# Patient Record
Sex: FEMALE | Race: WHITE | NOT HISPANIC OR LATINO | Employment: OTHER | URBAN - NONMETROPOLITAN AREA
[De-identification: names, ages, dates, MRNs, and addresses within clinical notes are randomized per-mention and may not be internally consistent; named-entity substitution may affect disease eponyms.]

---

## 2017-01-25 ENCOUNTER — AMBULATORY - GICH (OUTPATIENT)
Dept: FAMILY MEDICINE | Facility: OTHER | Age: 82
End: 2017-01-25

## 2017-05-31 ENCOUNTER — AMBULATORY - GICH (OUTPATIENT)
Dept: FAMILY MEDICINE | Facility: OTHER | Age: 82
End: 2017-05-31

## 2017-06-07 ENCOUNTER — AMBULATORY - GICH (OUTPATIENT)
Dept: FAMILY MEDICINE | Facility: OTHER | Age: 82
End: 2017-06-07

## 2017-06-12 ENCOUNTER — AMBULATORY - GICH (OUTPATIENT)
Dept: FAMILY MEDICINE | Facility: OTHER | Age: 82
End: 2017-06-12

## 2017-06-13 ENCOUNTER — COMMUNICATION - GICH (OUTPATIENT)
Dept: INTERNAL MEDICINE | Facility: OTHER | Age: 82
End: 2017-06-13

## 2017-06-13 DIAGNOSIS — D75.89 OTHER SPECIFIED DISEASES OF BLOOD AND BLOOD-FORMING ORGANS: ICD-10-CM

## 2017-06-13 DIAGNOSIS — I10 ESSENTIAL (PRIMARY) HYPERTENSION: ICD-10-CM

## 2017-06-13 DIAGNOSIS — E87.1 HYPO-OSMOLALITY AND HYPONATREMIA (CODE): ICD-10-CM

## 2017-06-13 DIAGNOSIS — E03.9 HYPOTHYROIDISM: ICD-10-CM

## 2017-06-13 DIAGNOSIS — E78.5 HYPERLIPIDEMIA: ICD-10-CM

## 2017-06-13 DIAGNOSIS — N18.30 CHRONIC KIDNEY DISEASE, STAGE III (MODERATE) (H): ICD-10-CM

## 2017-06-15 ENCOUNTER — AMBULATORY - GICH (OUTPATIENT)
Dept: LAB | Facility: OTHER | Age: 82
End: 2017-06-15

## 2017-06-15 DIAGNOSIS — I10 ESSENTIAL (PRIMARY) HYPERTENSION: ICD-10-CM

## 2017-06-15 DIAGNOSIS — E78.5 HYPERLIPIDEMIA: ICD-10-CM

## 2017-06-15 DIAGNOSIS — E87.1 HYPO-OSMOLALITY AND HYPONATREMIA (CODE): ICD-10-CM

## 2017-06-15 DIAGNOSIS — E03.9 HYPOTHYROIDISM: ICD-10-CM

## 2017-06-15 DIAGNOSIS — D75.89 OTHER SPECIFIED DISEASES OF BLOOD AND BLOOD-FORMING ORGANS: ICD-10-CM

## 2017-06-15 DIAGNOSIS — N18.30 CHRONIC KIDNEY DISEASE, STAGE III (MODERATE) (H): ICD-10-CM

## 2017-06-15 LAB
ANION GAP - HISTORICAL: 9 (ref 5–18)
BUN SERPL-MCNC: 35 MG/DL (ref 7–25)
BUN/CREAT RATIO - HISTORICAL: 35
CALCIUM SERPL-MCNC: 9.6 MG/DL (ref 8.6–10.3)
CHLORIDE SERPLBLD-SCNC: 104 MMOL/L (ref 98–107)
CHOL/HDL RATIO - HISTORICAL: 3.06
CHOLESTEROL TOTAL: 162 MG/DL
CO2 SERPL-SCNC: 27 MMOL/L (ref 21–31)
CREAT SERPL-MCNC: 1.01 MG/DL (ref 0.7–1.3)
ERYTHROCYTE [DISTWIDTH] IN BLOOD BY AUTOMATED COUNT: 13.3 % (ref 11.5–15.5)
GFR IF NOT AFRICAN AMERICAN - HISTORICAL: 52 ML/MIN/1.73M2
GLUCOSE SERPL-MCNC: 96 MG/DL (ref 70–105)
HCT VFR BLD AUTO: 48.9 % (ref 33–51)
HDLC SERPL-MCNC: 53 MG/DL (ref 23–92)
HEMOGLOBIN: 16.2 G/DL (ref 12–16)
LDLC SERPL CALC-MCNC: 96 MG/DL
MCH RBC QN AUTO: 31.5 PG (ref 26–34)
MCHC RBC AUTO-ENTMCNC: 33.1 G/DL (ref 32–36)
MCV RBC AUTO: 95 FL (ref 80–100)
NON-HDL CHOLESTEROL - HISTORICAL: 109 MG/DL
PATIENT STATUS - HISTORICAL: NORMAL
PLATELET # BLD AUTO: 170 THOU/CU MM (ref 140–440)
PMV BLD: 10.4 FL (ref 6.5–11)
POTASSIUM SERPL-SCNC: 4.2 MMOL/L (ref 3.5–5.1)
RED BLOOD COUNT - HISTORICAL: 5.15 MIL/CU MM (ref 4–5.2)
SODIUM SERPL-SCNC: 140 MMOL/L (ref 133–143)
TRIGL SERPL-MCNC: 65 MG/DL
TSH - HISTORICAL: 7.48 UIU/ML (ref 0.34–5.6)
VIT B12 SERPL-MCNC: 1466 PG/ML (ref 180–914)
WHITE BLOOD COUNT - HISTORICAL: 5.9 THOU/CU MM (ref 4.5–11)

## 2017-06-16 ENCOUNTER — OFFICE VISIT - GICH (OUTPATIENT)
Dept: PEDIATRICS | Facility: OTHER | Age: 82
End: 2017-06-16

## 2017-06-16 ENCOUNTER — HISTORY (OUTPATIENT)
Dept: PEDIATRICS | Facility: OTHER | Age: 82
End: 2017-06-16

## 2017-06-16 DIAGNOSIS — I10 ESSENTIAL (PRIMARY) HYPERTENSION: ICD-10-CM

## 2017-06-16 DIAGNOSIS — I48.0 PAROXYSMAL ATRIAL FIBRILLATION (H): ICD-10-CM

## 2017-06-16 DIAGNOSIS — E03.9 HYPOTHYROIDISM: ICD-10-CM

## 2017-06-20 ENCOUNTER — AMBULATORY - GICH (OUTPATIENT)
Dept: SCHEDULING | Facility: OTHER | Age: 82
End: 2017-06-20

## 2017-07-31 ENCOUNTER — COMMUNICATION - GICH (OUTPATIENT)
Dept: INTERNAL MEDICINE | Facility: OTHER | Age: 82
End: 2017-07-31

## 2017-08-14 ENCOUNTER — COMMUNICATION - GICH (OUTPATIENT)
Dept: INTERNAL MEDICINE | Facility: OTHER | Age: 82
End: 2017-08-14

## 2017-08-14 ENCOUNTER — AMBULATORY - GICH (OUTPATIENT)
Dept: LAB | Facility: OTHER | Age: 82
End: 2017-08-14

## 2017-08-14 DIAGNOSIS — E03.9 HYPOTHYROIDISM: ICD-10-CM

## 2017-08-14 LAB — TSH - HISTORICAL: 5.49 UIU/ML (ref 0.34–5.6)

## 2017-12-28 NOTE — PROGRESS NOTES
"Patient Information     Patient Name MRJanna Pat [id number] Female 1931      Progress Notes by Nuno Clinton MD at 2017 10:45 AM     Author:  Nuno Clinton MD Service:  (none) Author Type:  Physician     Filed:  2017 12:58 PM Encounter Date:  2017 Status:  Signed     :  Nuno Clinton MD (Physician)            Subjective  Janna Mcdermott is a 86 y.o. female who presents for medication management. History of atrial fibrillation, hypertension which has been stable with losartan and diltiazem. Has chosen against warfarin and normal anticoagulation, but does continue on aspirin 81 mg daily. History of hypothyroidism and has been stable on [place] Thyroid. She eats very healthy. She does physical exercise every day. She has no chest pains. No constipation. No diarrhea. No skin or hair changes. No depression. No insomnia.    Problem List/PMH: reviewed in EMR, and made relevant updates today.  Medications: reviewed in EMR, and made relevant updates today.  Allergies: reviewed in EMR, and made relevant updates today.    Social Hx:  Social History     Substance Use Topics       Smoking status: Never Smoker     Smokeless tobacco: Never Used     Alcohol use No     Social History Narrative    .  Lives in town.  Daughter lives next door and works for the [employer].                      I reviewed social history and made relevant updates today.    Family Hx:   Family History       Problem   Relation Age of Onset     Heart Disease  Mother      Other  Other      No cancer or diabetes in the family       Other  Brother        in service       Other  Brother       of Parkinson's       Heart Disease  Sister             Heart Disease  Sister             Other  Sister      at 6 months       Other  Brother       of pneumonia         Objective  Vitals: reviewed in EMR.  /64  Pulse 68  Ht 1.549 m (5' 1\")  Wt 64 kg (141 lb)  BMI 26.64 " kg/m2    Gen: Pleasant female, NAD.  HEENT: MMM, no OP erythema.   Neck: Supple, no JVD, no bruits.  CV: Irregularly irregular, no murmurs  Pulm: CTAB no w/r/r  GI: Soft, NT, ND.   Neuro: Grossly intact  Msk: No lower extremity edema.  Skin: No concerning lesions.  Psychiatric: Normal affect and insight. Does not appear anxious or depressed.    Results for orders placed or performed in visit on 06/15/17      TSH      Result  Value Ref Range    TSH 7.48 (H) 0.34 - 5.60 uIU/mL   BASIC METABOLIC PANEL      Result  Value Ref Range    SODIUM 140 133 - 143 mmol/L    POTASSIUM 4.2 3.5 - 5.1 mmol/L    CHLORIDE 104 98 - 107 mmol/L    CO2,TOTAL 27 21 - 31 mmol/L    ANION GAP 9 5 - 18                    GLUCOSE 96 70 - 105 mg/dL    CALCIUM 9.6 8.6 - 10.3 mg/dL    BUN 35 (H) 7 - 25 mg/dL    CREATININE 1.01 0.70 - 1.30 mg/dL    BUN/CREAT RATIO           35                    GFR if African American >60 >60 ml/min/1.73m2    GFR if not African American 52 (L) >60 ml/min/1.73m2   LIPID PANEL      Result  Value Ref Range    CHOLESTEROL,TOTAL 162 <200 mg/dL    TRIGLYCERIDES 65 <150 mg/dL    HDL CHOLESTEROL 53 23 - 92 mg/dL    NON-HDL CHOLESTEROL 109 <145 mg/dl    CHOL/HDL RATIO            3.06 <4.50                    LDL CHOLESTEROL 96 <100 mg/dL    PATIENT STATUS            FASTING                   CBC W PLT NO DIFF      Result  Value Ref Range    WHITE BLOOD COUNT         5.9 4.5 - 11.0 thou/cu mm    RED BLOOD COUNT           5.15 4.00 - 5.20 mil/cu mm    HEMOGLOBIN                16.2 (H) 12.0 - 16.0 g/dL    HEMATOCRIT                48.9 33.0 - 51.0 %    MCV                       95 80 - 100 fL    MCH                       31.5 26.0 - 34.0 pg    MCHC                      33.1 32.0 - 36.0 g/dL    RDW                       13.3 11.5 - 15.5 %    PLATELET COUNT            170 140 - 440 thou/cu mm    MPV                       10.4 6.5 - 11.0 fL   VITAMIN B12      Result  Value Ref Range    VITAMIN B12 1466 (H) 180 - 914 pg/mL          Assessment    ICD-10-CM    1. Paroxysmal atrial fibrillation (HC) I48.0 diltiazem CD (CARDIZEM CD) 120 mg extended release 24 hr capsule   2. Hypothyroidism, unspecified type E03.9 thyroid (ARMOUR THYROID) 60 mg tablet      thyroid (ARMOUR THYROID) 90 mg tablet      TSH   3. Essential hypertension with goal blood pressure less than 140/90 I10 losartan (COZAAR) 25 mg tablet      losartan (COZAAR) 50 mg tablet       Atrial fibrillation seems to be persistent at this point however her daughter does report episodes of sinus rhythm on the monitor at home. We again discussed the possibility for anticoagulation which was declined by the patient. Given her elevated TSH we discussed the possibility for increasing her dose of [place] Thyroid and she prefers to recheck and see if the change has been persistent or not. No change to antihypertension regimen at this time. Discussed possible immunizations, patient declines all immunizations.    Plan   -- Expected clinical course discussed   -- Medications and their side effects discussed  Patient Instructions    -- Recheck TSH in 2 months   -- No change to [place] Thyroid today   -- Watch for constipation, depression, hair and skin changes    Return in about 1 year (around 6/16/2018) for medication management.    Signed, Nuno Clinton MD  Internal Medicine & Pediatrics

## 2017-12-28 NOTE — PATIENT INSTRUCTIONS
Patient Information     Patient Name MRJanna Pat [id number] Female 1931      Patient Instructions by Gali Myers at 2017 10:55 AM     Author:  Gali Myers Service:  (none) Author Type:  (none)     Filed:  2017 10:55 AM Encounter Date:  2017 Status:  Signed     :  Gali Myers              Your blood pressure was high today at  BP Readings from Last 1 Encounters:    17 146/84    Recommend follow up with your primary care provider to discuss ways to avoid hypertension.

## 2017-12-28 NOTE — PATIENT INSTRUCTIONS
Patient Information     Patient Name MRN Janna vAery [id number] Female 1931      Patient Instructions by Nuno Clinton MD at 2017 10:45 AM     Author:  Nuno Clinton MD Service:  (none) Author Type:  Physician     Filed:  2017 11:16 AM Encounter Date:  2017 Status:  Signed     :  Nuno Clinton MD (Physician)             -- Recheck TSH in 2 months   -- No change to [place] Thyroid today   -- Watch for constipation, depression, hair and skin changes

## 2017-12-28 NOTE — TELEPHONE ENCOUNTER
Patient Information     Patient Name MRJanna Pat [id number] Female 1931      Telephone Encounter by Kiara Barreto at 2017  8:53 AM     Author:  Kiara Barreto Service:  (none) Author Type:  (none)     Filed:  2017  8:55 AM Encounter Date:  2017 Status:  Signed     :  Kiara Barreto            [facility]- Pt wants an order for blood work. Pt has an appt with him on Friday and would like to have the blood work done on Thursday.

## 2017-12-28 NOTE — TELEPHONE ENCOUNTER
Patient Information     Patient Name MRJanna Pat [id number] Female 1931      Telephone Encounter by Laura Lundberg at 2017  4:36 PM     Author:  Laura Lundberg Service:  (none) Author Type:  (none)     Filed:  2017  4:38 PM Encounter Date:  2017 Status:  Signed     :  Laura Lundberg            Patient would like to know if she can have her T3 and T4 checked as well with the blood they osiris today.  Read the letter of results to her today.  Laura Lundberg LPN ....................  2017   4:38 PM

## 2017-12-28 NOTE — TELEPHONE ENCOUNTER
Patient Information     Patient Name MRJanna Pat [id number] Female 1931      Telephone Encounter by Nancy Guzman at 2017 11:39 AM     Author:  Nancy Guzman Service:  (none) Author Type:  (none)     Filed:  2017 11:48 AM Encounter Date:  2017 Status:  Signed     :  Nancy Guzman            Daughter will write a letter for Dr. Clinton to sign, stating patient should be able to bring her own food to the [facility] since she is on a restricted diet for her health issues.  Will drop it off at [address].  Nancy Guzman LPNAOMI..........2017  11:47 AM

## 2017-12-28 NOTE — TELEPHONE ENCOUNTER
Patient Information     Patient Name MRN Sex Janna White [id number] Female 1931      Telephone Encounter by Nuno Clinton MD at 2017  4:55 PM     Author:  Nuno Clinton MD Service:  (none) Author Type:  Physician     Filed:  2017  4:55 PM Encounter Date:  2017 Status:  Signed     :  Nuno Clinton MD (Physician)            TSH (uIU/mL)    Date Value   2017 5.49       TSH was normal. Checking T3 and T4 are not necessary.    Signed, Nuno Clinton MD  Internal Medicine & Pediatrics

## 2017-12-28 NOTE — TELEPHONE ENCOUNTER
Patient Information     Patient Name MRN Janna Avery [id number] Female 1931      Telephone Encounter by Cassandra Adames at 2017 10:45 AM     Author:  Cassandra Adames Service:  (none) Author Type:  (none)     Filed:  2017 10:46 AM Encounter Date:  2017 Status:  Signed     :  Cassandra Adames            Patient wants this lab work done also.hemoglobin, cholseterol , b 12 .  Cassandra Adames LPN ....................2017  10:46 AM

## 2017-12-29 NOTE — PATIENT INSTRUCTIONS
Patient Information     Patient Name Janna Berger [id number] Female 1931      Patient Instructions by Kristan Fine at 2017  9:36 AM     Author:  Kristan Fine Service:  (none) Author Type:  (none)     Filed:  2017  9:36 AM Encounter Date:  2017 Status:  Signed     :  Kristan Fine              Your blood pressure was high today at  BP Readings from Last 1 Encounters:    17 136/68    Recommend follow up with your primary care provider to discuss ways to avoid hypertension.

## 2017-12-30 NOTE — NURSING NOTE
Patient Information     Patient Name MRN Janna Avery [id number] Female 1931      Nursing Note by Laura Lundberg at 2017 10:45 AM     Author:  Laura Lundberg Service:  (none) Author Type:  (none)     Filed:  2017 11:08 AM Encounter Date:  2017 Status:  Signed     :  Laura Lundberg            Patient presents to clinic for medication management today.  No other concerns today. Had lad drawn yesterday.  Laura Lundberg LPN ....................  2017   10:50 AM

## 2018-01-27 VITALS
SYSTOLIC BLOOD PRESSURE: 136 MMHG | BODY MASS INDEX: 26.62 KG/M2 | HEART RATE: 68 BPM | HEIGHT: 61 IN | DIASTOLIC BLOOD PRESSURE: 64 MMHG | WEIGHT: 141 LBS

## 2018-01-27 VITALS — SYSTOLIC BLOOD PRESSURE: 136 MMHG | DIASTOLIC BLOOD PRESSURE: 68 MMHG | HEART RATE: 74 BPM

## 2018-01-27 VITALS — SYSTOLIC BLOOD PRESSURE: 169 MMHG | DIASTOLIC BLOOD PRESSURE: 78 MMHG | HEART RATE: 62 BPM

## 2018-01-27 VITALS — SYSTOLIC BLOOD PRESSURE: 146 MMHG | DIASTOLIC BLOOD PRESSURE: 84 MMHG

## 2018-01-27 VITALS — SYSTOLIC BLOOD PRESSURE: 144 MMHG | HEART RATE: 79 BPM | DIASTOLIC BLOOD PRESSURE: 68 MMHG

## 2018-02-13 ENCOUNTER — DOCUMENTATION ONLY (OUTPATIENT)
Dept: FAMILY MEDICINE | Facility: OTHER | Age: 83
End: 2018-02-13

## 2018-02-13 PROBLEM — E03.9 HYPOTHYROIDISM: Status: ACTIVE | Noted: 2018-02-13

## 2018-02-13 PROBLEM — I10 HYPERTENSION: Status: ACTIVE | Noted: 2018-02-13

## 2018-02-13 PROBLEM — E78.5 HYPERLIPIDEMIA: Status: ACTIVE | Noted: 2018-02-13

## 2018-02-13 RX ORDER — FLUOCINOLONE ACETONIDE 0.1 MG/G
CREAM TOPICAL 2 TIMES DAILY
COMMUNITY
Start: 2014-11-24 | End: 2020-08-25

## 2018-02-13 RX ORDER — DILTIAZEM HYDROCHLORIDE 120 MG/1
120 CAPSULE, COATED, EXTENDED RELEASE ORAL DAILY
COMMUNITY
Start: 2017-06-16 | End: 2018-08-17

## 2018-02-13 RX ORDER — CHLORAL HYDRATE 500 MG
1000 CAPSULE ORAL DAILY
COMMUNITY

## 2018-02-13 RX ORDER — THYROID 60 MG/1
1 TABLET ORAL EVERY OTHER DAY
COMMUNITY
Start: 2017-06-16 | End: 2018-07-26

## 2018-02-13 RX ORDER — LOSARTAN POTASSIUM 50 MG/1
75 TABLET ORAL DAILY
COMMUNITY
Start: 2017-06-16 | End: 2018-08-17

## 2018-02-13 RX ORDER — SESAME OIL
1 OIL (ML) MISCELLANEOUS DAILY
COMMUNITY

## 2018-02-13 RX ORDER — THYROID 90 MG/1
1 TABLET ORAL EVERY OTHER DAY
COMMUNITY
Start: 2017-06-16 | End: 2018-07-26

## 2018-02-13 RX ORDER — LOSARTAN POTASSIUM 25 MG/1
75 TABLET ORAL DAILY
COMMUNITY
Start: 2017-06-16 | End: 2018-08-17

## 2018-07-23 NOTE — PROGRESS NOTES
Patient Information     Patient Name  Janna Mcdermott MRN  [id number] Sex  Female   1931      Letter by Nuno Clinton MD at      Author:  Nuno Clinton MD Service:  (none) Author Type:  (none)    Filed:   Encounter Date:  2017 Status:  (Other)           Janna Mcdermott  [address] [facility] [address]          2017    Dear Ms. Mcdermott:    Looks good.    Results for orders placed or performed in visit on 17      TSH      Result  Value Ref Range    TSH 5.49 0.34 - 5.60 uIU/mL     If you have any questions or concerns, please call the clinic at [phone].    Signed, Nuno Clinton MD  Internal Medicine & Pediatrics

## 2018-07-23 NOTE — PROGRESS NOTES
Patient Information     Patient Name  Janna Mcdermott MRN  [id number] Sex  Female   1931      Letter by Nuno Clinton MD at      Author:  Nuno Clinton MD Service:  (none) Author Type:  (none)    Filed:   Encounter Date:  2017 Status:  (Other)           Janna Mcdermott  [address] [facility] [address]          2017    Dear Ms. Mcdermott:    As discussed.    Results for orders placed or performed in visit on 06/15/17      TSH      Result  Value Ref Range    TSH 7.48 (H) 0.34 - 5.60 uIU/mL   BASIC METABOLIC PANEL      Result  Value Ref Range    SODIUM 140 133 - 143 mmol/L    POTASSIUM 4.2 3.5 - 5.1 mmol/L    CHLORIDE 104 98 - 107 mmol/L    CO2,TOTAL 27 21 - 31 mmol/L    ANION GAP 9 5 - 18                    GLUCOSE 96 70 - 105 mg/dL    CALCIUM 9.6 8.6 - 10.3 mg/dL    BUN 35 (H) 7 - 25 mg/dL    CREATININE 1.01 0.70 - 1.30 mg/dL    BUN/CREAT RATIO           35                    GFR if African American >60 >60 ml/min/1.73m2    GFR if not African American 52 (L) >60 ml/min/1.73m2   LIPID PANEL      Result  Value Ref Range    CHOLESTEROL,TOTAL 162 <200 mg/dL    TRIGLYCERIDES 65 <150 mg/dL    HDL CHOLESTEROL 53 23 - 92 mg/dL    NON-HDL CHOLESTEROL 109 <145 mg/dl    CHOL/HDL RATIO            3.06 <4.50                    LDL CHOLESTEROL 96 <100 mg/dL    PATIENT STATUS            FASTING                   CBC W PLT NO DIFF      Result  Value Ref Range    WHITE BLOOD COUNT         5.9 4.5 - 11.0 thou/cu mm    RED BLOOD COUNT           5.15 4.00 - 5.20 mil/cu mm    HEMOGLOBIN                16.2 (H) 12.0 - 16.0 g/dL    HEMATOCRIT                48.9 33.0 - 51.0 %    MCV                       95 80 - 100 fL    MCH                       31.5 26.0 - 34.0 pg    MCHC                      33.1 32.0 - 36.0 g/dL    RDW                       13.3 11.5 - 15.5 %    PLATELET COUNT            170 140 - 440 thou/cu mm    MPV                       10.4 6.5 - 11.0 fL   VITAMIN B12      Result   Value Ref Range    VITAMIN B12 1466 (H) 180 - 914 pg/mL     If you have any questions or concerns, please call the clinic at [phone].    Signed, Nuno Clinton MD  Internal Medicine & Pediatrics

## 2018-07-26 DIAGNOSIS — E03.9 HYPOTHYROIDISM, UNSPECIFIED TYPE: Primary | ICD-10-CM

## 2018-07-30 RX ORDER — THYROID 60 MG
TABLET ORAL
Qty: 45 TABLET | Refills: 0 | Status: SHIPPED | OUTPATIENT
Start: 2018-07-30 | End: 2018-08-17

## 2018-07-30 RX ORDER — THYROID,PORK 90 MG
TABLET ORAL
Qty: 45 TABLET | Refills: 0 | Status: SHIPPED | OUTPATIENT
Start: 2018-07-30 | End: 2018-08-17

## 2018-07-30 NOTE — TELEPHONE ENCOUNTER
Prescription approved per [facility] according to the [facility] Refill Protocol.  LOV: 6/16/17  Last TSH: 8/14/17  Patient is due for annual review  Patient notified and transferred to scheduling.  Request came over as [facility] [place] Thyroid, current medication list and care Everywhere not thyroid ([place] Thyroid).   Will call and verify with pharmacy per pharmacy patient has been getting [place] Thyroid  Mimi refill given  Bessie Marroquin RN on 7/30/2018 at 8:08 AM

## 2018-07-30 NOTE — TELEPHONE ENCOUNTER
Patient calling regarding refill on [place] thyroid, states has one pill left and needing refill today.

## 2018-07-31 ENCOUNTER — TELEPHONE (OUTPATIENT)
Dept: PEDIATRICS | Facility: OTHER | Age: 83
End: 2018-07-31

## 2018-07-31 DIAGNOSIS — I10 ESSENTIAL HYPERTENSION: ICD-10-CM

## 2018-07-31 DIAGNOSIS — E03.9 ACQUIRED HYPOTHYROIDISM: ICD-10-CM

## 2018-07-31 DIAGNOSIS — I48.0 PAROXYSMAL ATRIAL FIBRILLATION (H): ICD-10-CM

## 2018-07-31 DIAGNOSIS — N18.30 CHRONIC KIDNEY DISEASE, STAGE III (MODERATE) (H): ICD-10-CM

## 2018-07-31 DIAGNOSIS — E78.2 MIXED HYPERLIPIDEMIA: Primary | ICD-10-CM

## 2018-07-31 PROBLEM — E78.5 HYPERLIPIDEMIA: Status: RESOLVED | Noted: 2018-02-13 | Resolved: 2018-07-31

## 2018-08-16 DIAGNOSIS — N18.30 CHRONIC KIDNEY DISEASE, STAGE III (MODERATE) (H): ICD-10-CM

## 2018-08-16 DIAGNOSIS — E03.9 ACQUIRED HYPOTHYROIDISM: ICD-10-CM

## 2018-08-16 DIAGNOSIS — I10 ESSENTIAL HYPERTENSION: ICD-10-CM

## 2018-08-16 DIAGNOSIS — I48.0 PAROXYSMAL ATRIAL FIBRILLATION (H): ICD-10-CM

## 2018-08-16 DIAGNOSIS — E78.2 MIXED HYPERLIPIDEMIA: ICD-10-CM

## 2018-08-16 LAB
ANION GAP SERPL CALCULATED.3IONS-SCNC: 6 MMOL/L (ref 3–14)
BUN SERPL-MCNC: 31 MG/DL (ref 7–25)
CALCIUM SERPL-MCNC: 9.7 MG/DL (ref 8.6–10.3)
CHLORIDE SERPL-SCNC: 105 MMOL/L (ref 98–107)
CHOLEST SERPL-MCNC: 167 MG/DL
CO2 SERPL-SCNC: 28 MMOL/L (ref 21–31)
CREAT SERPL-MCNC: 0.9 MG/DL (ref 0.6–1.2)
ERYTHROCYTE [DISTWIDTH] IN BLOOD BY AUTOMATED COUNT: 13.4 % (ref 10–15)
GFR SERPL CREATININE-BSD FRML MDRD: 59 ML/MIN/1.7M2
GLUCOSE SERPL-MCNC: 96 MG/DL (ref 70–105)
HCT VFR BLD AUTO: 48.4 % (ref 35–47)
HDLC SERPL-MCNC: 57 MG/DL (ref 23–92)
HGB BLD-MCNC: 16.1 G/DL (ref 11.7–15.7)
LDLC SERPL CALC-MCNC: 94 MG/DL
MCH RBC QN AUTO: 31.3 PG (ref 26.5–33)
MCHC RBC AUTO-ENTMCNC: 33.3 G/DL (ref 31.5–36.5)
MCV RBC AUTO: 94 FL (ref 78–100)
NONHDLC SERPL-MCNC: 110 MG/DL
PLATELET # BLD AUTO: 191 10E9/L (ref 150–450)
POTASSIUM SERPL-SCNC: 4 MMOL/L (ref 3.5–5.1)
RBC # BLD AUTO: 5.15 10E12/L (ref 3.8–5.2)
SODIUM SERPL-SCNC: 139 MMOL/L (ref 134–144)
TRIGL SERPL-MCNC: 78 MG/DL
TSH SERPL DL<=0.05 MIU/L-ACNC: 9.71 IU/ML (ref 0.34–5.6)
WBC # BLD AUTO: 6.9 10E9/L (ref 4–11)

## 2018-08-16 PROCEDURE — 80048 BASIC METABOLIC PNL TOTAL CA: CPT | Performed by: INTERNAL MEDICINE

## 2018-08-16 PROCEDURE — 85027 COMPLETE CBC AUTOMATED: CPT | Performed by: INTERNAL MEDICINE

## 2018-08-16 PROCEDURE — 84443 ASSAY THYROID STIM HORMONE: CPT | Performed by: INTERNAL MEDICINE

## 2018-08-16 PROCEDURE — 36415 COLL VENOUS BLD VENIPUNCTURE: CPT | Performed by: INTERNAL MEDICINE

## 2018-08-16 PROCEDURE — 80061 LIPID PANEL: CPT | Performed by: INTERNAL MEDICINE

## 2018-08-17 ENCOUNTER — OFFICE VISIT (OUTPATIENT)
Dept: PEDIATRICS | Facility: OTHER | Age: 83
End: 2018-08-17
Attending: INTERNAL MEDICINE
Payer: COMMERCIAL

## 2018-08-17 VITALS
DIASTOLIC BLOOD PRESSURE: 72 MMHG | SYSTOLIC BLOOD PRESSURE: 124 MMHG | BODY MASS INDEX: 27.48 KG/M2 | WEIGHT: 140 LBS | HEIGHT: 60 IN | HEART RATE: 56 BPM

## 2018-08-17 DIAGNOSIS — E03.9 ACQUIRED HYPOTHYROIDISM: Primary | ICD-10-CM

## 2018-08-17 DIAGNOSIS — E78.2 MIXED HYPERLIPIDEMIA: ICD-10-CM

## 2018-08-17 DIAGNOSIS — I10 ESSENTIAL HYPERTENSION: ICD-10-CM

## 2018-08-17 PROCEDURE — 99397 PER PM REEVAL EST PAT 65+ YR: CPT | Mod: GY | Performed by: INTERNAL MEDICINE

## 2018-08-17 PROCEDURE — G0463 HOSPITAL OUTPT CLINIC VISIT: HCPCS

## 2018-08-17 RX ORDER — THYROID,PORK 90 MG
TABLET ORAL
Qty: 45 TABLET | Refills: 3 | Status: SHIPPED | OUTPATIENT
Start: 2018-08-17 | End: 2019-08-22

## 2018-08-17 RX ORDER — LOSARTAN POTASSIUM 25 MG/1
TABLET ORAL
Qty: 90 TABLET | Refills: 3 | Status: SHIPPED | OUTPATIENT
Start: 2018-08-17 | End: 2019-08-22

## 2018-08-17 RX ORDER — DILTIAZEM HYDROCHLORIDE 120 MG/1
120 CAPSULE, COATED, EXTENDED RELEASE ORAL DAILY
Qty: 90 CAPSULE | Refills: 3 | Status: SHIPPED | OUTPATIENT
Start: 2018-08-17 | End: 2019-08-22

## 2018-08-17 RX ORDER — LOSARTAN POTASSIUM 50 MG/1
TABLET ORAL
Qty: 90 TABLET | Refills: 3 | Status: SHIPPED | OUTPATIENT
Start: 2018-08-17 | End: 2019-08-22

## 2018-08-17 RX ORDER — THYROID 60 MG
TABLET ORAL
Qty: 45 TABLET | Refills: 3 | Status: SHIPPED | OUTPATIENT
Start: 2018-08-17 | End: 2019-08-22

## 2018-08-17 ASSESSMENT — PAIN SCALES - GENERAL: PAINLEVEL: NO PAIN (0)

## 2018-08-17 NOTE — MR AVS SNAPSHOT
After Visit Summary   8/17/2018    Janna Mcdermott    MRN: [id number]           Patient Information     Date Of Birth          2/23/1931        Visit Information        Provider Department      8/17/2018 11:00 AM Nuno Clinton MD [facility]        Today's Diagnoses     Acquired hypothyroidism    -  1    Essential hypertension        Mixed hyperlipidemia          Care Instructions     -- Recheck your Thyroid in 3 months          Follow-ups after your visit        Future tests that were ordered for you today     Open Future Orders        Priority Expected Expires Ordered    Thyrotropin GH Routine 11/17/2018 8/18/2019 8/17/2018    T4, Free Routine 11/17/2018 8/17/2019 8/17/2018    T3, Free Routine 11/17/2018 8/17/2019 8/17/2018            Who to contact     If you have questions or need follow up information about today's clinic visit or your schedule please contact [facility] directly at [phone].  Normal or non-critical lab and imaging results will be communicated to you by MyChart, letter or phone within 4 business days after the clinic has received the results. If you do not hear from us within 7 days, please contact the clinic through [employer] or phone. If you have a critical or abnormal lab result, we will notify you by phone as soon as possible.  Submit refill requests through [employer] or call your pharmacy and they will forward the refill request to us. Please allow 3 business days for your refill to be completed.          Additional Information About Your Visit        Care EveryWhere ID     This is your Care EveryWhere ID. This could be used by other organizations to access your [facility] medical records  [id number]        Your Vitals Were     Pulse Height Breastfeeding? BMI (Body Mass Index)          56 5' (1.524 m) No 27.34 kg/m2         Blood Pressure from Last 3 Encounters:   08/17/18 124/72   06/16/17 136/64   06/12/17 136/68     Weight from Last 3 Encounters:   08/17/18 140 lb (63.5 kg)   06/16/17 141 lb (64 kg)   09/19/16 134 lb (60.8 kg)                 Today's Medication Changes          These changes are accurate as of 8/17/18 11:30 AM.  If you have any questions, ask your nurse or doctor.               These medicines have changed or have updated prescriptions.        Dose/Directions    * losartan 25 MG tablet   Commonly known as:  COZAAR   This may have changed:    - how much to take  - how to take this  - when to take this  - additional instructions   Used for:  Essential hypertension   Changed by:  Nuno Clinton MD        Take 75 mg by mouth daily   Quantity:  90 tablet   Refills:  3       * losartan 50 MG tablet   Commonly known as:  COZAAR   This may have changed:    - how much to take  - how to take this  - when to take this  - additional instructions   Used for:  Essential hypertension   Changed by:  Nuno Clinton MD        Take 75 mg by mouth daily   Quantity:  90 tablet   Refills:  3       * Notice:  This list has 2 medication(s) that are the same as other medications prescribed for you. Read the directions carefully, and ask your doctor or other care provider to review them with you.         Where to get your medicines      These medications were sent to [facility] [address] [place] [id number]     Phone:  [phone]     ARMOUR THYROID 60 MG tablet    ARMOUR THYROID 90 MG Tabs    diltiazem 120 MG 24 hr capsule    losartan 25 MG tablet    losartan 50 MG tablet                Primary Care Provider Office Phone # Fax #    Nuno Clinton [phone]       [address] [facility] [address]        Equal Access to Services     [facility]: Hadii jeannette Lambert, endy bourne, qajuan kolb, rey franco. So [facility] [phone].    ATENCIÓN: Taurus brennan  español, tiene a andrews disposición servicios gratuitos de asistencia lingüística. Jarad smith [phone].    We comply with applicable federal civil rights laws and [place] laws. We do not discriminate on the basis of race, color, national origin, age, disability, sex, sexual orientation, or gender identity.            Thank you!     Thank you for choosing [facility]  for your care. Our goal is always to provide you with excellent care. Hearing back from our patients is one way we can continue to improve our services. Please take a few minutes to complete the written survey that you may receive in the mail after your visit with us. Thank you!             Your Updated Medication List - Protect others around you: Learn how to safely use, store and throw away your medicines at www.disposemymeds.org.          This list is accurate as of 8/17/18 11:30 AM.  Always use your most recent med list.                   Brand Name Dispense Instructions for use Diagnosis    * ARMOUR THYROID 60 MG tablet   Generic drug:  thyroid     45 tablet    TAKE ONE TABLET BY MOUTH EVERY OTHER DAY    Acquired hypothyroidism       * ARMOUR THYROID 90 MG Tabs   Generic drug:  Thyroid     45 tablet    TAKE ONE TABLET BY MOUTH EVERY OTHER DAY    Acquired hypothyroidism       aspirin 81 MG tablet      Take 81 mg by mouth daily with food        [employer] VITAMIN D3 400 units Caps   Generic drug:  Cholecalciferol      Take 800 Units by mouth daily        diltiazem 120 MG 24 hr capsule    CARDIZEM CD/CARTIA XT    90 capsule    Take 1 capsule (120 mg) by mouth daily    Essential hypertension       fish oil-omega-3 fatty acids 1000 MG capsule      Take 1,000 capsules by mouth daily        Flaxseed Oil Oil      Take by mouth daily        fluocinolone 0.01 % cream    SYNALAR     Apply topically 2 times daily        * losartan 25 MG tablet    COZAAR    90 tablet    Take 75 mg by mouth daily    Essential hypertension       * losartan 50 MG  tablet    COZAAR    90 tablet    Take 75 mg by mouth daily    Essential hypertension       Sesame Oil Oil      Take by mouth daily        * Notice:  This list has 4 medication(s) that are the same as other medications prescribed for you. Read the directions carefully, and ask your doctor or other care provider to review them with you.

## 2018-08-17 NOTE — LETTER
August 17, 2018      Janna Mcdermott  [address] [facility] [address]        Dear ,    Here are are your results we discussed in clinic.    Results for orders placed or performed in visit on 08/16/18   Basic metabolic panel   Result Value Ref Range    Sodium 139 134 - 144 mmol/L    Potassium 4.0 3.5 - 5.1 mmol/L    Chloride 105 98 - 107 mmol/L    Carbon Dioxide 28 21 - 31 mmol/L    Anion Gap 6 3 - 14 mmol/L    Glucose 96 70 - 105 mg/dL    Urea Nitrogen 31 (H) 7 - 25 mg/dL    Creatinine 0.90 0.60 - 1.20 mg/dL    GFR Estimate 59 (L) >60 mL/min/1.7m2    GFR Estimate If Black 72 >60 mL/min/1.7m2    Calcium 9.7 8.6 - 10.3 mg/dL   CBC with platelets   Result Value Ref Range    WBC 6.9 4.0 - 11.0 10e9/L    RBC Count 5.15 3.8 - 5.2 10e12/L    Hemoglobin 16.1 (H) 11.7 - 15.7 g/dL    Hematocrit 48.4 (H) 35.0 - 47.0 %    MCV 94 78 - 100 fl    MCH 31.3 26.5 - 33.0 pg    MCHC 33.3 31.5 - 36.5 g/dL    RDW 13.4 10.0 - 15.0 %    Platelet Count 191 150 - 450 10e9/L   Lipid Profile   Result Value Ref Range    Cholesterol 167 <200 mg/dL    Triglycerides 78 <150 mg/dL    HDL Cholesterol 57 23 - 92 mg/dL    LDL Cholesterol Calculated 94 <100 mg/dL    Non HDL Cholesterol 110 <130 mg/dL   Thyrotropin GH   Result Value Ref Range    Thyrotropin 9.71 (H) 0.34 - 5.60 IU/mL       If you have any questions or concerns, please call the clinic at the number listed above.       Sincerely,        Nuno Clinton MD

## 2018-08-17 NOTE — PROGRESS NOTES
Subjective  Janna Mcdermott is a 87 year old female who presents for annual physical.  She has a history of hypothyroidism which has been stable on [place] Thyroid alternating between 90 and 60 mg.  No constipation, no skin or hair changes, no mood changes, no water retention.  History of hyperlipidemia which has been stable with fish oil.  History of hypertension which is stable on losartan 75 mg daily.  History of paroxysmal atrial fibrillation which is stable on diltiazem.  She has had no chest pain with exertion.  She has been checking her blood pressure at the [facility] and it has been great, in the 120 range.  She continues to be very active, goes to [employer] 5 days per week.    Problem List/PMH: reviewed in EMR, and made relevant updates today.  Medications: reviewed in EMR, and made relevant updates today.  Allergies: reviewed in EMR, and made relevant updates today.    Social Hx:  Social History   Substance Use Topics     Smoking status: Never Smoker     Smokeless tobacco: Never Used     Alcohol use No     Social History     Social History Narrative    .  Lives in town.  Daughter lives next door and works for the [employer].     I reviewed social history and made relevant updates today.    Family Hx:   Family History   Problem Relation Age of Onset     HEART DISEASE Mother      Heart Disease     Other - See Comments Other      No cancer or diabetes in the family     Other - See Comments Brother        in service     Other - See Comments Brother       of Parkinson's     HEART DISEASE Sister      Heart Disease,     HEART DISEASE Sister      Heart Disease,     Other - See Comments Sister      at 6 months     Other - See Comments Brother       of pneumonia       Objective  Vitals: reviewed in EMR.  /72 (BP Location: Right arm, Patient Position: Sitting, Cuff Size: Adult Large)  Pulse 56  Ht 5' (1.524 m)  Wt 140 lb (63.5 kg)  Breastfeeding? No  BMI 27.34 kg/m2    Gen:  Pleasant female, NAD.  HEENT: MMM, no OP erythema.   Neck: Supple, no JVD, no bruits. No thyromegaly.  CV: RRR no m/r/g.   Pulm: CTAB no w/r/r  Neuro: Grossly intact  Msk: No lower extremity edema.  Skin: No concerning lesions.  Psychiatric: Normal affect and insight. Does not appear anxious or depressed.    Results for orders placed or performed in visit on 08/16/18   Basic metabolic panel   Result Value Ref Range    Sodium 139 134 - 144 mmol/L    Potassium 4.0 3.5 - 5.1 mmol/L    Chloride 105 98 - 107 mmol/L    Carbon Dioxide 28 21 - 31 mmol/L    Anion Gap 6 3 - 14 mmol/L    Glucose 96 70 - 105 mg/dL    Urea Nitrogen 31 (H) 7 - 25 mg/dL    Creatinine 0.90 0.60 - 1.20 mg/dL    GFR Estimate 59 (L) >60 mL/min/1.7m2    GFR Estimate If Black 72 >60 mL/min/1.7m2    Calcium 9.7 8.6 - 10.3 mg/dL   CBC with platelets   Result Value Ref Range    WBC 6.9 4.0 - 11.0 10e9/L    RBC Count 5.15 3.8 - 5.2 10e12/L    Hemoglobin 16.1 (H) 11.7 - 15.7 g/dL    Hematocrit 48.4 (H) 35.0 - 47.0 %    MCV 94 78 - 100 fl    MCH 31.3 26.5 - 33.0 pg    MCHC 33.3 31.5 - 36.5 g/dL    RDW 13.4 10.0 - 15.0 %    Platelet Count 191 150 - 450 10e9/L   Lipid Profile   Result Value Ref Range    Cholesterol 167 <200 mg/dL    Triglycerides 78 <150 mg/dL    HDL Cholesterol 57 23 - 92 mg/dL    LDL Cholesterol Calculated 94 <100 mg/dL    Non HDL Cholesterol 110 <130 mg/dL   Thyrotropin GH   Result Value Ref Range    Thyrotropin 9.71 (H) 0.34 - 5.60 IU/mL         Assessment    ICD-10-CM    1. Acquired hypothyroidism E03.9 Thyrotropin GH     T4, Free     T3, Free     ARMOUR THYROID 60 MG tablet     ARMOUR THYROID 90 MG TABS   2. Essential hypertension I10 diltiazem (CARDIZEM CD/CARTIA XT) 120 MG 24 hr capsule     losartan (COZAAR) 25 MG tablet     losartan (COZAAR) 50 MG tablet   3. Mixed hyperlipidemia E78.2      Orders Placed This Encounter   Procedures     Thyrotropin GH     T4, Free     T3, Free       Medications were reviewed, renewed today.   Immunizations offered, patient declined.  TSH slightly increased so discussed options including dose adjustments.  Ultimately we decided to leave [place] Thyroid dose is stable at this point in time and recheck in 3 months.  Patient requests also checking free T3 and T4 at that time.  She will likely call her endocrinologist at that point to discuss a dose change.    Plan   -- Expected clinical course discussed   -- Medications and their side effects discussed  Patient Instructions    -- Recheck your Thyroid in 3 months    Signed, Nuno Clinton MD  Internal Medicine & Pediatrics

## 2018-08-17 NOTE — NURSING NOTE
Chief Complaint   Patient presents with     Physical   Pt present to clinic today for annual physical. She has no concerns today.  Kristie Dubose LPN on 8/17/2018 at 10:56 AM     Initial /72 (BP Location: Right arm, Patient Position: Sitting, Cuff Size: Adult Large)  Pulse 56  Ht 5' (1.524 m)  Wt 140 lb (63.5 kg)  Breastfeeding? No  BMI 27.34 kg/m2 Estimated body mass index is 27.34 kg/(m^2) as calculated from the following:    Height as of this encounter: 5' (1.524 m).    Weight as of this encounter: 140 lb (63.5 kg).  Medication Reconciliation: complete    Kristie Dubose LPN

## 2018-08-20 ENCOUNTER — TELEPHONE (OUTPATIENT)
Dept: PEDIATRICS | Facility: OTHER | Age: 83
End: 2018-08-20

## 2018-08-20 NOTE — TELEPHONE ENCOUNTER
Called and spoke with patient after proper verification. Informed patient of below message. Patient stated understanding and no further questions at this time.   Elham Lechuga LPN............. August 20, 2018 10:41 AM

## 2018-08-20 NOTE — TELEPHONE ENCOUNTER
Called and spoke with patient after proper verification. Patient states she seen Nuno Clinton MD on 8/17 and had a T3 and T4 lab ordered, but can't see them on mychart. Patient is looking for these results. Please advise.   Elham Lechuga LPN............. August 20, 2018 10:17 AM

## 2018-11-15 DIAGNOSIS — E03.9 ACQUIRED HYPOTHYROIDISM: ICD-10-CM

## 2018-11-15 LAB
T3FREE SERPL-MCNC: 3.9 PG/ML (ref 2.5–3.9)
T4 FREE SERPL-MCNC: 0.86 NG/DL (ref 0.6–1.6)
TSH SERPL DL<=0.05 MIU/L-ACNC: 6.8 IU/ML (ref 0.34–5.6)

## 2018-11-15 PROCEDURE — 36415 COLL VENOUS BLD VENIPUNCTURE: CPT | Performed by: INTERNAL MEDICINE

## 2018-11-15 PROCEDURE — 84481 FREE ASSAY (FT-3): CPT | Performed by: INTERNAL MEDICINE

## 2018-11-15 PROCEDURE — 84439 ASSAY OF FREE THYROXINE: CPT | Performed by: INTERNAL MEDICINE

## 2018-11-15 PROCEDURE — 84443 ASSAY THYROID STIM HORMONE: CPT | Performed by: INTERNAL MEDICINE

## 2018-11-19 ENCOUNTER — TELEPHONE (OUTPATIENT)
Dept: PEDIATRICS | Facility: OTHER | Age: 83
End: 2018-11-19

## 2018-11-19 NOTE — TELEPHONE ENCOUNTER
Patient notified that letter was written and read to her.  She will call if she wants to change her thyroid medication.  Laura Lundberg LPN ....................  11/19/2018   2:30 PM

## 2019-08-05 ENCOUNTER — TELEPHONE (OUTPATIENT)
Dept: PEDIATRICS | Facility: OTHER | Age: 84
End: 2019-08-05

## 2019-08-05 DIAGNOSIS — E03.9 ACQUIRED HYPOTHYROIDISM: Chronic | ICD-10-CM

## 2019-08-05 DIAGNOSIS — N18.30 CHRONIC KIDNEY DISEASE, STAGE III (MODERATE) (H): ICD-10-CM

## 2019-08-05 DIAGNOSIS — E78.2 MIXED HYPERLIPIDEMIA: Primary | Chronic | ICD-10-CM

## 2019-08-05 DIAGNOSIS — I10 ESSENTIAL HYPERTENSION: Chronic | ICD-10-CM

## 2019-08-21 ENCOUNTER — ALLIED HEALTH/NURSE VISIT (OUTPATIENT)
Dept: FAMILY MEDICINE | Facility: OTHER | Age: 84
End: 2019-08-21
Attending: INTERNAL MEDICINE
Payer: MEDICARE

## 2019-08-21 VITALS — DIASTOLIC BLOOD PRESSURE: 72 MMHG | SYSTOLIC BLOOD PRESSURE: 120 MMHG | HEART RATE: 60 BPM

## 2019-08-21 DIAGNOSIS — Z01.30 BLOOD PRESSURE CHECK: Primary | ICD-10-CM

## 2019-08-21 PROCEDURE — G0463 HOSPITAL OUTPT CLINIC VISIT: HCPCS

## 2019-08-22 ENCOUNTER — OFFICE VISIT (OUTPATIENT)
Dept: PEDIATRICS | Facility: OTHER | Age: 84
End: 2019-08-22
Attending: INTERNAL MEDICINE
Payer: MEDICARE

## 2019-08-22 VITALS
RESPIRATION RATE: 16 BRPM | WEIGHT: 138 LBS | SYSTOLIC BLOOD PRESSURE: 116 MMHG | BODY MASS INDEX: 27.09 KG/M2 | HEIGHT: 60 IN | OXYGEN SATURATION: 97 % | HEART RATE: 86 BPM | DIASTOLIC BLOOD PRESSURE: 72 MMHG | TEMPERATURE: 97.3 F

## 2019-08-22 DIAGNOSIS — N18.30 CHRONIC KIDNEY DISEASE, STAGE III (MODERATE) (H): ICD-10-CM

## 2019-08-22 DIAGNOSIS — E03.9 ACQUIRED HYPOTHYROIDISM: Chronic | ICD-10-CM

## 2019-08-22 DIAGNOSIS — I10 ESSENTIAL HYPERTENSION: Chronic | ICD-10-CM

## 2019-08-22 DIAGNOSIS — E78.2 MIXED HYPERLIPIDEMIA: Primary | Chronic | ICD-10-CM

## 2019-08-22 DIAGNOSIS — E78.2 MIXED HYPERLIPIDEMIA: Chronic | ICD-10-CM

## 2019-08-22 DIAGNOSIS — R73.01 IMPAIRED FASTING GLUCOSE: ICD-10-CM

## 2019-08-22 LAB
ANION GAP SERPL CALCULATED.3IONS-SCNC: 7 MMOL/L (ref 3–14)
BUN SERPL-MCNC: 34 MG/DL (ref 7–25)
CALCIUM SERPL-MCNC: 9.7 MG/DL (ref 8.6–10.3)
CHLORIDE SERPL-SCNC: 102 MMOL/L (ref 98–107)
CHOLEST SERPL-MCNC: 166 MG/DL
CO2 SERPL-SCNC: 28 MMOL/L (ref 21–31)
CREAT SERPL-MCNC: 0.94 MG/DL (ref 0.6–1.2)
ERYTHROCYTE [DISTWIDTH] IN BLOOD BY AUTOMATED COUNT: 13.7 % (ref 10–15)
GFR SERPL CREATININE-BSD FRML MDRD: 56 ML/MIN/{1.73_M2}
GLUCOSE SERPL-MCNC: 109 MG/DL (ref 70–105)
HBA1C MFR BLD: 5.6 % (ref 4–6)
HCT VFR BLD AUTO: 48.2 % (ref 35–47)
HDLC SERPL-MCNC: 54 MG/DL (ref 23–92)
HGB BLD-MCNC: 15.7 G/DL (ref 11.7–15.7)
LDLC SERPL CALC-MCNC: 100 MG/DL
MCH RBC QN AUTO: 31 PG (ref 26.5–33)
MCHC RBC AUTO-ENTMCNC: 32.6 G/DL (ref 31.5–36.5)
MCV RBC AUTO: 95 FL (ref 78–100)
NONHDLC SERPL-MCNC: 112 MG/DL
PLATELET # BLD AUTO: 170 10E9/L (ref 150–450)
POTASSIUM SERPL-SCNC: 4.1 MMOL/L (ref 3.5–5.1)
RBC # BLD AUTO: 5.06 10E12/L (ref 3.8–5.2)
SODIUM SERPL-SCNC: 137 MMOL/L (ref 134–144)
TRIGL SERPL-MCNC: 60 MG/DL
TSH SERPL DL<=0.05 MIU/L-ACNC: 10.33 IU/ML (ref 0.34–5.6)
WBC # BLD AUTO: 6.1 10E9/L (ref 4–11)

## 2019-08-22 PROCEDURE — 80048 BASIC METABOLIC PNL TOTAL CA: CPT | Performed by: INTERNAL MEDICINE

## 2019-08-22 PROCEDURE — 36415 COLL VENOUS BLD VENIPUNCTURE: CPT | Mod: ZL | Performed by: INTERNAL MEDICINE

## 2019-08-22 PROCEDURE — 84443 ASSAY THYROID STIM HORMONE: CPT | Performed by: INTERNAL MEDICINE

## 2019-08-22 PROCEDURE — G0463 HOSPITAL OUTPT CLINIC VISIT: HCPCS

## 2019-08-22 PROCEDURE — 80061 LIPID PANEL: CPT | Performed by: INTERNAL MEDICINE

## 2019-08-22 PROCEDURE — 85027 COMPLETE CBC AUTOMATED: CPT | Performed by: INTERNAL MEDICINE

## 2019-08-22 PROCEDURE — 99214 OFFICE O/P EST MOD 30 MIN: CPT | Performed by: INTERNAL MEDICINE

## 2019-08-22 PROCEDURE — 36415 COLL VENOUS BLD VENIPUNCTURE: CPT | Performed by: INTERNAL MEDICINE

## 2019-08-22 PROCEDURE — 83036 HEMOGLOBIN GLYCOSYLATED A1C: CPT | Mod: ZL | Performed by: INTERNAL MEDICINE

## 2019-08-22 RX ORDER — THYROID,PORK 90 MG
TABLET ORAL
Qty: 45 TABLET | Refills: 3 | Status: SHIPPED | OUTPATIENT
Start: 2019-08-22 | End: 2019-08-22

## 2019-08-22 RX ORDER — LOSARTAN POTASSIUM 25 MG/1
TABLET ORAL
Qty: 90 TABLET | Refills: 3 | Status: SHIPPED | OUTPATIENT
Start: 2019-08-22 | End: 2020-08-07

## 2019-08-22 RX ORDER — DILTIAZEM HYDROCHLORIDE 120 MG/1
120 CAPSULE, COATED, EXTENDED RELEASE ORAL DAILY
Qty: 90 CAPSULE | Refills: 3 | Status: SHIPPED | OUTPATIENT
Start: 2019-08-22 | End: 2020-08-06

## 2019-08-22 RX ORDER — THYROID 60 MG
TABLET ORAL
Qty: 45 TABLET | Refills: 3 | Status: SHIPPED | OUTPATIENT
Start: 2019-08-22 | End: 2020-08-11

## 2019-08-22 RX ORDER — SESAME OIL
OIL (ML) MISCELLANEOUS
COMMUNITY
End: 2020-08-25

## 2019-08-22 RX ORDER — LOSARTAN POTASSIUM 50 MG/1
TABLET ORAL
Qty: 90 TABLET | Refills: 3 | Status: SHIPPED | OUTPATIENT
Start: 2019-08-22 | End: 2020-08-07

## 2019-08-22 RX ORDER — THYROID 60 MG
TABLET ORAL
Qty: 45 TABLET | Refills: 3 | Status: SHIPPED | OUTPATIENT
Start: 2019-08-22 | End: 2019-08-22

## 2019-08-22 RX ORDER — LOSARTAN POTASSIUM 25 MG/1
TABLET ORAL
Qty: 90 TABLET | Refills: 3 | Status: SHIPPED | OUTPATIENT
Start: 2019-08-22 | End: 2019-08-22

## 2019-08-22 RX ORDER — DILTIAZEM HYDROCHLORIDE 120 MG/1
120 CAPSULE, COATED, EXTENDED RELEASE ORAL DAILY
Qty: 90 CAPSULE | Refills: 3 | Status: SHIPPED | OUTPATIENT
Start: 2019-08-22 | End: 2019-08-22

## 2019-08-22 RX ORDER — THYROID,PORK 90 MG
TABLET ORAL
Qty: 45 TABLET | Refills: 3 | Status: SHIPPED | OUTPATIENT
Start: 2019-08-22 | End: 2020-08-11

## 2019-08-22 RX ORDER — LOSARTAN POTASSIUM 50 MG/1
TABLET ORAL
Qty: 90 TABLET | Refills: 3 | Status: SHIPPED | OUTPATIENT
Start: 2019-08-22 | End: 2019-08-22

## 2019-08-22 ASSESSMENT — PAIN SCALES - GENERAL: PAINLEVEL: NO PAIN (0)

## 2019-08-22 ASSESSMENT — MIFFLIN-ST. JEOR: SCORE: 977.46

## 2019-08-22 NOTE — PROGRESS NOTES
Subjective  Janna Mcdermott is a 88 year old female who presents for medication management.  She has a history of hypothyroidism which is stable on [place] Thyroid.  History of hypertension which is stable on losartan.  History of hyperlipidemia which is diet controlled.  History of atrial fibrillation and follows with Dr. Iraheta of cardiology.  She continues on diltiazem.  She has declined anticoagulation.  She feels very healthy.  She eats healthy foods.  Low-salt diet.  Does not drink caffeine or alcohol.  Exercises every day.  She feels excellent.  No lightheadedness or dizziness.  Blood pressures are always in the normal range.  She sleeps well all night.  No chest pains with exertion.    Problem List/PMH: reviewed in EMR, and made relevant updates today.  Medications: reviewed in EMR, and made relevant updates today.  Allergies: reviewed in EMR, and made relevant updates today.    Social Hx:  Social History     Tobacco Use     Smoking status: Never Smoker     Smokeless tobacco: Never Used   Substance Use Topics     Alcohol use: No     Alcohol/week: 0.0 oz     Drug use: Unknown     Types: Other     Comment: Drug use: No     Social History     Social History Narrative    .  Lives in town.  Daughter lives next door and works for the [employer].     I reviewed social history and made relevant updates today.    Family Hx:   Family History   Problem Relation Age of Onset     Heart Disease Mother         Heart Disease     Other - See Comments Other         No cancer or diabetes in the family     Other - See Comments Brother           in service     Other - See Comments Brother          of Parkinson's     Heart Disease Sister         Heart Disease,     Heart Disease Sister         Heart Disease,     Other - See Comments Sister         at 6 months     Other - See Comments Brother          of pneumonia       Objective  Vitals: reviewed in EMR.  /72 (BP Location: Right arm, Patient  Position: Sitting, Cuff Size: Adult Regular)   Pulse 86   Temp 97.3  F (36.3  C) (Tympanic)   Resp 16   Ht 1.524 m (5')   Wt 62.6 kg (138 lb)   SpO2 97%   Breastfeeding? No   BMI 26.95 kg/m      Gen: Pleasant female, NAD.  HEENT: MMM, no OP erythema.   Neck: Supple, no JVD, no bruits.  CV: RRR no m/r/g.   Pulm: CTAB no w/r/r  Neuro: Grossly intact  Msk: No lower extremity edema.  Skin: No concerning lesions.  Psychiatric: Normal affect and insight. Does not appear anxious or depressed.    Labs:  Lab Results   Component Value Date    WBC 6.1 08/22/2019    HGB 15.7 08/22/2019    HCT 48.2 (H) 08/22/2019     08/22/2019    CHOL 166 08/22/2019    TRIG 60 08/22/2019    HDL 54 08/22/2019     08/22/2019    BUN 34 (H) 08/22/2019    CO2 28 08/22/2019    INR 1.0 10/15/2013     Labs:  Glucose   Date Value Ref Range Status   08/22/2019 109 (H) 70 - 105 mg/dL Final   08/16/2018 96 70 - 105 mg/dL Final     Hemoglobin A1C   Date Value Ref Range Status   08/22/2019 5.6 4.0 - 6.0 % Final     Creatinine   Date Value Ref Range Status   08/22/2019 0.94 0.60 - 1.20 mg/dL Final   08/16/2018 0.90 0.60 - 1.20 mg/dL Final     LDL Cholesterol Calculated   Date Value Ref Range Status   08/22/2019 100 (H) <100 mg/dL Final     Comment:     Above desirable:  100-129 mg/dl  Borderline High:  130-159 mg/dL  High:             160-189 mg/dL  Very high:       >189 mg/dl               Assessment    ICD-10-CM    1. Mixed hyperlipidemia E78.2    2. Essential hypertension I10 diltiazem ER COATED BEADS (CARDIZEM CD/CARTIA XT) 120 MG 24 hr capsule     losartan (COZAAR) 25 MG tablet     losartan (COZAAR) 50 MG tablet     DISCONTINUED: losartan (COZAAR) 25 MG tablet     DISCONTINUED: losartan (COZAAR) 50 MG tablet     DISCONTINUED: diltiazem ER COATED BEADS (CARDIZEM CD/CARTIA XT) 120 MG 24 hr capsule   3. Acquired hypothyroidism E03.9 JACOB THYROID 90 MG tablet     JACOB THYROID 60 MG tablet     Thyrotropin GH     DISCONTINUED: JACOB  THYROID 60 MG tablet     DISCONTINUED: JACOB THYROID 90 MG tablet   4. Impaired fasting glucose R73.01 Hemoglobin A1c     Hemoglobin A1c     Orders Placed This Encounter   Procedures     Hemoglobin A1c     Thyrotropin GH       She seems to be doing very well.  No major concerns.  If her thyrotropin continues to rise would recommend an increase in her thyroid dosage.  At this time she prefers observation as she feels quite well.  She wants to try to work on improving it with dietary changes if possible.  Warning signs were discussed including depression and constipation.    Plan   -- Expected clinical course discussed   -- Medications and their side effects discussed  Patient Instructions    -- No change to thyroid dose   -- Recheck TSH in 2-3 months   -- If level remains up, I'd recommend increasing the dose      Return in about 1 year (around 8/22/2020).    Signed, Nuno Clinton MD  Internal Medicine & Pediatrics

## 2019-08-22 NOTE — LETTER
August 22, 2019      Janna Mcdermott  [address] [address]        Dear ,    We are writing to inform you of your test results.    Good news, diabetes test is negative.  Keep up all the healthy living.    Signed, Nuno Clinton MD  Internal Medicine & Pediatrics     -- Pre-diabetes:    fasting glucose: 100 - 125    hemoglobin A1c: 5.7 - 6.4   -- Diabetes:    fasting glucose greater than 125    random glucose greater than 200    hemoglobin A1c: > or = 6.5        Resulted Orders   Hemoglobin A1c   Result Value Ref Range    Hemoglobin A1C 5.6 4.0 - 6.0 %       If you have any questions or concerns, please call the clinic at the number listed above.       Sincerely,        Nuno Clinton MD

## 2019-08-22 NOTE — NURSING NOTE
Chief Complaint   Patient presents with     Physical     Here today for PX. No specific questions or concerns at this time other than some medications. Had labs done this morning.     Medication Reconciliation: complete    Linda Salgado LPN

## 2019-08-22 NOTE — PATIENT INSTRUCTIONS
-- No change to thyroid dose   -- Recheck TSH in 2-3 months   -- If level remains up, I'd recommend increasing the dose

## 2019-11-06 DIAGNOSIS — E03.9 ACQUIRED HYPOTHYROIDISM: Chronic | ICD-10-CM

## 2019-11-06 LAB — TSH SERPL DL<=0.05 MIU/L-ACNC: 6.34 IU/ML (ref 0.34–5.6)

## 2019-11-06 PROCEDURE — 84443 ASSAY THYROID STIM HORMONE: CPT | Mod: ZL | Performed by: INTERNAL MEDICINE

## 2019-11-06 PROCEDURE — 36415 COLL VENOUS BLD VENIPUNCTURE: CPT | Mod: ZL | Performed by: INTERNAL MEDICINE

## 2019-11-06 NOTE — LETTER
November 7, 2019      Janna Mcdermott  [address] [address]         Dear ,    We are writing to inform you of your test results.    TSH level has come back down again.  If you're feeling fine, I'd recommend no changes to your thyroid hormone dose.  If you have noticed any hypothyroid symptoms like constipation or feeling cold we could increase it.    Signed, Nuno Clinton MD, FAAP, FACP  Internal Medicine & Pediatrics      Resulted Orders   Thyrotropin GH   Result Value Ref Range    Thyrotropin 6.34 (H) 0.34 - 5.60 IU/mL       If you have any questions or concerns, please call the clinic at the number listed above.       Sincerely,        [facility]

## 2019-11-08 ENCOUNTER — TELEPHONE (OUTPATIENT)
Dept: PEDIATRICS | Facility: OTHER | Age: 84
End: 2019-11-08

## 2019-11-08 NOTE — TELEPHONE ENCOUNTER
Read patient letter that Nuno Clinton MD wrote on the 6th.  States feeling fine and will call if anything changes.  Laura Boyd LPN ....................  11/8/2019   9:23 AM

## 2020-03-30 ENCOUNTER — TELEPHONE (OUTPATIENT)
Dept: PEDIATRICS | Facility: OTHER | Age: 85
End: 2020-03-30

## 2020-03-30 NOTE — TELEPHONE ENCOUNTER
Called and verified patient full name and  with daughter. Patient lives alone. Daughter is primary caretaker of patient and is wondering about guidelines of caring for patient. Patient is independent in most ADL's. Daughter is providing assistance with grocery shopping, picking up prescriptions and housekeeping. Daughter is worried about patient becoming depressed due to lack of social interaction. Daughter works from home and is also limiting her exposure to people. Is checking her temp at home. Has not been sitting and visiting or playing card like them normally do everyday.     Please advice if anything further is needed.     Linda Salgado LPN on 3/30/2020 at 10:20 AM

## 2020-03-30 NOTE — TELEPHONE ENCOUNTER
All that sounds great. Make sure to stay active outdoors with social distancing.  Use technology to stay connected to friends, family and Jew.    Signed, Nuno Clinton MD, FAAP, FACP  Internal Medicine & Pediatrics

## 2020-03-30 NOTE — TELEPHONE ENCOUNTER
Called and verified patient full name and  with daughter. Notified daughter of below.     Linda Salgado LPN on 3/30/2020 at 10:54 AM

## 2020-08-07 DIAGNOSIS — I10 ESSENTIAL HYPERTENSION: Chronic | ICD-10-CM

## 2020-08-07 RX ORDER — LOSARTAN POTASSIUM 25 MG/1
TABLET ORAL
Qty: 90 TABLET | Refills: 0 | Status: ON HOLD | OUTPATIENT
Start: 2020-08-07 | End: 2021-03-29

## 2020-08-07 RX ORDER — LOSARTAN POTASSIUM 50 MG/1
TABLET ORAL
Qty: 90 TABLET | Refills: 0 | Status: ON HOLD | OUTPATIENT
Start: 2020-08-07 | End: 2021-03-29

## 2020-08-07 NOTE — LETTER
August 7, 2020      Jnana Mcdermott  [address] [facility] [address]        Dear Janna,     This letter is to remind you that you are due for your annual exam with Nuno Clinton. Your last comprehensive visit was more than 12 months ago.    A LIMITED refill of Losartan has been called into your pharmacy. Additional refills require you to complete this appointment.    Please call the clinic at [phone] to schedule your appointment.    If you should require additional refills before your scheduled appointment, please contact your pharmacy and we will refill your medication until the date of your appointment.    If you are no longer seeing Nuno Clinton for primary care, please call to let us know. Doing so will remove you from our call/contact list.      Thank you for choosing [facility] and [facility] for your health care needs.    Sincerely,    Refill RN  [facility]

## 2020-08-07 NOTE — TELEPHONE ENCOUNTER
[id number] - [place] -  sent Rx request for the following:        Last Office Visit:              8/22/19  Future Office visit:           none         Disp  Refills  Start  End  [facility]    losartan (COZAAR) 25 MG tablet  90 tablet  3  8/22/2019   No    Sig: Take 75 mg by mouth daily       Disp  Refills  Start  End  [facility]    losartan (COZAAR) 50 MG tablet  90 tablet  3  8/22/2019   No    Sig: Take 75 mg by mouth daily      Prescription approved per [facility] Refill Protocol.  Reminder letter sent for appt.  Melita Vasquez RN .............. 8/7/2020  2:50 PM

## 2020-08-11 DIAGNOSIS — E03.9 ACQUIRED HYPOTHYROIDISM: Chronic | ICD-10-CM

## 2020-08-11 RX ORDER — THYROID,PORK 90 MG
TABLET ORAL
Qty: 45 TABLET | Refills: 0 | Status: SHIPPED | OUTPATIENT
Start: 2020-08-11 | End: 2020-08-13

## 2020-08-11 RX ORDER — THYROID 60 MG
TABLET ORAL
Qty: 45 TABLET | Refills: 0 | Status: SHIPPED | OUTPATIENT
Start: 2020-08-11 | End: 2020-08-13

## 2020-08-11 NOTE — TELEPHONE ENCOUNTER
Routing refill request to provider for review/approval because:  Labs out of range:  TSH  LOV: 8/22/2019  Letter already sent  Bessie Marroquin RN on 8/11/2020 at 3:14 PM

## 2020-08-13 ENCOUNTER — TELEPHONE (OUTPATIENT)
Dept: PEDIATRICS | Facility: OTHER | Age: 85
End: 2020-08-13

## 2020-08-13 DIAGNOSIS — E03.9 ACQUIRED HYPOTHYROIDISM: Chronic | ICD-10-CM

## 2020-08-13 RX ORDER — THYROID 60 MG
TABLET ORAL
Qty: 45 TABLET | Refills: 0 | Status: SHIPPED | OUTPATIENT
Start: 2020-08-13 | End: 2020-08-25

## 2020-08-13 RX ORDER — THYROID,PORK 90 MG
TABLET ORAL
Qty: 45 TABLET | Refills: 0 | Status: SHIPPED | OUTPATIENT
Start: 2020-08-13 | End: 2020-08-25

## 2020-08-13 NOTE — TELEPHONE ENCOUNTER
Patient is upset because the prescription losartan/ Cozaar was sent to [facility] and she does not go through [facility] for that prescription. Patients states she would like her prescription refill sent for [facility] to [facility].   Nisha Cerna on 8/13/2020 at 2:30 PM

## 2020-08-13 NOTE — TELEPHONE ENCOUNTER
Calling regarding a letter she received stating it says refill from RN.  Pt is upset because of the letter.  Would like a call back today.

## 2020-08-17 NOTE — TELEPHONE ENCOUNTER
Patient informed Losartan was sent to [facility] and [place] Thyroid was sent to [facility].     Linda Barnes CMA on 8/17/2020 at 11:52 AM

## 2020-08-20 DIAGNOSIS — N18.30 CHRONIC KIDNEY DISEASE, STAGE III (MODERATE) (H): ICD-10-CM

## 2020-08-20 DIAGNOSIS — E78.2 MIXED HYPERLIPIDEMIA: Chronic | ICD-10-CM

## 2020-08-20 DIAGNOSIS — I10 ESSENTIAL HYPERTENSION: Chronic | ICD-10-CM

## 2020-08-20 DIAGNOSIS — E03.9 ACQUIRED HYPOTHYROIDISM: Chronic | ICD-10-CM

## 2020-08-20 LAB
ANION GAP SERPL CALCULATED.3IONS-SCNC: 9 MMOL/L (ref 3–14)
BUN SERPL-MCNC: 40 MG/DL (ref 7–25)
CALCIUM SERPL-MCNC: 10 MG/DL (ref 8.6–10.3)
CHLORIDE SERPL-SCNC: 102 MMOL/L (ref 98–107)
CHOLEST SERPL-MCNC: 184 MG/DL
CO2 SERPL-SCNC: 29 MMOL/L (ref 21–31)
CREAT SERPL-MCNC: 1.03 MG/DL (ref 0.6–1.2)
ERYTHROCYTE [DISTWIDTH] IN BLOOD BY AUTOMATED COUNT: 13.4 % (ref 10–15)
GFR SERPL CREATININE-BSD FRML MDRD: 50 ML/MIN/{1.73_M2}
GLUCOSE SERPL-MCNC: 120 MG/DL (ref 70–105)
HCT VFR BLD AUTO: 50.9 % (ref 35–47)
HDLC SERPL-MCNC: 57 MG/DL (ref 23–92)
HGB BLD-MCNC: 16.7 G/DL (ref 11.7–15.7)
LDLC SERPL CALC-MCNC: 114 MG/DL
MCH RBC QN AUTO: 31 PG (ref 26.5–33)
MCHC RBC AUTO-ENTMCNC: 32.8 G/DL (ref 31.5–36.5)
MCV RBC AUTO: 95 FL (ref 78–100)
NONHDLC SERPL-MCNC: 127 MG/DL
PLATELET # BLD AUTO: 172 10E9/L (ref 150–450)
POTASSIUM SERPL-SCNC: 4.4 MMOL/L (ref 3.5–5.1)
RBC # BLD AUTO: 5.38 10E12/L (ref 3.8–5.2)
SODIUM SERPL-SCNC: 140 MMOL/L (ref 134–144)
T4 FREE SERPL-MCNC: 1.01 NG/DL (ref 0.6–1.6)
TRIGL SERPL-MCNC: 65 MG/DL
TSH SERPL DL<=0.05 MIU/L-ACNC: 15.8 IU/ML (ref 0.34–5.6)
WBC # BLD AUTO: 7.4 10E9/L (ref 4–11)

## 2020-08-20 PROCEDURE — 36415 COLL VENOUS BLD VENIPUNCTURE: CPT | Mod: ZL | Performed by: INTERNAL MEDICINE

## 2020-08-20 PROCEDURE — 80061 LIPID PANEL: CPT | Mod: ZL | Performed by: INTERNAL MEDICINE

## 2020-08-20 PROCEDURE — 80048 BASIC METABOLIC PNL TOTAL CA: CPT | Mod: ZL | Performed by: INTERNAL MEDICINE

## 2020-08-20 PROCEDURE — 84439 ASSAY OF FREE THYROXINE: CPT | Mod: ZL | Performed by: INTERNAL MEDICINE

## 2020-08-20 PROCEDURE — 84443 ASSAY THYROID STIM HORMONE: CPT | Mod: ZL | Performed by: INTERNAL MEDICINE

## 2020-08-20 PROCEDURE — 85027 COMPLETE CBC AUTOMATED: CPT | Mod: ZL | Performed by: INTERNAL MEDICINE

## 2020-08-25 ENCOUNTER — VIRTUAL VISIT (OUTPATIENT)
Dept: PEDIATRICS | Facility: OTHER | Age: 85
End: 2020-08-25
Attending: INTERNAL MEDICINE
Payer: COMMERCIAL

## 2020-08-25 VITALS — HEIGHT: 60 IN | WEIGHT: 142 LBS | BODY MASS INDEX: 27.88 KG/M2

## 2020-08-25 DIAGNOSIS — I10 ESSENTIAL HYPERTENSION: Chronic | ICD-10-CM

## 2020-08-25 DIAGNOSIS — E03.9 ACQUIRED HYPOTHYROIDISM: Primary | Chronic | ICD-10-CM

## 2020-08-25 PROCEDURE — 99213 OFFICE O/P EST LOW 20 MIN: CPT | Mod: 95 | Performed by: INTERNAL MEDICINE

## 2020-08-25 RX ORDER — THYROID,PORK 90 MG
TABLET ORAL
Qty: 45 TABLET | Refills: 3 | Status: SHIPPED | OUTPATIENT
Start: 2020-08-25 | End: 2021-10-11

## 2020-08-25 RX ORDER — THYROID 60 MG
TABLET ORAL
Qty: 45 TABLET | Refills: 3 | Status: SHIPPED | OUTPATIENT
Start: 2020-08-25 | End: 2021-10-11

## 2020-08-25 ASSESSMENT — MIFFLIN-ST. JEOR: SCORE: 990.61

## 2020-08-25 ASSESSMENT — PAIN SCALES - GENERAL: PAINLEVEL: NO PAIN (0)

## 2020-08-25 NOTE — PROGRESS NOTES
"Janna Mcdermott is a 89 year old female who is being evaluated via a billable telephone visit.      The patient has been notified of following:     \"This telephone visit will be conducted via a call between you and your physician/provider. We have found that certain health care needs can be provided without the need for a physical exam.  This service lets us provide the care you need with a short phone conversation.  If a prescription is necessary we can send it directly to your pharmacy.  If lab work is needed we can place an order for that and you can then stop by our lab to have the test done at a later time.    Telephone visits are billed at different rates depending on your insurance coverage. During this emergency period, for some insurers they may be billed the same as an in-person visit.  Please reach out to your insurance provider with any questions.    If during the course of the call the physician/provider feels a telephone visit is not appropriate, you will not be charged for this service.\"    Patient has given verbal consent for Telephone visit?  Yes    What phone number would you like to be contacted at? [phone]    How would you like to obtain your AVS? Does not need a copy of AVS     Subjective     Janna Mcdermott is a 89 year old female who presents via phone visit today for the following health issues:    HPI    She has no health concerns.  She feels like she has a lot of energy.  Her hair is very thick.  She has been staying in her own home since the COVID-19 pandemic started.  Family members visit through the glass.  They are bringing groceries to her front door.  She has a history of hypothyroidism which is stable on [place] Thyroid alternating doses of 60 and 90 mg.  History of hypertension which is stable on losartan and diltiazem.    Review of Systems   Constitutional, HEENT, cardiovascular, pulmonary, gi and gu systems are negative, except as otherwise noted.       Objective "   Vitals - Patient Reported  Pain Score: No Pain (0)        healthy, alert and no distress  PSYCH: Alert and oriented times 3; coherent speech, normal   rate and volume, able to articulate logical thoughts, able   to abstract reason, no tangential thoughts, no hallucinations   or delusions  Her affect is normal  RESP: No cough, no audible wheezing, able to talk in full sentences  Remainder of exam unable to be completed due to telephone visits    Labs:  Lab Results   Component Value Date    WBC 7.4 08/20/2020    HGB 16.7 (H) 08/20/2020    HCT 50.9 (H) 08/20/2020     08/20/2020    CHOL 184 08/20/2020    TRIG 65 08/20/2020    HDL 57 08/20/2020     08/20/2020    BUN 40 (H) 08/20/2020    CO2 29 08/20/2020    INR 1.0 10/15/2013       Glucose   Date Value Ref Range Status   08/20/2020 120 (H) 70 - 105 mg/dL Final   08/22/2019 109 (H) 70 - 105 mg/dL Final     Hemoglobin A1C   Date Value Ref Range Status   08/22/2019 5.6 4.0 - 6.0 % Final     Creatinine   Date Value Ref Range Status   08/20/2020 1.03 0.60 - 1.20 mg/dL Final   08/22/2019 0.94 0.60 - 1.20 mg/dL Final     LDL Cholesterol Calculated   Date Value Ref Range Status   08/20/2020 114 (H) <100 mg/dL Final     Comment:     Above desirable:  100-129 mg/dl  Borderline High:  130-159 mg/dL  High:             160-189 mg/dL  Very high:       >189 mg/dl       Thyrotropin   Date Value Ref Range Status   11/06/2019 6.34 (H) 0.34 - 5.60 IU/mL Final   08/22/2019 10.33 (H) 0.34 - 5.60 IU/mL Final   11/15/2018 6.80 (H) 0.34 - 5.60 IU/mL Final   08/16/2018 9.71 (H) 0.34 - 5.60 IU/mL Final                   Assessment/Plan:    ICD-10-CM    1. Acquired hypothyroidism  E03.9 ARMOUR THYROID 60 MG tablet     ARMOUR THYROID 90 MG tablet     Thyrotropin GH   2. Essential hypertension  I10      Medications are reviewed, renewed.  Screening and surveillance lab work is obtained.  I encouraged her to remain active.  Discussed the possibility of increasing the dose of her  thyroid medication and she decided against it.  She does seem to fluctuate quite a bit and always feels normal without any hypothyroid symptoms.  We will plan to trend again in 6 months, expects it will go back down as it has for the last several cycles.    Signed, Nuno Clinton MD, FAAP, FACP  Internal Medicine & Pediatrics    Phone call duration:  16 minutes

## 2020-08-25 NOTE — NURSING NOTE
Chief Complaint   Patient presents with     Recheck Medication     Patient is following up on medications and needs refills.     Initial Ht 1.524 m (5')   Wt 64.4 kg (142 lb)   LMP  (LMP Unknown)   Breastfeeding No   BMI 27.73 kg/m   Estimated body mass index is 27.73 kg/m  as calculated from the following:    Height as of this encounter: 1.524 m (5').    Weight as of this encounter: 64.4 kg (142 lb).  Medication Reconciliation: complete    Linda Barnes MA

## 2020-12-07 ENCOUNTER — TELEPHONE (OUTPATIENT)
Dept: PEDIATRICS | Facility: OTHER | Age: 85
End: 2020-12-07

## 2020-12-07 NOTE — TELEPHONE ENCOUNTER
Génesis states that patient's fridge broke and there are going to be people in her house replacing a new one. They will be wearing masks and patient will not be there (she is going to daughter's house). She is wondering if patient will be ok to return to home when they are finished. Writer spoke with Dr. Clinton and he stated it is fine for her to return home after workers are done. If she is concerned, she can clean with Clorox wipes and open the windows for 20 min. Génesis informed of his response.     Linda Barnes CMA on 12/7/2020 at 8:09 AM

## 2021-01-26 ENCOUNTER — TELEPHONE (OUTPATIENT)
Dept: PEDIATRICS | Facility: OTHER | Age: 86
End: 2021-01-26

## 2021-01-26 NOTE — TELEPHONE ENCOUNTER
Patient was called and requested to have information for cremation put into the chart as these are the arrangements that have been put in place.  Family knows of the patients wishes as well.  This information will be placed in the demographic portion of the patients chart.  Patient is aware for the need to have legal document scanned to honor this and stated that she has not left her home since COVID and doesn't plan too.  Additional information will be mailed to the patient for reference and understanding.    Svetlana Mcdonnell LPN....................  1/26/2021   4:04 PM

## 2021-01-26 NOTE — TELEPHONE ENCOUNTER
Follow up to call from other day - her request for her burial wishes to be in her chart.  Please call her on her cell [phone].    Angelia Amin on 1/26/2021 at 11:25 AM

## 2021-01-28 ENCOUNTER — TELEPHONE (OUTPATIENT)
Dept: PEDIATRICS | Facility: OTHER | Age: 86
End: 2021-01-28

## 2021-01-28 DIAGNOSIS — L60.2 OVERGROWN TOENAILS: Primary | ICD-10-CM

## 2021-01-28 NOTE — TELEPHONE ENCOUNTER
Patient requesting a referral to Dr. Mckeon for toenail trimming. Referral pending.     Linda Barnes CMA on 1/28/2021 at 12:01 PM

## 2021-01-28 NOTE — TELEPHONE ENCOUNTER
Pt would like to get a standing order for a pediatrist in case she has to go to one.  Please call.      Levi Heard on 1/28/2021 at 11:40 AM

## 2021-03-25 ENCOUNTER — APPOINTMENT (OUTPATIENT)
Dept: CT IMAGING | Facility: OTHER | Age: 86
End: 2021-03-25
Attending: PHYSICIAN ASSISTANT
Payer: MEDICARE

## 2021-03-25 ENCOUNTER — HOSPITAL ENCOUNTER (INPATIENT)
Facility: CLINIC | Age: 86
LOS: 4 days | Discharge: HOME HEALTH | DRG: 065 | End: 2021-03-29
Attending: INTERNAL MEDICINE | Admitting: INTERNAL MEDICINE
Payer: MEDICARE

## 2021-03-25 ENCOUNTER — HOSPITAL ENCOUNTER (EMERGENCY)
Facility: OTHER | Age: 86
Discharge: NURSING, REHAB OR LONG-TERM CARE | End: 2021-03-25
Attending: PHYSICIAN ASSISTANT | Admitting: PHYSICIAN ASSISTANT
Payer: MEDICARE

## 2021-03-25 ENCOUNTER — APPOINTMENT (OUTPATIENT)
Dept: CT IMAGING | Facility: CLINIC | Age: 86
DRG: 065 | End: 2021-03-25
Attending: PHYSICIAN ASSISTANT
Payer: MEDICARE

## 2021-03-25 VITALS
SYSTOLIC BLOOD PRESSURE: 156 MMHG | OXYGEN SATURATION: 93 % | DIASTOLIC BLOOD PRESSURE: 79 MMHG | RESPIRATION RATE: 22 BRPM | BODY MASS INDEX: 27.73 KG/M2 | HEART RATE: 86 BPM | TEMPERATURE: 96 F | WEIGHT: 142 LBS

## 2021-03-25 DIAGNOSIS — I65.01 VERTEBRAL ARTERY OCCLUSION, RIGHT: ICD-10-CM

## 2021-03-25 DIAGNOSIS — I48.91 ATRIAL FIBRILLATION, UNSPECIFIED TYPE (H): ICD-10-CM

## 2021-03-25 DIAGNOSIS — I65.02 OCCLUSION OF LEFT VERTEBRAL ARTERY: ICD-10-CM

## 2021-03-25 DIAGNOSIS — I63.9 ACUTE CVA (CEREBROVASCULAR ACCIDENT) (H): Primary | ICD-10-CM

## 2021-03-25 LAB
ALBUMIN UR-MCNC: NEGATIVE MG/DL
ANION GAP SERPL CALCULATED.3IONS-SCNC: 11 MMOL/L (ref 3–14)
APPEARANCE UR: CLEAR
APTT PPP: 170 SEC (ref 22–37)
APTT PPP: >240 SEC (ref 22–37)
BASOPHILS # BLD AUTO: 0.1 10E9/L (ref 0–0.2)
BASOPHILS NFR BLD AUTO: 0.5 %
BILIRUB UR QL STRIP: NEGATIVE
BUN SERPL-MCNC: 34 MG/DL (ref 7–25)
CALCIUM SERPL-MCNC: 9.7 MG/DL (ref 8.6–10.3)
CHLORIDE SERPL-SCNC: 99 MMOL/L (ref 98–107)
CO2 SERPL-SCNC: 24 MMOL/L (ref 21–31)
COLOR UR AUTO: ABNORMAL
CREAT SERPL-MCNC: 0.97 MG/DL (ref 0.6–1.2)
DIFFERENTIAL METHOD BLD: ABNORMAL
EOSINOPHIL # BLD AUTO: 0 10E9/L (ref 0–0.7)
EOSINOPHIL NFR BLD AUTO: 0.3 %
ERYTHROCYTE [DISTWIDTH] IN BLOOD BY AUTOMATED COUNT: 13.3 % (ref 10–15)
ERYTHROCYTE [DISTWIDTH] IN BLOOD BY AUTOMATED COUNT: 13.4 % (ref 10–15)
ERYTHROCYTE [DISTWIDTH] IN BLOOD BY AUTOMATED COUNT: 13.5 % (ref 10–15)
GFR SERPL CREATININE-BSD FRML MDRD: 54 ML/MIN/{1.73_M2}
GLUCOSE BLDC GLUCOMTR-MCNC: 142 MG/DL (ref 70–99)
GLUCOSE BLDC GLUCOMTR-MCNC: 173 MG/DL (ref 70–99)
GLUCOSE SERPL-MCNC: 185 MG/DL (ref 70–105)
GLUCOSE UR STRIP-MCNC: NEGATIVE MG/DL
HBA1C MFR BLD: 5.6 % (ref 0–5.6)
HCT VFR BLD AUTO: 47.5 % (ref 35–47)
HCT VFR BLD AUTO: 48 % (ref 35–47)
HCT VFR BLD AUTO: 49 % (ref 35–47)
HGB BLD-MCNC: 15.5 G/DL (ref 11.7–15.7)
HGB BLD-MCNC: 15.8 G/DL (ref 11.7–15.7)
HGB BLD-MCNC: 16.4 G/DL (ref 11.7–15.7)
HGB UR QL STRIP: NEGATIVE
IMM GRANULOCYTES # BLD: 0 10E9/L (ref 0–0.4)
IMM GRANULOCYTES NFR BLD: 0.3 %
KETONES UR STRIP-MCNC: 10 MG/DL
LABORATORY COMMENT REPORT: NORMAL
LACTATE BLD-SCNC: 1.7 MMOL/L (ref 0.7–2)
LACTATE BLD-SCNC: 2.4 MMOL/L (ref 0.7–2)
LEUKOCYTE ESTERASE UR QL STRIP: NEGATIVE
LYMPHOCYTES # BLD AUTO: 0.8 10E9/L (ref 0.8–5.3)
LYMPHOCYTES NFR BLD AUTO: 7.1 %
MCH RBC QN AUTO: 30.7 PG (ref 26.5–33)
MCH RBC QN AUTO: 30.9 PG (ref 26.5–33)
MCH RBC QN AUTO: 31.2 PG (ref 26.5–33)
MCHC RBC AUTO-ENTMCNC: 32.6 G/DL (ref 31.5–36.5)
MCHC RBC AUTO-ENTMCNC: 32.9 G/DL (ref 31.5–36.5)
MCHC RBC AUTO-ENTMCNC: 33.5 G/DL (ref 31.5–36.5)
MCV RBC AUTO: 93 FL (ref 78–100)
MCV RBC AUTO: 93 FL (ref 78–100)
MCV RBC AUTO: 95 FL (ref 78–100)
MONOCYTES # BLD AUTO: 0.5 10E9/L (ref 0–1.3)
MONOCYTES NFR BLD AUTO: 4.9 %
NEUTROPHILS # BLD AUTO: 9.6 10E9/L (ref 1.6–8.3)
NEUTROPHILS NFR BLD AUTO: 86.9 %
NITRATE UR QL: NEGATIVE
PH UR STRIP: 6.5 PH (ref 5–7)
PLATELET # BLD AUTO: 141 10E9/L (ref 150–450)
PLATELET # BLD AUTO: 149 10E9/L (ref 150–450)
PLATELET # BLD AUTO: 155 10E9/L (ref 150–450)
POTASSIUM SERPL-SCNC: 4.3 MMOL/L (ref 3.4–5.3)
POTASSIUM SERPL-SCNC: 4.4 MMOL/L (ref 3.5–5.1)
RBC # BLD AUTO: 5.02 10E12/L (ref 3.8–5.2)
RBC # BLD AUTO: 5.15 10E12/L (ref 3.8–5.2)
RBC # BLD AUTO: 5.25 10E12/L (ref 3.8–5.2)
SARS-COV-2 RNA RESP QL NAA+PROBE: NEGATIVE
SODIUM SERPL-SCNC: 134 MMOL/L (ref 134–144)
SOURCE: ABNORMAL
SP GR UR STRIP: 1.03 (ref 1–1.03)
SPECIMEN SOURCE: NORMAL
T3FREE SERPL-MCNC: 3 PG/ML (ref 2.5–3.9)
T4 FREE SERPL-MCNC: 0.93 NG/DL (ref 0.6–1.6)
TROPONIN I SERPL-MCNC: 4.2 PG/ML
TSH SERPL DL<=0.05 MIU/L-ACNC: 7.08 IU/ML (ref 0.34–5.6)
UROBILINOGEN UR STRIP-MCNC: NORMAL MG/DL (ref 0–2)
WBC # BLD AUTO: 10.8 10E9/L (ref 4–11)
WBC # BLD AUTO: 11 10E9/L (ref 4–11)
WBC # BLD AUTO: 12.9 10E9/L (ref 4–11)

## 2021-03-25 PROCEDURE — 84484 ASSAY OF TROPONIN QUANT: CPT | Performed by: PHYSICIAN ASSISTANT

## 2021-03-25 PROCEDURE — 255N000002 HC RX 255 OP 636: Performed by: PHYSICIAN ASSISTANT

## 2021-03-25 PROCEDURE — 96366 THER/PROPH/DIAG IV INF ADDON: CPT | Performed by: PHYSICIAN ASSISTANT

## 2021-03-25 PROCEDURE — 999N001017 HC STATISTIC GLUCOSE BY METER IP

## 2021-03-25 PROCEDURE — 250N000011 HC RX IP 250 OP 636: Performed by: PHYSICIAN ASSISTANT

## 2021-03-25 PROCEDURE — 93005 ELECTROCARDIOGRAM TRACING: CPT | Performed by: PHYSICIAN ASSISTANT

## 2021-03-25 PROCEDURE — 84443 ASSAY THYROID STIM HORMONE: CPT | Performed by: PHYSICIAN ASSISTANT

## 2021-03-25 PROCEDURE — 200N000001 HC R&B ICU

## 2021-03-25 PROCEDURE — 85730 THROMBOPLASTIN TIME PARTIAL: CPT | Performed by: PHYSICIAN ASSISTANT

## 2021-03-25 PROCEDURE — 258N000003 HC RX IP 258 OP 636: Performed by: PHYSICIAN ASSISTANT

## 2021-03-25 PROCEDURE — 84481 FREE ASSAY (FT-3): CPT | Performed by: PHYSICIAN ASSISTANT

## 2021-03-25 PROCEDURE — 93010 ELECTROCARDIOGRAM REPORT: CPT | Performed by: INTERNAL MEDICINE

## 2021-03-25 PROCEDURE — 84132 ASSAY OF SERUM POTASSIUM: CPT | Performed by: PHYSICIAN ASSISTANT

## 2021-03-25 PROCEDURE — 83036 HEMOGLOBIN GLYCOSYLATED A1C: CPT | Performed by: PHYSICIAN ASSISTANT

## 2021-03-25 PROCEDURE — 81003 URINALYSIS AUTO W/O SCOPE: CPT | Performed by: PHYSICIAN ASSISTANT

## 2021-03-25 PROCEDURE — 70496 CT ANGIOGRAPHY HEAD: CPT

## 2021-03-25 PROCEDURE — 85027 COMPLETE CBC AUTOMATED: CPT | Performed by: PHYSICIAN ASSISTANT

## 2021-03-25 PROCEDURE — 36415 COLL VENOUS BLD VENIPUNCTURE: CPT | Performed by: PHYSICIAN ASSISTANT

## 2021-03-25 PROCEDURE — 96374 THER/PROPH/DIAG INJ IV PUSH: CPT | Performed by: PHYSICIAN ASSISTANT

## 2021-03-25 PROCEDURE — 80048 BASIC METABOLIC PNL TOTAL CA: CPT | Performed by: PHYSICIAN ASSISTANT

## 2021-03-25 PROCEDURE — 84439 ASSAY OF FREE THYROXINE: CPT | Performed by: PHYSICIAN ASSISTANT

## 2021-03-25 PROCEDURE — U0005 INFEC AGEN DETEC AMPLI PROBE: HCPCS | Performed by: PHYSICIAN ASSISTANT

## 2021-03-25 PROCEDURE — 85027 COMPLETE CBC AUTOMATED: CPT | Mod: XU | Performed by: PHYSICIAN ASSISTANT

## 2021-03-25 PROCEDURE — 70450 CT HEAD/BRAIN W/O DYE: CPT

## 2021-03-25 PROCEDURE — U0003 INFECTIOUS AGENT DETECTION BY NUCLEIC ACID (DNA OR RNA); SEVERE ACUTE RESPIRATORY SYNDROME CORONAVIRUS 2 (SARS-COV-2) (CORONAVIRUS DISEASE [COVID-19]), AMPLIFIED PROBE TECHNIQUE, MAKING USE OF HIGH THROUGHPUT TECHNOLOGIES AS DESCRIBED BY CMS-2020-01-R: HCPCS | Performed by: PHYSICIAN ASSISTANT

## 2021-03-25 PROCEDURE — C9803 HOPD COVID-19 SPEC COLLECT: HCPCS | Performed by: PHYSICIAN ASSISTANT

## 2021-03-25 PROCEDURE — 99291 CRITICAL CARE FIRST HOUR: CPT | Performed by: PHYSICIAN ASSISTANT

## 2021-03-25 PROCEDURE — 99223 1ST HOSP IP/OBS HIGH 75: CPT | Mod: AI | Performed by: INTERNAL MEDICINE

## 2021-03-25 PROCEDURE — 99291 CRITICAL CARE FIRST HOUR: CPT | Mod: 25 | Performed by: PHYSICIAN ASSISTANT

## 2021-03-25 PROCEDURE — C9113 INJ PANTOPRAZOLE SODIUM, VIA: HCPCS | Performed by: PHYSICIAN ASSISTANT

## 2021-03-25 PROCEDURE — 83605 ASSAY OF LACTIC ACID: CPT | Mod: 91 | Performed by: PHYSICIAN ASSISTANT

## 2021-03-25 PROCEDURE — 85025 COMPLETE CBC W/AUTO DIFF WBC: CPT | Performed by: PHYSICIAN ASSISTANT

## 2021-03-25 PROCEDURE — 250N000009 HC RX 250: Performed by: PHYSICIAN ASSISTANT

## 2021-03-25 PROCEDURE — 96375 TX/PRO/DX INJ NEW DRUG ADDON: CPT | Performed by: PHYSICIAN ASSISTANT

## 2021-03-25 RX ORDER — HEPARIN SODIUM 5000 [USP'U]/.5ML
80 INJECTION, SOLUTION INTRAVENOUS; SUBCUTANEOUS ONCE
Status: COMPLETED | OUTPATIENT
Start: 2021-03-25 | End: 2021-03-25

## 2021-03-25 RX ORDER — PROCHLORPERAZINE MALEATE 5 MG
5 TABLET ORAL EVERY 6 HOURS PRN
Status: DISCONTINUED | OUTPATIENT
Start: 2021-03-25 | End: 2021-03-29 | Stop reason: HOSPADM

## 2021-03-25 RX ORDER — ONDANSETRON 4 MG/1
4 TABLET, ORALLY DISINTEGRATING ORAL EVERY 6 HOURS PRN
Status: DISCONTINUED | OUTPATIENT
Start: 2021-03-25 | End: 2021-03-29 | Stop reason: HOSPADM

## 2021-03-25 RX ORDER — LIDOCAINE 40 MG/G
CREAM TOPICAL
Status: DISCONTINUED | OUTPATIENT
Start: 2021-03-25 | End: 2021-03-29 | Stop reason: HOSPADM

## 2021-03-25 RX ORDER — NICOTINE POLACRILEX 4 MG
15-30 LOZENGE BUCCAL
Status: DISCONTINUED | OUTPATIENT
Start: 2021-03-25 | End: 2021-03-26

## 2021-03-25 RX ORDER — SODIUM CHLORIDE 9 MG/ML
INJECTION, SOLUTION INTRAVENOUS CONTINUOUS
Status: DISCONTINUED | OUTPATIENT
Start: 2021-03-25 | End: 2021-03-25 | Stop reason: HOSPADM

## 2021-03-25 RX ORDER — THYROID 30 MG/1
90 TABLET ORAL EVERY OTHER DAY
Status: DISCONTINUED | OUTPATIENT
Start: 2021-03-26 | End: 2021-03-29 | Stop reason: HOSPADM

## 2021-03-25 RX ORDER — ONDANSETRON 2 MG/ML
4 INJECTION INTRAMUSCULAR; INTRAVENOUS ONCE
Status: COMPLETED | OUTPATIENT
Start: 2021-03-25 | End: 2021-03-25

## 2021-03-25 RX ORDER — LABETALOL HYDROCHLORIDE 5 MG/ML
5-10 INJECTION, SOLUTION INTRAVENOUS EVERY 10 MIN PRN
Status: DISCONTINUED | OUTPATIENT
Start: 2021-03-25 | End: 2021-03-26

## 2021-03-25 RX ORDER — LATANOPROST 50 UG/ML
1 SOLUTION/ DROPS OPHTHALMIC AT BEDTIME
COMMUNITY
Start: 2021-03-24

## 2021-03-25 RX ORDER — IODIXANOL 320 MG/ML
100 INJECTION, SOLUTION INTRAVASCULAR ONCE
Status: COMPLETED | OUTPATIENT
Start: 2021-03-25 | End: 2021-03-25

## 2021-03-25 RX ORDER — PROCHLORPERAZINE 25 MG
12.5 SUPPOSITORY, RECTAL RECTAL EVERY 12 HOURS PRN
Status: DISCONTINUED | OUTPATIENT
Start: 2021-03-25 | End: 2021-03-29 | Stop reason: HOSPADM

## 2021-03-25 RX ORDER — ACETAMINOPHEN 650 MG/1
650 SUPPOSITORY RECTAL EVERY 4 HOURS PRN
Status: DISCONTINUED | OUTPATIENT
Start: 2021-03-25 | End: 2021-03-29 | Stop reason: HOSPADM

## 2021-03-25 RX ORDER — DILTIAZEM HYDROCHLORIDE 120 MG/1
120 CAPSULE, EXTENDED RELEASE ORAL DAILY
COMMUNITY
Start: 2020-08-11 | End: 2021-03-25

## 2021-03-25 RX ORDER — DILTIAZEM HYDROCHLORIDE 5 MG/ML
10 INJECTION INTRAVENOUS ONCE
Status: COMPLETED | OUTPATIENT
Start: 2021-03-25 | End: 2021-03-25

## 2021-03-25 RX ORDER — HYDRALAZINE HYDROCHLORIDE 20 MG/ML
5-10 INJECTION INTRAMUSCULAR; INTRAVENOUS EVERY 30 MIN PRN
Status: DISCONTINUED | OUTPATIENT
Start: 2021-03-25 | End: 2021-03-29 | Stop reason: HOSPADM

## 2021-03-25 RX ORDER — ONDANSETRON 2 MG/ML
4 INJECTION INTRAMUSCULAR; INTRAVENOUS EVERY 6 HOURS PRN
Status: DISCONTINUED | OUTPATIENT
Start: 2021-03-25 | End: 2021-03-29 | Stop reason: HOSPADM

## 2021-03-25 RX ORDER — ACETAMINOPHEN 325 MG/1
650 TABLET ORAL EVERY 4 HOURS PRN
Status: DISCONTINUED | OUTPATIENT
Start: 2021-03-25 | End: 2021-03-29 | Stop reason: HOSPADM

## 2021-03-25 RX ORDER — HEPARIN SODIUM 10000 [USP'U]/100ML
0-5000 INJECTION, SOLUTION INTRAVENOUS CONTINUOUS
Status: DISCONTINUED | OUTPATIENT
Start: 2021-03-25 | End: 2021-03-25 | Stop reason: HOSPADM

## 2021-03-25 RX ORDER — THYROID 60 MG/1
60 TABLET ORAL EVERY OTHER DAY
Status: DISCONTINUED | OUTPATIENT
Start: 2021-03-27 | End: 2021-03-29 | Stop reason: HOSPADM

## 2021-03-25 RX ORDER — HEPARIN SODIUM 10000 [USP'U]/100ML
0-5000 INJECTION, SOLUTION INTRAVENOUS CONTINUOUS
Status: DISCONTINUED | OUTPATIENT
Start: 2021-03-25 | End: 2021-03-27

## 2021-03-25 RX ORDER — DEXTROSE MONOHYDRATE 25 G/50ML
25-50 INJECTION, SOLUTION INTRAVENOUS
Status: DISCONTINUED | OUTPATIENT
Start: 2021-03-25 | End: 2021-03-26

## 2021-03-25 RX ORDER — METOPROLOL TARTRATE 1 MG/ML
2.5 INJECTION, SOLUTION INTRAVENOUS EVERY 4 HOURS PRN
Status: DISCONTINUED | OUTPATIENT
Start: 2021-03-25 | End: 2021-03-27

## 2021-03-25 RX ORDER — SODIUM CHLORIDE 9 MG/ML
INJECTION, SOLUTION INTRAVENOUS CONTINUOUS
Status: DISCONTINUED | OUTPATIENT
Start: 2021-03-25 | End: 2021-03-26

## 2021-03-25 RX ADMIN — SODIUM CHLORIDE: 9 INJECTION, SOLUTION INTRAVENOUS at 18:44

## 2021-03-25 RX ADMIN — ONDANSETRON 4 MG: 2 INJECTION INTRAMUSCULAR; INTRAVENOUS at 13:58

## 2021-03-25 RX ADMIN — SODIUM CHLORIDE: 9 INJECTION, SOLUTION INTRAVENOUS at 14:42

## 2021-03-25 RX ADMIN — HEPARIN SODIUM 5150 UNITS: 5000 INJECTION, SOLUTION INTRAVENOUS; SUBCUTANEOUS at 15:48

## 2021-03-25 RX ADMIN — HEPARIN SODIUM 750 UNITS/HR: 10000 INJECTION, SOLUTION INTRAVENOUS at 19:29

## 2021-03-25 RX ADMIN — IODIXANOL 100 ML: 320 INJECTION, SOLUTION INTRAVASCULAR at 14:18

## 2021-03-25 RX ADMIN — HEPARIN SODIUM AND DEXTROSE 1150 UNITS/HR: 10000; 5 INJECTION INTRAVENOUS at 15:49

## 2021-03-25 RX ADMIN — PANTOPRAZOLE SODIUM 40 MG: 40 INJECTION, POWDER, FOR SOLUTION INTRAVENOUS at 19:28

## 2021-03-25 RX ADMIN — DILTIAZEM HYDROCHLORIDE 10 MG: 5 INJECTION, SOLUTION INTRAVENOUS at 13:58

## 2021-03-25 ASSESSMENT — ACTIVITIES OF DAILY LIVING (ADL): ADLS_ACUITY_SCORE: 17

## 2021-03-25 ASSESSMENT — MIFFLIN-ST. JEOR: SCORE: 1009.5

## 2021-03-25 NOTE — ED PROVIDER NOTES
The patients is sleepy easily awakes and answers questions appropriately. Vital signs are noted and she was place on oxygen to keep sat's greater than 90% Heparin is infusing.     Vitals:    03/25/21 1520 03/25/21 1530 03/25/21 1540 03/25/21 1550   BP: (!) 146/77 129/74  (!) 146/66   Pulse: 96 88 86 77   Resp: 21 25 24 23   Temp:       TempSrc:       SpO2: 92% 90% (!) 89% (!) 89%   Weight:              Daniel Meade, PASylvesterC  03/25/21 1621

## 2021-03-25 NOTE — H&P
[facility]    History and Physical - Hospitalist Service       Date of Admission:  21     Assessment & Plan   Janna Mcdermott is a 89 year old female admitted on 21.  She has a PMH significant for paroxysmal atrial fibrillation not on anticoagulation, hypertension, dyslipidemia and chronic kidney disease.  She presented to outside emergency department for acute onset dizziness.  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  She is transferred for further stroke evaluation.    Acute ischemic stroke   Chronic occlusion of left vertebral artery  Acute occlusion of right vertebral artery  High-grade stenosis of basilar artery: Presented with complaints of acute dizziness.  CTA with the above findings.  Stroke neurology consulted who recommended initiation of heparin drip due to high-grade basilar artery stenosis.  Unfortunately, just prior to transfer she developed new neurologic deficits and heparin drip was stopped due to concern for hemorrhagic transformation.  -Admit to the ICU  -Stat head CT on arrival- negative for bleed. Spoke with NCC, will restart low intensity heparin  -Neuro critical care consult  -Neurochecks every hour  -Blood pressure goal with -160, per NCC favor high sided of range. Use small, frequent doses of prn medications. Hydralazine and labetalol are available.   -MRI  -Telemetry with echo with bubble study  -A1c, FLP and troponin  -PT/OT/speech    Paroxysmal atrial fibrillation: Follows with [facility].  Per review of most recent notes patient has historically declined full anticoagulation due to the fact that her   of complications from cerebral hemorrhage while on warfarin.  She is also declined left atrial appendage.  PTA regimen includes rate control with diltiazem and baby aspirin  - Hold Diltiazem while npo. Received IV bolus at OSH   - IV  metoprolol prn for HR > 120    Essential hypertension[PTA regimen includes diltiazem 240 mg daily losartan 75 mg daily]  - Hold PTA meds  - IV hydralazine and labetolol available    Hypothyroidism: Maintained on alternating doses of [place] Thyroid 60 mg to 90 mg every other day  - Continue PTA regimen  - Check TSH    Chronic kidney disease, stage III: Baseline creatinine 0.9    COVID-19: Asymptomatic swab pending     Diet:   NPO  DVT Prophylaxis: heparin  Grubbs Catheter: not present  Code Status:   Very prolonged conversation with patient and daughter, Jessika, (separetly on the phone) regarding CODE status. Patient has no POLST. She initially states she would want chest compression but is very clear that she does not want to under any circumstances want to be on a ventilator/intubated. We discussed that performing CPR in the absence of intubation is futile and that if she is firm on her DNI that DNR/DNI would be most align with her wishes. I spoke with Jessika via phone. She did agree that her mom has historically expressed a strong desire to never be on a ventilator but Jessika was also leaning towards CPR. She did say that she knows her mother would not ever want to live with a significant disability. She ultimately deferred decision to her mom but hoped she would consider Full code. Spoke again with patient, she remains adamant that she does not want to be on a ventilator. She is in agreement for DNR/DNI         Disposition Plan   Expected discharge: Admit inpatient  Entered: Suly Mayers PA-C 03/25/2021, 5:15 PM     The patient's care was discussed with the Bedside Nurse, Patient, Patient's Family and NCC Consultant.    Suly Mayers PA-C  [facility]  Contact information available via [facility] Paging/Directory      ______________________________________________________________________    Chief Complaint   Dizziness and nausea    History is obtained from the patient    History of Present  "Illness   Janna Mcdermott is a 89 year old female with a past medical history of paroxysmal atrial fibrillation, hypertension, chronic kidney disease who presented to [facility] emergency department for evaluation of sudden onset dizziness with associated emesis.  She had no other focal neurologic deficits.  And her symptoms resolved shortly after arrival.  CTA showed chronic occlusion of the left vertebral artery, acute occlusion of right vertebral artery and high-grade stenosis of basilar artery.  Neuro critical care was consulted who recommended initiation of heparin drip and transfer for further evaluation.  Just prior to transfer patient developed new onset right-sided deficits.  Neuro critical care was consulted and heparin drip was stopped due to concern for hemorrhagic transformation.    Presently, patient is evaluated in her hospital room.  Complains of mild headache she attributes to the ride to [place] (app). Denies current dizziness. She is alert, oriented, following commands and rattles off her daughter's phone number. Denies recent fevers/chill, no URI symptoms. States \"I've never been sick in my life\"     Review of Systems    The 10 point Review of Systems is negative other than noted in the HPI or here.     Past Medical History    I have reviewed this patient's medical history and updated it with pertinent information if needed.   Past Medical History:   Diagnosis Date     Asymptomatic menopausal state     on estrogen     Chronic kidney disease, stage I     No Comments Provided     Dermatitis     No Comments Provided     Essential (primary) hypertension     No Comments Provided     Hyperlipidemia     No Comments Provided     Hypothyroidism     No Comments Provided     Other specified symptoms and signs involving the circulatory and respiratory systems     No Comments Provided     Paroxysmal atrial fibrillation (H)     No Comments Provided       Past Surgical History   I have reviewed this patient's " surgical history and updated it with pertinent information if needed.  Past Surgical History:   Procedure Laterality Date     APPENDECTOMY OPEN      No Comments Provided     CHOLECYSTECTOMY      No Comments Provided     HYSTERECTOMY TOTAL ABDOMINAL      No Comments Provided       Social History   I have reviewed this patient's social history and updated it with pertinent information if needed.  Social History     Tobacco Use     Smoking status: Never Smoker     Smokeless tobacco: Never Used   Substance Use Topics     Alcohol use: No     Alcohol/week: 0.0 standard drinks     Drug use: Never     Types: Other       Family History   I have reviewed this patient's family history and updated it with pertinent information if needed.  Family History   Problem Relation Age of Onset     Heart Disease Mother         Heart Disease     Other - See Comments Other         No cancer or diabetes in the family     Other - See Comments Brother           in service     Other - See Comments Brother          of Parkinson's     Heart Disease Sister         Heart Disease,     Heart Disease Sister         Heart Disease,     Other - See Comments Sister         at 6 months     Other - See Comments Brother          of pneumonia       Prior to Admission Medications   Cannot display prior to admission medications because the patient has not been admitted in this contact.     Allergies   Allergies   Allergen Reactions     Benazepril Cough     Ciprofloxacin Unknown     Erythromycin Unknown     Gluten Meal Unknown     Hydrochlorothiazide      Other reaction(s): Hyponatremia     Penicillins Unknown       Physical Exam   Vital Signs:                    Weight: 0 lbs 0 oz    Physical Exam  Constitutional:       Appearance: Normal appearance. She is not toxic-appearing.      Comments: Leaning to right   HENT:      Head: Normocephalic and atraumatic.   Eyes:      General: No scleral icterus.     Extraocular Movements: Extraocular  movements intact.      Conjunctiva/sclera: Conjunctivae normal.      Comments: R pupil smaller then left   Cardiovascular:      Rate and Rhythm: Normal rate. Rhythm irregular.      Heart sounds: No murmur.   Pulmonary:      Effort: Pulmonary effort is normal. No respiratory distress.   Abdominal:      General: Abdomen is flat.   Musculoskeletal: Normal range of motion.      Right lower leg: No edema.      Left lower leg: No edema.   Skin:     General: Skin is warm and dry.      Findings: No rash.   Neurological:      General: No focal deficit present.      Mental Status: She is alert and oriented to person, place, and time.      Cranial Nerves: No cranial nerve deficit.      Motor: No weakness.      Coordination: Coordination normal.   Psychiatric:         Mood and Affect: Mood normal.         Thought Content: Thought content normal.         Data   Data reviewed today: I reviewed all medications, new labs and imaging results over the last 24 hours. I personally reviewed no images or EKG's today.    Recent Labs   Lab 03/25/21  1612 03/25/21  1330   WBC 12.9* 11.0   HGB 15.8* 16.4*   MCV 93 93   * 155   NA  --  134   POTASSIUM  --  4.4   CHLORIDE  --  99   CO2  --  24   BUN  --  34*   CR  --  0.97   ANIONGAP  --  11   CORY  --  9.7   GLC  --  185*     Recent Results (from the past 24 hour(s))   CTA Head Neck with Contrast    Narrative    PROCEDURE: CTA  HEAD NECK WITH CONTRAST   3/25/2021 2:31 PM    HISTORY:Female, age,  89 years, , , Afib dizziness but now resolved    COMPARISON: None    TECHNIQUE: CT scan was performed of the head and neck  before and  after the administration of intravenous contrast. Sagittal, coronal,  axial and 3-D reconstructed MIP  images were reviewed.    FINDINGS:     Head/neck Angiogram:  Mixed calcified and noncalcified plaque seen  within the visualized portions of the aortic arch. Calcified  noncalcified plaque within the proximal aspects of the left subclavian  artery contributes  to between 65 and 75 cm stenosis. 2 tandem plaques  are seen in this location.    The left subclavian artery is occluded in its proximal aspects. The  right subclavian artery is tortuous at its origin. Innominate artery  is patent. The right subclavian artery and common carotid arteries are  patent. Small volume of mixed calcified noncalcified plaque is seen  within the left and right carotid bifurcation. Cervical portions of  the left and right internal carotid artery are patent and normal in  appearance. The external carotid arteries and branches are normal.    The right subclavian artery is occluded at the C4-5 level. The distal  aspects of the left subclavian artery are reconstituted via  collaterals in the overlying soft tissues. The basilar artery  demonstrates heterogeneous opacification suggesting the presence of  noncalcified atherosclerotic plaque localized dissection.    The posterior cerebral arteries and branches are patent. The cavernous  and petrous portions of the left and right internal carotid artery are  also patent. Small volume of calcified plaque is seen within the  cavernous portions of both the left and right internal carotid artery.  The anterior and middle cerebral arteries and respective branches are  unremarkable.    CT scan of the neck: Muscles of mastication are normal. Oral and  pharyngeal mucosa is normal. There is no evidence of pathologic lymph  node enlargement. Salivary glands are grossly normal.    Mandible is intact. Bones of the face demonstrate no acute  abnormality. Degenerative changes are seen throughout the cervical  spine without acute abnormality.      Head CT: Ventricles and sulci are mildly prominent however are  age-appropriate. Patchy areas of decreased density is seen within the  periventricular white matter. Air-fluid levels are seen within the  sphenoid locules. No evidence of acute hemorrhage, mass or mass  effect..  Bones of the skull demonstrate no acute  abnormality.       Impression    IMPRESSION:   Complete occlusion of the right vertebral artery at the C4-5 level.  This appears to represent an acute/subacute finding as there is mild  fat stranding seen along the more distal portions of the vessel.    Chronic occlusion of the left vertebral artery at its origin with  reconstitution distally (at the C2 level) via collaterals arising from  the overlying soft tissues..     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    Small volume of calcified and noncalcified plaque at the carotid  bifurcations without significant stenosis.    Limited evaluation suggests high-grade stenosis in the proximal  aspects of the left subclavian artery secondary to mixed calcified and  noncalcified plaques.    Examination was discussed with Dr. Meade at 1500 hours 3/25/2021    XOCHILT MORGAN MD   CT Head w/o Contrast    Narrative    CT OF THE HEAD WITHOUT CONTRAST 3/25/2021 6:25 PM     COMPARISON: Head CT same day    HISTORY: Stroke, follow-up. Rule out hemorrhagic transformation.    TECHNIQUE: 5 mm thick axial CT images of the head were acquired  without IV contrast material.    FINDINGS: There is mild diffuse cerebral volume loss. There are subtle  patchy areas of decreased density in the cerebral white matter  bilaterally that are consistent with sequela of chronic small vessel  ischemic disease.    The ventricles and basal cisterns are within normal limits in  configuration given the degree of cerebral volume loss.  There is no  midline shift. There are no extra-axial fluid collections.    No intracranial hemorrhage, mass or recent infarct.    The visualized paranasal sinuses are well-aerated. There is no  mastoiditis. There are no fractures of the visualized bones.      Impression    IMPRESSION: Diffuse cerebral volume loss and cerebral white matter  changes consistent with chronic small vessel ischemic disease. No  evidence for  acute intracranial pathology. No change from the recent  comparison study.        Radiation dose for this scan was reduced using automated exposure  control, adjustment of the mA and/or kV according to patient size, or  iterative reconstruction technique

## 2021-03-25 NOTE — PHARMACY-ADMISSION MEDICATION HISTORY
Pharmacy -- Admission Medication Reconciliation IN PROGRESS    Prior to admission (PTA) medications were reviewed and the patient's PTA medication list was updated.    Sources Consulted: Sure Scripts, Chart notes, medication list, Care Everywhere medication list, medication history    The reliability of this Medication Reconciliation is: Reliability: Borderline reliable    The pharmacist has reviewed with the patient;s medication history. The pharmacist did NOT find any Factor Xa inhibitor (e.g. apixaban or rivaroxaban) in the last 72 hours.   Jessica Velez [facility], 3/25/2021,  3:44 PM

## 2021-03-25 NOTE — ED PROVIDER NOTES
History     Chief Complaint   Patient presents with     Dizziness     Nausea     HPI  Janna Mcdermott is a 89 year old female who presents from home by staff patient was very busy throughout the day doing her daily activities she went from the downstairs to the upstairs and then subsequently felt dizzy had an episode of vomiting this happened around 11:00. She notes she has had headaches this week on and off. She does have a known history of atrial fibrillation not on any anticoagulation.  She notes that she did not have any balance issues, vision changes, facial droop or extremity weakness there was no concerns for slurred speech or excessive sun severe headache.  At this time patient's symptoms have now improved significantly.  She does not have any dizziness but has some dry heaving intermittently.  No neck pain chest pain shortness of breath no abdominal pain loss of bowel bladder function rashes and she does not have any trauma.  She does not have any weakness on one side or the other and she is in good spirits. In fact she is refusing all cares at this time.    Allergies:  Allergies   Allergen Reactions     Benazepril Cough     Ciprofloxacin Unknown     Erythromycin Unknown     Gluten Meal Unknown     Hydrochlorothiazide      Other reaction(s): Hyponatremia     Penicillins Unknown       Problem List:    Patient Active Problem List    Diagnosis Date Noted     Mixed hyperlipidemia 07/31/2018     Priority: Medium     Essential hypertension 07/31/2018     Priority: Medium     Acquired hypothyroidism 07/31/2018     Priority: Medium     External ear ulcer (H) 09/20/2016     Priority: Medium     Biatrial enlargement 05/28/2015     Priority: Medium     Paroxysmal atrial fibrillation (H) 04/14/2014     Priority: Medium     Chronic kidney disease, stage III (moderate) 02/15/2011     Priority: Medium     Other symptoms involving cardiovascular system 01/26/2011     Priority: Medium     Overview:   No obstruction  on CUS in [place]       Contact dermatitis and eczema 2010     Priority: Medium        Past Medical History:    Past Medical History:   Diagnosis Date     Asymptomatic menopausal state      Chronic kidney disease, stage I      Dermatitis      Essential (primary) hypertension      Hyperlipidemia      Hypothyroidism      Other specified symptoms and signs involving the circulatory and respiratory systems      Paroxysmal atrial fibrillation (H)        Past Surgical History:    Past Surgical History:   Procedure Laterality Date     APPENDECTOMY OPEN      No Comments Provided     CHOLECYSTECTOMY      No Comments Provided     HYSTERECTOMY TOTAL ABDOMINAL      No Comments Provided       Family History:    Family History   Problem Relation Age of Onset     Heart Disease Mother         Heart Disease     Other - See Comments Other         No cancer or diabetes in the family     Other - See Comments Brother           in service     Other - See Comments Brother          of Parkinson's     Heart Disease Sister         Heart Disease,     Heart Disease Sister         Heart Disease,     Other - See Comments Sister         at 6 months     Other - See Comments Brother          of pneumonia       Social History:  Marital Status:   [5]  Social History     Tobacco Use     Smoking status: Never Smoker     Smokeless tobacco: Never Used   Substance Use Topics     Alcohol use: No     Alcohol/week: 0.0 standard drinks     Drug use: Never     Types: Other        Medications:    ARMOUR THYROID 60 MG tablet  ARMOUR THYROID 90 MG tablet  aspirin 81 MG tablet  Cholecalciferol ([employer] VITAMIN D3) 400 UNITS CAPS  diltiazem ER (DILT-XR) 120 MG 24 hr capsule  diltiazem ER COATED BEADS (CARDIZEM CD/CARTIA XT) 120 MG 24 hr capsule  fish oil-omega-3 fatty acids 1000 MG capsule  Flaxseed Oil OIL  latanoprost (XALATAN) 0.005 % ophthalmic solution  losartan (COZAAR) 25 MG tablet  losartan (COZAAR) 50 MG tablet  Sesame Oil  OIL          Review of Systems     Pertinent positives and negatives are as above in the HPI. 10 point review of systems is otherwise negative.    Physical Exam   BP: (!) 184/124  Pulse: 104  Temp: 96  F (35.6  C)  Resp: 18  Weight: 64.4 kg (142 lb)  SpO2: 99 %    Vitals:    03/25/21 1520 03/25/21 1530 03/25/21 1540 03/25/21 1550   BP: (!) 146/77 129/74  (!) 146/66   Pulse: 96 88 86 77   Resp: 21 25 24 23   Temp:       TempSrc:       SpO2: 92% 90% (!) 89% (!) 89%   Weight:             Physical Exam   Exam:  Constitutional: healthy, alert and no distress  Head: Normocephalic. No masses, lesions, tenderness or abnormalities  Neck: Neck supple. No adenopathy. Thyroid symmetric, normal size,, Carotids without bruits.  ENT: ENT exam normal, no neck nodes or sinus tenderness extraocular movements are intact and there is no nystagmus.  Cardiovascular: Irregular rate and rhythm, no murmurs, clicks gallops or rub  Respiratory: negative, Percussion normal. Good diaphragmatic excursion. Lungs clear  Gastrointestinal: Abdomen soft, non-tender. BS normal. No masses, organomegaly  : Deferred  Musculoskeletal: extremities normal- no gross deformities noted, gait normal and normal muscle tone  Skin: no suspicious lesions or rashes  Neurologic: Gait normal. Reflexes normal and symmetric. Sensation grossly WNL.  National Institutes of [employer] Stroke Scale  Exam Interval: Baseline   Score    Level of consciousness: (0)   Alert, keenly responsive    LOC questions: (0)   Answers both questions correctly    LOC commands: (0)   Performs both tasks correctly    Best gaze: (0)   Normal    Visual: (0)   No visual loss    Facial palsy: (0)   Normal symmetrical movements    Motor arm (left): (0)   No drift    Motor arm (right): (0)   No drift    Motor leg (left): (0)   No drift    Motor leg (right): (0)   No drift    Limb ataxia: (0)   Absent    Sensory: (0)   Normal- no sensory loss    Best language: (0)   Normal- no aphasia    Dysarthria:  (0)   Normal    Extinction and inattention: (0)   No abnormality        Total Score:  0     National Institutes of [employer] Stroke Scale  Exam Interval: second evaluation    Score    Level of consciousness: (0)   Alert, keenly responsive    LOC questions: (0)   Answers both questions correctly    LOC commands: (0)   Performs both tasks correctly    Best gaze: (0)   Normal    Visual: (0)   No visual loss    Facial palsy: (0)   Normal symmetrical movements    Motor arm (left): (0)   No drift    Motor arm (right): (0)   No drift    Motor leg (left): (0)   No drift    Motor leg (right): (0)   No drift    Limb ataxia: (0)   Absent    Sensory: (0)   Normal- no sensory loss    Best language: (0)   Normal- no aphasia    Dysarthria: (0)   Normal    Extinction and inattention: (0)   No abnormality        Total Score:  0   Psychiatric: mentation appears normal and affect normal/bright        ED Course        Procedures               EKG Interpretation:      Interpreted by Daniel Meade PA-C  Time reviewed: 13:51  Symptoms at time of EKG: None   Rhythm: atrial fibrillation  Rate: 106-120  ST Segments/ T Waves: No ST-T wave changes  Clinical Impression: atrial fibrillation (chronic)        Results for orders placed or performed during the hospital encounter of 03/25/21 (from the past 24 hour(s))   CBC with platelets differential   Result Value Ref Range    WBC 11.0 4.0 - 11.0 10e9/L    RBC Count 5.25 (H) 3.8 - 5.2 10e12/L    Hemoglobin 16.4 (H) 11.7 - 15.7 g/dL    Hematocrit 49.0 (H) 35.0 - 47.0 %    MCV 93 78 - 100 fl    MCH 31.2 26.5 - 33.0 pg    MCHC 33.5 31.5 - 36.5 g/dL    RDW 13.5 10.0 - 15.0 %    Platelet Count 155 150 - 450 10e9/L    Diff Method Automated Method     % Neutrophils 86.9 %    % Lymphocytes 7.1 %    % Monocytes 4.9 %    % Eosinophils 0.3 %    % Basophils 0.5 %    % Immature Granulocytes 0.3 %    Absolute Neutrophil 9.6 (H) 1.6 - 8.3 10e9/L    Absolute Lymphocytes 0.8 0.8 - 5.3 10e9/L    Absolute  Monocytes 0.5 0.0 - 1.3 10e9/L    Absolute Eosinophils 0.0 0.0 - 0.7 10e9/L    Absolute Basophils 0.1 0.0 - 0.2 10e9/L    Abs Immature Granulocytes 0.0 0 - 0.4 10e9/L   Basic metabolic panel   Result Value Ref Range    Sodium 134 134 - 144 mmol/L    Potassium 4.4 3.5 - 5.1 mmol/L    Chloride 99 98 - 107 mmol/L    Carbon Dioxide 24 21 - 31 mmol/L    Anion Gap 11 3 - 14 mmol/L    Glucose 185 (H) 70 - 105 mg/dL    Urea Nitrogen 34 (H) 7 - 25 mg/dL    Creatinine 0.97 0.60 - 1.20 mg/dL    GFR Estimate 54 (L) >60 mL/min/[1.73_m2]    GFR Estimate If Black 65 >60 mL/min/[1.73_m2]    Calcium 9.7 8.6 - 10.3 mg/dL   Lactic acid   Result Value Ref Range    Lactic Acid 2.4 (H) 0.7 - 2.0 mmol/L   Troponin GH (now)   Result Value Ref Range    Troponin 4.2 <34.0 pg/mL   Thyrotropin GH   Result Value Ref Range    Thyrotropin 7.08 (H) 0.34 - 5.60 IU/mL   T3 Free   Result Value Ref Range    Free T3 3.0 2.5 - 3.9 pg/mL   Thyroxin T4 free   Result Value Ref Range    T4 Free 0.93 0.60 - 1.60 ng/dL   CTA Head Neck with Contrast    Narrative    PROCEDURE: CTA  HEAD NECK WITH CONTRAST   3/25/2021 2:31 PM    HISTORY:Female, age,  89 years, , , Afib dizziness but now resolved    COMPARISON: None    TECHNIQUE: CT scan was performed of the head and neck  before and  after the administration of intravenous contrast. Sagittal, coronal,  axial and 3-D reconstructed MIP  images were reviewed.    FINDINGS:     Head/neck Angiogram:  Mixed calcified and noncalcified plaque seen  within the visualized portions of the aortic arch. Calcified  noncalcified plaque within the proximal aspects of the left subclavian  artery contributes to between 65 and 75 cm stenosis. 2 tandem plaques  are seen in this location.    The left subclavian artery is occluded in its proximal aspects. The  right subclavian artery is tortuous at its origin. Innominate artery  is patent. The right subclavian artery and common carotid arteries are  patent. Small volume of mixed  calcified noncalcified plaque is seen  within the left and right carotid bifurcation. Cervical portions of  the left and right internal carotid artery are patent and normal in  appearance. The external carotid arteries and branches are normal.    The right subclavian artery is occluded at the C4-5 level. The distal  aspects of the left subclavian artery are reconstituted via  collaterals in the overlying soft tissues. The basilar artery  demonstrates heterogeneous opacification suggesting the presence of  noncalcified atherosclerotic plaque localized dissection.    The posterior cerebral arteries and branches are patent. The cavernous  and petrous portions of the left and right internal carotid artery are  also patent. Small volume of calcified plaque is seen within the  cavernous portions of both the left and right internal carotid artery.  The anterior and middle cerebral arteries and respective branches are  unremarkable.    CT scan of the neck: Muscles of mastication are normal. Oral and  pharyngeal mucosa is normal. There is no evidence of pathologic lymph  node enlargement. Salivary glands are grossly normal.    Mandible is intact. Bones of the face demonstrate no acute  abnormality. Degenerative changes are seen throughout the cervical  spine without acute abnormality.      Head CT: Ventricles and sulci are mildly prominent however are  age-appropriate. Patchy areas of decreased density is seen within the  periventricular white matter. Air-fluid levels are seen within the  sphenoid locules. No evidence of acute hemorrhage, mass or mass  effect..  Bones of the skull demonstrate no acute abnormality.       Impression    IMPRESSION:   Complete occlusion of the right vertebral artery at the C4-5 level.  This appears to represent an acute/subacute finding as there is mild  fat stranding seen along the more distal portions of the vessel.    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    Small volume of calcified and noncalcified plaque at the carotid  bifurcations without significant stenosis.    Limited evaluation suggests high-grade stenosis in the proximal  aspects of the left subclavian artery secondary to mixed calcified and  noncalcified plaques.    Examination was discussed with Dr. Meade at 1500 hours 3/25/2021    XOCHILT MORGAN MD   UA reflex to Microscopic and Culture    Specimen: Urine clean catch; Midstream Urine   Result Value Ref Range    Color Urine Light Yellow     Appearance Urine Clear     Glucose Urine Negative NEG^Negative mg/dL    Bilirubin Urine Negative NEG^Negative    Ketones Urine 10 (A) NEG^Negative mg/dL    Specific Gravity Urine 1.026 1.003 - 1.035    Blood Urine Negative NEG^Negative    pH Urine 6.5 5.0 - 7.0 pH    Protein Albumin Urine Negative NEG^Negative mg/dL    Urobilinogen mg/dL Normal 0.0 - 2.0 mg/dL    Nitrite Urine Negative NEG^Negative    Leukocyte Esterase Urine Negative NEG^Negative    Source Midstream Urine        Medications   sodium chloride 0.9% infusion ( Intravenous New Bag 3/25/21 1442)   sodium chloride 0.9% infusion (has no administration in time range)   heparin 100 unit/mL in D5W ANTICOAGULANT infusion (1,150 Units/hr Intravenous New Bag 3/25/21 1549)   ondansetron (ZOFRAN) injection 4 mg (4 mg Intravenous Given 3/25/21 1358)   diltiazem (CARDIZEM) injection 10 mg (10 mg Intravenous Given 3/25/21 1358)   iodixanol (VISIPAQUE 320) injection 100 mL (100 mLs Intravenous Given 3/25/21 1418)   heparin loading dose for HIGH INTENSITY TREATMENT * Give BEFORE starting heparin infusion (5,150 Units Intravenous Given 3/25/21 1548)       Assessments & Plan (with Medical Decision Making)     There is a high probability of clinically significant  deterioration in this patient's condition which required the highest level of my preparedness to intervene urgently.    I have reviewed the nursing notes.    I have reviewed the findings, diagnosis, plan and need for follow up with the patient.  Differential diagnosis at this point included acute CVA, TIA, complex migraine headache, intracranial bleed, meningitis/encephalitis, hypoglycemia, metabolic derangements, atypical seizure, cardiovascular concerns as well as other etiologies.      Pleasant female who presents to the emergency department for evaluation she had some dizziness earlier on in the day.  Is also noted that she is had a headache intermittently for the last week or so that resolved.  She prompted the ambulance because she was nauseous and had some episodes of dry heaving.  Essentially present here for evaluation when she arrived her dizziness was resolved.  Underwent laboratory studies and CTA imaging of her head and neck.  CTA imaging as noted above.    Patient was found to be A. fib RVR about 112-120.  She was quite hypertensive and also received 10 mg of IV diltiazem.  Her rate improved to the 80s and her blood pressure as noted above.    Result returned at 1300, at 1305 I did speak with stroke neurology.  The fellow is going to discuss this with his staff.  I did call back at 1330 to speak with the attending Dr. Fernández who felt as though the patient would subsequently benefit from a MRI for amyeloid angiopathy we did not want to delay transport for MRI here at [facility] and subsequently MRI would be more beneficial at [facility], we will monitor for hypotension with a target systolic blood pressure of 120-160 and a heart rate between 70 and 100.  Dr. Wong also recommended heparin.  Heparin will be started here in the emergency department.  My understanding the patient does not a candidate for thrombectomy.      Stroke neurology called back and noted that we need to de-escalate the  stroke code.    I then subsequently at 1335 spoke with [facility] to speak with the hospitalist service.  [facility] return the call with the hospitalist at 15:50 Dr. Parrish is the accepting physician.    I explained my diagnostic considerations and recommendations and the patient voiced an understanding and was in agreement with the treatment plan. All questions were answered. We discussed potential side effects of any prescribed or recommended therapies, as well as expectations for response to treatments.       New Prescriptions    No medications on file     Aggregate Critical Care Time is 35 minutes.  This was the time seeing the patient at the bedside while the patient was critical.  My time did not include any pertinent procedures or activities that did not contribute to the patient's care while the patient was critical.        Final diagnoses:   Vertebral artery occlusion, right   Occlusion of left vertebral artery   Atrial fibrillation, unspecified type (H)       3/25/2021   [facility] AND [facility]     Daniel Meade PA-C  03/25/21 [id number]

## 2021-03-25 NOTE — ED PROVIDER NOTES
PTT had returned high, this was drawn after the heparin was started. Heparin was started at 15:40. At 16:04 the PTT was drawn by lab from the same site.      Daniel Meade, PA-C  03/25/21 1173

## 2021-03-25 NOTE — PROGRESS NOTES
[facility]    Stroke Telephone Note    I was called by Dr. Daniel Hurtado on 03/25/21 at 1509 regarding patient Janna Mcdermott, who is a 89 year old woman with known history of atrial fibrillation (not anticoagulated) who presents to the ED for evaluation of acute onset dizziness. Her symptoms started abruptly at 1100 AM along with nausea and dry heaving. She then presented to the ED for evaluation. Since her arrival in the ED her symptoms have resolved, although she is a little bit sleepy. She is not reported to be somnolent to the point of losing airway protection and she can be easily roused to cooperate with examination. No other neurologic symptoms are reported, including vision changes, facial droop, eye movement abnormalities, slurred speech, limb weakness, and sensory loss. 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 After completion of imagine, a stroke code was called.     Stroke Code Data  (for stroke code without tele)  Stroke code activated 03/25/21   1509   First stroke provider response 03/25/21   1513   Last known normal 03/25/21   1100   Time of discovery   (or onset of symptoms) 03/25/21   1100   Head CT read by Stroke Neuro Dr/Provider 03/25/21   1511   Was stroke code de-escalated? Yes 03/25/21 1538  other (see comments) Symptoms resolved     TPA Treatment   Not given due to minor/isolated/quickly resolving symptoms.    Endovascular Treatment  Proximal vessel occlusion present, but endovascular treatment not initiated due to technical difficulty accessing area of concern given bilateral vertebral artery occlusion    Impression  Ischemic Stroke due to large-artery atherosclerosis (embolus/thrombosis)   Ms Mcdermott is a 89-year-old woman presenting to the emergency department for evaluation of transient dizziness.  As part of the work-up, a CT and CT angiogram were obtained  showing chronic occlusion of the left vertebral artery, acute appearing occlusion of the mid right vertebral artery, and high-grade stenosis of the basilar artery.  Fortunately, her symptoms have completely resolved and there are no ongoing focal neurologic deficits.  Despite this, she remains at very high risk of neurologic injury due to high-grade basilar stenosis.  The basilar artery injury appears to be atherosclerotic in nature.  Given high risk of basilar artery stroke, we recommend initiation of heparin drip and transfer for further workup and secondary prevention.  Care should be taken to avoid hypotension and simultaneously hypertension given her age and risk of bleeding now that she is on an anticoagulant.  We suggest a blood pressure goal of 120-160/ in addition to hourly neurochecks.  She will need an MRI to assess for presence of acute infarction.  Signs of basilar artery stroke to be aware of include vertigo, nausea, vomiting, altered level of consciousness, abnormal eye movements, facial droop, limb weakness, and slurred speech among others.    Recommendations  Acute Ischemic Stroke (without tPA) Recommendations  - Neurochecks Q 1 hour  - heparin gtt, low intensity without bolus  - Blood pressure goal 120-160/  - Statin: TBD pending LDL level  - MRI Stroke Protocol  - Telemetry, EKG  - Bedside Glucose Monitoring  - Nutrition: TBD pending swallow assessment  - A1c, Lipid Panel, Troponin x 3  - PT/OT/SLP  - Stroke Education  - Euthermia, Euglycemia    My recommendations are based on the information provided over the phone by Janna Mcdermott's in-person providers. They are not intended to replace the clinical judgment of her in-person providers. I was not requested to personally see or examine the patient at this time.    The Stroke/Neurocritical Care Staff is Dr. Wong.    Leonardo Burnette DO  Neurocritical Care Fellow

## 2021-03-25 NOTE — ED TRIAGE NOTES
Patient presents to the ED via EMS from home with complaints of sudden onset of dizziness at 11am followed by nausea.  Patient has a hx of afib.  No known exposure to covid.  Patient refused PIV or medication by EMS.

## 2021-03-25 NOTE — PHARMACY-ADMISSION MEDICATION HISTORY
Admission medication history interview status for the 3/25/2021  admission is complete. See EPIC admission navigator for prior to admission medications     Medication history source reliability:Moderate    Actions taken by pharmacist (provider contacted, etc):None     Additional medication history information not noted on PTA med list :None    Medication reconciliation/reorder completed by provider prior to medication history? No    Time spent in this activity: 20min review of surescripts, ED interview in [facility], care everywhere     Prior to Admission medications    Medication Sig Last Dose Taking? Auth Provider   JACOB THYROID 60 MG tablet TAKE 1 TABLET BY MOUTH EVERY OTHER DAY 3/25/2021 at Unknown time Yes Nuno Clinton MD   ARMMARY KATE THYROID 90 MG tablet TAKE 1 TABLET BY MOUTH EVERY OTHER DAY 3/24/2021 at Unknown time Yes Nuno Clinton MD   aspirin 81 MG tablet Take 81 mg by mouth daily with food 3/25/2021 at Unknown time Yes Reported, Patient   Cholecalciferol ([employer] VITAMIN D3) 400 UNITS CAPS Take 800 Units by mouth daily 3/25/2021 at Unknown time Yes Reported, Patient   diltiazem ER COATED BEADS (CARDIZEM CD/CARTIA XT) 120 MG 24 hr capsule Take 1 capsule (120 mg) by mouth daily 3/25/2021 at Unknown time Yes Dwayne Gaona MD   fish oil-omega-3 fatty acids 1000 MG capsule Take 1,000 capsules by mouth daily 3/25/2021 at Unknown time Yes Reported, Patient   Flaxseed Oil OIL Take by mouth daily 3/25/2021 at Unknown time Yes Reported, Patient   latanoprost (XALATAN) 0.005 % ophthalmic solution Place 1 drop into both eyes At Bedtime  3/24/2021 at Unknown time Yes Reported, Patient   losartan (COZAAR) 25 MG tablet Take 75 mg by mouth daily  Patient taking differently: 25 mg daily Take with 50mg for total of 75mg 3/25/2021 at Unknown time Yes Nuno Clinton MD   losartan (COZAAR) 50 MG tablet Take 75 mg by mouth daily  Patient taking differently: 50 mg daily Take with 25mg for total of  75mg 3/25/2021 at Unknown time Yes Nuno Clinton MD   Sesame Oil OIL Take by mouth daily 3/25/2021 at Unknown time Yes Reported, Patient

## 2021-03-25 NOTE — ED NOTES
"Staff notified patient that a PIV and zofran was ordered.  Patient states to staff that she does not like needles.  Staff notified patient that labs were also ordered and blood could be pulled off patients PIV to save her from getting a second poke.  Patient stated to staff, \"its ok I am an easy poke\".  Patient refusing PIV or zofran at this time and would like to wait for lab results, provider aware.  "

## 2021-03-25 NOTE — ED PROVIDER NOTES
The patient was noted on reexamination she has started to lean to the right, a rightward gaze is noted, her tongue started to deviate to the right also.  Her right pupil is smaller than her left.  She still has equal  strength in the upper and lower extremities.  Finger-to-nose is intact.  Rapid alternating for intact inhalations intact at this time.    16:30 I did contact Dr. Parrish at [facility] to update him. Advised to discontinue heparin    16:35-16:45 Dr. Wong was notified also was updated.  Suggested CT examination of the head at arrival.     Daniel Meade PA-C  03/25/21 2496

## 2021-03-25 NOTE — ED NOTES
[facility] called and requested to be on stand by per request of PA.   [facility] accepted the flight; waiting for call; if they are called to transport, ETA 13 min.

## 2021-03-25 NOTE — ED NOTES
Neuro check completed prior to departure, new onset deficit noted with R) sided mediation with tongue.  Patient was noted to be leaning to the R) side prior to departure with guardian.  Provider called neurologist and orders received to discontinue heparin gtt.  Guardian called and notified to discontinue gtt, spoke with Josr MONTANEZ.  Custer aissatou miller called at this time and staff spoke with Lee, receiving RN, and notified receiving nurse that due to symptoms heparin gtt was stopped.

## 2021-03-25 NOTE — PROGRESS NOTES
IV Contrast- Discharge Instructions After Your CT Scan      The IV contrast you received today will be filtered from your bloodstream by your kidneys during the next 24 hours and pass from the body in urine.  You will not be aware of this process and your urine will not change in color.  To help this process you should drink at least 4 additional glasses of water or juice today.  This reduces stress on your kidneys.    Most contrast reactions are immediate.  Should you develop symptoms of concern after discharge, contact the department at the number below.  After hours you should contact your personal physician.  If you develop breathing distress or wheezing, call 911.      1.  Has the patient had a previous reaction to IV contrast? no    2.  Does the patient have kidney disease? Yes [id number]    3.  Is the patient on dialysis? no    If YES to any of these questions, exam will be reviewed with a Radiologist before administering contrast.

## 2021-03-26 ENCOUNTER — APPOINTMENT (OUTPATIENT)
Dept: CARDIOLOGY | Facility: CLINIC | Age: 86
DRG: 065 | End: 2021-03-26
Attending: PHYSICIAN ASSISTANT
Payer: MEDICARE

## 2021-03-26 ENCOUNTER — APPOINTMENT (OUTPATIENT)
Dept: MRI IMAGING | Facility: CLINIC | Age: 86
DRG: 065 | End: 2021-03-26
Attending: PHYSICIAN ASSISTANT
Payer: MEDICARE

## 2021-03-26 ENCOUNTER — APPOINTMENT (OUTPATIENT)
Dept: OCCUPATIONAL THERAPY | Facility: CLINIC | Age: 86
DRG: 065 | End: 2021-03-26
Attending: PHYSICIAN ASSISTANT
Payer: MEDICARE

## 2021-03-26 ENCOUNTER — APPOINTMENT (OUTPATIENT)
Dept: PHYSICAL THERAPY | Facility: CLINIC | Age: 86
DRG: 065 | End: 2021-03-26
Attending: PHYSICIAN ASSISTANT
Payer: MEDICARE

## 2021-03-26 ENCOUNTER — APPOINTMENT (OUTPATIENT)
Dept: CT IMAGING | Facility: CLINIC | Age: 86
DRG: 065 | End: 2021-03-26
Attending: INTERNAL MEDICINE
Payer: MEDICARE

## 2021-03-26 LAB
ANION GAP SERPL CALCULATED.3IONS-SCNC: 5 MMOL/L (ref 3–14)
APTT PPP: 45 SEC (ref 22–37)
BUN SERPL-MCNC: 20 MG/DL (ref 7–30)
CALCIUM SERPL-MCNC: 8.3 MG/DL (ref 8.5–10.1)
CHLORIDE SERPL-SCNC: 105 MMOL/L (ref 94–109)
CHOLEST SERPL-MCNC: 154 MG/DL
CO2 SERPL-SCNC: 26 MMOL/L (ref 20–32)
CREAT SERPL-MCNC: 0.71 MG/DL (ref 0.52–1.04)
ERYTHROCYTE [DISTWIDTH] IN BLOOD BY AUTOMATED COUNT: 13.3 % (ref 10–15)
GFR SERPL CREATININE-BSD FRML MDRD: 75 ML/MIN/{1.73_M2}
GLUCOSE BLDC GLUCOMTR-MCNC: 115 MG/DL (ref 70–99)
GLUCOSE BLDC GLUCOMTR-MCNC: 120 MG/DL (ref 70–99)
GLUCOSE BLDC GLUCOMTR-MCNC: 135 MG/DL (ref 70–99)
GLUCOSE BLDC GLUCOMTR-MCNC: 138 MG/DL (ref 70–99)
GLUCOSE SERPL-MCNC: 113 MG/DL (ref 70–99)
HCT VFR BLD AUTO: 44.6 % (ref 35–47)
HDLC SERPL-MCNC: 66 MG/DL
HGB BLD-MCNC: 14.9 G/DL (ref 11.7–15.7)
INR PPP: 1.06 (ref 0.86–1.14)
LDLC SERPL CALC-MCNC: 80 MG/DL
MAGNESIUM SERPL-MCNC: 2 MG/DL (ref 1.6–2.3)
MCH RBC QN AUTO: 31.2 PG (ref 26.5–33)
MCHC RBC AUTO-ENTMCNC: 33.4 G/DL (ref 31.5–36.5)
MCV RBC AUTO: 93 FL (ref 78–100)
NONHDLC SERPL-MCNC: 88 MG/DL
PHOSPHATE SERPL-MCNC: 3.1 MG/DL (ref 2.5–4.5)
PLATELET # BLD AUTO: 143 10E9/L (ref 150–450)
POTASSIUM SERPL-SCNC: 4 MMOL/L (ref 3.4–5.3)
RBC # BLD AUTO: 4.78 10E12/L (ref 3.8–5.2)
SODIUM SERPL-SCNC: 136 MMOL/L (ref 133–144)
TRIGL SERPL-MCNC: 40 MG/DL
TROPONIN I SERPL-MCNC: 0.04 UG/L (ref 0–0.04)
TROPONIN I SERPL-MCNC: 0.09 UG/L (ref 0–0.04)
TROPONIN I SERPL-MCNC: <0.015 UG/L (ref 0–0.04)
TSH SERPL DL<=0.005 MIU/L-ACNC: 3.9 MU/L (ref 0.4–4)
UFH PPP CHRO-ACNC: 0.16 IU/ML
UFH PPP CHRO-ACNC: 0.35 IU/ML
UFH PPP CHRO-ACNC: 0.5 IU/ML
UFH PPP CHRO-ACNC: 0.93 IU/ML
WBC # BLD AUTO: 8 10E9/L (ref 4–11)

## 2021-03-26 PROCEDURE — 85520 HEPARIN ASSAY: CPT | Performed by: PHYSICIAN ASSISTANT

## 2021-03-26 PROCEDURE — 80048 BASIC METABOLIC PNL TOTAL CA: CPT | Performed by: PHYSICIAN ASSISTANT

## 2021-03-26 PROCEDURE — 85027 COMPLETE CBC AUTOMATED: CPT | Performed by: PHYSICIAN ASSISTANT

## 2021-03-26 PROCEDURE — 250N000011 HC RX IP 250 OP 636: Performed by: INTERNAL MEDICINE

## 2021-03-26 PROCEDURE — A9585 GADOBUTROL INJECTION: HCPCS | Performed by: INTERNAL MEDICINE

## 2021-03-26 PROCEDURE — 85730 THROMBOPLASTIN TIME PARTIAL: CPT | Performed by: PHYSICIAN ASSISTANT

## 2021-03-26 PROCEDURE — 97161 PT EVAL LOW COMPLEX 20 MIN: CPT | Mod: GP | Performed by: PHYSICAL THERAPIST

## 2021-03-26 PROCEDURE — 255N000002 HC RX 255 OP 636: Performed by: INTERNAL MEDICINE

## 2021-03-26 PROCEDURE — 250N000013 HC RX MED GY IP 250 OP 250 PS 637: Performed by: INTERNAL MEDICINE

## 2021-03-26 PROCEDURE — 85520 HEPARIN ASSAY: CPT | Performed by: INTERNAL MEDICINE

## 2021-03-26 PROCEDURE — 36415 COLL VENOUS BLD VENIPUNCTURE: CPT | Performed by: INTERNAL MEDICINE

## 2021-03-26 PROCEDURE — 93005 ELECTROCARDIOGRAM TRACING: CPT

## 2021-03-26 PROCEDURE — 999N001017 HC STATISTIC GLUCOSE BY METER IP

## 2021-03-26 PROCEDURE — 120N000001 HC R&B MED SURG/OB

## 2021-03-26 PROCEDURE — 99232 SBSQ HOSP IP/OBS MODERATE 35: CPT | Mod: GC | Performed by: PSYCHIATRY & NEUROLOGY

## 2021-03-26 PROCEDURE — 258N000003 HC RX IP 258 OP 636: Performed by: PHYSICIAN ASSISTANT

## 2021-03-26 PROCEDURE — 84443 ASSAY THYROID STIM HORMONE: CPT | Performed by: INTERNAL MEDICINE

## 2021-03-26 PROCEDURE — 250N000009 HC RX 250: Performed by: INTERNAL MEDICINE

## 2021-03-26 PROCEDURE — 250N000011 HC RX IP 250 OP 636: Performed by: PHYSICIAN ASSISTANT

## 2021-03-26 PROCEDURE — 85610 PROTHROMBIN TIME: CPT | Performed by: PHYSICIAN ASSISTANT

## 2021-03-26 PROCEDURE — 97116 GAIT TRAINING THERAPY: CPT | Mod: GP | Performed by: PHYSICAL THERAPIST

## 2021-03-26 PROCEDURE — 93306 TTE W/DOPPLER COMPLETE: CPT | Mod: 26 | Performed by: INTERNAL MEDICINE

## 2021-03-26 PROCEDURE — 71275 CT ANGIOGRAPHY CHEST: CPT

## 2021-03-26 PROCEDURE — 83735 ASSAY OF MAGNESIUM: CPT | Performed by: PHYSICIAN ASSISTANT

## 2021-03-26 PROCEDURE — 999N000208 ECHOCARDIOGRAM COMPLETE

## 2021-03-26 PROCEDURE — 84484 ASSAY OF TROPONIN QUANT: CPT | Performed by: PHYSICIAN ASSISTANT

## 2021-03-26 PROCEDURE — 97530 THERAPEUTIC ACTIVITIES: CPT | Mod: GP | Performed by: PHYSICAL THERAPIST

## 2021-03-26 PROCEDURE — 97535 SELF CARE MNGMENT TRAINING: CPT | Mod: GO | Performed by: OCCUPATIONAL THERAPIST

## 2021-03-26 PROCEDURE — 80061 LIPID PANEL: CPT | Performed by: INTERNAL MEDICINE

## 2021-03-26 PROCEDURE — 258N000003 HC RX IP 258 OP 636: Performed by: NURSE PRACTITIONER

## 2021-03-26 PROCEDURE — 99233 SBSQ HOSP IP/OBS HIGH 50: CPT | Performed by: INTERNAL MEDICINE

## 2021-03-26 PROCEDURE — 97530 THERAPEUTIC ACTIVITIES: CPT | Mod: GO | Performed by: OCCUPATIONAL THERAPIST

## 2021-03-26 PROCEDURE — 97166 OT EVAL MOD COMPLEX 45 MIN: CPT | Mod: GO | Performed by: OCCUPATIONAL THERAPIST

## 2021-03-26 PROCEDURE — C9113 INJ PANTOPRAZOLE SODIUM, VIA: HCPCS | Performed by: PHYSICIAN ASSISTANT

## 2021-03-26 PROCEDURE — 93010 ELECTROCARDIOGRAM REPORT: CPT | Performed by: INTERNAL MEDICINE

## 2021-03-26 PROCEDURE — 36415 COLL VENOUS BLD VENIPUNCTURE: CPT | Performed by: PHYSICIAN ASSISTANT

## 2021-03-26 PROCEDURE — 84100 ASSAY OF PHOSPHORUS: CPT | Performed by: PHYSICIAN ASSISTANT

## 2021-03-26 PROCEDURE — 250N000011 HC RX IP 250 OP 636: Performed by: NURSE PRACTITIONER

## 2021-03-26 PROCEDURE — 250N000013 HC RX MED GY IP 250 OP 250 PS 637: Performed by: PHYSICIAN ASSISTANT

## 2021-03-26 PROCEDURE — 70553 MRI BRAIN STEM W/O & W/DYE: CPT

## 2021-03-26 RX ORDER — IOPAMIDOL 755 MG/ML
80 INJECTION, SOLUTION INTRAVASCULAR ONCE
Status: COMPLETED | OUTPATIENT
Start: 2021-03-26 | End: 2021-03-26

## 2021-03-26 RX ORDER — PANTOPRAZOLE SODIUM 40 MG/1
40 TABLET, DELAYED RELEASE ORAL
Status: DISCONTINUED | OUTPATIENT
Start: 2021-03-27 | End: 2021-03-29 | Stop reason: HOSPADM

## 2021-03-26 RX ORDER — LABETALOL HYDROCHLORIDE 5 MG/ML
10-20 INJECTION, SOLUTION INTRAVENOUS
Status: DISCONTINUED | OUTPATIENT
Start: 2021-03-26 | End: 2021-03-27

## 2021-03-26 RX ORDER — ATORVASTATIN CALCIUM 40 MG/1
40 TABLET, FILM COATED ORAL EVERY EVENING
Status: DISCONTINUED | OUTPATIENT
Start: 2021-03-26 | End: 2021-03-27

## 2021-03-26 RX ORDER — GADOBUTROL 604.72 MG/ML
7 INJECTION INTRAVENOUS ONCE
Status: COMPLETED | OUTPATIENT
Start: 2021-03-26 | End: 2021-03-26

## 2021-03-26 RX ADMIN — SODIUM CHLORIDE: 9 INJECTION, SOLUTION INTRAVENOUS at 12:15

## 2021-03-26 RX ADMIN — LABETALOL HYDROCHLORIDE 5 MG: 5 INJECTION, SOLUTION INTRAVENOUS at 07:47

## 2021-03-26 RX ADMIN — HEPARIN SODIUM 600 UNITS/HR: 10000 INJECTION, SOLUTION INTRAVENOUS at 22:13

## 2021-03-26 RX ADMIN — PANTOPRAZOLE SODIUM 40 MG: 40 INJECTION, POWDER, FOR SOLUTION INTRAVENOUS at 08:44

## 2021-03-26 RX ADMIN — CALCIUM GLUCONATE 1 G: 98 INJECTION, SOLUTION INTRAVENOUS at 22:20

## 2021-03-26 RX ADMIN — SODIUM CHLORIDE 80 ML: 9 INJECTION, SOLUTION INTRAVENOUS at 16:20

## 2021-03-26 RX ADMIN — LEVOTHYROXINE, LIOTHYRONINE 90 MG: 19; 4.5 TABLET ORAL at 08:46

## 2021-03-26 RX ADMIN — IOPAMIDOL 80 ML: 755 INJECTION, SOLUTION INTRAVENOUS at 16:20

## 2021-03-26 RX ADMIN — GADOBUTROL 7 ML: 604.72 INJECTION INTRAVENOUS at 04:38

## 2021-03-26 RX ADMIN — ONDANSETRON 4 MG: 4 TABLET, ORALLY DISINTEGRATING ORAL at 07:26

## 2021-03-26 ASSESSMENT — ACTIVITIES OF DAILY LIVING (ADL)
ADLS_ACUITY_SCORE: 17
ADLS_ACUITY_SCORE: 17
ADLS_ACUITY_SCORE: 20
PREVIOUS_RESPONSIBILITIES: MEAL PREP;HOUSEKEEPING;LAUNDRY;MEDICATION MANAGEMENT;FINANCES;DRIVING
ADLS_ACUITY_SCORE: 18
ADLS_ACUITY_SCORE: 17
ADLS_ACUITY_SCORE: 18

## 2021-03-26 ASSESSMENT — MIFFLIN-ST. JEOR: SCORE: 1025.5

## 2021-03-26 NOTE — PROGRESS NOTES
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   -- Transfer to Neurology floor, continue IV heparin for now -- anticipate change to Eliquis (might aim for lower dose of 2.5 mg bid given age and recent stroke), and Pharm Liaison asked to see if Eliquis covered (does want Warfarin and doing INR's,and doesn't want Xarelto as her   of a head bleed on it)     Paroxysmal atrial fibrillation: Follows with [facility].  Per review of most recent notes patient has historically declined full anticoagulation due to the fact that her   of complications from cerebral hemorrhage while on warfarin.  She is also declined left atrial appendage.  PTA regimen includes rate control with diltiazem and baby aspirin  - current HR 80's on no Diltiazem (will continue to monitor on telemetry    Slight elevated Trop, possible demand ischemia  -- repeat Trop in AM, keep on telemetry     Essential hypertension[PTA regimen includes diltiazem 240 mg daily losartan 75 mg daily]  - Hold PTA meds  - IV hydralazine PRN available     Hypothyroidism: Maintained on alternating doses of [place] Thyroid 60 mg to 90 mg every other day  - Continue PTA regimen  - Check TSH normal (continue current [place] dose)     Chronic kidney disease, stage III: Baseline creatinine 0.9     COVID-19: Negative     Plan:  As above    DVT Prophylaxis: On IV heparin   Code Status: No  CPR- Do NOT Intubate    Disposition: Expected discharge in 2 days, ?to TCU if balance issues     Carmine Montague MD  Pager [phone]  Cell Phone [phone]  Text Page (7am to 6pm)  (35 min total)    Interval History   Has dizziness but feels better today.     Physical Exam   Temp: (P) 98.1  F (36.7  C) Temp src: (P) Oral BP: (!) (P) 155/83 Pulse: (P) 62   Resp: (P) 18 SpO2: (P) 96 % O2 Device: (P) Nasal cannula Oxygen Delivery: (P) 2 LPM  Vitals:    03/25/21 1810 03/26/21 0530   Weight: 66.8 kg (147 lb 4.3 oz) 68.4 kg (150 lb 12.7 oz)     Vital Signs with Ranges  Temp:  [97.9  F (36.6  C)-98.3  F (36.8  C)] (P) 98.1  F (36.7  C)  Pulse:  [] (P) 62  Resp:  [10-31] (P) 18  BP: (126-166)/() (P) 155/83  SpO2:  [89 %-99 %] (P) 96 %  I/O last 3 completed shifts:  In: 1648.11 [P.O.:100; I.V.:1548.11]  Out: 1825 [Urine:1825]    # Pain Assessment:  Current Pain Score 3/26/2021   Patient currently in pain? zahida Saldivar s pain level was assessed and she currently denies pain.        Constitutional: Awake, alert, cooperative, no apparent distress  Respiratory: Clear to auscultation bilaterally, no crackles or wheezing  Cardiovascular: irregular rate and rhythm, normal S1 and S2, and no murmur noted  GI: Normal bowel sounds, soft, non-distended, non-tender  Extrem: No calf tenderness, no ankle edema  Neuro: Ox3, no focal motor or sensory deficits    Medications     heparin 600 Units/hr (03/26/21 1832)     - MEDICATION INSTRUCTIONS -       - MEDICATION INSTRUCTIONS -         atorvastatin  40 mg Oral QPM     [START ON 3/27/2021] pantoprazole  40 mg Oral QAM AC     sodium chloride (PF)  3 mL Intracatheter Q8H     [START ON 3/27/2021] thyroid  60 mg Oral Every Other Day     thyroid  90 mg Oral or NG Tube Every Other Day       Data   Recent Labs   Lab 03/26/21  1218 03/26/21  0554 03/26/21  0033 03/25/21  1924 03/25/21  1612 03/25/21  1330   WBC  --  8.0  --  10.8 12.9* 11.0   HGB  --  14.9  --  15.5  15.8* 16.4*   MCV  --  93  --  95 93 93   PLT  --  143*  --  149* 141* 155   INR  --  1.06  --   --   --   --    NA  --  136  --   --   --  134   POTASSIUM  --  4.0  --  4.3  --  4.4   CHLORIDE  --  105  --   --   --  99   CO2  --  26  --   --   --  24   BUN  --  20  --   --   --  34*   CR  --  0.71  --   --   --  0.97   ANIONGAP  --  5  --   --   --  11   CORY  --  8.3*  --   --   --  9.7   GLC  --  113*  --   --   --  185*   TROPI 0.094* 0.036 <0.015  --   --   --        Imaging:   Recent Results (from the past 24 hour(s))   MR Brain w/o & w Contrast    Narrative    EXAM: MR BRAIN W/O and W CONTRAST  LOCATION: [facility]  DATE/TIME: 3/26/2021 4:31 AM    INDICATION: Neuro deficit, acute, stroke suspected  COMPARISON: CT head 03/25/2021  CONTRAST: 7 mL Gadavist  TECHNIQUE: Routine multiplanar multisequence head MRI without and with intravenous contrast.    FINDINGS:  INTRACRANIAL CONTENTS: Right inferior cerebellum PICA territory prominent acute infarct (restricted diffusion, positive FLAIR). No additional acute infarcts. Left inferior cerebellum multiple small chronic infarcts PICA territory. No mass, acute   hemorrhage, or extra-axial fluid collections. Patchy nonspecific T2/FLAIR hyperintensities within the cerebral white matter most consistent with mild to moderate chronic microvascular ischemic change. Moderate generalized cerebral atrophy. No   hydrocephalus. Normal position of the cerebellar tonsils. No pathologic contrast enhancement.    SELLA: No abnormality accounting for technique.    OSSEOUS STRUCTURES/SOFT TISSUES: Normal marrow signal. Right V4 flow void absent. Portions of basilar artery flow void absent. Please defer to recent CTA 03/25/2021.     ORBITS: No abnormality accounting for technique.     SINUSES/MASTOIDS: Mild mucosal thickening scattered about the paranasal sinuses. Right sphenoid sinus small air-fluid level. Please correlate for acute sinusitis. No middle ear or mastoid  effusion.       Impression    IMPRESSION:  1.  Right inferior cerebellum PICA territory prominent acute infarct.  2.  No additional acute infarcts.   3.  Left inferior cerebellum multiple small chronic infarcts PICA territory.   4.  Age-related changes described above.     Echocardiogram Complete - Bubble study    Narrative    [id number]  [id number]  [id number]  [id number]^HEATHER^GERONIMO^MAY                                                                       Version 2     [facility]  Echocardiography Laboratory  [address] [place] [address]     Name: MARIA EUGENIA ADAMES  MRN: [id number]  : 1931  Study Date: 2021 11:19 AM  Age: 89 yrs  Gender: Female  Patient Location: [facility]  Reason For Study: CVA  Ordering Physician: GERONIMO ROMERO  Referring Physician: MAXINE PARIS  Performed By: Shilo Momin     BSA: 1.7 m2  Height: 60 in  Weight: 150 lb  HR: 87  BP: 156/79 mmHg  ______________________________________________________________________________  Procedure  Complete Portable Bubble Echo Adult.  ______________________________________________________________________________  Interpretation Summary     1. Left ventricular systolic function is normal. The visual ejection fraction  is estimated at 60-65%.  2. No regional wall motion abnormalities noted.  3. The right ventricle is normal in structure, function and size.  4. The left atrium is severely dilated.  5. Moderate to severely dilated ascending aorta (normalized for BSA) extending  all the way to the aortic arch. No clear dissection flap but cannot entirley  exclude contained dissection.  6. Recommend chest CTA for further evaluation. Findings discussed with Dr. Sweeney.  ______________________________________________________________________________  Left Ventricle  The left ventricle is normal in size. There is normal left ventricular wall  thickness. Left ventricular systolic function is normal. The visual  ejection  fraction is estimated at 60-65%. Diastolic function not assessed due to  arrhythmia. No regional wall motion abnormalities noted.     Right Ventricle  The right ventricle is normal in structure, function and size.     Atria  The left atrium is severely dilated. The right atrium is moderately dilated.  There is no color Doppler evidence of an atrial shunt. A contrast injection  (Bubble Study) was performed that was negative for flow across the interatrial  septum.     Mitral Valve  The mitral valve is normal in structure and function. There is trace mitral  regurgitation.     Tricuspid Valve  The tricuspid valve is normal in structure and function. There is moderate  (2+) tricuspid regurgitation. The right ventricular systolic pressure is  approximated at 35mmHg plus the right atrial pressure.     Aortic Valve  The aortic valve is trileaflet with aortic valve sclerosis. There is trace  aortic regurgitation.     Pulmonic Valve  Normal pulmonic valve. There is trace pulmonic valvular regurgitation.     Vessels  The ascending aorta is Moderately dilated. Moderate to severely dilated  ascending aorta extending to the aortic artch. No clear dissection flap but  cannot entirley exclude contained dissection.     Pericardium  There is no pericardial effusion.     Rhythm  Sinus rhythm was noted.  ______________________________________________________________________________  MMode/2D Measurements & Calculations  IVSd: 1.0 cm     LVIDd: 4.1 cm  LVIDs: 2.1 cm  LVPWd: 1.0 cm  FS: 49.6 %  LV mass(C)d: 135.6 grams  LV mass(C)dI: 82.1 grams/m2  Ao root diam: 2.7 cm  LA dimension: 3.2 cm  asc Aorta Diam: 4.5 cm  LA/Ao: 1.2  LVOT diam: 2.0 cm  LVOT area: 3.1 cm2  LA Volume (BP): 90.8 ml  LA Volume Index (BP): 55.0 ml/m2  RWT: 0.51     Doppler Measurements & Calculations  MV E max hanna: 139.0 cm/sec  MV dec slope: 633.2 cm/sec2  TR max hanna: 295.9 cm/sec  TR max P.0 mmHg  E/E' av.1  Lateral E/e': 21.3  Medial E/e':  16.8     ______________________________________________________________________________  Report approved by: Robbie Dotson 03/26/2021 01:58 PM         CTA Chest with Contrast    Narrative    EXAM: CTA CHEST, ABDOMEN AND PELVIS WITH CONTRAST  LOCATION: [facility]  DATE/TIME: 3/26/2021 4:20 PM    INDICATION: Aortic disease, nontraumatic. Evaluate for aortic dissection.  COMPARISON: No pertinent previous comparison study is available for comparison.    TECHNIQUE: Multiphase helical acquisition through the chest, abdomen, and pelvis was performed after the administration of IV contrast. 2D and 3D reconstructions were performed by the CT technologist. Dose reduction techniques were used.   CONTRAST: 80 mL Isovue-370    FINDINGS:  CT ANGIOGRAM CHEST, ABDOMEN, AND PELVIS:   Moderate to large amount of atheromatous disease involving the intrathoracic aorta, most notably involving the descending thoracic aorta where there are mild areas of penetrating atheromatous change. The ascending thoracic aorta is mildly dilated   measuring 42 mm in greatest dimension. Classic great arch anatomy. There is a high-grade stenosis of the proximal left subclavian artery extending up to 90% in severity. The remainder of the great vessels are widely patent. Right dominant vertebral   system. Moderate amount of atheromatous calcification involving the coronary arteries.    Minimal atheromatous disease involving a nonaneurysmal abdominal aorta. The celiac artery is widely patent. The superior mesenteric artery is severely stenotic (greater than 95%) over its first few cm however there is excellent collateral flow to its   branches. Variant anatomy with a separate and widely patent hepatic artery arising off the aorta.    Minimal atheromatous disease involving the iliac vasculature which is widely patent. The bilateral common femoral and profunda femoris arteries are widely patent. The bilateral superficial femoral arteries  appear to have stenoses proximally although are   only partially visualized.    CHEST:  LUNGS AND PLEURA: Mild strands of intraparenchymal scarring and fibrosis involving the lungs. Tiny left pleural effusion.    MEDIASTINUM: Negative.    CORONARY ARTERY CALCIFICATION: As above.    ABDOMEN: Cholecystectomy.    PELVIS: Extensive colonic diverticulosis.    MUSCULOSKELETAL: Degenerative change involving the bilateral sternoclavicular joints. Benign bone island in the sacrum. Degenerative change involving the lumbar spine and intervertebral discs.      Impression    IMPRESSION:  1.  No aortic dissection.  2.  Mild dilatation of the descending thoracic aorta measuring 42 mm.  3.  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  4.  Moderate atheromatous calcification involving the coronary arteries.  5.  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  6.  Flow more distally without secondary features to suggest bowel ischemia.  7.  Probable bilateral high-grade stenoses involving the superficial femoral arteries which are only partially visualized in the proximal thigh.  8.  Tiny left pleural effusion.  9.  Extensive musculoskeletal degenerative changes detailed above.

## 2021-03-26 NOTE — PLAN OF CARE
SLP: ST orders received, chart reviewed and discussed with RN. No SLP needs identified at this time. Will complete orders.

## 2021-03-26 NOTE — PHARMACY-RX INSURANCE COVERAGE
Anticoagulation coverage check.  Patient has Medicare D through [employer] with $330 (of $445) unmet deductible.    Eliquis & Pradaxa are not covered by this plan.    Xarelto   March: Upon receipt of RX, [facility] can dispense 1 month free.   April: $379 (fulfills $445 deductible)   May-Nov: $123/mo ($83/mo at [facility])   Dec: $133/mo (coverage gap)     Jantoven (warfarin): $6/mo ($1/mo at [facility]).      -RAQUEL Flores, Pharmacy Technician/Liaison, [facility] [phone]

## 2021-03-26 NOTE — PROGRESS NOTES
03/26/21 0920   Quick Adds   Type of Visit Initial PT Evaluation   Living Environment   People in home alone   Current Living Arrangements house   Home Accessibility stairs within home   Number of Stairs, Within Home, Primary other (see comments)  (12 to lower level)   Stair Railings, Within Home, Primary railings on both sides of stairs   Transportation Anticipated car, drives self;family or friend will provide   Living Environment Comments lives in own home; daughter lives next door   Self-Care   Usual Activity Tolerance excellent   Current Activity Tolerance good   Regular Exercise Yes   Activity/Exercise Type walking  (walks on deck)   Exercise Amount/Frequency daily   Equipment Currently Used at Home none  ( has a 2WW and 4WW)   Disability/Function   Fall history within last six months no   Change in Functional Status Since Onset of Current Illness/Injury yes   General Information   Onset of Illness/Injury or Date of Surgery 03/25/21   Referring Physician Suly Mayers PA-C   Patient/Family Therapy Goals Statement (PT) return home   Pertinent History of Current Problem (include personal factors and/or comorbidities that impact the POC) per chart: Janna Mcdermott is a 89 year old female admitted on 03/25/21.  She has a PMH significant for paroxysmal atrial fibrillation not on anticoagulation, hypertension, dyslipidemia and chronic kidney disease.  She presented to outside emergency department for acute onset dizziness.  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  She is transferred for further stroke evaluation; has Chronic occlusion of left vertebral artery; CT (-) for bleed; see medical record for further information   Existing Precautions/Restrictions fall;other (see comments)  (BP parameters .)   Heart Disease Risk Factors Medical history   General Observations patient in bed; wanting to use toilet and get to a chair    Cognition   Orientation Status (Cognition) oriented x 3  (could not name place)   Affect/Mental Status (Cognition) WNL   Cognitive Status Comments mild impairment? defer to OT for further testing as needed   Pain Assessment   Patient Currently in Pain No   Posture    Posture Comments forward flexed in standing   Range of Motion (ROM)   ROM Comment WFL   Strength   Strength Comments appears symmetrical; mild functional weakness noted in standing   Bed Mobility   Comment (Bed Mobility) SBA and use of bedrail   Transfers   Transfer Safety Comments Min/CGA and safety cues for sit<>stand and transfer on and off commode   Gait/Stairs (Locomotion)   Comment (Gait/Stairs) able to take steps to commode with min/CGA and support of therapist   Balance   Balance Comments mild unsteadiness noted during transfer; UE support needed   Sensory Examination   Sensory Perception Comments intact per patient   Coordination   Coordination Comments no noted deficits   Clinical Impression   Criteria for Skilled Therapeutic Intervention yes, treatment indicated   PT Diagnosis (PT) impaired gait/transfers   Influenced by the following impairments impaired balance; mild functional weakness;    Functional limitations due to impairments impaired independence with functional mobility   Clinical Presentation Evolving/Changing   Clinical Presentation Rationale clinical judgement; level of assist   Clinical Decision Making (Complexity) low complexity   Therapy Frequency (PT) Daily   Predicted Duration of Therapy Intervention (days/wks) 3 days   Planned Therapy Interventions (PT) bed mobility training;balance training;gait training;strengthening;stair training;transfer training   Anticipated Equipment Needs at Discharge (PT) other (see comments)  (to be determined)   Risk & Benefits of therapy have been explained evaluation/treatment results reviewed;care plan/treatment goals reviewed;risks/benefits reviewed;current/potential barriers  reviewed;participants voiced agreement with care plan;participants included   Clinical Impression Comments patient looks to be below reported baseline; she feels she has returned to her baseline   PT Discharge Planning    PT Discharge Recommendation (DC Rec) home with assist;home with home care physical therapy   PT Rationale for DC Rec patient appears below reported independent baseline; would benefit from ongoing PT in hospital and possibly at discharge to maximize return to PLOF; anticipate with therapy patient will return to baseline prior to discharge and may not need ongoing services; reports daughter lives next door and may be able to stay to help if needed   PT Brief overview of current status  A x 1   Total Evaluation Time   Total Evaluation Time (Minutes) 10

## 2021-03-26 NOTE — PROVIDER NOTIFICATION
Paged neurology to clarify blood pressure parameters. Callback received from Dr. Burnette, orders received to keep SBP<160.

## 2021-03-26 NOTE — PROGRESS NOTES
Arrived by EMS @1810- see neuro flowsheet for exam. Due to neuro changes prior to admission, pt was transported to CT scanner for CTH stat. VS taken and neuro exam done while transferring to cart. No issues through transfer. Back to unit at 1830 and settle with belongings that included but not limited to: cards, money, glasses, ID's, shoes, stockings. Son updated via phone about pt's status and plan of care. Heparin gtt restarted per Neuro crit s/p CTH results. All care explained to pt and education was also given to her.

## 2021-03-26 NOTE — PROGRESS NOTES
03/26/21 1438   Quick Adds   Type of Visit Initial Occupational Therapy Evaluation   Living Environment   People in home alone   Current Living Arrangements house   Home Accessibility stairs within home   Transportation Anticipated family or friend will provide;car, drives self   Self-Care   Regular Exercise Yes   Equipment Currently Used at Home grab bar, tub/shower   Activity/Exercise/Self-Care Comment Pt sits down into tub with grab bar use.    Disability/Function   Wear Glasses or Blind yes   Fall history within last six months no   General Information   Onset of Illness/Injury or Date of Surgery 03/25/21   Referring Physician Suly Mayers PA-C   Patient/Family Therapy Goal Statement (OT) home   Additional Occupational Profile Info/Pertinent History of Current Problem Janna Mcdermott is a 89 year old female admitted on 03/25/21.  She has a PMH significant for paroxysmal atrial fibrillation not on anticoagulation, hypertension, dyslipidemia and chronic kidney disease.  She presented to outside emergency department for acute onset dizziness.  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   Existing Precautions/Restrictions fall;oxygen therapy device and L/min  (2 L O2)   Cognitive Status Examination   Orientation Status orientation to person, place and time   Affect/Mental Status (Cognitive) WFL   Follows Commands WFL   Visual Perception   Visual Impairment/Limitations corrective lenses full-time   Sensory   Sensory Quick Adds No deficits were identified   Pain Assessment   Patient Currently in Pain No   Range of Motion Comprehensive   Comment, General Range of Motion B UE AROM WFL    Strength Comprehensive (MMT)   Comment, General Manual Muscle Testing (MMT) Assessment B UE strength symmetrical   Muscle Tone Assessment   Muscle Tone Quick Adds No deficits were identified   Coordination   Upper Extremity Coordination No deficits  were identified   Bed Mobility   Scooting/Bridging [place] (Bed Mobility) supervision   Supine-Sit [place] (Bed Mobility) supervision   Transfer Skill: Bed to Chair/Chair to Bed   Bed-Chair [place] (Transfers) minimum assist (75% patient effort)   Sit-Stand Transfer   Sit-Stand [place] (Transfers) minimum assist (75% patient effort)   Lower Body Dressing Assessment/Training   [place] Level (Lower Body Dressing) moderate assist (50% patient effort)   Instrumental Activities of Daily Living (IADL)   Previous Responsibilities meal prep;housekeeping;laundry;medication management;finances;driving  (pts dtr assisting with shopping since COVID, A with yard wor)   Clinical Impression   Criteria for Skilled Therapeutic Interventions Met (OT) yes   OT Diagnosis decreased ADL's and functional moblity    OT Problem List-Impairments impacting ADL problems related to;activity tolerance impaired;balance;strength   Assessment of Occupational Performance 3-5 Performance Deficits   Identified Performance Deficits impaired I with dressimg, toilet and tub transfer, laundry, cleaning, driving, etc   Planned Therapy Interventions (OT) ADL retraining;cognition;transfer training;home program guidelines;progressive activity/exercise;risk factor education   Clinical Decision Making Complexity (OT) moderate complexity   Therapy Frequency (OT) Daily   Predicted Duration of Therapy 3 days   Risk & Benefits of therapy have been explained evaluation/treatment results reviewed;care plan/treatment goals reviewed;risks/benefits reviewed;current/potential barriers reviewed;participants voiced agreement with care plan;patient;participants included;son   OT Discharge Planning    OT Discharge Recommendation (DC Rec) Home with assist;home with home care occupational therapy   OT Rationale for DC Rec Pt currently limited due to impaired balance, overall strength, actiivty tolerance and anticipate with further therapy and activity,  pt will be able to return home with family A with ADL/IADL's as needed ie tub/shwoer transfer, all cleaning, laundry, cooking, med mgmt, driving and home RN for med mgmt, OT for ADL/IADL's and home safety. cont to monitor progress and safe discharge plan.    Total Evaluation Time (Minutes)   Total Evaluation Time (Minutes) 10

## 2021-03-26 NOTE — PLAN OF CARE
NEURO: Neuro check Q 1 H, Alert and oriented X 4. No weakness, No focal deficit present,  No cranial nerve deficit. Still R pupil < L pupil  RESP:  LSC, good sat's  on 2 L, O 2 via nasal canula.  CARDIAC: A Fib, VSS, SBP < 160. Denies headache, CP or SOB  GI/: Pt voiding using bedpan, adequate UOP, denies nausea.  VASC: R and L PIV   GTT: Heparin @ 450 ( next check at 0800) and NS @ 75  Labs:  ( at 1940)  Hospitalist notified.  MR done this morning no result posted yet.

## 2021-03-26 NOTE — CONSULTS
"[facility]    Stroke Consult Note    Reason for Consult: \"stroke\"    Chief Complaint: No chief complaint on file.     IRIS Mcdermott is a right hand dominant 89 year old female with PMH of atrial fibrillation not anticoagulated, HLD, CKD3, who initially presented to [facility] emergency department for evaluation of new onset dizziness and N/V. Patient did report intermittent headaches throughout this last week. When she arrived to the ED, symptoms significantly improved although she was reported to be a bit sleepy but easily roused to participate in examination as needed. 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 Given these findings, it was recommended patient start on heparin and transfer to [facility] for further medical management. Prior to transfer Stroke/Neurology was paged as patient had a neurological change. She was noted to have a right gaze deviation, right sided tongue deviation, right pupil was noted to be smaller than left, and she was leaning to the right when staff attempted to sit her up straight. She was not reported to have any new focal weakness. Heparin gtt was held and STAT CTH ordered for upon arrival to Critical access hospital to rule out hemorrhagic transformation. CTH negative for acute bleed, Heparin gtt restarted.   Today patient laying in bed comfortably, she does not report current HA, visual disturbances, focal weakness, or sensory changes. She states declining anticoagulation due to her  experiencing complications on Warfarin, and the inconvenient frequent INR checks. She states that she is open to newer AC therapy as they do not require the frequent checks.      Stroke Evaluation summarized:  MRI/Head CT: MRI reveals right inferior cerebellum PICA territory prominent acute infarct  Intracranial Vascular Imaging: high grade stenosis of basilar artery   Cervical Carotid and " "Vertebral Artery Vascular Imaging: chronic L vert occlusion, acute R vert occlusion  Echocardiogram: EF 60-65%, mod-severe dilated ascending aorta No clear dissection flap but cannot entirely exclude contained dissection, left atrium severely dilated, right atrium moderly dilated, negative bubble   EKG/Telemetry: completed, pending.   LDL: 80  A1c: 5.6  Troponin: 0.036  Other testing: Not Applicable    Impression  Ischemic Stroke due to large-artery atherosclerosis (embolus/thrombosis) vs cardioembolic secondary to un-anticoagulated atrial fibrillation     Recommendations  -Continue Heparin gtt, will discuss anticoagulation options with patient tomorrow. Pharmacy liaison consult placed   -LDL (80), start Lipitor 40 mg daily   -A1c (5.6) within goal <7.0  -Goal BP <140/90 with tighter control associated with decreased overall CV risk, if tolerated  -Therapies as needed   -PLC    Patient Follow-up    - final recommendation pending work-up    Thank you for this consult. We will continue to follow.     Leonarda Abrams PA-C   Neurology  To page me or covering stroke neurology team member, click here: AMCOM   Choose \"On Call\" tab at top, then search dropdown box for \"Neurology Adult\", select location, press Enter, then look for stroke/neuro ICU/telestroke.  _____________________________________________________    Past Medical History   Past Medical History:   Diagnosis Date     Asymptomatic menopausal state     on estrogen     Chronic kidney disease, stage I     No Comments Provided     Dermatitis     No Comments Provided     Essential (primary) hypertension     No Comments Provided     Hyperlipidemia     No Comments Provided     Hypothyroidism     No Comments Provided     Other specified symptoms and signs involving the circulatory and respiratory systems     No Comments Provided     Paroxysmal atrial fibrillation (H)     No Comments Provided     Past Surgical History   Past Surgical History:   Procedure Laterality Date     " APPENDECTOMY OPEN      No Comments Provided     CHOLECYSTECTOMY      No Comments Provided     HYSTERECTOMY TOTAL ABDOMINAL      No Comments Provided     Medications   Home Meds  Prior to Admission medications    Medication Sig Start Date End Date Taking? Authorizing Provider   JACOB THYROID 60 MG tablet TAKE 1 TABLET BY MOUTH EVERY OTHER DAY 8/25/20  Yes Nuno Clinton MD   JACOB THYROID 90 MG tablet TAKE 1 TABLET BY MOUTH EVERY OTHER DAY 8/25/20  Yes Nuno Clinton MD   aspirin 81 MG tablet Take 81 mg by mouth daily with food   Yes Reported, Patient   Cholecalciferol ([employer] VITAMIN D3) 400 UNITS CAPS Take 800 Units by mouth daily   Yes Reported, Patient   diltiazem ER COATED BEADS (CARDIZEM CD/CARTIA XT) 120 MG 24 hr capsule Take 1 capsule (120 mg) by mouth daily 8/6/20  Yes Dwayne Gaona MD   fish oil-omega-3 fatty acids 1000 MG capsule Take 1,000 capsules by mouth daily   Yes Reported, Patient   Flaxseed Oil OIL Take by mouth daily   Yes Reported, Patient   latanoprost (XALATAN) 0.005 % ophthalmic solution Place 1 drop into both eyes At Bedtime  3/24/21  Yes Reported, Patient   losartan (COZAAR) 25 MG tablet Take 75 mg by mouth daily  Patient taking differently: 25 mg daily Take with 50mg for total of 75mg 8/7/20  Yes Nuno Clinton MD   losartan (COZAAR) 50 MG tablet Take 75 mg by mouth daily  Patient taking differently: 50 mg daily Take with 25mg for total of 75mg 8/7/20  Yes Nuno Clinton MD   Sesame Oil OIL Take by mouth daily   Yes Reported, Patient     Scheduled Meds    insulin aspart  1-7 Units Subcutaneous TID AC     insulin aspart  1-5 Units Subcutaneous At Bedtime     pantoprazole (PROTONIX) IV  40 mg Intravenous Daily     sodium chloride (PF)  3 mL Intracatheter Q8H     [START ON 3/27/2021] thyroid  60 mg Oral Every Other Day     thyroid  90 mg Oral or NG Tube Every Other Day     Infusion Meds    heparin 450 Units/hr (03/26/21 0600)     - MEDICATION INSTRUCTIONS -        - MEDICATION INSTRUCTIONS -       sodium chloride 75 mL/hr at 21 0400       PRN Meds  sodium chloride 0.9%, acetaminophen, acetaminophen, glucose **OR** dextrose **OR** glucagon, hydrALAZINE, labetalol, lidocaine 4%, lidocaine (buffered or not buffered), - MEDICATION INSTRUCTIONS -, - MEDICATION INSTRUCTIONS -, metoprolol, ondansetron **OR** ondansetron, prochlorperazine **OR** prochlorperazine **OR** prochlorperazine, sodium chloride (PF), sodium chloride (PF)    Allergies   Allergies   Allergen Reactions     Benazepril Cough     Ciprofloxacin Unknown     Erythromycin Unknown     Gluten Meal Unknown     Hydrochlorothiazide      Other reaction(s): Hyponatremia     Penicillins Unknown     Family History   Family History   Problem Relation Age of Onset     Heart Disease Mother         Heart Disease     Other - See Comments Other         No cancer or diabetes in the family     Other - See Comments Brother           in service     Other - See Comments Brother          of Parkinson's     Heart Disease Sister         Heart Disease,     Heart Disease Sister         Heart Disease,     Other - See Comments Sister         at 6 months     Other - See Comments Brother          of pneumonia     Social History   Social History     Tobacco Use     Smoking status: Never Smoker     Smokeless tobacco: Never Used   Substance Use Topics     Alcohol use: No     Alcohol/week: 0.0 standard drinks     Drug use: Never     Types: Other     Review of Systems   The 10 point Review of Systems is negative other than noted in the HPI or here.      PHYSICAL EXAMINATION   Temp:  [96  F (35.6  C)-98.1  F (36.7  C)] 98.1  F (36.7  C)  Pulse:  [] 85  Resp:  [14-47] 19  BP: (126-188)/() 158/82  SpO2:  [86 %-99 %] 99 %    Neurologic  Mental Status:  alert, oriented x 3, follows commands, speech clear and fluent, naming and repetition normal  Cranial Nerves: visual fields intact, pupils equal and reactive, left  pupil > right pupil, EOMI with normal smooth pursuit, facial sensation intact and symmetric, facial movements symmetric, hearing not formally tested but intact to conversation, no dysarthria, tongue protrusion midline  Motor:  normal muscle tone and bulk, no abnormal movements, able to move all limbs spontaneously, no pronator drift  Reflexes:  deferred  Sensory:  light touch sensation intact and symmetric throughout upper and lower extremities, no extinction on double simultaneous stimulation   Coordination:  subtle ataxia in RUE   Station/Gait:  deferred    Dysphagia Screen  Per Nursing    Stroke Scales  NIHSS  Interval     Interval Comments     1a. Level of Consciousness 0-->Alert, keenly responsive   1b. LOC Questions 0-->Answers both questions correctly   1c. LOC Commands 0-->Performs both tasks correctly   2.   Best Gaze 0-->Normal   3.   Visual 0-->No visual loss   4.   Facial Palsy 0-->Normal symmetrical movements   5a. Motor Arm, Left 0-->No drift, limb holds 90 (or 45) degrees for full 10 secs   5b. Motor Arm, Right 0-->No drift, limb holds 90 (or 45) degrees for full 10 secs   6a. Motor Leg, Left 0-->No drift, leg holds 30 degree position for full 5 secs   6b. Motor Leg, right 0-->No drift, leg holds 30 degree position for full 5 secs   7.   Limb Ataxia 1-->Present in one limb   8.   Sensory 0-->Normal, no sensory loss   9.   Best Language 0-->No aphasia, normal   10. Dysarthria 0-->Normal   11. Extinction and Inattention  0-->No abnormality   Total 1 (03/26/21 1018)     Imaging  I personally reviewed all imaging; relevant findings per HPI.    Labs Data   CBC  Recent Labs   Lab 03/26/21  0554 03/25/21 1924 03/25/21  1612   WBC 8.0 10.8 12.9*   RBC 4.78 5.02 5.15   HGB 14.9 15.5 15.8*   HCT 44.6 47.5* 48.0*   * 149* 141*     Basic Metabolic Panel   Recent Labs   Lab 03/26/21  0554 03/25/21 1924 03/25/21  1330     --  134   POTASSIUM 4.0 4.3 4.4   CHLORIDE 105  --  99   CO2 26  --  24   BUN  20  --  34*   CR 0.71  --  0.97   *  --  185*   CORY 8.3*  --  9.7     Liver Panel  No results for input(s): PROTTOTAL, ALBUMIN, BILITOTAL, ALKPHOS, AST, ALT, BILIDIRECT in the last 168 hours.     INR    Recent Labs   Lab Test 03/26/21  0554 10/15/13  1612   INR 1.06 1.0      Lipid Profile    Recent Labs   Lab Test 08/20/20  0806 08/22/19  0739 08/16/18  0725   CHOL 184 166 167   HDL 57 54 57   * 100* 94   TRIG 65 60 78     A1C    Recent Labs   Lab Test 03/25/21  1924 08/22/19  0740   A1C 5.6 5.6     Troponin I    Recent Labs   Lab 03/26/21  0554 03/26/21  0033   TROPI 0.036 <0.015        Stroke Code / Stroke Consult Data Data This was a non-emergent, non-tele stroke consult.

## 2021-03-26 NOTE — PHARMACY-CONSULT NOTE
Pharmacy Consult to evaluate for medication related stroke core measures    Janna Mcdermott, 89 year old female admitted for CVA 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.    Thrombolytic was not given because of not documented    VTE Prophylaxis Heparin given on 3/25 as appropriate prior to end of hospital day 2.    Antithrombotic: Not started yet. Will be addressed tomorrow after reviewing insurance coverage.  Continue antithrombotic therapy on discharge to meet quality measures, unless contraindicated.    Anticoagulation if history of A-fib/flutter: Patient on Heparin infusion; continue anticoagulation on discharge to meet quality measures, unless contraindicated.    No components found for: LDLCALC    Patient currently receiving Lipitor (atorvastatin) continue statin on discharge to meet quality measures, unless contraindicated.    Recommendations: None at this time    Thank you for the consult.    Edwin Maxwell RPH 3/26/2021 4:57 PM

## 2021-03-26 NOTE — PROGRESS NOTES
Pt vitally stable on 2L O2 per NC. SBPs maintained between 120-160. Neuros unchanged, intact except R<L pupil. CT[facility] scan completed. [facility]. Son at bedside updated on POC. Transferred to neuro unit, report called to intaking nurse.

## 2021-03-27 ENCOUNTER — APPOINTMENT (OUTPATIENT)
Dept: OCCUPATIONAL THERAPY | Facility: CLINIC | Age: 86
DRG: 065 | End: 2021-03-27
Attending: INTERNAL MEDICINE
Payer: MEDICARE

## 2021-03-27 ENCOUNTER — APPOINTMENT (OUTPATIENT)
Dept: PHYSICAL THERAPY | Facility: CLINIC | Age: 86
DRG: 065 | End: 2021-03-27
Attending: INTERNAL MEDICINE
Payer: MEDICARE

## 2021-03-27 LAB
ALBUMIN SERPL-MCNC: 3.5 G/DL (ref 3.4–5)
ALP SERPL-CCNC: 70 U/L (ref 40–150)
ALT SERPL W P-5'-P-CCNC: 45 U/L (ref 0–50)
ANION GAP SERPL CALCULATED.3IONS-SCNC: 4 MMOL/L (ref 3–14)
AST SERPL W P-5'-P-CCNC: 33 U/L (ref 0–45)
BILIRUB DIRECT SERPL-MCNC: 0.2 MG/DL (ref 0–0.2)
BILIRUB SERPL-MCNC: 1.1 MG/DL (ref 0.2–1.3)
BUN SERPL-MCNC: 19 MG/DL (ref 7–30)
CALCIUM SERPL-MCNC: 8.9 MG/DL (ref 8.5–10.1)
CHLORIDE SERPL-SCNC: 107 MMOL/L (ref 94–109)
CO2 SERPL-SCNC: 26 MMOL/L (ref 20–32)
CREAT SERPL-MCNC: 0.82 MG/DL (ref 0.52–1.04)
ERYTHROCYTE [DISTWIDTH] IN BLOOD BY AUTOMATED COUNT: 13.5 % (ref 10–15)
GFR SERPL CREATININE-BSD FRML MDRD: 63 ML/MIN/{1.73_M2}
GLUCOSE BLDC GLUCOMTR-MCNC: 103 MG/DL (ref 70–99)
GLUCOSE BLDC GLUCOMTR-MCNC: 94 MG/DL (ref 70–99)
GLUCOSE SERPL-MCNC: 106 MG/DL (ref 70–99)
HCT VFR BLD AUTO: 51.8 % (ref 35–47)
HGB BLD-MCNC: 16.3 G/DL (ref 11.7–15.7)
INTERPRETATION ECG - MUSE: NORMAL
MAGNESIUM SERPL-MCNC: 2.1 MG/DL (ref 1.6–2.3)
MCH RBC QN AUTO: 29.9 PG (ref 26.5–33)
MCHC RBC AUTO-ENTMCNC: 31.5 G/DL (ref 31.5–36.5)
MCV RBC AUTO: 95 FL (ref 78–100)
PHOSPHATE SERPL-MCNC: 2.6 MG/DL (ref 2.5–4.5)
PLATELET # BLD AUTO: 169 10E9/L (ref 150–450)
POTASSIUM SERPL-SCNC: 3.9 MMOL/L (ref 3.4–5.3)
PROT SERPL-MCNC: 7.3 G/DL (ref 6.8–8.8)
RBC # BLD AUTO: 5.46 10E12/L (ref 3.8–5.2)
SODIUM SERPL-SCNC: 137 MMOL/L (ref 133–144)
TROPONIN I SERPL-MCNC: 0.12 UG/L (ref 0–0.04)
UFH PPP CHRO-ACNC: 0.39 IU/ML
WBC # BLD AUTO: 8.6 10E9/L (ref 4–11)

## 2021-03-27 PROCEDURE — 85027 COMPLETE CBC AUTOMATED: CPT | Performed by: INTERNAL MEDICINE

## 2021-03-27 PROCEDURE — 84484 ASSAY OF TROPONIN QUANT: CPT | Performed by: INTERNAL MEDICINE

## 2021-03-27 PROCEDURE — 36415 COLL VENOUS BLD VENIPUNCTURE: CPT | Performed by: INTERNAL MEDICINE

## 2021-03-27 PROCEDURE — 97116 GAIT TRAINING THERAPY: CPT | Mod: GP

## 2021-03-27 PROCEDURE — 250N000013 HC RX MED GY IP 250 OP 250 PS 637: Performed by: INTERNAL MEDICINE

## 2021-03-27 PROCEDURE — 250N000011 HC RX IP 250 OP 636: Performed by: STUDENT IN AN ORGANIZED HEALTH CARE EDUCATION/TRAINING PROGRAM

## 2021-03-27 PROCEDURE — 120N000001 HC R&B MED SURG/OB

## 2021-03-27 PROCEDURE — 999N001017 HC STATISTIC GLUCOSE BY METER IP

## 2021-03-27 PROCEDURE — 80076 HEPATIC FUNCTION PANEL: CPT | Performed by: INTERNAL MEDICINE

## 2021-03-27 PROCEDURE — 80048 BASIC METABOLIC PNL TOTAL CA: CPT | Performed by: INTERNAL MEDICINE

## 2021-03-27 PROCEDURE — 83735 ASSAY OF MAGNESIUM: CPT | Performed by: INTERNAL MEDICINE

## 2021-03-27 PROCEDURE — 99232 SBSQ HOSP IP/OBS MODERATE 35: CPT | Performed by: HOSPITALIST

## 2021-03-27 PROCEDURE — 99232 SBSQ HOSP IP/OBS MODERATE 35: CPT | Mod: GC | Performed by: PSYCHIATRY & NEUROLOGY

## 2021-03-27 PROCEDURE — 250N000013 HC RX MED GY IP 250 OP 250 PS 637: Performed by: HOSPITALIST

## 2021-03-27 PROCEDURE — 84100 ASSAY OF PHOSPHORUS: CPT | Performed by: INTERNAL MEDICINE

## 2021-03-27 PROCEDURE — 250N000013 HC RX MED GY IP 250 OP 250 PS 637: Performed by: PHYSICIAN ASSISTANT

## 2021-03-27 PROCEDURE — 85520 HEPARIN ASSAY: CPT | Performed by: INTERNAL MEDICINE

## 2021-03-27 RX ORDER — POLYETHYLENE GLYCOL 3350 17 G/17G
17 POWDER, FOR SOLUTION ORAL 2 TIMES DAILY PRN
Status: DISCONTINUED | OUTPATIENT
Start: 2021-03-27 | End: 2021-03-29 | Stop reason: HOSPADM

## 2021-03-27 RX ORDER — AMOXICILLIN 250 MG
1 CAPSULE ORAL 2 TIMES DAILY
Status: DISCONTINUED | OUTPATIENT
Start: 2021-03-27 | End: 2021-03-29 | Stop reason: HOSPADM

## 2021-03-27 RX ORDER — AMLODIPINE BESYLATE 5 MG/1
5 TABLET ORAL DAILY
Status: DISCONTINUED | OUTPATIENT
Start: 2021-03-27 | End: 2021-03-27

## 2021-03-27 RX ORDER — LOSARTAN POTASSIUM 25 MG/1
25 TABLET ORAL DAILY
Status: DISCONTINUED | OUTPATIENT
Start: 2021-03-27 | End: 2021-03-27

## 2021-03-27 RX ORDER — HEPARIN SODIUM 10000 [USP'U]/100ML
0-5000 INJECTION, SOLUTION INTRAVENOUS CONTINUOUS
Status: DISCONTINUED | OUTPATIENT
Start: 2021-03-27 | End: 2021-03-27

## 2021-03-27 RX ORDER — ATORVASTATIN CALCIUM 20 MG/1
20 TABLET, FILM COATED ORAL EVERY EVENING
Status: DISCONTINUED | OUTPATIENT
Start: 2021-03-27 | End: 2021-03-29 | Stop reason: HOSPADM

## 2021-03-27 RX ADMIN — LEVOTHYROXINE, LIOTHYRONINE 60 MG: 38; 9 TABLET ORAL at 08:22

## 2021-03-27 RX ADMIN — SENNOSIDES AND DOCUSATE SODIUM 1 TABLET: 8.6; 5 TABLET ORAL at 20:19

## 2021-03-27 RX ADMIN — APIXABAN 5 MG: 5 TABLET, FILM COATED ORAL at 20:19

## 2021-03-27 RX ADMIN — SENNOSIDES AND DOCUSATE SODIUM 1 TABLET: 8.6; 5 TABLET ORAL at 11:12

## 2021-03-27 RX ADMIN — HYDRALAZINE HYDROCHLORIDE 5 MG: 20 INJECTION INTRAMUSCULAR; INTRAVENOUS at 11:28

## 2021-03-27 RX ADMIN — PANTOPRAZOLE SODIUM 40 MG: 40 TABLET, DELAYED RELEASE ORAL at 08:22

## 2021-03-27 RX ADMIN — ATORVASTATIN CALCIUM 20 MG: 20 TABLET, FILM COATED ORAL at 20:19

## 2021-03-27 RX ADMIN — LOSARTAN POTASSIUM 75 MG: 25 TABLET, FILM COATED ORAL at 13:07

## 2021-03-27 ASSESSMENT — ACTIVITIES OF DAILY LIVING (ADL)
ADLS_ACUITY_SCORE: 18
ADLS_ACUITY_SCORE: 17
ADLS_ACUITY_SCORE: 18
ADLS_ACUITY_SCORE: 18

## 2021-03-27 NOTE — PROGRESS NOTES
"[facility]    Stroke Progress Note    Interval Events  No acute events reported overnight, patient transferred from ICU to station 73.      Stroke Evaluation summarized:  MRI/Head CT: MRI reveals right inferior cerebellum PICA territory prominent acute infarct  Intracranial Vascular Imaging: high grade stenosis of basilar artery   Cervical Carotid and Vertebral Artery Vascular Imaging: chronic L vert occlusion, acute R vert occlusion  Echocardiogram: EF 60-65%, mod-severe dilated ascending aorta No clear dissection flap but cannot entirely exclude contained dissection, left atrium severely dilated, right atrium moderly dilated, negative bubble   EKG/Telemetry: completed, pending.   LDL: 80  A1c: 5.6  Troponin: 0.036  Other testing: Not Applicable     Impression  Ischemic Stroke due to large-artery atherosclerosis (embolus/thrombosis) vs cardioembolic secondary to un-anticoagulated atrial fibrillation     Plan  -Patient states that she discussed DOAC options with son and Dr. Sweeney, and has decided to proceed with Eliquis. Start on Eliquis 5 mg BID  -LDL(80) decreased Lipitor to 20 mg daily  -A1c(5.6) within goal <7.0   -Given evidence of high grade stenosis in basilar artery, blood pressure should not be dropped aggressively, as of now aim for long term goal BP <140/90 with tighter control associated with decreased overall CV risk, if tolerated. Discussed the importance of home BP monitoring and keeping a log to keep track of BP.   -Therapies as needed   -PLC  -Follow up with PCP in 1-2 weeks  -in 6-8 weeks with [facility] clinic [phone] (neurology referral order placed)    Thank you for this consult. No further stroke evaluation is recommended, so we will sign off. Please contact us with any additional questions.    Leonarda Abrams PA-C   Neurology  To page me or covering stroke neurology team member, click here: AMCOM   Choose \"On Call\" tab at top, then search dropdown " "box for \"Neurology Adult\", select location, press Enter, then look for stroke/neuro ICU/telestroke.  ______________________________________________________    Medications   Home Meds  Prior to Admission medications    Medication Sig Start Date End Date Taking? Authorizing Provider   JACOB THYROID 60 MG tablet TAKE 1 TABLET BY MOUTH EVERY OTHER DAY 8/25/20  Yes Nuno Clinton MD   ARMOUR THYROID 90 MG tablet TAKE 1 TABLET BY MOUTH EVERY OTHER DAY 8/25/20  Yes Nuno Clinton MD   aspirin 81 MG tablet Take 81 mg by mouth daily with food   Yes Reported, Patient   Cholecalciferol ([employer]S VITAMIN D3) 400 UNITS CAPS Take 800 Units by mouth daily   Yes Reported, Patient   diltiazem ER COATED BEADS (CARDIZEM CD/CARTIA XT) 120 MG 24 hr capsule Take 1 capsule (120 mg) by mouth daily 8/6/20  Yes Dwayne Gaona MD   fish oil-omega-3 fatty acids 1000 MG capsule Take 1,000 capsules by mouth daily   Yes Reported, Patient   Flaxseed Oil OIL Take by mouth daily   Yes Reported, Patient   latanoprost (XALATAN) 0.005 % ophthalmic solution Place 1 drop into both eyes At Bedtime  3/24/21  Yes Reported, Patient   losartan (COZAAR) 25 MG tablet Take 75 mg by mouth daily  Patient taking differently: 25 mg daily Take with 50mg for total of 75mg 8/7/20  Yes Nuno Clinton MD   losartan (COZAAR) 50 MG tablet Take 75 mg by mouth daily  Patient taking differently: 50 mg daily Take with 25mg for total of 75mg 8/7/20  Yes Nuno Clinton MD   Sesame Oil OIL Take by mouth daily   Yes Reported, Patient       Scheduled Meds    atorvastatin  40 mg Oral QPM     pantoprazole  40 mg Oral QAM AC     sodium chloride (PF)  3 mL Intracatheter Q8H     thyroid  60 mg Oral Every Other Day     thyroid  90 mg Oral or NG Tube Every Other Day       Infusion Meds    heparin 600 Units/hr (03/26/21 6954)     - MEDICATION INSTRUCTIONS -       - MEDICATION INSTRUCTIONS -         PRN Meds  sodium chloride 0.9%, acetaminophen, acetaminophen, " hydrALAZINE, [Held by provider] labetalol, lidocaine 4%, lidocaine (buffered or not buffered), - MEDICATION INSTRUCTIONS -, - MEDICATION INSTRUCTIONS -, [Held by provider] metoprolol, ondansetron **OR** ondansetron, prochlorperazine **OR** prochlorperazine **OR** prochlorperazine, sodium chloride (PF), sodium chloride (PF)       PHYSICAL EXAMINATION  Temp:  [97.6  F (36.4  C)-99.2  F (37.3  C)] 98.2  F (36.8  C)  Pulse:  [44-93] 84  Resp:  [14-27] 16  BP: (127-157)/(63-93) 145/93  SpO2:  [89 %-98 %] 96 %     Neurologic  Mental Status:  alert, oriented x 3, follows commands, speech clear and fluent  Cranial Nerves:  visual fields intact, EOMI with normal smooth pursuit, facial sensation intact and symmetric, facial movements symmetric, hearing not formally tested but intact to conversation, no dysarthria, tongue protrusion midline  Motor:  normal muscle tone and bulk, no abnormal movements, able to move all limbs spontaneously, strength 5/5 throughout upper and lower extremities, no pronator drift  Reflexes:  deferred  Sensory:  light touch sensation intact and symmetric throughout upper and lower extremities  Coordination:  no obvious ataxia noted on exam today   Station/Gait:  deferred      Imaging  I personally reviewed all imaging; relevant findings per HPI.     Lab Results Data   CBC  Recent Labs   Lab 03/26/21  0554 03/25/21 1924 03/25/21  1612   WBC 8.0 10.8 12.9*   RBC 4.78 5.02 5.15   HGB 14.9 15.5 15.8*   HCT 44.6 47.5* 48.0*   * 149* 141*     Basic Metabolic Panel    Recent Labs   Lab 03/26/21  0554 03/25/21 1924 03/25/21  1330     --  134   POTASSIUM 4.0 4.3 4.4   CHLORIDE 105  --  99   CO2 26  --  24   BUN 20  --  34*   CR 0.71  --  0.97   *  --  185*   CORY 8.3*  --  9.7     Liver Panel  No results for input(s): PROTTOTAL, ALBUMIN, BILITOTAL, ALKPHOS, AST, ALT, BILIDIRECT in the last 168 hours.  INR    Recent Labs   Lab Test 03/26/21  0554 10/15/13  1612   INR 1.06 1.0      Lipid  Profile    Recent Labs   Lab Test 03/26/21  0820 08/20/20  0806 08/22/19  0739   CHOL 154 184 166   HDL 66 57 54   LDL 80 114* 100*   TRIG 40 65 60     A1C    Recent Labs   Lab Test 03/25/21  1924 08/22/19  0740   A1C 5.6 5.6     Troponin I    Recent Labs   Lab 03/26/21  1218 03/26/21  0554 03/26/21  0033   TROPI 0.094* 0.036 <0.015

## 2021-03-27 NOTE — PLAN OF CARE
Pt here with R cerebellar CVA, R artery occlusion. A&Ox4. Neuros L pupil >R, slight unsteadiness in gait, otherwise intact. VSS. Heparin gtt at 6 ml/hr-recheck at 0600. Tele A-fib CVR. Regular diet, thin liquids. Takes pills whole with water. Up with A1/GB. Denies pain. Pt scoring green on the Aggression Stop Light Tool. Discharge plan pending.

## 2021-03-27 NOTE — PROVIDER NOTIFICATION
714, EB. Do you want to recheck a trop tomorrow? Eliquis has not been ordered yet, can you place order if you want to switch from hep drip. Thank you!

## 2021-03-27 NOTE — PROGRESS NOTES
[facility]    Hospitalist Progress Note    Assessment & Plan   89 year old female with paroxysmal atrial fibrillation not on anticoagulation, hypertension, dyslipidemia and chronic kidney disease -- presented to [facility] with  acute onset dizziness and found to have basilar artery occlusion and started on IV heparin and transferred to [facility] for further treatment on 21:     Acute Posterior Circulation stroke with dizziness   Chronic occlusion of left vertebral artery  Acute occlusion of right vertebral artery  High-grade stenosis of basilar arteryformation.  Plan  - start on eliquis, discussed with neurology  - gentle blood pressure assess to drop down to < 140/90 as an outpatient  - PT/OT/SLP  - follow-up with [facility] neurology as outpatient (order placed)  - A1c at goal  - LDL of 80, so lipitor started at 20       Paroxysmal atrial fibrillation: Follows with [facility].  Per review of most recent notes patient has historically declined full anticoagulation due to the fact that her   of complications from cerebral hemorrhage while on warfarin.  She is also declined left atrial appendage.  PTA regimen includes rate control with diltiazem and baby aspirin  Had 3.2 second pause on RRT (on the one rhythm strip able to be reviewed, it appears to be 2 seconds)  Plan  - holding the diltiazem    Slight elevated Trop, possible demand ischemia  -- echocardiogram had no wall motion abnormalities  - could be related to hypertension  - no clear symptoms of ACS     Essential hypertension[PTA regimen includes diltiazem 240 mg daily losartan 75 mg daily]  - Hold PTA meds, consider restart lower dose losartan in the AM  - IV hydralazine PRN available     Hypothyroidism: Maintained on alternating doses of [place] Thyroid 60 mg to 90 mg every other day  - Continue PTA regimen  - TSH WNL     Chronic kidney disease, stage III: Baseline creatinine 0.9     COVID-19: Negative        DVT Prophylaxis: transition to eliquis  Code Status: No CPR- Do NOT Intubate    Disposition: Expected discharge in 1-2 days    Dung Lopez DO  Hospitalist [facility]  Pager: [phone]  Interval History   Doing well, cleared for home by therapies. Discussed with neurology    Physical Exam   Temp: 97.9  F (36.6  C) Temp src: Oral BP: (!) 144/90 Pulse: 100   Resp: 18 SpO2: 97 % O2 Device: None (Room air) Oxygen Delivery: 1.5 LPM  Vitals:    03/25/21 1810 03/26/21 0530   Weight: 66.8 kg (147 lb 4.3 oz) 68.4 kg (150 lb 12.7 oz)     Vital Signs with Ranges  Temp:  [97.6  F (36.4  C)-99.2  F (37.3  C)] 97.9  F (36.6  C)  Pulse:  [] 100  Resp:  [16-18] 18  BP: (127-165)/() 144/90  SpO2:  [94 %-97 %] 97 %  I/O last 3 completed shifts:  In: 232 [P.O.:220; I.V.:12]  Out: -     # Pain Assessment:  Current Pain Score 3/27/2021   Patient currently in pain? denies   Janna s pain level was assessed and she currently denies pain.        Constitutional: Awake, alert, cooperative, no apparent distress  Respiratory: Clear to auscultation bilaterally, no crackles or wheezing  Cardiovascular: irregular rate and rhythm, normal S1 and S2, and no murmur noted  GI: Normal bowel sounds, soft, non-distended, non-tender  Extrem: No calf tenderness, no ankle edema  Neuro: Ox3, no focal motor or sensory deficits    Medications     heparin 600 Units/hr (03/27/21 1618)     - MEDICATION INSTRUCTIONS -       - MEDICATION INSTRUCTIONS -         apixaban ANTICOAGULANT  5 mg Oral BID     atorvastatin  20 mg Oral QPM     pantoprazole  40 mg Oral QAM AC     senna-docusate  1 tablet Oral BID     sodium chloride (PF)  3 mL Intracatheter Q8H     thyroid  60 mg Oral Every Other Day     thyroid  90 mg Oral or NG Tube Every Other Day       Data   Recent Labs   Lab 03/27/21  0938 03/26/21  1218 03/26/21  0554 03/25/21 1924 03/25/21 1924 03/25/21  1330 03/25/21  1330   WBC 8.6  --  8.0  --  10.8   < > 11.0   HGB 16.3*  --  14.9  --  15.5    < > 16.4*   MCV 95  --  93  --  95   < > 93     --  143*  --  149*   < > 155   INR  --   --  1.06  --   --   --   --      --  136  --   --   --  134   POTASSIUM 3.9  --  4.0  --  4.3  --  4.4   CHLORIDE 107  --  105  --   --   --  99   CO2 26  --  26  --   --   --  24   BUN 19  --  20  --   --   --  34*   CR 0.82  --  0.71  --   --   --  0.97   ANIONGAP 4  --  5  --   --   --  11   CORY 8.9  --  8.3*  --   --   --  9.7   *  --  113*  --   --   --  185*   ALBUMIN 3.5  --   --   --   --   --   --    PROTTOTAL 7.3  --   --   --   --   --   --    BILITOTAL 1.1  --   --   --   --   --   --    ALKPHOS 70  --   --   --   --   --   --    ALT 45  --   --   --   --   --   --    AST 33  --   --   --   --   --   --    TROPI 0.116* 0.094* 0.036   < >  --   --   --     < > = values in this interval not displayed.       Imaging:   No results found for this or any previous visit (from the past 24 hour(s)).

## 2021-03-27 NOTE — PROVIDER NOTIFICATION
Hospitalist service cross cover:    Paged by nursing staff regarding 3.4 second pause with subsequent bradycardia. Last set of vitals HR 62 RR 18 /83 mmHg. Electrolytes were normal this morning. I don't see a hospitalist note from today. Looks like patient has history of atrial fibrillation, both metoprolol and labetalol are available PRN.    Interventions/plan:  -- 12 lead ECG STAT, shows rate controlled afib, on the slower side. No acute ischemia.   -- hold PRN beta blockade until 12 lead is done   -- 1 g calcium gluconate   -- PRN hydralazine is available for HTN    Please page with additional concerns,     DEEDEE Aguilar, CNP  Hospitalist - [facility]  Text Page  [phone])

## 2021-03-27 NOTE — PLAN OF CARE
Pt here with R cerebellar CVA and R artery occlusion. Pt A&O x4. Left pupil larger than right. Hypertensive, received PRN hydralazine x1. New orders for increased BP meds. Denies pain. Pt up with A1/GB. Tolerating regular diet. Takes meds whole with water. Blanchable redness on coccyx. Plan to discharge home tomorrow.   Pt on stoplight aggression tool.

## 2021-03-27 NOTE — PLAN OF CARE
ICU transfer today. [facility] right cerebellum infarct, as well as a chronic left vertebral artery occlusion and an acute right vertebral artery occlusion. Patient is A&0x4, anxious at times. Neuros are intact with the exception of left pupil being larger than right. On a heparin gtt until a final anticoagulation plan is made tomorrow, per patients daughter, patient has been resistant to blood thinners in the past due to  dying of a brain bleed after being on Warfarin. Up with Ax1/GB to BSC, voiding adequately. Denies pain. Tolerating regular diet, patients daughter called tonight and expressed frustration at the limited food options available as the patient is GF and there are many foods she is unable to eat. Tele afib/cvr. Discharge plan pending.

## 2021-03-27 NOTE — PLAN OF CARE
Patient is here with a right cerebellum stroke. A&0x4, VSS, still hypertensive but blood pressure within parameters. Neuros with left pupil > right, otherwise intact. Denies pain or headache. Up with Ax1/GB in the room. Voiding adequately. Heparin discontinued this evening and first dose of Eliquis given. Tele controlled afib. Plan is for patient to discharge to home tomorrow, she would like to discharge as early as possible so she can make the four hour drive home.

## 2021-03-28 ENCOUNTER — APPOINTMENT (OUTPATIENT)
Dept: OCCUPATIONAL THERAPY | Facility: CLINIC | Age: 86
DRG: 065 | End: 2021-03-28
Attending: INTERNAL MEDICINE
Payer: MEDICARE

## 2021-03-28 ENCOUNTER — APPOINTMENT (OUTPATIENT)
Dept: PHYSICAL THERAPY | Facility: CLINIC | Age: 86
DRG: 065 | End: 2021-03-28
Attending: INTERNAL MEDICINE
Payer: MEDICARE

## 2021-03-28 LAB
ANION GAP SERPL CALCULATED.3IONS-SCNC: 5 MMOL/L (ref 3–14)
BUN SERPL-MCNC: 28 MG/DL (ref 7–30)
CALCIUM SERPL-MCNC: 8.3 MG/DL (ref 8.5–10.1)
CHLORIDE SERPL-SCNC: 108 MMOL/L (ref 94–109)
CO2 SERPL-SCNC: 26 MMOL/L (ref 20–32)
CREAT SERPL-MCNC: 0.85 MG/DL (ref 0.52–1.04)
ERYTHROCYTE [DISTWIDTH] IN BLOOD BY AUTOMATED COUNT: 13.4 % (ref 10–15)
GFR SERPL CREATININE-BSD FRML MDRD: 60 ML/MIN/{1.73_M2}
GLUCOSE SERPL-MCNC: 114 MG/DL (ref 70–99)
HCT VFR BLD AUTO: 46.2 % (ref 35–47)
HGB BLD-MCNC: 15.4 G/DL (ref 11.7–15.7)
MAGNESIUM SERPL-MCNC: 2 MG/DL (ref 1.6–2.3)
MCH RBC QN AUTO: 31.3 PG (ref 26.5–33)
MCHC RBC AUTO-ENTMCNC: 33.3 G/DL (ref 31.5–36.5)
MCV RBC AUTO: 94 FL (ref 78–100)
PHOSPHATE SERPL-MCNC: 2.7 MG/DL (ref 2.5–4.5)
PLATELET # BLD AUTO: 148 10E9/L (ref 150–450)
POTASSIUM SERPL-SCNC: 3.6 MMOL/L (ref 3.4–5.3)
RBC # BLD AUTO: 4.92 10E12/L (ref 3.8–5.2)
SODIUM SERPL-SCNC: 139 MMOL/L (ref 133–144)
UFH PPP CHRO-ACNC: >1.1 IU/ML
WBC # BLD AUTO: 7.9 10E9/L (ref 4–11)

## 2021-03-28 PROCEDURE — 97116 GAIT TRAINING THERAPY: CPT | Mod: GP

## 2021-03-28 PROCEDURE — 250N000011 HC RX IP 250 OP 636: Performed by: STUDENT IN AN ORGANIZED HEALTH CARE EDUCATION/TRAINING PROGRAM

## 2021-03-28 PROCEDURE — 99232 SBSQ HOSP IP/OBS MODERATE 35: CPT | Performed by: HOSPITALIST

## 2021-03-28 PROCEDURE — 250N000013 HC RX MED GY IP 250 OP 250 PS 637: Performed by: INTERNAL MEDICINE

## 2021-03-28 PROCEDURE — 36415 COLL VENOUS BLD VENIPUNCTURE: CPT | Performed by: INTERNAL MEDICINE

## 2021-03-28 PROCEDURE — 97535 SELF CARE MNGMENT TRAINING: CPT | Mod: GO | Performed by: OCCUPATIONAL THERAPIST

## 2021-03-28 PROCEDURE — 85520 HEPARIN ASSAY: CPT | Performed by: INTERNAL MEDICINE

## 2021-03-28 PROCEDURE — 250N000013 HC RX MED GY IP 250 OP 250 PS 637: Performed by: HOSPITALIST

## 2021-03-28 PROCEDURE — 83735 ASSAY OF MAGNESIUM: CPT | Performed by: INTERNAL MEDICINE

## 2021-03-28 PROCEDURE — 250N000013 HC RX MED GY IP 250 OP 250 PS 637: Performed by: PHYSICIAN ASSISTANT

## 2021-03-28 PROCEDURE — 84100 ASSAY OF PHOSPHORUS: CPT | Performed by: INTERNAL MEDICINE

## 2021-03-28 PROCEDURE — 93005 ELECTROCARDIOGRAM TRACING: CPT

## 2021-03-28 PROCEDURE — 85027 COMPLETE CBC AUTOMATED: CPT | Performed by: INTERNAL MEDICINE

## 2021-03-28 PROCEDURE — 120N000001 HC R&B MED SURG/OB

## 2021-03-28 PROCEDURE — 80048 BASIC METABOLIC PNL TOTAL CA: CPT | Performed by: INTERNAL MEDICINE

## 2021-03-28 PROCEDURE — 93010 ELECTROCARDIOGRAM REPORT: CPT | Performed by: INTERNAL MEDICINE

## 2021-03-28 RX ORDER — DILTIAZEM HYDROCHLORIDE 30 MG/1
30 TABLET, FILM COATED ORAL EVERY 6 HOURS SCHEDULED
Status: DISCONTINUED | OUTPATIENT
Start: 2021-03-28 | End: 2021-03-29

## 2021-03-28 RX ORDER — DILTIAZEM HYDROCHLORIDE 30 MG/1
30 TABLET, FILM COATED ORAL EVERY 6 HOURS SCHEDULED
Status: DISCONTINUED | OUTPATIENT
Start: 2021-03-28 | End: 2021-03-28

## 2021-03-28 RX ADMIN — DILTIAZEM HYDROCHLORIDE 30 MG: 30 TABLET, FILM COATED ORAL at 10:51

## 2021-03-28 RX ADMIN — DILTIAZEM HYDROCHLORIDE 30 MG: 30 TABLET, FILM COATED ORAL at 17:02

## 2021-03-28 RX ADMIN — HYDRALAZINE HYDROCHLORIDE 10 MG: 20 INJECTION INTRAMUSCULAR; INTRAVENOUS at 18:18

## 2021-03-28 RX ADMIN — SENNOSIDES AND DOCUSATE SODIUM 1 TABLET: 8.6; 5 TABLET ORAL at 21:08

## 2021-03-28 RX ADMIN — APIXABAN 5 MG: 5 TABLET, FILM COATED ORAL at 21:08

## 2021-03-28 RX ADMIN — APIXABAN 5 MG: 5 TABLET, FILM COATED ORAL at 09:47

## 2021-03-28 RX ADMIN — PANTOPRAZOLE SODIUM 40 MG: 40 TABLET, DELAYED RELEASE ORAL at 09:47

## 2021-03-28 RX ADMIN — ATORVASTATIN CALCIUM 20 MG: 20 TABLET, FILM COATED ORAL at 21:08

## 2021-03-28 RX ADMIN — SENNOSIDES AND DOCUSATE SODIUM 1 TABLET: 8.6; 5 TABLET ORAL at 09:47

## 2021-03-28 RX ADMIN — LEVOTHYROXINE, LIOTHYRONINE 90 MG: 19; 4.5 TABLET ORAL at 09:47

## 2021-03-28 ASSESSMENT — ACTIVITIES OF DAILY LIVING (ADL)
ADLS_ACUITY_SCORE: 18
ADLS_ACUITY_SCORE: 17
ADLS_ACUITY_SCORE: 18
ADLS_ACUITY_SCORE: 18

## 2021-03-28 ASSESSMENT — MIFFLIN-ST. JEOR: SCORE: 1030.06

## 2021-03-28 NOTE — CONSULTS
Care Management Initial Consult    General Information  Assessment completed with: Patient,    Type of CM/SW Visit: CM Role Introduction    Primary Care Provider verified and updated as needed: Yes   Readmission within the last 30 days: no previous admission in last 30 days   Reason for Consult: discharge planning    Communication Assessment  Patient's communication style: spoken language (English or Bilingual)    Hearing Difficulty or Deaf: no   Wear Glasses or Blind: yes    Cognitive  Cognitive/Neuro/Behavioral: WDL  Level of Consciousness: alert     Orientation: oriented x 4  Mood/Behavior: cooperative, calm  Best Language: 0 - No aphasia  Speech: clear, logical    Living Environment:   People in home: alone     Current living Arrangements: house      Able to return to prior arrangements: yes       Family/Social Support:  Care provided by: self  Provides care for: no one  Marital Status: Single  Children          Description of Support System: Supportive, Involved    Support Assessment: Adequate family and caregiver support, Adequate social supports    Current Resources:   Patient receiving home care services: No     Community Resources: None  Equipment currently used at home: grab bar, tub/shower  Supplies currently used at home:      Employment/Financial:  Employment Status: retired        Financial Concerns: unable to afford medication(s)(no coverage for eliquis however agreeable to xarelto cvd)   Referral to Financial Counselor: No       Lifestyle & Psychosocial Needs:        Socioeconomic History     Marital status:      Spouse name: Not on file     Number of children: Not on file     Years of education: 12     Highest education level: Not on file     Tobacco Use     Smoking status: Never Smoker     Smokeless tobacco: Never Used   Substance and Sexual Activity     Alcohol use: No     Alcohol/week: 0.0 standard drinks     Drug use: Never     Types: Other       Functional Status:  Prior to admission  patient needed assistance:   Independent                Additional Information:  Writer met with the patient at the bedside. Patient is A+Ox4 up with SBA.  Patient is most likely ready for discharge to home tomorrow.  Writer explained role and scope in safe discharge planning.  Patient resides independently in [place], her daughter lives right next door.  She is aware that homecare RN/PT/OT are being recommended at discharge.  Writer confirmed PCP, Address and preferred phone number in Epic are correct as listed.      Writer presented list of homecare agencies in her zip code for choice and explained that we would need to inquire with these homecare services with regards to their availability and that some homecare agencies are currently at capacity with homecare and some are unable to start until a week out, patient does identify that her daughter will be able to provide some assistance.      Patient agreeable to have a referral submitted to [facility] Phone#[phone] and talked to [place]per they would be able to provide c  homeare RN/PT/OT start of care week of 4/5/2021 and the MD would need to order the homecare with f2f and start of care on or before the week of 4/5/2021 sticky note to MD regarding this and MD is aware of homecare start delay.    Patient was not aware that Eliquis is NOT covered per [employer] in [place] patient would be responsible for ~$540.00/month patient is aware that her pharmacy's preferred RX is Xarelto and patient would be responsible to meet her deductible of $379.00 and ~$123-143 going forward.  Patient will get one month free if filled here and following months at [facility] in [place]. MD to change the medication to Xarelto today.    Patient is aware that she will require a PCP follow up within a week and that Neuro recommended a 6-8 week follow up locally. Patient stated Neuro mentioned a possible telephone follow up. Patient also advised to check with her PCP to ask  about local Neurologists in her area.    Patient had concerns about transportation to home. She has a friend who provides medical transports to the [facility] every week, however her insurance will most likely not cover.    Patient was encouraged to talk with her son regarding a ride home tomorrow as he is local.   Per patient her son is coming to visit today and would like to know if she is cleared tomorrow she could discuss this further with him. Writer asked bedside to relay that discharge is most likely tomorrow per MD so patient should discuss this with her son.      No further needs identified at this time.     Paris Lemus RN

## 2021-03-28 NOTE — PLAN OF CARE
Pt here with R cerebellar CVA, R artery occlusion. A&Ox4. Neuros L pupil > R, otherwise intact. VSS. Tele A-fib CVR. Regular diet, thin liquids. Takes pills whole with water. Up with A1/GB. Denies pain. Slept well. Pt scoring green on the Aggression Stop Light Tool. Discharge plan to go home today early this AM.

## 2021-03-28 NOTE — PLAN OF CARE
Pt here r cerebellar CVA. Pt A&Ox4. Pt left pupil larger than right, reactive. Pt in A fib with RVR, restarted on diltiazem. Pt denies pain. PT tolerating regular diet with thin liquids. Take pills whole with water. Bm today. Plan to discharge tomorrow morning.   Pt greenlight on stoplight aggression tool.

## 2021-03-28 NOTE — PROGRESS NOTES
[facility]    Hospitalist Progress Note    Assessment & Plan   89 year old female with paroxysmal atrial fibrillation not on anticoagulation, hypertension, dyslipidemia and chronic kidney disease -- presented to [place] ER with  acute onset dizziness and found to have basilar artery occlusion and started on IV heparin and transferred to [facility] for further treatment on 21:     Acute Posterior Circulation stroke with dizziness   Chronic occlusion of left vertebral artery  Acute occlusion of right vertebral artery  High-grade stenosis of basilar arteryformation.  Plan  - start on eliquis, discussed with neurology  - gentle blood pressure assess to drop down to < 140/90 as an outpatient  - PT/OT/SLP  - follow-up with [facility] neurology as outpatient (order placed)  - A1c at goal  - LDL of 80, so lipitor started at 20       Paroxysmal atrial fibrillation: Follows with [facility].  Per review of most recent notes patient has historically declined full anticoagulation due to the fact that her   of complications from cerebral hemorrhage while on warfarin.  She is also declined left atrial appendage.  PTA regimen includes rate control with diltiazem and baby aspirin  Had 3.2 second pause on RRT (on the one rhythm strip able to be reviewed, it appears to be 2 seconds)  Plan  - restart diltiazem as short acting, can resume the long acting on discharge    Slight elevated Trop, due to demand ischemia  -- echocardiogram had no wall motion abnormalities  - could be related to hypertension  - no clear symptoms of ACS     Essential hypertension[PTA regimen includes diltiazem 240 mg daily losartan 75 mg daily]  - Hold PTA meds, consider restart lower dose losartan in the AM  - IV hydralazine PRN available     Hypothyroidism: Maintained on alternating doses of [place] Thyroid 60 mg to 90 mg every other day  - Continue PTA regimen  - TSH WNL     Chronic kidney disease, stage III:  Baseline creatinine 0.9     COVID-19: Negative       DVT Prophylaxis: transition to eliquis  Code Status: No CPR- Do NOT Intubate    Disposition: Expected discharge tomorrow. Home therapies need to be set up and she will need early discharge orders.    Dung Lopez DO  Hospitalist [facility]  Pager: [phone]  Interval History   Developed RVR. Asymptomatic. We discussed staying another day    Physical Exam   Temp: 97.7  F (36.5  C) Temp src: Oral BP: 132/79 Pulse: 95   Resp: 16 SpO2: 96 % O2 Device: None (Room air)    Vitals:    03/25/21 1810 03/26/21 0530 03/28/21 0522   Weight: 66.8 kg (147 lb 4.3 oz) 68.4 kg (150 lb 12.7 oz) 68.9 kg (151 lb 12.8 oz)     Vital Signs with Ranges  Temp:  [97.7  F (36.5  C)-98.2  F (36.8  C)] 97.7  F (36.5  C)  Pulse:  [] 95  Resp:  [16-18] 16  BP: (132-170)/(76-96) 132/79  SpO2:  [92 %-97 %] 96 %  No intake/output data recorded.    # Pain Assessment:  Current Pain Score 3/28/2021   Patient currently in pain? denies   Janna s pain level was assessed and she currently denies pain.        Constitutional: Awake, alert, cooperative, no apparent distress  Respiratory: Clear to auscultation bilaterally, no crackles or wheezing  Cardiovascular: irregular rate and rhythm, normal S1 and S2, and no murmur noted  GI: Normal bowel sounds, soft, non-distended, non-tender  Extrem: No calf tenderness, no ankle edema  Neuro: Ox3, no focal motor or sensory deficits    Medications     - MEDICATION INSTRUCTIONS -       - MEDICATION INSTRUCTIONS -         apixaban ANTICOAGULANT  5 mg Oral BID     atorvastatin  20 mg Oral QPM     diltiazem  30 mg Oral Q6H [place]     pantoprazole  40 mg Oral QAM AC     senna-docusate  1 tablet Oral BID     sodium chloride (PF)  3 mL Intracatheter Q8H     thyroid  60 mg Oral Every Other Day     thyroid  90 mg Oral or NG Tube Every Other Day       Data   Recent Labs   Lab 03/28/21  0547 03/27/21  0938 03/26/21  1218 03/26/21  0554   WBC 7.9 8.6  --  8.0   HGB 15.4  16.3*  --  14.9   MCV 94 95  --  93   * 169  --  143*   INR  --   --   --  1.06    137  --  136   POTASSIUM 3.6 3.9  --  4.0   CHLORIDE 108 107  --  105   CO2 26 26  --  26   BUN 28 19  --  20   CR 0.85 0.82  --  0.71   ANIONGAP 5 4  --  5   CORY 8.3* 8.9  --  8.3*   * 106*  --  113*   ALBUMIN  --  3.5  --   --    PROTTOTAL  --  7.3  --   --    BILITOTAL  --  1.1  --   --    ALKPHOS  --  70  --   --    ALT  --  45  --   --    AST  --  33  --   --    TROPI  --  0.116* 0.094* 0.036       Imaging:   No results found for this or any previous visit (from the past 24 hour(s)).

## 2021-03-28 NOTE — PROVIDER NOTIFICATION
Paged on call regarding critical heparin lab >1.10. Pt stopped heparin in evening and started eliquis.   No new orders, does not need heparin labs anymore.

## 2021-03-29 VITALS
HEIGHT: 60 IN | OXYGEN SATURATION: 94 % | RESPIRATION RATE: 16 BRPM | SYSTOLIC BLOOD PRESSURE: 127 MMHG | TEMPERATURE: 97.8 F | BODY MASS INDEX: 29.8 KG/M2 | HEART RATE: 98 BPM | DIASTOLIC BLOOD PRESSURE: 83 MMHG | WEIGHT: 151.8 LBS

## 2021-03-29 LAB
ANION GAP SERPL CALCULATED.3IONS-SCNC: 5 MMOL/L (ref 3–14)
BUN SERPL-MCNC: 27 MG/DL (ref 7–30)
CALCIUM SERPL-MCNC: 8.3 MG/DL (ref 8.5–10.1)
CHLORIDE SERPL-SCNC: 110 MMOL/L (ref 94–109)
CO2 SERPL-SCNC: 26 MMOL/L (ref 20–32)
CREAT SERPL-MCNC: 0.86 MG/DL (ref 0.52–1.04)
ERYTHROCYTE [DISTWIDTH] IN BLOOD BY AUTOMATED COUNT: 13.4 % (ref 10–15)
GFR SERPL CREATININE-BSD FRML MDRD: 59 ML/MIN/{1.73_M2}
GLUCOSE SERPL-MCNC: 100 MG/DL (ref 70–99)
HCT VFR BLD AUTO: 45 % (ref 35–47)
HGB BLD-MCNC: 15.1 G/DL (ref 11.7–15.7)
INTERPRETATION ECG - MUSE: NORMAL
INTERPRETATION ECG - MUSE: NORMAL
MAGNESIUM SERPL-MCNC: 1.9 MG/DL (ref 1.6–2.3)
MCH RBC QN AUTO: 30.9 PG (ref 26.5–33)
MCHC RBC AUTO-ENTMCNC: 33.6 G/DL (ref 31.5–36.5)
MCV RBC AUTO: 92 FL (ref 78–100)
PHOSPHATE SERPL-MCNC: 3.1 MG/DL (ref 2.5–4.5)
PLATELET # BLD AUTO: 155 10E9/L (ref 150–450)
POTASSIUM SERPL-SCNC: 3.7 MMOL/L (ref 3.4–5.3)
RBC # BLD AUTO: 4.89 10E12/L (ref 3.8–5.2)
SODIUM SERPL-SCNC: 141 MMOL/L (ref 133–144)
WBC # BLD AUTO: 7.7 10E9/L (ref 4–11)

## 2021-03-29 PROCEDURE — 83735 ASSAY OF MAGNESIUM: CPT | Performed by: INTERNAL MEDICINE

## 2021-03-29 PROCEDURE — 84100 ASSAY OF PHOSPHORUS: CPT | Performed by: INTERNAL MEDICINE

## 2021-03-29 PROCEDURE — 250N000013 HC RX MED GY IP 250 OP 250 PS 637: Performed by: INTERNAL MEDICINE

## 2021-03-29 PROCEDURE — 85027 COMPLETE CBC AUTOMATED: CPT | Performed by: INTERNAL MEDICINE

## 2021-03-29 PROCEDURE — 80048 BASIC METABOLIC PNL TOTAL CA: CPT | Performed by: INTERNAL MEDICINE

## 2021-03-29 PROCEDURE — 36415 COLL VENOUS BLD VENIPUNCTURE: CPT | Performed by: INTERNAL MEDICINE

## 2021-03-29 PROCEDURE — 99239 HOSP IP/OBS DSCHRG MGMT >30: CPT | Performed by: HOSPITALIST

## 2021-03-29 PROCEDURE — 250N000013 HC RX MED GY IP 250 OP 250 PS 637: Performed by: HOSPITALIST

## 2021-03-29 RX ORDER — DILTIAZEM HYDROCHLORIDE 120 MG/1
120 CAPSULE, COATED, EXTENDED RELEASE ORAL DAILY
Status: DISCONTINUED | OUTPATIENT
Start: 2021-03-29 | End: 2021-03-29 | Stop reason: HOSPADM

## 2021-03-29 RX ORDER — ATORVASTATIN CALCIUM 20 MG/1
20 TABLET, FILM COATED ORAL EVERY EVENING
Qty: 30 TABLET | Refills: 0 | Status: SHIPPED | OUTPATIENT
Start: 2021-03-29 | End: 2021-04-07

## 2021-03-29 RX ADMIN — APIXABAN 5 MG: 5 TABLET, FILM COATED ORAL at 08:13

## 2021-03-29 RX ADMIN — LEVOTHYROXINE, LIOTHYRONINE 60 MG: 38; 9 TABLET ORAL at 08:14

## 2021-03-29 RX ADMIN — DILTIAZEM HYDROCHLORIDE 30 MG: 30 TABLET, FILM COATED ORAL at 00:01

## 2021-03-29 RX ADMIN — DILTIAZEM HYDROCHLORIDE 30 MG: 30 TABLET, FILM COATED ORAL at 06:44

## 2021-03-29 RX ADMIN — SENNOSIDES AND DOCUSATE SODIUM 1 TABLET: 8.6; 5 TABLET ORAL at 08:13

## 2021-03-29 RX ADMIN — PANTOPRAZOLE SODIUM 40 MG: 40 TABLET, DELAYED RELEASE ORAL at 06:44

## 2021-03-29 ASSESSMENT — ACTIVITIES OF DAILY LIVING (ADL)
ADLS_ACUITY_SCORE: 16

## 2021-03-29 NOTE — PLAN OF CARE
Patient discharging to home with assist from daughter, discharge instructions given to patients daughter by phone, also reviewed discharge orders with patient and son on patients room/ they have no further questions.     Belongings returned to patient at discharge as well as items from security.

## 2021-03-29 NOTE — PROGRESS NOTES
Physical Therapy Discharge Summary    Reason for therapy discharge:    Discharged to home with home therapy.    Progress towards therapy goal(s). See goals on Care Plan in [facility] electronic health record for goal details.  Goals partially met.  Barriers to achieving goals:   discharge from facility.    Therapy recommendation(s):    Continued therapy is recommended.  Rationale/Recommendations:  below baseline..          03/28/21 1147   Signing Clinician's Name / Credentials   Signing clinician's name / credentials Brittany Dressler, PT   Quick Adds   Rehab Discipline PT   Gait Training   Minutes of Treatment (Gait Training) 10   Symptoms Noted During/After Treatment (Gait Training) fatigue   Distance in Feet (Required for LE Total Joints) 200' Fidelia with pt self-initaiting RW use, Fidelia stands, pivots distant supervision to start given self-corrected instability then once going Fidelia. Finished with pt in chair with alarm armed.   PT Discharge Planning    PT Discharge Recommendation (DC Rec) home with assist;home with home care physical therapy   PT Rationale for DC Rec Pt needs consistent RW use, would be best to avoid stairs to basement until safer given high falls risk and living alone.    PT Brief overview of current status  Distant supervision-Fidelia tx and gait with RW, very unsafe without AD DGI 1/24 (falls risk when < 20).    Additional Documentation   PT Plan PT: safe mobility progressions wt pt initiating walker use, separate balance training.   Total Session Time   Total Session Time (minutes) 10 minutes

## 2021-03-29 NOTE — PLAN OF CARE
Occupational Therapy Discharge Summary    Reason for therapy discharge:    Discharged to home with home therapy.    Progress towards therapy goal(s). See goals on Care Plan in [facility] electronic health record for goal details.  Goals partially met.  Barriers to achieving goals:   discharge from facility.    Therapy recommendation(s):    Continued therapy is recommended.  Rationale/Recommendations:  Recommend continued therapy at home to progress and improve independence in ADLS and IADLS.

## 2021-03-29 NOTE — PLAN OF CARE
Patient is here with a right cerebellar stroke and basilar artery occlusion. Transitioned from heparin gtt to eliquis for afib. A&0x4, forgetful at times. IV Hydralazine given x1 for BP, vitals otherwise stable. Neuros stable and intact, left pupil larger than right. Denies pain. Up with SBA/GB/W to the bathroom. Tele controlled afib. Plan is for possible discharge home tomorrow, patients daughter, Génesis, would like to know how involved she'll need to be as far as assisting patient once she's home, says she will be around to help but cannot provide 24 assistance so the patient will be alone at times.

## 2021-03-29 NOTE — PLAN OF CARE
Pt here with R cerebellar CVA, R artery occlusion. A&Ox4. Neuros L pupil > R, otherwise intact. VSS. Tele A-fib CVR. Regular diet, thin liquids. Takes pills whole with water. Up with A1/GB. Denies pain. Seizure precautions maintained. Slept well. Pt scoring green on the Aggression Stop Light Tool. Discharge plan to go home today with home with family to assist.

## 2021-03-29 NOTE — DISCHARGE SUMMARY
[facility]  Hospitalist Discharge Summary      Date of Admission:  3/25/2021  Date of Discharge:  3/29/2021  Discharging Provider: Dung Lopez,       Discharge Diagnoses   Acute posterior stroke due to atrial fibrillation    Follow-ups Needed After Discharge   Follow-up Appointments     Follow-up and recommended labs and tests       Follow up with primary care provider, Nuno Clinton, within 7 days   to evaluate medication change and for hospital follow- up.  No follow up   labs or test are needed.  Future discussions of anticoagulant given the   eliquis is not covered. 1 month free provided    Follow-up with neurology in 4-6 wks locally.             Unresulted Labs Ordered in the Past 30 Days of this Admission     No orders found from 2/23/2021 to 3/26/2021.      These results will be followed up by none    Discharge Disposition   Discharged to home  Condition at discharge: Stable      Hospital Course   89 year old female with paroxysmal atrial fibrillation not on anticoagulation, hypertension, dyslipidemia and chronic kidney disease -- presented to [place] ER with  acute onset dizziness and found to have basilar artery occlusion and started on IV heparin and transferred to [facility] for further treatment on 03/25/21:     Acute Posterior Circulation stroke with dizziness   Chronic occlusion of left vertebral artery  Acute occlusion of right vertebral artery  High-grade stenosis of basilar arteryformation.  Plan  - start on eliquis. Family are aware it is not covered, this is their preference  - PT/OT/SLP  - follow-up with [facility] neurology as outpatient (order placed) in 6-8 weeks  - A1c at goal  - LDL of 80, so lipitor started at 20  - therapies ok for home therapy and family assistance prn for cooking, cleaning, bathing, medication management. Daughter lives next door and is able to help with some of this.        Paroxysmal atrial fibrillation: Follows with [facility]  [place].  Per review of most recent notes patient has historically declined full anticoagulation due to the fact that her   of complications from cerebral hemorrhage while on warfarin.  She is also declined left atrial appendage.  PTA regimen includes rate control with diltiazem and baby aspirin  Had 3.2 second pause on RRT (on the one rhythm strip able to be reviewed, it appears to be 2 seconds)  Plan  - resume long acting diltiazem    Slight elevated Trop, due to demand ischemia  -- echocardiogram had no wall motion abnormalities  - could be related to hypertension  - no clear symptoms of ACS     Essential hypertension[PTA regimen includes diltiazem 240 mg daily losartan 75 mg daily]  - resume home diltiazem  - IV hydralazine PRN available     Hypothyroidism: Maintained on alternating doses of [place] Thyroid 60 mg to 90 mg every other day  - Continue PTA regimen  - TSH WNL     Chronic kidney disease, stage III: Baseline creatinine 0.9    Consultations This Hospital Stay   PHYSICAL THERAPY ADULT IP CONSULT  OCCUPATIONAL THERAPY ADULT IP CONSULT  SPEECH LANGUAGE PATH ADULT IP CONSULT  SOCIAL WORK IP CONSULT  SMOKING CESSATION PROGRAM IP CONSULT  PHARMACY IP CONSULT  PATIENT LEARNING CENTER IP CONSULT  SWALLOW EVAL SPEECH PATH AT BEDSIDE IP CONSULT  SPEECH LANGUAGE PATH ADULT IP CONSULT  PHARMACY IP CONSULT  PHARMACY IP CONSULT  PHARMACY IP CONSULT  PHYSICAL THERAPY ADULT IP CONSULT  OCCUPATIONAL THERAPY ADULT IP CONSULT  CARE MANAGEMENT / SOCIAL WORK IP CONSULT  SOCIAL WORK IP CONSULT  PHARMACY IP CONSULT  PHARMACY IP CONSULT  PHARMACY LIAISON FOR MEDICATION COVERAGE CONSULT  PHARMACY LIAISON FOR MEDICATION COVERAGE CONSULT  PHARMACY LIAISON FOR MEDICATION COVERAGE CONSULT  NEUROLOGY IP CONSULT  CARE MANAGEMENT / SOCIAL WORK IP CONSULT  SMOKING CESSATION PROGRAM IP CONSULT    Code Status   No CPR- Do NOT Intubate    Time Spent on this Encounter   IDung DO, personally saw the patient  today and spent greater than 30 minutes discharging this patient.       Dung Lopez, [place] [address] [facility]  [address] [place] [address]  Phone: [phone]  ______________________________________________________________________    Physical Exam   Vital Signs: Temp: 97.8  F (36.6  C) Temp src: Oral BP: 127/83 Pulse: 98   Resp: 16 SpO2: 94 % O2 Device: None (Room air)    Weight: 151 lbs 12.8 oz     Constitutional: Awake, alert, cooperative, no apparent distress  Respiratory: Clear to auscultation bilaterally, no crackles or wheezing  Cardiovascular: irregular rate and rhythm, normal S1 and S2, and no murmur noted  GI: Normal bowel sounds, soft, non-distended, non-tender  Extrem: No calf tenderness, no ankle edema  Neuro: Ox3, no focal motor or sensory deficits          Primary Care Physician   Nuno Clinton    Discharge Orders      NEUROLOGY ADULT REFERRAL      Home care nursing referral      Home Care PT Referral for Hospital Discharge      Home Care OT Referral for Hospital Discharge      Reason for your hospital stay    Stroke due to right vertebral artery occlusion from atrial fibrillation     Follow-up and recommended labs and tests     Follow up with primary care provider, Nuno Clinton, within 7 days to evaluate medication change and for hospital follow- up.  No follow up labs or test are needed.  Future discussions of anticoagulant given the eliquis is not covered. 1 month free provided    Follow-up with neurology in 4-6 wks locally.     Activity    Your activity upon discharge: activity as tolerated     MD face to face encounter    Documentation of Face to Face and Certification for Home Health Services    I certify that patient: Janna Mcdermott is under my care and that I, or a nurse practitioner or physician's assistant working with me, had a face-to-face encounter that meets the physician face-to-face encounter requirements with this  patient on: 3/29/2021.    This encounter with the patient was in whole, or in part, for the following medical condition, which is the primary reason for home health care: stroke.    I certify that, based on my findings, the following services are medically necessary home health services: Nursing, Occupational Therapy and Physical Therapy.    My clinical findings support the need for the above services because: Nurse is needed: home safety, medication compliance., Occupational Therapy Services are needed to assess and treat cognitive ability and address ADL safety due to impairment in ADL due to stroke. and Physical Therapy Services are needed to assess and treat the following functional impairments: ADL due to stroke.    Further, I certify that my clinical findings support that this patient is homebound (i.e. absences from home require considerable and taxing effort and are for medical reasons or [employer] services or infrequently or of short duration when for other reasons) because: Patient is bedbound due to: stroke..    Based on the above findings. I certify that this patient is confined to the home and needs intermittent skilled nursing care, physical therapy and/or speech therapy.  The patient is under my care, and I have initiated the establishment of the plan of care.  This patient will be followed by a physician who will periodically review the plan of care.  Physician/Provider to provide follow up care: Nuno Clinton    Attending hospital physician (the Medicare certified [place] provider): Dung Lopez DO  Physician Signature: See electronic signature associated with these discharge orders.  Date: 3/29/2021     Discharge Instructions    patient will be able to return home with family assistance as needed ie tub/shwoer transfer, all cleaning, laundry, cooking, medication management, driving.    Home nursing and therapy are ordered to start.    Follow-up with the neurologist and primary care  doctor for further needs     Diet    Follow this diet upon discharge: Orders Placed This Encounter      Regular Diet Adult       Significant Results and Procedures   Most Recent 3 CBC's:  Recent Labs   Lab Test 03/29/21  0557 03/28/21  0547 03/27/21  0938   WBC 7.7 7.9 8.6   HGB 15.1 15.4 16.3*   MCV 92 94 95    148* 169     Most Recent 3 BMP's:  Recent Labs   Lab Test 03/29/21  0557 03/28/21  0547 03/27/21  0938    139 137   POTASSIUM 3.7 3.6 3.9   CHLORIDE 110* 108 107   CO2 26 26 26   BUN 27 28 19   CR 0.86 0.85 0.82   ANIONGAP 5 5 4   CORY 8.3* 8.3* 8.9   * 114* 106*     Most Recent 2 LFT's:  Recent Labs   Lab Test 03/27/21  0938 05/27/15  0952   AST 33  --    ALT 45  --    ALKPHOS 70 63   BILITOTAL 1.1 0.8     Most Recent 3 INR's:  Recent Labs   Lab Test 03/26/21  0554 10/15/13  1612   INR 1.06 1.0   ,   Results for orders placed or performed during the hospital encounter of 03/25/21   CT Head w/o Contrast    Narrative    CT OF THE HEAD WITHOUT CONTRAST 3/25/2021 6:25 PM     COMPARISON: Head CT same day    HISTORY: Stroke, follow-up. Rule out hemorrhagic transformation.    TECHNIQUE: 5 mm thick axial CT images of the head were acquired  without IV contrast material.    FINDINGS: There is mild diffuse cerebral volume loss. There are subtle  patchy areas of decreased density in the cerebral white matter  bilaterally that are consistent with sequela of chronic small vessel  ischemic disease.    The ventricles and basal cisterns are within normal limits in  configuration given the degree of cerebral volume loss.  There is no  midline shift. There are no extra-axial fluid collections.    No intracranial hemorrhage, mass or recent infarct.    The visualized paranasal sinuses are well-aerated. There is no  mastoiditis. There are no fractures of the visualized bones.      Impression    IMPRESSION: Diffuse cerebral volume loss and cerebral white matter  changes consistent with chronic small vessel  ischemic disease. No  evidence for acute intracranial pathology. No change from the recent  comparison study.        Radiation dose for this scan was reduced using automated exposure  control, adjustment of the mA and/or kV according to patient size, or  iterative reconstruction technique    ENRIQUE DOBBINS MD   MR Brain w/o & w Contrast    Narrative    EXAM: MR BRAIN W/O and W CONTRAST  LOCATION: [facility]  DATE/TIME: 3/26/2021 4:31 AM    INDICATION: Neuro deficit, acute, stroke suspected  COMPARISON: CT head 03/25/2021  CONTRAST: 7 mL Gadavist  TECHNIQUE: Routine multiplanar multisequence head MRI without and with intravenous contrast.    FINDINGS:  INTRACRANIAL CONTENTS: Right inferior cerebellum PICA territory prominent acute infarct (restricted diffusion, positive FLAIR). No additional acute infarcts. Left inferior cerebellum multiple small chronic infarcts PICA territory. No mass, acute   hemorrhage, or extra-axial fluid collections. Patchy nonspecific T2/FLAIR hyperintensities within the cerebral white matter most consistent with mild to moderate chronic microvascular ischemic change. Moderate generalized cerebral atrophy. No   hydrocephalus. Normal position of the cerebellar tonsils. No pathologic contrast enhancement.    SELLA: No abnormality accounting for technique.    OSSEOUS STRUCTURES/SOFT TISSUES: Normal marrow signal. Right V4 flow void absent. Portions of basilar artery flow void absent. Please defer to recent CTA 03/25/2021.     ORBITS: No abnormality accounting for technique.     SINUSES/MASTOIDS: Mild mucosal thickening scattered about the paranasal sinuses. Right sphenoid sinus small air-fluid level. Please correlate for acute sinusitis. No middle ear or mastoid effusion.       Impression    IMPRESSION:  1.  Right inferior cerebellum PICA territory prominent acute infarct.  2.  No additional acute infarcts.   3.  Left inferior cerebellum multiple small chronic infarcts PICA  territory.   4.  Age-related changes described above.     CTA Chest with Contrast    Narrative    EXAM: CTA CHEST, ABDOMEN AND PELVIS WITH CONTRAST  LOCATION: [facility]  DATE/TIME: 3/26/2021 4:20 PM    INDICATION: Aortic disease, nontraumatic. Evaluate for aortic dissection.  COMPARISON: No pertinent previous comparison study is available for comparison.    TECHNIQUE: Multiphase helical acquisition through the chest, abdomen, and pelvis was performed after the administration of IV contrast. 2D and 3D reconstructions were performed by the CT technologist. Dose reduction techniques were used.   CONTRAST: 80 mL Isovue-370    FINDINGS:  CT ANGIOGRAM CHEST, ABDOMEN, AND PELVIS:   Moderate to large amount of atheromatous disease involving the intrathoracic aorta, most notably involving the descending thoracic aorta where there are mild areas of penetrating atheromatous change. The ascending thoracic aorta is mildly dilated   measuring 42 mm in greatest dimension. Classic great arch anatomy. There is a high-grade stenosis of the proximal left subclavian artery extending up to 90% in severity. The remainder of the great vessels are widely patent. Right dominant vertebral   system. Moderate amount of atheromatous calcification involving the coronary arteries.    Minimal atheromatous disease involving a nonaneurysmal abdominal aorta. The celiac artery is widely patent. The superior mesenteric artery is severely stenotic (greater than 95%) over its first few cm however there is excellent collateral flow to its   branches. Variant anatomy with a separate and widely patent hepatic artery arising off the aorta.    Minimal atheromatous disease involving the iliac vasculature which is widely patent. The bilateral common femoral and profunda femoris arteries are widely patent. The bilateral superficial femoral arteries appear to have stenoses proximally although are   only partially visualized.    CHEST:  LUNGS AND  PLEURA: Mild strands of intraparenchymal scarring and fibrosis involving the lungs. Tiny left pleural effusion.    MEDIASTINUM: Negative.    CORONARY ARTERY CALCIFICATION: As above.    ABDOMEN: Cholecystectomy.    PELVIS: Extensive colonic diverticulosis.    MUSCULOSKELETAL: Degenerative change involving the bilateral sternoclavicular joints. Benign bone island in the sacrum. Degenerative change involving the lumbar spine and intervertebral discs.      Impression    IMPRESSION:  1.  No aortic dissection.  2.  Mild dilatation of the descending thoracic aorta measuring 42 mm.  3.  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  4.  Moderate atheromatous calcification involving the coronary arteries.  5.  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  6.  Flow more distally without secondary features to suggest bowel ischemia.  7.  Probable bilateral high-grade stenoses involving the superficial femoral arteries which are only partially visualized in the proximal thigh.  8.  Tiny left pleural effusion.  9.  Extensive musculoskeletal degenerative changes detailed above.   Echocardiogram Complete - Bubble study    Narrative    [id number]  [id number]  [id number]  [id number]^HEATHER^GERONIMO^MAY                                                                       Version 2     [facility]  Echocardiography Laboratory  [address] [place] [address]     Name: MARIA EUGENIA ADAMES  MRN: [id number]  : 1931  Study Date: 2021 11:19 AM  Age: 89 yrs  Gender: Female  Patient Location: [facility]  Reason For Study: CVA  Ordering Physician: GERONIMO ROMERO  Referring Physician: MAXINE PARIS  Performed By: Shilo Momin     BSA: 1.7 m2  Height: 60 in  Weight: 150 lb  HR: 87  BP: 156/79 mmHg  ______________________________________________________________________________  Procedure  Complete Portable Bubble Echo  Adult.  ______________________________________________________________________________  Interpretation Summary     1. Left ventricular systolic function is normal. The visual ejection fraction  is estimated at 60-65%.  2. No regional wall motion abnormalities noted.  3. The right ventricle is normal in structure, function and size.  4. The left atrium is severely dilated.  5. Moderate to severely dilated ascending aorta (normalized for BSA) extending  all the way to the aortic arch. No clear dissection flap but cannot entirley  exclude contained dissection.  6. Recommend chest CTA for further evaluation. Findings discussed with Dr. Sweeney.  ______________________________________________________________________________  Left Ventricle  The left ventricle is normal in size. There is normal left ventricular wall  thickness. Left ventricular systolic function is normal. The visual ejection  fraction is estimated at 60-65%. Diastolic function not assessed due to  arrhythmia. No regional wall motion abnormalities noted.     Right Ventricle  The right ventricle is normal in structure, function and size.     Atria  The left atrium is severely dilated. The right atrium is moderately dilated.  There is no color Doppler evidence of an atrial shunt. A contrast injection  (Bubble Study) was performed that was negative for flow across the interatrial  septum.     Mitral Valve  The mitral valve is normal in structure and function. There is trace mitral  regurgitation.     Tricuspid Valve  The tricuspid valve is normal in structure and function. There is moderate  (2+) tricuspid regurgitation. The right ventricular systolic pressure is  approximated at 35mmHg plus the right atrial pressure.     Aortic Valve  The aortic valve is trileaflet with aortic valve sclerosis. There is trace  aortic regurgitation.     Pulmonic Valve  Normal pulmonic valve. There is trace pulmonic valvular regurgitation.     Vessels  The ascending aorta is  Moderately dilated. Moderate to severely dilated  ascending aorta extending to the aortic artch. No clear dissection flap but  cannot entirley exclude contained dissection.     Pericardium  There is no pericardial effusion.     Rhythm  Sinus rhythm was noted.  ______________________________________________________________________________  MMode/2D Measurements & Calculations  IVSd: 1.0 cm     LVIDd: 4.1 cm  LVIDs: 2.1 cm  LVPWd: 1.0 cm  FS: 49.6 %  LV mass(C)d: 135.6 grams  LV mass(C)dI: 82.1 grams/m2  Ao root diam: 2.7 cm  LA dimension: 3.2 cm  asc Aorta Diam: 4.5 cm  LA/Ao: 1.2  LVOT diam: 2.0 cm  LVOT area: 3.1 cm2  LA Volume (BP): 90.8 ml  LA Volume Index (BP): 55.0 ml/m2  RWT: 0.51     Doppler Measurements & Calculations  MV E max hanna: 139.0 cm/sec  MV dec slope: 633.2 cm/sec2  TR max hanna: 295.9 cm/sec  TR max P.0 mmHg  E/E' av.1  Lateral E/e': 21.3  Medial E/e': 16.8     ______________________________________________________________________________  Report approved by: Robbie Dotson 2021 01:58 PM               Discharge Medications   Current Discharge Medication List      START taking these medications    Details   apixaban ANTICOAGULANT (ELIQUIS) 5 MG tablet Take 1 tablet (5 mg) by mouth 2 times daily  Qty: 60 tablet, Refills: 0    Associated Diagnoses: Acute CVA (cerebrovascular accident) (H)      atorvastatin (LIPITOR) 20 MG tablet Take 1 tablet (20 mg) by mouth every evening  Qty: 30 tablet, Refills: 0    Associated Diagnoses: Acute CVA (cerebrovascular accident) (H)         CONTINUE these medications which have NOT CHANGED    Details   !! JACOB THYROID 60 MG tablet TAKE 1 TABLET BY MOUTH EVERY OTHER DAY  Qty: 45 tablet, Refills: 3    Associated Diagnoses: Acquired hypothyroidism      !! JACOB THYROID 90 MG tablet TAKE 1 TABLET BY MOUTH EVERY OTHER DAY  Qty: 45 tablet, Refills: 3    Associated Diagnoses: Acquired hypothyroidism      Cholecalciferol ([employer]S VITAMIN D3) 400 UNITS CAPS  Take 800 Units by mouth daily      diltiazem ER COATED BEADS (CARDIZEM CD/CARTIA XT) 120 MG 24 hr capsule Take 1 capsule (120 mg) by mouth daily  Qty: 30 capsule, Refills: 0    Associated Diagnoses: Essential hypertension      fish oil-omega-3 fatty acids 1000 MG capsule Take 1,000 capsules by mouth daily      Flaxseed Oil OIL Take by mouth daily      latanoprost (XALATAN) 0.005 % ophthalmic solution Place 1 drop into both eyes At Bedtime       Sesame Oil OIL Take by mouth daily       !! - Potential duplicate medications found. Please discuss with provider.      STOP taking these medications       aspirin 81 MG tablet Comments:   Reason for Stopping:         losartan (COZAAR) 25 MG tablet Comments:   Reason for Stopping:         losartan (COZAAR) 50 MG tablet Comments:   Reason for Stopping:             Allergies   Allergies   Allergen Reactions     Benazepril Cough     Ciprofloxacin Unknown     Erythromycin Unknown     Gluten Meal Unknown     Hydrochlorothiazide      Other reaction(s): Hyponatremia     Penicillins Unknown

## 2021-03-29 NOTE — PROGRESS NOTES
Care Management Discharge Note    Discharge Date: 03/29/21       Discharge Disposition: Home, Home Care    Discharge Services: None    Discharge DME: None    Discharge Transportation: family or friend will provide    Private pay costs discussed: Not applicable    PAS Confirmation Code:  n/a  Patient/family educated on Medicare website which has current facility and service quality ratings: yes    Education Provided on the Discharge Plan:  yes  Persons Notified of Discharge Plans: daughter, RN  Patient/Family in Agreement with the Plan: yes    Handoff Referral Completed: No    Additional Information:  Spoke to Yareli at [facility]. Orders faxed to [phone].  They may be able to start nursing this week, but therapies most likely next week.  Updated patient with this information and also called her daughter Génesis.  Daughter indicated she will help her mother.  Patient requests RN review discharge instructions with her daughter as well.    Michelle Vail RN

## 2021-03-30 ENCOUNTER — TELEPHONE (OUTPATIENT)
Dept: PEDIATRICS | Facility: OTHER | Age: 86
End: 2021-03-30

## 2021-03-30 DIAGNOSIS — I63.9 CEREBROVASCULAR ACCIDENT (CVA), UNSPECIFIED MECHANISM (H): Primary | ICD-10-CM

## 2021-03-30 NOTE — TELEPHONE ENCOUNTER
Patient informed referral was sent and she will be contacted to schedule    Linda Barnes CMA on 3/30/2021 at 1:53 PM

## 2021-03-30 NOTE — TELEPHONE ENCOUNTER
Patient was hospitalized 3/25- 3/29 in regards to a stroke. Per hospital note, follow up with Neurology in 4-6 weeks locally. Also, note states to follow up with Dr. Clinton within 7 days. Patient is scheduled 4/5/2021 for hospital follow up. Please enter referral to Neurology.     Linda Barnes CMA on 3/30/2021 at 12:07 PM

## 2021-03-31 ENCOUNTER — TELEPHONE (OUTPATIENT)
Dept: NEUROLOGY | Facility: CLINIC | Age: 86
End: 2021-03-31

## 2021-03-31 ENCOUNTER — TELEPHONE (OUTPATIENT)
Dept: PEDIATRICS | Facility: OTHER | Age: 86
End: 2021-03-31

## 2021-03-31 NOTE — TELEPHONE ENCOUNTER
Tried to reach patient. No voicemail available.   Angelia Terry LPN.........................3/31/2021  10:40 AM

## 2021-03-31 NOTE — TELEPHONE ENCOUNTER
"[facility] Call Center    Phone Message    May a detailed message be left on voicemail: yes     Reason for Call: Other: pt had her stroke on 3/25/21. pt's family does not understand \"how soon\" pt was supposed to f/u after stroke. Pt is having anxiety about waiting until June to see provider. Pt's family is requesting an earlier appt date to help pt, please. Please review and call pt's daughter, Génesis, at # [phone]. Thank you    Action Taken: Message routed to:  [facility] ([facility]    Travel Screening: Not Applicable                                                                      "

## 2021-03-31 NOTE — TELEPHONE ENCOUNTER
Please resend orders for pt facility didn't get the one from yesterday fax referral number [id number]

## 2021-04-01 ENCOUNTER — TELEPHONE (OUTPATIENT)
Dept: PEDIATRICS | Facility: OTHER | Age: 86
End: 2021-04-01

## 2021-04-01 ENCOUNTER — TELEPHONE (OUTPATIENT)
Dept: NEUROLOGY | Facility: CLINIC | Age: 86
End: 2021-04-01

## 2021-04-01 NOTE — TELEPHONE ENCOUNTER
I spoke to pt daughter Génesis. I let her know Dr. Garrett said he could see them on Monday 5/17 at 2:30p. They will take the 2:30p on 5/17 at [facility]. We will message the scheduling team to update visit date/time from 6/11 and they would also like to be on wait list just in case anything sooner comes up.     Pt saw a home nurse today and was doing very well.     Quiana MONTANEZ

## 2021-04-01 NOTE — TELEPHONE ENCOUNTER
Dr. Clinton,       Request for Home Care Physical Therapy orders as follows:    PT eval and treat date:  4/1/21     Continuation frequency =  2 x week x 4 weeks              Effective date = 4/5/21    Interventions include:    Therapeutic Exercise  Gait Training  Gait/Balance/Dysfunction  Home Exercise Program  Home Safety Assessment      Therapist: Odilia Andrade, PT  Please respond with .HOMECAREAGREE, if you are in agreement. Please sign with your comments and signature, if you are not in agreement.

## 2021-04-01 NOTE — TELEPHONE ENCOUNTER
She has an appt. With Nuno Clinton 4/5/21. They will talk about it at that appointment.    Norma J. Gosselin, LPN .......  4/1/2021  12:54 PM

## 2021-04-01 NOTE — TELEPHONE ENCOUNTER
"I returned call to Génesis. I let her know per the hospital discharge instructions a follow up with neurology was recommended to be done 4-6 weeks after discharge. Currently they are scheduled on 6/11 with Dr. Garrett in [place]. She said she spoke to [place] and that was the soonest availability. I offered to help schedule an in person or video visit for pt here at [facility] [place]. Génesis requests I check with Dr. Garrett to see if anyway pt could be seen sooner. I will check with him and let Génesis know what he recommends.     She did also update pt is doing very well. No cognitive deficits \"she is sharp as a tack\".     Quiana MONTANEZ  "

## 2021-04-02 ENCOUNTER — TELEPHONE (OUTPATIENT)
Dept: PEDIATRICS | Facility: OTHER | Age: 86
End: 2021-04-02

## 2021-04-02 PROCEDURE — G0180 MD CERTIFICATION HHA PATIENT: HCPCS | Performed by: INTERNAL MEDICINE

## 2021-04-02 NOTE — TELEPHONE ENCOUNTER
Received Denial for Eliquis 5 MG tablet from Medicare Part D.  I faxed the denial / appeal information to our refill nurses.

## 2021-04-02 NOTE — TELEPHONE ENCOUNTER
I agree with the Home Care order requests below.  Nuno Clinton MD on 4/2/2021 at 8:56 AM    Signed, Nuno Clinton MD, FAAP, FACP  Internal Medicine & Pediatrics

## 2021-04-02 NOTE — TELEPHONE ENCOUNTER
I agree with the Home Care order requests below.  Nuno Clinton MD on 4/2/2021 at 11:21 AM    Signed, Nuno Clinton MD, FAAP, FACP  Internal Medicine & Pediatrics

## 2021-04-02 NOTE — TELEPHONE ENCOUNTER
Refer her to [facility] outpatient pharmacy.    Signed, Nuno Clinton MD, FAAP, FACP  Internal Medicine & Pediatrics

## 2021-04-02 NOTE — TELEPHONE ENCOUNTER
Returned call to Yareli at [facility] to inform her of Dr. Clinton's approval of skilled nursing requests for patient.  Dominga Real LPN on 4/2/2021 at 11:26 AM

## 2021-04-02 NOTE — TELEPHONE ENCOUNTER
Returned call to Home Care and relayed Dr. Clinton's message agreeing with Home Care requests for patient.  Dominga Real LPN on 4/2/2021 at 9:05 AM

## 2021-04-02 NOTE — TELEPHONE ENCOUNTER
"URGENT: per CMS best practice, response is requested within 24 hours.    Home Care regulation mandates that you are notified about drug discrepancies, interactions & contraindications. Response within a 24 hour timeframe is established by CMS as \"best practice\" for the delivery of home health care. Home Care is required to report if the 24 hour timeframe was met. The home health clinician will contact you again if this timeframe is not met or if the response does not address all concerns.       Situation:        The patient was admitted to [facility] on 4/1/21. Upon admission, a med reconciliation was completed to identify any drug discrepancies, interactions or contraindications. Home Care's drug regime review has revealed significant medication issues.     You are being contacted for clarifying orders related to the medication issues.     Background: Noted medication interactions and discrepancies with epic med list       Assessment:       Interactions  Moderate Level  1. Eliquis/Cardizem     Major Level  1. Atorvastatin/ Cardizem    Discrepancies:  1. Cholecalciferol- 400 international unit(s) (2 ) tabs twice daily (please update epic med list)  2. Please add- Conga Plex- as needed for lowered immune support/feeling illness coming on (not in drug formulary)  3. Please add- Cardio Plus - 2 tabs twice daily- (not in drug formulary)   4. Please add- BP health- 2 tabs daily (not in drug formulary)  5. Please add- Cyruta Plus 1 tab twice daily (not in drug formulary)   6. Please add- AC Carb- 1 tab twice daily (not found in drug formulary)  7. Please add- Zypan 1 tab daily (not found in drug formulary)  8. Please add- Mintran 1 po 3 times a day (not found in drug formulary)   6.     Recommendations:    Please evaluate this information and indicate below whether or not changes are required. A copy of the patient's drug interaction/contraindications report is available upon request.       CLINIC NURSE - If " changes are made, please update the Epic medication profile.     Please sign this encounter with your electronic signature.     Thanks for your time,   Piper Portillo RN

## 2021-04-02 NOTE — TELEPHONE ENCOUNTER
Request for Home Care Skilled Nursing orders as follows:    SN eval and treat date: 4/1/21    Continuation frequency =  1 X week X 2 weeks             Effective date= 4/1/21    Interventions include:    Assessment  O2 sat monitoring (all disciplines as needed)  Patient/caregiver education    Fall risk: instruct on fall prevention strategies, home safety, assess environment   Observe for signs and symptoms of depression, assess effectiveness of medication, if at risk  Instruct on pressure relief methods to prevent pressure ulcers      Please respond with .HOMECAREAGREE, if you are in agreement. Please sign with your comments and signature, if you are not in agreement.

## 2021-04-05 ENCOUNTER — OFFICE VISIT (OUTPATIENT)
Dept: PEDIATRICS | Facility: OTHER | Age: 86
End: 2021-04-05
Attending: INTERNAL MEDICINE
Payer: COMMERCIAL

## 2021-04-05 VITALS
SYSTOLIC BLOOD PRESSURE: 134 MMHG | BODY MASS INDEX: 27.73 KG/M2 | RESPIRATION RATE: 18 BRPM | DIASTOLIC BLOOD PRESSURE: 84 MMHG | HEART RATE: 91 BPM | WEIGHT: 142 LBS | OXYGEN SATURATION: 98 %

## 2021-04-05 DIAGNOSIS — K55.1 MESENTERIC ARTERY STENOSIS (H): ICD-10-CM

## 2021-04-05 DIAGNOSIS — I63.9 CEREBELLAR STROKE (H): Primary | ICD-10-CM

## 2021-04-05 DIAGNOSIS — I65.1 BASILAR ARTERY STENOSIS/OCCLUSION: ICD-10-CM

## 2021-04-05 DIAGNOSIS — E03.9 ACQUIRED HYPOTHYROIDISM: Chronic | ICD-10-CM

## 2021-04-05 DIAGNOSIS — I71.21 ASCENDING AORTIC ANEURYSM (H): ICD-10-CM

## 2021-04-05 DIAGNOSIS — I48.0 PAROXYSMAL ATRIAL FIBRILLATION (H): ICD-10-CM

## 2021-04-05 PROCEDURE — G0463 HOSPITAL OUTPT CLINIC VISIT: HCPCS | Performed by: INTERNAL MEDICINE

## 2021-04-05 PROCEDURE — 99215 OFFICE O/P EST HI 40 MIN: CPT | Performed by: INTERNAL MEDICINE

## 2021-04-05 ASSESSMENT — PAIN SCALES - GENERAL: PAINLEVEL: NO PAIN (0)

## 2021-04-05 NOTE — PATIENT INSTRUCTIONS
" -- Lifelong Eliquis and Lipitor   -- See [facility] pharmacy about Eliquis pricing   -- Vascular surgery consult   -- Get the COVID vaccine    How to get your COVID-19 vaccine  COVID-19 vaccine is free    1. From [facility]  We are working hard to be able to vaccinate more people against COVID-19.  We are vaccinating patents in Phase 1B - Tier 1, 2, 3 and 4 as identified by the [place] Department of Health.  This includes all people ages 50+ and ages 16+ with one or more of the following comorbidities:  Active cancer, Chronic kidney disease, COPD, Down Syndrome, Heart conditions (heart failure, coronary artery disease, cardiomyopathies), immunocompromised (HIV, bone marrow, chronic steroids for more than 30 days, immunodeficiency disease, or taking immunosuppressive medications), obesity - BMI greater than 30 kg/m2,  Pregnancy, Sickle cell disease or Type 1 or 2 diabetes.    The quickest way for patients to schedule a vaccine appointment is on [employer], but you can also schedule an appointment by calling our appointment line at [phone], weekdays 7:00AM - 5:00 PM. Our appointments for each week open for scheduling Mondays at 9:00 AM; the sooner you call, the more likely it is that you will get an appointment. We appreciate your patience as call volumes are expected to be high at times.      Please note that your primary care provider is not able to assist you in scheduling your vaccine. We do, however, have a \"will call standby list\" for those who meet the current qualifications and live within 10 minutes of [facility] to be contacted if last minute appointments become available. Please note that this does not ensure you will get an appointment and you should still attempt to make an appointment on Mondays at 9:00 AM when our schedules open for the week. Once you sign up, you will remain on the list for two weeks. After two weeks, you will be automatically removed from the list but can sign up again if you " choose.     We appreciate your patience as we work to vaccinate as many people as we can as quickly, safely and efficiently as possible.                    2. From [facility], pharmacy or other clinic  Who currently qualifies?    16+    See the [place] at [place]://[place].gov[place]vaccine/find-vaccine/community-vaccination-program/faq.jsp    How to schedule?    Schedule on-line via Vaccine Connector at https://vaccineconnector.[place].gov/)    Call [phone] or toll free at [phone]

## 2021-04-06 ENCOUNTER — TELEPHONE (OUTPATIENT)
Dept: PEDIATRICS | Facility: OTHER | Age: 86
End: 2021-04-06

## 2021-04-06 DIAGNOSIS — I63.9 ACUTE CVA (CEREBROVASCULAR ACCIDENT) (H): ICD-10-CM

## 2021-04-06 NOTE — TELEPHONE ENCOUNTER
Janna is interested in filling Eliquis at our pharmacy through the [facility] program. We need a new Rx that originates from our clinic in order to use the program. Please send us a new Rx if appropriate.    Thanks    Sonny Ha, PharmD  [facility]

## 2021-04-06 NOTE — PROGRESS NOTES
Subjective   Janna Mcdermott is a 89 year old female who presents for follow-up after stroke.  She wants to know why I think she had a stroke.  She has a history of paroxysmal atrial fibrillation and has chosen against anticoagulation in the past.  On March 25, 2021 she presented to the [facility] emergency department after developing a significant problem with her balance.  She tells me she had to hold onto the faucet in the kitchen for 30 minutes before she was even able to dial the phone to call for help.  She was found to have a cerebellar stroke.  She was started on anticoagulation and transferred to [facility].  Symptoms have essentially resolved at this point.  She continues on Eliquis anticoagulation.  She was newly started on atorvastatin.  She wants to know how long she will be on these.  She has a history of hypothyroidism and continues on [place] Thyroid.  She wants to know why she has had such wide fluctuations in her thyroid levels.  Her daughter has a grocery bag full of over-the-counter herbal and dietary supplements that she wants to go through with me to see if they are safe for her to continue taking.    Objective   Vitals: /84 (BP Location: Right arm, Patient Position: Sitting, Cuff Size: Adult Regular)   Pulse 91   Resp 18   Wt 64.4 kg (142 lb)   LMP  (LMP Unknown)   SpO2 98%   BMI 27.73 kg/m      Review and Analysis of Data   I personally reviewed the following:  External notes: Yes  Results: Yes  Use of an independent historian: No  Independent review of a test performed by another physician: No  Discussion of management with another physician: No  Moderate risk of morbidity from additional diagnostic testing and/or treatment.    Assessment & Plan   1. Cerebellar stroke (H)  The findings on the MRI and the cerebellum are consistent with her clinical presentation of significant onset of dizziness and balance problems.  Fortunately her symptoms have  resolved with initiating statin, anticoagulation and some permissive hypertension.  We discussed the use of these medications at length.  I would encourage lifelong Eliquis anticoagulation and lifelong use of statins.  We had discussed the use of anticoagulation at nausea him in the past and previously she had been uninterested in initiating it but now is willing to.  Her bubble study was reviewed and does not reveal any atrial level defect.  I reviewed her grocery bag full of bottles, looking several of them up through [employer].  Some of them we discontinued including the oral capsaicin.  Many seem to be consistent with food including mushroom and bovine extracts which I explained may not be well studied but seem to be safe.  Fortunately Eliquis is not known to have many drug drug interactions.  - VASCULAR SURGERY REFERRAL; Future    2. Basilar artery stenosis/occlusion  We had a lengthy discussion with regards to the findings on her CT neck.  These could also be a source for her stroke.  I recommend a vascular surgery consult to consider if intervention is indicated.  - VASCULAR SURGERY REFERRAL; Future    3. Acquired hypothyroidism  I think her thyroid levels have varied widely because she is on [place] Thyroid which can vary from lot a lot.  She is in range at this time.At goal, no change.    4. Ascending aortic aneurysm (H)  There was some discrepancy on size of the aneurysm between different studies.  I recommend vascular surgery consult.  - VASCULAR SURGERY REFERRAL; Future    5. Mesenteric artery stenosis (H)  - VASCULAR SURGERY REFERRAL; Future    6. Paroxysmal atrial fibrillation (H)  See above      Patient Instructions    -- Lifelong Eliquis and Lipitor   -- See [facility] pharmacy about Eliquis pricing   -- Vascular surgery consult   -- Get the COVID vaccine    How to get your COVID-19 vaccine  COVID-19 vaccine is free    1. From [facility]  We are working hard to be able to vaccinate more people against  "COVID-19.  We are vaccinating patents in Phase 1B - Tier 1, 2, 3 and 4 as identified by the [facility].  This includes all people ages 50+ and ages 16+ with one or more of the following comorbidities:  Active cancer, Chronic kidney disease, COPD, Down Syndrome, Heart conditions (heart failure, coronary artery disease, cardiomyopathies), immunocompromised (HIV, bone marrow, chronic steroids for more than 30 days, immunodeficiency disease, or taking immunosuppressive medications), obesity - BMI greater than 30 kg/m2,  Pregnancy, Sickle cell disease or Type 1 or 2 diabetes.    The quickest way for patients to schedule a vaccine appointment is on Livemocha, but you can also schedule an appointment by calling our appointment line at [phone], weekdays 7:00AM - 5:00 PM. Our appointments for each week open for scheduling Mondays at 9:00 AM; the sooner you call, the more likely it is that you will get an appointment. We appreciate your patience as call volumes are expected to be high at times.      Please note that your primary care provider is not able to assist you in scheduling your vaccine. We do, however, have a \"will call standby list\" for those who meet the current qualifications and live within 10 minutes of [facility] to be contacted if last minute appointments become available. Please note that this does not ensure you will get an appointment and you should still attempt to make an appointment on Mondays at 9:00 AM when our schedules open for the week. Once you sign up, you will remain on the list for two weeks. After two weeks, you will be automatically removed from the list but can sign up again if you choose.     We appreciate your patience as we work to vaccinate as many people as we can as quickly, safely and efficiently as possible.                    2. From [place] Department of Health, pharmacy or other clinic  Who currently qualifies?    16+    See the FAQ at " [place]://[place].gov[place]vaccine/find-vaccine/community-vaccination-program/faq.jsp    How to schedule?    Schedule on-line via Vaccine Connector at https://vaccineconnector.[place].gov/)    Call [phone] or toll free at [phone]        Nuno Ibrahim MD, FAAP, FACP  Internal Medicine & Pediatrics    Total time spent in the preparation, care care of this patient, coordination of care, documentation: 40-54 minutes.

## 2021-04-07 DIAGNOSIS — I63.9 ACUTE CVA (CEREBROVASCULAR ACCIDENT) (H): ICD-10-CM

## 2021-04-07 RX ORDER — ATORVASTATIN CALCIUM 20 MG/1
20 TABLET, FILM COATED ORAL EVERY EVENING
Qty: 90 TABLET | Refills: 3 | Status: SHIPPED | OUTPATIENT
Start: 2021-04-07 | End: 2021-05-17

## 2021-04-07 NOTE — TELEPHONE ENCOUNTER
atorvastatin (LIPITOR) 20 MG tablet  Sig - Route: Take 1 tablet (20 mg) by mouth every evening - Oral  Last Written Prescription Date:  03/29/2021  Last Fill Quantity: 30,  # refills: 0   Last office visit: 4/5/2021 with prescribing provider:    Future Office Visit:  None  Statins Protocol Passed    Prescription refilled per RN Medication Refill Policy. Phone call to pt, per pt, she would like this rx sent to [facility] in [place], [place]. .................... Kiara Mansfield RN ....................  4/7/2021   12:49 PM

## 2021-04-07 NOTE — TELEPHONE ENCOUNTER
Pt called the pharmacy again today asking for a refill of her atorvastatin which is out of refills. Please send us a new Rx if appropriate.    Thanks    Sonny Ha, PharmD  [facility] [place] [facility]

## 2021-04-07 NOTE — TELEPHONE ENCOUNTER
Please see refill encounter dated today. Medication refilled per Rx protocol for a 1 year supply. Per pt request prescription sent to [facility] pharmacy in [place], [place].    Kiara Mansfield RN  ....................  4/7/2021   12:54 PM

## 2021-04-19 ENCOUNTER — TELEPHONE (OUTPATIENT)
Facility: CLINIC | Age: 86
End: 2021-04-19

## 2021-04-19 NOTE — TELEPHONE ENCOUNTER
Called on request of daughter Génesis at her cell phone. Affirmed PCP plan to continue the eliquis and atorvastatin lifelong to reduce stroke risk. Discussed risks of stopping that, but that decision is ultimately up to them. Advised discussing her many herbal supps with pharmacist in terms of safety and interactions, but supplements are generally not studied and may be largely unknown. They had many questions in regards to exercise tolerance, and was advised to listen to her their home therapists and also the patient's own bodies.    They plan to follow-up with pcp and neurologist. Some depression present and somewhat expected after stroke. They are not interested in antidepressants.

## 2021-04-25 ENCOUNTER — HEALTH MAINTENANCE LETTER (OUTPATIENT)
Age: 86
End: 2021-04-25

## 2021-04-26 DIAGNOSIS — I63.9 CEREBELLAR STROKE (H): Primary | ICD-10-CM

## 2021-04-26 NOTE — PROGRESS NOTES
Dr Clinton reviewed and completed the following home care or hospice form(s) for Janna on 4-26-21   This covers the certification period effective 4-1-21 to 5-30-21.  Angelia Terry LPN on 4/26/2021 at 9:25 AM

## 2021-04-27 ENCOUNTER — TELEPHONE (OUTPATIENT)
Dept: PEDIATRICS | Facility: OTHER | Age: 86
End: 2021-04-27

## 2021-04-27 NOTE — TELEPHONE ENCOUNTER
Returned patient's daughter's call and relayed Dr. Clinton's information regarding patient's prescriptions.  Dominga Real LPN on 4/27/2021 at 3:22 PM

## 2021-04-27 NOTE — TELEPHONE ENCOUNTER
Pt daughter called and stated that she is concerned about her mothers medication dosages if you could please contact her thank you .

## 2021-04-27 NOTE — TELEPHONE ENCOUNTER
Returned call to patient's daughter; daughter is concerned about her mother's medications and questioning if she should be taking all she is prescribed right now.  Patient's daughter is inquiring about her Eliquis and her Lipitor; 1. Should patient be taking the Eliquis, 5mg 2 times daily and 2. why is she prescribed the Lipitor when her Lipid panel looks good.  Dominga Real, GHULAM on 4/27/2021 at 1:51 PM

## 2021-04-27 NOTE — TELEPHONE ENCOUNTER
-- Eliquis 5mg BID is the dose   -- Lipitor is life long after stroke    We discussed all of this in the OV.    Signed, Nuno Clinton MD, FAAP, FACP  Internal Medicine & Pediatrics

## 2021-05-17 ENCOUNTER — OFFICE VISIT (OUTPATIENT)
Dept: NEUROLOGY | Facility: OTHER | Age: 86
End: 2021-05-17
Attending: PSYCHIATRY & NEUROLOGY
Payer: COMMERCIAL

## 2021-05-17 VITALS
OXYGEN SATURATION: 99 % | WEIGHT: 145 LBS | TEMPERATURE: 97.9 F | HEART RATE: 65 BPM | DIASTOLIC BLOOD PRESSURE: 82 MMHG | HEIGHT: 60 IN | SYSTOLIC BLOOD PRESSURE: 128 MMHG | BODY MASS INDEX: 28.47 KG/M2 | RESPIRATION RATE: 18 BRPM

## 2021-05-17 DIAGNOSIS — I63.111 CEREBROVASCULAR ACCIDENT (CVA) DUE TO EMBOLISM OF RIGHT VERTEBRAL ARTERY (H): Primary | ICD-10-CM

## 2021-05-17 DIAGNOSIS — I63.9 ACUTE CVA (CEREBROVASCULAR ACCIDENT) (H): ICD-10-CM

## 2021-05-17 DIAGNOSIS — I63.9 CEREBROVASCULAR ACCIDENT (CVA), UNSPECIFIED MECHANISM (H): ICD-10-CM

## 2021-05-17 PROCEDURE — G0463 HOSPITAL OUTPT CLINIC VISIT: HCPCS

## 2021-05-17 PROCEDURE — 99204 OFFICE O/P NEW MOD 45 MIN: CPT | Performed by: PSYCHIATRY & NEUROLOGY

## 2021-05-17 RX ORDER — ATORVASTATIN CALCIUM 20 MG/1
10 TABLET, FILM COATED ORAL EVERY EVENING
Qty: 90 TABLET | Refills: 3 | Status: SHIPPED | OUTPATIENT
Start: 2021-05-17 | End: 2021-06-01 | Stop reason: ALTCHOICE

## 2021-05-17 ASSESSMENT — PAIN SCALES - GENERAL: PAINLEVEL: NO PAIN (0)

## 2021-05-17 ASSESSMENT — MIFFLIN-ST. JEOR: SCORE: 999.22

## 2021-05-17 NOTE — NURSING NOTE
Chief Complaint   Patient presents with     Consult     Stroke       Initial /82 (BP Location: Right arm, Patient Position: Sitting, Cuff Size: Adult Regular)   Pulse 65   Temp 97.9  F (36.6  C) (Tympanic)   Resp 18   Ht 1.524 m (5')   Wt 65.8 kg (145 lb)   LMP  (LMP Unknown)   SpO2 99%   Breastfeeding No   BMI 28.32 kg/m   Estimated body mass index is 28.32 kg/m  as calculated from the following:    Height as of this encounter: 1.524 m (5').    Weight as of this encounter: 65.8 kg (145 lb).  Medication Reconciliation: complete    Dominga Real LPN

## 2021-05-17 NOTE — PROGRESS NOTES
Neurology Clinic - New Consultation  5/17/2021    Reason for Consultation: stroke    Requesting Provider: Nuno Clinton    History of Presenting Symptoms:  Janna Mcdermott is a 89 year old female who presents today for evaluation of stroke.  She had a stroke on 3/25/21.   She states she did her usual work out that day and later that day she had a acute vestibular syndrome.  She was found to have a R PICA territory stroke with a acute R vert occlusion and high grade basilar artery stenosis.    She did not get alteplase.   Her blood pressure was noted to be high previously but she was stopped and paradoxically had improvement per daughter.     She had known atrial fibrillation but declined anticoagulation in the past due to  who had complications of intracerebral hemorrhage on anticoagulants.   She thinks she has fully recovered from her stroke.   She is living independently but has daughter next door.  She had no cognitive impairment from her stroke.       Her daughter thinks she is stronger now.  She is doing daily home exercises.        ROS: Complete 10-point review of systems was negative except as noted in the HPI.    Past Medical History:  Patient Active Problem List   Diagnosis     Biatrial enlargement     Other symptoms involving cardiovascular system     Chronic kidney disease, stage III (moderate)     Contact dermatitis and eczema     External ear ulcer (H)     Paroxysmal atrial fibrillation (H)     Mixed hyperlipidemia     Essential hypertension     Acquired hypothyroidism     Cerebellar stroke (H)     Mesenteric artery stenosis (H)     Ascending aortic aneurysm (H)     Basilar artery stenosis/occlusion       Past Surgical History:  Past Surgical History:   Procedure Laterality Date     APPENDECTOMY OPEN      No Comments Provided     CHOLECYSTECTOMY      No Comments Provided     HYSTERECTOMY TOTAL ABDOMINAL      No Comments Provided       PCP: Nuno Clinton    Allergies:    Allergies   Allergen Reactions     Bee Venom Anaphylaxis     Benazepril Cough     Ciprofloxacin Unknown     Erythromycin Unknown     Gluten Meal Unknown     Hydrochlorothiazide      Other reaction(s): Hyponatremia     Penicillins Unknown       Medications:  Current Outpatient Medications   Medication Sig Dispense Refill     apixaban ANTICOAGULANT (ELIQUIS) 5 MG tablet Take 1 tablet (5 mg) by mouth 2 times daily 180 tablet 3     ARMOUR THYROID 60 MG tablet TAKE 1 TABLET BY MOUTH EVERY OTHER DAY 45 tablet 3     ARMOUR THYROID 90 MG tablet TAKE 1 TABLET BY MOUTH EVERY OTHER DAY 45 tablet 3     atorvastatin (LIPITOR) 20 MG tablet Take 1 tablet (20 mg) by mouth every evening 90 tablet 3     Cholecalciferol ([employer]S VITAMIN D3) 400 UNITS CAPS Take 800 Units by mouth daily       diltiazem ER COATED BEADS (CARDIZEM CD/CARTIA XT) 120 MG 24 hr capsule Take 1 capsule (120 mg) by mouth daily 30 capsule 0     fish oil-omega-3 fatty acids 1000 MG capsule Take 1,000 capsules by mouth daily       Flaxseed Oil OIL Take by mouth daily       latanoprost (XALATAN) 0.005 % ophthalmic solution Place 1 drop into both eyes At Bedtime        Sesame Oil OIL Take by mouth daily         Family History:  Family History   Problem Relation Age of Onset     Heart Disease Mother         Heart Disease     Other - See Comments Other         No cancer or diabetes in the family     Other - See Comments Brother           in service     Other - See Comments Brother          of Parkinson's     Heart Disease Sister         Heart Disease,     Heart Disease Sister         Heart Disease,     Other - See Comments Sister         at 6 months     Other - See Comments Brother          of pneumonia       Social History:  Social History     Socioeconomic History     Marital status:      Spouse name: Not on file     Number of children: Not on file     Years of education: 12     Highest education level: Not on file   Occupational History      Not on file   Social Needs     Financial resource strain: Not on file     Food insecurity     Worry: Not on file     Inability: Not on file     Transportation needs     Medical: Not on file     Non-medical: Not on file   Tobacco Use     Smoking status: Never Smoker     Smokeless tobacco: Never Used   Substance and Sexual Activity     Alcohol use: No     Alcohol/week: 0.0 standard drinks     Drug use: Never     Types: Other     Sexual activity: Not Currently   Lifestyle     Physical activity     Days per week: Not on file     Minutes per session: Not on file     Stress: Not on file   Relationships     Social connections     Talks on phone: Not on file     Gets together: Not on file     Attends Alevism service: Not on file     Active member of club or organization: Not on file     Attends meetings of clubs or organizations: Not on file     Relationship status: Not on file     Intimate partner violence     Fear of current or ex partner: Not on file     Emotionally abused: Not on file     Physically abused: Not on file     Forced sexual activity: Not on file   Other Topics Concern     Parent/sibling w/ CABG, MI or angioplasty before 65F 55M? Not Asked   Social History Narrative    .  Lives in town.  Daughter lives next door and works for the [employer].     Alcohol: none  Tobacco: none  Illicit drugs: none  Caffeine: non    Physical Exam:  Vitals: /82 (BP Location: Right arm, Patient Position: Sitting, Cuff Size: Adult Regular)   Pulse 65   Temp 97.9  F (36.6  C) (Tympanic)   Resp 18   Ht 1.524 m (5')   Wt 65.8 kg (145 lb)   LMP  (LMP Unknown)   SpO2 99%   Breastfeeding No   BMI 28.32 kg/m     General: Seated comfortably in no acute distress.  HEENT:No paracervical muscle tenderness or tightness.     Heart: irregular rhythm  Lungs: breathing comfortably, no wheezing  Extremities: no edema  Skin: No rashes  Neurologic:     Mental Status: Fully alert, attentive and oriented. Speech clear and  fluent, no paraphasic errors.     Cranial Nerves:PERRL. EOMI with normal smooth pursuit, no nystagmus seen facial sensation intact/symmetric. Facial movements symmetric. Hearing not formally tested but intact to conversation. Palate elevation symmetric, uvula midline. No dysarthria. Shoulder shrug strong bilaterally. Tongue protrusion midline.     Motor: No tremors or other abnormal movements observed. Muscle tone normal throughout. No pronator drift.  Rapid finger tapping slightly slower and discordant on the right.  Strength 5/5 throughout upper and lower extremities.     Deep Tendon Reflexes: 2+/symmetric throughout upper and lower extremities. No clonus. Toes downgoing bilaterally.     Sensory: Intact to light touch throughout upper and lower extremities.  No extinction to double simultaneous stimulation.     Coordination: Finger-nose-finger with very mild dysmetria on the right, and normal on the left.     Gait: Normal, steady casual gait.     Pertinent Investigations:  MRI brain from March personally reviewed.  Right PICA stroke seen.  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    Assessment:  #Ischemic stroke: Likely embolic due to underlying atrial fibrillation.    Ms. Mcdermott is a 89-year-old female with minimal past medical history who presents after an ischemic stroke.  Etiology of her stroke is likely due to atrial fibrillation for which she was not anticoagulated at that time due to concerns about bleeding and a significant other.  She also was not on any statin at the time of the stroke.  Her LDL was 80 at that time but she did have some atherosclerotic disease.  Patient have family have questions about coming off of her secondary stroke prevention measures such as Lipitor and Eliquis.  I advised strongly against stopping Eliquis, as I suspect this is most likely responsible for preventing another stroke.  They are agreeable to continuing at this time.   She does have some atherosclerotic disease on her CTA, that being said I do think is reasonable to go down to atorvastatin 10 mg daily given her age/LDL and suspected cardioembolic source of stroke.  Her LDL was well controlled even off of statin medications at the time of her stroke.  I would not stop it however and I did discuss that I think this will likely be a lifelong medication.  Patient would like to continue following with neurology to monitor for new symptoms and ensure she is optimized from a secondary stroke prevention standpoint.  Her blood pressure was well controlled today.  We will have patient follow-up in approximately 9 months.    Plan:    - Continue apixaban 5mg BID  - Okay to decrease atorvastatin to 10mg    Follow up in Neurology clinic in 9 months or earlier as needed should new concerns arise.    Maritza Garrett DO   of Neurology      50 min spent on the date of the encounter in chart review, patient visit, review of tests, documentation and/or discussion with other providers about the issues documented above.

## 2021-05-17 NOTE — PATIENT INSTRUCTIONS
Reason for today's visit: Stroke follow up    Your diagnosis: Stroke follow up    Tests that you will need: none today    Treatment plan:   - Continue apixaban 5mg BID  - Okay to decrease atorvastatin to 10mg     Your test results are reviewed several times a day.  Once they are all in, you will be sent a letter with your results and/or if you are signed up for on-line services, you will be e-mailed the results.  If there are serious findings, you typically will be called.    If you have any questions about your visit, your symptoms, your medication, your test results or it is not clear what your diagnosis or treatment plan is please contact our office at [phone] for general neurology    If you need follow-up in the future, please call [phone] for an appointment.  If you cannot get a time that satisfies you, please let us know what times work for you and we will do our best to accommodate you.    Maritza Garrett DO  Neurology

## 2021-05-17 NOTE — LETTER
5/17/2021         RE: Janna Mcdermott  [address] [facility] [address]        Dear Colleague,    Thank you for referring your patient, Janna Mcdermott, to the [facility]. Please see a copy of my visit note below.    Neurology Clinic - New Consultation  5/17/2021    Reason for Consultation: stroke    Requesting Provider: Nuno Clinton    History of Presenting Symptoms:  Janna Mcdermott is a 89 year old female who presents today for evaluation of stroke.  She had a stroke on 3/25/21.   She states she did her usual work out that day and later that day she had a acute vestibular syndrome.  She was found to have a R PICA territory stroke with a acute R vert occlusion and high grade basilar artery stenosis.    She did not get alteplase.   Her blood pressure was noted to be high previously but she was stopped and paradoxically had improvement per daughter.     She had known atrial fibrillation but declined anticoagulation in the past due to  who had complications of intracerebral hemorrhage on anticoagulants.   She thinks she has fully recovered from her stroke.   She is living independently but has daughter next door.  She had no cognitive impairment from her stroke.       Her daughter thinks she is stronger now.  She is doing daily home exercises.        ROS: Complete 10-point review of systems was negative except as noted in the HPI.    Past Medical History:  Patient Active Problem List   Diagnosis     Biatrial enlargement     Other symptoms involving cardiovascular system     Chronic kidney disease, stage III (moderate)     Contact dermatitis and eczema     External ear ulcer (H)     Paroxysmal atrial fibrillation (H)     Mixed hyperlipidemia     Essential hypertension     Acquired hypothyroidism     Cerebellar stroke (H)     Mesenteric artery stenosis (H)     Ascending aortic aneurysm (H)     Basilar artery stenosis/occlusion       Past Surgical  History:  Past Surgical History:   Procedure Laterality Date     APPENDECTOMY OPEN      No Comments Provided     CHOLECYSTECTOMY      No Comments Provided     HYSTERECTOMY TOTAL ABDOMINAL      No Comments Provided       PCP: Nuno Clinton    Allergies:   Allergies   Allergen Reactions     Bee Venom Anaphylaxis     Benazepril Cough     Ciprofloxacin Unknown     Erythromycin Unknown     Gluten Meal Unknown     Hydrochlorothiazide      Other reaction(s): Hyponatremia     Penicillins Unknown       Medications:  Current Outpatient Medications   Medication Sig Dispense Refill     apixaban ANTICOAGULANT (ELIQUIS) 5 MG tablet Take 1 tablet (5 mg) by mouth 2 times daily 180 tablet 3     ARMOUR THYROID 60 MG tablet TAKE 1 TABLET BY MOUTH EVERY OTHER DAY 45 tablet 3     ARMOUR THYROID 90 MG tablet TAKE 1 TABLET BY MOUTH EVERY OTHER DAY 45 tablet 3     atorvastatin (LIPITOR) 20 MG tablet Take 1 tablet (20 mg) by mouth every evening 90 tablet 3     Cholecalciferol ([employer]S VITAMIN D3) 400 UNITS CAPS Take 800 Units by mouth daily       diltiazem ER COATED BEADS (CARDIZEM CD/CARTIA XT) 120 MG 24 hr capsule Take 1 capsule (120 mg) by mouth daily 30 capsule 0     fish oil-omega-3 fatty acids 1000 MG capsule Take 1,000 capsules by mouth daily       Flaxseed Oil OIL Take by mouth daily       latanoprost (XALATAN) 0.005 % ophthalmic solution Place 1 drop into both eyes At Bedtime        Sesame Oil OIL Take by mouth daily         Family History:  Family History   Problem Relation Age of Onset     Heart Disease Mother         Heart Disease     Other - See Comments Other         No cancer or diabetes in the family     Other - See Comments Brother           in service     Other - See Comments Brother          of Parkinson's     Heart Disease Sister         Heart Disease,     Heart Disease Sister         Heart Disease,     Other - See Comments Sister         at 6 months     Other - See Comments Brother          of  pneumonia       Social History:  Social History     Socioeconomic History     Marital status:      Spouse name: Not on file     Number of children: Not on file     Years of education: 12     Highest education level: Not on file   Occupational History     Not on file   Social Needs     Financial resource strain: Not on file     Food insecurity     Worry: Not on file     Inability: Not on file     Transportation needs     Medical: Not on file     Non-medical: Not on file   Tobacco Use     Smoking status: Never Smoker     Smokeless tobacco: Never Used   Substance and Sexual Activity     Alcohol use: No     Alcohol/week: 0.0 standard drinks     Drug use: Never     Types: Other     Sexual activity: Not Currently   Lifestyle     Physical activity     Days per week: Not on file     Minutes per session: Not on file     Stress: Not on file   Relationships     Social connections     Talks on phone: Not on file     Gets together: Not on file     Attends Jew service: Not on file     Active member of club or organization: Not on file     Attends meetings of clubs or organizations: Not on file     Relationship status: Not on file     Intimate partner violence     Fear of current or ex partner: Not on file     Emotionally abused: Not on file     Physically abused: Not on file     Forced sexual activity: Not on file   Other Topics Concern     Parent/sibling w/ CABG, MI or angioplasty before 65F 55M? Not Asked   Social History Narrative    .  Lives in town.  Daughter lives next door and works for the [employer].     Alcohol: none  Tobacco: none  Illicit drugs: none  Caffeine: non    Physical Exam:  Vitals: /82 (BP Location: Right arm, Patient Position: Sitting, Cuff Size: Adult Regular)   Pulse 65   Temp 97.9  F (36.6  C) (Tympanic)   Resp 18   Ht 1.524 m (5')   Wt 65.8 kg (145 lb)   LMP  (LMP Unknown)   SpO2 99%   Breastfeeding No   BMI 28.32 kg/m     General: Seated comfortably in no acute  distress.  HEENT:No paracervical muscle tenderness or tightness.     Heart: irregular rhythm  Lungs: breathing comfortably, no wheezing  Extremities: no edema  Skin: No rashes  Neurologic:     Mental Status: Fully alert, attentive and oriented. Speech clear and fluent, no paraphasic errors.     Cranial Nerves:PERRL. EOMI with normal smooth pursuit, no nystagmus seen facial sensation intact/symmetric. Facial movements symmetric. Hearing not formally tested but intact to conversation. Palate elevation symmetric, uvula midline. No dysarthria. Shoulder shrug strong bilaterally. Tongue protrusion midline.     Motor: No tremors or other abnormal movements observed. Muscle tone normal throughout. No pronator drift.  Rapid finger tapping slightly slower and discordant on the right.  Strength 5/5 throughout upper and lower extremities.     Deep Tendon Reflexes: 2+/symmetric throughout upper and lower extremities. No clonus. Toes downgoing bilaterally.     Sensory: Intact to light touch throughout upper and lower extremities.  No extinction to double simultaneous stimulation.     Coordination: Finger-nose-finger with very mild dysmetria on the right, and normal on the left.     Gait: Normal, steady casual gait.     Pertinent Investigations:  MRI brain from March personally reviewed.  Right PICA stroke seen.  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    Assessment:  #Ischemic stroke: Likely embolic due to underlying atrial fibrillation.    Ms. Mcdermott is a 89-year-old female with minimal past medical history who presents after an ischemic stroke.  Etiology of her stroke is likely due to atrial fibrillation for which she was not anticoagulated at that time due to concerns about bleeding and a significant other.  She also was not on any statin at the time of the stroke.  Her LDL was 80 at that time but she did have some atherosclerotic disease.  Patient have family have questions  about coming off of her secondary stroke prevention measures such as Lipitor and Eliquis.  I advised strongly against stopping Eliquis, as I suspect this is most likely responsible for preventing another stroke.  They are agreeable to continuing at this time.  She does have some atherosclerotic disease on her CTA, that being said I do think is reasonable to go down to atorvastatin 10 mg daily given her age/LDL and suspected cardioembolic source of stroke.  Her LDL was well controlled even off of statin medications at the time of her stroke.  I would not stop it however and I did discuss that I think this will likely be a lifelong medication.  Patient would like to continue following with neurology to monitor for new symptoms and ensure she is optimized from a secondary stroke prevention standpoint.  Her blood pressure was well controlled today.  We will have patient follow-up in approximately 9 months.    Plan:    - Continue apixaban 5mg BID  - Okay to decrease atorvastatin to 10mg    Follow up in Neurology clinic in 9 months or earlier as needed should new concerns arise.    Maritza Garrett DO   of Neurology      50 min spent on the date of the encounter in chart review, patient visit, review of tests, documentation and/or discussion with other providers about the issues documented above.         Again, thank you for allowing me to participate in the care of your patient.        Sincerely,        Maritza Garrett MD

## 2021-05-18 NOTE — TELEPHONE ENCOUNTER
"atorvastatin (LIPITOR) 20 MG tablet 90 tablet 3 5/17/2021  No   Sig - Route: Take 0.5 tablets (10 mg) by mouth every evening      To [facility] pharmacy    [facility] pharmacy requesting  Note from pharmacy \"Dr. Garrett has decreased dose to 10 mg. Could you send new Rx for 10 mg Please?Thank you\"    Called [facility] and informed new rx at [facility] pharmacy    Bessie Marroquin RN on 5/18/2021 at 9:32 AM    "

## 2021-05-30 ENCOUNTER — OFFICE VISIT (OUTPATIENT)
Dept: FAMILY MEDICINE | Facility: OTHER | Age: 86
End: 2021-05-30
Attending: FAMILY MEDICINE
Payer: MEDICARE

## 2021-05-30 ENCOUNTER — NURSE TRIAGE (OUTPATIENT)
Dept: NURSING | Facility: CLINIC | Age: 86
End: 2021-05-30

## 2021-05-30 ENCOUNTER — HOSPITAL ENCOUNTER (OUTPATIENT)
Dept: GENERAL RADIOLOGY | Facility: OTHER | Age: 86
End: 2021-05-30
Attending: NURSE PRACTITIONER
Payer: MEDICARE

## 2021-05-30 VITALS
OXYGEN SATURATION: 98 % | RESPIRATION RATE: 18 BRPM | DIASTOLIC BLOOD PRESSURE: 70 MMHG | SYSTOLIC BLOOD PRESSURE: 128 MMHG | HEART RATE: 76 BPM | WEIGHT: 144 LBS | TEMPERATURE: 99.2 F | BODY MASS INDEX: 28.12 KG/M2

## 2021-05-30 DIAGNOSIS — M54.2 NECK PAIN: Primary | ICD-10-CM

## 2021-05-30 PROCEDURE — G0463 HOSPITAL OUTPT CLINIC VISIT: HCPCS

## 2021-05-30 PROCEDURE — 99213 OFFICE O/P EST LOW 20 MIN: CPT | Performed by: NURSE PRACTITIONER

## 2021-05-30 PROCEDURE — G0463 HOSPITAL OUTPT CLINIC VISIT: HCPCS | Mod: 25

## 2021-05-30 PROCEDURE — 72040 X-RAY EXAM NECK SPINE 2-3 VW: CPT

## 2021-05-30 ASSESSMENT — PAIN SCALES - GENERAL: PAINLEVEL: MODERATE PAIN (5)

## 2021-05-30 NOTE — PROGRESS NOTES
"ASSESSMENT/PLAN:  1. Neck pain    - XR Cervical Spine 2/3 Views    Xray films and radiology report were reviewed.     Discussed with the patient and her daughter that the patient's neck pain is most likely due to a neck strain based on the patient's symptoms, physical exam findings and xray result.     Discussed with the patient that there were no acute fractures or dislocation of the cervical spine. However, there is suspected degenerative disc disease.     Instructed the patient to alternate warm and cool compresses and try taking tylenol 650 mg every 6-8 hours for pain relief. The patient may also apply biofreeze or topical medication for pain relief.     The patient states that she would like to stop taking her Lipitor or change this medication because she believes that this is contributing to her myalgia. Instructed the patient to avoid stopping this medication, due to her recent stroke, and follow up with her PCP regarding discontinuing this medication or changing the medication.      A follow up appointment was scheduled with the patient's PCP for this coming week and   Discussed warning signs/symptoms indicative of need to f/u    Follow up if symptoms persist or worsen or concerns      I explained my diagnostic considerations and recommendations to the patient, who voiced understanding and agreement with the treatment plan. All questions were answered. We discussed potential side effects of any prescribed or recommended therapies, as well as expectations for response to treatments.    Disclaimer:  This note consists of words and symbols derived from keyboarding, dictation, or using voice recognition software. As a result, there may be errors in the script that have gone undetected. Please consider this when interpreting information found in this note.    HPI:    Janna Mcdermott is a 89 year old female  who presents to [facility] today for a \"kink in her neck\". The patient had a stroke March 25th, " 2021. The patient has been completing PT and doing therapy at home with band exercises. The patient states that she fell asleep in her chair on Thursday and states that she has been having the posterior neck pain ever since. She is taking eliquis and Lipitor. States that yesterday it was in her right shoulder and neck and now today the pain is in the posterior aspect of her neck. Rating her pain 4-5/10. The patient states that she has a difficult time rotating her neck due to the pain. The patient denies taking any OTC medication for her pain.    Past Medical History:   Diagnosis Date     Asymptomatic menopausal state     on estrogen     Chronic kidney disease, stage I     No Comments Provided     Dermatitis     No Comments Provided     Essential (primary) hypertension     No Comments Provided     Hyperlipidemia     No Comments Provided     Hypothyroidism     No Comments Provided     Other specified symptoms and signs involving the circulatory and respiratory systems     No Comments Provided     Paroxysmal atrial fibrillation (H)     No Comments Provided     Past Surgical History:   Procedure Laterality Date     APPENDECTOMY OPEN      No Comments Provided     CHOLECYSTECTOMY      No Comments Provided     HYSTERECTOMY TOTAL ABDOMINAL      No Comments Provided     Social History     Tobacco Use     Smoking status: Never Smoker     Smokeless tobacco: Never Used   Substance Use Topics     Alcohol use: No     Alcohol/week: 0.0 standard drinks     Current Outpatient Medications   Medication Sig Dispense Refill     apixaban ANTICOAGULANT (ELIQUIS) 5 MG tablet Take 1 tablet (5 mg) by mouth 2 times daily 180 tablet 3     ARMOUR THYROID 60 MG tablet TAKE 1 TABLET BY MOUTH EVERY OTHER DAY 45 tablet 3     ARMOUR THYROID 90 MG tablet TAKE 1 TABLET BY MOUTH EVERY OTHER DAY 45 tablet 3     atorvastatin (LIPITOR) 20 MG tablet Take 0.5 tablets (10 mg) by mouth every evening 90 tablet 3     Cholecalciferol ([employer] VITAMIN D3) 400 UNITS  CAPS Take 800 Units by mouth daily       diltiazem ER COATED BEADS (CARDIZEM CD/CARTIA XT) 120 MG 24 hr capsule Take 1 capsule (120 mg) by mouth daily 30 capsule 0     fish oil-omega-3 fatty acids 1000 MG capsule Take 1,000 capsules by mouth daily       Flaxseed Oil OIL Take by mouth daily       latanoprost (XALATAN) 0.005 % ophthalmic solution Place 1 drop into both eyes At Bedtime        Sesame Oil OIL Take by mouth daily       Allergies   Allergen Reactions     Bee Venom Anaphylaxis     Benazepril Cough     Ciprofloxacin Unknown     Erythromycin Unknown     Gluten Meal Unknown     Hydrochlorothiazide      Other reaction(s): Hyponatremia     Penicillins Unknown         Past medical history, past surgical history, current medications and allergies reviewed and accurate to the best of my knowledge.        ROS:  Refer to HPI    /70   Pulse 76   Temp 99.2  F (37.3  C) (Tympanic)   Resp 18   Wt 65.3 kg (144 lb)   LMP  (LMP Unknown)   SpO2 98%   BMI 28.12 kg/m      EXAM:  General Appearance: Well appearing female, appropriate appearance for age. No acute distress  Eyes: conjunctivae normal without erythema or irritation, corneas clear, no drainage or crusting, no eyelid swelling, pupils equal and reactive to light.  Neurological exam: CN II-XII grossly intact   Musculoskeletal:  Equal movement of bilateral upper extremities. Patient has limited rotation of her neck.  Equal movement of bilateral lower extremities. Normal gait.    Dermatological: No erythema, swelling or ecchymosis noted.   Psychological: normal affect, alert, oriented, and pleasant.       Xray:  Results for orders placed or performed in visit on 05/30/21   XR Cervical Spine 2/3 Views     Status: None    Narrative    PROCEDURE INFORMATION:   Exam: XR Cervical Spine   Exam date and time: 5/30/2021 11:09 AM   Age: 89 years old   Clinical indication: Cervicalgia; Other: Neck pain     TECHNIQUE:   Imaging protocol: XR of the cervical spine.    Views: 2 or 3 views.     COMPARISON:   No relevant prior studies available.     FINDINGS:   Bones/joints: No acute fracture of the cervical spine. No subluxation or   dislocation of the cervical spine. Intervertebral disc space narrowing C5   through C7 may represent degenerative disc disease.. Anterior osteophyte   formation C5 through C7 Posterior osteophyte formation C5 through C7   Degenerative changes in the facets at multiple levels Degenerative changes at   C1/C2   Soft tissues: Unremarkable.       Impression    IMPRESSION:   1. No acute fracture of the cervical spine.   2. No subluxation or dislocation of the cervical spine.   3. Intervertebral disc space narrowing C5 through C7 may represent degenerative   disc disease..     THIS DOCUMENT HAS BEEN ELECTRONICALLY SIGNED BY NANETTE TORRES MD

## 2021-05-30 NOTE — NURSING NOTE
Chief Complaint   Patient presents with     Neck Problem     She had a stroke prior on the right side. Thursday she fell asleep in her chair and her neck has been kinked since. She is having pain in her neck on the right side. She has been doing PT at home with bands and she may have also pulled something.     Initial LMP  (LMP Unknown)  Estimated body mass index is 28.32 kg/m  as calculated from the following:    Height as of 5/17/21: 1.524 m (5').    Weight as of 5/17/21: 65.8 kg (145 lb).         Medication Reconciliation: Complete      Riley Workman LPN

## 2021-05-30 NOTE — PATIENT INSTRUCTIONS
Alternate warm and cool compresses to the neck.     Follow up appointment with PCP.     Try taking tylenol 650 mg

## 2021-05-30 NOTE — TELEPHONE ENCOUNTER
Started on Thursday    She might have slept wrong or stretched wrong    She is also having neck pain     It is on the right side    No numbness or tingling    Heat helps sometimes    Arm is not swollen    Per protocol recommended an appointment appointment in the next 3 days    Minoo Galicia RN  [facility]  7:56 AM  5/30/2021    COVID 19 Nurse Triage Plan/Patient Instructions    Please be aware that novel coronavirus (COVID-19) may be circulating in the community. If you develop symptoms such as fever, cough, or SOB or if you have concerns about the presence of another infection including coronavirus (COVID-19), please contact your health care provider or visit [email or web address].     Disposition/Instructions    In-Person Visit with provider recommended. Reference Visit Selection Guide.    Thank you for taking steps to prevent the spread of this virus.  o Limit your contact with others.  o Wear a simple mask to cover your cough.  o Wash your hands well and often.    Resources    [facility]: About COVID-19: [email or web address]    CDC: What to Do If You're Sick: www.cdc.gov/coronavirus/2019-ncov/about/steps-when-sick.html    CDC: Ending Home Isolation: www.cdc.gov/coronavirus/2019-ncov/hcp/disposition-in-home-patients.html     CDC: Caring for Someone: www.cdc.gov/coronavirus/2019-ncov/if-you-are-sick/care-for-someone.html     [facility]: Interim Guidance for Hospital Discharge to Home: www.health.[facility].[place]/diseases/coronavirus/hcp/hospdischarge.pdf    [facility] clinical trials (COVID-19 research studies): [email or web address]-clinical-trials     Below are the COVID-19 hotlines at the [facility] of Health ([facility]). Interpreters are available.   o For health questions: Call 956-801-6428 or 1-329.197.8481 (7 a.m. to 7 p.m.)  o For questions about schools and childcare: Call 709-851-1893 or 1-460.712.8628 (7 a.m. to 7 p.m.)                       Reason  "for Disposition    [1] MODERATE pain (e.g., interferes with normal activities) AND [2] present > 3 days    Additional Information    Negative: Passed out (i.e., lost consciousness, collapsed and was not responding)    Negative: Shock suspected (e.g., cold/pale/clammy skin, too weak to stand, low BP, rapid pulse)    Negative: [1] Similar pain previously AND [2] it was from \"heart attack\"    Negative: [1] Similar pain previously AND [2] it was from \"angina\" AND [3] not relieved by nitroglycerin    Negative: Sounds like a life-threatening emergency to the triager    Negative: Difficulty breathing or unusual sweating (e.g., sweating without exertion)    Negative: [1] Pain lasting > 5 minutes AND [2] pain also present in chest  (Exception: pain is clearly made worse by movement)    Negative: [1] Age > 40 AND [2] no obvious cause AND [3] pain even when not moving the arm    (Exception: pain is clearly made worse by moving arm or bending neck)    Negative: [1] SEVERE pain AND [2] not improved 2 hours after pain medicine    Negative: [1] Red area or streak AND [2] fever    Negative: [1] Swollen joint AND [2] fever    Negative: Patient sounds very sick or weak to the triager    Negative: Entire arm is swollen    Negative: Weakness (i.e., loss of strength) in hand or fingers    (Exception: not truly weak; hand feels weak because of pain)    Negative: [1] Shoulder pains with exertion (e.g., walking) AND [2] pain goes away on resting AND [3] not present now    Negative: [1] Painful rash AND [2] multiple small blisters grouped together (i.e., dermatomal distribution or \"band\" or \"stripe\")    Negative: Looks like a boil, infected sore, deep ulcer or other infected rash (spreading redness, pus)    Negative: [1] Localized rash is very painful AND [2] no fever    Negative: Numbness (i.e., loss of sensation) in hand or fingers    Negative: [1] Unable to use arm at all AND [2] because of shoulder pain or stiffness    Protocols used: " SHOULDER PAIN-A-AH

## 2021-05-31 ENCOUNTER — TELEPHONE (OUTPATIENT)
Dept: NEUROLOGY | Facility: CLINIC | Age: 86
End: 2021-05-31

## 2021-05-31 ENCOUNTER — NURSE TRIAGE (OUTPATIENT)
Dept: NURSING | Facility: CLINIC | Age: 86
End: 2021-05-31

## 2021-05-31 DIAGNOSIS — I63.22 CEREBROVASCULAR ACCIDENT (CVA) DUE TO STENOSIS OF BASILAR ARTERY (H): Primary | ICD-10-CM

## 2021-05-31 DIAGNOSIS — E78.5 HYPERLIPIDEMIA LDL GOAL <100: ICD-10-CM

## 2021-05-31 NOTE — TELEPHONE ENCOUNTER
Called daughter back at number provided.  No response.  HIPPA compliant  left.      Maritza Garrett, DO

## 2021-05-31 NOTE — TELEPHONE ENCOUNTER
Reason for call:  Other   Patient called regarding (reason for call): call back  Additional comments: Génesis just missed your call, please call  back as soon as possible.    Phone number to reach patient:  Other phone number:  [phone]    Best Time:  Anytime    Can we leave a detailed message on this number?  Not Applicable    Travel screening: Not Applicable

## 2021-05-31 NOTE — TELEPHONE ENCOUNTER
Janna gives consent to speak with daughter on the phone. Génesis calling, patient wants to stop taking Lipitor or take something else. She is having muscle spasms that she thinks is from the lipitor.   Please call Génesis (daughter) [phone] Daughter requests a message be sent to Dr. Garrett.  Mercedes Novoa RN  [facility]

## 2021-06-01 ENCOUNTER — TELEPHONE (OUTPATIENT)
Dept: PEDIATRICS | Facility: OTHER | Age: 86
End: 2021-06-01

## 2021-06-01 RX ORDER — ROSUVASTATIN CALCIUM 10 MG/1
10 TABLET, COATED ORAL DAILY
Qty: 60 TABLET | Refills: 4 | Status: SHIPPED | OUTPATIENT
Start: 2021-06-01 | End: 2022-03-21

## 2021-06-01 NOTE — TELEPHONE ENCOUNTER
Called patient back to discuss.  I actually suspect this is neck strain and less likely statin myalgia.  That being said patient stopped lipitor and daughter says won't use anymore.  Will discontinue in chart.      Will instead try crestor 10mg which is slightly less associated with myalgia.  If still symptomatic in 3-4 weeks should make appt with PCP for labs including CMP, mag, phos. Could consider alternate day dosing or 5mg dosing at that time if felt to be related to statin use but did discuss recent article in Stroke about deintensification associated with higher stroke mortality.    Other options include pravastatin if still symptomatic.  Daughter asked about chiropractor manipulations which I recommended against.       Maritza Garrett, DO

## 2021-06-01 NOTE — TELEPHONE ENCOUNTER
Has appt on Thur. [facility] Follow up also needs orders for Home Health-Bathing-and PT daughter can go over this

## 2021-06-03 ENCOUNTER — OFFICE VISIT (OUTPATIENT)
Dept: PEDIATRICS | Facility: OTHER | Age: 86
End: 2021-06-03
Attending: INTERNAL MEDICINE
Payer: COMMERCIAL

## 2021-06-03 ENCOUNTER — PATIENT OUTREACH (OUTPATIENT)
Dept: CARE COORDINATION | Facility: CLINIC | Age: 86
End: 2021-06-03

## 2021-06-03 VITALS
TEMPERATURE: 97.7 F | RESPIRATION RATE: 20 BRPM | WEIGHT: 146 LBS | SYSTOLIC BLOOD PRESSURE: 132 MMHG | OXYGEN SATURATION: 100 % | DIASTOLIC BLOOD PRESSURE: 76 MMHG | HEART RATE: 88 BPM | BODY MASS INDEX: 28.51 KG/M2

## 2021-06-03 DIAGNOSIS — R73.09 ELEVATED GLUCOSE: ICD-10-CM

## 2021-06-03 DIAGNOSIS — S13.9XXA NECK SPRAIN, INITIAL ENCOUNTER: ICD-10-CM

## 2021-06-03 DIAGNOSIS — Z86.73 HISTORY OF STROKE: ICD-10-CM

## 2021-06-03 DIAGNOSIS — I48.0 PAROXYSMAL ATRIAL FIBRILLATION (H): Primary | ICD-10-CM

## 2021-06-03 DIAGNOSIS — Z78.9 STATIN NOT TOLERATED: ICD-10-CM

## 2021-06-03 DIAGNOSIS — E03.9 ACQUIRED HYPOTHYROIDISM: ICD-10-CM

## 2021-06-03 DIAGNOSIS — I63.9 CEREBELLAR STROKE (H): ICD-10-CM

## 2021-06-03 DIAGNOSIS — N18.31 STAGE 3A CHRONIC KIDNEY DISEASE (H): ICD-10-CM

## 2021-06-03 LAB
ANION GAP SERPL CALCULATED.3IONS-SCNC: 5 MMOL/L (ref 3–14)
BUN SERPL-MCNC: 28 MG/DL (ref 7–25)
CALCIUM SERPL-MCNC: 9.4 MG/DL (ref 8.6–10.3)
CHLORIDE SERPL-SCNC: 103 MMOL/L (ref 98–107)
CK SERPL-CCNC: 42 U/L (ref 30–223)
CO2 SERPL-SCNC: 30 MMOL/L (ref 21–31)
CREAT SERPL-MCNC: 0.91 MG/DL (ref 0.6–1.2)
GFR SERPL CREATININE-BSD FRML MDRD: 58 ML/MIN/{1.73_M2}
GLUCOSE SERPL-MCNC: 107 MG/DL (ref 70–105)
HBA1C MFR BLD: 5.8 % (ref 4–6)
POTASSIUM SERPL-SCNC: 4.5 MMOL/L (ref 3.5–5.1)
SODIUM SERPL-SCNC: 138 MMOL/L (ref 134–144)
TSH SERPL DL<=0.05 MIU/L-ACNC: 6.47 IU/ML (ref 0.34–5.6)

## 2021-06-03 PROCEDURE — 80048 BASIC METABOLIC PNL TOTAL CA: CPT | Mod: ZL | Performed by: INTERNAL MEDICINE

## 2021-06-03 PROCEDURE — 36415 COLL VENOUS BLD VENIPUNCTURE: CPT | Mod: ZL | Performed by: INTERNAL MEDICINE

## 2021-06-03 PROCEDURE — 84443 ASSAY THYROID STIM HORMONE: CPT | Mod: ZL | Performed by: INTERNAL MEDICINE

## 2021-06-03 PROCEDURE — G0463 HOSPITAL OUTPT CLINIC VISIT: HCPCS | Performed by: INTERNAL MEDICINE

## 2021-06-03 PROCEDURE — 83036 HEMOGLOBIN GLYCOSYLATED A1C: CPT | Mod: ZL | Performed by: INTERNAL MEDICINE

## 2021-06-03 PROCEDURE — 82550 ASSAY OF CK (CPK): CPT | Mod: ZL | Performed by: INTERNAL MEDICINE

## 2021-06-03 PROCEDURE — 99214 OFFICE O/P EST MOD 30 MIN: CPT | Performed by: INTERNAL MEDICINE

## 2021-06-03 ASSESSMENT — PAIN SCALES - GENERAL: PAINLEVEL: NO PAIN (1)

## 2021-06-03 NOTE — PROGRESS NOTES
Subjective   Janna Mcdermott is a 89 year old female who presents for follow-up, refill, home care orders.  She is here with her daughter today.  She has a history of a cerebellar stroke.  She feels like she has completely recovered from the symptoms.  She was doing physical therapy to work on her legs.  The therapist left her some rubber bands and she feels like she overdid it causing neck pain.  She had to have her daughter help her get dressed for a while, now the pain is down to 1/10.  She would like to work on therapy again.  She was getting muscle aching from Lipitor.  They contacted Dr. Garrett and she was changed to Crestor, both moderate intensity dosing.  Her fingers are purple and cold.    Objective   Vitals: /76 (BP Location: Right arm, Patient Position: Sitting, Cuff Size: Adult Regular)   Pulse 88   Temp 97.7  F (36.5  C) (Tympanic)   Resp 20   Wt 66.2 kg (146 lb)   LMP  (LMP Unknown)   SpO2 100%   Breastfeeding No   BMI 28.51 kg/m      Cardiovascular: Irregularly irregular  Pulmonary: Clear  HEENT: No bruits  Msk: trace lower ext edema    Review and Analysis of Data   I personally reviewed the following:  External notes: No  Results: Yes Kidney panels and thyroid labs from the last year reviewed.  Normal LFTs reviewed.  Use of an independent historian: Yes I spoke with her daughter  Independent review of a test performed by another physician: No  Discussion of management with another physician: No  Moderate risk of morbidity from additional diagnostic testing and/or treatment.    Assessment & Plan   1. Paroxysmal atrial fibrillation (H)  At goal, no change.  - HOME CARE NURSING REFERRAL  - CARE COORDINATION REFERRAL    2. History of stroke  3. Cerebellar stroke (H)  At goal, no change.  - HOME CARE NURSING REFERRAL  - CARE COORDINATION REFERRAL    4. Stage 3a chronic kidney disease  At goal, no change.  - Basic metabolic panel; Future  - Basic metabolic panel    5. Statin not  tolerated  - CK Total; Future  - CK Total    6. Elevated glucose  - Hemoglobin A1c; Future  - Hemoglobin A1c    7. Acquired hypothyroidism  - Thyrotropin GH; Future  - Thyrotropin GH    8. Neck sprain, initial encounter  Physical therapy consult via home care as requested        Patient Instructions      -- See Dr. Iraheta, discuss switching from diltiazem to metoprolol      Home Care Referral and Documentation  Janna Mcdermott was seen for a face-to-face encounter 6/3/2021 by Nuno Clinton MD.    I recommend she receive home skilled nursing services and/or home physical therapy and/or home occupational therapy services related to the health conditions listed below.    In my opinion, because of illness and/or injury she has difficulty leaving the home due to taxing effort, requires assistive devices (including but not limited to crutches, canes, wheelchairs and walkers), requires assistance of another person for safety.    she is experiencing weakness, debility, unsteadiness, and/or pain secondary to the following diagnoses:    ICD-10-CM    1. Paroxysmal atrial fibrillation (H)  I48.0    2. History of stroke  Z86.73    3. Cerebellar stroke (H)  I63.9    4. Stage 3a chronic kidney disease  N18.31        Please allow Ms. Janna Mcdermott to receive these services as I believe they will allow improved ability to perform ADLs, and will prevent falls.    Signed, Nuno Clinton MD, FAAP, FACP  Internal Medicine & Pediatrics      How to get your COVID-19 vaccine  COVID-19 vaccine is free    1. From [facility]  Thanks for reaching out to us. At this time we are vaccinating all people age 16 and older. To schedule your appointment please log in to [facility] to see when and where we have openings. Appointments open up throughout the week, check back for additional openings. You can also schedule an appointment by calling our appointment line at [phone], weekdays 7:00AM - 5:00 PM.   Please note that your  "primary care provider is not able to assist you in scheduling your vaccine. We do, however, have a \"will call standby list\" for those who meet the current qualifications and live within 10 minutes of [facility] to be contacted if last minute appointments become available. Please note that this does not ensure you will get an appointment and you should still attempt to make an appointment. Once you sign up, you will remain on the list for two weeks. After two weeks, you will be automatically removed from the list but can sign up again if you choose.   We appreciate your patience as we work to vaccinate as many people as we can as quickly, safely and efficiently as possible.                      2. From [place] [facility], pharmacy or other clinic  Who currently qualifies?    12+    See the FAQ at [place]://[place].gov/[place]id[place]/vaccine/find-vaccine/community-vaccination-program/faq.jsp    How to schedule?    Schedule on-line via Vaccine Connector at https://vaccineconnector.[place].gov/)    Call [phone] or toll free at [phone]        Return if symptoms worsen or fail to improve.    SignedNuno MD, FAAP, FACP  Internal Medicine & Pediatrics    "

## 2021-06-03 NOTE — PATIENT INSTRUCTIONS
" -- See Dr. Iraheta, discuss switching from diltiazem to metoprolol      Home Care Referral and Documentation  Janna Mcdermott was seen for a face-to-face encounter 6/3/2021 by Nuno Clinton MD.    I recommend she receive home skilled nursing services and/or home physical therapy and/or home occupational therapy services related to the health conditions listed below.    In my opinion, because of illness and/or injury she has difficulty leaving the home due to taxing effort, requires assistive devices (including but not limited to crutches, canes, wheelchairs and walkers), requires assistance of another person for safety.    she is experiencing weakness, debility, unsteadiness, and/or pain secondary to the following diagnoses:    ICD-10-CM    1. Paroxysmal atrial fibrillation (H)  I48.0    2. History of stroke  Z86.73    3. Cerebellar stroke (H)  I63.9    4. Stage 3a chronic kidney disease  N18.31        Please allow Ms. Janna Mcdermott to receive these services as I believe they will allow improved ability to perform ADLs, and will prevent falls.    Signed, Nuno Clinton MD, FAAP, FACP  Internal Medicine & Pediatrics      How to get your COVID-19 vaccine  COVID-19 vaccine is free    1. From [facility]  Thanks for reaching out to us. At this time we are vaccinating all people age 16 and older. To schedule your appointment please log in to [facility] to see when and where we have openings. Appointments open up throughout the week, check back for additional openings. You can also schedule an appointment by calling our appointment line at [phone], weekdays 7:00AM - 5:00 PM.   Please note that your primary care provider is not able to assist you in scheduling your vaccine. We do, however, have a \"will call standby list\" for those who meet the current qualifications and live within 10 minutes of [facility] to be contacted if last minute appointments become available. Please note that this does not " ensure you will get an appointment and you should still attempt to make an appointment. Once you sign up, you will remain on the list for two weeks. After two weeks, you will be automatically removed from the list but can sign up again if you choose.   We appreciate your patience as we work to vaccinate as many people as we can as quickly, safely and efficiently as possible.                      2. From [facility] of Health, pharmacy or other clinic  Who currently qualifies?    12+    See the FAQ at [place]://[place].gov/[place]id[place]/vaccine/find-vaccine/community-vaccination-program/faq.jsp    How to schedule?    Schedule on-line via Vaccine Connector at https://vaccineconnector.[place].gov/)    Call [phone] or toll free at [phone]

## 2021-06-04 ENCOUNTER — TELEPHONE (OUTPATIENT)
Dept: PEDIATRICS | Facility: OTHER | Age: 86
End: 2021-06-04

## 2021-06-04 PROBLEM — R73.03 PRE-DIABETES: Status: ACTIVE | Noted: 2021-06-04

## 2021-06-04 PROCEDURE — G0180 MD CERTIFICATION HHA PATIENT: HCPCS | Performed by: INTERNAL MEDICINE

## 2021-06-04 NOTE — TELEPHONE ENCOUNTER
"URGENT: per CMS best practice, response is requested within 24 hours.    Home Care regulation mandates that you are notified about drug discrepancies, interactions & contraindications. Response within a 24 hour timeframe is established by CMS as \"best practice\" for the delivery of home health care. Home Care is required to report if the 24 hour timeframe was met. The home health clinician will contact you again if this timeframe is not met or if the response does not address all concerns.       Situation:        The patient was admitted to [facility] on 6-4-21. Upon admission, a med reconciliation was completed to identify any drug discrepancies, interactions or contraindications. Home Care's drug regime review has revealed significant medication issues.     You are being contacted for clarifying orders related to the medication issues.        Assessment: Med reconciliation Mod and Major     Eliquis & Cardizem, Atorvastatin calcium    FYI: Patient is taking multiple supplements that are not listed on our med span. Kathy Food, Zypan, multizym, Cyruta-Plus, Min-Tan, A-C Carbamide, Cardio-Plus daily        Recommendations:    Please evaluate this information and indicate below whether or not changes are required. A copy of the patient's drug interaction/contraindications report is available upon request.     Thanks for your time Katarina Brock, RN     CLINIC NURSE - If changes are made, please update the Epic medication profile.     Please sign this encounter with your electronic signature.         "

## 2021-06-08 NOTE — PROGRESS NOTES
"Clinic Care Coordination Contact    Situation: Patient referred by PCP.     Background:  Patient feels she has \"completely recovered\" from stroke.  Is participating in home therapies, and [facility] homecare nurse also comes weekly. Her daughter takes her out for for a drive daily, lives next door and is involved. No food insecurity, daughter brings groceries and patient cooks.  Reports she would like to drive again, but her daughter won't let her yet. Says the neurologist said it is \"fine if I want to\".     Denies falls or safety concerns at home. Feels she can complete daily tasks. Says she used to have someone helping her in house and someone else who mowed lawn. She felt it was too expensive recently.  However, she refused referral for [facility] . Patient values independence, also worries somewhat about Covid. Explains that is why she has not resumed the [employer] she enjoyed, saying \"there are too many people there not wearing masks\".  Also unsure if she wants to return to [facility] yet.      Intervention:  Discussed potential services to help in community. Care coordination was introduced. She wrote down my number to call \"if need be.\"    Kenisha Brasher RN on 6/8/2021 at 12:08 PM    "

## 2021-06-08 NOTE — TELEPHONE ENCOUNTER
I wondered if she needed any additional services.  I told them I'd have you call and placed a referral.    Signed, Nuno Clinton MD, FAAP, FACP  Internal Medicine & Pediatrics

## 2021-06-18 ENCOUNTER — TELEPHONE (OUTPATIENT)
Dept: PEDIATRICS | Facility: OTHER | Age: 86
End: 2021-06-18

## 2021-06-18 NOTE — TELEPHONE ENCOUNTER
Called and informed daughter refills at [facility] .  apixaban ANTICOAGULANT (ELIQUIS) 5 MG tablet 180 tablet 3 4/6/2021  No   Sig - Route: Take 1 tablet (5 mg) by mouth 2 times daily        Transferred daughter to pharmacy .    Bessie Marroquin RN on 6/18/2021 at 12:49 PM

## 2021-06-18 NOTE — TELEPHONE ENCOUNTER
Patient needs her apixaban ANTICOAGULANT (ELIQUIS) 5 MG tablet called into [facility].  Patient is running low and daughter would like to  today at the pharmacy.    Please call patient daughter back.  Thank you   Lorene Blank on 6/18/2021 at 12:38 PM

## 2021-06-21 ENCOUNTER — TELEPHONE (OUTPATIENT)
Dept: PEDIATRICS | Facility: OTHER | Age: 86
End: 2021-06-21
Payer: COMMERCIAL

## 2021-06-21 DIAGNOSIS — I48.0 PAROXYSMAL ATRIAL FIBRILLATION (H): Primary | ICD-10-CM

## 2021-06-21 NOTE — TELEPHONE ENCOUNTER
Dr Clinton  reviewed and completed the following home care or hospice form(s) for Monika Mcdermott  On 06/04/21   This covers the certification period effective 06/04/2021 to 08/02/21.  Cassandra Adames LPN on 6/21/2021 at 1:51 PM

## 2021-06-24 ENCOUNTER — TELEPHONE (OUTPATIENT)
Dept: PEDIATRICS | Facility: OTHER | Age: 86
End: 2021-06-24

## 2021-06-24 NOTE — TELEPHONE ENCOUNTER
FYI: patient has met Home Care skilled goals and discharged today from all therapies.      Allyson Workman RN on 6/24/2021 at 1:29 PM

## 2021-07-29 ENCOUNTER — TELEPHONE (OUTPATIENT)
Dept: NEUROLOGY | Facility: CLINIC | Age: 86
End: 2021-07-29

## 2021-07-29 NOTE — TELEPHONE ENCOUNTER
Prior Authorization Not Needed per Insurance    Medication: Rosuvastatin Calcium 10 MG Tablets-PA NOT NEEDED   Insurance Company: [employer] - Phone [phone] Fax [phone]  Expected CoPay:      Pharmacy Filling the Rx: [facility] [address]  Pharmacy Notified: Yes  Patient Notified: No    Called pharmacy and pharmacy stated that PA is Not Needed and medication is covered. **Instructed pharmacy to notify patient when script is ready to /ship.**  Pharmacy stated that they have a paid claim on medication on 7/27/2021 and patient has picked up medication.

## 2021-07-29 NOTE — TELEPHONE ENCOUNTER
Prior Authorization Retail Medication Request    Medication/Dose: Rosuvastatin Calcium 10 MG Tablets  ICD code (if different than what is on RX):  163.22  Previously Tried and Failed:  See Chart  Rationale:  See Chart    Insurance Name:  [employer] Ph: [phone]   Insurance ID:  [id number]      Pharmacy Information (if different than what is on RX)  Name:    Phone:

## 2021-10-09 ENCOUNTER — HEALTH MAINTENANCE LETTER (OUTPATIENT)
Age: 86
End: 2021-10-09

## 2021-10-10 DIAGNOSIS — E03.9 ACQUIRED HYPOTHYROIDISM: Chronic | ICD-10-CM

## 2021-10-11 RX ORDER — THYROID 60 MG
TABLET ORAL
Qty: 45 TABLET | Refills: 0 | Status: SHIPPED | OUTPATIENT
Start: 2021-10-11 | End: 2021-12-16

## 2021-10-11 RX ORDER — THYROID,PORK 90 MG
TABLET ORAL
Qty: 45 TABLET | Refills: 0 | Status: SHIPPED | OUTPATIENT
Start: 2021-10-11 | End: 2021-12-16

## 2021-10-13 ENCOUNTER — TELEPHONE (OUTPATIENT)
Dept: PEDIATRICS | Facility: OTHER | Age: 86
End: 2021-10-13

## 2021-10-13 ENCOUNTER — TELEPHONE (OUTPATIENT)
Dept: NEUROLOGY | Facility: CLINIC | Age: 86
End: 2021-10-13

## 2021-10-13 DIAGNOSIS — I10 ESSENTIAL HYPERTENSION: Chronic | ICD-10-CM

## 2021-10-13 DIAGNOSIS — N18.31 STAGE 3A CHRONIC KIDNEY DISEASE (H): ICD-10-CM

## 2021-10-13 DIAGNOSIS — E03.9 ACQUIRED HYPOTHYROIDISM: Chronic | ICD-10-CM

## 2021-10-13 DIAGNOSIS — E78.2 MIXED HYPERLIPIDEMIA: Primary | Chronic | ICD-10-CM

## 2021-10-13 NOTE — TELEPHONE ENCOUNTER
Health Call Center    Phone Message    May a detailed message be left on voicemail: no, mailbox is full     Reason for Call: Order(s): Other:   Reason for requested: Full panel lab  Date needed: ASAP  Provider name: Dr. Tami Capps calling to request a full panel for labs be entered so Janna may schedule and check to see if she needs to adjust her cholesterol medications. Génesis is requesting that the thyroid order be split to the T3, T4. They are wanting to schedule next week and would like a confirmation email sent to [email or web address].    Action Taken: Message routed to:  [facility]    Travel Screening: Not Applicable

## 2021-10-13 NOTE — TELEPHONE ENCOUNTER
The patient is going to see a neurologist next week on Friday (Dr Garrett) .  Her daughter wanted to have lab work done.  Dr Garrett had changed her cholesterol medication.  They want to a full lipid panel, liver, kidney and thyroid studies before her appointment with Dr Garrett.  Please advise.

## 2021-10-13 NOTE — TELEPHONE ENCOUNTER
I called Génesis back. I let her know Dr. Garrett reviewed her message. We recommend she reach out to PCP to order requested lab work.     Quiana MONTANEZ

## 2021-10-14 NOTE — TELEPHONE ENCOUNTER
Génesis informed lab orders are in and may schedule a lab appointment. She was transferred to scheduling to schedule.     Linda Barnes CMA on 10/14/2021 at 9:33 AM

## 2021-10-18 ENCOUNTER — TELEPHONE (OUTPATIENT)
Dept: PEDIATRICS | Facility: OTHER | Age: 86
End: 2021-10-18

## 2021-10-18 ENCOUNTER — LAB (OUTPATIENT)
Dept: LAB | Facility: OTHER | Age: 86
End: 2021-10-18
Attending: INTERNAL MEDICINE
Payer: MEDICARE

## 2021-10-18 DIAGNOSIS — E78.2 MIXED HYPERLIPIDEMIA: Chronic | ICD-10-CM

## 2021-10-18 DIAGNOSIS — N18.31 STAGE 3A CHRONIC KIDNEY DISEASE (H): ICD-10-CM

## 2021-10-18 DIAGNOSIS — E03.9 ACQUIRED HYPOTHYROIDISM: Chronic | ICD-10-CM

## 2021-10-18 DIAGNOSIS — N18.31 STAGE 3A CHRONIC KIDNEY DISEASE (H): Primary | ICD-10-CM

## 2021-10-18 DIAGNOSIS — I10 ESSENTIAL HYPERTENSION: Chronic | ICD-10-CM

## 2021-10-18 LAB
ALBUMIN SERPL-MCNC: 4 G/DL (ref 3.5–5.7)
ALP SERPL-CCNC: 60 U/L (ref 34–104)
ALT SERPL W P-5'-P-CCNC: 22 U/L (ref 7–52)
ANION GAP SERPL CALCULATED.3IONS-SCNC: 8 MMOL/L (ref 3–14)
AST SERPL W P-5'-P-CCNC: 23 U/L (ref 13–39)
BILIRUB SERPL-MCNC: 0.9 MG/DL (ref 0.3–1)
BUN SERPL-MCNC: 32 MG/DL (ref 7–25)
CALCIUM SERPL-MCNC: 9.5 MG/DL (ref 8.6–10.3)
CHLORIDE BLD-SCNC: 105 MMOL/L (ref 98–107)
CHOLEST SERPL-MCNC: 114 MG/DL
CO2 SERPL-SCNC: 28 MMOL/L (ref 21–31)
CREAT SERPL-MCNC: 1.02 MG/DL (ref 0.6–1.2)
DEPRECATED CALCIDIOL+CALCIFEROL SERPL-MC: 49 UG/L (ref 30–100)
FASTING STATUS PATIENT QL REPORTED: YES
GFR SERPL CREATININE-BSD FRML MDRD: 49 ML/MIN/1.73M2
GLUCOSE BLD-MCNC: 104 MG/DL (ref 70–105)
HDLC SERPL-MCNC: 50 MG/DL (ref 23–92)
HGB BLD-MCNC: 15.6 G/DL (ref 11.7–15.7)
HOLD SPECIMEN: NORMAL
LDLC SERPL CALC-MCNC: 50 MG/DL
NONHDLC SERPL-MCNC: 64 MG/DL
POTASSIUM BLD-SCNC: 4.3 MMOL/L (ref 3.5–5.1)
PROT SERPL-MCNC: 6.8 G/DL (ref 6.4–8.9)
SODIUM SERPL-SCNC: 141 MMOL/L (ref 134–144)
T4 FREE SERPL-MCNC: 0.89 NG/DL (ref 0.6–1.6)
TRIGL SERPL-MCNC: 68 MG/DL
TSH SERPL DL<=0.005 MIU/L-ACNC: 9.16 MU/L (ref 0.4–4)

## 2021-10-18 PROCEDURE — 80061 LIPID PANEL: CPT | Mod: ZL

## 2021-10-18 PROCEDURE — 85018 HEMOGLOBIN: CPT | Mod: ZL

## 2021-10-18 PROCEDURE — 36415 COLL VENOUS BLD VENIPUNCTURE: CPT | Mod: ZL

## 2021-10-18 PROCEDURE — 80053 COMPREHEN METABOLIC PANEL: CPT | Mod: ZL

## 2021-10-18 PROCEDURE — 84439 ASSAY OF FREE THYROXINE: CPT | Mod: ZL

## 2021-10-18 PROCEDURE — 82306 VITAMIN D 25 HYDROXY: CPT | Mod: ZL

## 2021-10-18 PROCEDURE — 84443 ASSAY THYROID STIM HORMONE: CPT | Mod: ZL

## 2021-10-18 NOTE — TELEPHONE ENCOUNTER
[facility]-patient daughter is wondering if [facility] can put an order in for hemoglobin to be drawn from lab draw today 10.18.21    Please call and advise    Larisa Martinez on 10/18/2021 at 3:47 PM

## 2021-10-18 NOTE — TELEPHONE ENCOUNTER
After proper verification writer spoke with patient daughter who wanted to know if Vitamin D could also be added to the blood they osiris this morning.  Order teed up.  Please advise.  Ele Leon LPN on 10/18/2021 at 4:10 PM

## 2021-10-22 ENCOUNTER — OFFICE VISIT (OUTPATIENT)
Dept: NEUROLOGY | Facility: OTHER | Age: 86
End: 2021-10-22
Attending: PSYCHIATRY & NEUROLOGY
Payer: COMMERCIAL

## 2021-10-22 VITALS
TEMPERATURE: 97.8 F | RESPIRATION RATE: 17 BRPM | WEIGHT: 146 LBS | HEIGHT: 60 IN | DIASTOLIC BLOOD PRESSURE: 76 MMHG | SYSTOLIC BLOOD PRESSURE: 128 MMHG | HEART RATE: 63 BPM | OXYGEN SATURATION: 100 % | BODY MASS INDEX: 28.66 KG/M2

## 2021-10-22 DIAGNOSIS — I63.111 CEREBROVASCULAR ACCIDENT (CVA) DUE TO EMBOLISM OF RIGHT VERTEBRAL ARTERY (H): Primary | ICD-10-CM

## 2021-10-22 PROCEDURE — G0463 HOSPITAL OUTPT CLINIC VISIT: HCPCS

## 2021-10-22 PROCEDURE — 99213 OFFICE O/P EST LOW 20 MIN: CPT | Performed by: PSYCHIATRY & NEUROLOGY

## 2021-10-22 ASSESSMENT — MIFFLIN-ST. JEOR: SCORE: 1003.75

## 2021-10-22 ASSESSMENT — PAIN SCALES - GENERAL: PAINLEVEL: NO PAIN (0)

## 2021-10-22 NOTE — LETTER
10/22/2021         RE: Janna Mcdermott  [address] [facility] [address]        Dear Colleague,    Thank you for referring your patient, Janna Mcdermott, to the [facility]. Please see a copy of my visit note below.    Outpatient Neurology Visit  10/22/2021    Subjective:  Janna Mcdermott is a 89 year old female who presents today for follow up evaluation of stroke       Brief history of symptoms: The patient was initially seen in neurologic consultation on 5/17/21 for evaluation of same. Please see the comprehensive neurologic consultation note from that date in the Epic records for details.     Interval history: She has no concerns today and feels well.  No events concerning for TIA's.  They state she is having diarrhea.   She is still on elequis and 10mg of crestor.  Patient's daughter has many questions about coming down on the Crestor and is concerned about bleeding if the LDL gets too low.      Physical Exam:  Vitals: /76   Pulse 63   Temp 97.8  F (36.6  C) (Tympanic)   Resp 17   Ht 1.524 m (5')   Wt 66.2 kg (146 lb)   LMP  (LMP Unknown)   SpO2 100%   BMI 28.51 kg/m     General: Seated comfortably in no acute distress.  Cardiac, regular rate  Respiratory: No respiratory distress  Neurologic:  Awake, alert, attentive, oriented, no aphasia, no dysarthria, visual fields intact, extraocular muscles intact, face symmetric, no pronator drift, no satelliting on rapid arm roll, no leg drift and bilateral lower extremity strength intact throughout.  No sensory extinction and sensation intact to light touch diffusely.  No dysmetria on finger-nose-finger.    NIH stroke scale equals 0    Pertinent Investigations:  MRI brain from March personally reviewed.  Right PICA stroke seen.  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    Assessment:  #Stroke:      Ms. 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  Etiology was felt to be due to atrial fibrillation for which she was known to have and not anticoagulated at the time.  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  We spent much of this visit discussing secondary stroke prevention measures.  Her daughter in particular is quite interested in getting a lower dose of Crestor.  She has done research and has concerns about side effects.  I discussed that given her posterior circulation stroke and atherosclerotic disease in that region I would not recommend coming down or stopping it for at least a year.  She is concerned that if her LDL gets lower that she will be at risk for intracerebral hemorrhage.  I stated that this typically only if LDL gets very low and her current LDL is at goal.  Ultimately we elected to repeat her lipid panel in approximately 3 to 4 months.  If it continues to downtrend can consider reducing the Crestor to 5 mg.  Patient would like to continue to follow-up in my clinic for vascular risk factor questions well arrange follow-up in approximately 9 months    Plan:  -Continue apixaban 5 mg twice daily  -Continue Crestor 10 mg for now  -We will repeat LDL in approximately 3 months.  If LDL drops into the low 30s or less will consider reducing Crestor otherwise would like to keep her on that dose for at least a year.    Follow up in Neurology clinic in 9 months after completion of recommended diagnostic testing or earlier as needed if symptoms persist or should new symptoms or concerns arise.        28 min spent on the date of the encounter in chart review, patient visit, review of tests, documentation and/or discussion with other providers about the issues documented above.       Maritza Garrett DO   of Neurology        Again, thank you for allowing me to participate in the care of your patient.        Sincerely,        Maritza Garrett MD

## 2021-10-22 NOTE — PATIENT INSTRUCTIONS
Reason for today's visit: stroke follow up    Your diagnosis: stroke, hypercholesterolemia    Tests that you will need: LDL repeat in 3 months    Treatment plan:   - Continue apixaban  - Continue crestor 10mg daily.       Your test results are reviewed several times a day.  Once they are all in, you will be sent a letter with your results and/or if you are signed up for on-line services, you will be e-mailed the results.  If there are serious findings, you typically will be called.    If you have any questions about your visit, your symptoms, your medication, your test results or it is not clear what your diagnosis or treatment plan is please contact our office at [phone] for general neurology    If you need follow-up in the future, please call [phone] for an appointment.  If you cannot get a time that satisfies you, please let us know what times work for you and we will do our best to accommodate you.     Maritza Garrett DO  Neurology

## 2021-10-22 NOTE — NURSING NOTE
Chief Complaint   Patient presents with     RECHECK     Patient presents to the clinic for a F/U.    Initial /76   Pulse 63   Temp 97.8  F (36.6  C) (Tympanic)   Resp 17   Ht 1.524 m (5')   Wt 66.2 kg (146 lb)   LMP  (LMP Unknown)   SpO2 100%   BMI 28.51 kg/m   Estimated body mass index is 28.51 kg/m  as calculated from the following:    Height as of this encounter: 1.524 m (5').    Weight as of this encounter: 66.2 kg (146 lb).  Medication Reconciliation: complete    Minoo Jha LPN

## 2021-10-22 NOTE — PROGRESS NOTES
Outpatient Neurology Visit  10/22/2021    Subjective:  Janna Mcdermott is a 89 year old female who presents today for follow up evaluation of stroke       Brief history of symptoms: The patient was initially seen in neurologic consultation on 5/17/21 for evaluation of same. Please see the comprehensive neurologic consultation note from that date in the Epic records for details.     Interval history: She has no concerns today and feels well.  No events concerning for TIA's.  They state she is having diarrhea.   She is still on elequis and 10mg of crestor.  Patient's daughter has many questions about coming down on the Crestor and is concerned about bleeding if the LDL gets too low.      Physical Exam:  Vitals: /76   Pulse 63   Temp 97.8  F (36.6  C) (Tympanic)   Resp 17   Ht 1.524 m (5')   Wt 66.2 kg (146 lb)   LMP  (LMP Unknown)   SpO2 100%   BMI 28.51 kg/m     General: Seated comfortably in no acute distress.  Cardiac, regular rate  Respiratory: No respiratory distress  Neurologic:  Awake, alert, attentive, oriented, no aphasia, no dysarthria, visual fields intact, extraocular muscles intact, face symmetric, no pronator drift, no satelliting on rapid arm roll, no leg drift and bilateral lower extremity strength intact throughout.  No sensory extinction and sensation intact to light touch diffusely.  No dysmetria on finger-nose-finger.    NIH stroke scale equals 0    Pertinent Investigations:  MRI brain from March personally reviewed.  Right PICA stroke seen.  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    Assessment:  #Stroke:      Ms. Luke is a 89year-old female with a history of of right PICA stroke in May 2021.  Etiology was felt to be due to atrial fibrillation for which she was known to have and not anticoagulated at the time.  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  We spent much of this  visit discussing secondary stroke prevention measures.  Her daughter in particular is quite interested in getting a lower dose of Crestor.  She has done research and has concerns about side effects.  I discussed that given her posterior circulation stroke and atherosclerotic disease in that region I would not recommend coming down or stopping it for at least a year.  She is concerned that if her LDL gets lower that she will be at risk for intracerebral hemorrhage.  I stated that this typically only if LDL gets very low and her current LDL is at goal.  Ultimately we elected to repeat her lipid panel in approximately 3 to 4 months.  If it continues to downtrend can consider reducing the Crestor to 5 mg.  Patient would like to continue to follow-up in my clinic for vascular risk factor questions well arrange follow-up in approximately 9 months    Plan:  -Continue apixaban 5 mg twice daily  -Continue Crestor 10 mg for now  -We will repeat LDL in approximately 3 months.  If LDL drops into the low 30s or less will consider reducing Crestor otherwise would like to keep her on that dose for at least a year.    Follow up in Neurology clinic in 9 months after completion of recommended diagnostic testing or earlier as needed if symptoms persist or should new symptoms or concerns arise.        28 min spent on the date of the encounter in chart review, patient visit, review of tests, documentation and/or discussion with other providers about the issues documented above.       Maritza Garrett DO   of Neurology

## 2021-11-11 ENCOUNTER — OFFICE VISIT (OUTPATIENT)
Dept: PEDIATRICS | Facility: OTHER | Age: 86
End: 2021-11-11
Attending: INTERNAL MEDICINE
Payer: COMMERCIAL

## 2021-11-11 VITALS
RESPIRATION RATE: 18 BRPM | WEIGHT: 144 LBS | DIASTOLIC BLOOD PRESSURE: 80 MMHG | TEMPERATURE: 98.4 F | SYSTOLIC BLOOD PRESSURE: 120 MMHG | BODY MASS INDEX: 28.12 KG/M2 | OXYGEN SATURATION: 96 % | HEART RATE: 80 BPM

## 2021-11-11 DIAGNOSIS — L03.211 FACIAL CELLULITIS: Primary | ICD-10-CM

## 2021-11-11 PROCEDURE — G0463 HOSPITAL OUTPT CLINIC VISIT: HCPCS | Mod: 25

## 2021-11-11 PROCEDURE — 99213 OFFICE O/P EST LOW 20 MIN: CPT | Performed by: INTERNAL MEDICINE

## 2021-11-11 RX ORDER — METRONIDAZOLE 500 MG/1
500 TABLET ORAL 3 TIMES DAILY
Qty: 15 TABLET | Refills: 0 | Status: SHIPPED | OUTPATIENT
Start: 2021-11-11 | End: 2021-11-16

## 2021-11-11 RX ORDER — MUPIROCIN 20 MG/G
OINTMENT TOPICAL 3 TIMES DAILY
Qty: 30 G | Refills: 3 | Status: SHIPPED | OUTPATIENT
Start: 2021-11-11 | End: 2023-08-29

## 2021-11-11 ASSESSMENT — PAIN SCALES - GENERAL: PAINLEVEL: MODERATE PAIN (5)

## 2021-11-11 NOTE — NURSING NOTE
Patient presents to the clinic for concerns about dental infection.      FOOD SECURITY SCREENING QUESTIONS  Hunger Vital Signs:  Within the past 12 months we worried whether our food would run out before we got money to buy more. Never  Within the past 12 months the food we bought just didn't last and we didn't have money to get more. Never    Advance Care Directive on file? no  Advance Care Directive provided to patient? Declined.      Chief Complaint   Patient presents with     Mouth Problem       Initial /80 (BP Location: Right arm, Patient Position: Sitting, Cuff Size: Adult Regular)   Pulse 80   Temp 98.4  F (36.9  C) (Tympanic)   Resp 18   Wt 65.3 kg (144 lb)   LMP  (LMP Unknown)   SpO2 96%   BMI 28.12 kg/m   Estimated body mass index is 28.12 kg/m  as calculated from the following:    Height as of 10/22/21: 1.524 m (5').    Weight as of this encounter: 65.3 kg (144 lb).  Medication Reconciliation: complete        Hattie Cantu LPN

## 2021-11-11 NOTE — PATIENT INSTRUCTIONS
-- Keflex x 5 days   -- Flagyl x 5 days   -- Topical mupirocin  -- Eat yogurt 1-2 times per day while on antibiotics (and for a few weeks after) to reduce the chances of diarrhea   -- Follow-up with the dentist

## 2021-11-11 NOTE — PROGRESS NOTES
Subjective   Janna Mcdermott is a 89 year old female who presents for dental infection.  She has had swelling and discomfort since Monday.  She wore her partial to chew on some tough meat.  Since then she has had some irritation.  It got worse overnight.  Now she has swelling of the face.  She saw Dr. Odonnell the dentist this morning who thought she had an infection of the tooth and told her he would prescribe antibiotics if I did not.  They applied clove oil to the face and the daughter is wondering if this may have caused a local irritation.  She wants to know if there are any natural products that they can use.    Objective   Vitals: /80 (BP Location: Right arm, Patient Position: Sitting, Cuff Size: Adult Regular)   Pulse 80   Temp 98.4  F (36.9  C) (Tympanic)   Resp 18   Wt 65.3 kg (144 lb)   LMP  (LMP Unknown)   SpO2 96%   BMI 28.12 kg/m      Skin: There is swelling of the face on the right lower chin with induration                Assessment & Plan   1. Facial cellulitis  I believe there is a emerging cellulitis of the right lower face.  Likely started with irritation from the gums from rubbing from her partial which may be ill fitting given infrequent usage.  Differential diagnosis also includes contact irritation from clove oil.  We discussed options.  I strongly recommend initiation of oral antibiotics at this time.  Based on her allergies this is difficult.  We decided on Keflex and Flagyl to cover both aerobes and anaerobes.  Adverse effects were discussed including diarrhea.  Yogurt is recommended.  - metroNIDAZOLE (FLAGYL) 500 MG tablet; Take 1 tablet (500 mg) by mouth 3 times daily for 5 days  Dispense: 15 tablet; Refill: 0  - mupirocin (BACTROBAN) 2 % external ointment; Apply topically 3 times daily  Dispense: 30 g; Refill: 3  - cephALEXin (KEFLEX) 250 MG capsule; Take 1 capsule (250 mg) by mouth 4 times daily for 5 days  Dispense: 20 capsule; Refill: 0      Patient Instructions    --  Keflex x 5 days   -- Flagyl x 5 days   -- Topical mupirocin  -- Eat yogurt 1-2 times per day while on antibiotics (and for a few weeks after) to reduce the chances of diarrhea   -- Follow-up with the dentist      Return if symptoms worsen or fail to improve.    Nuno Ibrahim MD, FAAP, FACP  Internal Medicine & Pediatrics

## 2021-11-16 ENCOUNTER — TELEPHONE (OUTPATIENT)
Dept: PEDIATRICS | Facility: OTHER | Age: 86
End: 2021-11-16
Payer: COMMERCIAL

## 2021-11-16 NOTE — TELEPHONE ENCOUNTER
Patient took last dose of Antibiotics this AM.  Uses [facility], patient can get into the dentist again in a couple of days and wants patient to continue with the antibiotics.  Family would like Dr. Clinton's opinion about the antibiotics.      Hattie Cantu LPN 11/16/2021 3:51 PM

## 2021-11-16 NOTE — TELEPHONE ENCOUNTER
Janna had an appointment with the dentist today and the dentist .said the infection appears to be gone and the swelling is down but he would like Janna to stay on the antibiotics until this tooth is fixed.  Please call her daughter Génesis she has several concerns: one will Dr Clinton refill the antibiotics that she did well with, two is it appropriate to continue antiobiotics if the infection is gone, and three how long is it okay to be on these antibiotics.    Please call Génesis Handley on 11/16/2021 at 1:35 PM

## 2021-11-16 NOTE — TELEPHONE ENCOUNTER
If the infection is gone antibiotic should not be needed.  If the dentist wants her to stay on antibiotics he can prescribe them as well.  Shortest effective duration of antibiotics is recommended.    Nuno Ibrahim MD, FAAP, FACP  Internal Medicine & Pediatrics

## 2021-11-23 ENCOUNTER — TELEPHONE (OUTPATIENT)
Dept: PEDIATRICS | Facility: OTHER | Age: 86
End: 2021-11-23
Payer: COMMERCIAL

## 2021-11-23 NOTE — TELEPHONE ENCOUNTER
Patient's daughter states that she did go to the dentist and the infection is mostly gone but is still in the root.  The dentist is suggesting a root canal. Daughter is wondering is safe to have novocain with the medications that she is on.  Please advise.  Yvonne Marroquin LPN  11/23/2021 11:46 AM

## 2021-11-23 NOTE — TELEPHONE ENCOUNTER
The patient's daughter called stating that the dentist wants the patient to have a root canal.   Daughter was wanting Dr Clinton's opinion about this.

## 2021-11-23 NOTE — TELEPHONE ENCOUNTER
After patient's name and date of birth verified the below information was given.    Ally Johnson LPN ---- 11/23/2021 1:11 PM

## 2021-11-30 ENCOUNTER — TELEPHONE (OUTPATIENT)
Dept: PEDIATRICS | Facility: OTHER | Age: 86
End: 2021-11-30
Payer: COMMERCIAL

## 2021-11-30 NOTE — TELEPHONE ENCOUNTER
[facility] question [facility] to be filled out by PCP then faxed back.      Please call when paperwork is finished  Lorene Blank on 11/30/2021 at 10:49 AM

## 2021-12-03 ENCOUNTER — TELEPHONE (OUTPATIENT)
Dept: PEDIATRICS | Facility: OTHER | Age: 86
End: 2021-12-03
Payer: COMMERCIAL

## 2021-12-03 NOTE — TELEPHONE ENCOUNTER
[facility]- Patient is calling in regard to a form she received from the [facility]. She would like to know if Dr. Clinton also received a form from them. Patient states she would like a call back ASA. Ok to leave detailed message [phone].        Svetlana Perez on 12/3/2021 at 9:57 AM

## 2021-12-03 NOTE — TELEPHONE ENCOUNTER
The paperwork was faxed today and will have  look at and sign. Yvonne Fritz LPN .......................12/3/2021  11:27 AM

## 2021-12-03 NOTE — TELEPHONE ENCOUNTER
I don't recall seeing her form.    Signed, Nuno Clinton MD, FAAP, FACP  Internal Medicine & Pediatrics

## 2021-12-06 ENCOUNTER — TELEPHONE (OUTPATIENT)
Dept: NEUROLOGY | Facility: CLINIC | Age: 86
End: 2021-12-06
Payer: COMMERCIAL

## 2021-12-06 NOTE — TELEPHONE ENCOUNTER
[facility] Call Center    Phone Message    May a detailed message be left on voicemail: yes     Reason for Call: Other: Génesis needs the clinic to call her because there are some questions on the form that Dr. Maritza Smith will not know how to answer. The [facility] will [facility] coming from the [facility] office in [place], [place]. Please call Génesis to have a conversation about this form from the [facility]. Génesis reporting that the form will be faxed today and Génesis needs to touch base with our clinic before the form comes to the clinic Thank you.    Action Taken: Message routed to:  [facility]    Travel Screening: Not Applicable

## 2021-12-08 NOTE — TELEPHONE ENCOUNTER
I called pt daughter Génesis back to discuss VA form.  at the [facility] will be faxing a form to the clinic for Dr. Garrett to review sign. Will be marked ATTN: Quiana MONTANEZ; verified fax number.     Génesis wanted to review a few questions that we may not have the answer to.   Page 2:   #19 Nutrition: pt has gluten free/dairy free diet d/t diverticulitis  #26 able to feed self Yes  #27 prepare own meals - No, daughter prepares all meals  #28 assistance with bathing needed - Yes  #29: legally blind - No  #30: nursing home care needed - No  #31: med management needed - yes, daughter prepares medications  #32: judgment capacity - Yes    3rd page  #33: posture/gait - yes  #34: restrictions with upper extremities - yes with   #35: restrictions of lower extremities - Yes, uses cane during the day, walker at night  #36 restrictions of spine/trunk/neck - No  #37 balance issues - yes  #38: Leaves home with daughter a couple times a week to walk, not able to leave home independently  #39: pt uses a cane and a walker    Will keep eye out for form.     Quiana MONTANEZ

## 2021-12-08 NOTE — TELEPHONE ENCOUNTER
Pt's daughter called back wanting to discuss the form the VA will be faxing prior to the VA faxing the form.    Please call Génesis ulloa ASAP.

## 2021-12-10 ENCOUNTER — TELEPHONE (OUTPATIENT)
Dept: NEUROLOGY | Facility: CLINIC | Age: 86
End: 2021-12-10
Payer: COMMERCIAL

## 2021-12-10 NOTE — TELEPHONE ENCOUNTER
Reason for Call:  Form, our goal is to have forms completed with 72 hours, however, some forms may require a visit or additional information.    Type of letter, form or note:  [facility] forms    Who is the form from?: Patient's daughter-Génesis    Where did the form come from: form was faxed in    What clinic location was the form placed at?: [facility] clinic-Dr. Garrett     Where the form was placed: needs to be fax via the [facility] clinic    What number is listed as a contact on the form? Emergncy contact-daughter Génesis       Additional comments: Please call daughter back to confirm fax number. Daughter stated that [facility] tried faxing forms to both fax given and they both did not work.    Call taken on 12/10/2021 at 9:20 AM by Kyle Reina

## 2021-12-13 ENCOUNTER — DOCUMENTATION ONLY (OUTPATIENT)
Dept: NEUROLOGY | Facility: CLINIC | Age: 86
End: 2021-12-13
Payer: COMMERCIAL

## 2021-12-13 DIAGNOSIS — E03.9 ACQUIRED HYPOTHYROIDISM: Chronic | ICD-10-CM

## 2021-12-15 ENCOUNTER — CARE COORDINATION (OUTPATIENT)
Dept: NEUROLOGY | Facility: CLINIC | Age: 86
End: 2021-12-15
Payer: COMMERCIAL

## 2021-12-15 ENCOUNTER — TELEPHONE (OUTPATIENT)
Dept: PEDIATRICS | Facility: OTHER | Age: 86
End: 2021-12-15
Payer: COMMERCIAL

## 2021-12-15 NOTE — TELEPHONE ENCOUNTER
Patient would like her RX called into [facility].  Patient states it was supposed to be done yesterday.  Lorene Blank on 12/15/2021 at 3:57 PM

## 2021-12-16 RX ORDER — THYROID 60 MG
TABLET ORAL
Qty: 45 TABLET | Refills: 0 | Status: SHIPPED | OUTPATIENT
Start: 2021-12-16 | End: 2022-03-08

## 2021-12-16 RX ORDER — THYROID,PORK 90 MG
TABLET ORAL
Qty: 45 TABLET | Refills: 0 | Status: SHIPPED | OUTPATIENT
Start: 2021-12-16 | End: 2022-03-08

## 2021-12-16 NOTE — TELEPHONE ENCOUNTER
Refer to telephone encounter dated 12/15/2021  Patient would like her RX called into [facility].  Patient states it was supposed to be done yesterday.  Lorene Muñozluciano on 12/15/2021 at 3:57 PM    ARMOUR THYROID 90 MG tablet & ARMOUR THYROID 60 MG tablet  Last Written Prescription Date: 10/11/2021  Last Fill Quantity: 45,   # refills: 0  Last Office Visit: 11/11/2021  Future Office visit:       Routing refill request to provider for review/approval:    Failed - Normal TSH on file in past 12 months   Recent Labs   Lab Test 10/18/21  0712   TSH 9.16*           Unable to complete prescription refill per RNMedication Refill Policy.................... Stacy Flood RN ....................  12/16/2021   7:50 AM

## 2021-12-21 NOTE — TELEPHONE ENCOUNTER
"[facility] Call Center    Phone Message    May a detailed message be left on voicemail: yes     Reason for Call: Other: per Génesis Vargas, daughter of pt: on 12/10/21 [facility] Dr[facility] Garrett's nurse Quiana regarding \"pt receiving in home care.\"  Génesis is asking IF the provider/nurse Quiana received the paperwork from the [facility] and if so, what is the status on getting this paperwork completed?      Please call Génesis becerril to let her know at ph# [phone].   Thank you.    Action Taken: Message routed to:  [facility] ([facility]    Travel Screening: Not Applicable                                                                      "

## 2021-12-30 ENCOUNTER — CARE COORDINATION (OUTPATIENT)
Dept: NEUROLOGY | Facility: CLINIC | Age: 86
End: 2021-12-30
Payer: COMMERCIAL

## 2021-12-30 NOTE — TELEPHONE ENCOUNTER
Patients daughter Génesis calling stating [facility] has still not received form discussed below. Please call to discuss thank you.

## 2021-12-30 NOTE — PROGRESS NOTES
"I corrected the form for the [facility] per pt daughter Génesis's update:   \"#27 my mother does not make her own meals  #28 she absolutely DOES need assistance with bathing putting compression stocking on and PT was ordered twice for this. There is no way she can bathe unattended.  #35 she exclusively walks with a cane and uses a walker in her home  #37 uses a cane due to balance issues  #38 is unable to leave home without assistance does not drive and needs assistance getting around for daily activities  #39 uses both cane and walker\"    Form refaxed to [facility] and emailed to Génesis.     Quiana MONTANEZ  "

## 2021-12-30 NOTE — TELEPHONE ENCOUNTER
I called pt daughter back. I let her know I re-faxed the [facility] form Dr[facility] Garrett signed. First sent on 12/15. I will also e-mail a copy to Padmini Araya RN

## 2022-02-06 ENCOUNTER — NURSE TRIAGE (OUTPATIENT)
Dept: NURSING | Facility: CLINIC | Age: 87
End: 2022-02-06
Payer: COMMERCIAL

## 2022-02-06 NOTE — TELEPHONE ENCOUNTER
"Rash on both sides from axilla down sides of torso. Began after pt used \"old salt stone deodorant\" which dripped down her sides. Pt also began applying heating pad to these areas. Rash is non-raised, very itchy, red and flat like a burn, no blisters, some small raised bumps \"like pimples on edges of rash area\". No breathing or swallowing difficulty. Pt has tried a number of remedies on the rash including coconut oil, Cerave lotion, Desitin, Vaseline and cumin seed oil.None helpful except Desitin helped a little. Tried HC cream 1 day decided not helpful and stopped using. Advised see provider w/i 3 days.   Advised stop all the above products. Wash area w/ mild soap and water x1 then use only plain water Apply HC 1% OTC cream for itching. Apply cool compresses for itching, no heat.     Reason for Disposition    Localized rash present > 7 days    Additional Information    Negative: [1] Sudden onset of rash (within last 2 hours) AND [2] difficulty with breathing or swallowing    Negative: Sounds like a life-threatening emergency to the triager    Negative: Poison ivy, oak, or sumac contact suspected    Negative: Insect bite(s) suspected    Negative: Ringworm suspected (i.e., round pink patch, sometimes looks like ring, usually 1/2 to 1 inch [12-25 mm],  in size, slowly increasing in size)    Negative: Athlete's Foot suspected (i.e., itchy rash between the toes)    Negative: Jock Itch suspected (i.e., itchy rash on inner thighs near genital area)    Negative: Wound infection suspected (i.e., pain, spreading redness, or pus; in a cut, puncture, scrape or sutured wound)    Negative: Impetigo suspected  (i.e., painless infected superficial small sores, less than 1 inch or 2.5 cm, often covered by a soft, yellow-brown scab or crust; sometimes occurring near nasal openings)    Negative: Shingles suspected (i.e., painful rash, multiple small blisters grouped together in one area of body; dermatomal distribution)    Negative: " Rash of external female genital area (vulva)    Negative: Rash of penis or scrotum    Negative: Small spot, skin growth, or mole    Negative: Sores or skin ulcer, not a rash    Negative: Localized lump (or swelling) without redness or rash    Negative: [1] Localized purple or blood-colored spots or dots AND [2] not from injury or friction AND [3] fever    Negative: Patient sounds very sick or weak to the triager    Negative: [1] Red area or streak AND [2] fever    Negative: [1] Rash is painful to touch AND [2] fever    Negative: [1] Looks infected (spreading redness, pus) AND [2] large red area (> 2 in. or 5 cm)    Negative: [1] Looks infected (spreading redness, pus) AND [2] diabetes mellitus or weak immune system (e.g., HIV positive, cancer chemo, splenectomy, organ transplant, chronic steroids)    Negative: [1] Localized purple or blood-colored spots or dots AND [2] not from injury or friction AND [3] no fever    Negative: [1] Looks infected (spreading redness, pus) AND [2] no fever    Negative: Looks like a boil, infected sore, deep ulcer or other infected rash    Negative: [1] Localized rash is very painful AND [2] no fever    Negative: Genital area rash    Negative: Lyme disease suspected (e.g., bull's eye rash or tick bite / exposure)    Negative: [1] Applying cream or ointment AND [2] causes severe itch, burning or pain    Negative: [1] Pimples (localized) AND [2 ] no improvement after using CARE ADVICE    Negative: Tender bumps in armpits    Negative: [1] Severe localized itching AND [2] after 2 days of steroid cream    Protocols used: RASH OR REDNESS - [id number]-[id number]

## 2022-02-07 ENCOUNTER — OFFICE VISIT (OUTPATIENT)
Dept: FAMILY MEDICINE | Facility: OTHER | Age: 87
End: 2022-02-07
Attending: FAMILY MEDICINE
Payer: COMMERCIAL

## 2022-02-07 VITALS
OXYGEN SATURATION: 95 % | HEART RATE: 68 BPM | DIASTOLIC BLOOD PRESSURE: 62 MMHG | RESPIRATION RATE: 18 BRPM | TEMPERATURE: 98.3 F | SYSTOLIC BLOOD PRESSURE: 100 MMHG

## 2022-02-07 DIAGNOSIS — E03.9 ACQUIRED HYPOTHYROIDISM: ICD-10-CM

## 2022-02-07 DIAGNOSIS — I63.9 CEREBELLAR STROKE (H): ICD-10-CM

## 2022-02-07 DIAGNOSIS — L23.9 CONTACT ALLERGIC REACTION: Primary | ICD-10-CM

## 2022-02-07 LAB
CHOLEST SERPL-MCNC: 111 MG/DL
FASTING STATUS PATIENT QL REPORTED: NO
HDLC SERPL-MCNC: 56 MG/DL (ref 23–92)
LDLC SERPL CALC-MCNC: 40 MG/DL
NONHDLC SERPL-MCNC: 55 MG/DL
T4 FREE SERPL-MCNC: 0.83 NG/DL (ref 0.6–1.6)
TRIGL SERPL-MCNC: 74 MG/DL
TSH SERPL DL<=0.005 MIU/L-ACNC: 12.47 MU/L (ref 0.4–4)

## 2022-02-07 PROCEDURE — 99214 OFFICE O/P EST MOD 30 MIN: CPT | Performed by: FAMILY MEDICINE

## 2022-02-07 PROCEDURE — 80061 LIPID PANEL: CPT | Mod: ZL | Performed by: FAMILY MEDICINE

## 2022-02-07 PROCEDURE — 36415 COLL VENOUS BLD VENIPUNCTURE: CPT | Mod: ZL | Performed by: FAMILY MEDICINE

## 2022-02-07 PROCEDURE — G0463 HOSPITAL OUTPT CLINIC VISIT: HCPCS | Mod: 25

## 2022-02-07 PROCEDURE — 84439 ASSAY OF FREE THYROXINE: CPT | Mod: ZL | Performed by: FAMILY MEDICINE

## 2022-02-07 PROCEDURE — 84443 ASSAY THYROID STIM HORMONE: CPT | Mod: ZL | Performed by: FAMILY MEDICINE

## 2022-02-07 RX ORDER — TRIAMCINOLONE ACETONIDE 1 MG/G
CREAM TOPICAL 3 TIMES DAILY
Qty: 454 G | Refills: 4 | Status: SHIPPED | OUTPATIENT
Start: 2022-02-07 | End: 2024-05-23

## 2022-02-07 ASSESSMENT — PAIN SCALES - GENERAL: PAINLEVEL: NO PAIN (0)

## 2022-02-07 NOTE — PATIENT INSTRUCTIONS
Consider Zyrtec or Claritin 5 to 10 mg each evening to help itching  Apply triamcinolone two to three times daily to help itching and resolve rash. Wean off the cream  Dove or Cetaphil soap are considered most gentle    Checking cholesterol and thyroid tests

## 2022-02-07 NOTE — PROGRESS NOTES
Assessment & Plan       ICD-10-CM    1. Contact allergic reaction  L23.9 triamcinolone (KENALOG) 0.1 % external cream   2. Acquired hypothyroidism  E03.9 TSH Reflex GH     TSH Reflex GH     T4 free   3. Cerebellar stroke (H)  I63.9 Lipid Panel     Lipid Panel       Rash very consistent with contact dermatitis.  Is hard to figure out what could have triggered it. Certainly some new topical substance could be the cause.  Does not seem like a drug reaction from one of the medication she takes.  Patient has noticed that getting hot or sweaty makes the rash worse. Avoid heating pad and getting overheated.  Avoid excessive showering.  Recommend regular and consistent use of triamcinolone 2-3 times daily until improving, then gradually reduce frequency.  Avoid other topical substances as this will potentially aggravate her symptoms.  If worse, return for evaluation    Obtain TSH at patient and daughter's request. TSH is slightly elevated, but T4 remains in normal range. She is on [place] Thyroid and I recommended they determine how to proceed with dosing through her PCP, given its variable nature.    Obtained nonfasting lipids. HDL increased, LDL decreased. Overall total cholesterol down 3 points. At this point, her non-HDL cholesterol did not drop so significantly, that she appears to require dose reduction in the rosuvastatin, so I defer this discussion to her follow-up appointment with neurology.    Follow up as needed     32 minutes spent on the date of the encounter doing chart review, interpretation of tests, patient visit and documentation     Alexis York MD     [facility]      Nursing Notes:   Hattie Cantu LPN  2/7/2022 10:02 AM  Sign at exiting of workspace  Patient presents to the clinic for itching, red skin of the back and sides for the past week.    FOOD SECURITY SCREENING QUESTIONS:    The next two questions are to help us understand your food security.  If you are feeling you  need any assistance in this area, we have resources available to support you today.    Hunger Vital Signs:  Within the past 12 months we worried whether our food would run out before we got money to buy more. Never  Within the past 12 months the food we bought just didn't last and we didn't have money to get more. Never    Advance Care Directive on file? no  Advance Care Directive provided to patient? Yes  Chief Complaint   Patient presents with     Derm Problem       Initial /62 (BP Location: Right arm, Patient Position: Sitting, Cuff Size: Adult Regular)   Pulse 68   Temp 98.3  F (36.8  C) (Tympanic)   Resp 18   LMP  (LMP Unknown)   SpO2 95%   Breastfeeding No  Estimated body mass index is 28.12 kg/m  as calculated from the following:    Height as of 10/22/21: 1.524 m (5').    Weight as of 11/11/21: 65.3 kg (144 lb).  Medication Reconciliation: complete        Hattie Cantu LPN            SUBJECTIVE:  History of Present Illness       She eats 2-3 servings of fruits and vegetables daily.She consumes 0 sweetened beverage(s) daily.She exercises with enough effort to increase her heart rate 9 or less minutes per day.  She exercises with enough effort to increase her heart rate 7 days per week.   She is taking medications regularly.      89 year old female presents with daughter for rash on both sides  She is not sure what caused it to start.  Is concerned that using her heating pad on the high setting may have triggered the reaction.  However, she also used some salt stone deodorant, obtain a new blouse, and use some new oil of [place] She[place] butter soap.  Has tried black cumin seed oil, but the seem to make it worse. Hydrocortisone might of helped a little. Desitin seems beneficial. Vaseline, CeraVe, coconut oil have not helped.  Today she has substantial erythema, thickness and induration down her right and left sides. Left axilla is involved, but not the right.  No fever, URI symptoms, or myalgias.    She  has not had recent medication changes.  Daughter reports that they need her TSH checked. She takes [place] [facility]  Additionally, she had a stroke about a year ago and was placed on rosuvastatin 10 mg daily.  Daughter is concerned that the HDL is going to be too low and she will be at risk for hemorrhagic stroke. Dr. Garrett of neurology recommended reducing rosuvastatin if LDL was 30 or below.       REVIEW OF SYSTEMS:    Pertinent items are noted in HPI.    Current Outpatient Medications   Medication Sig Dispense Refill     apixaban ANTICOAGULANT (ELIQUIS) 5 MG tablet Take 1 tablet (5 mg) by mouth 2 times daily 180 tablet 3     ARMOUR THYROID 60 MG tablet TAKE 1 TABLET BY MOUTH EVERY OTHER DAY 45 tablet 0     ARMOUR THYROID 90 MG tablet TAKE 1 TABLET BY MOUTH EVERY OTHER DAY 45 tablet 0     Cholecalciferol ([employer] VITAMIN D3) 400 UNITS CAPS Take 800 Units by mouth daily       diltiazem ER COATED BEADS (CARDIZEM CD/CARTIA XT) 120 MG 24 hr capsule Take 1 capsule (120 mg) by mouth daily 30 capsule 0     fish oil-omega-3 fatty acids 1000 MG capsule Take 1,000 capsules by mouth daily       Flaxseed Oil OIL Take by mouth daily       latanoprost (XALATAN) 0.005 % ophthalmic solution Place 1 drop into both eyes At Bedtime        mupirocin (BACTROBAN) 2 % external ointment Apply topically 3 times daily 30 g 3     rosuvastatin (CRESTOR) 10 MG tablet Take 1 tablet (10 mg) by mouth daily 60 tablet 4     Sesame Oil OIL Take by mouth daily       Allergies   Allergen Reactions     Bee Venom Anaphylaxis     Benazepril Cough     Ciprofloxacin Other (See Comments)     Possible GI illness     Erythromycin Other (See Comments)     Unsure of allergy time, possible hives.     Gluten Meal GI Disturbance     Hydrochlorothiazide      Other reaction(s): Hyponatremia     Penicillins Other (See Comments)     Unsure of allergy time, possible hives       OBJECTIVE:  /62 (BP Location: Right arm, Patient Position: Sitting, Cuff Size: Adult  Regular)   Pulse 68   Temp 98.3  F (36.8  C) (Tympanic)   Resp 18   LMP  (LMP Unknown)   SpO2 95%   Breastfeeding No     EXAM:  General Appearance: Alert. No acute distress  Psychiatric: Normal affect and mentation  Skin: Right axilla is not involved. Right chest wall down to right abdomen has coalesced area of erythematous, thickened, indurated macules. Most consistent with contact reaction.  Left axilla with erythematous macules and papules coalescing into a solid erythematous leather-like thickened rash extending down to her left abdomen.  No involvement on back or anterior abdomen. No rash on extremities or face.    Results for orders placed or performed in visit on 02/07/22   TSH Reflex GH     Status: Abnormal   Result Value Ref Range    TSH 12.47 (H) 0.40 - 4.00 mU/L   Lipid Panel     Status: None   Result Value Ref Range    Cholesterol 111 <200 mg/dL    Triglycerides 74 <150 mg/dL    Direct Measure HDL 56 23 - 92 mg/dL    LDL Cholesterol Calculated 40 <=100 mg/dL    Non HDL Cholesterol 55 <130 mg/dL    Patient Fasting > 8hrs? No     Narrative    Cholesterol  Desirable:  <200 mg/dL    Triglycerides  Normal:  Less than 150 mg/dL  Borderline High:  150-199 mg/dL  High:  200-499 mg/dL  Very High:  Greater than or equal to 500 mg/dL    Direct Measure HDL  Female:  Greater than or equal to 50 mg/dL   Male:  Greater than or equal to 40 mg/dL    LDL Cholesterol  Desirable:  <100mg/dL  Above Desirable:  100-129 mg/dL   Borderline High:  130-159 mg/dL   High:  160-189 mg/dL   Very High:  >= 190 mg/dL    Non HDL Cholesterol  Desirable:  130 mg/dL  Above Desirable:  130-159 mg/dL  Borderline High:  160-189 mg/dL  High:  190-219 mg/dL  Very High:  Greater than or equal to 220 mg/dL   T4 free     Status: Normal   Result Value Ref Range    Free T4 0.83 0.60 - 1.60 ng/dL

## 2022-02-07 NOTE — NURSING NOTE
Patient presents to the clinic for itching, red skin of the back and sides for the past week.    FOOD SECURITY SCREENING QUESTIONS:    The next two questions are to help us understand your food security.  If you are feeling you need any assistance in this area, we have resources available to support you today.    Hunger Vital Signs:  Within the past 12 months we worried whether our food would run out before we got money to buy more. Never  Within the past 12 months the food we bought just didn't last and we didn't have money to get more. Never    Advance Care Directive on file? no  Advance Care Directive provided to patient? Yes  Chief Complaint   Patient presents with     Derm Problem       Initial /62 (BP Location: Right arm, Patient Position: Sitting, Cuff Size: Adult Regular)   Pulse 68   Temp 98.3  F (36.8  C) (Tympanic)   Resp 18   LMP  (LMP Unknown)   SpO2 95%   Breastfeeding No  Estimated body mass index is 28.12 kg/m  as calculated from the following:    Height as of 10/22/21: 1.524 m (5').    Weight as of 11/11/21: 65.3 kg (144 lb).  Medication Reconciliation: complete        Hattie Cantu LPN

## 2022-03-07 DIAGNOSIS — E03.9 ACQUIRED HYPOTHYROIDISM: Chronic | ICD-10-CM

## 2022-03-08 RX ORDER — THYROID,PORK 90 MG
TABLET ORAL
Qty: 45 TABLET | Refills: 0 | Status: SHIPPED | OUTPATIENT
Start: 2022-03-08 | End: 2022-05-25

## 2022-03-08 RX ORDER — THYROID 60 MG
TABLET ORAL
Qty: 45 TABLET | Refills: 0 | Status: SHIPPED | OUTPATIENT
Start: 2022-03-08 | End: 2022-05-25

## 2022-03-08 NOTE — TELEPHONE ENCOUNTER
"Routing refill request to provider for review/approval because:    LOV: 2/7/2022    Per TSH result note on 2/7/2022  \"  TSH is high, so the brain wants a little more thyroid medication in general. T4 is the hormone and that level is in a normal range. I do not modify Armor thyroid since it is so variable. This could be addressed through your PCP if needed.   Written by Alexis York MD on 2/7/2022  2:56 PM CST  Bessie Marroquin RN on 3/8/2022 at 8:46 AM      "

## 2022-03-21 DIAGNOSIS — I63.22 CEREBROVASCULAR ACCIDENT (CVA) DUE TO STENOSIS OF BASILAR ARTERY (H): ICD-10-CM

## 2022-03-21 RX ORDER — ROSUVASTATIN CALCIUM 10 MG/1
10 TABLET, COATED ORAL DAILY
Qty: 60 TABLET | Refills: 4 | Status: SHIPPED | OUTPATIENT
Start: 2022-03-21 | End: 2022-05-04

## 2022-04-13 ENCOUNTER — TELEPHONE (OUTPATIENT)
Dept: PEDIATRICS | Facility: OTHER | Age: 87
End: 2022-04-13
Payer: COMMERCIAL

## 2022-04-13 DIAGNOSIS — E03.9 ACQUIRED HYPOTHYROIDISM: ICD-10-CM

## 2022-04-13 DIAGNOSIS — E78.2 MIXED HYPERLIPIDEMIA: Primary | ICD-10-CM

## 2022-04-13 DIAGNOSIS — I10 ESSENTIAL HYPERTENSION: ICD-10-CM

## 2022-04-13 NOTE — TELEPHONE ENCOUNTER
Called JAMES Capps, she requests to be called back later and hung up.  Kristie Dubose RN on 4/13/2022 at 2:44 PM

## 2022-04-14 NOTE — TELEPHONE ENCOUNTER
Patient had the following labs on 2-7-2022: TSH, T4 and Lipid.  Patient was not fasting for those labs and daughter would like them rechecked.  Daughter is hoping that her LDL will get low enough in order to lower the statin prescription.      Daughter would also like Liver and Kidney labs checked.  Daughter is worried about TSH level and would like it rechecked along with consideration for Thyroid Antibody testing.      Hattie Cantu LPN 4/14/2022 9:40 AM

## 2022-04-18 DIAGNOSIS — I63.9 ACUTE CVA (CEREBROVASCULAR ACCIDENT) (H): ICD-10-CM

## 2022-04-18 RX ORDER — APIXABAN 5 MG/1
TABLET, FILM COATED ORAL
Qty: 60 TABLET | Refills: 0 | Status: SHIPPED | OUTPATIENT
Start: 2022-04-18 | End: 2022-05-18

## 2022-04-18 NOTE — TELEPHONE ENCOUNTER
Janna is coming to the clinic at 0730 for labs. She is almost out of apixiban and desires not to make an extra trip if possible. Apixiban was started following a stroke in 2021 by cardiology/neurology. Will fill 30 days per Collaborative Practice Agreement. Further fills per Dr Clinton as she appears to be due for an office visit.    Requested Prescriptions   Signed Prescriptions Disp Refills    ELIQUIS ANTICOAGULANT 5 MG tablet 60 tablet 0     Sig: Take 1 tablet (5 mg) by mouth 2 times daily       Direct Oral Anticoagulant Agents Failed - 4/18/2022  8:48 AM        Failed - Normal Platelets on file in past 12 months     Recent Labs   Lab Test 03/29/21  0557                  Failed - Patient is 18-79 years of age        Passed - Medication is active on med list        Passed - Serum creatinine less than or equal to 1.4 on file in past 12 mos     Recent Labs   Lab Test 10/18/21  0712   CR 1.02       Ok to refill medication if creatinine is low          Passed - Weight is greater than 60 kg for the past year     Wt Readings from Last 3 Encounters:   11/11/21 65.3 kg (144 lb)   10/22/21 66.2 kg (146 lb)   06/03/21 66.2 kg (146 lb)             Passed - No active pregnancy on record        Passed - No positive pregnancy test within past 12 months        Passed - Recent (6 mo) or future (30 days) visit within the authorizing provider's specialty

## 2022-04-19 ENCOUNTER — LAB (OUTPATIENT)
Dept: LAB | Facility: OTHER | Age: 87
End: 2022-04-19
Attending: INTERNAL MEDICINE
Payer: MEDICARE

## 2022-04-19 DIAGNOSIS — E78.2 MIXED HYPERLIPIDEMIA: ICD-10-CM

## 2022-04-19 DIAGNOSIS — E03.9 ACQUIRED HYPOTHYROIDISM: ICD-10-CM

## 2022-04-19 DIAGNOSIS — I10 ESSENTIAL HYPERTENSION: ICD-10-CM

## 2022-04-19 LAB
ALBUMIN SERPL-MCNC: 4.1 G/DL (ref 3.5–5.7)
ALP SERPL-CCNC: 59 U/L (ref 34–104)
ALT SERPL W P-5'-P-CCNC: 20 U/L (ref 7–52)
ANION GAP SERPL CALCULATED.3IONS-SCNC: 9 MMOL/L (ref 3–14)
AST SERPL W P-5'-P-CCNC: 23 U/L (ref 13–39)
BILIRUB SERPL-MCNC: 0.8 MG/DL (ref 0.3–1)
BUN SERPL-MCNC: 30 MG/DL (ref 7–25)
CALCIUM SERPL-MCNC: 9.4 MG/DL (ref 8.6–10.3)
CHLORIDE BLD-SCNC: 103 MMOL/L (ref 98–107)
CHOLEST SERPL-MCNC: 131 MG/DL
CO2 SERPL-SCNC: 27 MMOL/L (ref 21–31)
CREAT SERPL-MCNC: 0.98 MG/DL (ref 0.6–1.2)
ERYTHROCYTE [DISTWIDTH] IN BLOOD BY AUTOMATED COUNT: 13.8 % (ref 10–15)
FASTING STATUS PATIENT QL REPORTED: YES
GFR SERPL CREATININE-BSD FRML MDRD: 54 ML/MIN/1.73M2
GLUCOSE BLD-MCNC: 109 MG/DL (ref 70–105)
HCT VFR BLD AUTO: 47.3 % (ref 35–47)
HDLC SERPL-MCNC: 60 MG/DL (ref 23–92)
HGB BLD-MCNC: 15.6 G/DL (ref 11.7–15.7)
LDLC SERPL CALC-MCNC: 56 MG/DL
MCH RBC QN AUTO: 31.3 PG (ref 26.5–33)
MCHC RBC AUTO-ENTMCNC: 33 G/DL (ref 31.5–36.5)
MCV RBC AUTO: 95 FL (ref 78–100)
NONHDLC SERPL-MCNC: 71 MG/DL
PLATELET # BLD AUTO: 183 10E3/UL (ref 150–450)
POTASSIUM BLD-SCNC: 4 MMOL/L (ref 3.5–5.1)
PROT SERPL-MCNC: 7 G/DL (ref 6.4–8.9)
RBC # BLD AUTO: 4.98 10E6/UL (ref 3.8–5.2)
SODIUM SERPL-SCNC: 139 MMOL/L (ref 134–144)
T4 FREE SERPL-MCNC: 0.72 NG/DL (ref 0.6–1.6)
TRIGL SERPL-MCNC: 77 MG/DL
TSH SERPL DL<=0.005 MIU/L-ACNC: 11.6 MU/L (ref 0.4–4)
WBC # BLD AUTO: 7.8 10E3/UL (ref 4–11)

## 2022-04-19 PROCEDURE — 84439 ASSAY OF FREE THYROXINE: CPT | Mod: ZL

## 2022-04-19 PROCEDURE — 85027 COMPLETE CBC AUTOMATED: CPT | Mod: ZL

## 2022-04-19 PROCEDURE — 80061 LIPID PANEL: CPT | Mod: ZL

## 2022-04-19 PROCEDURE — 84443 ASSAY THYROID STIM HORMONE: CPT | Mod: ZL

## 2022-04-19 PROCEDURE — 36415 COLL VENOUS BLD VENIPUNCTURE: CPT | Mod: ZL

## 2022-04-19 PROCEDURE — 80053 COMPREHEN METABOLIC PANEL: CPT | Mod: ZL

## 2022-04-19 NOTE — TELEPHONE ENCOUNTER
-- Schedule med management OV    Signed, Nuno Clinton MD, FAAP, FACP  Internal Medicine & Pediatrics

## 2022-04-20 NOTE — TELEPHONE ENCOUNTER
Family inquiring if they can have video or telephone visit for the medication management appointment.  Please advise.

## 2022-04-20 NOTE — TELEPHONE ENCOUNTER
Patient notified of need to be seen for medication management appointment.  Call transferred to scheduling for assistance in setting up office visit.    Kenisha Cutler LPN............4/20/2022 12:59 PM

## 2022-04-20 NOTE — TELEPHONE ENCOUNTER
An in person office visit would be best.    Signed, Nuno Clinton MD, FAAP, FACP  Internal Medicine & Pediatrics

## 2022-04-20 NOTE — TELEPHONE ENCOUNTER
Patient notified of response per provider and verbally understood.  Office visit was scheduled for May 10th at 9am with Harsh Vasquez MD.    Kenisha Cutler LPN............4/20/2022 3:08 PM

## 2022-04-22 ENCOUNTER — OFFICE VISIT (OUTPATIENT)
Dept: NEUROLOGY | Facility: OTHER | Age: 87
End: 2022-04-22
Attending: PSYCHIATRY & NEUROLOGY
Payer: COMMERCIAL

## 2022-04-22 VITALS
HEART RATE: 81 BPM | OXYGEN SATURATION: 96 % | RESPIRATION RATE: 18 BRPM | DIASTOLIC BLOOD PRESSURE: 74 MMHG | SYSTOLIC BLOOD PRESSURE: 148 MMHG | TEMPERATURE: 97.6 F

## 2022-04-22 DIAGNOSIS — I65.09 VERTEBROBASILAR ARTERY STENOSIS: ICD-10-CM

## 2022-04-22 DIAGNOSIS — I48.0 PAROXYSMAL ATRIAL FIBRILLATION (H): ICD-10-CM

## 2022-04-22 DIAGNOSIS — I63.441 CEREBROVASCULAR ACCIDENT (CVA) DUE TO EMBOLISM OF RIGHT CEREBELLAR ARTERY (H): Primary | ICD-10-CM

## 2022-04-22 DIAGNOSIS — I65.1 VERTEBROBASILAR ARTERY STENOSIS: ICD-10-CM

## 2022-04-22 PROCEDURE — G0463 HOSPITAL OUTPT CLINIC VISIT: HCPCS

## 2022-04-22 PROCEDURE — 99213 OFFICE O/P EST LOW 20 MIN: CPT | Performed by: PSYCHIATRY & NEUROLOGY

## 2022-04-22 ASSESSMENT — PAIN SCALES - GENERAL: PAINLEVEL: NO PAIN (0)

## 2022-04-22 NOTE — PROGRESS NOTES
Outpatient Neurology Visit  4/22/2022    Subjective:  Janna Mcdermott is a 89 year old female who presents today for follow up evaluation of cerebellar stroke      Brief history of symptoms: The patient was initially seen in neurologic consultation on 5/21 for evaluation of stroke. Please see the comprehensive neurologic consultation note from that date in the Epic records for details.     Interval history: She feels well and denies any concerns.   Patient's daughter would like to consider going down on the Crestor, although patient is somewhat reluctant.  They are concerned about bilateral lower extremity edema.      Physical Exam:  Vitals: BP (!) 148/74   Pulse 81   Temp 97.6  F (36.4  C) (Tympanic)   Resp 18   LMP  (LMP Unknown)   SpO2 96%    General: Seated comfortably in no acute distress.  HEENT: Neck supple  Extremities: Bilateral lower extremity pitting edema, per patient and family this improved significantly with elevation..  Neurologic:  Awake, alert, attentive, oriented, no aphasia, no dysarthria, conjugate gaze face symmetric.  No pronator drift.  No sensory extinction.  Deep tendon reflexes 2+ in upper extremities.  1+ in lower extremities but symmetric throughout.  No dysmetria on finger-nose-finger.    Pertinent Investigations:  MRI brain from March personally reviewed.  Right PICA stroke seen.  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    Assessment:  #Acute ischemic stroke  #vertebrobasilar stenosis  #Atrial fibrillation    Ms. 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  Etiology was felt to most likely be related to A. fib although she did have significant vertebrobasilar stenosis including a vert occlusion.  She appears optimized from secondary stroke prevention standpoint.  Much of the visit was spent discussing patient's medications.  She has not any side effects of the apixaban.  She only meets one  of the 3 criteria to consider dose reduction at this time but will continue to follow her creatinin and weight.  She does not feel she is having side effects of the Crestor, although daughter has concerns that some intermittent mild diarrhea may be related.  They deny myalgias.  I had a long discussion with them as patient does not want to make any changes as she feels she is doing well, but daughter would like to decrease the Crestor.  At this time I did my best to explain the risks and benefits.  Certainly would not recommend coming off of the statin but the dose reduction is unlikely to cause a significant elevation in stroke risk given her well-controlled LDL.  It was 40 in the past and 56 on last check.  That being said, the patient did not want to make any changes today and is in charge of her own faculties which I think is quite reasonable given that she is at risk for stroke and feels she is doing well on her current regimen.  I discussed with them following up on a yearly basis, but they feel more regular follow-up as needed to discuss her medical issues and continue to address her dosing of medications.    Plan:  -Continue apixaban 5 mg twice daily  -Crestor 10mg for now, can reach out if having side effects to reduce to 5 mg    Follow up in Neurology clinic in 6 months or  Earlier as needed if symptoms persist or should new symptoms or concerns arise.        27 min spent on the date of the encounter in 6 min chart review,  15 min patient visit, review of tests, 6 min documentation and/or discussion with other providers about the issues documented above.       Maritza Garrett DO   of Neurology

## 2022-04-22 NOTE — LETTER
4/22/2022         RE: Janna Mcdermott  [address] [facility] [address]        Dear Colleague,    Thank you for referring your patient, Janna Mcdermott, to the [facility]. Please see a copy of my visit note below.    Outpatient Neurology Visit  4/22/2022    Subjective:  Janna Mcdermott is a 89 year old female who presents today for follow up evaluation of cerebellar stroke      Brief history of symptoms: The patient was initially seen in neurologic consultation on 5/21 for evaluation of stroke. Please see the comprehensive neurologic consultation note from that date in the Epic records for details.     Interval history: She feels well and denies any concerns.   Patient's daughter would like to consider going down on the Crestor, although patient is somewhat reluctant.  They are concerned about bilateral lower extremity edema.      Physical Exam:  Vitals: BP (!) 148/74   Pulse 81   Temp 97.6  F (36.4  C) (Tympanic)   Resp 18   LMP  (LMP Unknown)   SpO2 96%    General: Seated comfortably in no acute distress.  HEENT: Neck supple  Extremities: Bilateral lower extremity pitting edema, per patient and family this improved significantly with elevation..  Neurologic:  Awake, alert, attentive, oriented, no aphasia, no dysarthria, conjugate gaze face symmetric.  No pronator drift.  No sensory extinction.  Deep tendon reflexes 2+ in upper extremities.  1+ in lower extremities but symmetric throughout.  No dysmetria on finger-nose-finger.    Pertinent Investigations:  MRI brain from March personally reviewed.  Right PICA stroke seen.  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    Assessment:  #Acute ischemic stroke  #vertebrobasilar stenosis  #Atrial fibrillation    Ms. 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  Etiology was felt to most likely be related to A. fib although she did have  significant vertebrobasilar stenosis including a vert occlusion.  She appears optimized from secondary stroke prevention standpoint.  Much of the visit was spent discussing patient's medications.  She has not any side effects of the apixaban.  She only meets one of the 3 criteria to consider dose reduction at this time but will continue to follow her creatinin and weight.  She does not feel she is having side effects of the Crestor, although daughter has concerns that some intermittent mild diarrhea may be related.  They deny myalgias.  I had a long discussion with them as patient does not want to make any changes as she feels she is doing well, but daughter would like to decrease the Crestor.  At this time I did my best to explain the risks and benefits.  Certainly would not recommend coming off of the statin but the dose reduction is unlikely to cause a significant elevation in stroke risk given her well-controlled LDL.  It was 40 in the past and 56 on last check.  That being said, the patient did not want to make any changes today and is in charge of her own faculties which I think is quite reasonable given that she is at risk for stroke and feels she is doing well on her current regimen.  I discussed with them following up on a yearly basis, but they feel more regular follow-up as needed to discuss her medical issues and continue to address her dosing of medications.    Plan:  -Continue apixaban 5 mg twice daily  -Crestor 10mg for now, can reach out if having side effects to reduce to 5 mg    Follow up in Neurology clinic in 6 months or  Earlier as needed if symptoms persist or should new symptoms or concerns arise.        32 min spent on the date of the encounter in 6 min chart review,  19 min patient visit, review of tests, 6 min documentation and/or discussion with other providers about the issues documented above.       Maritza Garrett DO   of Neurology        Again, thank you for  allowing me to participate in the care of your patient.        Sincerely,        Maritza Garrett MD

## 2022-04-22 NOTE — PATIENT INSTRUCTIONS
Reason for today's visit: stroke    Your diagnosis: stroke    Tests that you will need: none today    Treatment plan:   - Contineu Crestor 10mg for now, call me if you would like to go down to 5mg due to side effects.        Your test results are reviewed several times a day.  Once they are all in, you will be sent a letter with your results and/or if you are signed up for on-line services, you will be e-mailed the results.  If there are serious findings, you typically will be called.    If you have any questions about your visit, your symptoms, your medication, your test results or it is not clear what your diagnosis or treatment plan is please contact our office at [phone] for general neurology    If you need follow-up in the future, please call [phone] for an appointment.  If you cannot get a time that satisfies you, please let us know what times work for you and we will do our best to accommodate you.    Maritza Garrett DO  Neurology

## 2022-04-22 NOTE — NURSING NOTE
Chief Complaint   Patient presents with     RECHECK     Stroke     Medication Reconciliation: complete    May Lares CMA ([facility])

## 2022-05-04 DIAGNOSIS — I63.22 CEREBROVASCULAR ACCIDENT (CVA) DUE TO STENOSIS OF BASILAR ARTERY (H): ICD-10-CM

## 2022-05-04 RX ORDER — ROSUVASTATIN CALCIUM 5 MG/1
5 TABLET, COATED ORAL DAILY
Qty: 90 TABLET | Refills: 3 | Status: SHIPPED | OUTPATIENT
Start: 2022-05-04 | End: 2023-01-06

## 2022-05-04 NOTE — PROGRESS NOTES
Patient worried about side effects of cresotr.  Specifically GI side effects.  Will lower dose to 5 mg per previous discussion.  Risks and benefitts discussed with patient.     Maritza Garrett, DO

## 2022-05-16 DIAGNOSIS — I48.0 PAROXYSMAL ATRIAL FIBRILLATION (H): Primary | ICD-10-CM

## 2022-05-18 RX ORDER — APIXABAN 5 MG/1
TABLET, FILM COATED ORAL
Qty: 180 TABLET | Refills: 4 | Status: SHIPPED | OUTPATIENT
Start: 2022-05-18 | End: 2023-01-17

## 2022-05-18 NOTE — TELEPHONE ENCOUNTER
Patents daughter called and will be in town today and would like to  the med.   Please call daughter back thank you   Lorene Blank on 5/18/2022 at 12:29 PM

## 2022-05-18 NOTE — TELEPHONE ENCOUNTER
Dr. Clinton is out of the office until 6-6-2022.  Last office visit with Alexis York MD 2-7-2022.  Patient will be out today.    Order pending.    Hattie Cantu LPN 5/18/2022 8:33 AM

## 2022-05-25 ENCOUNTER — OFFICE VISIT (OUTPATIENT)
Dept: FAMILY MEDICINE | Facility: OTHER | Age: 87
End: 2022-05-25
Attending: INTERNAL MEDICINE
Payer: COMMERCIAL

## 2022-05-25 VITALS
WEIGHT: 147 LBS | BODY MASS INDEX: 28.71 KG/M2 | OXYGEN SATURATION: 96 % | RESPIRATION RATE: 18 BRPM | TEMPERATURE: 96.1 F | DIASTOLIC BLOOD PRESSURE: 74 MMHG | SYSTOLIC BLOOD PRESSURE: 118 MMHG | HEART RATE: 80 BPM

## 2022-05-25 DIAGNOSIS — N18.31 STAGE 3A CHRONIC KIDNEY DISEASE (H): ICD-10-CM

## 2022-05-25 DIAGNOSIS — E78.2 MIXED HYPERLIPIDEMIA: ICD-10-CM

## 2022-05-25 DIAGNOSIS — E03.9 ACQUIRED HYPOTHYROIDISM: Primary | Chronic | ICD-10-CM

## 2022-05-25 DIAGNOSIS — I10 ESSENTIAL HYPERTENSION: Chronic | ICD-10-CM

## 2022-05-25 PROCEDURE — 99214 OFFICE O/P EST MOD 30 MIN: CPT | Performed by: FAMILY MEDICINE

## 2022-05-25 PROCEDURE — G0463 HOSPITAL OUTPT CLINIC VISIT: HCPCS | Mod: 25

## 2022-05-25 PROCEDURE — G0463 HOSPITAL OUTPT CLINIC VISIT: HCPCS

## 2022-05-25 RX ORDER — THYROID,PORK 90 MG
TABLET ORAL
Qty: 60 TABLET | Refills: 0 | Status: SHIPPED | OUTPATIENT
Start: 2022-05-25 | End: 2022-10-28

## 2022-05-25 RX ORDER — DILTIAZEM HYDROCHLORIDE 120 MG/1
120 CAPSULE, COATED, EXTENDED RELEASE ORAL DAILY
Qty: 90 CAPSULE | Refills: 4 | Status: ON HOLD | OUTPATIENT
Start: 2022-05-25 | End: 2022-11-25

## 2022-05-25 RX ORDER — THYROID 60 MG
TABLET ORAL
Qty: 30 TABLET | Refills: 0 | Status: SHIPPED | OUTPATIENT
Start: 2022-05-25 | End: 2022-10-28

## 2022-05-25 ASSESSMENT — PAIN SCALES - GENERAL: PAINLEVEL: NO PAIN (0)

## 2022-05-25 NOTE — PATIENT INSTRUCTIONS
Change thyroid dosing to take 90 mg dose 5 days a week and 60 mg dose 2 times a week (on Wed and Sun)    Recheck a thyroid test in 2 months

## 2022-05-25 NOTE — PROGRESS NOTES
Assessment & Plan       ICD-10-CM    1. Acquired hypothyroidism  E03.9 ARMOUR THYROID 60 MG tablet     ARMOUR THYROID 90 MG tablet     TSH Reflex GH   2. Essential hypertension  I10 diltiazem ER COATED BEADS (CARDIZEM CD/CARTIA XT) 120 MG 24 hr capsule   3. Mixed hyperlipidemia  E78.2 Lipid Panel   4. Stage 3a chronic kidney disease (H)  N18.31      TSH remains elevated on current Armor thyroid dose. She is alternating 60 and 90 mg doses. Recommend changing to 90 mg 5 days a week and 60 mg twice weekly. This will be a total increase of 45 mg a week or total dose increase by about 10%. Recheck in 2 months.    Refilled diltiazem.    Crestor dose reduced after discussion with Dr Garrett a month ago. Seeing a mild reduction in side effects.    Stable CKD on labs in April        Independent interpretation of a test performed by another physician/other qualified health care professional (not separately reported) - CMP, CBC, TSH  Ordering of each unique test        Return in about 2 months (around 7/25/2022).    Alexis York MD   [facility] is a 89 year old who presents for the following health issues  accompanied by her daughter.    History of Present Illness       Hypothyroidism:     Since last visit, patient describes the following symptoms::  Constipation, Fatigue and Weight gain    Weight gain::  Less than 5 lbs.    Reason for visit:  Review of medsShe exercises with enough effort to increase her heart rate 20 to 29 minutes per day.    She is taking medications regularly.     TSH remains elevated. She had problems on levothyroxine. Has been on Armor thyroid. Daughter reports levels usually improve in the summer.  Patient feeling colder and little more fatigued.      Review of Systems   General: Denies general constitutional problems  Cardiovascular: Denies problems  Respiratory: Denies problems       Objective    /74 (BP Location: Right arm, Patient  Position: Sitting, Cuff Size: Adult Regular)   Pulse 80   Temp (!) 96.1  F (35.6  C) (Tympanic)   Resp 18   Wt 66.7 kg (147 lb)   LMP 01/25/1974 (Approximate)   SpO2 96%   BMI 28.71 kg/m    Body mass index is 28.71 kg/m .  Physical Exam   General Appearance: Alert. No acute distress  Chest/Respiratory Exam: Clear to auscultation bilaterally  Cardiovascular Exam: Regular rate and rhythm. S1, S2, no murmur, gallop, or rubs.  Extremities: 2+ pedal pulses.  No lower extremity edema.  Psychiatric: Normal affect and mentation

## 2022-08-03 ENCOUNTER — PATIENT OUTREACH (OUTPATIENT)
Dept: CARE COORDINATION | Facility: CLINIC | Age: 87
End: 2022-08-03

## 2022-08-03 NOTE — PROGRESS NOTES
"Clinic Care Coordination Contact  Care Team Conversations    Patient's daughter, Génesis, is feeling burdened with caring for her mother since her stroke, and feeling mistreated by her.  Let her talk for sometime about valid concerns she has. She does worry about her mother having no advance directive or will; it was divisive when her father  years ago without any wishes in writing. These discussions are not easy to have with her mother.     Recommended she take steps to put services in place to allow her to lessen the time spent there and allow her more self-care.  They live next door, she has \"a baby monitor\" in her house to check in and be sure she's safe. It seems as thought they both feel controlled by the other. Recommended daughter says she need her weekends in the summer and leave her brothers or mother's friend to help her.     Plan: CC will send Advanced Directive paperwork, encouraged her to set up a family meeting.  Reminded daughter about [facility] at [facility]. She has consulted a  already. She has started with counseling.   Kenisha Brasher RN on 8/3/2022 at 3:55 PM      "

## 2022-08-04 ENCOUNTER — MYC MEDICAL ADVICE (OUTPATIENT)
Dept: FAMILY MEDICINE | Facility: OTHER | Age: 87
End: 2022-08-04

## 2022-08-04 ENCOUNTER — LAB (OUTPATIENT)
Dept: LAB | Facility: OTHER | Age: 87
End: 2022-08-04
Attending: FAMILY MEDICINE
Payer: MEDICARE

## 2022-08-04 DIAGNOSIS — E03.9 ACQUIRED HYPOTHYROIDISM: ICD-10-CM

## 2022-08-04 DIAGNOSIS — E78.2 MIXED HYPERLIPIDEMIA: Primary | ICD-10-CM

## 2022-08-04 DIAGNOSIS — E78.2 MIXED HYPERLIPIDEMIA: ICD-10-CM

## 2022-08-04 LAB
CHOLEST SERPL-MCNC: 117 MG/DL
FASTING STATUS PATIENT QL REPORTED: YES
HDLC SERPL-MCNC: 56 MG/DL (ref 23–92)
LDLC SERPL CALC-MCNC: 49 MG/DL
NONHDLC SERPL-MCNC: 61 MG/DL
T4 FREE SERPL-MCNC: 0.94 NG/DL (ref 0.6–1.6)
TRIGL SERPL-MCNC: 62 MG/DL
TSH SERPL DL<=0.005 MIU/L-ACNC: 10.52 MU/L (ref 0.4–4)

## 2022-08-04 PROCEDURE — 84443 ASSAY THYROID STIM HORMONE: CPT | Mod: ZL

## 2022-08-04 PROCEDURE — 80061 LIPID PANEL: CPT | Mod: ZL

## 2022-08-04 PROCEDURE — 84439 ASSAY OF FREE THYROXINE: CPT | Mod: ZL

## 2022-08-04 PROCEDURE — 36415 COLL VENOUS BLD VENIPUNCTURE: CPT | Mod: ZL

## 2022-09-21 ENCOUNTER — PATIENT OUTREACH (OUTPATIENT)
Dept: CARE COORDINATION | Facility: CLINIC | Age: 87
End: 2022-09-21

## 2022-09-21 NOTE — PROGRESS NOTES
"Clinic Care Coordination Contact  Care Team Conversations    Patient's daughter, Génesis, calls to confirm that request for [employer] to be contacted upon her mother's demise is clearly noted in chart.  This is found under the permanent comments in the demographics clinical notes.  She would like me to resend additional honoring choices advanced directives.  Her mother started to write on one form, and she says it is not legible.  Otherwise her mother is doing quite well, \"better than me\".  She is getting out of the house more.     Plan: Care coordinator will send advanced directive forms as requested.  Agreed it is good that she is willing to have these discussions right now. Daughter sounds more content with how things are going with her mother. They will call with any questions as they complete the directive.   Kenisha Brasher RN on 9/21/2022 at 10:32 AM          "

## 2022-10-24 ENCOUNTER — MYC MEDICAL ADVICE (OUTPATIENT)
Dept: FAMILY MEDICINE | Facility: OTHER | Age: 87
End: 2022-10-24

## 2022-10-24 DIAGNOSIS — N18.31 STAGE 3A CHRONIC KIDNEY DISEASE (H): Primary | ICD-10-CM

## 2022-10-24 DIAGNOSIS — I10 ESSENTIAL HYPERTENSION: ICD-10-CM

## 2022-10-28 ENCOUNTER — MYC MEDICAL ADVICE (OUTPATIENT)
Dept: FAMILY MEDICINE | Facility: OTHER | Age: 87
End: 2022-10-28

## 2022-10-28 DIAGNOSIS — E03.9 ACQUIRED HYPOTHYROIDISM: Chronic | ICD-10-CM

## 2022-10-28 RX ORDER — THYROID,PORK 90 MG
TABLET ORAL
Qty: 60 TABLET | Refills: 0 | Status: SHIPPED | OUTPATIENT
Start: 2022-10-28 | End: 2022-11-07

## 2022-10-28 RX ORDER — THYROID 60 MG
TABLET ORAL
Qty: 30 TABLET | Refills: 0 | Status: SHIPPED | OUTPATIENT
Start: 2022-10-28 | End: 2022-11-07

## 2022-10-28 NOTE — TELEPHONE ENCOUNTER
Pending Prescriptions:                       Disp   Refills    ARMOUR THYROID 60 MG tablet               30 tab*0            Sig: Take 1 pill Wed and Sun    ARMOUR THYROID 90 MG tablet               60 tab*0            Sig: Take 1 pill Mon, Tue, Thur, Fri, Sat    Last OV- 5/25/2022 for medication recheck     Last filled- 5/25/2022     Next OV- 11/7/2022 medication check with Dr. Chela Barnes CMA on 10/28/2022 at 10:21 AM

## 2022-10-31 ENCOUNTER — LAB (OUTPATIENT)
Dept: LAB | Facility: OTHER | Age: 87
End: 2022-10-31
Attending: FAMILY MEDICINE
Payer: MEDICARE

## 2022-10-31 DIAGNOSIS — E03.9 ACQUIRED HYPOTHYROIDISM: ICD-10-CM

## 2022-10-31 DIAGNOSIS — N18.31 STAGE 3A CHRONIC KIDNEY DISEASE (H): ICD-10-CM

## 2022-10-31 DIAGNOSIS — E78.2 MIXED HYPERLIPIDEMIA: ICD-10-CM

## 2022-10-31 DIAGNOSIS — I10 ESSENTIAL HYPERTENSION: ICD-10-CM

## 2022-10-31 LAB
ALBUMIN SERPL-MCNC: 4.1 G/DL (ref 3.5–5.7)
ALP SERPL-CCNC: 63 U/L (ref 34–104)
ALT SERPL W P-5'-P-CCNC: 21 U/L (ref 7–52)
ANION GAP SERPL CALCULATED.3IONS-SCNC: 8 MMOL/L (ref 3–14)
AST SERPL W P-5'-P-CCNC: 22 U/L (ref 13–39)
BILIRUB SERPL-MCNC: 0.9 MG/DL (ref 0.3–1)
BUN SERPL-MCNC: 31 MG/DL (ref 7–25)
CALCIUM SERPL-MCNC: 9.7 MG/DL (ref 8.6–10.3)
CHLORIDE BLD-SCNC: 103 MMOL/L (ref 98–107)
CHOLEST SERPL-MCNC: 124 MG/DL
CO2 SERPL-SCNC: 28 MMOL/L (ref 21–31)
CREAT SERPL-MCNC: 1.04 MG/DL (ref 0.6–1.2)
ERYTHROCYTE [DISTWIDTH] IN BLOOD BY AUTOMATED COUNT: 13.6 % (ref 10–15)
FASTING STATUS PATIENT QL REPORTED: YES
GFR SERPL CREATININE-BSD FRML MDRD: 50 ML/MIN/1.73M2
GLUCOSE BLD-MCNC: 99 MG/DL (ref 70–105)
HCT VFR BLD AUTO: 48.5 % (ref 35–47)
HDLC SERPL-MCNC: 54 MG/DL (ref 23–92)
HGB BLD-MCNC: 16.1 G/DL (ref 11.7–15.7)
LDLC SERPL CALC-MCNC: 55 MG/DL
MCH RBC QN AUTO: 30.9 PG (ref 26.5–33)
MCHC RBC AUTO-ENTMCNC: 33.2 G/DL (ref 31.5–36.5)
MCV RBC AUTO: 93 FL (ref 78–100)
NONHDLC SERPL-MCNC: 70 MG/DL
PLATELET # BLD AUTO: 165 10E3/UL (ref 150–450)
POTASSIUM BLD-SCNC: 4.1 MMOL/L (ref 3.5–5.1)
PROT SERPL-MCNC: 7.3 G/DL (ref 6.4–8.9)
RBC # BLD AUTO: 5.21 10E6/UL (ref 3.8–5.2)
SODIUM SERPL-SCNC: 139 MMOL/L (ref 134–144)
T4 FREE SERPL-MCNC: 0.88 NG/DL (ref 0.6–1.6)
TRIGL SERPL-MCNC: 76 MG/DL
TSH SERPL DL<=0.005 MIU/L-ACNC: 13.65 MU/L (ref 0.4–4)
WBC # BLD AUTO: 7.1 10E3/UL (ref 4–11)

## 2022-10-31 PROCEDURE — 36415 COLL VENOUS BLD VENIPUNCTURE: CPT | Mod: ZL

## 2022-10-31 PROCEDURE — 84439 ASSAY OF FREE THYROXINE: CPT | Mod: ZL

## 2022-10-31 PROCEDURE — 80053 COMPREHEN METABOLIC PANEL: CPT | Mod: ZL

## 2022-10-31 PROCEDURE — 84443 ASSAY THYROID STIM HORMONE: CPT | Mod: ZL

## 2022-10-31 PROCEDURE — 85027 COMPLETE CBC AUTOMATED: CPT | Mod: ZL

## 2022-10-31 PROCEDURE — 82040 ASSAY OF SERUM ALBUMIN: CPT | Mod: ZL

## 2022-10-31 PROCEDURE — 80061 LIPID PANEL: CPT | Mod: ZL

## 2022-11-01 ENCOUNTER — MYC MEDICAL ADVICE (OUTPATIENT)
Dept: FAMILY MEDICINE | Facility: OTHER | Age: 87
End: 2022-11-01

## 2022-11-01 RX ORDER — THYROID,PORK 90 MG
TABLET ORAL
Qty: 60 TABLET | Refills: 0 | OUTPATIENT
Start: 2022-11-01

## 2022-11-01 NOTE — TELEPHONE ENCOUNTER
Patient's chart was accessed to determine the status of a refill request - nothing needed at this time as the request was previously addressed.    Kenisha Hutchinson RN .............. 11/1/2022  9:58 AM

## 2022-11-07 ENCOUNTER — OFFICE VISIT (OUTPATIENT)
Dept: FAMILY MEDICINE | Facility: OTHER | Age: 87
End: 2022-11-07
Attending: FAMILY MEDICINE
Payer: COMMERCIAL

## 2022-11-07 VITALS
DIASTOLIC BLOOD PRESSURE: 68 MMHG | SYSTOLIC BLOOD PRESSURE: 116 MMHG | OXYGEN SATURATION: 97 % | WEIGHT: 147 LBS | TEMPERATURE: 97.8 F | BODY MASS INDEX: 28.71 KG/M2 | RESPIRATION RATE: 16 BRPM | HEART RATE: 76 BPM

## 2022-11-07 DIAGNOSIS — E03.9 ACQUIRED HYPOTHYROIDISM: Primary | Chronic | ICD-10-CM

## 2022-11-07 DIAGNOSIS — N18.31 STAGE 3A CHRONIC KIDNEY DISEASE (H): ICD-10-CM

## 2022-11-07 DIAGNOSIS — E78.2 MIXED HYPERLIPIDEMIA: ICD-10-CM

## 2022-11-07 PROCEDURE — G0463 HOSPITAL OUTPT CLINIC VISIT: HCPCS

## 2022-11-07 PROCEDURE — G0463 HOSPITAL OUTPT CLINIC VISIT: HCPCS | Mod: 25

## 2022-11-07 PROCEDURE — 99213 OFFICE O/P EST LOW 20 MIN: CPT | Performed by: FAMILY MEDICINE

## 2022-11-07 RX ORDER — THYROID,PORK 90 MG
90 TABLET ORAL DAILY
Qty: 90 TABLET | Refills: 0 | Status: SHIPPED | OUTPATIENT
Start: 2022-11-07 | End: 2023-01-17

## 2022-11-07 ASSESSMENT — PAIN SCALES - GENERAL: PAINLEVEL: NO PAIN (0)

## 2022-11-07 NOTE — NURSING NOTE
Patient presents to the clinic for follow up medication.    FOOD SECURITY SCREENING QUESTIONS:    The next two questions are to help us understand your food security.  If you are feeling you need any assistance in this area, we have resources available to support you today.    Hunger Vital Signs:  Within the past 12 months we worried whether our food would run out before we got money to buy more. Never  Within the past 12 months the food we bought just didn't last and we didn't have money to get more. Never    Advance Care Directive on file? no  Advance Care Directive provided to patient? Declined, has forms at home.  Chief Complaint   Patient presents with     Recheck Medication       Initial /68 (BP Location: Right arm, Patient Position: Sitting, Cuff Size: Adult Regular)   Pulse 76   Temp 97.8  F (36.6  C) (Tympanic)   Resp 16   Wt 66.7 kg (147 lb)   LMP 01/25/1974 (Approximate)   SpO2 97%   BMI 28.71 kg/m   Estimated body mass index is 28.71 kg/m  as calculated from the following:    Height as of 10/22/21: 1.524 m (5').    Weight as of this encounter: 66.7 kg (147 lb).  Medication Reconciliation: complete        Hattie Cantu LPN

## 2022-11-07 NOTE — PATIENT INSTRUCTIONS
Increase [place] thyroid to 90 mg every day. No 60 mg dose for now  Recheck lab in about 2 months

## 2022-11-07 NOTE — PROGRESS NOTES
Assessment & Plan       ICD-10-CM    1. Acquired hypothyroidism  E03.9 ARMOUR THYROID 90 MG tablet     TSH Reflex GH      2. Stage 3a chronic kidney disease (H)  N18.31       3. Mixed hyperlipidemia  E78.2         Despite increase in [place] Thyroid dose TSH increased.  She is on 60 mg twice weekly and 90 mg remainder of days.  At this point increase to 90 mg daily.  Recheck TSH in 2 months.  Ordered for lab only appointment.    Stable creatinine.  Blood pressure controlled.    Lipids look good on reduced rosuvastatin, continue same dose.      Return in about 2 months (around 1/7/2023).    Alexis York MD   [facility]      Ramila Saldivar is a 89 year old accompanied by her daughter, presenting for the following health issues:  Recheck Medication      HPI     Follow up with medications.    [place] Thyroid increased in a couple months ago  Reduced rosuvastatin dose to 5 mg  She feels great.  No complaints.    Review of Systems   As above      Objective    /68 (BP Location: Right arm, Patient Position: Sitting, Cuff Size: Adult Regular)   Pulse 76   Temp 97.8  F (36.6  C) (Tympanic)   Resp 16   Wt 66.7 kg (147 lb)   LMP 01/25/1974 (Approximate)   SpO2 97%   BMI 28.71 kg/m    Body mass index is 28.71 kg/m .  Physical Exam   General Appearance: Alert. No acute distress  Psychiatric: Normal affect and mentation      Recent Results (from the past 720 hour(s))   Comprehensive Metabolic Panel    Collection Time: 10/31/22  6:59 AM   Result Value Ref Range    Sodium 139 134 - 144 mmol/L    Potassium 4.1 3.5 - 5.1 mmol/L    Chloride 103 98 - 107 mmol/L    Carbon Dioxide (CO2) 28 21 - 31 mmol/L    Anion Gap 8 3 - 14 mmol/L    Urea Nitrogen 31 (H) 7 - 25 mg/dL    Creatinine 1.04 0.60 - 1.20 mg/dL    Calcium 9.7 8.6 - 10.3 mg/dL    Glucose 99 70 - 105 mg/dL    Alkaline Phosphatase 63 34 - 104 U/L    AST 22 13 - 39 U/L    ALT 21 7 - 52 U/L    Protein Total 7.3 6.4 - 8.9 g/dL    Albumin  4.1 3.5 - 5.7 g/dL    Bilirubin Total 0.9 0.3 - 1.0 mg/dL    GFR Estimate 50 (L) >60 mL/min/1.73m2   CBC W PLT No Diff    Collection Time: 10/31/22  6:59 AM   Result Value Ref Range    WBC Count 7.1 4.0 - 11.0 10e3/uL    RBC Count 5.21 (H) 3.80 - 5.20 10e6/uL    Hemoglobin 16.1 (H) 11.7 - 15.7 g/dL    Hematocrit 48.5 (H) 35.0 - 47.0 %    MCV 93 78 - 100 fL    MCH 30.9 26.5 - 33.0 pg    MCHC 33.2 31.5 - 36.5 g/dL    RDW 13.6 10.0 - 15.0 %    Platelet Count 165 150 - 450 10e3/uL   Lipid Panel    Collection Time: 10/31/22  6:59 AM   Result Value Ref Range    Cholesterol 124 <200 mg/dL    Triglycerides 76 <150 mg/dL    Direct Measure HDL 54 23 - 92 mg/dL    LDL Cholesterol Calculated 55 <=100 mg/dL    Non HDL Cholesterol 70 <130 mg/dL    Patient Fasting > 8hrs? Yes    TSH Reflex GH    Collection Time: 10/31/22  6:59 AM   Result Value Ref Range    TSH 13.65 (H) 0.40 - 4.00 mU/L   T4 free    Collection Time: 10/31/22  6:59 AM   Result Value Ref Range    Free T4 0.88 0.60 - 1.60 ng/dL

## 2022-11-08 ENCOUNTER — PATIENT OUTREACH (OUTPATIENT)
Dept: CARE COORDINATION | Facility: CLINIC | Age: 87
End: 2022-11-08

## 2022-11-11 NOTE — PROGRESS NOTES
Clinic Care Coordination Contact    Situation: Patient chart reviewed by care coordinator.    Background: Patient was just seen in clinic for follow-up on hypothyroidism. Her daughter who lives next door is supportive and involved.     Assessment: Per notes medication was adjusted. No other concerns noted.    Plan/Recommendations: Last contact patient was doing well and help for advanced planning was given. They know they can call with any needs and haven't had additional ones. Will move to maintenance at this time.   Kenisha Brasher RN on 11/11/2022 at 3:21 PM

## 2022-11-23 ENCOUNTER — APPOINTMENT (OUTPATIENT)
Dept: CT IMAGING | Facility: OTHER | Age: 87
DRG: 065 | End: 2022-11-23
Attending: FAMILY MEDICINE
Payer: MEDICARE

## 2022-11-23 ENCOUNTER — APPOINTMENT (OUTPATIENT)
Dept: MRI IMAGING | Facility: OTHER | Age: 87
DRG: 065 | End: 2022-11-23
Attending: FAMILY MEDICINE
Payer: MEDICARE

## 2022-11-23 ENCOUNTER — APPOINTMENT (OUTPATIENT)
Dept: CARDIOLOGY | Facility: OTHER | Age: 87
DRG: 065 | End: 2022-11-23
Attending: FAMILY MEDICINE
Payer: MEDICARE

## 2022-11-23 ENCOUNTER — HOSPITAL ENCOUNTER (INPATIENT)
Facility: OTHER | Age: 87
LOS: 2 days | Discharge: HOME HEALTH | DRG: 065 | End: 2022-11-25
Attending: FAMILY MEDICINE | Admitting: FAMILY MEDICINE
Payer: MEDICARE

## 2022-11-23 DIAGNOSIS — I63.9 ACUTE STROKE DUE TO ISCHEMIA (H): ICD-10-CM

## 2022-11-23 DIAGNOSIS — I10 ESSENTIAL HYPERTENSION: Primary | Chronic | ICD-10-CM

## 2022-11-23 DIAGNOSIS — Z11.52 ENCOUNTER FOR SCREENING LABORATORY TESTING FOR COVID-19 VIRUS: ICD-10-CM

## 2022-11-23 DIAGNOSIS — I48.0 PAROXYSMAL ATRIAL FIBRILLATION (H): ICD-10-CM

## 2022-11-23 LAB
ALBUMIN UR-MCNC: NEGATIVE MG/DL
ANION GAP SERPL CALCULATED.3IONS-SCNC: 11 MMOL/L (ref 7–15)
APPEARANCE UR: CLEAR
BACTERIA #/AREA URNS HPF: ABNORMAL /HPF
BASOPHILS # BLD AUTO: 0 10E3/UL (ref 0–0.2)
BASOPHILS NFR BLD AUTO: 1 %
BILIRUB UR QL STRIP: NEGATIVE
BUN SERPL-MCNC: 25.6 MG/DL (ref 8–23)
CALCIUM SERPL-MCNC: 9.6 MG/DL (ref 8.2–9.6)
CHLORIDE SERPL-SCNC: 101 MMOL/L (ref 98–107)
CHOLEST SERPL-MCNC: 118 MG/DL
COLOR UR AUTO: YELLOW
CREAT SERPL-MCNC: 0.88 MG/DL (ref 0.51–0.95)
DEPRECATED HCO3 PLAS-SCNC: 26 MMOL/L (ref 22–29)
EOSINOPHIL # BLD AUTO: 0.1 10E3/UL (ref 0–0.7)
EOSINOPHIL NFR BLD AUTO: 3 %
ERYTHROCYTE [DISTWIDTH] IN BLOOD BY AUTOMATED COUNT: 13.5 % (ref 10–15)
FLUAV RNA SPEC QL NAA+PROBE: NEGATIVE
FLUBV RNA RESP QL NAA+PROBE: NEGATIVE
GFR SERPL CREATININE-BSD FRML MDRD: 62 ML/MIN/1.73M2
GLUCOSE BLDC GLUCOMTR-MCNC: 103 MG/DL (ref 70–99)
GLUCOSE BLDC GLUCOMTR-MCNC: 111 MG/DL (ref 70–99)
GLUCOSE BLDC GLUCOMTR-MCNC: 127 MG/DL (ref 70–99)
GLUCOSE BLDC GLUCOMTR-MCNC: 189 MG/DL (ref 70–99)
GLUCOSE SERPL-MCNC: 186 MG/DL (ref 70–99)
GLUCOSE UR STRIP-MCNC: NEGATIVE MG/DL
HBA1C MFR BLD: 5.7 % (ref 4–6.2)
HCT VFR BLD AUTO: 47.7 % (ref 35–47)
HDLC SERPL-MCNC: 56 MG/DL
HGB BLD-MCNC: 15.7 G/DL (ref 11.7–15.7)
HGB UR QL STRIP: NEGATIVE
HOLD SPECIMEN: NORMAL
IMM GRANULOCYTES # BLD: 0 10E3/UL
IMM GRANULOCYTES NFR BLD: 0 %
INR PPP: 1.18 (ref 0.85–1.15)
KETONES UR STRIP-MCNC: NEGATIVE MG/DL
LDLC SERPL CALC-MCNC: 46 MG/DL
LEUKOCYTE ESTERASE UR QL STRIP: NEGATIVE
LVEF ECHO: NORMAL
LYMPHOCYTES # BLD AUTO: 1.1 10E3/UL (ref 0.8–5.3)
LYMPHOCYTES NFR BLD AUTO: 21 %
MCH RBC QN AUTO: 31 PG (ref 26.5–33)
MCHC RBC AUTO-ENTMCNC: 32.9 G/DL (ref 31.5–36.5)
MCV RBC AUTO: 94 FL (ref 78–100)
MONOCYTES # BLD AUTO: 0.4 10E3/UL (ref 0–1.3)
MONOCYTES NFR BLD AUTO: 8 %
NEUTROPHILS # BLD AUTO: 3.6 10E3/UL (ref 1.6–8.3)
NEUTROPHILS NFR BLD AUTO: 67 %
NITRATE UR QL: NEGATIVE
NONHDLC SERPL-MCNC: 62 MG/DL
NRBC # BLD AUTO: 0 10E3/UL
NRBC BLD AUTO-RTO: 0 /100
PH UR STRIP: 7.5 [PH] (ref 5–9)
PLATELET # BLD AUTO: 159 10E3/UL (ref 150–450)
POTASSIUM SERPL-SCNC: 4.4 MMOL/L (ref 3.4–5.3)
RBC # BLD AUTO: 5.06 10E6/UL (ref 3.8–5.2)
RBC URINE: <1 /HPF
RSV RNA SPEC NAA+PROBE: NEGATIVE
SARS-COV-2 RNA RESP QL NAA+PROBE: NEGATIVE
SODIUM SERPL-SCNC: 138 MMOL/L (ref 136–145)
SP GR UR STRIP: 1.01 (ref 1–1.03)
TRIGL SERPL-MCNC: 81 MG/DL
TROPONIN T SERPL HS-MCNC: 18 NG/L
TROPONIN T SERPL HS-MCNC: 19 NG/L
TROPONIN T SERPL HS-MCNC: 21 NG/L
UROBILINOGEN UR STRIP-MCNC: NORMAL MG/DL
WBC # BLD AUTO: 5.3 10E3/UL (ref 4–11)
WBC URINE: 1 /HPF

## 2022-11-23 PROCEDURE — 99222 1ST HOSP IP/OBS MODERATE 55: CPT | Mod: AI | Performed by: FAMILY MEDICINE

## 2022-11-23 PROCEDURE — 70498 CT ANGIOGRAPHY NECK: CPT | Mod: MA

## 2022-11-23 PROCEDURE — 70553 MRI BRAIN STEM W/O & W/DYE: CPT | Mod: MG

## 2022-11-23 PROCEDURE — 87637 SARSCOV2&INF A&B&RSV AMP PRB: CPT | Performed by: FAMILY MEDICINE

## 2022-11-23 PROCEDURE — C9803 HOPD COVID-19 SPEC COLLECT: HCPCS | Performed by: FAMILY MEDICINE

## 2022-11-23 PROCEDURE — 84484 ASSAY OF TROPONIN QUANT: CPT | Performed by: FAMILY MEDICINE

## 2022-11-23 PROCEDURE — 255N000002 HC RX 255 OP 636: Performed by: FAMILY MEDICINE

## 2022-11-23 PROCEDURE — 120N000001 HC R&B MED SURG/OB

## 2022-11-23 PROCEDURE — 83036 HEMOGLOBIN GLYCOSYLATED A1C: CPT | Performed by: FAMILY MEDICINE

## 2022-11-23 PROCEDURE — 96375 TX/PRO/DX INJ NEW DRUG ADDON: CPT | Performed by: FAMILY MEDICINE

## 2022-11-23 PROCEDURE — 250N000011 HC RX IP 250 OP 636: Performed by: FAMILY MEDICINE

## 2022-11-23 PROCEDURE — A9575 INJ GADOTERATE MEGLUMI 0.1ML: HCPCS | Performed by: FAMILY MEDICINE

## 2022-11-23 PROCEDURE — 250N000013 HC RX MED GY IP 250 OP 250 PS 637: Performed by: FAMILY MEDICINE

## 2022-11-23 PROCEDURE — 99285 EMERGENCY DEPT VISIT HI MDM: CPT | Mod: 25 | Performed by: FAMILY MEDICINE

## 2022-11-23 PROCEDURE — 96365 THER/PROPH/DIAG IV INF INIT: CPT | Performed by: FAMILY MEDICINE

## 2022-11-23 PROCEDURE — 81001 URINALYSIS AUTO W/SCOPE: CPT | Performed by: FAMILY MEDICINE

## 2022-11-23 PROCEDURE — 36415 COLL VENOUS BLD VENIPUNCTURE: CPT | Performed by: FAMILY MEDICINE

## 2022-11-23 PROCEDURE — 85014 HEMATOCRIT: CPT | Performed by: FAMILY MEDICINE

## 2022-11-23 PROCEDURE — 80061 LIPID PANEL: CPT | Performed by: FAMILY MEDICINE

## 2022-11-23 PROCEDURE — 70496 CT ANGIOGRAPHY HEAD: CPT | Mod: MA

## 2022-11-23 PROCEDURE — 93306 TTE W/DOPPLER COMPLETE: CPT | Mod: 26 | Performed by: STUDENT IN AN ORGANIZED HEALTH CARE EDUCATION/TRAINING PROGRAM

## 2022-11-23 PROCEDURE — G1010 CDSM STANSON: HCPCS

## 2022-11-23 PROCEDURE — 70450 CT HEAD/BRAIN W/O DYE: CPT | Mod: MA

## 2022-11-23 PROCEDURE — 85610 PROTHROMBIN TIME: CPT | Performed by: FAMILY MEDICINE

## 2022-11-23 PROCEDURE — 93306 TTE W/DOPPLER COMPLETE: CPT

## 2022-11-23 PROCEDURE — 99284 EMERGENCY DEPT VISIT MOD MDM: CPT | Performed by: FAMILY MEDICINE

## 2022-11-23 PROCEDURE — 80048 BASIC METABOLIC PNL TOTAL CA: CPT | Performed by: FAMILY MEDICINE

## 2022-11-23 PROCEDURE — 258N000003 HC RX IP 258 OP 636: Performed by: FAMILY MEDICINE

## 2022-11-23 RX ORDER — LORAZEPAM 2 MG/ML
0.25 INJECTION INTRAMUSCULAR ONCE
Status: COMPLETED | OUTPATIENT
Start: 2022-11-23 | End: 2022-11-23

## 2022-11-23 RX ORDER — LATANOPROST 50 UG/ML
1 SOLUTION/ DROPS OPHTHALMIC AT BEDTIME
Status: DISCONTINUED | OUTPATIENT
Start: 2022-11-23 | End: 2022-11-25 | Stop reason: HOSPADM

## 2022-11-23 RX ORDER — ROSUVASTATIN CALCIUM 5 MG/1
5 TABLET, COATED ORAL DAILY
Status: DISCONTINUED | OUTPATIENT
Start: 2022-11-23 | End: 2022-11-25 | Stop reason: HOSPADM

## 2022-11-23 RX ORDER — LABETALOL HYDROCHLORIDE 5 MG/ML
10 INJECTION, SOLUTION INTRAVENOUS
Status: DISCONTINUED | OUTPATIENT
Start: 2022-11-23 | End: 2022-11-25 | Stop reason: HOSPADM

## 2022-11-23 RX ORDER — THYROID 90 MG/1
90 TABLET ORAL DAILY
Status: DISCONTINUED | OUTPATIENT
Start: 2022-11-24 | End: 2022-11-25 | Stop reason: HOSPADM

## 2022-11-23 RX ORDER — SODIUM CHLORIDE 9 MG/ML
INJECTION, SOLUTION INTRAVENOUS CONTINUOUS PRN
Status: DISCONTINUED | OUTPATIENT
Start: 2022-11-23 | End: 2022-11-23

## 2022-11-23 RX ORDER — ASPIRIN 81 MG/1
81 TABLET, CHEWABLE ORAL DAILY
Status: DISCONTINUED | OUTPATIENT
Start: 2022-11-23 | End: 2022-11-24

## 2022-11-23 RX ORDER — ONDANSETRON 2 MG/ML
4 INJECTION INTRAMUSCULAR; INTRAVENOUS EVERY 6 HOURS PRN
Status: DISCONTINUED | OUTPATIENT
Start: 2022-11-23 | End: 2022-11-25 | Stop reason: HOSPADM

## 2022-11-23 RX ORDER — ONDANSETRON 4 MG/1
4 TABLET, ORALLY DISINTEGRATING ORAL EVERY 6 HOURS PRN
Status: DISCONTINUED | OUTPATIENT
Start: 2022-11-23 | End: 2022-11-25 | Stop reason: HOSPADM

## 2022-11-23 RX ORDER — DILTIAZEM HYDROCHLORIDE 120 MG/1
120 CAPSULE, COATED, EXTENDED RELEASE ORAL DAILY
Status: DISCONTINUED | OUTPATIENT
Start: 2022-11-23 | End: 2022-11-25

## 2022-11-23 RX ORDER — ASPIRIN 81 MG/1
81 TABLET ORAL DAILY
Status: DISCONTINUED | OUTPATIENT
Start: 2022-11-23 | End: 2022-11-24

## 2022-11-23 RX ORDER — IOPAMIDOL 755 MG/ML
75 INJECTION, SOLUTION INTRAVASCULAR ONCE
Status: COMPLETED | OUTPATIENT
Start: 2022-11-23 | End: 2022-11-23

## 2022-11-23 RX ORDER — ACETAMINOPHEN 325 MG/1
650 TABLET ORAL EVERY 4 HOURS PRN
Status: DISCONTINUED | OUTPATIENT
Start: 2022-11-23 | End: 2022-11-25 | Stop reason: HOSPADM

## 2022-11-23 RX ORDER — LIDOCAINE 40 MG/G
CREAM TOPICAL
Status: DISCONTINUED | OUTPATIENT
Start: 2022-11-23 | End: 2022-11-25 | Stop reason: HOSPADM

## 2022-11-23 RX ADMIN — GADOTERATE MEGLUMINE 14 ML: 376.9 INJECTION INTRAVENOUS at 12:55

## 2022-11-23 RX ADMIN — LATANOPROST 1 DROP: 50 SOLUTION/ DROPS OPHTHALMIC at 22:45

## 2022-11-23 RX ADMIN — LORAZEPAM 0.25 MG: 2 INJECTION, SOLUTION INTRAMUSCULAR; INTRAVENOUS at 12:53

## 2022-11-23 RX ADMIN — SODIUM CHLORIDE: 9 INJECTION, SOLUTION INTRAVENOUS at 11:14

## 2022-11-23 RX ADMIN — ASPIRIN 81 MG: 81 TABLET, COATED ORAL at 18:47

## 2022-11-23 RX ADMIN — NICARDIPINE HYDROCHLORIDE 2.5 MG/HR: 0.2 INJECTION, SOLUTION INTRAVENOUS at 13:51

## 2022-11-23 RX ADMIN — ROSUVASTATIN CALCIUM 5 MG: 5 TABLET, COATED ORAL at 20:15

## 2022-11-23 RX ADMIN — DILTIAZEM HYDROCHLORIDE 120 MG: 120 CAPSULE, COATED, EXTENDED RELEASE ORAL at 14:54

## 2022-11-23 RX ADMIN — IOPAMIDOL 75 ML: 755 INJECTION, SOLUTION INTRAVENOUS at 10:54

## 2022-11-23 ASSESSMENT — ACTIVITIES OF DAILY LIVING (ADL)
ADLS_ACUITY_SCORE: 28
DRESSING/BATHING_DIFFICULTY: NO
CHANGE_IN_FUNCTIONAL_STATUS_SINCE_ONSET_OF_CURRENT_ILLNESS/INJURY: YES
CONCENTRATING,_REMEMBERING_OR_MAKING_DECISIONS_DIFFICULTY: NO
DIFFICULTY_COMMUNICATING: NO
ADLS_ACUITY_SCORE: 35
TRANSFERRING: 0-->INDEPENDENT
EQUIPMENT_CURRENTLY_USED_AT_HOME: CANE, STRAIGHT
ADLS_ACUITY_SCORE: 32
VISION_MANAGEMENT: GLASSES
WERE_AUXILIARY_AIDS_OFFERED?: NO
DIFFICULTY_COMMUNICATING: NO
DOING_ERRANDS_INDEPENDENTLY_DIFFICULTY: NO
HEARING_DIFFICULTY_OR_DEAF: NO
WALKING_OR_CLIMBING_STAIRS_DIFFICULTY: YES
ADLS_ACUITY_SCORE: 35
ADLS_ACUITY_SCORE: 35
DRESSING/BATHING_DIFFICULTY: NO
TOILETING_ISSUES: NO
ADLS_ACUITY_SCORE: 35
DIFFICULTY_EATING/SWALLOWING: NO
WALKING_OR_CLIMBING_STAIRS: AMBULATION DIFFICULTY, REQUIRES EQUIPMENT
WALKING_OR_CLIMBING_STAIRS: AMBULATION DIFFICULTY, ASSISTANCE 1 PERSON
DIFFICULTY_EATING/SWALLOWING: NO
THE_FOLLOWING_AIDS_WERE_PROVIDED;: PATIENT DECLINED OFFER OF AUXILIARY AIDS
FALL_HISTORY_WITHIN_LAST_SIX_MONTHS: NO
EQUIPMENT_CURRENTLY_USED_AT_HOME: CANE, STRAIGHT
CONCENTRATING,_REMEMBERING_OR_MAKING_DECISIONS_DIFFICULTY: NO
ADLS_ACUITY_SCORE: 28
WALKING_OR_CLIMBING_STAIRS_DIFFICULTY: YES
TRANSFERRING: 0-->INDEPENDENT
TOILETING_ISSUES: NO
WEAR_GLASSES_OR_BLIND: YES
DOING_ERRANDS_INDEPENDENTLY_DIFFICULTY: OTHER (SEE COMMENTS)
WEAR_GLASSES_OR_BLIND: YES

## 2022-11-23 ASSESSMENT — ENCOUNTER SYMPTOMS
LIGHT-HEADEDNESS: 0
CHILLS: 0
SHORTNESS OF BREATH: 0
FEVER: 0
HEADACHES: 0
CHEST TIGHTNESS: 0
DYSURIA: 0

## 2022-11-23 NOTE — PHARMACY-ADMISSION MEDICATION HISTORY
Pharmacy -- Admission Medication Reconciliation    Prior to admission (PTA) medications were reviewed and the patient's PTA medication list was updated.    Sources Consulted: daughter Génesis phone interview, sure scripts, chart review    The reliability of this Medication Reconciliation is: Reliability: Reliable    The following significant changes were made:    None    **patient uses multiple pharmacies based on what she is getting filled.  Daughter would like to be informed what her discharge medications are first, than figure out where to send them.    **patient takes rosuvastatin 5 mg,  is [employer].  Per daughter, patient only tolerates this brand, does not tolerate other 's or atorvastatin (severe reactions when substituted).  Daughter is willing to bring in home medication if appropriate.      In addition, the patient's allergies were reviewed with the patient and updated as follows:   Allergies: Bee venom, Benazepril, Ciprofloxacin, Erythromycin, Gluten meal, Hydrochlorothiazide, and Penicillins    The pharmacist has reviewed with the patient that all personal medications should be removed from the building or locked in the belongings safe.  Patient shall only take medications ordered by the physician and administered by the nursing staff.       Medication barriers identified: daughter states patient is very sensitive to some medications, does not tolerate substitutions (crestor)   Medication adherence concerns: none, daughter states patient is very capable of taking care of her medications and has a very clear understanding, up until today   Understanding of emergency medications: LEI Pacheco [facility], 11/23/2022,  3:01 PM

## 2022-11-23 NOTE — ASSESSMENT & PLAN NOTE
Presenting with slurred speech and left facial droop, stuttering off and on today  Initially high blood pressure in the ED  CT/CTA imaging showing no acute changes  Previous history of cerebellar stroke in March 2021, ongoing atrial fibrillation taking Eliquis, hyperlipidemia on Crestor  MRI imaging showing small acute infarct in the deep right cerebrum  Admit inpatient, stroke work-up initiated with echocardiogram ordered, placed on telemetry, troponins, lipid panel, evaluation by speech/PT/OT.  Plan for stroke neuro consult tomorrow per recommendation, hold Eliquis tonight, continue daily 81 mg aspirin for now  11/24/2022-symptomatically better. Speech clear.   Echo done and no changes. Telemetry showing atrial fibrillation. PT/OT eval done. Speech not available today, but swallowing okay per nursing. LDL at 46  Plan today is for stroke neuro assessment and recommendations for ongoing anticoagulation and stroke prevention,  11/25/2022-feeling near baseline. Neuro recommending restarting eliquis. Working on blood pressure control slowly. Working with PT, home today, [facility] ordered

## 2022-11-23 NOTE — PHARMACY-CONSULT NOTE
"Pharmacy: Patient Own Medication Identification    The requirements of Policy 13-03 from the Pharmacy Policy Manual have been met and the patient will be allowed to use their own supply of: rosuvastatin and armour thyroid.    Dagmar Pacheco RPH has verified the name, dose, route, and directions for each medication. The integrity has been assessed and deemed safe.     The product(s) have been labeled as being inspected by a pharmacist and the medication has been placed in the patient-specific bin in the medication room.    Nursing will remove medication at the appropriately scheduled times and document the administration on the MAR with the designation of \"patient own\".    Nursing to return medication back to patient at time of discharge.     Dagmar Pacheco RPH ....................  11/23/2022   5:21 PM  "

## 2022-11-23 NOTE — PROVIDER NOTIFICATION
11/23/22 1707   Valuables   Patient Belongings remains with patient   Patient Belongings Remaining with Patient cane;glasses;medication(s);purse/wallet;shoes;tote   Did you bring any home meds/supplements to the hospital?  Yes   Disposition of meds  Pharmacy approved for patient use (see comment)   [facility] will make every effort per our policy to help keep your items safe while in the hospital.  If you choose to keep any items at the bedside, we cannot be held responsible for any items that are lost or broken.      List items sent to safe: none    I have reviewed my belongings list on admission and verify that it is correct.     Patient signature_______________________________  Date/Time_____________________    2nd Staff person if patient unable to sign __________________________  Date/Time ______________________      I have received all my belongings noted above at discharge.    Patient signature________________________________  Date/Time  __________________________

## 2022-11-23 NOTE — PROGRESS NOTES
IV Contrast- Discharge Instructions After Your CT Scan      The IV contrast you received today will be filtered from your bloodstream by your kidneys during the next 24 hours and pass from the body in urine.  You will not be aware of this process and your urine will not change in color.  To help this process you should drink at least 4 additional glasses of water or juice today.  This reduces stress on your kidneys.    Most contrast reactions are immediate.  Should you develop symptoms of concern after discharge, contact the department at the number below.  After hours you should contact your personal physician.  If you develop breathing distress or wheezing, call 911.    1.  Has the patient had a previous reaction to IV contrast? NO    2.  Does the patient have kidney disease? YES    3.  Is the patient on dialysis? NO    If YES to any of these questions, exam will be reviewed with a Radiologist before administering contrast.

## 2022-11-23 NOTE — CONSULTS
[facility]    Stroke Telephone Note    I was called by Yayo Samaniego MD on 11/23/22 regarding patient Janna Mcdermott. 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    She presented to the [facility] ED this morning due to L sided weakness and slurred speech, symptoms completely resolved upon arrival so no stroke code called. She had CT/CTA performed without acute changes from prior imaging. Felt possible TIA; however, she then around 1150a was noticed to have L facial droop and again L sided weakness so stroke code called. After ED provider reassessed she was back to baseline again. Stroke code de-escalated and recommended MRI brain and permissive HTN (capped at 180 due to on Eliquis).    Stroke Code Data (for stroke code without tele)  Stroke code activated 11/23/22   1152   Stroke provider first response  11/23/22   1157            Last known normal 11/23/22   (S)  (unclear given recurrence of symptoms)    (S) Unknown (unclear given recurrence of symptoms)   Time of discovery   (or onset of symptoms) 11/23/22   1150   Head CT read by Stroke Neuro Dr/Provider 11/23/22   1157   Was stroke code de-escalated? Yes 11/23/22 1220          Imaging Findings   CTH: Interval evolution of a prior right cerebellar infarct. No  evidence of acute intracranial hemorrhage.    CTA head and neck:   Chronic abnormal posterior circulation. Coronary absent enhancement of  the distal right vertebral artery. Chronic absent enhancement of the  proximal the left vertebral artery until reconstituted by collateral  within the distal left V3 segment. Chronic thready basilar artery  appearance. Poor opacification of left superior cerebellar artery,  similar to prior    Intravenous Thrombolysis  Not given due to:   -  "minor/isolated/quickly resolving symptoms    Endovascular Treatment  Not initiated due to absence of proximal vessel occlusion    Impression  Recurrent episodes of transient slurred speech and     Recommendations   -permissive HTN up to  given on Eliquis, avoid hypotension, recommend bedrest, lay flat for now to allow perfusion  -MRI brain w/wo contrast  -hold Eliquis pending stroke size to determine when safe to resume, okay to do ASA 81 mg daily in the interim  -admit for stroke versus TIA work-up:    - Use orderset: \"Ischemic Stroke Routine Admission\" or \"Ischemic Stroke No Thrombolytics/No Thrombectomy ICU Admission\"  -Neuro checks and vitals every 2 hours  -TTE (with bubble study if 60 yrs or less)   -PT/OT/SPT, Dysphagia screen  -ECG/troponins x 3, telemetry  -blood glucose monitoring, check Hgb A1c (goal <7%)  -PTA crestor 5 mg daily, check Lipid panel (titrate to goal LDL 40-70), <40 increases risk of ICH  -Smoking screen, depression screen, sleep apnea screen  -Euthermia, euglycemia, eunatremia   -Stroke Education  -Stroke Class per [facility])    My recommendations are based on the information provided over the phone by Janna Mcdermott's in-person providers. They are not intended to replace the clinical judgment of her in-person providers. I was not requested to personally see or examine the patient at this time.    Sabi Eagle PA-C  Vascular Neurology    To page me or covering stroke neurology team member, click here: [facility]   Choose \"On Call\" tab at top, then search dropdown box for \"Neurology Adult\", select location, press Enter, then look for stroke/neuro ICU/telestroke.    "

## 2022-11-23 NOTE — ASSESSMENT & PLAN NOTE
Taking daily low-dose Crestor  Last LDL was 55  Recheck, goal is to get LDL between 40 and 75  LDL at 46, continue current dosing of crestor

## 2022-11-23 NOTE — ASSESSMENT & PLAN NOTE
Controlled at home taking daily diltiazem  Initial blood pressure in ED quite high, controlled now allow   permissive hypertension, continue Cardizem  11/25/2022-BP remains somewhat high., will increase cardiazem to 180 mg daily, will need OP management and follow-up

## 2022-11-23 NOTE — H&P
[facility]    History and Physical - Hospitalist Service       Date of Admission:  11/23/2022    Assessment & Plan      Janna Mcdermott is a 89 year old female admitted on 11/23/2022. She presents from home after experiencing slurred speech with some left-sided weakness at home this morning.  History of previous cerebellar stroke in March 2021.  Known history of atrial fibrillation, taking Eliquis.  Symptoms occurred this morning improved and then reoccurred while in the ED.  Code stroke was called with CT and CTA imaging showing no acute changes.  Stroke neurology was involved, she was not a candidate for tPA.  MRI was done which is now showing a deep right cerebral stroke in the white matter.  Patient is being admitted to inpatient status for treatment of acute stroke.  Blood pressure was initially quite elevated and is now improved after a dose of diltiazem.  Patient will be monitored with telemetry, echocardiogram will be performed.  Lipid panel will be reevaluated and consults for PT/OT and speech will be placed.  We will ask stroke neuro for a consult tomorrow.  They recommend at this time that we hold her Eliquis dosing until this consult, will treat with a baby aspirin for now.  Permissive hypertension will be allowed.    Acute stroke due to ischemia (H)  Presenting with slurred speech and left facial droop, stuttering off and on today  Initially high blood pressure in the ED  CT/CTA imaging showing no acute changes  Previous history of cerebellar stroke in March 2021, ongoing atrial fibrillation taking Eliquis, hyperlipidemia on Crestor  MRI imaging showing small acute infarct in the deep right cerebrum  Admit inpatient, stroke work-up initiated with echocardiogram ordered, placed on telemetry, troponins, lipid panel, evaluation by speech/PT/OT.  Plan for stroke neuro consult tomorrow per recommendation, hold Eliquis tonight, continue daily 81 mg aspirin for now      Essential  hypertension  Controlled at home taking daily diltiazem  Initial blood pressure in ED quite high, controlled now allow   permissive hypertension, continue Cardizem      History of stroke  History of cerebellar stroke in 2021  No residual effects    Longstanding persistent atrial fibrillation (H)  Rate controlled taking diltiazem, taking Eliquis   per neuro, holding Eliquis at this time  Reevaluate tomorrow      Acquired hypothyroidism  Taking daily  thyroid replacement  Continue home dosing    Mixed hyperlipidemia  Taking daily low-dose Crestor  Last LDL was 55  Recheck, goal is to get LDL between 40 and 75         Diet:  Combination diet if passes dysphagia screen  DVT Prophylaxis: Pneumatic Compression Devices  Grubbs Catheter: Not present  Central Lines: None  Cardiac Monitoring: None  Code Status:  No CPR, DO NOT INTUBATE    Clinically Significant Risk Factors Present on Admission               # Drug Induced Coagulation Defect: home medication list includes an anticoagulant medication        # Obesity: Estimated body mass index is 30.17 kg/m  as calculated from the following:    Height as of this encounter: 1.524 m (5').    Weight as of this encounter: 70.1 kg (154 lb 8 oz).           Disposition Plan      Expected Discharge Date: 11/25/2022                The patient's care was discussed with the Bedside Nurse, Patient and Family friend.    Chase Herrera MD  Hospitalist Service  [facility]  Securely message with the Vocera Web Console (learn more here)  Text page via [employer] Paging/Directory         ______________________________________________________________________    Chief Complaint   89-year-old female presenting with slurred speech and left-sided facial weakness    History is obtained from the patient, electronic health record, emergency department physician and patient's friend    History of Present Illness   Janna Mcdermott is a 89 year old female who presents from home  with concerns of slurred speech and some left facial weakness.  This occurred this morning prompting her to call a friend to bring her to the ER.  Patient has a history of a previous stroke involving the cerebellar area in March 2021 with no residual defects.  Patient has atrial fibrillation for which she takes diltiazem for rate control and she is also taking twice daily Eliquis.  Other risk factors include hypertension, hyperlipidemia for which she takes Crestor and former history of smoking.  Currently she is asymptomatic.  Denies any visual changes denies any weakness involving her hands or legs or difficulty walking or thinking.  Prior to these episodes, she was otherwise feeling well    Review of Systems    All other systems reviewed and negative other than noted in the HPI or here.       Past Medical History    I have reviewed this patient's medical history and updated it with pertinent information if needed.   Past Medical History:   Diagnosis Date     Asymptomatic menopausal state     on estrogen     Chronic kidney disease, stage I     No Comments Provided     Dermatitis     No Comments Provided     Essential (primary) hypertension     No Comments Provided     Hyperlipidemia     No Comments Provided     Hypothyroidism     No Comments Provided     Other specified symptoms and signs involving the circulatory and respiratory systems     No Comments Provided     Paroxysmal atrial fibrillation (H)     No Comments Provided       Past Surgical History   I have reviewed this patient's surgical history and updated it with pertinent information if needed.  Past Surgical History:   Procedure Laterality Date     APPENDECTOMY OPEN      No Comments Provided     CHOLECYSTECTOMY      No Comments Provided     HYSTERECTOMY TOTAL ABDOMINAL      No Comments Provided       Social History   I have reviewed this patient's social history and updated it with pertinent information if needed.  Social History     Tobacco Use      Smoking status: Never     Smokeless tobacco: Never   Vaping Use     Vaping Use: Never used   Substance Use Topics     Alcohol use: No     Alcohol/week: 0.0 standard drinks     Drug use: Never       Family History   I have reviewed this patient's family history and updated it with pertinent information if needed.  Family History   Problem Relation Age of Onset     Coronary Artery Disease Mother      Hypertension Mother      Heart Disease Mother         Heart Disease     Heart Disease Sister         Heart Disease,     Heart Disease Sister         Heart Disease,     Other - See Comments Sister         at 6 months     Other - See Comments Brother           in service     Other - See Comments Brother          of Parkinson's     Other - See Comments Brother          of pneumonia     Other - See Comments Other         No cancer or diabetes in the family       Prior to Admission Medications   Prior to Admission Medications   Prescriptions Last Dose Informant Patient Reported? Taking?   ARMOUR THYROID 90 MG tablet 2022 at AM  No Yes   Sig: Take 1 tablet (90 mg) by mouth daily   Cholecalciferol 10 MCG (400 UNIT) CAPS 2022 at AM  Yes Yes   Sig: Take 800 Units by mouth daily   ELIQUIS ANTICOAGULANT 5 MG tablet 2022 at AM  No Yes   Sig: Take 1 tablet (5 mg) by mouth 2 times daily   Flaxseed Oil OIL 2022 at AM  Yes Yes   Sig: Take daily, puts liquid on oatmeal every morning   Sesame Oil OIL 2022 at AM  Yes Yes   Sig: Take 1 capsule by mouth daily   diltiazem ER COATED BEADS (CARDIZEM CD/CARTIA XT) 120 MG 24 hr capsule 2022 at 1200  No Yes   Sig: Take 1 capsule (120 mg) by mouth daily   fish oil-omega-3 fatty acids 1000 MG capsule 2022 at AM  Yes Yes   Sig: Take 1,000 capsules by mouth daily   latanoprost (XALATAN) 0.005 % ophthalmic solution 2022 at PM  Yes Yes   Sig: Place 1 drop into both eyes At Bedtime    mupirocin (BACTROBAN) 2 % external ointment More than  a month at AM  No Yes   Sig: Apply topically 3 times daily   rosuvastatin (CRESTOR) 5 MG tablet 11/22/2022 at PM  No Yes   Sig: Take 1 tablet (5 mg) by mouth daily   triamcinolone (KENALOG) 0.1 % external cream More than a month at AM  No Yes   Sig: Apply topically 3 times daily      Facility-Administered Medications: None     Allergies   Allergies   Allergen Reactions     Bee Venom Anaphylaxis     Benazepril Cough     Ciprofloxacin Other (See Comments)     Possible GI illness     Erythromycin Other (See Comments)     Unsure of allergy time, possible hives.     Gluten Meal GI Disturbance     Hydrochlorothiazide      Other reaction(s): Hyponatremia     Penicillins Other (See Comments)     Unsure of allergy time, possible hives       Physical Exam   Vital Signs: Temp: 97.4  F (36.3  C) Temp src: Tympanic BP: (!) 177/129 Pulse: 111   Resp: 21 SpO2: 96 % O2 Device: None (Room air)    Weight: 154 lbs 8 oz    General Appearance: Elderly woman, sitting on the [facility].  Appears to be in no distress  Eyes: Pupils are equal and reactive  HEENT: Normocephalic, nose mouth and throat normal  Respiratory: Lungs are clear  Cardiovascular: Irregular rate and rhythm, no murmur heard  GI: Soft, nondistended, nontender  Lymph/Hematologic: Cervical nodes negative  Genitourinary: Not examined  Skin: No lesions, rashes or bruising  Musculoskeletal: Moving arms and legs equally, no deformities  Neurologic: Mildly dysarthric but she thinks that is more from the Ativan she got for the MRI.  Otherwise speaking clearly, getting her thoughts across, no other focal deficits  Psychiatric: Mood and affect normal    Data   Data reviewed today: I reviewed all medications, new labs and imaging results over the last 24 hours. I personally reviewed the head CT image(s) showing No signs of bleed or other abnormalities.    Results for orders placed or performed during the hospital encounter of 11/23/22   CTA Head Neck with Contrast     Status:  None    Narrative    CT ANGIOGRAPHY OF THE BRAIN AND NECK    HISTORY: TIA.    TECHNIQUE: Following the administration of intravenous contrast, thin  helical CT angiography images of the brain were obtained.   Postcontrast helical thin CT angiography images of the neck were  obtained.  NASCET criteria were applied. Source, multiplanar and MIP  reformatted images were reviewed.  This CT exam was performed using  one or more the following dose reduction techniques: automated  exposure control, adjustment of the mA and/or kV according to patient  size, and/or iterative reconstruction technique.    COMPARISON: 3/25/2021    FINDINGS:    CTA Brain:      Atherosclerotic calcification is seen in the cavernous carotids. The  petrous, cavernous, and supraclinoid internal carotid arteries  demonstrate no high-grade, flow limiting stenosis.    The A1 segments are symmetric. An anterior communicating artery is  present. The distal ENEIDA vessels are unremarkable. The M1 segments, MCA  bifurcations and distal MCA vessels are patent.    The right V3 and V4 segments do not enhance, unchanged. There is some  flow in the left V4 segment, which is irregular and thready, similar  to prior. There is a thready appearing basilar artery. Moderate left  and small right posterior communicating arteries likely contribute  posterior circulation flow. No focal PCA abnormality is identified.  The left superior cerebellar artery enhancement is poor, but similar  to prior. Right PICA enhancement is slightly improved compared to  prior.     CTA Neck:     A 3 vessel aortic arch is present. Extensive atherosclerotic plaque is  present without critical proximal stenosis. The aortic arch is dilated  up to 3.9 cm.     The common carotid arteries demonstrate redundancy but preserved  caliber. The carotid bulbs demonstrate mild plaque without critical  stenosis.     There is enhancement the right V1 and V2 segments. There is tortuosity  of the right V3  segment. There is poor enhancement of the right V3 V4  junction.    The left vertebral artery is diffusely nonenhancing until  reconstituted by a left external collateral artery within the distal  left V3 segment on series 4 image 292.     No mass or pneumothorax is seen at the apices. Multilevel degenerative  changes are seen in the cervical spine.      Impression    IMPRESSION:    Chronic abnormal posterior circulation. Coronary absent enhancement of  the distal right vertebral artery. Chronic absent enhancement of the  proximal the left vertebral artery until reconstituted by collateral  within the distal left V3 segment. Chronic thready basilar artery  appearance. Poor opacification of left superior cerebellar artery,  similar to prior.    Consider MR for better assessment for acute posterior circulation  recurrent ischemia if it would modify clinical management.    AMMY SEQUEIRA MD         SYSTEM ID:  [id number]   CT Head w/o Contrast     Status: None    Narrative    PROCEDURE: CT HEAD W/O CONTRAST     HISTORY: TIA.    COMPARISON: 3/26/2021    TECHNIQUE:  Helical images of the head from the foramen magnum to the  vertex were obtained without contrast. This CT exam was performed  using one or more the following dose reduction techniques: automated  exposure control, adjustment of the mA and/or kV according to patient  size, and/or iterative reconstruction technique.    FINDINGS: There has been interval evolution of the previously  described right posterior inferior cerebellar territory infarct. There  is chronic encephalomalacia in the left posterior inferior cerebellar  territory. There are advanced chronic microvascular ischemic changes.  No CT evidence of acute transcortical ischemia is identified.    No acute intracranial hemorrhage, mass effect or hydrocephalus is  seen.     The calvarium is intact.        Impression    IMPRESSION: Interval evolution of a prior right cerebellar infarct. No  evidence of  acute intracranial hemorrhage.    AMMY SEQUEIRA MD         SYSTEM ID:  [id number]   MR Brain w/o & w Contrast     Status: None    Narrative    EXAM:  MR BRAIN W/O & W CONTRAST    HISTORY:  TIA, r/o stroke. .    TECHNIQUE:  Sagittal T1, axial T1, T2, FLAIR, diffusion, gradient as  well as axial and 3D postcontrast imaging of the whole brain was  performed.    MEDS/CONTRAST: 15 mL Dotarem    COMPARISON:  3/26/2021     FINDINGS:    Prominence of the ventricles and sulci is compatible with moderate,  generalized volume loss. No abnormal extra-axial collection,  hydrocephalus, mass effect or midline shift is seen. The basal  cisterns are preserved.    Evolution of a right inferomedial cerebellar infarct is seen, with  some residual laminar necrosis. Other chronic cerebellar lacunar  injuries are redemonstrated.    There is diffusion restriction within the right corona  radiata/anterior right corticospinal tract. This is not associated  with enhancement.    Diffuse background microvascular disease is redemonstrated.     The T1 marrow signal is unremarkable.       Impression    IMPRESSION:     Diffusion restriction within the right corona radiata/anterior right  corticospinal tract is compatible with a small, acute infarct of the  deep right cerebral white matter.     AMMY SEQUEIRA MD         SYSTEM ID:  [id number]   INR     Status: Abnormal   Result Value Ref Range    INR 1.18 (H) 0.85 - 1.15   Basic metabolic panel     Status: Abnormal   Result Value Ref Range    Sodium 138 136 - 145 mmol/L    Potassium 4.4 3.4 - 5.3 mmol/L    Chloride 101 98 - 107 mmol/L    Carbon Dioxide (CO2) 26 22 - 29 mmol/L    Anion Gap 11 7 - 15 mmol/L    Urea Nitrogen 25.6 (H) 8.0 - 23.0 mg/dL    Creatinine 0.88 0.51 - 0.95 mg/dL    Calcium 9.6 8.2 - 9.6 mg/dL    Glucose 186 (H) 70 - 99 mg/dL    GFR Estimate 62 >60 mL/min/1.73m2   Troponin T, High Sensitivity     Status: Abnormal   Result Value Ref Range    Troponin T, High Sensitivity 19  (H) <=14 ng/L   Glucose by meter     Status: Abnormal   Result Value Ref Range    GLUCOSE BY METER POCT 189 (H) 70 - 99 mg/dL   Extra Tube     Status: None    Narrative    The following orders were created for panel order Extra Tube.  Procedure                               Abnormality         Status                     ---------                               -----------         ------                     Extra Serum Separator Tu...[[id number]]                      Final result                 Please view results for these tests on the individual orders.   Extra Serum Separator Tube (SST)     Status: None   Result Value Ref Range    Hold Specimen [facility]    CBC with platelets and differential     Status: Abnormal   Result Value Ref Range    WBC Count 5.3 4.0 - 11.0 10e3/uL    RBC Count 5.06 3.80 - 5.20 10e6/uL    Hemoglobin 15.7 11.7 - 15.7 g/dL    Hematocrit 47.7 (H) 35.0 - 47.0 %    MCV 94 78 - 100 fL    MCH 31.0 26.5 - 33.0 pg    MCHC 32.9 31.5 - 36.5 g/dL    RDW 13.5 10.0 - 15.0 %    Platelet Count 159 150 - 450 10e3/uL    % Neutrophils 67 %    % Lymphocytes 21 %    % Monocytes 8 %    % Eosinophils 3 %    % Basophils 1 %    % Immature Granulocytes 0 %    NRBCs per 100 WBC 0 <1 /100    Absolute Neutrophils 3.6 1.6 - 8.3 10e3/uL    Absolute Lymphocytes 1.1 0.8 - 5.3 10e3/uL    Absolute Monocytes 0.4 0.0 - 1.3 10e3/uL    Absolute Eosinophils 0.1 0.0 - 0.7 10e3/uL    Absolute Basophils 0.0 0.0 - 0.2 10e3/uL    Absolute Immature Granulocytes 0.0 <=0.4 10e3/uL    Absolute NRBCs 0.0 10e3/uL   Troponin T, High Sensitivity     Status: Abnormal   Result Value Ref Range    Troponin T, High Sensitivity 18 (H) <=14 ng/L   UA with Microscopic reflex to Culture     Status: Abnormal    Specimen: Urine, Clean Catch   Result Value Ref Range    Color Urine Yellow Colorless, Straw, Light Yellow, Yellow    Appearance Urine Clear Clear    Glucose Urine Negative Negative mg/dL    Bilirubin Urine Negative Negative    Ketones Urine Negative  Negative mg/dL    Specific Gravity Urine 1.012 1.000 - 1.030    Blood Urine Negative Negative    pH Urine 7.5 5.0 - 9.0    Protein Albumin Urine Negative Negative mg/dL    Urobilinogen Urine Normal Normal, 2.0 mg/dL    Nitrite Urine Negative Negative    Leukocyte Esterase Urine Negative Negative    Bacteria Urine Few (A) None Seen /HPF    RBC Urine <1 <=2 /HPF    WBC Urine 1 <=5 /HPF    Narrative    Urine Culture not indicated   Glucose by meter     Status: Abnormal   Result Value Ref Range    GLUCOSE BY METER POCT 103 (H) 70 - 99 mg/dL   Asymptomatic Influenza A/B & SARS-CoV2 (COVID-19) Virus PCR Multiplex Nose     Status: Normal    Specimen: Nose; Swab   Result Value Ref Range    Influenza A PCR Negative Negative    Influenza B PCR Negative Negative    RSV PCR Negative Negative    SARS CoV2 PCR Negative Negative    Narrative    Testing was performed using the Xpert Xpress CoV2/Flu/RSV Assay on the [employer] Gene[employer]pert Instrument. This test should be ordered for the detection of SARS-CoV-2 and influenza viruses in individuals who meet clinical and/or epidemiological criteria. Test performance is unknown in asymptomatic patients. This test is for in vitro diagnostic use under the FDA EUA for laboratories certified under CLIA to perform high or moderate complexity testing. This test has not been FDA cleared or approved. A negative result does not rule out the presence of PCR inhibitors in the specimen or target RNA in concentration below the limit of detection for the assay. If only one viral target is positive but coinfection with multiple targets is suspected, the sample should be re-tested with another FDA cleared, approved, or authorized test, if coinfection would change clinical management. This test was validated by the [facility]. These laboratories are certified under the Clinical Laboratory Improvement Amendments of 1988 (CLIA-88) as qualified to perform high complexity laboratory testing.    CBC with platelets differential     Status: Abnormal    Narrative    The following orders were created for panel order CBC with platelets differential.  Procedure                               Abnormality         Status                     ---------                               -----------         ------                     CBC with platelets and d...[[id number]]  Abnormal            Final result                 Please view results for these tests on the individual orders.

## 2022-11-23 NOTE — ED TRIAGE NOTES
Pt arrives from home by MEDS[facility]. Pt's daughter called stating that pt slurring her words. Pt states that she is currently feeling fine, words are clear on arrival.     Triage Assessment     Row Name 11/23/22 1021       Respiratory WDL    Respiratory WDL WDL       Skin Circulation/Temperature WDL    Skin Circulation/Temperature WDL WDL       Cardiac WDL    Cardiac WDL WDL

## 2022-11-23 NOTE — ASSESSMENT & PLAN NOTE
Rate controlled taking diltiazem, taking Eliquis   per neuro, holding Eliquis at this time  11/25/2022-Per neuro have restarted her eliquis

## 2022-11-23 NOTE — ED PROVIDER NOTES
History     Chief Complaint   Patient presents with     Stroke Symptoms     HPI  Janna Mcdermott is a 89 year old female who presents emergency department with episode of slurring her words and possible left facial droop.  Patient ambulating fine.  Patient is back to normal upon arrival, she did present via EMS.  No recent fevers or chills, no headache.  No vision changes, patient is on Eliquis for A. Fib.      Reviewed RN notes below, same history relayed to me    Pt arrives from home by [facility]. Pt's daughter called stating that pt slurring her words. Pt states that she is currently feeling fine, words are clear on arrival.     Allergies:  Allergies   Allergen Reactions     Bee Venom Anaphylaxis     Benazepril Cough     Ciprofloxacin Other (See Comments)     Possible GI illness     Erythromycin Other (See Comments)     Unsure of allergy time, possible hives.     Gluten Meal GI Disturbance     Hydrochlorothiazide      Other reaction(s): Hyponatremia     Penicillins Other (See Comments)     Unsure of allergy time, possible hives       Problem List:    Patient Active Problem List    Diagnosis Date Noted     Acute stroke due to ischemia (H) 11/23/2022     Priority: Medium     Pre-diabetes 06/04/2021     Priority: Medium     History of stroke 06/03/2021     Priority: Medium     Mesenteric artery stenosis (H) 04/05/2021     Priority: Medium     Ascending aortic aneurysm 04/05/2021     Priority: Medium     Basilar artery stenosis/occlusion 04/05/2021     Priority: Medium     Cerebellar stroke (H) 03/25/2021     Priority: Medium     Mixed hyperlipidemia 07/31/2018     Priority: Medium     Essential hypertension 07/31/2018     Priority: Medium     Acquired hypothyroidism 07/31/2018     Priority: Medium     External ear ulcer (H) 09/20/2016     Priority: Medium     Biatrial enlargement 05/28/2015     Priority: Medium     Longstanding persistent atrial fibrillation (H) 03/09/2015     Priority: Medium     Paroxysmal  atrial fibrillation (H) 2014     Priority: Medium     Stage 3a chronic kidney disease (H) 02/15/2011     Priority: Medium     CKD (chronic kidney disease), stage II 2011     Priority: Medium     Formatting of this note might be different from the original.  IMO Update 10/11       Other symptoms involving cardiovascular system 2011     Priority: Medium     Overview:   No obstruction on CUS in [place]       Contact dermatitis and eczema 2010     Priority: Medium     Gambling problem 2009     Priority: Medium     Formatting of this note might be different from the original.  Daughter feels she is addicted.  History of some financial problems due to gambling, but she denies addiction  IMO Update 10/11          Past Medical History:    Past Medical History:   Diagnosis Date     Asymptomatic menopausal state      Chronic kidney disease, stage I      Dermatitis      Essential (primary) hypertension      Hyperlipidemia      Hypothyroidism      Other specified symptoms and signs involving the circulatory and respiratory systems      Paroxysmal atrial fibrillation (H)        Past Surgical History:    Past Surgical History:   Procedure Laterality Date     APPENDECTOMY OPEN      No Comments Provided     CHOLECYSTECTOMY      No Comments Provided     HYSTERECTOMY TOTAL ABDOMINAL      No Comments Provided       Family History:    Family History   Problem Relation Age of Onset     Heart Disease Mother         Heart Disease     Other - See Comments Other         No cancer or diabetes in the family     Other - See Comments Brother           in service     Other - See Comments Brother          of Parkinson's     Heart Disease Sister         Heart Disease,     Heart Disease Sister         Heart Disease,     Other - See Comments Sister         at 6 months     Other - See Comments Brother          of pneumonia       Social History:  Marital Status:   [5]  Social History      Tobacco Use     Smoking status: Never     Smokeless tobacco: Never   Vaping Use     Vaping Use: Never used   Substance Use Topics     Alcohol use: No     Alcohol/week: 0.0 standard drinks     Drug use: Never        Medications:    ARMOUR THYROID 90 MG tablet  Cholecalciferol 10 MCG (400 UNIT) CAPS  diltiazem ER COATED BEADS (CARDIZEM CD/CARTIA XT) 120 MG 24 hr capsule  ELIQUIS ANTICOAGULANT 5 MG tablet  fish oil-omega-3 fatty acids 1000 MG capsule  Flaxseed Oil OIL  latanoprost (XALATAN) 0.005 % ophthalmic solution  mupirocin (BACTROBAN) 2 % external ointment  rosuvastatin (CRESTOR) 5 MG tablet  Sesame Oil OIL  triamcinolone (KENALOG) 0.1 % external cream          Review of Systems   Constitutional: Negative for chills and fever.   Respiratory: Negative for chest tightness and shortness of breath.    Genitourinary: Negative for dysuria.   Neurological: Negative for light-headedness and headaches.       Physical Exam   BP: (!) 194/85  Pulse: 77  Temp: 97.4  F (36.3  C)  Resp: 18  Height: 152.4 cm (5')  Weight: 70.1 kg (154 lb 8 oz)  SpO2: 97 %      Physical Exam  Vitals and nursing note reviewed.   Cardiovascular:      Rate and Rhythm: Normal rate.      Pulses: Normal pulses.   Abdominal:      General: There is no distension.      Tenderness: There is no abdominal tenderness.   Skin:     Capillary Refill: Capillary refill takes less than 2 seconds.   Neurological:      General: No focal deficit present.      Mental Status: She is alert.       Upon presentation to the ED patient had been at baseline so NIH scale was 0.    For a brief time patient had recurrent symptoms with NIH scale of 6    Symptoms resolved again and was at baseline for the remainder of ED stay with NIH score of 0.  ED Course         Results for orders placed or performed during the hospital encounter of 11/23/22 (from the past 24 hour(s))   Glucose by meter   Result Value Ref Range    GLUCOSE BY METER POCT 189 (H) 70 - 99 mg/dL   CBC with  platelets differential    Narrative    The following orders were created for panel order CBC with platelets differential.  Procedure                               Abnormality         Status                     ---------                               -----------         ------                     CBC with platelets and d...[[id number]]  Abnormal            Final result                 Please view results for these tests on the individual orders.   INR   Result Value Ref Range    INR 1.18 (H) 0.85 - 1.15   Basic metabolic panel   Result Value Ref Range    Sodium 138 136 - 145 mmol/L    Potassium 4.4 3.4 - 5.3 mmol/L    Chloride 101 98 - 107 mmol/L    Carbon Dioxide (CO2) 26 22 - 29 mmol/L    Anion Gap 11 7 - 15 mmol/L    Urea Nitrogen 25.6 (H) 8.0 - 23.0 mg/dL    Creatinine 0.88 0.51 - 0.95 mg/dL    Calcium 9.6 8.2 - 9.6 mg/dL    Glucose 186 (H) 70 - 99 mg/dL    GFR Estimate 62 >60 mL/min/1.73m2   Troponin T, High Sensitivity   Result Value Ref Range    Troponin T, High Sensitivity 19 (H) <=14 ng/L   Extra Tube    Narrative    The following orders were created for panel order Extra Tube.  Procedure                               Abnormality         Status                     ---------                               -----------         ------                     Extra Serum Separator Tu...[[id number]]                      Final result                 Please view results for these tests on the individual orders.   Extra Serum Separator Tube (SST)   Result Value Ref Range    Hold Specimen JI    CBC with platelets and differential   Result Value Ref Range    WBC Count 5.3 4.0 - 11.0 10e3/uL    RBC Count 5.06 3.80 - 5.20 10e6/uL    Hemoglobin 15.7 11.7 - 15.7 g/dL    Hematocrit 47.7 (H) 35.0 - 47.0 %    MCV 94 78 - 100 fL    MCH 31.0 26.5 - 33.0 pg    MCHC 32.9 31.5 - 36.5 g/dL    RDW 13.5 10.0 - 15.0 %    Platelet Count 159 150 - 450 10e3/uL    % Neutrophils 67 %    % Lymphocytes 21 %    % Monocytes 8 %    % Eosinophils 3 %     % Basophils 1 %    % Immature Granulocytes 0 %    NRBCs per 100 WBC 0 <1 /100    Absolute Neutrophils 3.6 1.6 - 8.3 10e3/uL    Absolute Lymphocytes 1.1 0.8 - 5.3 10e3/uL    Absolute Monocytes 0.4 0.0 - 1.3 10e3/uL    Absolute Eosinophils 0.1 0.0 - 0.7 10e3/uL    Absolute Basophils 0.0 0.0 - 0.2 10e3/uL    Absolute Immature Granulocytes 0.0 <=0.4 10e3/uL    Absolute NRBCs 0.0 10e3/uL   CT Head w/o Contrast    Narrative    PROCEDURE: CT HEAD W/O CONTRAST     HISTORY: TIA.    COMPARISON: 3/26/2021    TECHNIQUE:  Helical images of the head from the foramen magnum to the  vertex were obtained without contrast. This CT exam was performed  using one or more the following dose reduction techniques: automated  exposure control, adjustment of the mA and/or kV according to patient  size, and/or iterative reconstruction technique.    FINDINGS: There has been interval evolution of the previously  described right posterior inferior cerebellar territory infarct. There  is chronic encephalomalacia in the left posterior inferior cerebellar  territory. There are advanced chronic microvascular ischemic changes.  No CT evidence of acute transcortical ischemia is identified.    No acute intracranial hemorrhage, mass effect or hydrocephalus is  seen.     The calvarium is intact.        Impression    IMPRESSION: Interval evolution of a prior right cerebellar infarct. No  evidence of acute intracranial hemorrhage.    AMMY SEQUEIRA MD         SYSTEM ID:  [id number]   CTA Head Neck with Contrast    Narrative    CT ANGIOGRAPHY OF THE BRAIN AND NECK    HISTORY: TIA.    TECHNIQUE: Following the administration of intravenous contrast, thin  helical CT angiography images of the brain were obtained.   Postcontrast helical thin CT angiography images of the neck were  obtained.  NASCET criteria were applied. Source, multiplanar and MIP  reformatted images were reviewed.  This CT exam was performed using  one or more the following dose  reduction techniques: automated  exposure control, adjustment of the mA and/or kV according to patient  size, and/or iterative reconstruction technique.    COMPARISON: 3/25/2021    FINDINGS:    CTA Brain:      Atherosclerotic calcification is seen in the cavernous carotids. The  petrous, cavernous, and supraclinoid internal carotid arteries  demonstrate no high-grade, flow limiting stenosis.    The A1 segments are symmetric. An anterior communicating artery is  present. The distal ENEIDA vessels are unremarkable. The M1 segments, MCA  bifurcations and distal MCA vessels are patent.    The right V3 and V4 segments do not enhance, unchanged. There is some  flow in the left V4 segment, which is irregular and thready, similar  to prior. There is a thready appearing basilar artery. Moderate left  and small right posterior communicating arteries likely contribute  posterior circulation flow. No focal PCA abnormality is identified.  The left superior cerebellar artery enhancement is poor, but similar  to prior. Right PICA enhancement is slightly improved compared to  prior.     CTA Neck:     A 3 vessel aortic arch is present. Extensive atherosclerotic plaque is  present without critical proximal stenosis. The aortic arch is dilated  up to 3.9 cm.     The common carotid arteries demonstrate redundancy but preserved  caliber. The carotid bulbs demonstrate mild plaque without critical  stenosis.     There is enhancement the right V1 and V2 segments. There is tortuosity  of the right V3 segment. There is poor enhancement of the right V3 V4  junction.    The left vertebral artery is diffusely nonenhancing until  reconstituted by a left external collateral artery within the distal  left V3 segment on series 4 image 292.     No mass or pneumothorax is seen at the apices. Multilevel degenerative  changes are seen in the cervical spine.      Impression    IMPRESSION:    Chronic abnormal posterior circulation. Coronary absent  enhancement of  the distal right vertebral artery. Chronic absent enhancement of the  proximal the left vertebral artery until reconstituted by collateral  within the distal left V3 segment. Chronic thready basilar artery  appearance. Poor opacification of left superior cerebellar artery,  similar to prior.    Consider MR for better assessment for acute posterior circulation  recurrent ischemia if it would modify clinical management.    AMMY SEQUEIRA MD         SYSTEM ID:  [id number]   UA with Microscopic reflex to Culture    Specimen: Urine, Clean Catch   Result Value Ref Range    Color Urine Yellow Colorless, Straw, Light Yellow, Yellow    Appearance Urine Clear Clear    Glucose Urine Negative Negative mg/dL    Bilirubin Urine Negative Negative    Ketones Urine Negative Negative mg/dL    Specific Gravity Urine 1.012 1.000 - 1.030    Blood Urine Negative Negative    pH Urine 7.5 5.0 - 9.0    Protein Albumin Urine Negative Negative mg/dL    Urobilinogen Urine Normal Normal, 2.0 mg/dL    Nitrite Urine Negative Negative    Leukocyte Esterase Urine Negative Negative    Bacteria Urine Few (A) None Seen /HPF    RBC Urine <1 <=2 /HPF    WBC Urine 1 <=5 /HPF    Narrative    Urine Culture not indicated   Troponin T, High Sensitivity   Result Value Ref Range    Troponin T, High Sensitivity 18 (H) <=14 ng/L   Glucose by meter   Result Value Ref Range    GLUCOSE BY METER POCT 103 (H) 70 - 99 mg/dL   MR Brain w/o & w Contrast    Narrative    EXAM:  MR BRAIN W/O & W CONTRAST    HISTORY:  TIA, r/o stroke. .    TECHNIQUE:  Sagittal T1, axial T1, T2, FLAIR, diffusion, gradient as  well as axial and 3D postcontrast imaging of the whole brain was  performed.    MEDS/CONTRAST: 15 mL Dotarem    COMPARISON:  3/26/2021     FINDINGS:    Prominence of the ventricles and sulci is compatible with moderate,  generalized volume loss. No abnormal extra-axial collection,  hydrocephalus, mass effect or midline shift is seen. The  basal  cisterns are preserved.    Evolution of a right inferomedial cerebellar infarct is seen, with  some residual laminar necrosis. Other chronic cerebellar lacunar  injuries are redemonstrated.    There is diffusion restriction within the right corona  radiata/anterior right corticospinal tract. This is not associated  with enhancement.    Diffuse background microvascular disease is redemonstrated.     The T1 marrow signal is unremarkable.       Impression    IMPRESSION:     Diffusion restriction within the right corona radiata/anterior right  corticospinal tract is compatible with a small, acute infarct of the  deep right cerebral white matter.     AMMY SEQUEIRA MD         SYSTEM ID:  [id number]       Medications   sodium chloride 0.9% infusion ( Intravenous New Bag 11/23/22 1114)   sodium chloride 0.9% infusion (has no administration in time range)   niCARdipine 40 mg in 200 mL NS (CARDENE) infusion (2.5 mg/hr Intravenous New Bag 11/23/22 1351)   diltiazem ER COATED BEADS (CARDIZEM CD/CARTIA XT) 24 hr capsule 120 mg (has no administration in time range)   iopamidol (ISOVUE-370) solution 75 mL (75 mLs Intravenous Given 11/23/22 1054)   LORazepam (ATIVAN) injection 0.25 mg (0.25 mg Intravenous Given 11/23/22 1253)   gadoterate meglumine (DOTAREM) injection 15 mL (15 mLs Intravenous Given 11/23/22 1255)       Assessments & Plan (with Medical Decision Making)     I have reviewed the nursing notes.    I have reviewed the findings, diagnosis, plan and need for follow up with the patient.  Small stroke on MRI, discussed with stroke neurology.  Will admit for further management, echocardiogram results pending at time of admission.  Patient and family are in agreement with plan.  Hold Eliquis for now, aspirin as a bridge, patient achieving blood pressure parameters at time of admission.  Discussed with Dr. Herrera regarding admission, graciously excepted patient for admission.  New Prescriptions    No medications on  file       Final diagnoses:   Acute stroke due to ischemia (H)       11/23/2022   [facility] AND Yayo Pacheco MD  11/23/22 9457

## 2022-11-24 ENCOUNTER — APPOINTMENT (OUTPATIENT)
Dept: PHYSICAL THERAPY | Facility: OTHER | Age: 87
DRG: 065 | End: 2022-11-24
Attending: FAMILY MEDICINE
Payer: MEDICARE

## 2022-11-24 ENCOUNTER — APPOINTMENT (OUTPATIENT)
Dept: OCCUPATIONAL THERAPY | Facility: OTHER | Age: 87
DRG: 065 | End: 2022-11-24
Attending: FAMILY MEDICINE
Payer: MEDICARE

## 2022-11-24 LAB
ANION GAP SERPL CALCULATED.3IONS-SCNC: 12 MMOL/L (ref 7–15)
BUN SERPL-MCNC: 16.4 MG/DL (ref 8–23)
CALCIUM SERPL-MCNC: 9.3 MG/DL (ref 8.2–9.6)
CHLORIDE SERPL-SCNC: 104 MMOL/L (ref 98–107)
CREAT SERPL-MCNC: 0.68 MG/DL (ref 0.51–0.95)
DEPRECATED HCO3 PLAS-SCNC: 24 MMOL/L (ref 22–29)
ERYTHROCYTE [DISTWIDTH] IN BLOOD BY AUTOMATED COUNT: 13.3 % (ref 10–15)
GFR SERPL CREATININE-BSD FRML MDRD: 82 ML/MIN/1.73M2
GLUCOSE BLDC GLUCOMTR-MCNC: 104 MG/DL (ref 70–99)
GLUCOSE BLDC GLUCOMTR-MCNC: 108 MG/DL (ref 70–99)
GLUCOSE BLDC GLUCOMTR-MCNC: 161 MG/DL (ref 70–99)
GLUCOSE BLDC GLUCOMTR-MCNC: 98 MG/DL (ref 70–99)
GLUCOSE SERPL-MCNC: 99 MG/DL (ref 70–99)
HCT VFR BLD AUTO: 47.6 % (ref 35–47)
HGB BLD-MCNC: 15.9 G/DL (ref 11.7–15.7)
MCH RBC QN AUTO: 30.6 PG (ref 26.5–33)
MCHC RBC AUTO-ENTMCNC: 33.4 G/DL (ref 31.5–36.5)
MCV RBC AUTO: 92 FL (ref 78–100)
PLATELET # BLD AUTO: 159 10E3/UL (ref 150–450)
POTASSIUM SERPL-SCNC: 4.1 MMOL/L (ref 3.4–5.3)
RBC # BLD AUTO: 5.19 10E6/UL (ref 3.8–5.2)
SODIUM SERPL-SCNC: 140 MMOL/L (ref 136–145)
WBC # BLD AUTO: 7.1 10E3/UL (ref 4–11)

## 2022-11-24 PROCEDURE — 97165 OT EVAL LOW COMPLEX 30 MIN: CPT | Mod: GO | Performed by: OCCUPATIONAL THERAPIST

## 2022-11-24 PROCEDURE — 97535 SELF CARE MNGMENT TRAINING: CPT | Mod: GO | Performed by: OCCUPATIONAL THERAPIST

## 2022-11-24 PROCEDURE — 97116 GAIT TRAINING THERAPY: CPT | Mod: GP | Performed by: PHYSICAL THERAPIST

## 2022-11-24 PROCEDURE — 82310 ASSAY OF CALCIUM: CPT | Performed by: FAMILY MEDICINE

## 2022-11-24 PROCEDURE — 85027 COMPLETE CBC AUTOMATED: CPT | Performed by: FAMILY MEDICINE

## 2022-11-24 PROCEDURE — 250N000013 HC RX MED GY IP 250 OP 250 PS 637: Performed by: FAMILY MEDICINE

## 2022-11-24 PROCEDURE — 36415 COLL VENOUS BLD VENIPUNCTURE: CPT | Performed by: FAMILY MEDICINE

## 2022-11-24 PROCEDURE — 120N000001 HC R&B MED SURG/OB

## 2022-11-24 PROCEDURE — 97161 PT EVAL LOW COMPLEX 20 MIN: CPT | Mod: GP | Performed by: PHYSICAL THERAPIST

## 2022-11-24 PROCEDURE — 99232 SBSQ HOSP IP/OBS MODERATE 35: CPT | Performed by: FAMILY MEDICINE

## 2022-11-24 PROCEDURE — G0426 INPT/ED TELECONSULT50: HCPCS | Mod: G0 | Performed by: PSYCHIATRY & NEUROLOGY

## 2022-11-24 RX ADMIN — LATANOPROST 1 DROP: 50 SOLUTION/ DROPS OPHTHALMIC at 21:04

## 2022-11-24 RX ADMIN — DILTIAZEM HYDROCHLORIDE 120 MG: 120 CAPSULE, COATED, EXTENDED RELEASE ORAL at 10:59

## 2022-11-24 RX ADMIN — APIXABAN 5 MG: 5 TABLET, FILM COATED ORAL at 21:03

## 2022-11-24 RX ADMIN — APIXABAN 5 MG: 5 TABLET, FILM COATED ORAL at 12:20

## 2022-11-24 RX ADMIN — THYROID 90 MG: 90 TABLET ORAL at 08:25

## 2022-11-24 RX ADMIN — ASPIRIN 81 MG: 81 TABLET, COATED ORAL at 10:59

## 2022-11-24 RX ADMIN — ROSUVASTATIN CALCIUM 5 MG: 5 TABLET, COATED ORAL at 20:04

## 2022-11-24 ASSESSMENT — ACTIVITIES OF DAILY LIVING (ADL)
ADLS_ACUITY_SCORE: 23
ADLS_ACUITY_SCORE: 29
ADLS_ACUITY_SCORE: 28
ADLS_ACUITY_SCORE: 29
ADLS_ACUITY_SCORE: 28

## 2022-11-24 NOTE — PROGRESS NOTES
11/24/22 6923   Appointment Info   Signing Clinician's Name / Credentials (PT) Nik Andrade PT   Living Environment   People in Home alone   Current Living Arrangements house   Transportation Anticipated family or friend will provide   Pain Assessment   Patient Currently in Pain No   Posture    Posture Kyphosis   Range of Motion (ROM)   Range of Motion ROM is WFL   ROM Comment Mild ankle impairment, loss of ROM and mobility right worse then left   Strength (Manual Muscle Testing)   Strength Comments Mild left LE weakness with MMT 4/5   Transfers   Transfers sit-stand transfer   Sit-Stand Transfer   Sit-Stand [place] (Transfers) set up;supervision;verbal cues;minimum assist (75% patient effort)   Gait/Stairs (Locomotion)   [place] Level (Gait) minimum assist (75% patient effort)   Assistive Device (Gait) walker, front-wheeled   Distance in Feet (Required for LE Total Joints) 20   Distance in Feet (Gait) 20   Pattern (Gait) 2-point   Deviations/Abnormal Patterns (Gait) left sided deviations;gait speed decreased   Balance   Balance Comments Use of FWW for safety, patient amb with and without cane at home, also uses FWW for outdoor amb per her report   Clinical Impression   Criteria for Skilled Therapeutic Intervention Yes, treatment indicated   PT Diagnosis (PT) Impaired gait and balance   Functional limitations due to impairments General weakness and deconditioned, Mild left leg weakness compared to right, deviates to her left with amb requiring verbal and manual cues   Clinical Presentation (PT Evaluation Complexity) Evolving/Changing   Clinical Decision Making (Complexity) low complexity   Planned Therapy Interventions (PT) gait training;transfer training;strengthening   Anticipated Equipment Needs at Discharge (PT) walker, rolling   Risk & Benefits of therapy have been explained evaluation/treatment results reviewed;risks/benefits reviewed;patient   PT Total Evaluation Time   PT Eval, Low Complexity  Minutes (34064) 15   Physical Therapy Goals   PT Frequency Daily   PT Goals Transfers;Gait   PT: Transfers Supervision/stand-by assist   PT: Gait Supervision/stand-by assist   Interventions   Interventions Quick Adds Gait Training   Gait Training   Gait Training Minutes (00213) 10   Symptoms Noted During/After Treatment (Gait Training) none   Treatment Detail/Skilled Intervention Amb with min assist with FWW, manual assist for FWW, deviates to the left, visual impairment left, possible left neglect   [place] Level (Gait Training) minimum assist (75% patient effort)   Physical Assistance Level (Gait Training) set-up required;supervision;verbal cues;1 person assist   Weight Bearing (Gait Training) full weight-bearing   Assistive Device (Gait Training) rolling walker   Pattern Analysis (Gait Training) 2-point gait   Gait Analysis Deviations decreased step length;decreased stride length   Impairments (Gait Analysis/Training) balance impaired;flexibility decreased;strength decreased;vision impaired   PT Discharge Planning   PT Plan Continue PT for gait and mobility, safety with transfers and gait   PT Discharge Recommendation (DC Rec) home with home care physical therapy   PT Rationale for DC Rec Safety, at risk of fall   PT Brief overview of current status Min assist for gait and transfers, visual impairment left peripheral, deviates left, requires assist for FWW, at risk of fall   Total Session Time   Timed Code Treatment Minutes 10   Total Session Time (sum of timed and untimed services) 25

## 2022-11-24 NOTE — PROGRESS NOTES
11/24/22 1200   Appointment Info   Signing Clinician's Name / Credentials (OT) Kenisha Valadez, OTR/L   Living Environment   People in Home alone   Current Living Arrangements house   Transportation Anticipated family or friend will provide   Self-Care   Usual Activity Tolerance moderate   Current Activity Tolerance fair   Equipment Currently Used at Home cane, straight   Cognitive Status Examination   Orientation Status orientation to person, place and time   Affect/Mental Status (Cognitive) WFL   Follows Commands WFL   Visual Perception   Visual Impairment/Limitations peripheral vision impaired left   Visual Attention pt somewhat innattentive to left side, bumping in to doors, etc   Sensory   Sensory Quick Adds sensation intact   Range of Motion Comprehensive   General Range of Motion bilateral upper extremity ROM WFL   Strength Comprehensive (MMT)   General Manual Muscle Testing (MMT) Assessment   (pt has functional stength bilateral U/E's)   Coordination   Upper Extremity Coordination No deficits were identified   Transfers   Transfers sit-stand transfer;toilet transfer   Sit-Stand Transfer   Sit-Stand [place] (Transfers) minimum assist (75% patient effort)   Assistive Device (Sit-Stand Transfers) walker, front-wheeled   Toilet Transfer   Type (Toilet Transfer) sit-stand   [place] Level (Toilet Transfer) minimum assist (75% patient effort);1 person to manage equipment   Assistive Device (Toilet Transfer) grab bars/safety frame;walker, front-wheeled   Activities of Daily Living   BADL Assessment/Intervention toileting;bathing   Bathing Assessment/Intervention   [place] Level (Bathing) supervision;set up;verbal cues   Toileting   [place] Level (Toileting) supervision   Clinical Impression   Criteria for Skilled Therapeutic Interventions Met (OT) Yes, treatment indicated   OT Diagnosis Stroke   Influenced by the following impairments slight left sided weakness and visual deficit   OT Problem  List-Impairments impacting ADL activity tolerance impaired;vision   Assessment of Occupational Performance 1-3 Performance Deficits   Identified Performance Deficits mobility and self care   Planned Therapy Interventions (OT) ADL retraining;progressive activity/exercise   Clinical Decision Making Complexity (OT) low complexity   Anticipated Equipment Needs Upon Discharge (OT) walker, rolling   Risk & Benefits of therapy have been explained risks/benefits reviewed   OT Total Evaluation Time   OT Eval, Low Complexity Minutes (75566) 15   OT Goals   Therapy Frequency (OT) Daily   OT Predicted Duration/Target Date for Goal Attainment 11/27/22   OT Goals Upper Body Dressing;Lower Body Dressing;Upper Body Bathing;Lower Body Bathing;Toilet Transfer/Toileting   OT: Upper Body Dressing Supervision/stand-by assist   OT: Lower Body Dressing Supervision/stand-by assist   OT: Upper Body Bathing Supervision/stand-by assist   OT: Lower Body Bathing Supervision/stand-by assist   OT: Toilet Transfer/Toileting Supervision/stand-by assist   Interventions   Interventions Quick Adds Self-Care/Home Management;Therapeutic Activity   Self-Care/Home Management   Self-Care/Home Mgmt/ADL, Compensatory, Meal Prep Minutes (28074) 30   Symptoms Noted During/After Treatment (Meal Preparation/Planning Training) fatigue   [place] Level (Bathing Training) contact guard   Assistance (Bathing Training) 1 person assist   Assistive Device (Bathing Training) grab bars  (standing)   [place] Level (Upper Body Dressing Training) minimum assist (75% patient effort)   Assistance (Upper Body Dressing Training) 1 person assist   Lower Body Dressing Training Assistance maximum assist (25% patient effort)   Lower Body Dressing Training Assistance 1 person assist   [place] Level (Toilet Training) contact guard   Assistance (Toilet Training) 1 person assist   OT Discharge Planning   OT Plan cont OT   OT Discharge Recommendation (DC Rec) home with  "assist;home with home care occupational therapy   OT Rationale for DC Rec Pt will benefit from continued assist and home care therapies at home, will continue to see with recommendations to follow   OT Brief overview of current status Pt very pleasant, reports feeling better vs yesterday and \"able to move left leg better\" Pt did demonstraet left peripheral vision deficits, bumped into things on left side with walker, innattentive to left side at times. Pt ambulated wiht min assist of 1 with FWW, completed self cares with min assist while seated, CGA with standing.   Total Session Time   Timed Code Treatment Minutes 30   Total Session Time (sum of timed and untimed services) 45     "

## 2022-11-24 NOTE — PROGRESS NOTES
Pt daughter Génesis called and wanted update on patient.  Pt stated it was ok to talk to Génesis.  Asked Génesis if she could be the #1spokes person that could re-lay all updates on patient and she stated that she could do that.  A message was sent to provider asking to call Génesis as she has many questions about patients prognosis.  approx 35 min was spent on phone.

## 2022-11-24 NOTE — PLAN OF CARE
Patient is alert and oriented. Denies pain. Neuros intact. Speech is slow and clear. Slight left-sided weakness noted in upper and lower extremities. Patient ambulated in hallway, SBA with gait belt and walker. At baseline, uses a cane to ambulate. Up in chair most of the day. Voiding without difficulty. Tolerating oral intake. Daughter at bedside. VSS.

## 2022-11-24 NOTE — PROGRESS NOTES
NS ADMISSION NOTE    Patient admitted to room 332 at approximately 1700 via cart from emergency room. Patient was accompanied by transport tech.     Verbal SBAR report received from Diane KATZ RN prior to patient arrival.     Patient trasferred to bed via slip sheet. Patient alert and oriented X 3. The patient is not having any pain.  . Admission vital signs: Blood pressure (!) 195/99, pulse 91, temperature 97.3  F (36.3  C), temperature source Tympanic, resp. rate 16, height 1.524 m (5'), weight 70.1 kg (154 lb 8 oz), last menstrual period 01/25/1974, SpO2 95 %, not currently breastfeeding. Patient was oriented to plan of care, call light, bed controls, tv, telephone, bathroom and visiting hours.     Risk Assessment    The following safety risks were identified during admission: fall. Yellow risk band applied: YES.       Johnna Anderson RN

## 2022-11-24 NOTE — PLAN OF CARE
"Pt A/O, pt does have some left sided facial droop and left arm weakness/drift.  She denies pain, her speech is not always clear, I have to have her repeat some of her words, when she slows down her speech then I can understand her better.  Pt has been taking in adequate fluids.  She does have trouble holding a water bottle, eating jello was hard as she had trouble holding onto the container with her left hand. Pt is assist x 2  Up to commode she does lean to the left, pt should be supervised while on commode so she doesn't fall to the side.  Bps elevated but have been trending down.  BP (!) 158/86 (BP Location: Right arm, Patient Position: Semi-Hadley's)   Pulse 87   Temp 97  F (36.1  C) (Tympanic)   Resp 16   Ht 1.524 m (5')   Wt 66.4 kg (146 lb 6.4 oz)   LMP 01/25/1974 (Approximate)   SpO2 95%   BMI 28.59 kg/m         Goal Outcome Evaluation:      Plan of Care Reviewed With: patient    Overall Patient Progress: no changeOverall Patient Progress: no change      Problem: Plan of Care - These are the overarching goals to be used throughout the patient stay.    Goal: Plan of Care Review  Description: The Plan of Care Review/Shift note should be completed every shift.  The Outcome Evaluation is a brief statement about your assessment that the patient is improving, declining, or no change.  This information will be displayed automatically on your shift note.  Outcome: Progressing  Flowsheets (Taken 11/24/2022 0528)  Plan of Care Reviewed With: patient  Overall Patient Progress: no change  Goal: Patient-Specific Goal (Individualized)  Description: You can add care plan individualizations to a care plan. Examples of Individualization might be:  \"Parent requests to be called daily at 9am for status\", \"I have a hard time hearing out of my right ear\", or \"Do not touch me to wake me up as it startles me\".  Outcome: Progressing  Flowsheets (Taken 11/24/2022 0528)  Individualized Care Needs: left sided " weakness  Anxieties, Fears or Concerns: concerned if she will get her strength back  Goal: Absence of Hospital-Acquired Illness or Injury  Outcome: Progressing  Intervention: Identify and Manage Fall Risk  Recent Flowsheet Documentation  Taken 11/24/2022 0307 by Kiara Prado RN  Safety Promotion/Fall Prevention: safety round/check completed  Taken 11/23/2022 1951 by Kiara Prado RN  Safety Promotion/Fall Prevention: safety round/check completed  Intervention: Prevent and Manage VTE (Venous Thromboembolism) Risk  Recent Flowsheet Documentation  Taken 11/24/2022 0307 by Kiara Prado RN  VTE Prevention/Management: SCDs (sequential compression devices) off  Taken 11/23/2022 1951 by Kiara Prado RN  VTE Prevention/Management: SCDs (sequential compression devices) off  Goal: Optimal Comfort and Wellbeing  Outcome: Progressing  Goal: Readiness for Transition of Care  Outcome: Progressing     Problem: Stroke, Ischemic (Includes Transient Ischemic Attack)  Goal: Optimal Coping  Outcome: Progressing  Goal: Optimal Eating and Swallowing without Aspiration  Outcome: Progressing

## 2022-11-24 NOTE — PHARMACY-CONSULT NOTE
Pharmacy Consult to evaluate for medication related stroke core measures    Janna Mcdermott, 89 year old female admitted for Acute stroke due to ischemia (H)   on 11/23/2022.    Thrombolytic was not given because of minor/isolated/quickly resolving symptoms    VTE Prophylaxis patient's home Eliquis given on 11/24 as appropriate prior to end of hospital day 2.    Antithrombotic: apixaban started on 11/24, as appropriate by end of hospital day 2. Continue antithrombotic therapy on discharge to meet quality measures, unless contraindicated.    Anticoagulation if history of A-fib/flutter: Patient has history of A fib/A flutter- to resume home Eliquis 11/24 per stroke neuro    LDL Cholesterol Calculated   Date Value Ref Range Status   11/23/2022 46 <=100 mg/dL Final   03/26/2021 80 <100 mg/dL Final     Comment:     Desirable:       <100 mg/dl       Patient currently receiving Crestor (rosuvastatin) continue statin on discharge to meet quality measures, unless contraindicated.    Recommendations: None at this time    Thank you for the consult.    Sonny Moss RPH 11/24/2022 2:30 PM

## 2022-11-24 NOTE — PROGRESS NOTES
[facility]    Medicine Progress Note - Hospitalist Service    Date of Admission:  11/23/2022    Assessment & Plan   Acute stroke due to ischemia (H)  Presenting with slurred speech and left facial droop, stuttering off and on today  Initially high blood pressure in the ED  CT/CTA imaging showing no acute changes  Previous history of cerebellar stroke in March 2021, ongoing atrial fibrillation taking Eliquis, hyperlipidemia on Crestor  MRI imaging showing small acute infarct in the deep right cerebrum  Admit inpatient, stroke work-up initiated with echocardiogram ordered, placed on telemetry, troponins, lipid panel, evaluation by speech/PT/OT.  Plan for stroke neuro consult tomorrow per recommendation, hold Eliquis tonight, continue daily 81 mg aspirin for now  11/24/2022-symptomatically better. Speech clear.   Echo done and no changes. Telemetry showing atrial fibrillation. PT/OT eval done. Speech not available today, but swallowing okay per nursing. LDL at 46  Plan today is for stroke neuro assessment and recommendations for ongoing anticoagulation and stroke prevention,      Essential hypertension  Controlled at home taking daily diltiazem  Initial blood pressure in ED quite high, controlled now allow   permissive hypertension, continue Cardizem      History of stroke  History of cerebellar stroke in 2021  No residual effects    Longstanding persistent atrial fibrillation (H)  Rate controlled taking diltiazem, taking Eliquis   per neuro, holding Eliquis at this time  Reevaluate tomorrow      Acquired hypothyroidism  Taking daily  thyroid replacement  Continue home dosing    Mixed hyperlipidemia  Taking daily low-dose Crestor  Last LDL was 55  Recheck, goal is to get LDL between 40 and 75           Diet: Combination Diet    DVT Prophylaxis: previously on DOAC, now with pneumatic stockings  Grubbs Catheter: Not present  Central Lines: None  Cardiac Monitoring: ACTIVE order. Indication: Stroke,  acute (48 hours)  Code Status: No CPR- Do NOT Intubate      Disposition Plan           The patient's care was discussed with the Bedside Nurse and Patient.    Chase Herrera MD  Hospitalist Service  [facility]  Securely message with the Vocera Web Console (learn more here)  Text page via People[employer] Paging/Directory         Clinically Significant Risk Factors Present on Admission               # Drug Induced Coagulation Defect: home medication list includes an anticoagulant medication        # Overweight: Estimated body mass index is 28.59 kg/m  as calculated from the following:    Height as of this encounter: 1.524 m (5').    Weight as of this encounter: 66.4 kg (146 lb 6.4 oz).           ______________________________________________________________________    Interval History   No new complaints. Feeling better. Speech is better. Nursing noting some left sided weakness. PT/OT to see.    Data reviewed today: I reviewed all medications, new labs and imaging results over the last 24 hours. I personally reviewed no images or EKG's today.    Physical Exam   Vital Signs: Temp: 97.8  F (36.6  C) Temp src: Tympanic BP: (!) 158/86 Pulse: 85   Resp: 16 SpO2: 95 % O2 Device: None (Room air)    Weight: 146 lbs 6.4 oz  Constitutional: awake, alert, cooperative, no apparent distress, and appears stated age  Respiratory: No increased work of breathing, good air exchange, clear to auscultation bilaterally, no crackles or wheezing  Cardiovascular: irregularly irregular rhythm  GI: normal bowel sounds, soft, non-distended and non-tender  Neurologic: Mental Status Exam:  Level of Alertness:   awake  Orientation:   person, place, time  Memory:   normal  Fund of Knowledge:  normal  Cranial Nerves:  cranial nerves II-XII are grossly intact  Motor Exam:  moves all extremities well and symmetrically  Sensory:  Sensory intact    Data   Recent Labs   Lab 11/24/22  0826 11/24/22  0642 11/23/22  2104 11/23/22  1155  11/23/22  1024   WBC  --  7.1  --   --  5.3   HGB  --  15.9*  --   --  15.7   MCV  --  92  --   --  94   PLT  --  159  --   --  159   INR  --   --   --   --  1.18*   NA  --  140  --   --  138   POTASSIUM  --  4.1  --   --  4.4   CHLORIDE  --  104  --   --  101   CO2  --  24  --   --  26   BUN  --  16.4  --   --  25.6*   CR  --  0.68  --   --  0.88   ANIONGAP  --  12  --   --  11   CORY  --  9.3  --   --  9.6   GLC 98 99 111*   < > 186*    < > = values in this interval not displayed.     Recent Results (from the past 24 hour(s))   CT Head w/o Contrast    Narrative    PROCEDURE: CT HEAD W/O CONTRAST     HISTORY: TIA.    COMPARISON: 3/26/2021    TECHNIQUE:  Helical images of the head from the foramen magnum to the  vertex were obtained without contrast. This CT exam was performed  using one or more the following dose reduction techniques: automated  exposure control, adjustment of the mA and/or kV according to patient  size, and/or iterative reconstruction technique.    FINDINGS: There has been interval evolution of the previously  described right posterior inferior cerebellar territory infarct. There  is chronic encephalomalacia in the left posterior inferior cerebellar  territory. There are advanced chronic microvascular ischemic changes.  No CT evidence of acute transcortical ischemia is identified.    No acute intracranial hemorrhage, mass effect or hydrocephalus is  seen.     The calvarium is intact.        Impression    IMPRESSION: Interval evolution of a prior right cerebellar infarct. No  evidence of acute intracranial hemorrhage.    AMMY SEQUEIRA MD         SYSTEM ID:  [id number]   CTA Head Neck with Contrast    Narrative    CT ANGIOGRAPHY OF THE BRAIN AND NECK    HISTORY: TIA.    TECHNIQUE: Following the administration of intravenous contrast, thin  helical CT angiography images of the brain were obtained.   Postcontrast helical thin CT angiography images of the neck were  obtained.  NASCET criteria were  applied. Source, multiplanar and MIP  reformatted images were reviewed.  This CT exam was performed using  one or more the following dose reduction techniques: automated  exposure control, adjustment of the mA and/or kV according to patient  size, and/or iterative reconstruction technique.    COMPARISON: 3/25/2021    FINDINGS:    CTA Brain:      Atherosclerotic calcification is seen in the cavernous carotids. The  petrous, cavernous, and supraclinoid internal carotid arteries  demonstrate no high-grade, flow limiting stenosis.    The A1 segments are symmetric. An anterior communicating artery is  present. The distal ENEIDA vessels are unremarkable. The M1 segments, MCA  bifurcations and distal MCA vessels are patent.    The right V3 and V4 segments do not enhance, unchanged. There is some  flow in the left V4 segment, which is irregular and thready, similar  to prior. There is a thready appearing basilar artery. Moderate left  and small right posterior communicating arteries likely contribute  posterior circulation flow. No focal PCA abnormality is identified.  The left superior cerebellar artery enhancement is poor, but similar  to prior. Right PICA enhancement is slightly improved compared to  prior.     CTA Neck:     A 3 vessel aortic arch is present. Extensive atherosclerotic plaque is  present without critical proximal stenosis. The aortic arch is dilated  up to 3.9 cm.     The common carotid arteries demonstrate redundancy but preserved  caliber. The carotid bulbs demonstrate mild plaque without critical  stenosis.     There is enhancement the right V1 and V2 segments. There is tortuosity  of the right V3 segment. There is poor enhancement of the right V3 V4  junction.    The left vertebral artery is diffusely nonenhancing until  reconstituted by a left external collateral artery within the distal  left V3 segment on series 4 image 292.     No mass or pneumothorax is seen at the apices. Multilevel  degenerative  changes are seen in the cervical spine.      Impression    IMPRESSION:    Chronic abnormal posterior circulation. Coronary absent enhancement of  the distal right vertebral artery. Chronic absent enhancement of the  proximal the left vertebral artery until reconstituted by collateral  within the distal left V3 segment. Chronic thready basilar artery  appearance. Poor opacification of left superior cerebellar artery,  similar to prior.    Consider MR for better assessment for acute posterior circulation  recurrent ischemia if it would modify clinical management.    AMMY SEQUEIRA MD         SYSTEM ID:  [id number]   MR Brain w/o & w Contrast    Narrative    EXAM:  MR BRAIN W/O & W CONTRAST    HISTORY:  TIA, r/o stroke. .    TECHNIQUE:  Sagittal T1, axial T1, T2, FLAIR, diffusion, gradient as  well as axial and 3D postcontrast imaging of the whole brain was  performed.    MEDS/CONTRAST: 15 mL Dotarem    COMPARISON:  3/26/2021     FINDINGS:    Prominence of the ventricles and sulci is compatible with moderate,  generalized volume loss. No abnormal extra-axial collection,  hydrocephalus, mass effect or midline shift is seen. The basal  cisterns are preserved.    Evolution of a right inferomedial cerebellar infarct is seen, with  some residual laminar necrosis. Other chronic cerebellar lacunar  injuries are redemonstrated.    There is diffusion restriction within the right corona  radiata/anterior right corticospinal tract. This is not associated  with enhancement.    Diffuse background microvascular disease is redemonstrated.     The T1 marrow signal is unremarkable.       Impression    IMPRESSION:     Diffusion restriction within the right corona radiata/anterior right  corticospinal tract is compatible with a small, acute infarct of the  deep right cerebral white matter.     AMMY SEQUEIRA MD         SYSTEM ID:  [id number]   Echocardiogram Complete   Result Value    LVEF  55-60%    Narrative     [id number]  [id number]  [id number]  [id number]^TAO^LALA     [facility]  [address] [place] [address]     Name: MARIA EUGENIA ADAMES  MRN: [id number]  : 1931  Study Date: 2022 03:05 PM  Age: 89 yrs  Gender: Female  Patient Location: [facility]  Reason For Study: TIA  Ordering Physician: LALA BURGER  Performed By: Kimberli Nieves     BSA: 1.7 m2  Height: 60 in  Weight: 154 lb  HR: 94  BP: 177/129 mmHg  ______________________________________________________________________________  Procedure  Echocardiogram with two-dimensional, color and spectral Doppler performed.  ______________________________________________________________________________  Interpretation Summary  Left ventricular size, wall motion and function are normal. The ejection  fraction is 55-60%.  Mild concentric wall thickening consistent with left ventricular hypertrophy  is present.  The right ventricle is normal size. Global right ventricular function is  normal.  Moderate dilatation of the aorta is present. Ascending aorta 4.5 cm (2.64  cm/m2).  No pericardial effusion is present.     This study was compared with the study from 3/26/2021. No significant changes  noted.  ______________________________________________________________________________  Left Ventricle  Left ventricular size, wall motion and function are normal. The ejection  fraction is 55-60%. Mild concentric wall thickening consistent with left  ventricular hypertrophy is present. Left ventricular diastolic function is  indeterminate.     Right Ventricle  The right ventricle is normal size. Global right ventricular function is  normal.     Atria  Severe left atrial enlargement is present. Moderate right atrial enlargement  is present.     Mitral Valve  Mild mitral annular calcification is present. Trace mitral insufficiency is  present.     Aortic Valve  The valve leaflets are not well visualized. Trace aortic insufficiency is  present.      Tricuspid Valve  Mild tricuspid insufficiency is present. The right ventricular systolic  pressure is approximated at 31.6 mmHg plus the right atrial pressure.  Pulmonary artery systolic pressure is normal.     Pulmonic Valve  The valve leaflets are not well visualized. On Doppler interrogation, there is  no significant stenosis or regurgitation.     Vessels  Moderate dilatation of the aorta is present. Ascending aorta 4.5 cm. IVC  diameter <2.1 cm collapsing >50% with sniff suggests a normal RA pressure of 3  mmHg.     Pericardium  No pericardial effusion is present.     Compared to Previous Study  This study was compared with the study from 3/26/2021 . No significant changes  noted.     ______________________________________________________________________________  MMode/2D Measurements & Calculations  IVSd: 1.2 cm  LVIDd: 4.3 cm  LVIDs: 2.2 cm  LVPWd: 0.67 cm  FS: 48.5 %     LV mass(C)d: 128.9 grams  LV mass(C)dI: 77.2 grams/m2  Ao root diam: 2.7 cm  asc Aorta Diam: 4.5 cm  LVOT diam: 1.8 cm  LVOT area: 2.5 cm2  LA Volume (BP): 89.4 ml  LA Volume Index (BP): 53.5 ml/m2  RWT: 0.31     Doppler Measurements & Calculations  MV E max dominic: 121.0 cm/sec     MV dec time: 0.17 sec  Ao V2 max: 130.0 cm/sec  Ao max P.0 mmHg  Ao V2 mean: 95.6 cm/sec  Ao mean P.3 mmHg  Ao V2 VTI: 24.4 cm  BETH(I,D): 2.1 cm2  BETH(V,D): 2.2 cm2  LV V1 max P.0 mmHg  LV V1 max: 110.0 cm/sec  LV V1 VTI: 19.7 cm  SV(LVOT): 50.2 ml  SI(LVOT): 30.1 ml/m2  PA acc time: 0.07 sec  TR max dominic: 270.7 cm/sec  TR max P.6 mmHg  AV Dominic Ratio (DI): 0.85  BETH Index (cm2/m2): 1.2  E/E' av.2  Lateral E/e': 20.2  Medial E/e': 22.2     ______________________________________________________________________________  Report approved by: MD Sanchez Díaz 2022 04:24 PM

## 2022-11-24 NOTE — CONSULTS
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     She presented to the [facility] ED this morning due to L sided weakness and slurred speech, symptoms completely resolved upon arrival so no stroke code called. She had CT/CTA performed without acute changes from prior imaging. Felt possible TIA; however, she then around 1150a was noticed to have L facial droop and again L sided weakness so stroke code called. After ED provider reassessed she was back to baseline again. Stroke code de-escalated and recommended MRI brain and permissive HTN (capped at 180 due to on Eliquis).      Her MRI demonstrated a small right corona radiata stroke which is not secondary to her known posterior circulation stenosis.  The cause is likely multifactorial, but in this location high blood pressure may be a predominant risk factor.  This was discussed extensively with her daughter. Janna was evaluated in 2021 for stroke and followed by Dr. Garrett. She has excellent control of her risk factors, although she has been hypertensive here.  Her daughter has followed her very closely with daily BP checks and reports she is typically well-controlled, although she was well-controlled for so long that she has not taken it regularly recently.  Additionally, she notes they use a wrist BP cuff, but that she has calibrated it against upper arm cuffs and seems to be more accurate when compared with  her PCPs cuff.  I advised her that that is not typically the case, but I would defer to their own  experience as they have been clearly quite invested in her medical care.  After the last stroke it was been determined that she did not need additional BP medications beyond diltiazem (for rate control) as her BP has been well-controlled.      Stroke Evaluation Summarized    MRI/Head CT Small R corona radiata stroke superimposed upon mild chronic microvascular changes.   Intracranial Vasculature Scattered atherosclerosis, occlusion of intracranial right vertebral artery   Cervical Vasculature Occlusion of bilateral distal vertebral arteries.  Scattered carotid atherosclerosis.  Extensive aortic arch atheroma     Echocardiogram EF 55-60%, mild LVH, moderate dilation of the aorta, severe left atrial, moderate right atrial enlargement   EKG/Telemetry Known Afib   Other Testing Not Applicable     LDL  11/23/2022: 46 mg/dL   A1C  11/23/2022: 5.7 %   Troponin 11/23/2022: 21 ng/L       Impression  1. Right corona radiata stroke--possible lacunar but patient at risk for atherosclerotic and cardioembolic stroke based upon imaging findings and known risk factors.  She has excellent control of her vascular risk factors, it will be necessary to reassess BP control.    Recommendations  1. Re-start/Continue Eliquis  2. Continue Crestor 5mg daily (LDL 46)  3. BP goal < 140/90, with tighter control associated with lower overall CV risk, if tolerated   --recommend daily BP treatment recordings for her PCP  --here is considerable plaque within the left subclavian, would assess BPs in both arms and if different take BPs in the right arm exlcusively.  They report they take BPs at the right wrist  --Would calibrate wrist cuff and consider upper arm cuff (see HPI), however, it seems the wrist cuff has been more accurate for them previously  --extremely gradual titration of any BP medications if indicated (over months)  --if she tolerates BP < 140/90, could aim for 130/80--again with very gradual titration.  Family reports she had  "typically been near this or below in the past.  4. Daily exercise (20 minutes per day, 4-5/xweek) and mediterranean diet  5. PT/OT/Speech     Patient Follow-up    1. Continue to follow with General Neurology with Dr. Garrett in his [facility] [place] [facility] in 6-8 weeks, she reports that they already have an appointment    Thank you for this consult. No further stroke evaluation is recommended, so we will sign off. Please contact us with any additional questions.    Javier Hendrix MD, MS  Vascular Neurology  To page me or covering stroke neurology team member, click here: [facility]   Choose \"On Call\" tab at top, then search dropdown box for \"Neurology Adult\", select location, press Enter, then look for stroke/neuro ICU/telestroke.    _____________________________________________________    Clinically Significant Risk Factors Present on Admission               # Drug Induced Coagulation Defect: home medication list includes an anticoagulant medication        Past Medical History   Past Medical History:   Diagnosis Date     Asymptomatic menopausal state     on estrogen     Chronic kidney disease, stage I     No Comments Provided     Dermatitis     No Comments Provided     Essential (primary) hypertension     No Comments Provided     Hyperlipidemia     No Comments Provided     Hypothyroidism     No Comments Provided     Other specified symptoms and signs involving the circulatory and respiratory systems     No Comments Provided     Paroxysmal atrial fibrillation (H)     No Comments Provided     Past Surgical History   Past Surgical History:   Procedure Laterality Date     APPENDECTOMY OPEN      No Comments Provided     CHOLECYSTECTOMY      No Comments Provided     HYSTERECTOMY TOTAL ABDOMINAL      No Comments Provided     Medications   Home Meds  Prior to Admission medications    Medication Sig Start Date End Date Taking? Authorizing Provider   JACOB THYROID 90 MG tablet Take 1 tablet (90 mg) by mouth daily 11/7/22  Yes " Alexis York MD   Cholecalciferol 10 MCG (400 UNIT) CAPS Take 800 Units by mouth daily   Yes Reported, Patient   diltiazem ER COATED BEADS (CARDIZEM CD/CARTIA XT) 120 MG 24 hr capsule Take 1 capsule (120 mg) by mouth daily 5/25/22  Yes Alexis York MD   ELIQUIS ANTICOAGULANT 5 MG tablet Take 1 tablet (5 mg) by mouth 2 times daily 5/18/22  Yes Alexis York MD   fish oil-omega-3 fatty acids 1000 MG capsule Take 1,000 capsules by mouth daily   Yes Reported, Patient   Flaxseed Oil OIL Take daily, puts liquid on oatmeal every morning   Yes Reported, Patient   latanoprost (XALATAN) 0.005 % ophthalmic solution Place 1 drop into both eyes At Bedtime  3/24/21  Yes Reported, Patient   mupirocin (BACTROBAN) 2 % external ointment Apply topically 3 times daily 11/11/21  Yes Nuno Clinton MD   rosuvastatin (CRESTOR) 5 MG tablet Take 1 tablet (5 mg) by mouth daily 5/4/22  Yes Maritza Garrett MD   Sesame Oil OIL Take 1 capsule by mouth daily   Yes Reported, Patient   triamcinolone (KENALOG) 0.1 % external cream Apply topically 3 times daily 2/7/22  Yes Alexis York MD       Scheduled Meds    aspirin  81 mg Oral Daily    Or     aspirin  81 mg Oral or NG Tube Daily     diltiazem ER COATED BEADS  120 mg Oral Daily     latanoprost  1 drop Both Eyes At Bedtime     rosuvastatin  5 mg Oral Daily     sodium chloride (PF)  3 mL Intracatheter Q8H     thyroid  90 mg Oral Daily       Infusion Meds    - MEDICATION INSTRUCTIONS -       - MEDICATION INSTRUCTIONS -         PRN Meds  acetaminophen, labetalol, lidocaine 4%, lidocaine (buffered or not buffered), - MEDICATION INSTRUCTIONS -, - MEDICATION INSTRUCTIONS -, ondansetron **OR** ondansetron, sodium chloride (PF)    Allergies   Allergies   Allergen Reactions     Bee Venom Anaphylaxis     Benazepril Cough     Ciprofloxacin Other (See Comments)     Possible GI illness     Erythromycin Other (See Comments)     Unsure of allergy time, possible hives.      Gluten Meal GI Disturbance     Hydrochlorothiazide      Other reaction(s): Hyponatremia     Penicillins Other (See Comments)     Unsure of allergy time, possible hives     Family History   Family History   Problem Relation Age of Onset     Coronary Artery Disease Mother      Hypertension Mother      Heart Disease Mother         Heart Disease     Heart Disease Sister         Heart Disease,     Heart Disease Sister         Heart Disease,     Other - See Comments Sister         at 6 months     Other - See Comments Brother           in service     Other - See Comments Brother          of Parkinson's     Other - See Comments Brother          of pneumonia     Other - See Comments Other         No cancer or diabetes in the family     Social History   Social History     Tobacco Use     Smoking status: Never     Smokeless tobacco: Never   Vaping Use     Vaping Use: Never used   Substance Use Topics     Alcohol use: No     Alcohol/week: 0.0 standard drinks     Drug use: Never       Review of Systems   The 10 point Review of Systems is negative other than noted in the HPI or here. She notes no history of weight loss, fevers, sweats, or chills.       PHYSICAL EXAMINATION   Temp:  [97  F (36.1  C)-98.7  F (37.1  C)] 97.8  F (36.6  C)  Pulse:  [] 85  Resp:  [8-47] 16  BP: (158-201)/() 158/86  SpO2:  [93 %-96 %] 95 %    See NIHSS below.    Additional pertinent neurologic findings include subtle left hemiparesis with LUE drift on distraction (not with NIHSS testing) and decreased  strength in the LUE.  When attempting to raise both legs simultaneously, the LLE is clearly impaired, but she does not have drift when testing for NIHSS.    Her speech sounds objectively normal, but is different to patient and tyreeshaniceer although has nearly returned to baseline.    Dysphagia Screen  Per Nursing    Stroke Scales    NIHSS  1a. Level of Consciousness 0-->Alert, keenly responsive   1b. LOC Questions  0-->Answers both questions correctly   1c. LOC Commands 0-->Performs both tasks correctly   2.   Best Gaze 0-->Normal   3.   Visual 0-->No visual loss   4.   Facial Palsy 0-->Normal symmetrical movements   5a. Motor Arm, Left 0-->No drift, limb holds 90 (or 45) degrees for full 10 secs   5b. Motor Arm, Right 0-->No drift, limb holds 90 (or 45) degrees for full 10 secs   6a. Motor Leg, Left 0-->No drift, leg holds 30 degree position for full 5 secs   6b. Motor Leg, right 0-->No drift, leg holds 30 degree position for full 5 secs   7.   Limb Ataxia 0-->Absent   8.   Sensory 0-->Normal, no sensory loss   9.   Best Language 0-->No aphasia, normal   10. Dysarthria 1-->Mild-to-moderate dysarthria, patient slurs at least some words and, at worst, can be understood with some difficulty (Sounds objectively normal, but is different to her daughter)   11. Extinction and Inattention  0-->No abnormality   Total 1 (11/24/22 1109)       Modified Rosy Score (Pre-morbid)  2 - Slight disability.  Able to look after own affairs without assistance, but unable to carry out all previous activities.     Imaging  I personally reviewed all imaging; relevant findings per HPI.    Labs Data   CBC  Recent Labs   Lab 11/24/22  0642 11/23/22  1024   WBC 7.1 5.3   RBC 5.19 5.06   HGB 15.9* 15.7   HCT 47.6* 47.7*    159     Basic Metabolic Panel   Recent Labs   Lab 11/24/22  0826 11/24/22  0642 11/23/22  2104 11/23/22  1153 11/23/22  1024   NA  --  140  --   --  138   POTASSIUM  --  4.1  --   --  4.4   CHLORIDE  --  104  --   --  101   CO2  --  24  --   --  26   BUN  --  16.4  --   --  25.6*   CR  --  0.68  --   --  0.88   GLC 98 99 111*   < > 186*   CORY  --  9.3  --   --  9.6    < > = values in this interval not displayed.     Liver Panel  No results for input(s): PROTTOTAL, ALBUMIN, BILITOTAL, ALKPHOS, AST, ALT, BILIDIRECT in the last 168 hours.  INR    Recent Labs   Lab Test 11/23/22  1024 03/26/21  0554   INR 1.18* 1.06      Lipid  Profile    Recent Labs   Lab Test 11/23/22  1024 10/31/22  0659 08/04/22  0712   CHOL 118 124 117   HDL 56 54 56   LDL 46 55 49   TRIG 81 76 62     A1C    Recent Labs   Lab Test 11/23/22  1024 06/03/21  1107 03/25/21  1924   A1C 5.7 5.8 5.6     Troponin    Recent Labs   Lab 11/23/22  1721 11/23/22  1149 11/23/22  1024   CTROPT 21* 18* 19*          Stroke Consult Data Data   Telestroke Service Details  (for non-emergent stroke consult with tele)  Video start time 11/24/22   1021   Video end time 11/24/22   1106   Type of service telemedicine diagnostic assessment of acute neurological changes   Reason telemedicine is appropriate patient requires assessment with a specialist for diagnosis and treatment of neurological symptoms   Mode of transmission secure interactive audio and video communication per Gonzalo   Originating site (patient location) [facility]    Distant site (provider location) Provider remote site     I saw Janna Mcdermott on November 24, 2022  I have personally spent a total of 80 minutes consulting with her medical providers and assessing the patient today, with more than 50% of this time spent in consultation, coordination of care, and discussion with the patient and/or family regarding diagnostic test results, prognosis, symptom management, risks and benefits of management options, and development of the plan of care of above.

## 2022-11-25 ENCOUNTER — APPOINTMENT (OUTPATIENT)
Dept: SPEECH THERAPY | Facility: OTHER | Age: 87
DRG: 065 | End: 2022-11-25
Attending: FAMILY MEDICINE
Payer: MEDICARE

## 2022-11-25 ENCOUNTER — APPOINTMENT (OUTPATIENT)
Dept: PHYSICAL THERAPY | Facility: OTHER | Age: 87
DRG: 065 | End: 2022-11-25
Payer: MEDICARE

## 2022-11-25 ENCOUNTER — APPOINTMENT (OUTPATIENT)
Dept: OCCUPATIONAL THERAPY | Facility: OTHER | Age: 87
DRG: 065 | End: 2022-11-25
Payer: MEDICARE

## 2022-11-25 VITALS
TEMPERATURE: 97.9 F | WEIGHT: 143.2 LBS | BODY MASS INDEX: 28.11 KG/M2 | DIASTOLIC BLOOD PRESSURE: 87 MMHG | RESPIRATION RATE: 16 BRPM | HEIGHT: 60 IN | SYSTOLIC BLOOD PRESSURE: 155 MMHG | OXYGEN SATURATION: 95 % | HEART RATE: 74 BPM

## 2022-11-25 PROCEDURE — 250N000013 HC RX MED GY IP 250 OP 250 PS 637: Performed by: FAMILY MEDICINE

## 2022-11-25 PROCEDURE — 92522 EVALUATE SPEECH PRODUCTION: CPT | Mod: GN

## 2022-11-25 PROCEDURE — 99239 HOSP IP/OBS DSCHRG MGMT >30: CPT | Performed by: FAMILY MEDICINE

## 2022-11-25 PROCEDURE — 97530 THERAPEUTIC ACTIVITIES: CPT | Mod: GO | Performed by: OCCUPATIONAL THERAPIST

## 2022-11-25 PROCEDURE — 97116 GAIT TRAINING THERAPY: CPT | Mod: GP

## 2022-11-25 RX ORDER — DILTIAZEM HYDROCHLORIDE 180 MG/1
180 CAPSULE, COATED, EXTENDED RELEASE ORAL DAILY
Status: DISCONTINUED | OUTPATIENT
Start: 2022-11-26 | End: 2022-11-25 | Stop reason: HOSPADM

## 2022-11-25 RX ORDER — DILTIAZEM HYDROCHLORIDE 180 MG/1
180 CAPSULE, COATED, EXTENDED RELEASE ORAL DAILY
Qty: 30 CAPSULE | Refills: 0 | Status: SHIPPED | OUTPATIENT
Start: 2022-11-26 | End: 2022-11-30

## 2022-11-25 RX ADMIN — APIXABAN 5 MG: 5 TABLET, FILM COATED ORAL at 09:25

## 2022-11-25 RX ADMIN — THYROID 90 MG: 90 TABLET ORAL at 06:30

## 2022-11-25 RX ADMIN — DILTIAZEM HYDROCHLORIDE 120 MG: 120 CAPSULE, COATED, EXTENDED RELEASE ORAL at 12:07

## 2022-11-25 ASSESSMENT — ACTIVITIES OF DAILY LIVING (ADL)
ADLS_ACUITY_SCORE: 23

## 2022-11-25 NOTE — DISCHARGE SUMMARY
[facility]  Hospitalist Discharge Summary      Date of Admission:  11/23/2022  Date of Discharge:  11/25/2022  Discharging Provider: Chase Herrera MD  Discharge Service: Hospitalist Service    Discharge Diagnoses   Principal Problem:    Acute stroke due to ischemia (H)  Active Problems:    Essential hypertension    History of stroke    Longstanding persistent atrial fibrillation (H)    Mixed hyperlipidemia    Acquired hypothyroidism        Follow-ups Needed After Discharge   Follow-up Appointments     Follow-up and recommended labs and tests       Follow up with primary care provider, Nuno Clinton, within 7 days   for hospital follow- up.  No follow up labs or test are needed.             Unresulted Labs Ordered in the Past 30 Days of this Admission     No orders found from 10/24/2022 to 11/24/2022.      These results will be followed up by Dr. Clinton/Dr. Jacques    Discharge Disposition   Admited to home care:   Agency: [facility]  Discharged to home  Condition at discharge: Satisfactory      Hospital Course   Acute stroke due to ischemia (H)  Presenting with slurred speech and left facial droop, stuttering off and on today  Initially high blood pressure in the ED  CT/CTA imaging showing no acute changes  Previous history of cerebellar stroke in March 2021, ongoing atrial fibrillation taking Eliquis, hyperlipidemia on Crestor  MRI imaging showing small acute infarct in the deep right cerebrum  Admit inpatient, stroke work-up initiated with echocardiogram ordered, placed on telemetry, troponins, lipid panel, evaluation by speech/PT/OT.  Plan for stroke neuro consult tomorrow per recommendation, hold Eliquis tonight, continue daily 81 mg aspirin for now  11/24/2022-symptomatically better. Speech clear.   Echo done and no changes. Telemetry showing atrial fibrillation. PT/OT eval done. Speech not available today, but swallowing okay per nursing. LDL at 46  Plan today is for stroke neuro  assessment and recommendations for ongoing anticoagulation and stroke prevention,  11/25/2022-feeling near baseline. Neuro recommending restarting eliquis. Working on blood pressure control slowly. Working with PT, home today, [facility] ordered       Essential hypertension  Controlled at home taking daily diltiazem  Initial blood pressure in ED quite high, controlled now allow   permissive hypertension, continue Cardizem  11/25/2022-BP remains somewhat high., will increase cardiazem to 180 mg daily, will need OP management and follow-up    History of stroke  History of cerebellar stroke in 2021  No residual effects    Longstanding persistent atrial fibrillation (H)  Rate controlled taking diltiazem, taking Eliquis   per neuro, holding Eliquis at this time  11/25/2022-Per neuro have restarted her eliquis      Acquired hypothyroidism  Taking daily  thyroid replacement  Continue home dosing    Mixed hyperlipidemia  Taking daily low-dose Crestor  Last LDL was 55  Recheck, goal is to get LDL between 40 and 75  LDL at 46, continue current dosing of crestor        Consultations This Hospital Stay   NEUROLOGY IP STROKE CONSULT  SPEECH LANGUAGE PATH ADULT IP CONSULT  PHARMACY IP CONSULT  PHARMACY IP CONSULT  PHARMACY IP CONSULT  PHYSICAL THERAPY ADULT IP CONSULT  OCCUPATIONAL THERAPY ADULT IP CONSULT  REHAB ADMISSIONS LIAISON IP CONSULT  CARE MANAGEMENT / SOCIAL WORK IP CONSULT  SOCIAL WORK IP CONSULT  SMOKING CESSATION PROGRAM IP CONSULT    Code Status   No CPR- Do NOT Intubate    Time Spent on this Encounter   I, Chase Herrera MD, personally saw the patient today and spent greater than 30 minutes discharging this patient.       Chase Herrera MD  [facility]  [address]  [place] [address]  Phone: [phone]  Fax: [phone]  ______________________________________________________________________    Physical Exam   Vital Signs: Temp: 97.9  F (36.6  C) Temp src: Tympanic BP:  (!) 155/87 Pulse: 74   Resp: 16 SpO2: 95 % O2 Device: None (Room air)    Weight: 143 lbs 3.2 oz  Constitutional: awake, alert, cooperative, no apparent distress, and appears stated age  Respiratory: No increased work of breathing, good air exchange, clear to auscultation bilaterally, no crackles or wheezing  Cardiovascular: regular rate and rhythm  GI: normal bowel sounds, soft, non-distended and non-tender  Neurologic: Mental Status Exam:  Level of Alertness:   awake  Orientation:   person, place, time  Memory:   normal  Cranial Nerves:  cranial nerves II-XII are grossly intact  Motor Exam:  moves all extremities well and symmetrically       Primary Care Physician   Nuno Clinton    Discharge Orders      Home Care Referral      Reason for your hospital stay    Admitted with slurred speech and left sided weakness likely due to a stroke     Follow-up and recommended labs and tests     Follow up with primary care provider, Nuno Clinton, within 7 days for hospital follow- up.  No follow up labs or test are needed.     Activity    Your activity upon discharge: activity as tolerated     Diet    Follow this diet upon discharge: Orders Placed This Encounter      Combination Diet Regular Diet       Significant Results and Procedures   Most Recent 3 CBC's:Recent Labs   Lab Test 11/24/22  0642 11/23/22  1024 10/31/22  0659   WBC 7.1 5.3 7.1   HGB 15.9* 15.7 16.1*   MCV 92 94 93    159 165     Most Recent 3 BMP's:Recent Labs   Lab Test 11/24/22  2104 11/24/22  1656 11/24/22  1216 11/24/22  0826 11/24/22  0642 11/23/22  1153 11/23/22  1024 11/23/22  1011 10/31/22  0659   NA  --   --   --   --  140  --  138  --  139   POTASSIUM  --   --   --   --  4.1  --  4.4  --  4.1   CHLORIDE  --   --   --   --  104  --  101  --  103   CO2  --   --   --   --  24  --  26  --  28   BUN  --   --   --   --  16.4  --  25.6*  --  31*   CR  --   --   --   --  0.68  --  0.88  --  1.04   ANIONGAP  --   --   --   --  12  --  11   --  8   CORY  --   --   --   --  9.3  --  9.6  --  9.7   * 161* 104*   < > 99   < > 186*   < > 99    < > = values in this interval not displayed.     Most Recent 3 Troponin's:Recent Labs   Lab Test 03/27/21  0938 03/26/21  1218 03/26/21  0554   TROPI 0.116* 0.094* 0.036     Most Recent Cholesterol Panel:Recent Labs   Lab Test 11/23/22  1024   CHOL 118   LDL 46   HDL 56   TRIG 81     Most Recent TSH and T4:Recent Labs   Lab Test 10/31/22  0659   TSH 13.65*   T4 0.88   ,   Results for orders placed or performed during the hospital encounter of 11/23/22   CTA Head Neck with Contrast    Narrative    CT ANGIOGRAPHY OF THE BRAIN AND NECK    HISTORY: TIA.    TECHNIQUE: Following the administration of intravenous contrast, thin  helical CT angiography images of the brain were obtained.   Postcontrast helical thin CT angiography images of the neck were  obtained.  NASCET criteria were applied. Source, multiplanar and MIP  reformatted images were reviewed.  This CT exam was performed using  one or more the following dose reduction techniques: automated  exposure control, adjustment of the mA and/or kV according to patient  size, and/or iterative reconstruction technique.    COMPARISON: 3/25/2021    FINDINGS:    CTA Brain:      Atherosclerotic calcification is seen in the cavernous carotids. The  petrous, cavernous, and supraclinoid internal carotid arteries  demonstrate no high-grade, flow limiting stenosis.    The A1 segments are symmetric. An anterior communicating artery is  present. The distal ENEIDA vessels are unremarkable. The M1 segments, MCA  bifurcations and distal MCA vessels are patent.    The right V3 and V4 segments do not enhance, unchanged. There is some  flow in the left V4 segment, which is irregular and thready, similar  to prior. There is a thready appearing basilar artery. Moderate left  and small right posterior communicating arteries likely contribute  posterior circulation flow. No focal PCA  abnormality is identified.  The left superior cerebellar artery enhancement is poor, but similar  to prior. Right PICA enhancement is slightly improved compared to  prior.     CTA Neck:     A 3 vessel aortic arch is present. Extensive atherosclerotic plaque is  present without critical proximal stenosis. The aortic arch is dilated  up to 3.9 cm.     The common carotid arteries demonstrate redundancy but preserved  caliber. The carotid bulbs demonstrate mild plaque without critical  stenosis.     There is enhancement the right V1 and V2 segments. There is tortuosity  of the right V3 segment. There is poor enhancement of the right V3 V4  junction.    The left vertebral artery is diffusely nonenhancing until  reconstituted by a left external collateral artery within the distal  left V3 segment on series 4 image 292.     No mass or pneumothorax is seen at the apices. Multilevel degenerative  changes are seen in the cervical spine.      Impression    IMPRESSION:    Chronic abnormal posterior circulation. Coronary absent enhancement of  the distal right vertebral artery. Chronic absent enhancement of the  proximal the left vertebral artery until reconstituted by collateral  within the distal left V3 segment. Chronic thready basilar artery  appearance. Poor opacification of left superior cerebellar artery,  similar to prior.    Consider MR for better assessment for acute posterior circulation  recurrent ischemia if it would modify clinical management.    AMMY SEQUEIRA MD         SYSTEM ID:  [id number]   CT Head w/o Contrast    Narrative    PROCEDURE: CT HEAD W/O CONTRAST     HISTORY: TIA.    COMPARISON: 3/26/2021    TECHNIQUE:  Helical images of the head from the foramen magnum to the  vertex were obtained without contrast. This CT exam was performed  using one or more the following dose reduction techniques: automated  exposure control, adjustment of the mA and/or kV according to patient  size, and/or iterative  reconstruction technique.    FINDINGS: There has been interval evolution of the previously  described right posterior inferior cerebellar territory infarct. There  is chronic encephalomalacia in the left posterior inferior cerebellar  territory. There are advanced chronic microvascular ischemic changes.  No CT evidence of acute transcortical ischemia is identified.    No acute intracranial hemorrhage, mass effect or hydrocephalus is  seen.     The calvarium is intact.        Impression    IMPRESSION: Interval evolution of a prior right cerebellar infarct. No  evidence of acute intracranial hemorrhage.    AMMY SEQUEIRA MD         SYSTEM ID:  [id number]   MR Brain w/o & w Contrast    Narrative    EXAM:  MR BRAIN W/O & W CONTRAST    HISTORY:  TIA, r/o stroke. .    TECHNIQUE:  Sagittal T1, axial T1, T2, FLAIR, diffusion, gradient as  well as axial and 3D postcontrast imaging of the whole brain was  performed.    MEDS/CONTRAST: 15 mL Dotarem    COMPARISON:  3/26/2021     FINDINGS:    Prominence of the ventricles and sulci is compatible with moderate,  generalized volume loss. No abnormal extra-axial collection,  hydrocephalus, mass effect or midline shift is seen. The basal  cisterns are preserved.    Evolution of a right inferomedial cerebellar infarct is seen, with  some residual laminar necrosis. Other chronic cerebellar lacunar  injuries are redemonstrated.    There is diffusion restriction within the right corona  radiata/anterior right corticospinal tract. This is not associated  with enhancement.    Diffuse background microvascular disease is redemonstrated.     The T1 marrow signal is unremarkable.       Impression    IMPRESSION:     Diffusion restriction within the right corona radiata/anterior right  corticospinal tract is compatible with a small, acute infarct of the  deep right cerebral white matter.     AMMY SEQUEIRA MD         SYSTEM ID:  [id number]   Echocardiogram Complete     Value    LVEF   55-60%    [facility]    [id number]  [id number]  [id number]  [id number]^TAO^LALA     [facility]  [address] [place] [address]     Name: MARIA EUGENIA ADAMES  MRN: [id number]  : 1931  Study Date: 2022 03:05 PM  Age: 89 yrs  Gender: Female  Patient Location: [facility]  Reason For Study: TIA  Ordering Physician: LALA BURGER  Performed By: Kimberli Nieves     BSA: 1.7 m2  Height: 60 in  Weight: 154 lb  HR: 94  BP: 177/129 mmHg  ______________________________________________________________________________  Procedure  Echocardiogram with two-dimensional, color and spectral Doppler performed.  ______________________________________________________________________________  Interpretation Summary  Left ventricular size, wall motion and function are normal. The ejection  fraction is 55-60%.  Mild concentric wall thickening consistent with left ventricular hypertrophy  is present.  The right ventricle is normal size. Global right ventricular function is  normal.  Moderate dilatation of the aorta is present. Ascending aorta 4.5 cm (2.64  cm/m2).  No pericardial effusion is present.     This study was compared with the study from 3/26/2021. No significant changes  noted.  ______________________________________________________________________________  Left Ventricle  Left ventricular size, wall motion and function are normal. The ejection  fraction is 55-60%. Mild concentric wall thickening consistent with left  ventricular hypertrophy is present. Left ventricular diastolic function is  indeterminate.     Right Ventricle  The right ventricle is normal size. Global right ventricular function is  normal.     Atria  Severe left atrial enlargement is present. Moderate right atrial enlargement  is present.     Mitral Valve  Mild mitral annular calcification is present. Trace mitral insufficiency is  present.     Aortic Valve  The valve leaflets are not well visualized. Trace aortic  insufficiency is  present.     Tricuspid Valve  Mild tricuspid insufficiency is present. The right ventricular systolic  pressure is approximated at 31.6 mmHg plus the right atrial pressure.  Pulmonary artery systolic pressure is normal.     Pulmonic Valve  The valve leaflets are not well visualized. On Doppler interrogation, there is  no significant stenosis or regurgitation.     Vessels  Moderate dilatation of the aorta is present. Ascending aorta 4.5 cm. IVC  diameter <2.1 cm collapsing >50% with sniff suggests a normal RA pressure of 3  mmHg.     Pericardium  No pericardial effusion is present.     Compared to Previous Study  This study was compared with the study from 3/26/2021 . No significant changes  noted.     ______________________________________________________________________________  MMode/2D Measurements & Calculations  IVSd: 1.2 cm  LVIDd: 4.3 cm  LVIDs: 2.2 cm  LVPWd: 0.67 cm  FS: 48.5 %     LV mass(C)d: 128.9 grams  LV mass(C)dI: 77.2 grams/m2  Ao root diam: 2.7 cm  asc Aorta Diam: 4.5 cm  LVOT diam: 1.8 cm  LVOT area: 2.5 cm2  LA Volume (BP): 89.4 ml  LA Volume Index (BP): 53.5 ml/m2  RWT: 0.31     Doppler Measurements & Calculations  MV E max dominic: 121.0 cm/sec     MV dec time: 0.17 sec  Ao V2 max: 130.0 cm/sec  Ao max P.0 mmHg  Ao V2 mean: 95.6 cm/sec  Ao mean P.3 mmHg  Ao V2 VTI: 24.4 cm  BETH(I,D): 2.1 cm2  BETH(V,D): 2.2 cm2  LV V1 max P.0 mmHg  LV V1 max: 110.0 cm/sec  LV V1 VTI: 19.7 cm  SV(LVOT): 50.2 ml  SI(LVOT): 30.1 ml/m2  PA acc time: 0.07 sec  TR max dominic: 270.7 cm/sec  TR max P.6 mmHg  AV Dominic Ratio (DI): 0.85  BETH Index (cm2/m2): 1.2  E/E' av.2  Lateral E/e': 20.2  Medial E/e': 22.2     ______________________________________________________________________________  Report approved by: MD Sanchez Díaz 2022 04:24 PM               Discharge Medications   Current Discharge Medication List      CONTINUE these medications which have CHANGED     Details   diltiazem ER COATED BEADS (CARDIZEM CD/CARTIA XT) 180 MG 24 hr capsule Take 1 capsule (180 mg) by mouth daily  Qty: 30 capsule, Refills: 0    Associated Diagnoses: Essential hypertension; Paroxysmal atrial fibrillation (H)         CONTINUE these medications which have NOT CHANGED    Details   JACOB THYROID 90 MG tablet Take 1 tablet (90 mg) by mouth daily  Qty: 90 tablet, Refills: 0    Comments: Dose change  Associated Diagnoses: Acquired hypothyroidism      Cholecalciferol 10 MCG (400 UNIT) CAPS Take 800 Units by mouth daily      ELIQUIS ANTICOAGULANT 5 MG tablet Take 1 tablet (5 mg) by mouth 2 times daily  Qty: 180 tablet, Refills: 4    Associated Diagnoses: Paroxysmal atrial fibrillation (H)      fish oil-omega-3 fatty acids 1000 MG capsule Take 1,000 capsules by mouth daily      Flaxseed Oil OIL Take daily, puts liquid on oatmeal every morning      latanoprost (XALATAN) 0.005 % ophthalmic solution Place 1 drop into both eyes At Bedtime       mupirocin (BACTROBAN) 2 % external ointment Apply topically 3 times daily  Qty: 30 g, Refills: 3    Associated Diagnoses: Facial cellulitis      rosuvastatin (CRESTOR) 5 MG tablet Take 1 tablet (5 mg) by mouth daily  Qty: 90 tablet, Refills: 3    Associated Diagnoses: 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     Gluten Meal GI Disturbance     Hydrochlorothiazide      Other reaction(s): Hyponatremia     Penicillins Other (See Comments)     Unsure of allergy time, possible hives

## 2022-11-25 NOTE — PHARMACY - DISCHARGE MEDICATION RECONCILIATION AND EDUCATION
Pharmacy: Discharge Counseling and Medication Reconciliation    Janna Jangesteban  [address] [facility] [address]  [phone] (home)   89 year old female  PCP:Nuno Clinton    Allergies   Allergen Reactions     Bee Venom Anaphylaxis     Benazepril Cough     Ciprofloxacin Other (See Comments)     Possible GI illness     Erythromycin Other (See Comments)     Unsure of allergy time, possible hives.     Gluten Meal GI Disturbance     Hydrochlorothiazide      Other reaction(s): Hyponatremia     Penicillins Other (See Comments)     Unsure of allergy time, possible hives       Discharge Counseling:    Pharmacist met with patient (and/or family) today to review the medication portion of the After Visit Summary (with an emphasis on NEW medications) and to address patient's questions/concerns.     Summary of Education:   Met with patient at time of discharge to review all changes in medications including dose increase of diltiazem. Discussed indications, directions for use, and possible side effects. Patient asked a few questions and all concerns were addressed.     Materials Provided:   MedCounselor sheets printed from Clinical Pharmacology on: diltiazem    Discharge Medication Reconciliation:    Madie Cifuentes RPH has reviewed the patient's discharge medication orders and has compared them to the inpatient medication administration record and to what the patient was taking prior to admission- any discrepancies have been resolved.     It has been determined that the patient has an adequate supply of medications available or which can be obtained from the patient's preferred pharmacy, which has been confirmed as: [place] ROMIE Cobb [An updated medication list will be faxed to the patient's pharmacy.]     Thank you for the consult.     Madie Cifuentes RPH ....................  11/25/2022   2:28 PM

## 2022-11-25 NOTE — PROGRESS NOTES
SAFETY CHECKLIST  ID Bands and Risk clasps correct and in place (DNR, Fall risk, Allergy, Latex, Limb):  Yes  All Lines Reconciled and labeled correctly: Yes  Whiteboard updated:Yes  Environmental interventions: Yes - bed low,call light, slippers, items with reach , bed/chair alarm  Verify Tele #: 3

## 2022-11-25 NOTE — PHARMACY - DISCHARGE MEDICATION RECONCILIATION
[facility]  Part of [address] [address]    November 25, 2022    Dear Pharmacist,    Your customer, Janna Mcdermott, born on 2/23/1931, was recently discharged from [facility].  We have updated her medication list and want to alert you to the following:       Review of your medicines      CONTINUE these medicines which may have CHANGED, or have new prescriptions. If we are uncertain of the size of tablets/capsules you have at home, strength may be listed as something that might have changed.      Dose / Directions   diltiazem ER COATED BEADS 180 MG 24 hr capsule  Commonly known as: CARDIZEM CD/CARTIA XT  This may have changed:     medication strength    how much to take  Used for: Essential hypertension, Paroxysmal atrial fibrillation (H)      Dose: 180 mg  Start taking on: November 26, 2022  Take 1 capsule (180 mg) by mouth daily  Quantity: 30 capsule  Refills: 0        CONTINUE these medicines which have NOT CHANGED      Dose / Directions   [place] Thyroid 90 MG tablet  Used for: Acquired hypothyroidism  Generic drug: thyroid      Dose: 90 mg  Take 1 tablet (90 mg) by mouth daily  Quantity: 90 tablet  Refills: 0     Cholecalciferol 10 MCG (400 UNIT) Caps      Dose: 800 Units  Take 800 Units by mouth daily  Refills: 0     ELIQUIS ANTICOAGULANT 5 MG tablet  Used for: Paroxysmal atrial fibrillation (H)  Generic drug: apixaban ANTICOAGULANT      Take 1 tablet (5 mg) by mouth 2 times daily  Quantity: 180 tablet  Refills: 4     fish oil-omega-3 fatty acids 1000 MG capsule      Dose: 1,000 capsule  Take 1,000 capsules by mouth daily  Refills: 0     Flaxseed Oil Oil      Take daily, puts liquid on oatmeal every morning  Refills: 0     latanoprost 0.005 % ophthalmic solution  Commonly known as: XALATAN      Dose: 1 drop  Place 1 drop into both eyes At Bedtime  Refills: 0     mupirocin 2 % external ointment  Commonly known as:  BACTROBAN  Used for: Facial cellulitis      Apply topically 3 times daily  Quantity: 30 g  Refills: 3     rosuvastatin 5 MG tablet  Commonly known as: CRESTOR  Used for: 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)      Dose: 5 mg  Take 1 tablet (5 mg) by mouth daily  Quantity: 90 tablet  Refills: 3     Sesame Oil Oil      Dose: 1 capsule  Take 1 capsule by mouth daily  Refills: 0     triamcinolone 0.1 % external cream  Commonly known as: KENALOG  Used for: Contact allergic reaction      Apply topically 3 times daily  Quantity: 454 g  Refills: 4           Where to get your medicines      These medications were sent to [facility] - [place], [place] - [address]  [address], [place] [address]    Phone: [phone]     diltiazem ER COATED BEADS 180 MG 24 hr capsule         We also reviewed Janna Mcdermott's allergy list and updated it as needed:  Allergies: Bee venom, Benazepril, Ciprofloxacin, Erythromycin, Gluten meal, Hydrochlorothiazide, and Penicillins    Thank you for continuing to care for Janna Mcdermott.  We look forward to working together with you in the future.    Sincerely,  Madie Cifuentes, [facility]

## 2022-11-25 NOTE — PROGRESS NOTES
[facility] DISCHARGE NOTE    Patient discharged to home at 3:31 PM via wheel chair. Accompanied by daughter and staff. Discharge instructions reviewed with patient, opportunity offered to ask questions. Prescriptions sent to patients preferred pharmacy. All belongings sent with patient.    Annette Luis RN

## 2022-11-25 NOTE — PLAN OF CARE
Goal Outcome Evaluation:    Pt admitted with acute ischemic stroke. A&Ox4. Neurological assessment WDL for CN II-XII. Fall precautions in place.       Plan of Care Reviewed With: patient    Overall Patient Progress: no changeOverall Patient Progress: no change

## 2022-11-25 NOTE — PROGRESS NOTES
11/25/22 1257   Appointment Info   Signing Clinician's Name / Credentials (OT) Kenisha Valadez, OTR/L   Therapeutic Activities   Therapeutic Activity Minutes (78499) 15   Symptoms noted during/after treatment fatigue   Treatment Detail/Skilled Intervention Pt states she feels better today vs yesterday, left visual field cut appears resolved, pt ambulated greater distance in hallway with FWW and no issues with left L/E weakness   OT Discharge Planning   OT Plan cont OT   OT Discharge Recommendation (DC Rec) home with assist;home with home care occupational therapy   OT Rationale for DC Rec Pt would benefit from home care therapy to assess home safety with ADL's and functional mobility, pt has family involved to assist as needed   OT Brief overview of current status see above note, pt progressing well, appears left sided visual deficits resolved and left L/E weakness   Total Session Time   Timed Code Treatment Minutes 15   Total Session Time (sum of timed and untimed services) 15

## 2022-11-25 NOTE — PLAN OF CARE
"Patient admitted for Acute stroke due to ischemia. VSS. Afebrile. Room Air. Denies pain. Up with assist 1 with walker. Neuros intact.     Problem: Plan of Care - These are the overarching goals to be used throughout the patient stay.    Goal: Plan of Care Review  Description: The Plan of Care Review/Shift note should be completed every shift.  The Outcome Evaluation is a brief statement about your assessment that the patient is improving, declining, or no change.  This information will be displayed automatically on your shift note.  Outcome: Progressing  Goal: Patient-Specific Goal (Individualized)  Description: You can add care plan individualizations to a care plan. Examples of Individualization might be:  \"Parent requests to be called daily at 9am for status\", \"I have a hard time hearing out of my right ear\", or \"Do not touch me to wake me up as it startles me\".  Outcome: Progressing  Goal: Absence of Hospital-Acquired Illness or Injury  Outcome: Progressing  Intervention: Identify and Manage Fall Risk  Recent Flowsheet Documentation  Taken 11/25/2022 0923 by Annette Luis RN  Safety Promotion/Fall Prevention:    activity supervised    assistive device/personal items within reach    bed alarm on    chair alarm on    clutter free environment maintained    fall prevention program maintained    nonskid shoes/slippers when out of bed    patient and family education    safety round/check completed    supervised activity    treat reversible contributory factors    treat underlying cause  Intervention: Prevent and Manage VTE (Venous Thromboembolism) Risk  Recent Flowsheet Documentation  Taken 11/25/2022 0923 by Annette Luis RN  VTE Prevention/Management:    SCDs (sequential compression devices) off    patient refused intervention  Goal: Optimal Comfort and Wellbeing  Outcome: Progressing  Goal: Readiness for Transition of Care  Outcome: Progressing     Problem: Stroke, Ischemic (Includes Transient " Ischemic Attack)  Goal: Optimal Coping  Outcome: Progressing  Goal: Optimal Eating and Swallowing without Aspiration  Outcome: Progressing  Intervention: Optimize Eating and Swallowing  Recent Flowsheet Documentation  Taken 11/25/2022 0923 by Annette Luis RN  Aspiration Precautions:    awake/alert before oral intake    respiratory status monitored   Goal Outcome Evaluation:

## 2022-11-25 NOTE — PROGRESS NOTES
[facility]    Medicine Progress Note - Hospitalist Service    Date of Admission:  11/23/2022    Assessment & Plan   Acute stroke due to ischemia (H)  Presenting with slurred speech and left facial droop, stuttering off and on today  Initially high blood pressure in the ED  CT/CTA imaging showing no acute changes  Previous history of cerebellar stroke in March 2021, ongoing atrial fibrillation taking Eliquis, hyperlipidemia on Crestor  MRI imaging showing small acute infarct in the deep right cerebrum  Admit inpatient, stroke work-up initiated with echocardiogram ordered, placed on telemetry, troponins, lipid panel, evaluation by speech/PT/OT.  Plan for stroke neuro consult tomorrow per recommendation, hold Eliquis tonight, continue daily 81 mg aspirin for now  11/24/2022-symptomatically better. Speech clear.   Echo done and no changes. Telemetry showing atrial fibrillation. PT/OT eval done. Speech not available today, but swallowing okay per nursing. LDL at 46  Plan today is for stroke neuro assessment and recommendations for ongoing anticoagulation and stroke prevention,  11/25/2022-feeling near baseline. Neuro recommending restarting eliquis. Working on blood pressure control slowly. Working with PT, question home today, assessing for home care.       Essential hypertension  Controlled at home taking daily diltiazem  Initial blood pressure in ED quite high, controlled now allow   permissive hypertension, continue Cardizem  11/25/2022-BP remains somewhat high., will increase cardiazem to 180 mg daily, will need OP management and follow-up    History of stroke  History of cerebellar stroke in 2021  No residual effects    Longstanding persistent atrial fibrillation (H)  Rate controlled taking diltiazem, taking Eliquis   per neuro, holding Eliquis at this time  11/25/2022-Per neuro have restarted her eliquis      Acquired hypothyroidism  Taking daily  thyroid replacement  Continue home  dosing    Mixed hyperlipidemia  Taking daily low-dose Crestor  Last LDL was 55  Recheck, goal is to get LDL between 40 and 75  LDL at 46, continue current dosing of crestor           Diet: Combination Diet Regular Diet    DVT Prophylaxis: DOAC  Grubbs Catheter: Not present  Central Lines: None  Cardiac Monitoring: ACTIVE order. Indication: Stroke, acute (48 hours)  Code Status: No CPR- Do NOT Intubate      Disposition Plan           The patient's care was discussed with the Bedside Nurse, Care Coordinator/ and PT/OT.    Chase Herrera MD  Hospitalist Service  [facility]  Securely message with the Vocera Web Console (learn more here)  Text page via [employer] Paging/Directory         Clinically Significant Risk Factors                       # Overweight: Estimated body mass index is 27.97 kg/m  as calculated from the following:    Height as of this encounter: 1.524 m (5').    Weight as of this encounter: 65 kg (143 lb 3.2 oz)., PRESENT ON ADMISSION         ______________________________________________________________________    Interval History   Feeling pretty good, feels she is at or near baseline. Speech and swallowing normal. PT notes still some shuffling in left leg, better with left sided neglect better.     Data reviewed today: I reviewed all medications, new labs and imaging results over the last 24 hours. I personally reviewed no images or EKG's today.    Physical Exam   Vital Signs: Temp: 97.9  F (36.6  C) Temp src: Tympanic BP: (!) 155/87 Pulse: 74   Resp: 16 SpO2: 95 % O2 Device: None (Room air)    Weight: 143 lbs 3.2 oz  Constitutional: awake, alert, cooperative, no apparent distress, and appears stated age  Respiratory: No increased work of breathing, good air exchange, clear to auscultation bilaterally, no crackles or wheezing  Cardiovascular: regular rate and rhythm  Musculoskeletal: no lower extremity pitting edema present  there is no redness, warmth, or swelling  of the joints  Neurologic: Mental Status Exam:  Level of Alertness:   awake  Orientation:   person, place, time  Memory:   normal  Cranial Nerves:  cranial nerves II-XII are grossly intact  Motor Exam:  moves all extremities well and symmetrically  Sensory:  Sensory intact    Data   Recent Labs   Lab 11/24/22  2104 11/24/22  1656 11/24/22  1216 11/24/22  0826 11/24/22  0642 11/23/22  1153 11/23/22  1024   WBC  --   --   --   --  7.1  --  5.3   HGB  --   --   --   --  15.9*  --  15.7   MCV  --   --   --   --  92  --  94   PLT  --   --   --   --  159  --  159   INR  --   --   --   --   --   --  1.18*   NA  --   --   --   --  140  --  138   POTASSIUM  --   --   --   --  4.1  --  4.4   CHLORIDE  --   --   --   --  104  --  101   CO2  --   --   --   --  24  --  26   BUN  --   --   --   --  16.4  --  25.6*   CR  --   --   --   --  0.68  --  0.88   ANIONGAP  --   --   --   --  12  --  11   CORY  --   --   --   --  9.3  --  9.6   * 161* 104*   < > 99   < > 186*    < > = values in this interval not displayed.

## 2022-11-25 NOTE — PROGRESS NOTES
11/25/22 1300   Appointment Info   Signing Clinician's Name / Credentials (PT) Portia Barrow DPT   Gait/Stairs (Locomotion)   Distance in Feet (Gait) 250   Physical Therapy Goals   PT Frequency Daily   PT Goals Transfers;Gait   PT: Transfers Supervision/stand-by assist   PT: Gait Supervision/stand-by assist   Interventions   Interventions Quick Adds Gait Training   Gait Training   Gait Training Minutes (39310) 15   Symptoms Noted During/After Treatment (Gait Training) none   Treatment Detail/Skilled Intervention gait for 250 feet with FWW and CGA x 1 with no visual field cut, ability to turn R and L inside FWW without noticeable difference and L visual scanning with ability to find room without cues. step through pattern with equal leg strength and coordination. CGA for sit<>stand from recliner.   [place] Level (Gait Training) contact guard   Physical Assistance Level (Gait Training) set-up required;supervision;verbal cues;1 person assist   Weight Bearing (Gait Training) full weight-bearing   Assistive Device (Gait Training) rolling walker   Pattern Analysis (Gait Training) 2-point gait   Gait Analysis Deviations decreased step length;decreased stride length   Impairments (Gait Analysis/Training) balance impaired;flexibility decreased;strength decreased;vision impaired   PT Discharge Planning   PT Plan Continue PT   PT Discharge Recommendation (DC Rec) home with home care physical therapy   PT Rationale for DC Rec to promote optimal function   PT Brief overview of current status CGA for transfers and gait of 250 feet with FWW and B step through pattern. no significant difference between R and L sides for transfers and gaits.   Total Session Time   Timed Code Treatment Minutes 15   Total Session Time (sum of timed and untimed services) 15

## 2022-11-25 NOTE — PROGRESS NOTES
11/25/22 1300   Appointment Info   Signing Clinician's Name / Credentials (SLP) Yareli Winters CCC-SLP   General Information   Onset of Illness/Injury or Date of Surgery 11/23/22   Referring Physician Dr. Herrera   General Observations Pt was sitting in chair with PT/OT upon SLP arrival. Pt engaged in conversation and was A/Ox3. She reports that her speech has returned to baseline. She reports no further difficulties with swallowing, either.   Type of Evaluation   Type of Evaluation Speech, Language, Cognition   Motor Speech   Vocal Loudness (Motor Speech) WNL   Speech Intelligibility (Motor Speech) WNL;conversational level   Breath Support (Motor Speech) intact   Resonance (Motor Speech) WNL   Comment, Motor Speech Assessment No concerns identified regarding motor speech involvement.   Speech Fluency (Motor Speech) WNL   Articulation (Motor Speech) WNL   Respiration (motor speech) None   Phonation (motor speech) Adequate   Conversational Level, Speech Intelligibility (Motor Speech) intact   Auditory Comprehension   Follows Commands (Auditory Comprehension) WNL   Comment, Assessment (Auditory Comprehension) No concerns identified from informal observation within conversation   Yes/No Questions (Auditory Comprehension) WNL   Verbal Expression   Comment, Assesment (Verbal Expression) No concerns identified with expressive language.   Conversational Speech (Verbal Expression) WNL   Automatic Speech (Verbal Expression) WNL   Narrative Speech (Verbal Expression) WNL   Word Finding Skills (Verbal Expression) WNL   Cognition   Cognitive Function WNL   Orientation Status (Cognition) oriented x 3   Affect/Mental Status (Cognition) WNL   Clinical Impression   Criteria for Skilled Therapeutic Interventions Met (SLP Eval) Evaluation only   Clinical Impression Comments No concerns identified at this time. Pt has returned to baseline with her speech and word finding. No further ST warranted at this time.   SLP Total  Evaluation Time   Eval: Sound production Minutes (artic, phonology, apraxia, dysarthria) (18543) 15   SLP Discharge Planning   SLP Plan No concerns identified at this time.   SLP Discharge Recommendation home   SLP Rationale for DC Rec Pt has returned to baseline with her speech/word finding.   SLP Brief overview of current status  No concerns identified at this time. Pt has returned to baseline with her speech and word finding. No further ST warranted at this time.   Total Session Time   Total Session Time (sum of timed and untimed services) 15

## 2022-11-25 NOTE — PROGRESS NOTES
:    Spoke with patient in her room to discuss discharge planning. It was mentioned that patient would benefit for home care services. Patient stated that she got services from [facility] and stated she would like to go through them again.     Pt/family was given the Medicare Compare list for Home Care, with associated star ratings to assist with choice for referrals/discharge planning Yes    Education was given to pt/family that star ratings are updated/maintained by Medicare and can be reviewed by visiting www.medicare.gov Yes    Sent referral to [facility].     ADALID Santoro on 11/25/2022 at 1:23 PM    [facility] accepted patient's referral.     ADALID Santoro on 11/25/2022 at 2:09 PM

## 2022-11-28 ENCOUNTER — TELEPHONE (OUTPATIENT)
Dept: PEDIATRICS | Facility: OTHER | Age: 87
End: 2022-11-28

## 2022-11-28 ENCOUNTER — PATIENT OUTREACH (OUTPATIENT)
Dept: FAMILY MEDICINE | Facility: OTHER | Age: 87
End: 2022-11-28

## 2022-11-28 NOTE — TELEPHONE ENCOUNTER
Patient discharged to home with skilled nursing services. Spoke with Génesis, patient's daughter.  Fabiola to call patient.  No TCM call required per policy.     Thea Sanon RN on 11/28/2022 at 11:38 AM

## 2022-11-29 ENCOUNTER — PATIENT OUTREACH (OUTPATIENT)
Dept: PEDIATRICS | Facility: OTHER | Age: 87
End: 2022-11-29

## 2022-11-29 PROCEDURE — G0180 MD CERTIFICATION HHA PATIENT: HCPCS | Performed by: FAMILY MEDICINE

## 2022-11-29 NOTE — TELEPHONE ENCOUNTER
MED REC REQUIRED    Post Medication Reconciliation Status:  Discharge medications reconciled, continue medications without change. This took place 11/29/22 with patient.    Corinne R Thayer, RN on 11/29/2022 at 12:28 PM

## 2022-11-30 ENCOUNTER — TELEPHONE (OUTPATIENT)
Dept: PEDIATRICS | Facility: OTHER | Age: 87
End: 2022-11-30

## 2022-11-30 ENCOUNTER — OFFICE VISIT (OUTPATIENT)
Dept: FAMILY MEDICINE | Facility: OTHER | Age: 87
End: 2022-11-30
Attending: FAMILY MEDICINE
Payer: COMMERCIAL

## 2022-11-30 VITALS
SYSTOLIC BLOOD PRESSURE: 142 MMHG | OXYGEN SATURATION: 97 % | BODY MASS INDEX: 28.32 KG/M2 | RESPIRATION RATE: 20 BRPM | WEIGHT: 145 LBS | DIASTOLIC BLOOD PRESSURE: 80 MMHG | TEMPERATURE: 97.8 F | HEART RATE: 82 BPM

## 2022-11-30 DIAGNOSIS — I10 ESSENTIAL HYPERTENSION: Chronic | ICD-10-CM

## 2022-11-30 DIAGNOSIS — I63.9 STROKE, ACUTE, EMBOLIC (H): Primary | ICD-10-CM

## 2022-11-30 DIAGNOSIS — I48.0 PAROXYSMAL ATRIAL FIBRILLATION (H): ICD-10-CM

## 2022-11-30 DIAGNOSIS — E03.9 ACQUIRED HYPOTHYROIDISM: ICD-10-CM

## 2022-11-30 PROCEDURE — G0463 HOSPITAL OUTPT CLINIC VISIT: HCPCS | Performed by: FAMILY MEDICINE

## 2022-11-30 PROCEDURE — 99215 OFFICE O/P EST HI 40 MIN: CPT | Performed by: FAMILY MEDICINE

## 2022-11-30 RX ORDER — DILTIAZEM HYDROCHLORIDE 180 MG/1
180 CAPSULE, COATED, EXTENDED RELEASE ORAL DAILY
Qty: 90 CAPSULE | Refills: 1 | Status: SHIPPED | OUTPATIENT
Start: 2022-11-30 | End: 2022-12-20

## 2022-11-30 ASSESSMENT — ANXIETY QUESTIONNAIRES
1. FEELING NERVOUS, ANXIOUS, OR ON EDGE: NOT AT ALL
7. FEELING AFRAID AS IF SOMETHING AWFUL MIGHT HAPPEN: NOT AT ALL
3. WORRYING TOO MUCH ABOUT DIFFERENT THINGS: NOT AT ALL
IF YOU CHECKED OFF ANY PROBLEMS ON THIS QUESTIONNAIRE, HOW DIFFICULT HAVE THESE PROBLEMS MADE IT FOR YOU TO DO YOUR WORK, TAKE CARE OF THINGS AT HOME, OR GET ALONG WITH OTHER PEOPLE: NOT DIFFICULT AT ALL
GAD7 TOTAL SCORE: 0
GAD7 TOTAL SCORE: 0
5. BEING SO RESTLESS THAT IT IS HARD TO SIT STILL: NOT AT ALL
2. NOT BEING ABLE TO STOP OR CONTROL WORRYING: NOT AT ALL
6. BECOMING EASILY ANNOYED OR IRRITABLE: NOT AT ALL

## 2022-11-30 ASSESSMENT — PATIENT HEALTH QUESTIONNAIRE - PHQ9
5. POOR APPETITE OR OVEREATING: NOT AT ALL
SUM OF ALL RESPONSES TO PHQ QUESTIONS 1-9: 0

## 2022-11-30 ASSESSMENT — PAIN SCALES - GENERAL: PAINLEVEL: NO PAIN (0)

## 2022-11-30 NOTE — TELEPHONE ENCOUNTER
Quinn offered appointment today at 1220, for Hospital follow up.      Hattie Cantu LPN 11/30/2022 10:12 AM

## 2022-11-30 NOTE — TELEPHONE ENCOUNTER
Her initial readings were high today, but better on recheck.  May have some variability in readings due to atrial fibrillation.  Goal blood pressure under 140/90 in a couple weeks, but permissive hypertension still acceptable now so close to recent stroke. Has appointment Dec 20 to review blood pressure readings. OK to back off and check daily

## 2022-11-30 NOTE — PROGRESS NOTES
Assessment & Plan       ICD-10-CM    1. Stroke, acute, embolic (H)  I63.9       2. Essential hypertension  I10 diltiazem ER COATED BEADS (CARDIZEM CD/CARTIA XT) 180 MG 24 hr capsule     Basic Metabolic Panel      3. Paroxysmal atrial fibrillation (H)  I48.0 diltiazem ER COATED BEADS (CARDIZEM CD/CARTIA XT) 180 MG 24 hr capsule      4. Acquired hypothyroidism  E03.9 TSH Reflex GH        She was recently admitted for a stroke  This is in a different distribution than the stroke she had in March 2021.  Likely secondary to atrial fibrillation.  Has been consistently utilizing Eliquis.  LDL has been controlled with Crestor  Looking back, her blood pressure control has been very good, but she does have a few elevated readings.  Since stroke, her readings have been higher in the 1  systolic range.  This is the case even after increasing diltiazem from 120 up to 180 mg daily.  Sometimes in the past her pulse is down in the 40s.  She is asymptomatic.  Her blood pressure has been below 110 systolic on diltiazem 120 mg.  Have concerns that she may either have bradycardia or hypotension in the future with higher doses of diltiazem.  Continue 180 mg for now, but need to continue monitoring.  Her blood pressure should trend down in the next 2 weeks.  Discussed with daughter still allowing some permissive hypertension in the acute stroke phase    She has a scheduled appointment in 3 weeks.  Plan to check TSH and BMP at that time.  Follow-up on blood pressure results.    Continue with home care, which has been monitoring her blood pressure      41 minutes spent on the date of the encounter doing chart review, patient visit and documentation        MED REC REQUIRED  Post Medication Reconciliation Status:  Discharge medications reconciled, continue medications without change    Alexis York MD  [facility]   Janna is a 89 year old accompanied by her daughter, presenting for the  following health issues:  Hospital F/U      [facility]       Hospital Follow-up Visit:    Hospital/Nursing Home/IP Rehab Facility: [facility]  Date of Admission: 11-23  Date of Discharge: 11-25  Reason(s) for Admission: CVA    Was your hospitalization related to COVID-19? No   Problems taking medications regularly:  None  Medication changes since discharge: Add: Cardizem  Problems adhering to non-medication therapy:  None    Summary of hospitalization:  [facility] discharge summary reviewed  Diagnostic Tests/Treatments reviewed.  Follow up needed: lipids  Other Healthcare Providers Involved in Patient s Care:         Specialist appointment - neurology  Update since discharge: improved.         Plan of care communicated with patient         She has a history of stroke in March 2021.  Daughter noticed slurred speech and left-sided weakness morning of admission.  Symptoms improved, but then returned in the ED.  She is not a candidate for tPA.  Stroke neurology was consulted, she had an MRI showing deep right cerebral stroke.  This is not in the same distribution as her previous stroke.  She was taking her statin and anticoagulation consistently  Blood pressure initially elevated, had dose increase in diltiazem from 120 up to 180 mg daily.  Admitted, but improved markedly in the next couple days and discharged home with home care  Daughter has been monitoring her blood pressure, which has been in the 140-160 systolic range.      Review of Systems   As above      Objective    BP (!) 156/90   Pulse 82   Temp 97.8  F (36.6  C) (Tympanic)   Resp 20   Wt 65.8 kg (145 lb)   LMP 01/25/1974 (Approximate)   SpO2 97%   BMI 28.32 kg/m    Body mass index is 28.32 kg/m .  Physical Exam   General Appearance: Alert. No acute distress  Chest/Respiratory Exam: Clear to auscultation bilaterally  Cardiovascular Exam: Irregular rhythm  Extremities: 2+ pedal pulses.  No lower extremity  edema.  Psychiatric: Normal affect and mentation

## 2022-11-30 NOTE — TELEPHONE ENCOUNTER
Dr York,     I admitted this patient on Tuesday 11/29 and reviewed her blood pressures the daughter was completing at home and noted they continue to remain elevated. Blood pressure was 168/82 on the right arm using a standard cuff. Left arm- 168/96 on the left arm. No issues of dizziness or blurred vision.     BP readings from the daughter  11/26  11:30 am- 155/81  3:30 pm - 151/87  8:30 p,m- 152/91    11/27/22  8:45 am- 150/89  11:30 am- 158/94  3:30 pm 144/72  5:15 pm- 149/81    11/28/22  7:45 am 165/91  11:30 am- 163/98  2:30 pm- 150/91  5: 30 pm- 178/104    The daughter continues to check blood pressures and encouraged 4 times a day until review. Using wrist monitor primarily as the automatic cuff is not effective at this time. Please review and any further medication changes advised?.     Thanks for your time,   Piper Portillo RN

## 2022-11-30 NOTE — TELEPHONE ENCOUNTER
"URGENT: per CMS best practice, response is requested within 24 hours.    Home Care regulation mandates that you are notified about drug discrepancies, interactions & contraindications. Response within a 24 hour timeframe is established by CMS as \"best practice\" for the delivery of home health care. Home Care is required to report if the 24 hour timeframe was met. The home health clinician will contact you again if this timeframe is not met or if the response does not address all concerns.       Situation:        The patient was admitted to [facility], after being discharged from [facility] on 11/25/22. Upon admission, a med reconciliation was completed to identify any drug discrepancies, interactions or contraindications. Home Care's drug regime review has revealed significant medication issues.     You are being contacted for clarifying orders related to the medication issues.     Background:  Patient was admitted to the home care program on 11/29/22. Noted several changes with epic med list and OTC supplements not on the formulary as follows.      Assessment:   1. Flaxseed oil- takes daily and puts it on her oatmeal every am.  2. Sesame Oil- Takes 1 cap daily  3. Ac Carbamide 1 cap twice daily  4. Antronix 1 cap three times a day  5. Discontinue Triamcinolone cream, patient does not use.   6. Discontinue Bactroban- patient does not use.  7. Cholecalciferol- 10 mcg (400 unit) - patient takes 2 tabs in the am and 2 in the PM  8. Renatrophin PMG- 1 cap three times a day   9. Cyruta Plus 1 cap three times a day  10. Zypan- 1 tab daily  11. Mintran- 1 tab at bedtime  12. Biosent- 1 tab three times a day  13. Renaford- 1 tab 2 times a day  14. Multizyme- 1 tab at bedtime  15. Congaplex- 4 tabs daily        Recommendations:    Please evaluate this information and indicate below whether or not changes are required. A copy of the patient's drug interaction/contraindications report is available upon request. "       CLINIC NURSE - If changes are made, please update the Epic medication profile.     Please sign this encounter with your electronic signature.       Thanks for your time,   Piper Portillo RN

## 2022-11-30 NOTE — NURSING NOTE
Patient presents to the clinic for hospital follow up.    FOOD SECURITY SCREENING QUESTIONS:    The next two questions are to help us understand your food security.  If you are feeling you need any assistance in this area, we have resources available to support you today.    Hunger Vital Signs:  Within the past 12 months we worried whether our food would run out before we got money to buy more. Never  Within the past 12 months the food we bought just didn't last and we didn't have money to get more. Never    Advance Care Directive on file? no  Advance Care Directive provided to patient? Declined.    Chief Complaint   Patient presents with     Hospital F/U       Initial BP (!) 156/90   Pulse 82   Temp 97.8  F (36.6  C) (Tympanic)   Resp 20   Wt 65.8 kg (145 lb)   LMP 01/25/1974 (Approximate)   SpO2 97%   BMI 28.32 kg/m   Estimated body mass index is 28.32 kg/m  as calculated from the following:    Height as of 11/23/22: 1.524 m (5').    Weight as of this encounter: 65.8 kg (145 lb).  Medication Reconciliation: complete        Hattie Cantu LPN

## 2022-12-05 ENCOUNTER — TELEPHONE (OUTPATIENT)
Dept: FAMILY MEDICINE | Facility: OTHER | Age: 87
End: 2022-12-05
Payer: COMMERCIAL

## 2022-12-05 DIAGNOSIS — I63.9 ACUTE STROKE DUE TO ISCHEMIA (H): ICD-10-CM

## 2022-12-05 DIAGNOSIS — I69.354 HEMIPARESIS AFFECTING LEFT SIDE AS LATE EFFECT OF CEREBROVASCULAR ACCIDENT (H): Primary | ICD-10-CM

## 2022-12-05 NOTE — TELEPHONE ENCOUNTER
Alexis York MD reviewed and completed the following home care or hospice form(s) for Home Care on 11-29-22   This covers the certification period effective 11-29-22 to 1-27-23.  Hattie Cantu LPN on 12/5/2022 at 9:00 AM

## 2022-12-19 ENCOUNTER — LAB (OUTPATIENT)
Dept: LAB | Facility: OTHER | Age: 87
End: 2022-12-19
Attending: FAMILY MEDICINE
Payer: MEDICARE

## 2022-12-19 ENCOUNTER — PATIENT OUTREACH (OUTPATIENT)
Dept: CARE COORDINATION | Facility: CLINIC | Age: 87
End: 2022-12-19

## 2022-12-19 DIAGNOSIS — I10 ESSENTIAL HYPERTENSION: Chronic | ICD-10-CM

## 2022-12-19 DIAGNOSIS — E03.9 ACQUIRED HYPOTHYROIDISM: ICD-10-CM

## 2022-12-19 LAB
ANION GAP SERPL CALCULATED.3IONS-SCNC: 11 MMOL/L (ref 7–15)
BUN SERPL-MCNC: 29.2 MG/DL (ref 8–23)
CALCIUM SERPL-MCNC: 9.5 MG/DL (ref 8.2–9.6)
CHLORIDE SERPL-SCNC: 102 MMOL/L (ref 98–107)
CREAT SERPL-MCNC: 0.93 MG/DL (ref 0.51–0.95)
DEPRECATED HCO3 PLAS-SCNC: 26 MMOL/L (ref 22–29)
GFR SERPL CREATININE-BSD FRML MDRD: 58 ML/MIN/1.73M2
GLUCOSE SERPL-MCNC: 111 MG/DL (ref 70–99)
POTASSIUM SERPL-SCNC: 4 MMOL/L (ref 3.4–5.3)
SODIUM SERPL-SCNC: 139 MMOL/L (ref 136–145)
T4 FREE SERPL-MCNC: 1.3 NG/DL (ref 0.9–1.7)
TSH SERPL DL<=0.005 MIU/L-ACNC: 9.03 UIU/ML (ref 0.3–4.2)

## 2022-12-19 PROCEDURE — 84443 ASSAY THYROID STIM HORMONE: CPT | Mod: ZL

## 2022-12-19 PROCEDURE — 80048 BASIC METABOLIC PNL TOTAL CA: CPT | Mod: ZL

## 2022-12-19 PROCEDURE — 36415 COLL VENOUS BLD VENIPUNCTURE: CPT | Mod: ZL

## 2022-12-19 PROCEDURE — 84439 ASSAY OF FREE THYROXINE: CPT | Mod: ZL

## 2022-12-20 ENCOUNTER — OFFICE VISIT (OUTPATIENT)
Dept: FAMILY MEDICINE | Facility: OTHER | Age: 87
End: 2022-12-20
Attending: FAMILY MEDICINE
Payer: COMMERCIAL

## 2022-12-20 VITALS
OXYGEN SATURATION: 97 % | TEMPERATURE: 97.5 F | HEART RATE: 66 BPM | DIASTOLIC BLOOD PRESSURE: 84 MMHG | SYSTOLIC BLOOD PRESSURE: 150 MMHG | RESPIRATION RATE: 20 BRPM

## 2022-12-20 DIAGNOSIS — I48.0 PAROXYSMAL ATRIAL FIBRILLATION (H): ICD-10-CM

## 2022-12-20 DIAGNOSIS — E78.2 MIXED HYPERLIPIDEMIA: ICD-10-CM

## 2022-12-20 DIAGNOSIS — E03.9 ACQUIRED HYPOTHYROIDISM: ICD-10-CM

## 2022-12-20 DIAGNOSIS — Z86.73 HISTORY OF STROKE: Primary | ICD-10-CM

## 2022-12-20 DIAGNOSIS — I10 ESSENTIAL HYPERTENSION: Chronic | ICD-10-CM

## 2022-12-20 PROCEDURE — 99215 OFFICE O/P EST HI 40 MIN: CPT | Performed by: FAMILY MEDICINE

## 2022-12-20 PROCEDURE — G0463 HOSPITAL OUTPT CLINIC VISIT: HCPCS | Performed by: FAMILY MEDICINE

## 2022-12-20 RX ORDER — DILTIAZEM HYDROCHLORIDE 120 MG/1
120 CAPSULE, EXTENDED RELEASE ORAL DAILY
Qty: 90 CAPSULE | Refills: 4 | Status: SHIPPED | OUTPATIENT
Start: 2022-12-20 | End: 2022-12-20

## 2022-12-20 RX ORDER — LOSARTAN POTASSIUM 25 MG/1
25 TABLET ORAL DAILY
Qty: 90 TABLET | Refills: 0 | Status: SHIPPED | OUTPATIENT
Start: 2022-12-20 | End: 2023-01-17

## 2022-12-20 RX ORDER — DILTIAZEM HYDROCHLORIDE 120 MG/1
120 CAPSULE, EXTENDED RELEASE ORAL DAILY
Qty: 90 CAPSULE | Refills: 4 | Status: SHIPPED | OUTPATIENT
Start: 2022-12-20 | End: 2022-12-21

## 2022-12-20 ASSESSMENT — PAIN SCALES - GENERAL: PAINLEVEL: NO PAIN (0)

## 2022-12-20 NOTE — PROGRESS NOTES
Clinic Care Coordination Contact  Care Team Conversations    Met with patient and daughter, Génesis, in office today.  Patient is recovering well after a second stroke.  Cognition in tact, remembers date today.      They have completed the advance directives and needed a notary to witness. She wishes to be full code at this time and have her daughter as a healthcare agent if she can't speak for herself.  Care coordination found a notary able to complete the documentation. Advance directive turned in for scanning into charts and copies made for patient/daughter.      Plan:  Patient and daughter agreed to call with any needs or questions.  Kenisha Brasher RN on 12/20/2022 at 5:05 PM

## 2022-12-20 NOTE — PATIENT INSTRUCTIONS
Reduce diltiazem to 120 mg daily  Losartan start 25 mg daily   If blood pressures are above 140/90 in 2 weeks, increase losartan further.  Continue other medications  Triamcinolone cream OK to use on feet and legs

## 2022-12-20 NOTE — NURSING NOTE
Patient presents to the clinic for follow up.    FOOD SECURITY SCREENING QUESTIONS:    The next two questions are to help us understand your food security.  If you are feeling you need any assistance in this area, we have resources available to support you today.    Hunger Vital Signs:  Within the past 12 months we worried whether our food would run out before we got money to buy more. Never  Within the past 12 months the food we bought just didn't last and we didn't have money to get more. Never    Advance Care Directive on file? no  Advance Care Directive provided to patient? Declined-brought completed forms in today.    Chief Complaint   Patient presents with     Clinic Care Coordination - Follow-up     Follow up        Initial BP (!) 150/84 (BP Location: Right arm, Patient Position: Sitting, Cuff Size: Adult Large)   Pulse 66   Temp 97.5  F (36.4  C) (Tympanic)   Resp 20   LMP 01/25/1974 (Approximate)   SpO2 97%  Estimated body mass index is 28.32 kg/m  as calculated from the following:    Height as of 11/23/22: 1.524 m (5').    Weight as of 11/30/22: 65.8 kg (145 lb).  Medication Reconciliation: complete        Hattie Cantu LPN

## 2022-12-20 NOTE — PROGRESS NOTES
Assessment & Plan       ICD-10-CM    1. History of stroke  Z86.73       2. Essential hypertension  I10 losartan (COZAAR) 25 MG tablet     Basic Metabolic Panel     DISCONTINUED: diltiazem ER (DILT-XR) 120 MG 24 hr capsule     DISCONTINUED: diltiazem ER (DILT-XR) 120 MG 24 hr capsule      3. Paroxysmal atrial fibrillation (H)  I48.0 DISCONTINUED: diltiazem ER (DILT-XR) 120 MG 24 hr capsule     DISCONTINUED: diltiazem ER (DILT-XR) 120 MG 24 hr capsule      4. Acquired hypothyroidism  E03.9 TSH Reflex GH      5. Mixed hyperlipidemia  E78.2 Lipid Panel        Hospitalized for stroke a month ago.  Her elevated during hospitalization, had an increase in diltiazem from 120 up to 180 mg daily.  Blood pressure still remain around 150 on average systolic.  She is past the 2-week permissive hypertension stage.  Prior to her for stroke in March 2021 she was on losartan at 75 mg daily.  She has medication sensitivity.  She prefers to return to diltiazem 120 mg daily from [facility] rather than the current brand she is receiving.  Start losartan 25 mg daily.  Daughter will monitor blood pressure at home.  If blood pressures are above 140/90 in 2 weeks, increase losartan further.    Recent TSH has improved, now below 10.  Current [place] Thyroid dose appears appropriate for age.  Continue same dosing.    Daughter is worried that the TSH will drop too low.  We can recheck this at next appointment in 2 months along with lipids.    41 minutes spent on the date of the encounter doing chart review, patient visit and documentation      MED REC REQUIRED  Post Medication Reconciliation Status:  Medication reconciliation previously completed during another office visit          Return in about 2 months (around 2/20/2023).    Alexis York MD  [facility] is a 89 year old accompanied by her daughter, presenting for the following health issues:  Clinic Care Coordination - Follow-up  (Follow up )      History of Present Illness       Hypertension: She presents for follow up of hypertension.  She does check blood pressure  regularly outside of the clinic. Outside blood pressures have been over 140/90. She follows a low salt diet.       Hospitalized for stroke in November 2022.  Occurred in a different distribution than the stroke she had in March 2021.  Stroke neurology felt it was still most likely secondary to atrial fibrillation.  She has been consistently using Eliquis and taking Crestor.  Relative permissive hypertension was allowed.  Diltiazem dose was increased.  She feels this gives her some side effects.  In the past she was on losartan, but it was stopped after her first stroke in March 2021 due to hypotension.  Daughter has been monitoring blood pressure outside of clinic and readings are still in the 140-160 systolic range.    She is on [place] Thyroid.  Dose was increased a couple months ago        Review of Systems   As above      Objective    BP (!) 150/84 (BP Location: Right arm, Patient Position: Sitting, Cuff Size: Adult Large)   Pulse 66   Temp 97.5  F (36.4  C) (Tympanic)   Resp 20   LMP 01/25/1974 (Approximate)   SpO2 97%   There is no height or weight on file to calculate BMI.  Physical Exam   General Appearance: Alert. No acute distress  Chest/Respiratory Exam: Clear to auscultation bilaterally  Cardiovascular Exam: Regular rate and rhythm. S1, S2, no murmur, gallop, or rubs.  Extremities: No lower extremity edema.  Psychiatric: Normal affect and mentation

## 2022-12-21 ENCOUNTER — TELEPHONE (OUTPATIENT)
Dept: FAMILY MEDICINE | Facility: OTHER | Age: 87
End: 2022-12-21

## 2022-12-21 DIAGNOSIS — I10 ESSENTIAL HYPERTENSION: Chronic | ICD-10-CM

## 2022-12-21 DIAGNOSIS — I48.0 PAROXYSMAL ATRIAL FIBRILLATION (H): ICD-10-CM

## 2022-12-21 RX ORDER — DILTIAZEM HYDROCHLORIDE 120 MG/1
120 CAPSULE, COATED, EXTENDED RELEASE ORAL DAILY
Qty: 90 CAPSULE | Refills: 4 | Status: SHIPPED | OUTPATIENT
Start: 2022-12-21 | End: 2023-12-04

## 2022-12-21 NOTE — TELEPHONE ENCOUNTER
Trevor, pharmacist with [facility], called and requested a call back from [facility] nurse regarding getting a verbal order to change patient's diltiazem from XR to CD. Please call back.    Ayah Lee on 12/21/2022 at 10:26 AM

## 2022-12-21 NOTE — TELEPHONE ENCOUNTER
Patient use to be on the 120 mg Diltiazem CD and would like to be on it again versus the 120 mg Diltiazem XR.  There's a slight difference in peak time.      Hattie Cantu LPN 12/21/2022 3:11 PM

## 2022-12-29 ENCOUNTER — DOCUMENTATION ONLY (OUTPATIENT)
Dept: OTHER | Facility: CLINIC | Age: 87
End: 2022-12-29

## 2023-01-03 ENCOUNTER — MYC MEDICAL ADVICE (OUTPATIENT)
Dept: FAMILY MEDICINE | Facility: OTHER | Age: 88
End: 2023-01-03

## 2023-01-05 DIAGNOSIS — I63.22 CEREBROVASCULAR ACCIDENT (CVA) DUE TO STENOSIS OF BASILAR ARTERY (H): ICD-10-CM

## 2023-01-06 DIAGNOSIS — I63.22 CEREBROVASCULAR ACCIDENT (CVA) DUE TO STENOSIS OF BASILAR ARTERY (H): ICD-10-CM

## 2023-01-06 RX ORDER — ROSUVASTATIN CALCIUM 5 MG/1
5 TABLET, COATED ORAL DAILY
Qty: 90 TABLET | Refills: 3 | Status: SHIPPED | OUTPATIENT
Start: 2023-01-06 | End: 2023-12-04

## 2023-01-06 NOTE — TELEPHONE ENCOUNTER
Rx Authorization:    Requested Medication/ Dose rosuvastatin (CRESTOR) 5 MG tablet    Date last refill ordered: 5/4/22    Quantity ordered: 90 tablets    # refills: 3    Date of last clinic visit with ordering provider: 4/22/22    Date of next clinic visit with ordering provider:     All pertinent protocol data (lab date/result):     Include pertinent information from patients message:

## 2023-01-09 RX ORDER — ROSUVASTATIN CALCIUM 5 MG/1
5 TABLET, COATED ORAL DAILY
Qty: 90 TABLET | Refills: 3 | OUTPATIENT
Start: 2023-01-09

## 2023-01-09 NOTE — TELEPHONE ENCOUNTER
Patient's chart was accessed to determine the status of a refill request - nothing needed at this time as the request was previously addressed.    Kenisha Hutchinson RN .............. 1/9/2023  9:24 AM

## 2023-01-15 ENCOUNTER — MYC MEDICAL ADVICE (OUTPATIENT)
Dept: FAMILY MEDICINE | Facility: OTHER | Age: 88
End: 2023-01-15

## 2023-01-17 ENCOUNTER — OFFICE VISIT (OUTPATIENT)
Dept: FAMILY MEDICINE | Facility: OTHER | Age: 88
End: 2023-01-17
Attending: FAMILY MEDICINE
Payer: COMMERCIAL

## 2023-01-17 ENCOUNTER — MYC MEDICAL ADVICE (OUTPATIENT)
Dept: FAMILY MEDICINE | Facility: OTHER | Age: 88
End: 2023-01-17
Payer: COMMERCIAL

## 2023-01-17 VITALS
BODY MASS INDEX: 28.12 KG/M2 | RESPIRATION RATE: 20 BRPM | SYSTOLIC BLOOD PRESSURE: 138 MMHG | HEART RATE: 88 BPM | WEIGHT: 144 LBS | DIASTOLIC BLOOD PRESSURE: 78 MMHG | TEMPERATURE: 97.2 F | OXYGEN SATURATION: 94 %

## 2023-01-17 DIAGNOSIS — I10 ESSENTIAL HYPERTENSION: Primary | Chronic | ICD-10-CM

## 2023-01-17 DIAGNOSIS — N18.31 STAGE 3A CHRONIC KIDNEY DISEASE (H): ICD-10-CM

## 2023-01-17 DIAGNOSIS — E03.9 ACQUIRED HYPOTHYROIDISM: Chronic | ICD-10-CM

## 2023-01-17 DIAGNOSIS — Z86.73 HISTORY OF STROKE: ICD-10-CM

## 2023-01-17 DIAGNOSIS — Z79.01 ON APIXABAN THERAPY: ICD-10-CM

## 2023-01-17 DIAGNOSIS — I48.0 PAROXYSMAL ATRIAL FIBRILLATION (H): ICD-10-CM

## 2023-01-17 PROCEDURE — G0463 HOSPITAL OUTPT CLINIC VISIT: HCPCS

## 2023-01-17 PROCEDURE — 99214 OFFICE O/P EST MOD 30 MIN: CPT | Performed by: FAMILY MEDICINE

## 2023-01-17 PROCEDURE — G0463 HOSPITAL OUTPT CLINIC VISIT: HCPCS | Mod: 25

## 2023-01-17 RX ORDER — THYROID,PORK 90 MG
90 TABLET ORAL DAILY
Qty: 90 TABLET | Refills: 4 | Status: SHIPPED | OUTPATIENT
Start: 2023-01-17 | End: 2023-12-04

## 2023-01-17 RX ORDER — LOSARTAN POTASSIUM 25 MG/1
25 TABLET ORAL DAILY
Qty: 90 TABLET | Refills: 4 | Status: SHIPPED | OUTPATIENT
Start: 2023-01-17 | End: 2023-12-04

## 2023-01-17 ASSESSMENT — PAIN SCALES - GENERAL: PAINLEVEL: NO PAIN (0)

## 2023-01-17 NOTE — NURSING NOTE
Patient presents to the clinic for follow up with hypertension.    FOOD SECURITY SCREENING QUESTIONS:    The next two questions are to help us understand your food security.  If you are feeling you need any assistance in this area, we have resources available to support you today.    Hunger Vital Signs:  Within the past 12 months we worried whether our food would run out before we got money to buy more. Never  Within the past 12 months the food we bought just didn't last and we didn't have money to get more. Never    Advance Care Directive on file? yes  Advance Care Directive provided to patient? N/a    Chief Complaint   Patient presents with     Clinic Care Coordination - Follow-up     HTN       Initial /78 (BP Location: Left arm, Patient Position: Sitting, Cuff Size: Adult Regular)   Pulse 88   Temp 97.2  F (36.2  C) (Tympanic)   Resp 20   Wt 65.3 kg (144 lb)   LMP 01/25/1974 (Approximate)   SpO2 94%   BMI 28.12 kg/m   Estimated body mass index is 28.12 kg/m  as calculated from the following:    Height as of 11/23/22: 1.524 m (5').    Weight as of this encounter: 65.3 kg (144 lb).  Medication Reconciliation: complete        Hattie Cantu LPN

## 2023-01-17 NOTE — PROGRESS NOTES
Assessment & Plan       ICD-10-CM    1. Essential hypertension  I10 losartan (COZAAR) 25 MG tablet      2. Paroxysmal atrial fibrillation (H)  I48.0 apixaban ANTICOAGULANT (ELIQUIS ANTICOAGULANT) 5 MG tablet     Apixaban      3. On apixaban therapy  Z79.01 Apixaban      4. Acquired hypothyroidism  E03.9 [place] THYROID 90 MG tablet      5. Stage 3a chronic kidney disease (H)  N18.31       6. History of stroke  Z86.73         She had a stroke in March 2021 and losartan was discontinued due to hypotension.  Since the stroke in November 2022 her blood pressures been elevated.  Diltiazem was increased, but this made her feel poorly and seem to cause more leg edema.  Diltiazem reduced to 220 mg daily, which is her long-term chronic dose.  Losartan restarted at 25 mg daily.  She feels better on this current combination.  Has less leg edema.  Overall doing well.  Daughter has been checking blood pressures at home twice daily.  There is not always correlation between the left and right arm, but generally they correlate.  She does have significant left subclavian stenosis, but no clear symptoms.  Patient seems less sharp when her blood pressure is in the 130s and a little better when it is over 140.  Certainly short-term versus long-term risk and benefit would favor not overtreating blood pressure.  No changes made at this time.  She is in goal range below 140/90 today in clinic.  Daughter can check blood pressure less and just keep an eye on things periodically.    A. fib rate controlled.  Refilled apixaban.  Given age, check apixaban level to ensure safe dosing.      Refilled [place] Thyroid.    BMP for CKD with next labs    She is scheduled for lab February 20 and will obtain TSH, lipids, BMP and apixaban level at that time.    Seeing neurology this week on follow up of 2 previous strokes, March 2021 and November 2022    Follow up in a month, after labs    Alexis York MD  [facility] AND  [facility]   Janna is a 89 year old accompanied by her daughter, presenting for the following health issues:  Clinic Care Coordination - Follow-up (HTN)      HPI     Hypertension Follow-up      Do you check your blood pressure regularly outside of the clinic? Yes     Are you following a low salt diet? Yes    Are your blood pressures ever more than 140 on the top number (systolic) OR more   than 90 on the bottom number (diastolic), for example 140/90? Yes    Feels well   Tolerating medication  Slight leg edema improved  Better notices that patient seems a little less sharp when her blood pressure is normal range and seems better when it is above 140 systolic        Review of Systems   As above      Objective    /78 (BP Location: Left arm, Patient Position: Sitting, Cuff Size: Adult Regular)   Pulse 88   Temp 97.2  F (36.2  C) (Tympanic)   Resp 20   Wt 65.3 kg (144 lb)   LMP 01/25/1974 (Approximate)   SpO2 94%   BMI 28.12 kg/m    Body mass index is 28.12 kg/m .  Physical Exam   General Appearance: Alert. No acute distress  Chest/Respiratory Exam: Decreased breath sounds bilaterally  Cardiovascular Exam: Regular rate and rhythm. S1, S2, no murmur, gallop, or rubs.  Extremities: Trace pitting lower extremity edema.  Psychiatric: Normal affect and mentation

## 2023-01-20 ENCOUNTER — OFFICE VISIT (OUTPATIENT)
Dept: NEUROLOGY | Facility: OTHER | Age: 88
End: 2023-01-20
Attending: PSYCHIATRY & NEUROLOGY
Payer: COMMERCIAL

## 2023-01-20 VITALS
HEART RATE: 76 BPM | RESPIRATION RATE: 16 BRPM | SYSTOLIC BLOOD PRESSURE: 136 MMHG | DIASTOLIC BLOOD PRESSURE: 82 MMHG | BODY MASS INDEX: 28.12 KG/M2 | WEIGHT: 144 LBS | OXYGEN SATURATION: 99 %

## 2023-01-20 DIAGNOSIS — I65.03 STENOSIS OF BOTH VERTEBRAL ARTERIES: ICD-10-CM

## 2023-01-20 DIAGNOSIS — I63.81 CEREBROVASCULAR ACCIDENT (CVA) DUE TO OCCLUSION OF SMALL ARTERY (H): Primary | ICD-10-CM

## 2023-01-20 DIAGNOSIS — I48.20 CHRONIC ATRIAL FIBRILLATION (H): ICD-10-CM

## 2023-01-20 PROCEDURE — G0463 HOSPITAL OUTPT CLINIC VISIT: HCPCS

## 2023-01-20 PROCEDURE — 99214 OFFICE O/P EST MOD 30 MIN: CPT | Performed by: PSYCHIATRY & NEUROLOGY

## 2023-01-20 ASSESSMENT — PAIN SCALES - GENERAL: PAINLEVEL: NO PAIN (0)

## 2023-01-20 NOTE — PROGRESS NOTES
Outpatient Neurology Visit  1/20/2023    Subjective:  Janna Mcdermott is a 89 year old female who presents today for follow up evaluation of stroke       Brief history of symptoms: The patient was initially seen in neurologic consultation  In 2021 for evaluation of stroke. Please see the comprehensive neurologic consultation note from that date in the Epic records for details.     Interval history: She presented to hospital 11/23 with slurred pseech and L facial droop.  BP was high in ED and she was found to have a small R deep infarct in the R corona radiata.  Suspected etiology given size and location was small vessel disease but she certainly had other risk factors as well.   LDL was 46 and A1c was 5.7 at that time.  BP was 194/85 at that time and after permissive HTN was slowly decreased.      She noticed that her words were slurring.   It did take her a day or two to completely return to baseline. Dr. York recently put her on Losartan.   Her Blood pressures have been 130's-150s systolic.  Her BP's do seem to correlate well    She did have an issue with her statin, where a new formulation was given and she was noted to have severe diarrhea.  This subsequently improved but is still been present.  Rather than describing true diarrhea they states she has some fecal incontinence that has been ongoing.      Physical Exam:  Vitals: /82 (BP Location: Right arm, Patient Position: Sitting, Cuff Size: Adult Regular)   Pulse 76   Resp 16   Wt 65.3 kg (144 lb)   LMP 01/25/1974 (Approximate)   SpO2 99%   BMI 28.12 kg/m     General: Seated comfortably in no acute distress.  Neurologic:  Awake, alert, oriented.  Language is fluent with no paraphasic errors.  Speech is not dysarthric.  Face is symmetric with smile.  Gaze is conjugate.  No pronator drift.  No satelliting with rapid arm roll.  Able to resist antigravity with all 4 extremities.  Moving equally all extremities.    Pertinent Investigations:  MRI  brain and CTA head and neck personally reviewed    Reviewed recent A1C and LDL levels.  Thyroid and BMP reviewed    Assessment:  #Acute ischemci stroke  #Vertebrobasilar stenosis  #Atrial fibrillation      Ms. Mcdermott is a 89-year-old female with a history of a right PICA stroke in May 2021 secondary to a combination of known atrial fibrillation off anticoagulation and severe vertebrobasilar stenosis.  She subsequently had a second stroke in November 2022 that was felt most likely to be small vessel disease related although could not definitively rule out other source.  Fortunately, she has made a complete recovery.  Her blood pressures appear well controlled and her other vascular risk factors are good.  They do have a suspicion that her blood pressure was high prior to her most recent stroke based on the fact that they had been checking her blood pressure less frequently and she had blood vessel problems in her eye around that time.  At this time it appears that her vascular risk factors are well controlled.  They would like to continue to follow-up so we will follow-up on a 9-month basis as we have previously.  Otherwise we will continue her Crestor and apixaban.  We will continue to monitor her kidney function on the apixaban, given her age for consideration of reduced dosing.   Family had many questions about the etiology of her stroke admitted over the imaging and suspected etiology with them in detail.    Plan:  -Continue Eliquis 5 mg twice daily  -Continue Crestor 5 mg    Follow up in Neurology clinic in 9 months  or earlier as needed if symptoms persist or should new symptoms or concerns arise.        37 min spent on the date of the encounter in  6 min chart review,  26 min patient visit, review of tests, 5 min documentation and/or discussion with other providers about the issues documented above.       Maritza Garrett DO   of Neurology    Dragon disclaimer: This documentation was  completed with the aid of dictation software.  Please note that there may be some inconsistencies due to software errors.  Errors are corrected in real time, however if there is a remaining error, please do not hesitate to reach out for clarification.

## 2023-01-20 NOTE — LETTER
1/20/2023         RE: Janna Mcdermott  [address] [facility] [address]        Dear Colleague,    Thank you for referring your patient, Janna Mcdermott, to the [facility]. Please see a copy of my visit note below.    Outpatient Neurology Visit  1/20/2023    Subjective:  Janna Mcdermott is a 89 year old female who presents today for follow up evaluation of stroke       Brief history of symptoms: The patient was initially seen in neurologic consultation  In 2021 for evaluation of stroke. Please see the comprehensive neurologic consultation note from that date in the Epic records for details.     Interval history: She presented to hospital 11/23 with slurred pseech and L facial droop.  BP was high in ED and she was found to have a small R deep infarct in the R corona radiata.  Suspected etiology given size and location was small vessel disease but she certainly had other risk factors as well.   LDL was 46 and A1c was 5.7 at that time.  BP was 194/85 at that time and after permissive HTN was slowly decreased.      She noticed that her words were slurring.   It did take her a day or two to completely return to baseline. Dr. York recently put her on Losartan.   Her Blood pressures have been 130's-150s systolic.  Her BP's do seem to correlate well    She did have an issue with her statin, where a new formulation was given and she was noted to have severe diarrhea.  This subsequently improved but is still been present.  Rather than describing true diarrhea they states she has some fecal incontinence that has been ongoing.      Physical Exam:  Vitals: /82 (BP Location: Right arm, Patient Position: Sitting, Cuff Size: Adult Regular)   Pulse 76   Resp 16   Wt 65.3 kg (144 lb)   LMP 01/25/1974 (Approximate)   SpO2 99%   BMI 28.12 kg/m     General: Seated comfortably in no acute distress.  Neurologic:  Awake, alert, oriented.  Language is fluent with no paraphasic  errors.  Speech is not dysarthric.  Face is symmetric with smile.  Gaze is conjugate.  No pronator drift.  No satelliting with rapid arm roll.  Able to resist antigravity with all 4 extremities.  Moving equally all extremities.    Pertinent Investigations:  MRI brain and CTA head and neck personally reviewed    Reviewed recent A1C and LDL levels.  Thyroid and BMP reviewed    Assessment:  #Acute ischemci stroke  #Vertebrobasilar stenosis  #Atrial fibrillation      Ms. Mcdermott is a 89-year-old female with a history of a right PICA stroke in May 2021 secondary to a combination of known atrial fibrillation off anticoagulation and severe vertebrobasilar stenosis.  She subsequently had a second stroke in November 2022 that was felt most likely to be small vessel disease related although could not definitively rule out other source.  Fortunately, she has made a complete recovery.  Her blood pressures appear well controlled and her other vascular risk factors are good.  They do have a suspicion that her blood pressure was high prior to her most recent stroke based on the fact that they had been checking her blood pressure less frequently and she had blood vessel problems in her eye around that time.  At this time it appears that her vascular risk factors are well controlled.  They would like to continue to follow-up so we will follow-up on a 9-month basis as we have previously.  Otherwise we will continue her Crestor and apixaban.  We will continue to monitor her kidney function on the apixaban, given her age for consideration of reduced dosing.   Family had many questions about the etiology of her stroke admitted over the imaging and suspected etiology with them in detail.    Plan:  -Continue Eliquis 5 mg twice daily  -Continue Crestor 5 mg    Follow up in Neurology clinic in 9 months  or earlier as needed if symptoms persist or should new symptoms or concerns arise.        37 min spent on the date of the encounter in  6  min chart review,  26 min patient visit, review of tests, 5 min documentation and/or discussion with other providers about the issues documented above.       Maritza Garrett DO   of Neurology    Dragon disclaimer: This documentation was completed with the aid of dictation software.  Please note that there may be some inconsistencies due to software errors.  Errors are corrected in real time, however if there is a remaining error, please do not hesitate to reach out for clarification.           Again, thank you for allowing me to participate in the care of your patient.        Sincerely,        Maritza Garrett MD

## 2023-01-20 NOTE — PATIENT INSTRUCTIONS
Reason for today's visit: stroke    Your diagnosis: stroke, likely due to small vessel ischemic disease.     Tests that you will need: none today    Treatment plan:   - Continue Crestor  - Check BP's every other day      Your test results are reviewed several times a day.  Once they are all in, you will be sent a letter with your results and/or if you are signed up for on-line services, you will be e-mailed the results.  If there are serious findings, you typically will be called.    If you have any questions about your visit, your symptoms, your medication, your test results or it is not clear what your diagnosis or treatment plan is please contact our office at [phone] for general neurology    If you need follow-up in the future, please call [phone] for an appointment.      Maritza Garrett DO  Neurology

## 2023-01-20 NOTE — NURSING NOTE
Chief Complaint   Patient presents with     Follow Up     CVA        Medication Reconciliation: complete       Jenn eRal LPN........................1/20/2023  11:29 AM

## 2023-02-03 ENCOUNTER — MYC MEDICAL ADVICE (OUTPATIENT)
Dept: FAMILY MEDICINE | Facility: OTHER | Age: 88
End: 2023-02-03
Payer: COMMERCIAL

## 2023-02-03 NOTE — TELEPHONE ENCOUNTER
It looks like she has established with Dr. York, I'll defer to him.    Signed, Nuno Clinton MD, FAAP, FACP  Internal Medicine & Pediatrics

## 2023-02-13 ENCOUNTER — PATIENT OUTREACH (OUTPATIENT)
Dept: CARE COORDINATION | Facility: CLINIC | Age: 88
End: 2023-02-13
Payer: COMMERCIAL

## 2023-02-19 ENCOUNTER — MYC MEDICAL ADVICE (OUTPATIENT)
Dept: FAMILY MEDICINE | Facility: OTHER | Age: 88
End: 2023-02-19
Payer: COMMERCIAL

## 2023-02-20 ENCOUNTER — LAB (OUTPATIENT)
Dept: LAB | Facility: OTHER | Age: 88
End: 2023-02-20
Attending: FAMILY MEDICINE
Payer: MEDICARE

## 2023-02-20 DIAGNOSIS — E78.2 MIXED HYPERLIPIDEMIA: ICD-10-CM

## 2023-02-20 DIAGNOSIS — Z79.01 ON APIXABAN THERAPY: ICD-10-CM

## 2023-02-20 DIAGNOSIS — I10 ESSENTIAL HYPERTENSION: Chronic | ICD-10-CM

## 2023-02-20 DIAGNOSIS — E03.9 ACQUIRED HYPOTHYROIDISM: ICD-10-CM

## 2023-02-20 DIAGNOSIS — I48.0 PAROXYSMAL ATRIAL FIBRILLATION (H): ICD-10-CM

## 2023-02-20 LAB
ANION GAP SERPL CALCULATED.3IONS-SCNC: 10 MMOL/L (ref 7–15)
BUN SERPL-MCNC: 33.7 MG/DL (ref 8–23)
CALCIUM SERPL-MCNC: 9.3 MG/DL (ref 8.2–9.6)
CHLORIDE SERPL-SCNC: 104 MMOL/L (ref 98–107)
CHOLEST SERPL-MCNC: 122 MG/DL
CREAT SERPL-MCNC: 1.01 MG/DL (ref 0.51–0.95)
DEPRECATED HCO3 PLAS-SCNC: 26 MMOL/L (ref 22–29)
GFR SERPL CREATININE-BSD FRML MDRD: 52 ML/MIN/1.73M2
GLUCOSE SERPL-MCNC: 108 MG/DL (ref 70–99)
HDLC SERPL-MCNC: 68 MG/DL
HOLD SPECIMEN: NORMAL
LDLC SERPL CALC-MCNC: 43 MG/DL
NONHDLC SERPL-MCNC: 54 MG/DL
POTASSIUM SERPL-SCNC: 4.4 MMOL/L (ref 3.4–5.3)
SODIUM SERPL-SCNC: 140 MMOL/L (ref 136–145)
T4 FREE SERPL-MCNC: 1.18 NG/DL (ref 0.9–1.7)
TRIGL SERPL-MCNC: 57 MG/DL
TSH SERPL DL<=0.005 MIU/L-ACNC: 11.64 UIU/ML (ref 0.3–4.2)

## 2023-02-20 PROCEDURE — 36415 COLL VENOUS BLD VENIPUNCTURE: CPT | Mod: ZL

## 2023-02-20 PROCEDURE — 84443 ASSAY THYROID STIM HORMONE: CPT | Mod: ZL

## 2023-02-20 PROCEDURE — 80299 QUANTITATIVE ASSAY DRUG: CPT | Mod: ZL

## 2023-02-20 PROCEDURE — 80061 LIPID PANEL: CPT | Mod: ZL

## 2023-02-20 PROCEDURE — 84439 ASSAY OF FREE THYROXINE: CPT | Mod: ZL

## 2023-02-20 PROCEDURE — 80048 BASIC METABOLIC PNL TOTAL CA: CPT | Mod: ZL

## 2023-02-20 NOTE — PROGRESS NOTES
Clinic Care Coordination Contact    Follow Up Progress Note      Intervention/Education provided during outreach: MyChart message sent reminding patient/daughter to let me know if I can help with anything.      Plan:  Care Coordinator will follow up in a month, put on maintenance if nothing else has been requested.   Kenisha Brasher RN on 2/20/2023 at 2:36 PM

## 2023-02-21 ENCOUNTER — OFFICE VISIT (OUTPATIENT)
Dept: FAMILY MEDICINE | Facility: OTHER | Age: 88
End: 2023-02-21
Attending: FAMILY MEDICINE
Payer: COMMERCIAL

## 2023-02-21 ENCOUNTER — PATIENT OUTREACH (OUTPATIENT)
Dept: CARE COORDINATION | Facility: CLINIC | Age: 88
End: 2023-02-21
Payer: COMMERCIAL

## 2023-02-21 VITALS
DIASTOLIC BLOOD PRESSURE: 82 MMHG | OXYGEN SATURATION: 97 % | WEIGHT: 142 LBS | SYSTOLIC BLOOD PRESSURE: 130 MMHG | HEART RATE: 76 BPM | TEMPERATURE: 96.8 F | RESPIRATION RATE: 20 BRPM | BODY MASS INDEX: 27.73 KG/M2

## 2023-02-21 DIAGNOSIS — E78.2 MIXED HYPERLIPIDEMIA: ICD-10-CM

## 2023-02-21 DIAGNOSIS — I48.0 PAROXYSMAL ATRIAL FIBRILLATION (H): ICD-10-CM

## 2023-02-21 DIAGNOSIS — I10 ESSENTIAL HYPERTENSION: Primary | ICD-10-CM

## 2023-02-21 DIAGNOSIS — Z79.01 ON APIXABAN THERAPY: ICD-10-CM

## 2023-02-21 DIAGNOSIS — E03.9 ACQUIRED HYPOTHYROIDISM: ICD-10-CM

## 2023-02-21 DIAGNOSIS — N18.31 STAGE 3A CHRONIC KIDNEY DISEASE (H): ICD-10-CM

## 2023-02-21 LAB — APIXABAN PPP CHRO-MCNC: 127 NG/ML

## 2023-02-21 PROCEDURE — G0463 HOSPITAL OUTPT CLINIC VISIT: HCPCS | Performed by: FAMILY MEDICINE

## 2023-02-21 PROCEDURE — 99214 OFFICE O/P EST MOD 30 MIN: CPT | Performed by: FAMILY MEDICINE

## 2023-02-21 ASSESSMENT — PAIN SCALES - GENERAL: PAINLEVEL: NO PAIN (0)

## 2023-02-21 NOTE — NURSING NOTE
Patient presents to the clinic for follow up hypertension.    FOOD SECURITY SCREENING QUESTIONS:    The next two questions are to help us understand your food security.  If you are feeling you need any assistance in this area, we have resources available to support you today.    Hunger Vital Signs:  Within the past 12 months we worried whether our food would run out before we got money to buy more. Never  Within the past 12 months the food we bought just didn't last and we didn't have money to get more. Never    Advance Care Directive on file? yes  Advance Care Directive provided to patient? N/a      Chief Complaint   Patient presents with     Clinic Care Coordination - Follow-up     HTN         Initial /82 (BP Location: Right arm, Patient Position: Sitting, Cuff Size: Adult Large)   Pulse 76   Temp 96.8  F (36  C) (Temporal)   Resp 20   Wt 64.4 kg (142 lb)   LMP 01/25/1974 (Approximate)   SpO2 97%   BMI 27.73 kg/m   Estimated body mass index is 27.73 kg/m  as calculated from the following:    Height as of 11/23/22: 1.524 m (5').    Weight as of this encounter: 64.4 kg (142 lb).  Medication Reconciliation: complete        Hattie Cantu LPN

## 2023-02-21 NOTE — PROGRESS NOTES
Clinic Care Coordination Contact    Follow Up Progress Note      Assessment: Patient called today about a couple of things.  She does want to make sure it is clear she is her own decision maker and that her instructions about the [employer] society are clear in her chart. She also wants to ask a favor for a friend.    Is physically well as confirmed by PCP at today's office visit.    Care Gaps:    Health Maintenance Due   Topic Date Due     MICROALBUMIN  Never done     COVID-19 Vaccine (1) Never done     Pneumococcal Vaccine: 65+ Years (1 - PCV) Never done     DTAP/TDAP/TD IMMUNIZATION (1 - Tdap) Never done     ZOSTER IMMUNIZATION (1 of 2) Never done     MEDICARE ANNUAL WELLNESS VISIT  08/17/2019     INFLUENZA VACCINE (1) Never done       Intervention/Education provided during outreach: Reassured her that the advance directive with health care agent only goes into affect if she is unable to make her wishes known. She stated understanding.     Plan: Reassured her that she is the only one in charge of her medical decisions right now. Care coordinator touched base about friends concerns without sharing any private data between the two.   Care Coordinator will follow up in two months, sooner as needed.   Kenisha Brasher RN on 2/21/2023 at 3:00 PM

## 2023-02-21 NOTE — PROGRESS NOTES
Assessment & Plan       ICD-10-CM    1. Essential hypertension  I10 Comprehensive Metabolic Panel      2. Stage 3a chronic kidney disease (H)  N18.31 Comprehensive Metabolic Panel      3. Acquired hypothyroidism  E03.9 TSH Reflex GH      4. Mixed hyperlipidemia  E78.2 Lipid Panel      5. On apixaban therapy  Z79.01       6. Paroxysmal atrial fibrillation (H)  I48.0         Daughter has been monitoring blood pressures at home  Patient has many readings in the 140s and 150s  With readings in the 130s she feels more fatigued  We discussed the long-term versus short-term impacts of blood pressure treatment  With more aggressive treatment, she can have temporary increase in creatinine, increase in falls.  Long-term has risk reduction on stroke.  She has 2 previous CVAs.  Did not tolerate a higher dose of diltiazem.  We could increase losartan, but then she would have lower readings and likely have more fatigue  Patient and daughter are comfortable continuing current medication doses.  This is an appropriate balance of risk versus benefit      [place] Thyroid dose was increased in the fall 2022.  TSH in December was 9.03.  Increased to 11.64.  T4 level remains in the normal range.  Often in the spring her TSH decreases for some reason.  Therefore, no change to [place] Thyroid dose.  Plan to recheck TSH in a few months.    LDL level 43.  Daughter has been concerned about 2 COVID LDL.  Crestor dose was reduced in the fall 2022.  LDL has generally been stable over the past several months ranging between 43 and 55.  Often we get concerned about LDL below 40.  Therefore, continue same Crestor dose of 5 mg daily for now.    Apixaban level checked.  Level is not elevated, therefore continue current dosing, which is standard A-fib dosing.    Plan to recheck lipids, CMP, TSH in 3 months    Return in about 3 months (around 5/21/2023).    Alexis York MD  [facility] is a 89 year  old accompanied by her daughter, presenting for the following health issues:  Clinic Care Coordination - Follow-up (HTN/)      HPI     Follow up with hypertension.    Daughter sent numerous blood pressure readings  Many are elevated, but patient seems less energetic when systolic is in the 130s    Feels good  Tolerating current medications    Review of Systems   As above      Objective    /82 (BP Location: Right arm, Patient Position: Sitting, Cuff Size: Adult Large)   Pulse 76   Temp 96.8  F (36  C) (Temporal)   Resp 20   Wt 64.4 kg (142 lb)   LMP 01/25/1974 (Approximate)   SpO2 97%   BMI 27.73 kg/m    Body mass index is 27.73 kg/m .  Physical Exam   General Appearance: Alert. No acute distress  Chest/Respiratory Exam: Clear to auscultation bilaterally  Cardiovascular Exam: Regular rate and rhythm. S1, S2, no murmur, gallop, or rubs.  Extremities:  No lower extremity edema.  Psychiatric: Normal affect and mentation    Recent Results (from the past 168 hour(s))   Apixaban    Collection Time: 02/20/23  7:09 AM   Result Value Ref Range     Apixaban  127 ng/mL   Lipid Panel    Collection Time: 02/20/23  7:09 AM   Result Value Ref Range    Cholesterol 122 <200 mg/dL    Triglycerides 57 <150 mg/dL    Direct Measure HDL 68 >=50 mg/dL    LDL Cholesterol Calculated 43 <=100 mg/dL    Non HDL Cholesterol 54 <130 mg/dL   Basic Metabolic Panel    Collection Time: 02/20/23  7:09 AM   Result Value Ref Range    Sodium 140 136 - 145 mmol/L    Potassium 4.4 3.4 - 5.3 mmol/L    Chloride 104 98 - 107 mmol/L    Carbon Dioxide (CO2) 26 22 - 29 mmol/L    Anion Gap 10 7 - 15 mmol/L    Urea Nitrogen 33.7 (H) 8.0 - 23.0 mg/dL    Creatinine 1.01 (H) 0.51 - 0.95 mg/dL    Calcium 9.3 8.2 - 9.6 mg/dL    Glucose 108 (H) 70 - 99 mg/dL    GFR Estimate 52 (L) >60 mL/min/1.73m2   TSH Reflex GH    Collection Time: 02/20/23  7:09 AM   Result Value Ref Range    TSH 11.64 (H) 0.30 - 4.20 uIU/mL   Extra Serum Separator Tube (SST)     Collection Time: 02/20/23  7:09 AM   Result Value Ref Range    Hold Specimen JIC    T4 free    Collection Time: 02/20/23  7:09 AM   Result Value Ref Range    Free T4 1.18 0.90 - 1.70 ng/dL

## 2023-03-23 ENCOUNTER — PATIENT OUTREACH (OUTPATIENT)
Dept: CARE COORDINATION | Facility: CLINIC | Age: 88
End: 2023-03-23
Payer: COMMERCIAL

## 2023-03-23 NOTE — PROGRESS NOTES
Clinic Care Coordination Contact    Follow Up Progress Note      Assessment: Patient was upset around her 89nd birthday when family wanted to come spend the night to celebrate. She and her daughter were not on the same page about potential exposure to illness as other family members. There were some hurt feelings according to daughter, Jessika. Encouraged them to work through this.     Care Gaps:    Health Maintenance Due   Topic Date Due     MICROALBUMIN  Never done     COVID-19 Vaccine (1) Never done     Pneumococcal Vaccine: 65+ Years (1 - PCV) Never done     DTAP/TDAP/TD IMMUNIZATION (1 - Tdap) Never done     ZOSTER IMMUNIZATION (1 of 2) Never done     MEDICARE ANNUAL WELLNESS VISIT  08/17/2019     INFLUENZA VACCINE (1) Never done       Intervention/Education provided during outreach: Caregiver support number for [facility]      Plan:   Patient is still doing well with daughter helping her. She has not wanted more support in home. Daughter did want caregiver support group, given [facility]  support group number.    Care Coordinator will follow up in 2 months, or sooner as needed.   Kenisha Brasher RN on 3/23/2023 at 4:26 PM

## 2023-05-22 ENCOUNTER — LAB (OUTPATIENT)
Dept: LAB | Facility: OTHER | Age: 88
End: 2023-05-22
Attending: FAMILY MEDICINE
Payer: MEDICARE

## 2023-05-22 DIAGNOSIS — I10 ESSENTIAL HYPERTENSION: ICD-10-CM

## 2023-05-22 DIAGNOSIS — E78.2 MIXED HYPERLIPIDEMIA: ICD-10-CM

## 2023-05-22 DIAGNOSIS — N18.31 STAGE 3A CHRONIC KIDNEY DISEASE (H): ICD-10-CM

## 2023-05-22 DIAGNOSIS — E03.9 ACQUIRED HYPOTHYROIDISM: ICD-10-CM

## 2023-05-22 LAB
ALBUMIN SERPL BCG-MCNC: 4.3 G/DL (ref 3.5–5.2)
ALP SERPL-CCNC: 84 U/L (ref 35–104)
ALT SERPL W P-5'-P-CCNC: 23 U/L (ref 10–35)
ANION GAP SERPL CALCULATED.3IONS-SCNC: 12 MMOL/L (ref 7–15)
AST SERPL W P-5'-P-CCNC: 23 U/L (ref 10–35)
BILIRUB SERPL-MCNC: 0.9 MG/DL
BUN SERPL-MCNC: 27.8 MG/DL (ref 8–23)
CALCIUM SERPL-MCNC: 9.5 MG/DL (ref 8.2–9.6)
CHLORIDE SERPL-SCNC: 102 MMOL/L (ref 98–107)
CHOLEST SERPL-MCNC: 129 MG/DL
CREAT SERPL-MCNC: 0.98 MG/DL (ref 0.51–0.95)
DEPRECATED HCO3 PLAS-SCNC: 26 MMOL/L (ref 22–29)
GFR SERPL CREATININE-BSD FRML MDRD: 54 ML/MIN/1.73M2
GLUCOSE SERPL-MCNC: 117 MG/DL (ref 70–99)
HDLC SERPL-MCNC: 69 MG/DL
LDLC SERPL CALC-MCNC: 49 MG/DL
NONHDLC SERPL-MCNC: 60 MG/DL
POTASSIUM SERPL-SCNC: 4.2 MMOL/L (ref 3.4–5.3)
PROT SERPL-MCNC: 7.2 G/DL (ref 6.4–8.3)
SODIUM SERPL-SCNC: 140 MMOL/L (ref 136–145)
T4 FREE SERPL-MCNC: 1.29 NG/DL (ref 0.9–1.7)
TRIGL SERPL-MCNC: 57 MG/DL
TSH SERPL DL<=0.005 MIU/L-ACNC: 10.44 UIU/ML (ref 0.3–4.2)

## 2023-05-22 PROCEDURE — 84443 ASSAY THYROID STIM HORMONE: CPT | Mod: ZL

## 2023-05-22 PROCEDURE — 80053 COMPREHEN METABOLIC PANEL: CPT | Mod: ZL

## 2023-05-22 PROCEDURE — 36415 COLL VENOUS BLD VENIPUNCTURE: CPT | Mod: ZL

## 2023-05-22 PROCEDURE — 84439 ASSAY OF FREE THYROXINE: CPT | Mod: ZL

## 2023-05-22 PROCEDURE — 80061 LIPID PANEL: CPT | Mod: ZL

## 2023-05-23 ENCOUNTER — OFFICE VISIT (OUTPATIENT)
Dept: FAMILY MEDICINE | Facility: OTHER | Age: 88
End: 2023-05-23
Attending: FAMILY MEDICINE
Payer: COMMERCIAL

## 2023-05-23 VITALS
BODY MASS INDEX: 28.47 KG/M2 | HEIGHT: 60 IN | DIASTOLIC BLOOD PRESSURE: 82 MMHG | WEIGHT: 145 LBS | RESPIRATION RATE: 15 BRPM | HEART RATE: 77 BPM | SYSTOLIC BLOOD PRESSURE: 133 MMHG | OXYGEN SATURATION: 96 %

## 2023-05-23 DIAGNOSIS — N18.31 STAGE 3A CHRONIC KIDNEY DISEASE (H): ICD-10-CM

## 2023-05-23 DIAGNOSIS — I10 ESSENTIAL HYPERTENSION: ICD-10-CM

## 2023-05-23 DIAGNOSIS — E78.2 MIXED HYPERLIPIDEMIA: ICD-10-CM

## 2023-05-23 DIAGNOSIS — E03.9 ACQUIRED HYPOTHYROIDISM: Primary | ICD-10-CM

## 2023-05-23 DIAGNOSIS — I48.0 PAROXYSMAL ATRIAL FIBRILLATION (H): ICD-10-CM

## 2023-05-23 PROCEDURE — 99214 OFFICE O/P EST MOD 30 MIN: CPT | Performed by: FAMILY MEDICINE

## 2023-05-23 PROCEDURE — G0463 HOSPITAL OUTPT CLINIC VISIT: HCPCS | Performed by: FAMILY MEDICINE

## 2023-05-23 ASSESSMENT — PAIN SCALES - GENERAL: PAINLEVEL: NO PAIN (0)

## 2023-05-23 NOTE — PATIENT INSTRUCTIONS
Blood pressure looks good here. Continue same blood pressure medication  Thyroid test is stable    Recent Results (from the past 168 hour(s))   TSH Reflex GH    Collection Time: 05/22/23  7:07 AM   Result Value Ref Range    TSH 10.44 (H) 0.30 - 4.20 uIU/mL   Comprehensive Metabolic Panel    Collection Time: 05/22/23  7:07 AM   Result Value Ref Range    Sodium 140 136 - 145 mmol/L    Potassium 4.2 3.4 - 5.3 mmol/L    Chloride 102 98 - 107 mmol/L    Carbon Dioxide (CO2) 26 22 - 29 mmol/L    Anion Gap 12 7 - 15 mmol/L    Urea Nitrogen 27.8 (H) 8.0 - 23.0 mg/dL    Creatinine 0.98 (H) 0.51 - 0.95 mg/dL    Calcium 9.5 8.2 - 9.6 mg/dL    Glucose 117 (H) 70 - 99 mg/dL    Alkaline Phosphatase 84 35 - 104 U/L    AST 23 10 - 35 U/L    ALT 23 10 - 35 U/L    Protein Total 7.2 6.4 - 8.3 g/dL    Albumin 4.3 3.5 - 5.2 g/dL    Bilirubin Total 0.9 <=1.2 mg/dL    GFR Estimate 54 (L) >60 mL/min/1.73m2   Lipid Panel    Collection Time: 05/22/23  7:07 AM   Result Value Ref Range    Cholesterol 129 <200 mg/dL    Triglycerides 57 <150 mg/dL    Direct Measure HDL 69 >=50 mg/dL    LDL Cholesterol Calculated 49 <=100 mg/dL    Non HDL Cholesterol 60 <130 mg/dL   T4 free    Collection Time: 05/22/23  7:07 AM   Result Value Ref Range    Free T4 1.29 0.90 - 1.70 ng/dL

## 2023-05-23 NOTE — PROGRESS NOTES
Assessment & Plan       ICD-10-CM    1. Acquired hypothyroidism  E03.9 TSH Reflex GH      2. Essential hypertension  I10       3. Stage 3a chronic kidney disease (H)  N18.31 Basic Metabolic Panel      4. Mixed hyperlipidemia  E78.2 Lipid Panel      5. Paroxysmal atrial fibrillation (H)  I48.0         [place] Thyroid dose was increased in the fall 2022.  TSH around 10 since ranging from 9 to 11.6. Continue same dosing. Patient and daughter desire follow up in 3 months. Lab prior to appointment.    Blood pressure higher at home, monitor seems to read high, but similar, so not accurate, but consistent.  Previously we discussed harms of more aggressive hypertensive treatment including risk for falls and elevated creatinine.  Needs blood pressure control to reduce risk for future stroke given 2 previous CVAs.  She is tolerating her current medications with a good reading today.  Continue same doses.    Creatinine stable.  Stable CKD    Lipids are stable.  Crestor dose was reduced in the fall 2022 this daughter had concerns about too low of an LDL and potential association with cerebral hemorrhage.  Continue same Crestor dose.    Return in about 3 months (around 8/23/2023). Labs prior.    Alexis York MD   [facility]   Janna is a 89 year old, presenting for the following health issues:  Follow Up         View : No data to display.              History of Present Illness       Hypothyroidism:     Since last visit, patient describes the following symptoms::  None    She eats 2-3 servings of fruits and vegetables daily.She consumes 1 sweetened beverage(s) daily.She exercises with enough effort to increase her heart rate 10 to 19 minutes per day.  She exercises with enough effort to increase her heart rate 7 days per week.   She is taking medications regularly.         Review of Systems   General: Denies general constitutional problems  Cardiovascular: Denies  problems  Respiratory: Denies problems       Objective    /82   Pulse 77   Resp 15   Ht 1.524 m (5')   Wt 65.8 kg (145 lb)   LMP 01/25/1974 (Approximate)   SpO2 96%   BMI 28.32 kg/m    Body mass index is 28.32 kg/m .  Physical Exam   General Appearance: Alert. No acute distress. Masked  Psychiatric: Normal affect and mentation      Recent Results (from the past 168 hour(s))   TSH Reflex GH    Collection Time: 05/22/23  7:07 AM   Result Value Ref Range    TSH 10.44 (H) 0.30 - 4.20 uIU/mL   Comprehensive Metabolic Panel    Collection Time: 05/22/23  7:07 AM   Result Value Ref Range    Sodium 140 136 - 145 mmol/L    Potassium 4.2 3.4 - 5.3 mmol/L    Chloride 102 98 - 107 mmol/L    Carbon Dioxide (CO2) 26 22 - 29 mmol/L    Anion Gap 12 7 - 15 mmol/L    Urea Nitrogen 27.8 (H) 8.0 - 23.0 mg/dL    Creatinine 0.98 (H) 0.51 - 0.95 mg/dL    Calcium 9.5 8.2 - 9.6 mg/dL    Glucose 117 (H) 70 - 99 mg/dL    Alkaline Phosphatase 84 35 - 104 U/L    AST 23 10 - 35 U/L    ALT 23 10 - 35 U/L    Protein Total 7.2 6.4 - 8.3 g/dL    Albumin 4.3 3.5 - 5.2 g/dL    Bilirubin Total 0.9 <=1.2 mg/dL    GFR Estimate 54 (L) >60 mL/min/1.73m2   Lipid Panel    Collection Time: 05/22/23  7:07 AM   Result Value Ref Range    Cholesterol 129 <200 mg/dL    Triglycerides 57 <150 mg/dL    Direct Measure HDL 69 >=50 mg/dL    LDL Cholesterol Calculated 49 <=100 mg/dL    Non HDL Cholesterol 60 <130 mg/dL   T4 free    Collection Time: 05/22/23  7:07 AM   Result Value Ref Range    Free T4 1.29 0.90 - 1.70 ng/dL

## 2023-05-24 ENCOUNTER — PATIENT OUTREACH (OUTPATIENT)
Dept: CARE COORDINATION | Facility: CLINIC | Age: 88
End: 2023-05-24
Payer: COMMERCIAL

## 2023-05-30 NOTE — PROGRESS NOTES
Clinic Care Coordination Contact    Situation: Patient chart reviewed by care coordinator.    Background: Patient has support with daughter next door. Has been doing well with no requests for assistance herself.     Plan/Recommendations: Will move to maintenance and check in 2 months if additional support is needed before removing from panel.  Kenisha Brasher, RN on 5/30/2023 at 4:01 PM

## 2023-07-07 ENCOUNTER — PATIENT OUTREACH (OUTPATIENT)
Dept: CARE COORDINATION | Facility: CLINIC | Age: 88
End: 2023-07-07
Payer: COMMERCIAL

## 2023-07-07 NOTE — PROGRESS NOTES
Clinic Care Coordination Contact    Situation: Patient chart reviewed by care coordinator.    Background: Patient has been stable with daughter involved and resources given previously.     Plan/Recommendations: Care coordinator has had on maintenance with no needs the last few months. Will graduate. Daughter and patient know they can call with any new concerns or questions.   Kenisha Brasher RN on 7/7/2023 at 9:36 AM

## 2023-08-28 ENCOUNTER — LAB (OUTPATIENT)
Dept: LAB | Facility: OTHER | Age: 88
End: 2023-08-28
Attending: FAMILY MEDICINE
Payer: MEDICARE

## 2023-08-28 DIAGNOSIS — E78.2 MIXED HYPERLIPIDEMIA: ICD-10-CM

## 2023-08-28 DIAGNOSIS — N18.31 STAGE 3A CHRONIC KIDNEY DISEASE (H): ICD-10-CM

## 2023-08-28 DIAGNOSIS — E03.9 ACQUIRED HYPOTHYROIDISM: ICD-10-CM

## 2023-08-28 LAB
ANION GAP SERPL CALCULATED.3IONS-SCNC: 12 MMOL/L (ref 7–15)
BUN SERPL-MCNC: 27.4 MG/DL (ref 8–23)
CALCIUM SERPL-MCNC: 9.9 MG/DL (ref 8.2–9.6)
CHLORIDE SERPL-SCNC: 103 MMOL/L (ref 98–107)
CHOLEST SERPL-MCNC: 136 MG/DL
CREAT SERPL-MCNC: 1.02 MG/DL (ref 0.51–0.95)
DEPRECATED HCO3 PLAS-SCNC: 26 MMOL/L (ref 22–29)
GFR SERPL CREATININE-BSD FRML MDRD: 51 ML/MIN/1.73M2
GLUCOSE SERPL-MCNC: 116 MG/DL (ref 70–99)
HDLC SERPL-MCNC: 69 MG/DL
HOLD SPECIMEN: NORMAL
LDLC SERPL CALC-MCNC: 55 MG/DL
NONHDLC SERPL-MCNC: 67 MG/DL
POTASSIUM SERPL-SCNC: 4.3 MMOL/L (ref 3.4–5.3)
SODIUM SERPL-SCNC: 141 MMOL/L (ref 136–145)
T4 FREE SERPL-MCNC: 1.26 NG/DL (ref 0.9–1.7)
TRIGL SERPL-MCNC: 62 MG/DL
TSH SERPL DL<=0.005 MIU/L-ACNC: 11.33 UIU/ML (ref 0.3–4.2)

## 2023-08-28 PROCEDURE — 36415 COLL VENOUS BLD VENIPUNCTURE: CPT | Mod: ZL

## 2023-08-28 PROCEDURE — 82310 ASSAY OF CALCIUM: CPT | Mod: ZL

## 2023-08-28 PROCEDURE — 80061 LIPID PANEL: CPT | Mod: ZL

## 2023-08-28 PROCEDURE — 84443 ASSAY THYROID STIM HORMONE: CPT | Mod: ZL

## 2023-08-28 PROCEDURE — 84439 ASSAY OF FREE THYROXINE: CPT | Mod: ZL

## 2023-08-29 ENCOUNTER — OFFICE VISIT (OUTPATIENT)
Dept: FAMILY MEDICINE | Facility: OTHER | Age: 88
End: 2023-08-29
Attending: FAMILY MEDICINE
Payer: COMMERCIAL

## 2023-08-29 VITALS
RESPIRATION RATE: 16 BRPM | HEART RATE: 82 BPM | TEMPERATURE: 97.8 F | DIASTOLIC BLOOD PRESSURE: 78 MMHG | HEIGHT: 60 IN | BODY MASS INDEX: 28.47 KG/M2 | SYSTOLIC BLOOD PRESSURE: 112 MMHG | WEIGHT: 145 LBS | OXYGEN SATURATION: 96 %

## 2023-08-29 DIAGNOSIS — E78.2 MIXED HYPERLIPIDEMIA: ICD-10-CM

## 2023-08-29 DIAGNOSIS — I10 ESSENTIAL HYPERTENSION: ICD-10-CM

## 2023-08-29 DIAGNOSIS — E03.9 ACQUIRED HYPOTHYROIDISM: Primary | ICD-10-CM

## 2023-08-29 DIAGNOSIS — N18.31 STAGE 3A CHRONIC KIDNEY DISEASE (H): ICD-10-CM

## 2023-08-29 PROCEDURE — G0463 HOSPITAL OUTPT CLINIC VISIT: HCPCS

## 2023-08-29 PROCEDURE — 99214 OFFICE O/P EST MOD 30 MIN: CPT | Performed by: FAMILY MEDICINE

## 2023-08-29 ASSESSMENT — PAIN SCALES - GENERAL: PAINLEVEL: NO PAIN (0)

## 2023-08-29 NOTE — NURSING NOTE
Patient presents to the clinic for follow up    FOOD SECURITY SCREENING QUESTIONS:    The next two questions are to help us understand your food security.  If you are feeling you need any assistance in this area, we have resources available to support you today.    Hunger Vital Signs:  Within the past 12 months we worried whether our food would run out before we got money to buy more. Never  Within the past 12 months the food we bought just didn't last and we didn't have money to get more. Never    Advance Care Directive on file? Yes  Advance Care Directive provided to patient? N/A     Chief Complaint   Patient presents with    Follow Up       Initial /78 (BP Location: Right arm, Patient Position: Sitting, Cuff Size: Adult Regular)   Pulse 82   Temp 97.8  F (36.6  C) (Temporal)   Resp 16   Ht 1.524 m (5')   Wt 65.8 kg (145 lb)   LMP 01/25/1974 (Approximate)   SpO2 96%   BMI 28.32 kg/m   Estimated body mass index is 28.32 kg/m  as calculated from the following:    Height as of this encounter: 1.524 m (5').    Weight as of this encounter: 65.8 kg (145 lb).  Medication Reconciliation: complete        Charisse Woods LPN on 8/29/2023 at 2:49 PM

## 2023-08-29 NOTE — PATIENT INSTRUCTIONS
Labs are stable  Follow-up in 4 months    Recent Results (from the past 48 hour(s))   Basic Metabolic Panel    Collection Time: 08/28/23  7:03 AM   Result Value Ref Range    Sodium 141 136 - 145 mmol/L    Potassium 4.3 3.4 - 5.3 mmol/L    Chloride 103 98 - 107 mmol/L    Carbon Dioxide (CO2) 26 22 - 29 mmol/L    Anion Gap 12 7 - 15 mmol/L    Urea Nitrogen 27.4 (H) 8.0 - 23.0 mg/dL    Creatinine 1.02 (H) 0.51 - 0.95 mg/dL    Calcium 9.9 (H) 8.2 - 9.6 mg/dL    Glucose 116 (H) 70 - 99 mg/dL    GFR Estimate 51 (L) >60 mL/min/1.73m2   Lipid Panel    Collection Time: 08/28/23  7:03 AM   Result Value Ref Range    Cholesterol 136 <200 mg/dL    Triglycerides 62 <150 mg/dL    Direct Measure HDL 69 >=50 mg/dL    LDL Cholesterol Calculated 55 <=100 mg/dL    Non HDL Cholesterol 67 <130 mg/dL   TSH Reflex GH    Collection Time: 08/28/23  7:03 AM   Result Value Ref Range    TSH 11.33 (H) 0.30 - 4.20 uIU/mL   T4 free    Collection Time: 08/28/23  7:03 AM   Result Value Ref Range    Free T4 1.26 0.90 - 1.70 ng/dL   Extra Serum Separator Tube (SST)    Collection Time: 08/28/23  7:08 AM   Result Value Ref Range    Hold Specimen JI

## 2023-08-29 NOTE — PROGRESS NOTES
Assessment & Plan       ICD-10-CM    1. Acquired hypothyroidism  E03.9 TSH Reflex GH      2. Essential hypertension  I10 Basic Metabolic Panel      3. Stage 3a chronic kidney disease (H)  N18.31 Basic Metabolic Panel      4. Mixed hyperlipidemia  E78.2 Lipid Panel        [place] Thyroid dose was increased in the fall 2022.  TSH around 10 since - ranging from 9 to 11.6.  Appropriate TSH for age 89. No changes.     History of CVA x 2. Blood pressure looks good. Readings at home are often over 130/80. Due to harms of more aggressive hypertensive treatment, including risk for falls and elevated creatinine, elected to aim for blood pressure under 140/90.     Creatinine stable.  Stable CKD     Lipids are stable.  Crestor dose was reduced in the fall 2022 this daughter had concerns about too low of an LDL and potential association with cerebral hemorrhage.  Continue same Crestor dose.    Seeing Dr Garrett of neurology in 2 days.  Discussed with patient that I am leaving clinic practice and will need to find a new PCP. Patient and daughter prefer to follow up on above issues every 3-4 months. Ordered labs prior to next appointment with provider of their choice.    Return in about 4 months (around 12/29/2023).    Alexis York MD  [facility]   Janna is a 89 year old, presenting for the following health issues:  Follow Up      8/29/2023     2:41 PM   Additional Questions   Roomed by Charisse Z   Accompanied by Daughter       History of Present Illness       Hypertension: She presents for follow up of hypertension.  She does check blood pressure  regularly outside of the clinic. Outside blood pressures have been over 140/90. She does not follow a low salt diet.     Hypothyroidism:     Since last visit, patient describes the following symptoms::  None    Vascular Disease:  She presents for follow up of vascular disease.     She never takes nitroglycerin. She is not taking daily  aspirin.    She eats 4 or more servings of fruits and vegetables daily.She consumes 0 sweetened beverage(s) daily.She exercises with enough effort to increase her heart rate 10 to 19 minutes per day.  She exercises with enough effort to increase her heart rate 7 days per week.   She is taking medications regularly.       Atrial fibrillation follow up      Review of Systems   General: Denies general constitutional problems  Cardiovascular: Denies problems  Respiratory: Denies problems       Objective    /78 (BP Location: Right arm, Patient Position: Sitting, Cuff Size: Adult Regular)   Pulse 82   Temp 97.8  F (36.6  C) (Temporal)   Resp 16   Ht 1.524 m (5')   Wt 65.8 kg (145 lb)   LMP 01/25/1974 (Approximate)   SpO2 96%   BMI 28.32 kg/m    Body mass index is 28.32 kg/m .  Physical Exam   General Appearance: Alert. No acute distress. Wearing N95.  Chest/Respiratory Exam: Clear to auscultation bilaterally  Cardiovascular Exam: Regular rate and rhythm. S1, S2, no murmur, gallop, or rubs.  Psychiatric: Normal affect and mentation      Recent Results (from the past 168 hour(s))   Basic Metabolic Panel    Collection Time: 08/28/23  7:03 AM   Result Value Ref Range    Sodium 141 136 - 145 mmol/L    Potassium 4.3 3.4 - 5.3 mmol/L    Chloride 103 98 - 107 mmol/L    Carbon Dioxide (CO2) 26 22 - 29 mmol/L    Anion Gap 12 7 - 15 mmol/L    Urea Nitrogen 27.4 (H) 8.0 - 23.0 mg/dL    Creatinine 1.02 (H) 0.51 - 0.95 mg/dL    Calcium 9.9 (H) 8.2 - 9.6 mg/dL    Glucose 116 (H) 70 - 99 mg/dL    GFR Estimate 51 (L) >60 mL/min/1.73m2   Lipid Panel    Collection Time: 08/28/23  7:03 AM   Result Value Ref Range    Cholesterol 136 <200 mg/dL    Triglycerides 62 <150 mg/dL    Direct Measure HDL 69 >=50 mg/dL    LDL Cholesterol Calculated 55 <=100 mg/dL    Non HDL Cholesterol 67 <130 mg/dL   TSH Reflex GH    Collection Time: 08/28/23  7:03 AM   Result Value Ref Range    TSH 11.33 (H) 0.30 - 4.20 uIU/mL   T4 free    Collection  Time: 08/28/23  7:03 AM   Result Value Ref Range    Free T4 1.26 0.90 - 1.70 ng/dL   Extra Serum Separator Tube (SST)    Collection Time: 08/28/23  7:08 AM   Result Value Ref Range    Hold Specimen JI

## 2023-09-01 ENCOUNTER — OFFICE VISIT (OUTPATIENT)
Dept: NEUROLOGY | Facility: OTHER | Age: 88
End: 2023-09-01
Attending: PSYCHIATRY & NEUROLOGY
Payer: COMMERCIAL

## 2023-09-01 VITALS
SYSTOLIC BLOOD PRESSURE: 144 MMHG | TEMPERATURE: 97.1 F | BODY MASS INDEX: 28.47 KG/M2 | WEIGHT: 145 LBS | RESPIRATION RATE: 16 BRPM | DIASTOLIC BLOOD PRESSURE: 86 MMHG | OXYGEN SATURATION: 98 % | HEART RATE: 71 BPM | HEIGHT: 60 IN

## 2023-09-01 DIAGNOSIS — Z86.73 HISTORY OF STROKE: Primary | ICD-10-CM

## 2023-09-01 DIAGNOSIS — I48.91 ATRIAL FIBRILLATION, UNSPECIFIED TYPE (H): ICD-10-CM

## 2023-09-01 PROCEDURE — G0463 HOSPITAL OUTPT CLINIC VISIT: HCPCS

## 2023-09-01 PROCEDURE — 99213 OFFICE O/P EST LOW 20 MIN: CPT | Performed by: PSYCHIATRY & NEUROLOGY

## 2023-09-01 ASSESSMENT — PAIN SCALES - GENERAL: PAINLEVEL: NO PAIN (0)

## 2023-09-01 NOTE — LETTER
9/1/2023         RE: Janna Mcdermott  [address] [facility] [address]        Dear Colleague,    Thank you for referring your patient, Janna Mcdermott, to the [facility]. Please see a copy of my visit note below.    Outpatient Neurology Visit  9/1/2023    Subjective:  Janna Mcdermott is a 89 year old female who presents today for follow up evaluation of stroke      Brief history of symptoms: The patient was initially seen in neurologic consultation on 2021 for evaluation of same. Please see the comprehensive neurologic consultation note from that date in the Epic records for details.     Interval history: She states she has been feeling  good.   No emergency room visits noted.  BPs are typically 130s/140s systolic.   She is still on eliquis and rosuvastatin.         Physical Exam:  Vitals: BP (!) 144/86 (BP Location: Right arm, Patient Position: Sitting, Cuff Size: Adult Regular)   Pulse 71   Temp 97.1  F (36.2  C) (Tympanic)   Resp 16   Ht 1.524 m (5')   Wt 65.8 kg (145 lb)   LMP 01/25/1974 (Approximate)   SpO2 98%   BMI 28.32 kg/m     General: Seated comfortably in no acute distress.  HEENT: Neck supple with normal range of motion. No paracervical muscle tenderness or tightness.    Cardiovascular: Irregular rhythm but normal rate.  No murmurs heard  Pulmonary: Respirations unlabored. Lungs CTAB.  Neurologic:  Awake, alert, did, language is fluent.  Gaze is conjugate, no pronator drift.  Moving all extremities equally and antigravity.    Pertinent Investigations:  LDL is 55    BMP with GFR 51  T4 normal    Assessment:  #Acute ischemic stroke  #Vertebrobasilar stenosis  #Atrial fibrillation    Ms. Luke is a 89-year-old female with recurrent stroke.  Had a right PICA stroke in May 2021 and subsequently second stroke in November 22 that was felt to be most likely small vessel related.  She has made complete recovery.  Her risk factors are reasonably well  controlled.  She is taking apixaban and Crestor as appropriate.  While she is close to meeting criteria for reduced dose apixaban she does not meet either the creatinine or weight-based criteria.  This should continue to be monitored.  They continue to request regular follow-up so we will have follow-up in approximately 8 to 9 months.      Plan:  -Continue Eliquis 5 mg twice daily  -Continue Crestor 5 mg    Follow up in Neurology clinic in 8-9 months  or earlier as needed if symptoms persist or should new symptoms or concerns arise.        25 min spent on the date of the encounter in 4 min chart review, 17 minpatient visit, review of tests, 4 min documentation and/or discussion with other providers about the issues documented above.       Maritza Garrett DO   of Neurology    Dragon disclaimer: This documentation was completed with the aid of dictation software.  Please note that there may be some inconsistencies due to software errors.  Errors are corrected in real time, however if there is a remaining error, please do not hesitate to reach out for clarification.          Again, thank you for allowing me to participate in the care of your patient.        Sincerely,        Maritza Garrett MD

## 2023-09-01 NOTE — PROGRESS NOTES
Outpatient Neurology Visit  9/1/2023    Subjective:  Janna Mcdermott is a 89 year old female who presents today for follow up evaluation of stroke      Brief history of symptoms: The patient was initially seen in neurologic consultation on 2021 for evaluation of same. Please see the comprehensive neurologic consultation note from that date in the Epic records for details.     Interval history: She states she has been feeling  good.   No emergency room visits noted.  BPs are typically 130s/140s systolic.   She is still on eliquis and rosuvastatin.         Physical Exam:  Vitals: BP (!) 144/86 (BP Location: Right arm, Patient Position: Sitting, Cuff Size: Adult Regular)   Pulse 71   Temp 97.1  F (36.2  C) (Tympanic)   Resp 16   Ht 1.524 m (5')   Wt 65.8 kg (145 lb)   LMP 01/25/1974 (Approximate)   SpO2 98%   BMI 28.32 kg/m     General: Seated comfortably in no acute distress.  HEENT: Neck supple with normal range of motion. No paracervical muscle tenderness or tightness.    Cardiovascular: Irregular rhythm but normal rate.  No murmurs heard  Pulmonary: Respirations unlabored. Lungs CTAB.  Neurologic:  Awake, alert, did, language is fluent.  Gaze is conjugate, no pronator drift.  Moving all extremities equally and antigravity.    Pertinent Investigations:  LDL is 55    BMP with GFR 51  T4 normal    Assessment:  #Acute ischemic stroke  #Vertebrobasilar stenosis  #Atrial fibrillation    Ms. Luke is a 89-year-old female with recurrent stroke.  Had a right PICA stroke in May 2021 and subsequently second stroke in November 22 that was felt to be most likely small vessel related.  She has made complete recovery.  Her risk factors are reasonably well controlled.  She is taking apixaban and Crestor as appropriate.  While she is close to meeting criteria for reduced dose apixaban she does not meet either the creatinine or weight-based criteria.  This should continue to be monitored.  They continue to request  regular follow-up so we will have follow-up in approximately 8 to 9 months.      Plan:  -Continue Eliquis 5 mg twice daily  -Continue Crestor 5 mg    Follow up in Neurology clinic in 8-9 months  or earlier as needed if symptoms persist or should new symptoms or concerns arise.        25 min spent on the date of the encounter in 4 min chart review, 17 minpatient visit, review of tests, 4 min documentation and/or discussion with other providers about the issues documented above.       Maritza Garrett DO   of Neurology    Dragon disclaimer: This documentation was completed with the aid of dictation software.  Please note that there may be some inconsistencies due to software errors.  Errors are corrected in real time, however if there is a remaining error, please do not hesitate to reach out for clarification.

## 2023-09-01 NOTE — NURSING NOTE
Patient presents to clinic with her daughter Jessica for follow up with previous CVA and Arterial Stenosis.  Medication Reconciliation: Complete    Kenisha Cutler LPN  9/1/2023 10:31 AM

## 2023-09-01 NOTE — PATIENT INSTRUCTIONS
Reason for today's visit: stroke    Your diagnosis: stroke    Tests that you will need: none today    Treatment plan:   Continue eliquis and crestor      Your test results are reviewed several times a day.  Once they are all in, you will be sent a letter with your results and/or if you are signed up for on-line services, you will be e-mailed the results.  If there are serious findings, you typically will be called.    If you have any questions about your visit, your symptoms, your medication, your test results or it is not clear what your diagnosis or treatment plan is please contact our office at [phone] for general neurology    If you need follow-up in the future, please call [phone] for an appointment.      Maritza Garrett DO  Neurology

## 2023-12-04 ENCOUNTER — OFFICE VISIT (OUTPATIENT)
Dept: INTERNAL MEDICINE | Facility: OTHER | Age: 88
End: 2023-12-04
Attending: STUDENT IN AN ORGANIZED HEALTH CARE EDUCATION/TRAINING PROGRAM
Payer: COMMERCIAL

## 2023-12-04 VITALS
HEIGHT: 60 IN | RESPIRATION RATE: 18 BRPM | DIASTOLIC BLOOD PRESSURE: 78 MMHG | SYSTOLIC BLOOD PRESSURE: 142 MMHG | OXYGEN SATURATION: 97 % | TEMPERATURE: 98.5 F | WEIGHT: 145 LBS | HEART RATE: 77 BPM | BODY MASS INDEX: 28.47 KG/M2

## 2023-12-04 DIAGNOSIS — I10 ESSENTIAL HYPERTENSION: Chronic | ICD-10-CM

## 2023-12-04 DIAGNOSIS — I48.0 PAROXYSMAL ATRIAL FIBRILLATION (H): ICD-10-CM

## 2023-12-04 DIAGNOSIS — E03.9 ACQUIRED HYPOTHYROIDISM: Primary | Chronic | ICD-10-CM

## 2023-12-04 DIAGNOSIS — I63.22 CEREBROVASCULAR ACCIDENT (CVA) DUE TO STENOSIS OF BASILAR ARTERY (H): ICD-10-CM

## 2023-12-04 LAB
ANION GAP SERPL CALCULATED.3IONS-SCNC: 10 MMOL/L (ref 7–15)
BUN SERPL-MCNC: 32.9 MG/DL (ref 8–23)
CALCIUM SERPL-MCNC: 9.6 MG/DL (ref 8.2–9.6)
CHLORIDE SERPL-SCNC: 104 MMOL/L (ref 98–107)
CREAT SERPL-MCNC: 1.19 MG/DL (ref 0.51–0.95)
DEPRECATED HCO3 PLAS-SCNC: 26 MMOL/L (ref 22–29)
EGFRCR SERPLBLD CKD-EPI 2021: 43 ML/MIN/1.73M2
GLUCOSE SERPL-MCNC: 127 MG/DL (ref 70–99)
POTASSIUM SERPL-SCNC: 4.6 MMOL/L (ref 3.4–5.3)
SODIUM SERPL-SCNC: 140 MMOL/L (ref 135–145)
T4 FREE SERPL-MCNC: 1.2 NG/DL (ref 0.9–1.7)
TSH SERPL DL<=0.005 MIU/L-ACNC: 8.18 UIU/ML (ref 0.3–4.2)

## 2023-12-04 PROCEDURE — 36415 COLL VENOUS BLD VENIPUNCTURE: CPT | Mod: ZL | Performed by: STUDENT IN AN ORGANIZED HEALTH CARE EDUCATION/TRAINING PROGRAM

## 2023-12-04 PROCEDURE — 84439 ASSAY OF FREE THYROXINE: CPT | Mod: ZL | Performed by: STUDENT IN AN ORGANIZED HEALTH CARE EDUCATION/TRAINING PROGRAM

## 2023-12-04 PROCEDURE — 80048 BASIC METABOLIC PNL TOTAL CA: CPT | Mod: ZL | Performed by: STUDENT IN AN ORGANIZED HEALTH CARE EDUCATION/TRAINING PROGRAM

## 2023-12-04 PROCEDURE — 99214 OFFICE O/P EST MOD 30 MIN: CPT | Performed by: STUDENT IN AN ORGANIZED HEALTH CARE EDUCATION/TRAINING PROGRAM

## 2023-12-04 PROCEDURE — G0463 HOSPITAL OUTPT CLINIC VISIT: HCPCS

## 2023-12-04 PROCEDURE — 84443 ASSAY THYROID STIM HORMONE: CPT | Mod: ZL | Performed by: STUDENT IN AN ORGANIZED HEALTH CARE EDUCATION/TRAINING PROGRAM

## 2023-12-04 PROCEDURE — 84480 ASSAY TRIIODOTHYRONINE (T3): CPT | Mod: ZL | Performed by: STUDENT IN AN ORGANIZED HEALTH CARE EDUCATION/TRAINING PROGRAM

## 2023-12-04 RX ORDER — DILTIAZEM HYDROCHLORIDE 120 MG/1
120 CAPSULE, COATED, EXTENDED RELEASE ORAL DAILY
Qty: 90 CAPSULE | Refills: 4 | Status: SHIPPED | OUTPATIENT
Start: 2023-12-04

## 2023-12-04 RX ORDER — ROSUVASTATIN CALCIUM 5 MG/1
5 TABLET, COATED ORAL DAILY
Qty: 90 TABLET | Refills: 4 | Status: SHIPPED | OUTPATIENT
Start: 2023-12-04 | End: 2024-05-04

## 2023-12-04 RX ORDER — THYROID,PORK 90 MG
90 TABLET ORAL DAILY
Qty: 90 TABLET | Refills: 4 | Status: SHIPPED | OUTPATIENT
Start: 2023-12-04 | End: 2024-09-03

## 2023-12-04 RX ORDER — LOSARTAN POTASSIUM 25 MG/1
25 TABLET ORAL DAILY
Qty: 90 TABLET | Refills: 4 | Status: SHIPPED | OUTPATIENT
Start: 2023-12-04

## 2023-12-04 ASSESSMENT — PAIN SCALES - GENERAL: PAINLEVEL: NO PAIN (0)

## 2023-12-04 NOTE — PROGRESS NOTES
Assessment & Plan         ICD-10-CM    1. Acquired hypothyroidism  E03.9 TSH     T4, Free     T3, Total     ARMOUR THYROID 90 MG tablet     TSH     T4, Free     T3, Total      2. Paroxysmal atrial fibrillation (H)  I48.0 apixaban ANTICOAGULANT (ELIQUIS ANTICOAGULANT) 5 MG tablet     diltiazem ER COATED BEADS (CARDIZEM CD/CARTIA XT) 120 MG 24 hr capsule      3. Essential hypertension  I10 diltiazem ER COATED BEADS (CARDIZEM CD/CARTIA XT) 120 MG 24 hr capsule     losartan (COZAAR) 25 MG tablet     Basic Metabolic Panel     Basic Metabolic Panel      4. 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)  I63.22 rosuvastatin (CRESTOR) 5 MG tablet        Hypothyroidism: Recheck TSH, T3 and T4.  My official recommendation would be to have her T4 on the higher end of normal given that her TSH is quite elevated and her body is stating that it is deplete of thyroid hormone.  I also recommend that she change her [place] Thyroid to levothyroxine which she adamantly declines.    Paroxysmal atrial fibrillation: Patient is greater than 80 but her weight is greater than 60 kg so continue apixaban 5 mg twice daily.  We will need to monitor her weight and if less than 60 kg could decrease to 2.5 mg twice daily.  Refill diltiazem.    Hypertension: Blood pressure elevated today.  Blood pressures at home in the 130s to 150s.  Declines management of her antihypertensive therapy.    Hyperlipidemia: Recommend high intensity statin with rosuvastatin 20 mg and 40 mg but patient declines due to fear of 2 level of cholesterol.  Discussed pathophysiology of      Ultimately patient has not taken any of my medical advice and would like to continue supplements and medications that I do not regularly prescribe.  I recommend she establish with family medicine for ongoing medical management.    Patient declined all vaccinations today.        32 minutes spent by me on the date of the encounter doing chart review, history and exam,  documentation and further activities per the note        No follow-ups on file.    Luis Malone MD  [facility]      Ramila Saldivar is a 89 year old, presenting for the following health issues:  Follow Up (Thyroid Check )      12/4/2023     2:46 PM   Additional Questions   Roomed by Maggie Rivas LPN       History of Present Illness       Hypertension: She presents for follow up of hypertension.  She does check blood pressure  regularly outside of the clinic. Outpatient blood pressures have not been over 140/90. She does not follow a low salt diet.     Hypothyroidism:     Since last visit, patient describes the following symptoms::  None    Vascular Disease:  She presents for follow up of vascular disease.     She never takes nitroglycerin. She is not taking daily aspirin.    She eats 4 or more servings of fruits and vegetables daily.She consumes 0 sweetened beverage(s) daily.She exercises with enough effort to increase her heart rate 10 to 19 minutes per day.  She exercises with enough effort to increase her heart rate 7 days per week.   She is taking medications regularly.     Blood pressures 130s vs 140s.     Patient is on [place] Thyroid as she reports she did not tolerate levothyroxine in the past.  She also wants her TSH to be elevated.  She reports no symptoms of hypothyroidism.    She also needs a refill of all of her medications.    Reviewed and updated her medication list.  She is on a large list of supplements all of which are provided by a chiropractor.  She requested that I sign a form stating that all of her supplements are medically necessary.  We discussed that I do not know what majority of these medications/supplements are or other active ingredients.  I did sign a form stating that they are prescribed by her chiropractor.  Do not think all of the supplements are medically necessary at this time.        Review of Systems         Objective    BP (!) 146/88   Pulse 77    Temp 98.5  F (36.9  C) (Temporal)   Resp 18   Ht 1.524 m (5')   Wt 65.8 kg (145 lb)   LMP 01/25/1974 (Approximate)   SpO2 97%   Breastfeeding No   BMI 28.32 kg/m    Body mass index is 28.32 kg/m .  Physical Exam   General: 89-year-old woman accompanied by her daughter no acute distress    Reviewed former PCP notes and laboratory results

## 2023-12-04 NOTE — NURSING NOTE
Chief Complaint   Patient presents with    Follow Up     Thyroid Check        Initial BP (!) 146/88   Pulse 77   Temp 98.5  F (36.9  C) (Temporal)   Resp 18   Ht 1.524 m (5')   Wt 65.8 kg (145 lb)   LMP 01/25/1974 (Approximate)   SpO2 97%   Breastfeeding No   BMI 28.32 kg/m   Estimated body mass index is 28.32 kg/m  as calculated from the following:    Height as of this encounter: 1.524 m (5').    Weight as of this encounter: 65.8 kg (145 lb).  Medication Review: complete    The next two questions are to help us understand your food security.  If you are feeling you need any assistance in this area, we have resources available to support you today.          12/4/2023   SDOH- Food Insecurity   Within the past 12 months, did you worry that your food would run out before you got money to buy more? N   Within the past 12 months, did the food you bought just not last and you didn t have money to get more? N         Health Care Directive:  Patient has a Health Care Directive on file      Hanna Rivas LPN

## 2023-12-05 LAB — T3 SERPL-MCNC: 130 NG/DL (ref 85–202)

## 2024-01-29 ENCOUNTER — OFFICE VISIT (OUTPATIENT)
Dept: INTERNAL MEDICINE | Facility: OTHER | Age: 89
End: 2024-01-29
Attending: NURSE PRACTITIONER
Payer: COMMERCIAL

## 2024-01-29 VITALS
HEART RATE: 87 BPM | OXYGEN SATURATION: 96 % | RESPIRATION RATE: 16 BRPM | SYSTOLIC BLOOD PRESSURE: 128 MMHG | HEIGHT: 60 IN | BODY MASS INDEX: 28.27 KG/M2 | WEIGHT: 144 LBS | TEMPERATURE: 97.2 F | DIASTOLIC BLOOD PRESSURE: 82 MMHG

## 2024-01-29 DIAGNOSIS — K55.1 MESENTERIC ARTERY STENOSIS (H): ICD-10-CM

## 2024-01-29 DIAGNOSIS — L98.499 ULCER OF EXTERNAL EAR, UNSPECIFIED ULCER STAGE (H): ICD-10-CM

## 2024-01-29 DIAGNOSIS — I10 ESSENTIAL HYPERTENSION: Chronic | ICD-10-CM

## 2024-01-29 DIAGNOSIS — I48.11 LONGSTANDING PERSISTENT ATRIAL FIBRILLATION (H): Primary | ICD-10-CM

## 2024-01-29 DIAGNOSIS — E03.9 ACQUIRED HYPOTHYROIDISM: Chronic | ICD-10-CM

## 2024-01-29 DIAGNOSIS — E78.2 MIXED HYPERLIPIDEMIA: Chronic | ICD-10-CM

## 2024-01-29 DIAGNOSIS — N18.31 STAGE 3A CHRONIC KIDNEY DISEASE (H): ICD-10-CM

## 2024-01-29 PROCEDURE — 99214 OFFICE O/P EST MOD 30 MIN: CPT | Performed by: NURSE PRACTITIONER

## 2024-01-29 PROCEDURE — G0463 HOSPITAL OUTPT CLINIC VISIT: HCPCS

## 2024-01-29 ASSESSMENT — PAIN SCALES - GENERAL: PAINLEVEL: NO PAIN (0)

## 2024-01-29 NOTE — PROGRESS NOTES
ICD-10-CM    1. Longstanding persistent atrial fibrillation (H)  I48.11       2. Essential hypertension  I10 CBC with platelets     Basic metabolic panel      3. Mesenteric artery stenosis (H24)  K55.1       4. Mixed hyperlipidemia  E78.2 Lipid Profile      5. Stage 3a chronic kidney disease (H)  N18.31       6. Ulcer of external ear, unspecified ulcer stage (H)  L98.499       7. Acquired hypothyroidism  E03.9 TSH with free T4 reflex         Plan:  Continue with all medications as currently prescribed.  Continue with lab work every 3-4 months and lipids every 6 months due to recent adjustments.  She declined influenza vaccine.  Leg exercises are beneficial for improving circulation in lower extremities including venous stasis.  Letter written for patient to give to [facility] for approval of the exercise machine.  Follow-up in March for Medicare wellness visit.    Ramila Saldivar is a 89 year old, presenting for the following health issues:  Medication Request and Establish Care        1/29/2024    10:25 AM   Additional Questions   Roomed by Rhona PARMAR     She is here today for establish care visit and chronic disease management.  She was seen in December 2023 and had lab work completed and medication refills processed.  She continues on [place] Thyroid with recent TSH mildly elevated and normal free T4.  Dose was not adjusted.  She has been titrated down on Crestor due to loose stools.  This has improved.  Also has stage III chronic kidney disease and has been getting lab work about every 3-4 months.  She has atrial fibrillation and is chronically anticoagulated with no bleeding issues currently.  Also has history of mesenteric artery stenosis, previous CVA and prediabetes.  She reports chronic edema of lower legs.  Leg elevation above heart does help with some of the swelling but does not completely resolve the edema.  It is not accompanied by shortness of breath or cough.  She would like to use a leg exercise  machine which stimulate circulation.  This machine is called the LegXercise.    History of Present Illness       Hypertension: She presents for follow up of hypertension.  She does check blood pressure  regularly outside of the clinic. Outside blood pressures have been over 140/90. She follows a low salt diet.     Hypothyroidism:     Since last visit, patient describes the following symptoms::  None    Vascular Disease:  She presents for follow up of vascular disease.     She never takes nitroglycerin. She is not taking daily aspirin.    She eats 4 or more servings of fruits and vegetables daily.She consumes 0 sweetened beverage(s) daily.She exercises with enough effort to increase her heart rate 10 to 19 minutes per day.  She exercises with enough effort to increase her heart rate 7 days per week.   She is taking medications regularly.               Review of systems:  Denies chest pain, chest pressure, shortness of breath, rectal bleeding, tarry stools, dark stools  See HPI for positive findings      Objective    /82   Pulse 87   Temp 97.2  F (36.2  C) (Tympanic)   Resp 16   Ht 1.524 m (5')   Wt 65.3 kg (144 lb)   LMP 01/25/1974 (Approximate)   SpO2 96%   BMI 28.12 kg/m    Body mass index is 28.12 kg/m .  Physical Exam   Pleasant female accompanied by her daughter.  No acute distress.  Alert and pleasant.  Sclera nonicteric.  Skin color pink.  Currently does not have skin lesion on right ear.  Neck supple without adenopathy.  No thyromegaly.  Lung fields clear to auscultation throughout.  Cardiovascular irregularly irregular with controlled rate.  Extremities with 1-2+ edema and many varicose veins.  DP PT palpable.  She is wearing Tubigrip stockinette's for edema management.    Labs in [facility] reviewed            Signed Electronically by: Vidhi Narayanan NP

## 2024-01-29 NOTE — LETTER
January 29, 2024      Jnana Mcdermott  [address] [facility] [address]        To Whom It May Concern,        She has significant lower extremity edema from venous stasis and varicose veins.  It is recommended that she use the LegXercise machine twice daily to improve circulation.          Sincerely,        Vidhi Narayanan, NP

## 2024-01-29 NOTE — NURSING NOTE
Chief Complaint   Patient presents with    Medication Request    Establish Care       FOOD SECURITY SCREENING QUESTIONS  Hunger Vital Signs:  Within the past 12 months we worried whether our food would run out before we got money to buy more. Never  Within the past 12 months the food we bought just didn't last and we didn't have money to get more. Never  Rhona De La Garza LPN 1/29/2024 10:28 AM      Initial BP (!) 152/90 (BP Location: Right arm, Patient Position: Sitting, Cuff Size: Adult Regular)   Pulse 87   Temp 97.2  F (36.2  C) (Tympanic)   Resp 16   Ht 1.524 m (5')   Wt 65.3 kg (144 lb)   LMP 01/25/1974 (Approximate)   SpO2 96%   BMI 28.12 kg/m   Estimated body mass index is 28.12 kg/m  as calculated from the following:    Height as of this encounter: 1.524 m (5').    Weight as of this encounter: 65.3 kg (144 lb).  Medication Reconciliation: complete    Rhona De La Garza LPN

## 2024-02-02 ENCOUNTER — OFFICE VISIT (OUTPATIENT)
Dept: NEUROLOGY | Facility: OTHER | Age: 89
End: 2024-02-02
Attending: PSYCHIATRY & NEUROLOGY
Payer: COMMERCIAL

## 2024-02-02 VITALS
BODY MASS INDEX: 28.12 KG/M2 | OXYGEN SATURATION: 92 % | DIASTOLIC BLOOD PRESSURE: 78 MMHG | WEIGHT: 144 LBS | HEART RATE: 84 BPM | SYSTOLIC BLOOD PRESSURE: 128 MMHG | RESPIRATION RATE: 18 BRPM

## 2024-02-02 DIAGNOSIS — Z86.73 HISTORY OF STROKE: Primary | ICD-10-CM

## 2024-02-02 DIAGNOSIS — I48.19 PERSISTENT ATRIAL FIBRILLATION (H): ICD-10-CM

## 2024-02-02 PROCEDURE — 99213 OFFICE O/P EST LOW 20 MIN: CPT | Performed by: PSYCHIATRY & NEUROLOGY

## 2024-02-02 PROCEDURE — G0463 HOSPITAL OUTPT CLINIC VISIT: HCPCS | Performed by: PSYCHIATRY & NEUROLOGY

## 2024-02-02 ASSESSMENT — PAIN SCALES - GENERAL: PAINLEVEL: NO PAIN (0)

## 2024-02-02 NOTE — LETTER
2/2/2024         RE: Janna Mcdermott  [address] [facility] [address]        Dear Colleague,    Thank you for referring your patient, Janna Mcdermott, to the [facility]. Please see a copy of my visit note below.    Outpatient Neurology Visit  2/2/24    Subjective:  Janna Mcdermott is a 89 year old female who presents today for follow up evaluation of stroke      Brief history of symptoms: The patient was initially seen in neurologic consultation on 2021 for evaluation of same. Please see the comprehensive neurologic consultation note from that date in the Epic records for details.     Interval history:  They are interested in some pedal machines for her legs to prevent blood clots.    Denies any falls or strokelike events.    Physical Exam:  Vitals: /78   Pulse 84   Resp 18   Wt 65.3 kg (144 lb)   LMP 01/25/1974 (Approximate)   SpO2 92%   Breastfeeding No   BMI 28.12 kg/m     General: Seated comfortably in no acute distress.  HEENT: no carotid bruit  Cardiovascular: Irregular rhythm but normal rate.  No murmurs heard  Pulmonary: Respirations unlabored. Lungs CTAB.  Neurologic:  Awake, alert, did, language is fluent.  Gaze is conjugate, no pronator drift.  Moving all extremities equally and antigravity.  Able to get up and ambulate without assistance.    Pertinent Investigations:  LDL is 55    BMP with GFR 51  T4 normal    Assessment:  #Acute ischemic stroke  #Vertebrobasilar stenosis  #Atrial fibrillation    Ms. Luke is a 89-year-old female with recurrent stroke.  Had a right PICA stroke in May 2021 and subsequently second stroke in November 22 that was felt to be most likely small vessel related.  She has made complete recovery.  Her risk factors are reasonably well controlled.  She is taking apixaban and Crestor as appropriate.  While she is close to meeting criteria for reduced dose apixaban she does not meet either the creatinine or weight-based  criteria at present.  This should continue to be monitored.  I did express that continued neurologic follow-up is at their discretion, but they continue to request regular follow-up so we will have follow-up in approximately 9 months      Plan:  -Continue Eliquis 5 mg twice daily  -Continue Crestor 5 mg    Follow up in Neurology clinic in 9 months or earlier as needed if symptoms persist or should new symptoms or concerns arise.        24 min spent on the date of the encounter in 3 min chart review, 17 min patient visit, review of tests, 4 min documentation and/or discussion with other providers about the issues documented above.       Maritza Garrett DO   of Neurology    Dragon disclaimer: This documentation was completed with the aid of dictation software.  Please note that there may be some inconsistencies due to software errors.  Errors are corrected in real time, however if there is a remaining error, please do not hesitate to reach out for clarification.          Again, thank you for allowing me to participate in the care of your patient.        Sincerely,        Maritza Garrett MD

## 2024-02-02 NOTE — NURSING NOTE
Chief Complaint   Patient presents with    Follow Up       Initial /78   Pulse 84   Resp 18   Wt 65.3 kg (144 lb)   LMP 01/25/1974 (Approximate)   SpO2 92%   Breastfeeding No   BMI 28.12 kg/m   Estimated body mass index is 28.12 kg/m  as calculated from the following:    Height as of 1/29/24: 1.524 m (5').    Weight as of this encounter: 65.3 kg (144 lb).  Medication Review: complete    The next two questions are to help us understand your food security.  If you are feeling you need any assistance in this area, we have resources available to support you today.          1/29/2024   SDOH- Food Insecurity   Within the past 12 months, did you worry that your food would run out before you got money to buy more? N   Within the past 12 months, did the food you bought just not last and you didn t have money to get more? N         Health Care Directive:  Patient has a Health Care Directive on file      Josiane Wayne LPN

## 2024-02-02 NOTE — PATIENT INSTRUCTIONS
Reason for today's visit: post stroke    Your diagnosis: same as above    Tests that you will need: labs per your primary care    Treatment plan:   Continue eliquis and atorvastatin.        Your test results are reviewed several times a day.  Once they are all in, you will be sent a letter with your results and/or if you are signed up for on-line services, you will be e-mailed the results.  If there are serious findings, you typically will be called.    If you have any questions about your visit, your symptoms, your medication, your test results or it is not clear what your diagnosis or treatment plan is please contact our office at [phone] for general neurology    If you need follow-up in the future, please call [phone] for an appointment.      Maritza Garrett DO  Neurology

## 2024-02-02 NOTE — PROGRESS NOTES
Outpatient Neurology Visit  2/2/24    Subjective:  Janna Mcdermott is a 89 year old female who presents today for follow up evaluation of stroke      Brief history of symptoms: The patient was initially seen in neurologic consultation on 2021 for evaluation of same. Please see the comprehensive neurologic consultation note from that date in the Epic records for details.     Interval history:  They are interested in some pedal machines for her legs to prevent blood clots.    Denies any falls or strokelike events.    Physical Exam:  Vitals: /78   Pulse 84   Resp 18   Wt 65.3 kg (144 lb)   LMP 01/25/1974 (Approximate)   SpO2 92%   Breastfeeding No   BMI 28.12 kg/m     General: Seated comfortably in no acute distress.  HEENT: no carotid bruit  Cardiovascular: Irregular rhythm but normal rate.  No murmurs heard  Pulmonary: Respirations unlabored. Lungs CTAB.  Neurologic:  Awake, alert, did, language is fluent.  Gaze is conjugate, no pronator drift.  Moving all extremities equally and antigravity.  Able to get up and ambulate without assistance.    Pertinent Investigations:  LDL is 55    BMP with GFR 51  T4 normal    Assessment:  #Acute ischemic stroke  #Vertebrobasilar stenosis  #Atrial fibrillation    Ms. Luke is a 89-year-old female with recurrent stroke.  Had a right PICA stroke in May 2021 and subsequently second stroke in November 22 that was felt to be most likely small vessel related.  She has made complete recovery.  Her risk factors are reasonably well controlled.  She is taking apixaban and Crestor as appropriate.  While she is close to meeting criteria for reduced dose apixaban she does not meet either the creatinine or weight-based criteria at present.  This should continue to be monitored.  I did express that continued neurologic follow-up is at their discretion, but they continue to request regular follow-up so we will have follow-up in approximately 9 months      Plan:  -Continue  Eliquis 5 mg twice daily  -Continue Crestor 5 mg    Follow up in Neurology clinic in 9 months or earlier as needed if symptoms persist or should new symptoms or concerns arise.        24 min spent on the date of the encounter in 3 min chart review, 17 min patient visit, review of tests, 4 min documentation and/or discussion with other providers about the issues documented above.       Maritza Garrett DO   of Neurology    Dragon disclaimer: This documentation was completed with the aid of dictation software.  Please note that there may be some inconsistencies due to software errors.  Errors are corrected in real time, however if there is a remaining error, please do not hesitate to reach out for clarification.

## 2024-03-04 ENCOUNTER — LAB (OUTPATIENT)
Dept: LAB | Facility: OTHER | Age: 89
End: 2024-03-04
Attending: NURSE PRACTITIONER
Payer: MEDICARE

## 2024-03-04 DIAGNOSIS — I10 ESSENTIAL HYPERTENSION: Chronic | ICD-10-CM

## 2024-03-04 DIAGNOSIS — E03.9 ACQUIRED HYPOTHYROIDISM: ICD-10-CM

## 2024-03-04 DIAGNOSIS — E78.2 MIXED HYPERLIPIDEMIA: Chronic | ICD-10-CM

## 2024-03-04 DIAGNOSIS — R73.09 ELEVATED GLUCOSE: Primary | ICD-10-CM

## 2024-03-04 DIAGNOSIS — R73.09 ELEVATED GLUCOSE: ICD-10-CM

## 2024-03-04 LAB
ANION GAP SERPL CALCULATED.3IONS-SCNC: 10 MMOL/L (ref 7–15)
BUN SERPL-MCNC: 28.6 MG/DL (ref 8–23)
CALCIUM SERPL-MCNC: 9.8 MG/DL (ref 8.2–9.6)
CHLORIDE SERPL-SCNC: 102 MMOL/L (ref 98–107)
CHOLEST SERPL-MCNC: 135 MG/DL
CREAT SERPL-MCNC: 1.01 MG/DL (ref 0.51–0.95)
DEPRECATED HCO3 PLAS-SCNC: 28 MMOL/L (ref 22–29)
EGFRCR SERPLBLD CKD-EPI 2021: 52 ML/MIN/1.73M2
ERYTHROCYTE [DISTWIDTH] IN BLOOD BY AUTOMATED COUNT: 13.8 % (ref 10–15)
FASTING STATUS PATIENT QL REPORTED: YES
GLUCOSE SERPL-MCNC: 110 MG/DL (ref 70–99)
HBA1C MFR BLD: 5.9 % (ref 4–6.2)
HCT VFR BLD AUTO: 49.3 % (ref 35–47)
HDLC SERPL-MCNC: 66 MG/DL
HGB BLD-MCNC: 16.4 G/DL (ref 11.7–15.7)
LDLC SERPL CALC-MCNC: 56 MG/DL
MCH RBC QN AUTO: 31.8 PG (ref 26.5–33)
MCHC RBC AUTO-ENTMCNC: 33.3 G/DL (ref 31.5–36.5)
MCV RBC AUTO: 96 FL (ref 78–100)
NONHDLC SERPL-MCNC: 69 MG/DL
PLATELET # BLD AUTO: 169 10E3/UL (ref 150–450)
POTASSIUM SERPL-SCNC: 4.5 MMOL/L (ref 3.4–5.3)
RBC # BLD AUTO: 5.16 10E6/UL (ref 3.8–5.2)
SODIUM SERPL-SCNC: 140 MMOL/L (ref 135–145)
T4 FREE SERPL-MCNC: 1.27 NG/DL (ref 0.9–1.7)
TRIGL SERPL-MCNC: 64 MG/DL
TSH SERPL DL<=0.005 MIU/L-ACNC: 16.9 UIU/ML (ref 0.3–4.2)
WBC # BLD AUTO: 7.5 10E3/UL (ref 4–11)

## 2024-03-04 PROCEDURE — 80048 BASIC METABOLIC PNL TOTAL CA: CPT | Mod: ZL

## 2024-03-04 PROCEDURE — 83036 HEMOGLOBIN GLYCOSYLATED A1C: CPT | Mod: ZL

## 2024-03-04 PROCEDURE — 84443 ASSAY THYROID STIM HORMONE: CPT | Mod: ZL

## 2024-03-04 PROCEDURE — 80061 LIPID PANEL: CPT | Mod: ZL

## 2024-03-04 PROCEDURE — 85027 COMPLETE CBC AUTOMATED: CPT | Mod: ZL

## 2024-03-04 PROCEDURE — 84439 ASSAY OF FREE THYROXINE: CPT | Mod: ZL

## 2024-03-04 PROCEDURE — 36415 COLL VENOUS BLD VENIPUNCTURE: CPT | Mod: ZL

## 2024-03-20 ENCOUNTER — OFFICE VISIT (OUTPATIENT)
Dept: INTERNAL MEDICINE | Facility: OTHER | Age: 89
End: 2024-03-20
Attending: NURSE PRACTITIONER
Payer: COMMERCIAL

## 2024-03-20 VITALS
RESPIRATION RATE: 16 BRPM | TEMPERATURE: 98.9 F | SYSTOLIC BLOOD PRESSURE: 138 MMHG | OXYGEN SATURATION: 96 % | HEART RATE: 61 BPM | HEIGHT: 60 IN | BODY MASS INDEX: 27.88 KG/M2 | DIASTOLIC BLOOD PRESSURE: 84 MMHG | WEIGHT: 142 LBS

## 2024-03-20 DIAGNOSIS — I10 ESSENTIAL HYPERTENSION: Chronic | ICD-10-CM

## 2024-03-20 DIAGNOSIS — I71.21 ANEURYSM OF ASCENDING AORTA WITHOUT RUPTURE (H): ICD-10-CM

## 2024-03-20 DIAGNOSIS — Z00.00 MEDICARE ANNUAL WELLNESS VISIT, SUBSEQUENT: Primary | ICD-10-CM

## 2024-03-20 DIAGNOSIS — I63.9 CEREBELLAR STROKE (H): ICD-10-CM

## 2024-03-20 DIAGNOSIS — E03.9 ACQUIRED HYPOTHYROIDISM: ICD-10-CM

## 2024-03-20 DIAGNOSIS — E78.2 MIXED HYPERLIPIDEMIA: Chronic | ICD-10-CM

## 2024-03-20 DIAGNOSIS — I48.11 LONGSTANDING PERSISTENT ATRIAL FIBRILLATION (H): ICD-10-CM

## 2024-03-20 DIAGNOSIS — N18.31 STAGE 3A CHRONIC KIDNEY DISEASE (H): ICD-10-CM

## 2024-03-20 DIAGNOSIS — R73.03 PRE-DIABETES: ICD-10-CM

## 2024-03-20 PROCEDURE — 99214 OFFICE O/P EST MOD 30 MIN: CPT | Mod: 25 | Performed by: NURSE PRACTITIONER

## 2024-03-20 PROCEDURE — G0439 PPPS, SUBSEQ VISIT: HCPCS | Performed by: NURSE PRACTITIONER

## 2024-03-20 PROCEDURE — G0463 HOSPITAL OUTPT CLINIC VISIT: HCPCS

## 2024-03-20 RX ORDER — THYROID 15 MG/1
15 TABLET ORAL DAILY
Qty: 90 TABLET | Refills: 3 | Status: SHIPPED | OUTPATIENT
Start: 2024-03-20 | End: 2024-05-22

## 2024-03-20 SDOH — HEALTH STABILITY: PHYSICAL HEALTH: ON AVERAGE, HOW MANY MINUTES DO YOU ENGAGE IN EXERCISE AT THIS LEVEL?: 20 MIN

## 2024-03-20 SDOH — HEALTH STABILITY: PHYSICAL HEALTH: ON AVERAGE, HOW MANY DAYS PER WEEK DO YOU ENGAGE IN MODERATE TO STRENUOUS EXERCISE (LIKE A BRISK WALK)?: 7 DAYS

## 2024-03-20 ASSESSMENT — PAIN SCALES - GENERAL: PAINLEVEL: NO PAIN (0)

## 2024-03-20 ASSESSMENT — SOCIAL DETERMINANTS OF HEALTH (SDOH): HOW OFTEN DO YOU GET TOGETHER WITH FRIENDS OR RELATIVES?: THREE TIMES A WEEK

## 2024-03-20 NOTE — PROGRESS NOTES
Preventive Care Visit  [facility]  Vidhi Narayanan NP, Internal Medicine  Mar 20, 2024        ICD-10-CM    1. Medicare annual wellness visit, subsequent  Z00.00       2. Acquired hypothyroidism  E03.9 thyroid (ARMOUR) 15 MG tablet     TSH with free T4 reflex      3. Cerebellar stroke (H)  I63.9 Adult Cardiology Eval  Referral      4. Mixed hyperlipidemia  E78.2 Adult Cardiology Eval  Referral      5. Pre-diabetes  R73.03       6. Essential hypertension  I10 Adult Cardiology Eval  Referral      7. Longstanding persistent atrial fibrillation (H)  I48.11 Adult Cardiology Eval  Referral      8. Aneurysm of ascending aorta without rupture (H24)  I71.21       9. Stage 3a chronic kidney disease (H)  N18.31          Plan:  Medicare wellness visit complete.  Follow-up annually.  Start [place] Thyroid 15 mg daily in addition to 90 mg daily.  Recheck thyroid labs in 6 weeks.  Lab orders placed.  She will make a lab only appointment.  Continue to follow with cardiology for hypertension, A-fib and monitoring of mildly dilated thoracic aorta.  Kidney function stable.  Continue to follow with neurology.  Follow-up in 4-6 months for labs.  Declines immunization update.    Ramila Saldivar is a 89 year old, presenting for the following:  Medicare Visit        3/20/2024    10:33 AM   Additional Questions   Roomed by Rhona PARMAR     Health Care Directive  Patient has a Health Care Directive on file  Advance care planning document is on file and is current.    HPI  She is here today for Medicare wellness visit and chronic disease management.  Had labs completed within the past couple of weeks.  She is here to discuss results.  Daughter brings in blood pressure readings from home and these typically average between 121//77.  Her neurologist has wanted her to have a blood pressure of 140/90 or better.  She is on blood pressure agents.  She also is followed by  cardiology.  She is due for annual visit with Dr. Iraheta.  Usually has kidney labs checked about every 3-4 months.  Had thyroid labs completed recently with elevated TSH in normal free T4.  She has been taking medication regularly.              3/20/2024   General Health   How would you rate your overall physical health? Excellent   Feel stress (tense, anxious, or unable to sleep) Not at all         3/20/2024   Nutrition   Diet: Gluten-free/reduced         3/20/2024   Exercise   Days per week of moderate/strenous exercise 7 days   Average minutes spent exercising at this level 20 min         3/20/2024   Social Factors   Frequency of gathering with friends or relatives Three times a week   Worry food won't last until get money to buy more No   Food not last or not have enough money for food? No   Do you have housing?  Yes   Are you worried about losing your housing? No   Lack of transportation? No   Unable to get utilities (heat,electricity)? No         3/20/2024   Activities of Daily Living- Home Safety   Needs help with the following daily activites Transportation    Shopping    Preparing meals    Housework    Bathing    Laundry    Medication administration    Money management   Safety concerns in the home None of the above         3/20/2024   Dental   Dentist two times every year? Yes         3/20/2024   Hearing Screening   Hearing concerns? None of the above         3/20/2024   Driving Risk Screening   Patient/family members have concerns about driving No         3/20/2024   General Alertness/Fatigue Screening   Have you been more tired than usual lately? No         3/20/2024   Urinary Incontinence Screening   Bothered by leaking urine in past 6 months No         3/20/2024   TB Screening   Were you born outside of the [place]? No         Today's PHQ-2 Score:       3/20/2024    10:27 AM   PHQ-2 ( 1999 [employer])   Q1: Little interest or pleasure in doing things 0   Q2: Feeling down, depressed or hopeless 0   PHQ-2 Score 0    Q1: Little interest or pleasure in doing things Not at all   Q2: Feeling down, depressed or hopeless Not at all   PHQ-2 Score 0           3/20/2024   Substance Use   Alcohol more than 3/day or more than 7/wk No   Do you have a current opioid prescription? No   How severe/bad is pain from 1 to 10? 0/10 (No Pain)   Do you use any other substances recreationally? No     Social History     Tobacco Use    Smoking status: Never    Smokeless tobacco: Never   Vaping Use    Vaping Use: Never used   Substance Use Topics    Alcohol use: No     Alcohol/week: 0.0 standard drinks of alcohol    Drug use: Never                    Reviewed and updated as needed this visit by Provider                    Past Medical History:   Diagnosis Date    Asymptomatic menopausal state     on estrogen    Chronic kidney disease, stage I     No Comments Provided    Dermatitis     No Comments Provided    Essential (primary) hypertension     No Comments Provided    Hyperlipidemia     No Comments Provided    Hypothyroidism     No Comments Provided    Other specified symptoms and signs involving the circulatory and respiratory systems     No Comments Provided    Paroxysmal atrial fibrillation (H)     No Comments Provided     Past Surgical History:   Procedure Laterality Date    APPENDECTOMY OPEN      No Comments Provided    CHOLECYSTECTOMY      No Comments Provided    HYSTERECTOMY TOTAL ABDOMINAL      No Comments Provided     Patient Active Problem List   Diagnosis    Biatrial enlargement    Other symptoms involving cardiovascular system    Stage 3a chronic kidney disease (H)    Contact dermatitis and eczema    External ear ulcer (H)    Mixed hyperlipidemia    Essential hypertension    Acquired hypothyroidism    Cerebellar stroke (H)    Mesenteric artery stenosis (H24)    Ascending aortic aneurysm (H24)    Basilar artery stenosis/occlusion    History of stroke    Pre-diabetes    Gambling problem    Longstanding persistent atrial fibrillation (H)     Acute stroke due to ischemia (H)     Past Surgical History:   Procedure Laterality Date    APPENDECTOMY OPEN      No Comments Provided    CHOLECYSTECTOMY      No Comments Provided    HYSTERECTOMY TOTAL ABDOMINAL      No Comments Provided       Social History     Tobacco Use    Smoking status: Never    Smokeless tobacco: Never   Substance Use Topics    Alcohol use: No     Alcohol/week: 0.0 standard drinks of alcohol     Family History   Problem Relation Age of Onset    Coronary Artery Disease Mother     Hypertension Mother     Heart Disease Mother         Heart Disease    Heart Disease Sister         Heart Disease,    Heart Disease Sister         Heart Disease,    Other - See Comments Sister         at 6 months    Other - See Comments Brother           in service    Other - See Comments Brother          of Parkinson's    Other - See Comments Brother          of pneumonia    Other - See Comments Other         No cancer or diabetes in the family         Current Outpatient Medications   Medication Sig Dispense Refill    apixaban ANTICOAGULANT (ELIQUIS ANTICOAGULANT) 5 MG tablet Take 1 tablet (5 mg) by mouth 2 times daily 180 tablet 4    ARMOUR THYROID 90 MG tablet Take 1 tablet (90 mg) by mouth daily 90 tablet 4    Cholecalciferol 10 MCG (400 UNIT) CAPS Take 800 Units by mouth daily      diltiazem ER COATED BEADS (CARDIZEM CD/CARTIA XT) 120 MG 24 hr capsule Take 1 capsule (120 mg) by mouth daily 90 capsule 4    fish oil-omega-3 fatty acids 1000 MG capsule Take 1,000 capsules by mouth daily      Flaxseed Oil OIL Take daily, puts liquid on oatmeal every morning      latanoprost (XALATAN) 0.005 % ophthalmic solution Place 1 drop into both eyes At Bedtime       losartan (COZAAR) 25 MG tablet Take 1 tablet (25 mg) by mouth daily 90 tablet 4    rosuvastatin (CRESTOR) 5 MG tablet Take 1 tablet (5 mg) by mouth daily 90 tablet 4    Sesame Oil OIL Take 1 capsule by mouth daily      thyroid (ARMOUR) 15 MG  tablet Take 1 tablet (15 mg) by mouth daily 90 tablet 3    triamcinolone (KENALOG) 0.1 % external cream Apply topically 3 times daily 454 g 4     Allergies   Allergen Reactions    Bee Venom Anaphylaxis    Benazepril Cough    Ciprofloxacin Other (See Comments)     Possible GI illness    Erythromycin Other (See Comments)     Unsure of allergy time, possible hives.    Gluten Meal GI Disturbance    Hydrochlorothiazide      Other reaction(s): Hyponatremia    Penicillins Other (See Comments)     Unsure of allergy time, possible hives     Current providers sharing in care for this patient include:  Patient Care Team:  Alexis York MD as PCP - General (Family Medicine)  Maritza Garrett MD as Assigned Neuroscience Provider  Alexis York MD as Assigned PCP    The following health maintenance items are reviewed in Epic and correct as of today:  Health Maintenance   Topic Date Due    Pneumococcal Vaccine: 65+ Years (1 of 2 - PCV) Never done    DTAP/TDAP/TD IMMUNIZATION (1 - Tdap) Never done    ZOSTER IMMUNIZATION (1 of 2) Never done    RSV VACCINE (Pregnancy & 60+) (1 - 1-dose 60+ series) Never done    MEDICARE ANNUAL WELLNESS VISIT  08/17/2019    INFLUENZA VACCINE (1) Never done    COVID-19 Vaccine (1 - 2023-24 season) Never done    BMP  03/04/2025    LIPID  03/04/2025    TSH W/FREE T4 REFLEX  03/04/2025    HEMOGLOBIN  03/04/2025    FALL RISK ASSESSMENT  03/20/2025    ADVANCE CARE PLANNING  12/29/2027    PHQ-2 (once per calendar year)  Completed    URINALYSIS  Completed    IPV IMMUNIZATION  Aged Out    HPV IMMUNIZATION  Aged Out    MENINGITIS IMMUNIZATION  Aged Out    RSV MONOCLONAL ANTIBODY  Aged Out    MICROALBUMIN  Discontinued         Review of Systems  Constitutional, neuro, ENT, endocrine, pulmonary, cardiac, gastrointestinal, genitourinary, musculoskeletal, integument and psychiatric systems are negative, except as otherwise noted.     Objective    Exam  BP (!) 144/62 (BP Location: Right arm,  Patient Position: Sitting, Cuff Size: Adult Regular)   Pulse 61   Temp 98.9  F (37.2  C) (Tympanic)   Resp 16   Ht 1.524 m (5')   Wt 64.4 kg (142 lb)   LMP 01/25/1974 (Approximate)   SpO2 96%   BMI 27.73 kg/m     Estimated body mass index is 27.73 kg/m  as calculated from the following:    Height as of this encounter: 1.524 m (5').    Weight as of this encounter: 64.4 kg (142 lb).    Physical Exam  Female accompanied by her daughter.  No acute distress.  Affect normal.  Alert and oriented.  Skin color pink.  Sclera nonicteric.  Wearing facial mask for prevention.  Neck supple without adenopathy.  No thyroid tenderness or thyromegaly.  Lung fields clear to auscultation throughout.  Cardiovascular irregularly irregular with controlled rate.  Abdomen is soft and nontender.  No suprapubic tenderness.  Extremities with 1+ chronic edema.    Lab work from March 2024 reviewed and discussed with patient and daughter        3/20/2024   Mini Cog   Clock Draw Score 2 Normal   3 Item Recall 3 objects recalled   Mini Cog Total Score 5              Signed Electronically by: Vidhi Narayanan NP

## 2024-03-20 NOTE — NURSING NOTE
Chief Complaint   Patient presents with    Medicare Visit       Initial BP (!) 144/62 (BP Location: Right arm, Patient Position: Sitting, Cuff Size: Adult Regular)   Pulse 61   Temp 98.9  F (37.2  C) (Tympanic)   Resp 16   Ht 1.524 m (5')   Wt 64.4 kg (142 lb)   LMP 01/25/1974 (Approximate)   SpO2 96%   BMI 27.73 kg/m   Estimated body mass index is 27.73 kg/m  as calculated from the following:    Height as of this encounter: 1.524 m (5').    Weight as of this encounter: 64.4 kg (142 lb).  Medication Review: complete    The next two questions are to help us understand your food security.  If you are feeling you need any assistance in this area, we have resources available to support you today.          3/20/2024   SDOH- Food Insecurity   Within the past 12 months, did you worry that your food would run out before you got money to buy more? N   Within the past 12 months, did the food you bought just not last and you didn t have money to get more? N       Health Care Directive:  Patient has a Health Care Directive on file      Rhona De La Garza LPN

## 2024-03-27 ENCOUNTER — MYC MEDICAL ADVICE (OUTPATIENT)
Dept: INTERNAL MEDICINE | Facility: OTHER | Age: 89
End: 2024-03-27
Payer: COMMERCIAL

## 2024-04-24 ENCOUNTER — OFFICE VISIT (OUTPATIENT)
Dept: FAMILY MEDICINE | Facility: OTHER | Age: 89
End: 2024-04-24
Attending: NURSE PRACTITIONER
Payer: COMMERCIAL

## 2024-04-24 VITALS
BODY MASS INDEX: 28.27 KG/M2 | HEART RATE: 81 BPM | HEIGHT: 60 IN | DIASTOLIC BLOOD PRESSURE: 80 MMHG | SYSTOLIC BLOOD PRESSURE: 128 MMHG | OXYGEN SATURATION: 98 % | RESPIRATION RATE: 16 BRPM | WEIGHT: 144 LBS | TEMPERATURE: 97.2 F

## 2024-04-24 DIAGNOSIS — N30.00 ACUTE CYSTITIS WITHOUT HEMATURIA: ICD-10-CM

## 2024-04-24 DIAGNOSIS — E03.9 ACQUIRED HYPOTHYROIDISM: ICD-10-CM

## 2024-04-24 DIAGNOSIS — R39.15 URGENCY OF URINATION: Primary | ICD-10-CM

## 2024-04-24 LAB
ALBUMIN UR-MCNC: 10 MG/DL
APPEARANCE UR: CLEAR
BACTERIA #/AREA URNS HPF: ABNORMAL /HPF
BILIRUB UR QL STRIP: NEGATIVE
COLOR UR AUTO: YELLOW
GLUCOSE UR STRIP-MCNC: NEGATIVE MG/DL
HGB UR QL STRIP: ABNORMAL
HOLD SPECIMEN: NORMAL
KETONES UR STRIP-MCNC: NEGATIVE MG/DL
LEUKOCYTE ESTERASE UR QL STRIP: ABNORMAL
NITRATE UR QL: POSITIVE
PH UR STRIP: 6.5 [PH] (ref 5–9)
RBC URINE: 32 /HPF
SP GR UR STRIP: 1.02 (ref 1–1.03)
T4 FREE SERPL-MCNC: 1.22 NG/DL (ref 0.9–1.7)
TSH SERPL DL<=0.005 MIU/L-ACNC: 4.41 UIU/ML (ref 0.3–4.2)
UROBILINOGEN UR STRIP-MCNC: NORMAL MG/DL
WBC URINE: 93 /HPF

## 2024-04-24 PROCEDURE — G0463 HOSPITAL OUTPT CLINIC VISIT: HCPCS

## 2024-04-24 PROCEDURE — 36415 COLL VENOUS BLD VENIPUNCTURE: CPT | Mod: ZL

## 2024-04-24 PROCEDURE — 99214 OFFICE O/P EST MOD 30 MIN: CPT

## 2024-04-24 PROCEDURE — 84439 ASSAY OF FREE THYROXINE: CPT | Mod: ZL

## 2024-04-24 PROCEDURE — 81001 URINALYSIS AUTO W/SCOPE: CPT | Mod: ZL

## 2024-04-24 PROCEDURE — 87086 URINE CULTURE/COLONY COUNT: CPT | Mod: ZL

## 2024-04-24 PROCEDURE — 84443 ASSAY THYROID STIM HORMONE: CPT | Mod: ZL

## 2024-04-24 RX ORDER — CEPHALEXIN 500 MG/1
500 CAPSULE ORAL 2 TIMES DAILY
Qty: 14 CAPSULE | Refills: 0 | Status: SHIPPED | OUTPATIENT
Start: 2024-04-24 | End: 2024-04-24

## 2024-04-24 RX ORDER — CEPHALEXIN 500 MG/1
500 CAPSULE ORAL 2 TIMES DAILY
Qty: 14 CAPSULE | Refills: 0 | Status: SHIPPED | OUTPATIENT
Start: 2024-04-24 | End: 2024-05-01

## 2024-04-24 ASSESSMENT — PAIN SCALES - GENERAL: PAINLEVEL: NO PAIN (0)

## 2024-04-24 NOTE — PROGRESS NOTES
"ASSESSMENT/PLAN:      (N30.00) Acute cystitis without hematuria; (R39.15) Urgency of urination  (primary encounter diagnosis)  Comment: Patient presents with a 1 day history of urinary urgency and frequency, she denies any dysuria.  She denies any fevers or chills.  On exam she is afebrile, no CVA or suprapubic tenderness.  Urinalysis is positive for nitrites and reveals a large amount of leukocyte esterase.  There is a large amount of blood in the urine with 32 red blood cells, and 93 white blood cells.  Patient and her daughter report that she does not tolerate many antibiotics well.  She has tolerated Keflex well in the past.  Will opt to initiate treatment with Keflex.  Her culture did grow E. coli which is susceptible to cephalosporins.  She will complete this course.  I would like her to follow-up in primary care to ensure that hematuria has resolved.  Patient and her daughter are agreeable to this plan and will follow-up with primary care provider, sooner if symptoms persist or worsen.  Plan: UA Macroscopic with reflex to Microscopic and         Culture, Urine Culture       cephALEXin (KEFLEX) 500 MG capsule,   Vitals stable. PE available for review below. UA results: Below. Based off UA results, patient does meet criteria for antibiotic therapy. I did discuss with patient that if a urine culture was performed, that we will inform patient if a change to their treatment plan needs to occur based off culture results - with urine cultures typically returning in 1-3 days. In the meantime, recommend, alternating tylenol and ibuprofen every 4-6 hours as needed, warm heating pad, pushing fluids/hydration, cranberry juice/pills, urinating when urge arises. May also try over the counter remedies such as Azo (as long as pyridium not already prescribed). Patient aware that if they do take Azo, that this medication can change color of urine to an \"orange\" color. If fevers, chills, flank pain/back pain, inability to " urinate/struggle to urinate, signs of dehydration or other worrisome signs occur, patient agreeable to follow up for reevaluation. Patient is in agreement and understanding of the above treatment plan. All questions and concerns were addressed and answered to patient's satisfaction. AVS reviewed with patient.     Discussed warning signs/symptoms indicative of need to f/u    Follow up if symptoms persist or worsen or concerns    I have reviewed the nursing notes.  I have reviewed the findings, diagnosis, plan and need for follow up with the patient.    I explained my diagnostic considerations and recommendations to the patient, who voiced understanding and agreement with the treatment plan. All questions were answered. We discussed potential side effects of any prescribed or recommended therapies, as well as expectations for response to treatments.    DEEDEE BOATENG CNP  4/24/2024  1:16 PM    HPI:    Janna Mcdermott is a 89 year old female  who presents to [facility] today for concerns of UTI.    possible UTI, x 1 day    Symptoms: urgency and frequency  No back or side pain  No gross hematuria/blood in urine, but at ome urine test was positive for blood.   Last Urination: Today  YES: she is  able to completely void/empty  No fevers, chills  No change in bowel habits (diarrhea, constipation, etc.)  No vaginal/penile symptoms (discharge, pain, itching, sores, etc.)    LMP: Hysterectomy.    Allergies: As listed, including penicillins, erythromycin, and Cipro.    PCP: [facility]      Past Medical History:   Diagnosis Date    Asymptomatic menopausal state     on estrogen    Chronic kidney disease, stage I     No Comments Provided    Dermatitis     No Comments Provided    Essential (primary) hypertension     No Comments Provided    Hyperlipidemia     No Comments Provided    Hypothyroidism     No Comments Provided    Other specified symptoms and signs involving the circulatory and respiratory systems     No  Comments Provided    Paroxysmal atrial fibrillation (H)     No Comments Provided     Past Surgical History:   Procedure Laterality Date    APPENDECTOMY OPEN      No Comments Provided    CHOLECYSTECTOMY      No Comments Provided    HYSTERECTOMY TOTAL ABDOMINAL      No Comments Provided     Social History     Tobacco Use    Smoking status: Never    Smokeless tobacco: Never   Substance Use Topics    Alcohol use: No     Alcohol/week: 0.0 standard drinks of alcohol     Current Outpatient Medications   Medication Sig Dispense Refill    apixaban ANTICOAGULANT (ELIQUIS ANTICOAGULANT) 5 MG tablet Take 1 tablet (5 mg) by mouth 2 times daily 180 tablet 4    ARMOUR THYROID 90 MG tablet Take 1 tablet (90 mg) by mouth daily 90 tablet 4    cephALEXin (KEFLEX) 500 MG capsule Take 1 capsule (500 mg) by mouth 2 times daily for 7 days 14 capsule 0    Cholecalciferol 10 MCG (400 UNIT) CAPS Take 800 Units by mouth daily      diltiazem ER COATED BEADS (CARDIZEM CD/CARTIA XT) 120 MG 24 hr capsule Take 1 capsule (120 mg) by mouth daily 90 capsule 4    fish oil-omega-3 fatty acids 1000 MG capsule Take 1,000 capsules by mouth daily      Flaxseed Oil OIL Take daily, puts liquid on oatmeal every morning      latanoprost (XALATAN) 0.005 % ophthalmic solution Place 1 drop into both eyes At Bedtime       losartan (COZAAR) 25 MG tablet Take 1 tablet (25 mg) by mouth daily 90 tablet 4    rosuvastatin (CRESTOR) 5 MG tablet Take 1 tablet (5 mg) by mouth daily 90 tablet 4    Sesame Oil OIL Take 1 capsule by mouth daily      thyroid (ARMOUR) 15 MG tablet Take 1 tablet (15 mg) by mouth daily 90 tablet 3    triamcinolone (KENALOG) 0.1 % external cream Apply topically 3 times daily 454 g 4     Allergies   Allergen Reactions    Bee Venom Anaphylaxis    Benazepril Cough    Ciprofloxacin Other (See Comments)     Possible GI illness    Erythromycin Other (See Comments)     Unsure of allergy time, possible hives.    Gluten Meal GI Disturbance     Hydrochlorothiazide      Other reaction(s): Hyponatremia    Penicillins Other (See Comments)     Unsure of allergy time, possible hives     Past medical history, past surgical history, current medications and allergies reviewed and accurate to the best of my knowledge.      ROS:  Refer to HPI    /80   Pulse 81   Temp 97.2  F (36.2  C) (Tympanic)   Resp 16   Ht 1.524 m (5')   Wt 65.3 kg (144 lb)   LMP 01/25/1974 (Approximate)   SpO2 98%   BMI 28.12 kg/m      EXAM:  General Appearance: Well appearing 89 year old female, appropriate appearance for age. No acute distress   Eyes: conjunctivae normal without erythema or irritation, corneas clear, no drainage or crusting, no eyelid swelling, pupils equal   Oropharynx: moist mucous membranes, voice clear.    Nose:  Bilateral nares: no erythema, no edema, no drainage or congestion   Neck: supple   Respiratory: normal chest wall and respirations.  Normal effort.  Clear to auscultation bilaterally, no wheezing, crackles or rhonchi.  No increased work of breathing.  No cough appreciated.  Cardiac: RRR with no murmurs  Abdomen: soft, nontender, no rigidity, no rebound tenderness or guarding, normal bowel sounds present  :  No suprapubic tenderness to palpation.  Absent CVA tenderness to palpation.    Musculoskeletal:  Equal movement of bilateral upper extremities.  Equal movement of bilateral lower extremities.  Normal gait.    Dermatological: no rashes noted of exposed skin  Neuro: Alert and oriented to person, place, and time.  Cranial nerves II-XII grossly intact with no focal or lateralizing deficits.  Muscle tone normal.  Gait normal. No tremor.   Psychological: normal affect, alert, oriented, and pleasant.     Labs:  Results for orders placed or performed in visit on 04/24/24   UA Macroscopic with reflex to Microscopic and Culture     Status: Abnormal    Specimen: Urine, Clean Catch   Result Value Ref Range    Color Urine Yellow Colorless, Straw, Light  Yellow, Yellow    Appearance Urine Clear Clear    Glucose Urine Negative Negative mg/dL    Bilirubin Urine Negative Negative    Ketones Urine Negative Negative mg/dL    Specific Gravity Urine 1.017 1.000 - 1.030    Blood Urine Large (A) Negative    pH Urine 6.5 5.0 - 9.0    Protein Albumin Urine 10 (A) Negative mg/dL    Urobilinogen Urine Normal Normal, 2.0 mg/dL    Nitrite Urine Positive (A) Negative    Leukocyte Esterase Urine Large (A) Negative    Bacteria Urine Few (A) None Seen /HPF    RBC Urine 32 (H) <=2 /HPF    WBC Urine 93 (H) <=5 /HPF    Narrative    Urine Culture ordered based on laboratory criteria   TSH with free T4 reflex     Status: Abnormal   Result Value Ref Range    TSH 4.41 (H) 0.30 - 4.20 uIU/mL   Extra Tube     Status: None    Narrative    The following orders were created for panel order Extra Tube.  Procedure                               Abnormality         Status                     ---------                               -----------         ------                     Extra Purple Top Tube[[id number]]                            Final result                 Please view results for these tests on the individual orders.   Extra Purple Top Tube     Status: None   Result Value Ref Range    Hold Specimen x    T4 free     Status: Normal   Result Value Ref Range    Free T4 1.22 0.90 - 1.70 ng/dL   Urine Culture     Status: Abnormal    Specimen: Urine, Clean Catch   Result Value Ref Range    Culture >100,000 CFU/mL Escherichia coli (A)        Susceptibility    Escherichia coli - LUCERO     Amoxicillin/Clavulanate <=8 Susceptible      Ampicillin <=8 Susceptible      Ampicillin/ Sulbactam <=8 Susceptible      Aztreonam <=4 Susceptible      Cefazolin <=2 Susceptible      Cefepime <=2 Susceptible      Ceftazidime <=1 Susceptible      Ceftriaxone <=1 Susceptible      Cefoxitin <=8 Susceptible      Ciprofloxacin*         * Ciprofloxacin not resistant.  Due to test limitations, lab cannot provide LUCERO to  determine susceptibility.     Ertapenem <=0.5 Susceptible      Gentamicin <=4 Susceptible      Imipenem <=1 Susceptible      Levofloxacin*         * Levofloxacin not resistant.  Due to test limitations, lab cannot provide LUCERO to determine susceptibility.     Nitrofurantoin <=32 Susceptible      Piperacillin/Tazobactam*         * Piperacillin/Tazobactam not resistant.  Due to test limitations, lab cannot provide LUCERO to determine susceptibility.     Tetracycline <=4 Susceptible      Tobramycin <=4 Susceptible      Trimethoprim/Sulfamethoxazole <=2/38 Susceptible      Cefpodoxime <=2 Susceptible      Cefuroxime <=4 Susceptible      Trimethoprim <=8 Susceptible

## 2024-04-24 NOTE — NURSING NOTE
Chief Complaint   Patient presents with    UTI     Patient here with daughter for urgency and frequency since yesterday. Daughter did a dip stick at home and multiple levels resulted. Patient has a sensitivity to antibiotics, daughter states that metronidazole and Keflex work the best.    Initial /80   Pulse 81   Temp 97.2  F (36.2  C) (Tympanic)   Resp 16   Ht 1.524 m (5')   Wt 65.3 kg (144 lb)   LMP 01/25/1974 (Approximate)   SpO2 98%   BMI 28.12 kg/m   Estimated body mass index is 28.12 kg/m  as calculated from the following:    Height as of this encounter: 1.524 m (5').    Weight as of this encounter: 65.3 kg (144 lb).  Medication Review: complete    The next two questions are to help us understand your food security.  If you are feeling you need any assistance in this area, we have resources available to support you today.          4/24/2024   SDOH- Food Insecurity   Within the past 12 months, did you worry that your food would run out before you got money to buy more? N   Within the past 12 months, did the food you bought just not last and you didn t have money to get more? N         Health Care Directive:  Patient has a Health Care Directive on file      Sosa Gamble LPN

## 2024-04-26 ENCOUNTER — MYC MEDICAL ADVICE (OUTPATIENT)
Dept: INTERNAL MEDICINE | Facility: OTHER | Age: 89
End: 2024-04-26
Payer: COMMERCIAL

## 2024-04-26 LAB — BACTERIA UR CULT: ABNORMAL

## 2024-05-02 ENCOUNTER — OFFICE VISIT (OUTPATIENT)
Dept: CARDIOLOGY | Facility: OTHER | Age: 89
End: 2024-05-02
Attending: INTERNAL MEDICINE
Payer: COMMERCIAL

## 2024-05-02 VITALS
SYSTOLIC BLOOD PRESSURE: 178 MMHG | DIASTOLIC BLOOD PRESSURE: 78 MMHG | OXYGEN SATURATION: 98 % | BODY MASS INDEX: 28.71 KG/M2 | WEIGHT: 147 LBS | HEART RATE: 74 BPM | RESPIRATION RATE: 16 BRPM | TEMPERATURE: 98.7 F

## 2024-05-02 DIAGNOSIS — I65.1 BASILAR ARTERY STENOSIS/OCCLUSION: ICD-10-CM

## 2024-05-02 DIAGNOSIS — I73.9 PAD (PERIPHERAL ARTERY DISEASE) (H): ICD-10-CM

## 2024-05-02 DIAGNOSIS — E78.2 MIXED HYPERLIPIDEMIA: Chronic | ICD-10-CM

## 2024-05-02 DIAGNOSIS — I48.21 PERMANENT ATRIAL FIBRILLATION (H): Primary | ICD-10-CM

## 2024-05-02 DIAGNOSIS — R60.0 PERIPHERAL EDEMA: ICD-10-CM

## 2024-05-02 DIAGNOSIS — N18.31 STAGE 3A CHRONIC KIDNEY DISEASE (H): ICD-10-CM

## 2024-05-02 DIAGNOSIS — Z86.73 HISTORY OF CVA (CEREBROVASCULAR ACCIDENT): ICD-10-CM

## 2024-05-02 DIAGNOSIS — I70.203 BILATERAL FEMORAL ARTERY STENOSIS (H): ICD-10-CM

## 2024-05-02 DIAGNOSIS — I71.21 ANEURYSM OF ASCENDING AORTA WITHOUT RUPTURE (H): ICD-10-CM

## 2024-05-02 DIAGNOSIS — Z79.01 ON CONTINUOUS ORAL ANTICOAGULATION: ICD-10-CM

## 2024-05-02 DIAGNOSIS — I10 ESSENTIAL HYPERTENSION: Chronic | ICD-10-CM

## 2024-05-02 DIAGNOSIS — I77.1 SUBCLAVIAN ARTERY STENOSIS, LEFT (H): ICD-10-CM

## 2024-05-02 DIAGNOSIS — I50.33 ACUTE ON CHRONIC HEART FAILURE WITH PRESERVED EJECTION FRACTION (H): ICD-10-CM

## 2024-05-02 DIAGNOSIS — K55.1 MESENTERIC ARTERY STENOSIS (H): ICD-10-CM

## 2024-05-02 DIAGNOSIS — R73.03 PRE-DIABETES: ICD-10-CM

## 2024-05-02 DIAGNOSIS — I25.10 CORONARY ARTERY DISEASE INVOLVING NATIVE CORONARY ARTERY OF NATIVE HEART WITHOUT ANGINA PECTORIS: ICD-10-CM

## 2024-05-02 DIAGNOSIS — I51.7 BIATRIAL ENLARGEMENT: ICD-10-CM

## 2024-05-02 DIAGNOSIS — I63.9 CEREBELLAR STROKE (H): ICD-10-CM

## 2024-05-02 PROCEDURE — 93005 ELECTROCARDIOGRAM TRACING: CPT | Performed by: INTERNAL MEDICINE

## 2024-05-02 PROCEDURE — G0463 HOSPITAL OUTPT CLINIC VISIT: HCPCS

## 2024-05-02 PROCEDURE — 93010 ELECTROCARDIOGRAM REPORT: CPT | Performed by: INTERNAL MEDICINE

## 2024-05-02 PROCEDURE — 99205 OFFICE O/P NEW HI 60 MIN: CPT | Performed by: INTERNAL MEDICINE

## 2024-05-02 PROCEDURE — 99417 PROLNG OP E/M EACH 15 MIN: CPT | Performed by: INTERNAL MEDICINE

## 2024-05-02 ASSESSMENT — PAIN SCALES - GENERAL: PAINLEVEL: NO PAIN (0)

## 2024-05-02 NOTE — PROGRESS NOTES
[facility] HEART CARE   CARDIOLOGY CONSULT     Janna Mcdemrott   2/23/1931  [id number]    Vidhi Narayanan     Chief Complaint   Patient presents with    Consult For     Hyperlipidemia, cerebrale stroke, AF          HPI:   Betsy is a 89-year-old female who was referred to cardiology by primary care for history of stroke 2021 and 2022, hypertension-uncontrolled, hyperlipidemia-controlled, and permanent A-fib.  Patient is with family today.  Patient and family voiced no concerns and report they are not even sure why they are here today.    Betsy has a history of A-fib.  Records show A-fib dating back to at least 12/13/2012 on an EKG. I cannot say definitively when she was first diagnosed for A-fib.  In 2012, she was in A-fib with RVR with a heart rate of 129 bpm.  EKG at time of consultation on 5/2/2024 showed A-fib with controlled rates at 73 bpm.  She is being managed with diltiazem 120 mg once daily and Eliquis 5 mg twice a day.  She does not qualify for the lesser dose of Eliquis.  She was denying symptoms of A-fib which include palpitations, fluttering, fatigue, or shortness of breath.  Patient and daughter were very clear that they did not want adjustments or changes to medications, particularly diltiazem today.  I offered explanation of A-fib but the daughter declined.  They feel comfortable with this dysrhythmia.    She also has a history of CVA x2.  She initially had an MRI on 3/26/2021.  It showed right inferior cerebellar PICA territory with acute infarct.  Also, it showed left inferior cerebellum multiple small chronic infarcts.  She had an MRI of her brain on 11/23/2022 that also showed evidence for right corona radiata/anterior right corticospinal tract compatible with a small, acute infarct of the deep right cerebellar white matter.  CTA head and neck on the same date showed chronic abnormal posterior circulation with absent enhancement of the right distal right vertebral artery suggesting  complete occlusion.  There was absent enhancement of the left vertebral artery with reconstitution by collaterals.  Thready basilar artery appearance.  There was poor opacification of the left superior cerebellar artery.  In talking to family, she has minimal deficits from these strokes.  At time of consultation, they are resistant to adjustments or changes in medications.  Suggested aspirin and increase of Crestor/rosuvastatin at 5 mg daily to 20 or 40.  This was adamantly declined.    There is also history of severe PAD.  CTA chest with contrast 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.  This study showed rather advanced PAD.  There was mild dilation of the ascending aorta at 4.2 cm.  She was noted to have a high-grade stenosis of the proximal left subclavian artery up to 90% severity.  At the time of consultation, intermittently, the tips of her fingers were blue/dusky.  Capillary refill time was reduced.  There was moderate calcifications in the coronary arteries.  There is high-grade stenosis of the proximal superior mesenteric artery at greater than 95%.  Thankfully, there flow distally preventing bowel ischemia.  There was high-grade stenosis involving the superficial femoral arteries which were only partially visualized in the proximal thigh.  In addition, she has significant vertebral artery disease.    She has a history of hypertension.  Blood pressure were uncontrolled at time of consultation.  Blood pressure initially was 164/70 increasing to 178/78.  Blood pressure in the clinic has been ranging from the 120-150s systolically.  Discussed the importance of blood pressure control.  Patient is adamant that an alternative physician was allowing for elevated blood pressures ranging from 130-140.  The daughter/patient declined any adjustments or changes to her antihypertensive medications.    She has a history of hyperlipidemia.  Cholesterol has been relatively well-controlled on rosuvastatin 5 mg once daily.  She is not meeting the LDL  requirement.  I recommended a higher dose of rosuvastatin, preferably 20 to 40 mg once daily.  This was declined.  It is believed that the rosuvastatin has caused diarrhea which has persisted.  I reviewed up-to-date side effects from this medication.  No side effects suggest diarrhea.  Quite the contrary, side effects show constipation.    There is also history of an ascending aortic aneurysm.  It was 4.5 cm on echo from 11/23/2022.  Briefly, it was 4.2 cm on CT scan of chest from 3/25/2021.  Suggested an echocardiogram and follow-up at time of consultation but was declined.  They will let us know if if they change her mind.    IMAGING RESULTS:   Echo on 11/23/2022:  Left ventricular size, wall motion and function are normal.   The ejection fraction is 55-60%.  Mild concentric wall thickening consistent with left ventricular hypertrophy is present.  The right ventricle is normal size. Global right ventricular function is normal.  Moderate dilatation of the aorta is present.   Ascending aorta 4.5 cm (2.64 cm/m2).  No pericardial effusion is present.  This study was compared with the study from 3/26/2021. No significant changes noted.    CTA head and neck on 11/23/2022:  Chronic abnormal posterior circulation. Coronary absent enhancement of  the distal right vertebral artery. Chronic absent enhancement of the  proximal the left vertebral artery until reconstituted by collateral  within the distal left V3 segment. Chronic thready basilar artery  appearance. Poor opacification of left superior cerebellar artery,  similar to prior.     Consider MR for better assessment for acute posterior circulation  recurrent ischemia if it would modify clinical management.    CURRENT MEDICATIONS:   Prior to Admission medications    Medication Sig Start Date End Date Taking? Authorizing Provider   apixaban ANTICOAGULANT (ELIQUIS ANTICOAGULANT) 5 MG tablet Take 1 tablet (5 mg) by mouth 2 times daily 12/4/23   Luis Malone MD ARMOUR  THYROID 90 MG tablet Take 1 tablet (90 mg) by mouth daily 12/4/23   Luis Malone MD   cephALEXin (KEFLEX) 500 MG capsule Take 1 capsule (500 mg) by mouth 2 times daily for 7 days 4/24/24 5/1/24  Arabella Nails APRN CNP   Cholecalciferol 10 MCG (400 UNIT) CAPS Take 800 Units by mouth daily    Reported, Patient   diltiazem ER COATED BEADS (CARDIZEM CD/CARTIA XT) 120 MG 24 hr capsule Take 1 capsule (120 mg) by mouth daily 12/4/23   Luis Malone MD   fish oil-omega-3 fatty acids 1000 MG capsule Take 1,000 capsules by mouth daily    Reported, Patient   Flaxseed Oil OIL Take daily, puts liquid on oatmeal every morning    Reported, Patient   latanoprost (XALATAN) 0.005 % ophthalmic solution Place 1 drop into both eyes At Bedtime  3/24/21   Reported, Patient   losartan (COZAAR) 25 MG tablet Take 1 tablet (25 mg) by mouth daily 12/4/23   Luis Malone MD   rosuvastatin (CRESTOR) 5 MG tablet Take 1 tablet (5 mg) by mouth daily 12/4/23   Luis Malone MD   Sesame Oil OIL Take 1 capsule by mouth daily    Reported, Patient   thyroid (ARMOUR) 15 MG tablet Take 1 tablet (15 mg) by mouth daily 3/20/24   Vidhi Narayanan, NP   triamcinolone (KENALOG) 0.1 % external cream Apply topically 3 times daily 2/7/22   Alexis York MD       ALLERGIES:   Allergies   Allergen Reactions    Bee Venom Anaphylaxis    Benazepril Cough    Ciprofloxacin Other (See Comments)     Possible GI illness    Erythromycin Other (See Comments)     Unsure of allergy time, possible hives.    Gluten Meal GI Disturbance    Hydrochlorothiazide      Other reaction(s): Hyponatremia    Penicillins Other (See Comments)     Unsure of allergy time, possible hives        PAST MEDICAL HISTORY:   Past Medical History:   Diagnosis Date    Asymptomatic menopausal state     on estrogen    Chronic kidney disease, stage I     No Comments Provided    Dermatitis     No Comments Provided    Essential (primary) hypertension     No Comments Provided     Hyperlipidemia     No Comments Provided    Hypothyroidism     No Comments Provided    Other specified symptoms and signs involving the circulatory and respiratory systems     No Comments Provided    Paroxysmal atrial fibrillation (H)     No Comments Provided        PAST SURGICAL HISTORY:   Past Surgical History:   Procedure Laterality Date    APPENDECTOMY OPEN      No Comments Provided    CHOLECYSTECTOMY      No Comments Provided    HYSTERECTOMY TOTAL ABDOMINAL      No Comments Provided        FAMILY HISTORY:   Family History   Problem Relation Age of Onset    Coronary Artery Disease Mother     Hypertension Mother     Heart Disease Mother         Heart Disease    Heart Disease Sister         Heart Disease,    Heart Disease Sister         Heart Disease,    Other - See Comments Sister         at 6 months    Other - See Comments Brother           in service    Other - See Comments Brother          of Parkinson's    Other - See Comments Brother          of pneumonia    Other - See Comments Other         No cancer or diabetes in the family        SOCIAL HISTORY:   Social History     Socioeconomic History    Marital status:     Years of education: 12   Tobacco Use    Smoking status: Never    Smokeless tobacco: Never   Vaping Use    Vaping status: Never Used   Substance and Sexual Activity    Alcohol use: No     Alcohol/week: 0.0 standard drinks of alcohol    Drug use: Never    Sexual activity: Not Currently     Partners: Male   Social History Narrative    .  Lives in town.  Daughter lives next door and works for the [employer].     Social Determinants of Health     Financial Resource Strain: Low Risk  (3/20/2024)    Financial Resource Strain     Within the past 12 months, have you or your family members you live with been unable to get utilities (heat, electricity) when it was really needed?: No   Food Insecurity: Low Risk  (2024)    Food Insecurity     Within the past 12 months,  did you worry that your food would run out before you got money to buy more?: No     Within the past 12 months, did the food you bought just not last and you didn t have money to get more?: No   Transportation Needs: Low Risk  (3/20/2024)    Transportation Needs     Within the past 12 months, has lack of transportation kept you from medical appointments, getting your medicines, non-medical meetings or appointments, work, or from getting things that you need?: No   Physical Activity: Insufficiently Active (3/20/2024)    Exercise Vital Sign     Days of Exercise per Week: 7 days     Minutes of Exercise per Session: 20 min   Stress: No Stress Concern Present (3/20/2024)    [place] [employer] - Occupational Stress Questionnaire     Feeling of Stress : Not at all   Social Connections: Unknown (3/20/2024)    Social Connection and Isolation Panel [NHANES]     Frequency of Social Gatherings with Friends and Family: Three times a week   Interpersonal Safety: Low Risk  (4/24/2024)    Interpersonal Safety     Do you feel physically and emotionally safe where you currently live?: Yes     Within the past 12 months, have you been hit, slapped, kicked or otherwise physically hurt by someone?: No     Within the past 12 months, have you been humiliated or emotionally abused in other ways by your partner or ex-partner?: No   Housing Stability: Low Risk  (3/20/2024)    Housing Stability     Do you have housing? : Yes     Are you worried about losing your housing?: No          ROS:   CONSTITUTIONAL: No weight loss, fever, chills, weakness or fatigue.   CARDIOVASCULAR: No chest pain, chest pressure or chest discomfort. No palpitations or lower extremity edema.   RESPIRATORY: No shortness of breath, dyspnea upon exertion, cough or sputum production.   NEUROLOGICAL: No headache, lightheadedness, dizziness, syncope, ataxia or weakness.   HEMATOLOGIC: No anemia, bleeding or bruising.         PHYSICAL EXAM:   GENERAL:  The patient is a well-developed, well-nourished, in no apparent distress. Alert and oriented x3.   HEART: Regular rate and rhythm, S1S2 present without murmur, rub or gallop.   LUNGS: Respirations regular and unlabored. Clear to auscultation.   EXTREMITIES: No peripheral edema present.   SKIN: No jaundice. No rashes or visible skin lesions present.        LAB RESULTS:   Office Visit on 04/24/2024   Component Date Value Ref Range Status    Color Urine 04/24/2024 Yellow  Colorless, Straw, Light Yellow, Yellow Final    Appearance Urine 04/24/2024 Clear  Clear Final    Glucose Urine 04/24/2024 Negative  Negative mg/dL Final    Bilirubin Urine 04/24/2024 Negative  Negative Final    Ketones Urine 04/24/2024 Negative  Negative mg/dL Final    Specific Gravity Urine 04/24/2024 1.017  1.000 - 1.030 Final    Blood Urine 04/24/2024 Large (A)  Negative Final    pH Urine 04/24/2024 6.5  5.0 - 9.0 Final    Protein Albumin Urine 04/24/2024 10 (A)  Negative mg/dL Final    Urobilinogen Urine 04/24/2024 Normal  Normal, 2.0 mg/dL Final    Nitrite Urine 04/24/2024 Positive (A)  Negative Final    Leukocyte Esterase Urine 04/24/2024 Large (A)  Negative Final    Bacteria Urine 04/24/2024 Few (A)  None Seen /HPF Final    RBC Urine 04/24/2024 32 (H)  <=2 /HPF Final    WBC Urine 04/24/2024 93 (H)  <=5 /HPF Final    Culture 04/24/2024 >100,000 CFU/mL Escherichia coli (A)   Final    TSH 04/24/2024 4.41 (H)  0.30 - 4.20 uIU/mL Final    Hold Specimen 04/24/2024 x   Final    Free T4 04/24/2024 1.22  0.90 - 1.70 ng/dL Final   Lab on 03/04/2024   Component Date Value Ref Range Status    WBC Count 03/04/2024 7.5  4.0 - 11.0 10e3/uL Final    RBC Count 03/04/2024 5.16  3.80 - 5.20 10e6/uL Final    Hemoglobin 03/04/2024 16.4 (H)  11.7 - 15.7 g/dL Final    Hematocrit 03/04/2024 49.3 (H)  35.0 - 47.0 % Final    MCV 03/04/2024 96  78 - 100 fL Final    MCH 03/04/2024 31.8  26.5 - 33.0 pg Final    MCHC 03/04/2024 33.3  31.5 - 36.5 g/dL Final    RDW  03/04/2024 13.8  10.0 - 15.0 % Final    Platelet Count 03/04/2024 169  150 - 450 10e3/uL Final    Sodium 03/04/2024 140  135 - 145 mmol/L Final    Potassium 03/04/2024 4.5  3.4 - 5.3 mmol/L Final    Chloride 03/04/2024 102  98 - 107 mmol/L Final    Carbon Dioxide (CO2) 03/04/2024 28  22 - 29 mmol/L Final    Anion Gap 03/04/2024 10  7 - 15 mmol/L Final    Urea Nitrogen 03/04/2024 28.6 (H)  8.0 - 23.0 mg/dL Final    Creatinine 03/04/2024 1.01 (H)  0.51 - 0.95 mg/dL Final    GFR Estimate 03/04/2024 52 (L)  >60 mL/min/1.73m2 Final    Calcium 03/04/2024 9.8 (H)  8.2 - 9.6 mg/dL Final    Glucose 03/04/2024 110 (H)  70 - 99 mg/dL Final    Cholesterol 03/04/2024 135  <200 mg/dL Final    Triglycerides 03/04/2024 64  <150 mg/dL Final    Direct Measure HDL 03/04/2024 66  >=50 mg/dL Final    LDL Cholesterol Calculated 03/04/2024 56  <=100 mg/dL Final    Non HDL Cholesterol 03/04/2024 69  <130 mg/dL Final    Patient Fasting > 8hrs? 03/04/2024 Yes   Final    TSH 03/04/2024 16.90 (H)  0.30 - 4.20 uIU/mL Final    Free T4 03/04/2024 1.27  0.90 - 1.70 ng/dL Final    Hemoglobin A1C 03/04/2024 5.9  4.0 - 6.2 % Final          ASSESSMENT:       ICD-10-CM    1. Permanent atrial fibrillation (H)  I48.21 Adult Cardiology Eval  Referral     EKG 12-lead, tracing only      2. On continuous oral anticoagulation  Z79.01       3. History of CVA on 3/26/2021  Z86.73       4. Cerebellar stroke (H)  I63.9 Adult Cardiology Eval  Referral     EKG 12-lead, tracing only      5. Mixed hyperlipidemia  E78.2 Adult Cardiology Eval  Referral     EKG 12-lead, tracing only      6. Essential hypertension  [facility]     EKG 12-lead, tracing only      7. Pre-diabetes  R73.03       8. Aneurysm of ascending aorta without rupture (H24)  I71.21       9. Stage 3a chronic kidney disease (H)  N18.31       10. PAD (peripheral artery disease) (H24)  I73.9       11. Bilateral femoral artery stenosis (H24)   I70.203     On CT scan from 3/25/2021      12. Subclavian artery stenosis, left (H24)  I77.1     90% 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      13. Coronary artery disease involving native coronary artery of native heart without angina pectoris  I25.10     Moderate on CTA chest 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      14. Mesenteric artery stenosis (H24)  K55.1     High-grade 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      15. Peripheral edema  R60.0       16. Acute on chronic heart failure with preserved ejection fraction (H)  I50.33       17. Basilar artery stenosis/occlusion  I65.1       18. Biatrial enlargement  I51.7             PLAN:   1.  A-fib: Permanent. The oldest EKG on record was 12/13/2012 and that showed A-fib.  Today, she is rate controlled at 74 bpm on Eliquis and diltiazem 120 mg daily.  She does not meet the requirement for a lesser dose of Eliquis.  Patient has been asymptomatic related to A-fib.  No changes recommended.  Offered an explanation of A-fib but declined.  Continue diltiazem and Eliquis.  2.  History of CVA: x2 occurring in 2021 and 2022.  She reports minimal defects from her stroke.  Recommended medication changes with addition of aspirin 81 mg daily and increasing statin from Crestor 5 mg to preferably 20-40 mg daily, declined.  They were unwilling to make any changes today.  3.  PAD: Has significant vertebral artery, basilar, potential coronary artery disease, mesenteric, femoral artery bilaterally and severe left subclavian artery stenosis.  Tips of fingers are regularly blue on the left.  Suggested aspirin and increase of Crestor with possible referral to vascular medicine but adamantly declined by daughter.  4.  Ascending aortic aneurysm.  Echocardiogram on 11/23/2022 shows a diameter of 4.5 cm.  Suggested a repeat echocardiogram in follow-up but declined.  5.  Hyperlipidemia: Recommended tighter control with a goal of LDL less than 55, declined.  6.  Hypertension: Uncontrolled.  Recommended changes but declined.  7.  Peripheral edema: Likely diastolic in  nature.  Indeterminate diastolic function on 11/3/2022 with a normal EF.  Suggested echocardiogram and at minimum diuretic.  Declined.  Previously on hydrochlorothiazide and they believe is the cause of A-fib.  8.  CKD-3: Patient aware.  9.  Unfortunately, patient and daughter declined all interventions/changes/workup/treatment.  Daughter appeared rather irritated with being here at this visit today.  I believe they have chosen to follow-up on an as-needed basis.    Total time spent on day of visit, including review of tests, obtaining/reviewing separately obtained history, ordering medications/tests/procedures, communicating with PCP/consultants, and documenting in electronic medical record: 60 minutes.          Thank you for allowing me to participate in the care of your patient. Please do not hesitate to contact me if you have any questions.     Kevin Wall, DO

## 2024-05-02 NOTE — NURSING NOTE
Patient comes in for new consult for Hyperlipidemia, cerebrale stroke, AF.    Chief Complaint   Patient presents with    Consult For     Hyperlipidemia, cerebrale stroke, AF       Initial BP (!) 164/70 (BP Location: Right arm, Patient Position: Sitting, Cuff Size: Adult Regular)   Pulse 74   Temp 98.7  F (37.1  C) (Temporal)   Resp 16   Wt 66.7 kg (147 lb)   LMP 01/25/1974 (Approximate)   SpO2 98%   BMI 28.71 kg/m   Estimated body mass index is 28.71 kg/m  as calculated from the following:    Height as of 4/24/24: 1.524 m (5').    Weight as of this encounter: 66.7 kg (147 lb).  Meds Reconciled: complete  Pt is not on Aspirin  Pt is on a Statin  PHQ and/or KAN reviewed. Pt referred to PCP/MH Provider as appropriate.    Naye Braga LPN

## 2024-05-03 ENCOUNTER — NURSE TRIAGE (OUTPATIENT)
Dept: NURSING | Facility: CLINIC | Age: 89
End: 2024-05-03
Payer: COMMERCIAL

## 2024-05-04 ENCOUNTER — HOSPITAL ENCOUNTER (EMERGENCY)
Facility: OTHER | Age: 89
Discharge: HOME | End: 2024-05-04
Attending: INTERNAL MEDICINE | Admitting: INTERNAL MEDICINE
Payer: MEDICARE

## 2024-05-04 VITALS
RESPIRATION RATE: 16 BRPM | HEIGHT: 64 IN | SYSTOLIC BLOOD PRESSURE: 156 MMHG | DIASTOLIC BLOOD PRESSURE: 78 MMHG | HEART RATE: 63 BPM | TEMPERATURE: 97.1 F | WEIGHT: 144 LBS | OXYGEN SATURATION: 95 % | BODY MASS INDEX: 24.59 KG/M2

## 2024-05-04 DIAGNOSIS — Z78.9 STATIN INTOLERANCE: ICD-10-CM

## 2024-05-04 DIAGNOSIS — N39.0 RECURRENT UTI: Primary | ICD-10-CM

## 2024-05-04 DIAGNOSIS — N18.31 STAGE 3A CHRONIC KIDNEY DISEASE (H): ICD-10-CM

## 2024-05-04 DIAGNOSIS — E78.2 MIXED HYPERLIPIDEMIA: Chronic | ICD-10-CM

## 2024-05-04 DIAGNOSIS — R15.9 INCONTINENCE OF FECES, UNSPECIFIED FECAL INCONTINENCE TYPE: ICD-10-CM

## 2024-05-04 DIAGNOSIS — Z86.73 HISTORY OF CVA (CEREBROVASCULAR ACCIDENT): ICD-10-CM

## 2024-05-04 LAB
ALBUMIN UR-MCNC: 70 MG/DL
APPEARANCE UR: ABNORMAL
ATRIAL RATE - MUSE: NORMAL BPM
BACTERIA #/AREA URNS HPF: ABNORMAL /HPF
BILIRUB UR QL STRIP: NEGATIVE
COLOR UR AUTO: YELLOW
DIASTOLIC BLOOD PRESSURE - MUSE: NORMAL MMHG
GLUCOSE UR STRIP-MCNC: NEGATIVE MG/DL
HGB UR QL STRIP: ABNORMAL
INTERPRETATION ECG - MUSE: NORMAL
KETONES UR STRIP-MCNC: NEGATIVE MG/DL
LEUKOCYTE ESTERASE UR QL STRIP: ABNORMAL
NITRATE UR QL: NEGATIVE
P AXIS - MUSE: NORMAL DEGREES
PH UR STRIP: 6.5 [PH] (ref 5–9)
PR INTERVAL - MUSE: NORMAL MS
QRS DURATION - MUSE: 62 MS
QT - MUSE: 384 MS
QTC - MUSE: 423 MS
R AXIS - MUSE: -48 DEGREES
RBC URINE: >182 /HPF
SP GR UR STRIP: 1.02 (ref 1–1.03)
SYSTOLIC BLOOD PRESSURE - MUSE: NORMAL MMHG
T AXIS - MUSE: 60 DEGREES
UROBILINOGEN UR STRIP-MCNC: NORMAL MG/DL
VENTRICULAR RATE- MUSE: 73 BPM
WBC CLUMPS #/AREA URNS HPF: PRESENT /HPF
WBC URINE: >182 /HPF

## 2024-05-04 PROCEDURE — 87186 SC STD MICRODIL/AGAR DIL: CPT | Performed by: INTERNAL MEDICINE

## 2024-05-04 PROCEDURE — 81001 URINALYSIS AUTO W/SCOPE: CPT | Performed by: INTERNAL MEDICINE

## 2024-05-04 PROCEDURE — 87086 URINE CULTURE/COLONY COUNT: CPT | Performed by: INTERNAL MEDICINE

## 2024-05-04 PROCEDURE — 99284 EMERGENCY DEPT VISIT MOD MDM: CPT

## 2024-05-04 PROCEDURE — 99284 EMERGENCY DEPT VISIT MOD MDM: CPT | Performed by: INTERNAL MEDICINE

## 2024-05-04 RX ORDER — EZETIMIBE 10 MG/1
10 TABLET ORAL DAILY
Qty: 90 TABLET | Refills: 4 | Status: SHIPPED | OUTPATIENT
Start: 2024-05-04 | End: 2024-05-22

## 2024-05-04 RX ORDER — CEPHALEXIN 500 MG/1
500 CAPSULE ORAL 3 TIMES DAILY
Qty: 30 CAPSULE | Refills: 0 | Status: SHIPPED | OUTPATIENT
Start: 2024-05-04 | End: 2024-05-22

## 2024-05-04 RX ORDER — CHOLECALCIFEROL (VITAMIN D3) 125 MCG
1 CAPSULE ORAL 2 TIMES DAILY
Qty: 180 CAPSULE | Refills: 4 | Status: SHIPPED | OUTPATIENT
Start: 2024-05-04

## 2024-05-04 ASSESSMENT — COLUMBIA-SUICIDE SEVERITY RATING SCALE - C-SSRS
6. HAVE YOU EVER DONE ANYTHING, STARTED TO DO ANYTHING, OR PREPARED TO DO ANYTHING TO END YOUR LIFE?: NO
2. HAVE YOU ACTUALLY HAD ANY THOUGHTS OF KILLING YOURSELF IN THE PAST MONTH?: NO
1. IN THE PAST MONTH, HAVE YOU WISHED YOU WERE DEAD OR WISHED YOU COULD GO TO SLEEP AND NOT WAKE UP?: NO

## 2024-05-04 ASSESSMENT — ACTIVITIES OF DAILY LIVING (ADL)
ADLS_ACUITY_SCORE: 36
ADLS_ACUITY_SCORE: 36

## 2024-05-04 NOTE — ED PROVIDER NOTES
Emergency Department Provider Note  : 1931 Age: 89 year old Sex: female MRN: [id number]    Chief Complaint   Patient presents with    Abnormal Labs     Home testing shows possible UTI       Medical Decision Making / Assessment / Plan   89 year old female presenting with recurrent UTI -E. coli, stool incontinence, statin intolerance    ED Course as of 24 1030   Sat May 04, 2024   0905 Patient evaluated.  Presents with recurrent UTI.  Has been having stool incontinence since being started on statin therapy back in .  Recent E. coli UTI on , treated with Keflex 7 mg twice daily x 7 days.  Symptoms restarted yesterday evening and now this morning had blood leukocyte esterase and nitrites on urine dipstick at home.  Grossly abnormal urinalysis noted today.  Culture pending.  No flank pain.  Daughter was worried this might progress to pyelonephritis and brought her in for evaluation.  E. coli infection from  grew out pansensitive E. coli.   1001 Patient has multiple medication sensitivities.  She would like to restart Keflex but at higher dosing.  10 days instead of 7.  Prescription from [facility].  Recommend stopping Crestor for now, this seems to be the cause of her stool incontinence.  Stool incontinence appears to be the cause of her frequent UTI.  -Start Zetia 10 mg once daily.  Prescription sent to [facility] pharmacy.  Consider starting high-dose cranberry tablets twice daily.  Prescription sent to [facility] pharmacy.  Has follow-up appointment with outpatient provider on May 7.  -May need her [place] Thyroid dose increased slightly.        A shared decision making model was used. Plan and all results were discussed  Time was taken to answer all questions. Patient and/or associated parties understood and were agreeable to treatment plan.  Strict return to Emergency Department precautions as well as appropriate follow up instructions were provided. The patient was discharged in stable  condition.    Discharge Medication List as of 5/4/2024 10:10 AM        START taking these medications    Details   cephALEXin (KEFLEX) 500 MG capsule Take 1 capsule (500 mg) by mouth 3 times daily, Disp-30 capsule, R-0, [facility]      Cranberry-Vitamin C (CRANBERRY CONCENTRATE/VITAMINC) 89483-973 MG CAPS Take 1 capsule by mouth 2 times daily - for UTI prevention, Disp-180 capsule, R-4, E-Prescribe      ezetimibe (ZETIA) 10 MG tablet Take 1 tablet (10 mg) by mouth daily - for cholesterol + stop Crestor (causing stool incontinence / frequent UTI), Disp-90 tablet, R-4, E-Prescribe             Final diagnoses:   Recurrent UTI   Incontinence of feces, unspecified fecal incontinence type -- Since starting Statin Medications   History of CVA on 3/26/2021   Stage 3a chronic kidney disease (H)   Mixed hyperlipidemia   Statin intolerance       Dwayne Gaona MD  5/4/2024   Emergency Department    Ramila Saldivar is a 89 year old female who presents at  9:03 AM with stool incontinence since being started on statin therapy back in 2021, following stroke.  Initially was on Lipitor, that caused diarrhea.  This was noted by neurology that this is fairly common side effect.  Subsequently changed over to Crestor 5 mg daily.  Still having frequent diarrhea.  Daughter states that she has to do frequent wiping and cleaning of her ARIC area and is concerned this is causing her bladder infections.  Has had E. coli UTI twice now recently.  Most recently on April 24.  Pansensitive E. coli.    Daughter has been checking her urine samples at home with a urine stick and since developing some urinary symptoms again yesterday, after finishing oral antibiotics and Wednesday, had some mild abnormalities noted but this morning had large abnormalities including blood leukocyte esterase and nitrites.  Subsequently brought in for UTI evaluation and treatment.    Recently had thyroid dosing adjusted.  She only tolerates [place] Thyroid.  TSH was  "16 now 14.1.  She has not been feeling great since having her dose adjusted.  May need to have dose adjusted further, possible target TSH of 1.0.  Daughter plans to talk with primary care about this later this week.    Given the fecal incontinence, recommend stopping statin therapy.  Start Zetia.  This seems to be contributing to frequent UTI.    Kidney function appears at baseline.    Recommend consideration to start high-dose cranberry tablets twice daily and Zetia.  Prescription sent to pharmacy.    Prescription for Keflex obtained from [facility].  500 mg 3 times daily x 10 days.    I have reviewed the Medications, Allergies, Past Medical and Surgical History, and Social History in the [employer] [facility] and with family.    Review of Systems:  Please see Subjective / HPI for pertinent positives and negatives. All other systems reviewed and found to be negative.      Objective   Patient Vitals for the past 24 hrs:   BP Temp Temp src Pulse Resp SpO2 Height Weight   05/04/24 0858 (!) 156/78 97.1  F (36.2  C) Temporal 63 16 95 % 1.626 m (5' 4\") 65.3 kg (144 lb)       Physical Exam:     General: Awake, alert, in no acute respiratory distress.  Head: Normocephalic, atraumatic.  Eyes: No conjunctival injection and normal lids. PERRL, EOMI.  ENT: Moist membranes, external ear appears normal.   Chest/Respiratory: Unlabored respiratory effort. Equal chest rise, clear bilaterally.  Cardiovascular: Peripheral pulses present, regular rate and rhythm.  Abdominal: Soft, non-distended, no CVA tenderness to palpation. Mild Suprapubic tenderness to palpation.   Extremities: No obvious long bone deformity.  Neurological: GCS 15, extremities without gross deficit.  Skin: Warm, no rashes, lesions, or bruising.   Psychiatric: Appropriate affect.     Procedures / Critical Care   Procedures    Aggregate Critical Care Time: None.     Orders Placed This Encounter   Procedures    UA with Microscopic reflex to Culture    Urine Culture "       RESULTS: As noted above.          Medical/Surgical History:  Past Medical History:   Diagnosis Date    Asymptomatic menopausal state     on estrogen    Chronic kidney disease, stage I     No Comments Provided    Dermatitis     No Comments Provided    Essential (primary) hypertension     No Comments Provided    Hyperlipidemia     No Comments Provided    Hypothyroidism     No Comments Provided    Other specified symptoms and signs involving the circulatory and respiratory systems     No Comments Provided    Paroxysmal atrial fibrillation (H)     No Comments Provided     Past Surgical History:   Procedure Laterality Date    APPENDECTOMY OPEN      No Comments Provided    CHOLECYSTECTOMY      No Comments Provided    HYSTERECTOMY TOTAL ABDOMINAL      No Comments Provided       Medications:  No current facility-administered medications for this encounter.     Current Outpatient Medications   Medication Sig Dispense Refill    cephALEXin (KEFLEX) 500 MG capsule Take 1 capsule (500 mg) by mouth 3 times daily 30 capsule 0    Cranberry-Vitamin C (CRANBERRY CONCENTRATE/VITAMINC) 35652-687 MG CAPS Take 1 capsule by mouth 2 times daily - for UTI prevention 180 capsule 4    ezetimibe (ZETIA) 10 MG tablet Take 1 tablet (10 mg) by mouth daily - for cholesterol + stop Crestor (causing stool incontinence / frequent UTI) 90 tablet 4    apixaban ANTICOAGULANT (ELIQUIS ANTICOAGULANT) 5 MG tablet Take 1 tablet (5 mg) by mouth 2 times daily 180 tablet 4    ARMOUR THYROID 90 MG tablet Take 1 tablet (90 mg) by mouth daily 90 tablet 4    Cholecalciferol 10 MCG (400 UNIT) CAPS Take 800 Units by mouth daily      diltiazem ER COATED BEADS (CARDIZEM CD/CARTIA XT) 120 MG 24 hr capsule Take 1 capsule (120 mg) by mouth daily 90 capsule 4    fish oil-omega-3 fatty acids 1000 MG capsule Take 1,000 capsules by mouth daily      Flaxseed Oil OIL Take daily, puts liquid on oatmeal every morning      latanoprost (XALATAN) 0.005 % ophthalmic solution  Place 1 drop into both eyes At Bedtime       losartan (COZAAR) 25 MG tablet Take 1 tablet (25 mg) by mouth daily 90 tablet 4    Sesame Oil OIL Take 1 capsule by mouth daily      thyroid (ARMOUR) 15 MG tablet Take 1 tablet (15 mg) by mouth daily 90 tablet 3    triamcinolone (KENALOG) 0.1 % external cream Apply topically 3 times daily 454 g 4       Allergies:  Bee venom, Benazepril, Ciprofloxacin, Erythromycin, Gluten meal, Hydrochlorothiazide, and Penicillins    Nursing notes were reviewed.  Past medical history, past surgical history, problem list, family history, social history, medication list, and allergies were reviewed as documented in epic snapshot. Relevant review of systems are documented within the HPI above.    Statin intolerance     Dwayne Gaona MD  05/04/24 1803

## 2024-05-04 NOTE — ED TRIAGE NOTES
"Pt is here today with her daughter for a possible UTI.  Per pt, her daughter checks her urine every morning.  Daughter states that her urine showed blood, nitrates and lococytes.    Pt was recently treated for a UTI, took her last antibiotic on Wednesday morning.   Pt denies any pain with urination or increased urination (although daughter states that she is voiding more)  BP (!) 156/78   Pulse 63   Temp 97.1  F (36.2  C) (Temporal)   Resp 16   Ht 1.626 m (5' 4\")   Wt 65.3 kg (144 lb)   LMP 01/25/1974 (Approximate)   SpO2 95%   BMI 24.72 kg/m    Hayley Combs RN on 5/4/2024 at 9:02 AM       Triage Assessment (Adult)       Row Name 05/04/24 0900          Triage Assessment    Airway WDL WDL        Respiratory WDL    Respiratory WDL WDL        Skin Circulation/Temperature WDL    Skin Circulation/Temperature WDL WDL        Cardiac WDL    Cardiac WDL WDL        Peripheral/Neurovascular WDL    Peripheral Neurovascular WDL WDL        Cognitive/Neuro/Behavioral WDL    Cognitive/Neuro/Behavioral WDL WDL        Ilya Coma Scale    Best Eye Response 4-->(E4) spontaneous     Best Motor Response 6-->(M6) obeys commands     Best Verbal Response 5-->(V5) oriented     [place] Coma Scale Score 15                     "

## 2024-05-04 NOTE — TELEPHONE ENCOUNTER
Nurse Triage SBAR    Is this a 2nd Level Triage? NO    Situation: Pt and daughter calling with concerns for a UTI.    Background: Pt was on an abx for a UTI on 4/24 which she took and finished.    Assessment: Pt is having no symptoms other than new incontinence which started tonight. Daughter did test urine with at home test which states she had leukocytes in her urine. Denies fever, pain, dysuria, hematuria.     Protocol Recommended Disposition:   See PCP Within 24 Hours, Home Care    Recommendation: Recommendation to be seen within the next 24  hours. They will talk about it and decide if they will go back in tonight.     Reason for Disposition   [1] Reasonable improvement on antibiotics AND [2] no fever   [1] Can't control passage of urine (i.e., urinary incontinence) AND [2] new-onset (< 2 weeks) or worsening    Additional Information   Negative: Sounds like a life-threatening emergency to the triager   Negative: Shock suspected (e.g., cold/pale/clammy skin, too weak to stand, low BP, rapid pulse)   Negative: Patient sounds very sick or weak to the triager   Negative: [1] Unable to urinate (or only a few drops) > 4 hours AND [2] bladder feels very full (e.g., palpable bladder or strong urge to urinate)   Negative: Passing pure blood or large blood clots (i.e., size > a dime)  (Exceptions: Flecks, small strands, or pinkish-red color)   Negative: Fever > 103 F (39.4 C)   Negative: [1] SEVERE pain (e.g., excruciating) AND [2] no improvement 2 hours after pain medications   Negative: [1] Fever > 100.0 F (37.8 C) AND [2] new-onset since starting antibiotics   Negative: [1] Side (flank) or lower back pain AND [2] new-onset since starting antibiotics   Negative: [1] Taking antibiotic > 24 hours for UTI AND [2] flank or lower back pain getting WORSE   Negative: [1] Vomiting 2 or more times AND [2] interferes with taking oral antibiotic   Negative: [1] Taking antibiotic > 24 hours for UTI (urinary tract or bladder  infection) AND [2] fever persists (still has fever)   Negative: [1] Taking antibiotic > 72 hours (3 days) for UTI AND [2] painful urination or frequency is SAME (unchanged, not better)   Negative: [1] Taking antibiotic > 72 hours (3 days) for UTI AND [2] flank or lower back pain is SAME (unchanged, not better)   Negative: Diabetes mellitus or weak immune system (e.g., HIV positive, cancer chemo, splenectomy, organ transplant, chronic steroids)   Negative: Sickle cell disease   Negative: Shock suspected (e.g., cold/pale/clammy skin, too weak to stand, low BP, rapid pulse)   Negative: Sounds like a life-threatening emergency to the triager   Negative: [1] Unable to urinate (or only a few drops) > 4 hours AND [2] bladder feels very full (e.g., palpable bladder or strong urge to urinate)   Negative: [1] Decreased urination and [2] drinking very little AND [2] dehydration suspected (e.g., dark urine, no urine > 12 hours, very dry mouth, very lightheaded)   Negative: Patient sounds very sick or weak to the triager   Negative: Fever > 100.4 F (38.0 C)   Negative: Side (flank) or lower back pain present   Negative: Urinating more frequently than usual (i.e., frequency)    Protocols used: Urinary Tract Infection on Antibiotic Follow-up Call - Female-SARTHAK, Urinary Symptoms-CHRISTO-JENNIFER Monreal RN  [facility] Nurse Advisor   5/3/2024  9:18 PM

## 2024-05-04 NOTE — DISCHARGE INSTRUCTIONS
Recurrent UTI    START:  - cephALEXin (KEFLEX) 500 MG capsule; Take 1 capsule (500 mg) by mouth 3 times daily    Consider starting:  - Cranberry-Vitamin C (CRANBERRY CONCENTRATE/VITAMINC) 37129-157 MG CAPS; Take 1 capsule by mouth 2 times daily - for UTI prevention    Incontinence of feces, unspecified fecal incontinence type -- Since starting Statin Medications  -- hopefully improved once stopping Crestor / Statin therapy.     History of CVA on 3/26/2021  START:   - ezetimibe (ZETIA) 10 MG tablet; Take 1 tablet (10 mg) by mouth daily - for cholesterol + stop Crestor (causing stool incontinence / frequent UTI)    Stage 3a chronic kidney disease (H)    Mixed hyperlipidemia    START:   - ezetimibe (ZETIA) 10 MG tablet; Take 1 tablet (10 mg) by mouth daily - for cholesterol + stop Crestor (causing stool incontinence / frequent UTI)        Return to see your primary care provider this week as scheduled.     Clinic : [phone]  Appointment line: [phone]

## 2024-05-06 ENCOUNTER — TELEPHONE (OUTPATIENT)
Dept: EMERGENCY MEDICINE | Facility: OTHER | Age: 89
End: 2024-05-06
Payer: COMMERCIAL

## 2024-05-06 LAB — BACTERIA UR CULT: ABNORMAL

## 2024-05-06 NOTE — TELEPHONE ENCOUNTER
[facility]    Reason for call: Lab Result Notification     Lab Result (including Rx patient on, if applicable).  If culture, copy of lab report at bottom.  Lab Result: See below  Cephalexin (Keflex) 500 mg capsule, 1 capsule (500 mg) by mouth 3 times daily for 10 days.     Creatinine Level (mg/dl)   Creatinine   Date Value Ref Range Status   03/04/2024 1.01 (H) 0.51 - 0.95 mg/dL Final   06/03/2021 0.91 0.60 - 1.20 mg/dL Final    Creatinine clearance (ml/min), if applicable    Serum creatinine: 1.01 mg/dL (H) 03/04/24 0711  Estimated creatinine clearance: 35.9 mL/min (A)     Patient's current Symptoms:   Unable to reach patient and unable to leave .    RN Recommendations/Instructions per [place] ED lab result protocol:   [facility] ED lab result protocol utilized: Urine culture      Lee Ramirez RN

## 2024-05-07 ENCOUNTER — DOCUMENTATION ONLY (OUTPATIENT)
Dept: INTERNAL MEDICINE | Facility: OTHER | Age: 89
End: 2024-05-07
Payer: COMMERCIAL

## 2024-05-07 NOTE — PROGRESS NOTES
Patient's daughter came and for her own appointment.  She had questions about her mother.  Betsy was not present for visit.  Daughter reports that Betsy has had recurrent urinary tract infection.  She was in the emergency department in the last few days and treated with another infection.  She believes these are occurring because she has been having loose incontinent bowel movements at times.  She believes this is related to statin.  She has reduced the dose of the statin and the loose bowel movements and episodes of incontinence have improved but have not resolved.  She states that over the past 3 to 4 days she did stop the cholesterol-lowering medication and found that the bowel movements are improving.  She is wondering if it would be appropriate to leave her off of the cholesterol medication.  Risk and benefits reviewed and discussed as patient has history of CVA in the past.  She will need to discuss this further with her mother and then make the decision between the 2 of them as to what would be best.  She also reports that a prescription for Zetia was provided and after reading the side effects she has not started her mother on that medication nor does she want to.  She is also wanting to start her on probiotic fiber powder.  Recommended this would be appropriate starting at the lowest dose and titrating upward if the bowel issues continue.  She also is planning to have a follow-up visit in the next few weeks to repeat thyroid labs.

## 2024-05-22 ENCOUNTER — MYC MEDICAL ADVICE (OUTPATIENT)
Dept: INTERNAL MEDICINE | Facility: OTHER | Age: 89
End: 2024-05-22

## 2024-05-22 ENCOUNTER — OFFICE VISIT (OUTPATIENT)
Dept: INTERNAL MEDICINE | Facility: OTHER | Age: 89
End: 2024-05-22
Attending: NURSE PRACTITIONER
Payer: COMMERCIAL

## 2024-05-22 VITALS
DIASTOLIC BLOOD PRESSURE: 84 MMHG | SYSTOLIC BLOOD PRESSURE: 124 MMHG | TEMPERATURE: 97.6 F | WEIGHT: 146.6 LBS | HEART RATE: 73 BPM | OXYGEN SATURATION: 98 % | RESPIRATION RATE: 16 BRPM | BODY MASS INDEX: 25.16 KG/M2

## 2024-05-22 DIAGNOSIS — E03.9 ACQUIRED HYPOTHYROIDISM: Primary | ICD-10-CM

## 2024-05-22 DIAGNOSIS — R15.9 FULL INCONTINENCE OF FECES: ICD-10-CM

## 2024-05-22 DIAGNOSIS — E03.9 ACQUIRED HYPOTHYROIDISM: ICD-10-CM

## 2024-05-22 DIAGNOSIS — N30.01 ACUTE CYSTITIS WITH HEMATURIA: ICD-10-CM

## 2024-05-22 DIAGNOSIS — E03.9 ACQUIRED HYPOTHYROIDISM: Primary | Chronic | ICD-10-CM

## 2024-05-22 DIAGNOSIS — L23.9 CONTACT ALLERGIC REACTION: ICD-10-CM

## 2024-05-22 DIAGNOSIS — L84 CORN OR CALLUS: ICD-10-CM

## 2024-05-22 DIAGNOSIS — Z78.9 STATIN INTOLERANCE: ICD-10-CM

## 2024-05-22 LAB
ALBUMIN UR-MCNC: NEGATIVE MG/DL
APPEARANCE UR: CLEAR
BILIRUB UR QL STRIP: NEGATIVE
COLOR UR AUTO: NORMAL
GLUCOSE UR STRIP-MCNC: NEGATIVE MG/DL
HGB UR QL STRIP: NEGATIVE
KETONES UR STRIP-MCNC: NEGATIVE MG/DL
LEUKOCYTE ESTERASE UR QL STRIP: NEGATIVE
NITRATE UR QL: NEGATIVE
PH UR STRIP: 6.5 [PH] (ref 5–9)
SP GR UR STRIP: 1.01 (ref 1–1.03)
T4 FREE SERPL-MCNC: 1.14 NG/DL (ref 0.9–1.7)
TSH SERPL DL<=0.005 MIU/L-ACNC: 6.54 UIU/ML (ref 0.3–4.2)
UROBILINOGEN UR STRIP-MCNC: NORMAL MG/DL

## 2024-05-22 PROCEDURE — 36415 COLL VENOUS BLD VENIPUNCTURE: CPT | Mod: ZL | Performed by: NURSE PRACTITIONER

## 2024-05-22 PROCEDURE — 84439 ASSAY OF FREE THYROXINE: CPT | Mod: ZL | Performed by: NURSE PRACTITIONER

## 2024-05-22 PROCEDURE — 99214 OFFICE O/P EST MOD 30 MIN: CPT | Performed by: NURSE PRACTITIONER

## 2024-05-22 PROCEDURE — G0463 HOSPITAL OUTPT CLINIC VISIT: HCPCS

## 2024-05-22 PROCEDURE — 84443 ASSAY THYROID STIM HORMONE: CPT | Mod: ZL | Performed by: NURSE PRACTITIONER

## 2024-05-22 PROCEDURE — G2211 COMPLEX E/M VISIT ADD ON: HCPCS | Performed by: NURSE PRACTITIONER

## 2024-05-22 PROCEDURE — 81003 URINALYSIS AUTO W/O SCOPE: CPT | Mod: ZL | Performed by: NURSE PRACTITIONER

## 2024-05-22 RX ORDER — THYROID 15 MG/1
TABLET ORAL
Qty: 135 TABLET | Refills: 4 | Status: SHIPPED | OUTPATIENT
Start: 2024-05-22

## 2024-05-22 ASSESSMENT — PAIN SCALES - GENERAL: PAINLEVEL: NO PAIN (0)

## 2024-05-22 NOTE — PROGRESS NOTES
ICD-10-CM    1. Acquired hypothyroidism  E03.9 TSH with free T4 reflex     TSH with free T4 reflex     T4 free      2. Acute cystitis with hematuria  N30.01 Urine Macroscopic with reflex to Microscopic     Urine Macroscopic with reflex to Microscopic      3. Statin intolerance  Z78.9       4. Full incontinence of feces  R15.9       5. Corn or callus  L84          Plan:  1.  Check TSH and free T4 at this time.  Dose adjustment depending upon results.  2.  Recheck UA UC.  Currently asymptomatic.  3.  Patient has discontinued Crestor and did not start Zetia.  Aware of associated risk with history of CVA.  4.  Still having some intermittent fecal incontinence.  Reviewed and discussed that she is on several medications that contain an oil base including sesame, flaxseed and fish oil.  Would recommend stopping these as could be contributing.  May want to add Metamucil powder 1 teaspoon daily or wafer cookies in the morning.  This may help to bulk up the stool.  5.  Recommend corn pad.  Follow-up with nail care RN.    The longitudinal plan of care for the diagnosis(es)/condition(s) as documented were addressed during this visit. Due to the added complexity in care, I will continue to support Janna in the subsequent management and with ongoing continuity of care.       Ramila Saldivar is a 89 year old, presenting for the following health issues:  RECHECK      5/22/2024    11:00 AM   Additional Questions   Roomed by Rhona PARMAR     She was in the emergency department recently for urinary leakage.  Daughter did urine dipstick at home which showed evidence of urinary tract infection.  She has been treated with Keflex.  Currently asymptomatic.  Done with the antibiotic.  Fecal incontinence and loose stools have improved significantly since stopping Crestor.  Zetia was prescribed while she was in the ED but medication not started due to potential side effects.  At this time they have decided not to proceed with  cholesterol-lowering medication with her history of CVA.  Wondering what else can be done to help with the loose stools and fecal incontinence.  She also has a corn on the second digit of the left foot.  She is followed by nail care RN.  She is also have podiatrist trim this down.  She has noticed more swelling in her legs since she started using exercise bike.  Saw cardiologist beginning of May.  No worsening of shortness of breath.  She was also told that she would need to have orders placed by a physician in order to receive CADI waiver.  They would like to schedule an appointment with Dr. Gaona as he followed them while she was in ED and they were pleased with his care.    History of Present Illness       Hypothyroidism:     Since last visit, patient describes the following symptoms::  Dry skin    Vascular Disease:  She presents for follow up of vascular disease.     She never takes nitroglycerin. She is not taking daily aspirin.    She eats 4 or more servings of fruits and vegetables daily.She consumes 0 sweetened beverage(s) daily.She exercises with enough effort to increase her heart rate 9 or less minutes per day.  She exercises with enough effort to increase her heart rate 3 or less days per week.   She is taking medications regularly.                     Objective    /84 (BP Location: Right arm, Patient Position: Sitting, Cuff Size: Adult Regular)   Pulse 73   Temp 97.6  F (36.4  C) (Tympanic)   Resp 16   Wt 66.5 kg (146 lb 9.6 oz)   LMP 01/25/1974 (Approximate)   SpO2 98%   BMI 25.16 kg/m    Body mass index is 25.16 kg/m .  Physical Exam   Pleasant female no acute distress.  Affect normal.  Alert and oriented.  Accompanied by daughter.  Neck supple without adenopathy.  No thyromegaly.  Lung fields clear to auscultation.  Cardiovascular regular.  Abdomen soft and nontender.  Normal bowel sounds x 4 quadrants.  Bilateral extremities with 1+ edema.  Mild hyperkeratotic formation along distal  tip of second digit left foot no open wound present.            Signed Electronically by: Vidhi Narayanan, NP

## 2024-05-22 NOTE — NURSING NOTE
"Chief Complaint   Patient presents with    RECHECK       FOOD SECURITY SCREENING QUESTIONS  Hunger Vital Signs:  Within the past 12 months we worried whether our food would run out before we got money to buy more. Never  Within the past 12 months the food we bought just didn't last and we didn't have money to get more. Never  Rhona De La Garza LPN 5/22/2024 11:02 AM      Initial /84 (BP Location: Right arm, Patient Position: Sitting, Cuff Size: Adult Regular)   Pulse 73   Temp 97.6  F (36.4  C) (Tympanic)   Resp 16   Wt 66.5 kg (146 lb 9.6 oz)   LMP 01/25/1974 (Approximate)   SpO2 98%   BMI 25.16 kg/m   Estimated body mass index is 25.16 kg/m  as calculated from the following:    Height as of 5/4/24: 1.626 m (5' 4\").    Weight as of this encounter: 66.5 kg (146 lb 9.6 oz).  Medication Reconciliation: complete    Rhona De La Garza LPN    "

## 2024-05-23 RX ORDER — THYROID 15 MG/1
TABLET ORAL
Qty: 45 TABLET | Refills: 11 | Status: SHIPPED | OUTPATIENT
Start: 2024-05-23

## 2024-05-23 RX ORDER — TRIAMCINOLONE ACETONIDE 1 MG/G
CREAM TOPICAL 3 TIMES DAILY
Qty: 454 G | Refills: 4 | Status: SHIPPED | OUTPATIENT
Start: 2024-05-23

## 2024-05-23 NOTE — TELEPHONE ENCOUNTER
Free T4 is normal but TSH increased so would recommend continuing [place] thyroid 90 mg daily and increasing the [place] thyroid 15 mg to 1 tablet daily alternating with 2 tablets daily. Repeat thyroid labs in 6-8 weeks. Normal urine   Written by Vidhi Narayanan NP on 5/22/2024  2:53 PM CDT  Seen by patient Janna Mcdermott on 5/22/2024  9:41 PM    Isamar,    I don't see any [place] Thyroid 15mg in chart anywhere but I have sheryl'd up.  Please check my sig for accuracy.      Michelle Weaver RN on 5/23/2024 at 10:26 AM

## 2024-05-23 NOTE — TELEPHONE ENCOUNTER
See refill encounter where prescription approved per [facility] Refill Protocol.    Michelle Weaver RN on 5/23/2024 at 9:27 AM

## 2024-07-03 ENCOUNTER — PATIENT OUTREACH (OUTPATIENT)
Dept: CARE COORDINATION | Facility: CLINIC | Age: 89
End: 2024-07-03
Payer: COMMERCIAL

## 2024-07-03 NOTE — PROGRESS NOTES
Clinic Care Coordination Contact  Follow Up Progress Note      Assessment: Patient's daughter, Génesis, is her health care agent. She is looking for respite or a medical ride. Daughter admits she needed to vent because she has no respite options. Her mother has refused all assistance.     On  patient has a foot appointment at [facility] in [place] at 2pm. Her daughter needs to be at a  in [place]. She is looking for someone to take her mother to the foot doctor. Her brothers will not do it.        Intervention/Education provided during outreach: Discussed [facility]. Also recommended she talk to  home care who they use for foot care to see about getting a PCA in.      Plan: Daughter called back to report that  health care would support a PCA choice with a neighbor to act as the back-up when Génesis is out of town. Her mother did find a ride, but did not want to tell her as she doesn't like it when Génesis leaves. It was agreed that I would place a referral for assisted transportation with [facility] as a back-up for future rides. This packet will be sent to Génesis to complete.     Care Coordinator will support as able.   Kenisha Brasher, RN on 7/3/2024 at 2:21 PM

## 2024-07-22 ENCOUNTER — OFFICE VISIT (OUTPATIENT)
Dept: INTERNAL MEDICINE | Facility: OTHER | Age: 89
End: 2024-07-22
Attending: NURSE PRACTITIONER
Payer: COMMERCIAL

## 2024-07-22 VITALS
TEMPERATURE: 97.1 F | WEIGHT: 141 LBS | HEART RATE: 80 BPM | BODY MASS INDEX: 24.07 KG/M2 | SYSTOLIC BLOOD PRESSURE: 140 MMHG | RESPIRATION RATE: 16 BRPM | OXYGEN SATURATION: 96 % | DIASTOLIC BLOOD PRESSURE: 78 MMHG | HEIGHT: 64 IN

## 2024-07-22 DIAGNOSIS — I63.9 CEREBELLAR STROKE (H): ICD-10-CM

## 2024-07-22 DIAGNOSIS — E03.9 ACQUIRED HYPOTHYROIDISM: Primary | ICD-10-CM

## 2024-07-22 PROCEDURE — G0463 HOSPITAL OUTPT CLINIC VISIT: HCPCS | Mod: 25

## 2024-07-22 PROCEDURE — 99213 OFFICE O/P EST LOW 20 MIN: CPT | Performed by: NURSE PRACTITIONER

## 2024-07-22 PROCEDURE — G0463 HOSPITAL OUTPT CLINIC VISIT: HCPCS

## 2024-07-22 ASSESSMENT — PAIN SCALES - GENERAL: PAINLEVEL: NO PAIN (0)

## 2024-07-22 NOTE — PROGRESS NOTES
"    ICD-10-CM    1. Acquired hypothyroidism  E03.9 TSH with free T4 reflex     CANCELED: TSH with free T4 reflex      2. Cerebellar stroke (H)  I63.9 Other Specialty Referral     Lipid Profile         Plan:  Patient will follow-up for a lab only appointment to check TSH, free T4 and fasting lipids.  Referral placed for home health aide to assist with dressing and grooming, incontinence management and applying hammertoe cushion.    The longitudinal plan of care for the diagnosis(es)/condition(s) as documented were addressed during this visit. Due to the added complexity in care, I will continue to support Janna in the subsequent management and with ongoing continuity of care.    Ramila Saldivar is a 89 year old, presenting for the following health issues:  Follow Up        7/22/2024    10:59 AM   Additional Questions   Roomed by Rhona MONTANEZ     She is here today to follow-up on hypothyroidism.  She stopped fish oil and loose stools seem to have improved.  She thought it was related to her cholesterol medication, Setia.  She has an appointment with neurologist and would like to have lipids checked before she sees the neurologist.  She is not sure if she wants to restart the Zetia.  She would like to have a home health aide assist her with dressing and grooming, incontinence care and applying hammertoe cushion.  She states she needs referral.  She called Medicare and this was recommended.                       Objective    BP (!) 140/78 (BP Location: Right arm, Patient Position: Sitting, Cuff Size: Adult Regular)   Pulse 80   Temp 97.1  F (36.2  C) (Tympanic)   Resp 16   Ht 1.626 m (5' 4\")   Wt 64 kg (141 lb)   LMP 01/25/1974 (Approximate)   SpO2 96%   BMI 24.20 kg/m    Body mass index is 24.2 kg/m .  Physical Exam   Pleasant female accompanied by her daughter.  Alert and oriented.  Ambulates with the use of a cane.  Lung fields clear to auscultation.  Cardiovascular regular.  Neck supple without " adenopathy.            Signed Electronically by: Vidhi Narayanan, NP

## 2024-07-22 NOTE — NURSING NOTE
"Chief Complaint   Patient presents with    Follow Up       FOOD SECURITY SCREENING QUESTIONS  Hunger Vital Signs:  Within the past 12 months we worried whether our food would run out before we got money to buy more. Never  Within the past 12 months the food we bought just didn't last and we didn't have money to get more. Never  Rhona De La Garza LPN 7/22/2024 11:01 AM      Initial BP (!) 140/78 (BP Location: Right arm, Patient Position: Sitting, Cuff Size: Adult Regular)   Pulse 80   Temp 97.1  F (36.2  C) (Tympanic)   Resp 16   Ht 1.626 m (5' 4\")   Wt 64 kg (141 lb)   LMP 01/25/1974 (Approximate)   SpO2 96%   BMI 24.20 kg/m   Estimated body mass index is 24.2 kg/m  as calculated from the following:    Height as of this encounter: 1.626 m (5' 4\").    Weight as of this encounter: 64 kg (141 lb).  Medication Reconciliation: complete    Rhona De La Garza LPN    "

## 2024-07-26 ENCOUNTER — LAB (OUTPATIENT)
Dept: LAB | Facility: OTHER | Age: 89
End: 2024-07-26
Attending: NURSE PRACTITIONER
Payer: MEDICARE

## 2024-07-26 DIAGNOSIS — I63.9 CEREBELLAR STROKE (H): ICD-10-CM

## 2024-07-26 DIAGNOSIS — E03.9 ACQUIRED HYPOTHYROIDISM: ICD-10-CM

## 2024-07-26 LAB
CHOLEST SERPL-MCNC: 151 MG/DL
FASTING STATUS PATIENT QL REPORTED: YES
HDLC SERPL-MCNC: 62 MG/DL
LDLC SERPL CALC-MCNC: 75 MG/DL
NONHDLC SERPL-MCNC: 89 MG/DL
T4 FREE SERPL-MCNC: 1.43 NG/DL (ref 0.9–1.7)
TRIGL SERPL-MCNC: 72 MG/DL
TSH SERPL DL<=0.005 MIU/L-ACNC: 6.37 UIU/ML (ref 0.3–4.2)

## 2024-07-26 PROCEDURE — 84439 ASSAY OF FREE THYROXINE: CPT | Mod: ZL

## 2024-07-26 PROCEDURE — 84443 ASSAY THYROID STIM HORMONE: CPT | Mod: ZL

## 2024-07-26 PROCEDURE — 80061 LIPID PANEL: CPT | Mod: ZL

## 2024-07-26 PROCEDURE — 36415 COLL VENOUS BLD VENIPUNCTURE: CPT | Mod: ZL

## 2024-07-29 ENCOUNTER — OFFICE VISIT (OUTPATIENT)
Dept: NEUROLOGY | Facility: OTHER | Age: 89
End: 2024-07-29
Attending: PSYCHIATRY & NEUROLOGY
Payer: COMMERCIAL

## 2024-07-29 VITALS
SYSTOLIC BLOOD PRESSURE: 88 MMHG | OXYGEN SATURATION: 95 % | DIASTOLIC BLOOD PRESSURE: 64 MMHG | TEMPERATURE: 97.9 F | WEIGHT: 136 LBS | BODY MASS INDEX: 23.22 KG/M2 | HEIGHT: 64 IN | RESPIRATION RATE: 16 BRPM | HEART RATE: 73 BPM

## 2024-07-29 DIAGNOSIS — I48.0 PAROXYSMAL ATRIAL FIBRILLATION (H): ICD-10-CM

## 2024-07-29 DIAGNOSIS — I95.9 HYPOTENSION, UNSPECIFIED HYPOTENSION TYPE: ICD-10-CM

## 2024-07-29 DIAGNOSIS — Z86.73 HISTORY OF STROKE: Primary | ICD-10-CM

## 2024-07-29 PROCEDURE — 99213 OFFICE O/P EST LOW 20 MIN: CPT | Performed by: PSYCHIATRY & NEUROLOGY

## 2024-07-29 PROCEDURE — G0463 HOSPITAL OUTPT CLINIC VISIT: HCPCS

## 2024-07-29 ASSESSMENT — PAIN SCALES - GENERAL: PAINLEVEL: NO PAIN (0)

## 2024-07-29 NOTE — LETTER
"7/29/2024      Janna Mcdermott  [address] [facility] [address]      Dear Colleague,    Thank you for referring your patient, Janna Mcdermott, to [facility]. Please see a copy of my visit note below.    Outpatient Neurology Visit  2/2/24    Subjective:  Janna Mcdermott is a 89 year old female who presents today for follow up evaluation of stroke      Brief history of symptoms: The patient was initially seen in neurologic consultation on 2021 for evaluation of same. Please see the comprehensive neurologic consultation note from that date in the Epic records for details.     Interval history:  Her main concern today is for hammer toes.   She is seeing podiatry and is new shoes and feels better.      She is off crestor for past few months.   She was not interested in going back on at this time unless cholesterol gets very high.  She is not sure if limiting oils in her diet or stopping the Crestor helped her diarrhea but she has noticed substantial improvement.    Physical Exam:  Vitals: BP (!) 88/64 (BP Location: Right arm, Patient Position: Sitting, Cuff Size: Adult Regular)   Pulse 73   Temp 97.9  F (36.6  C) (Tympanic)   Resp 16   Ht 1.626 m (5' 4\")   Wt 61.7 kg (136 lb)   LMP 01/25/1974 (Approximate)   SpO2 95%   BMI 23.34 kg/m     General: Seated comfortably in no acute distress.  Cardiovascular: regular rate  Pulmonary: Respirations unlabored.   Neurologic:  Awake, alert, language is fluent.  Able to provide clear history, gaze is conjugate, no pronator drift.  Moving all extremities equally and antigravity.  Able to get up and ambulate without assistance.    Pertinent Investigations:  LDL is 75    T4 normal  Bmp reviewed    Assessment:  #Acute ischemic stroke  #Vertebrobasilar stenosis  #Atrial fibrillation  #Hypotension    Ms. Luke is a 89-year-old female with recurrent stroke.  Had a right PICA stroke in May 2021 and subsequently second stroke in " November , 22 that was felt to be most likely small vessel related.  She has made complete recovery.  Her risk factors are reasonably well controlled.  She is taking apixaban and has nto tolerated multiple statins and is not interested in retrialing cholesterol medications at this time while she is close to meeting criteria for reduced dose apixaban she does not meet either the creatinine or weight-based criteria at present.  This should continue to be monitored.  I did express that continued neurologic follow-up is at their discretion and not entirely necessary as she has been stable for quite some time, but they continue to request regular follow-up so we will have follow-up in approximately 9 months      Plan:  -Continue Eliquis 5 mg twice daily  -Continue regular primary care monitoring of basic metabolic panel and weights.  If GFR is less than 50 would qualify for reduced dose apixaban      Follow up in Neurology clinic in 9 months or earlier as needed if symptoms persist or should new symptoms or concerns arise.        Maritza Garrett DO   of Neurology    Dragon disclaimer: This documentation was completed with the aid of dictation software.  Please note that there may be some inconsistencies due to software errors.  Errors are corrected in real time, however if there is a remaining error, please do not hesitate to reach out for clarification.        Again, thank you for allowing me to participate in the care of your patient.        Sincerely,        Maritza Garrett MD

## 2024-07-29 NOTE — NURSING NOTE
"Chief Complaint   Patient presents with    Follow Up     Stroke    Patient presents to the clinic today to follow up on a stroke.     Initial BP (!) 88/64 (BP Location: Right arm, Patient Position: Sitting, Cuff Size: Adult Regular)   Pulse 73   Temp 97.9  F (36.6  C) (Tympanic)   Resp 16   Ht 1.626 m (5' 4\")   Wt 61.7 kg (136 lb)   LMP 01/25/1974 (Approximate)   SpO2 95%   BMI 23.34 kg/m   Estimated body mass index is 23.34 kg/m  as calculated from the following:    Height as of this encounter: 1.626 m (5' 4\").    Weight as of this encounter: 61.7 kg (136 lb).  Medication Review: complete    The next two questions are to help us understand your food security.  If you are feeling you need any assistance in this area, we have resources available to support you today.          4/24/2024   SDOH- Food Insecurity   Within the past 12 months, did you worry that your food would run out before you got money to buy more? N   Within the past 12 months, did the food you bought just not last and you didn t have money to get more? N            Health Care Directive:  Patient has a Health Care Directive on file      Lenora Levine      "

## 2024-07-29 NOTE — PROGRESS NOTES
"Outpatient Neurology Visit  2/2/24    Subjective:  Janna Mcdermott is a 89 year old female who presents today for follow up evaluation of stroke      Brief history of symptoms: The patient was initially seen in neurologic consultation on 2021 for evaluation of same. Please see the comprehensive neurologic consultation note from that date in the Epic records for details.     Interval history:  Her main concern today is for hammer toes.   She is seeing podiatry and is new shoes and feels better.      She is off crestor for past few months.   She was not interested in going back on at this time unless cholesterol gets very high.  She is not sure if limiting oils in her diet or stopping the Crestor helped her diarrhea but she has noticed substantial improvement.    Physical Exam:  Vitals: BP (!) 88/64 (BP Location: Right arm, Patient Position: Sitting, Cuff Size: Adult Regular)   Pulse 73   Temp 97.9  F (36.6  C) (Tympanic)   Resp 16   Ht 1.626 m (5' 4\")   Wt 61.7 kg (136 lb)   LMP 01/25/1974 (Approximate)   SpO2 95%   BMI 23.34 kg/m     General: Seated comfortably in no acute distress.  Cardiovascular: regular rate  Pulmonary: Respirations unlabored.   Neurologic:  Awake, alert, language is fluent.  Able to provide clear history, gaze is conjugate, no pronator drift.  Moving all extremities equally and antigravity.  Able to get up and ambulate without assistance.    Pertinent Investigations:  LDL is 75    T4 normal  Bmp reviewed    Assessment:  #Acute ischemic stroke  #Vertebrobasilar stenosis  #Atrial fibrillation  #Hypotension    Ms. Luke is a 89-year-old female with recurrent stroke.  Had a right PICA stroke in May 2021 and subsequently second stroke in November , 22 that was felt to be most likely small vessel related.  She has made complete recovery.  Her risk factors are reasonably well controlled.  She is taking apixaban and has nto tolerated multiple statins and is not interested in retrialing " cholesterol medications at this time while she is close to meeting criteria for reduced dose apixaban she does not meet either the creatinine or weight-based criteria at present.  This should continue to be monitored.  I did express that continued neurologic follow-up is at their discretion and not entirely necessary as she has been stable for quite some time, but they continue to request regular follow-up so we will have follow-up in approximately 9 months      Plan:  -Continue Eliquis 5 mg twice daily  -Continue regular primary care monitoring of basic metabolic panel and weights.  If GFR is less than 50 would qualify for reduced dose apixaban      Follow up in Neurology clinic in 9 months or earlier as needed if symptoms persist or should new symptoms or concerns arise.        Maritza Garrett DO   of Neurology    Dragon disclaimer: This documentation was completed with the aid of dictation software.  Please note that there may be some inconsistencies due to software errors.  Errors are corrected in real time, however if there is a remaining error, please do not hesitate to reach out for clarification.

## 2024-07-29 NOTE — PATIENT INSTRUCTIONS
Reason for today's visit: Hx of stroke    Your diagnosis: same as above    Tests that you will need: none today    Treatment plan:   Continue eliquis      Your test results are reviewed several times a day.  Once they are all in, you will be sent a letter with your results and/or if you are signed up for on-line services, you will be e-mailed the results.  If there are serious findings, you typically will be called.    If you have any questions about your visit, your symptoms, your medication, your test results or it is not clear what your diagnosis or treatment plan is please contact our office at [phone]    If you need follow-up in the future, please call [phone] for an appointment.  If you cannot get a time that satisfies you, please let us know what times work for you and we will do our best to accommodate you.    Maritza Garrett DO

## 2024-08-09 ENCOUNTER — OFFICE VISIT (OUTPATIENT)
Dept: INTERNAL MEDICINE | Facility: OTHER | Age: 89
End: 2024-08-09
Attending: INTERNAL MEDICINE
Payer: COMMERCIAL

## 2024-08-09 VITALS
TEMPERATURE: 98.5 F | DIASTOLIC BLOOD PRESSURE: 80 MMHG | OXYGEN SATURATION: 98 % | BODY MASS INDEX: 23.22 KG/M2 | WEIGHT: 136 LBS | HEART RATE: 76 BPM | HEIGHT: 64 IN | SYSTOLIC BLOOD PRESSURE: 138 MMHG | RESPIRATION RATE: 16 BRPM

## 2024-08-09 DIAGNOSIS — D68.69 HYPERCOAGULABLE STATE DUE TO PAROXYSMAL ATRIAL FIBRILLATION (H): ICD-10-CM

## 2024-08-09 DIAGNOSIS — K55.1 MESENTERIC ARTERY STENOSIS (H): ICD-10-CM

## 2024-08-09 DIAGNOSIS — I50.32 CHRONIC HEART FAILURE WITH PRESERVED EJECTION FRACTION (H): ICD-10-CM

## 2024-08-09 DIAGNOSIS — I48.0 HYPERCOAGULABLE STATE DUE TO PAROXYSMAL ATRIAL FIBRILLATION (H): ICD-10-CM

## 2024-08-09 DIAGNOSIS — I70.203 BILATERAL FEMORAL ARTERY STENOSIS (H): ICD-10-CM

## 2024-08-09 DIAGNOSIS — Z79.01 ON CONTINUOUS ORAL ANTICOAGULATION: ICD-10-CM

## 2024-08-09 DIAGNOSIS — I63.22 CEREBROVASCULAR ACCIDENT (CVA) DUE TO STENOSIS OF BASILAR ARTERY (H): Primary | ICD-10-CM

## 2024-08-09 DIAGNOSIS — I48.21 PERMANENT ATRIAL FIBRILLATION (H): ICD-10-CM

## 2024-08-09 DIAGNOSIS — E78.2 MIXED HYPERLIPIDEMIA: ICD-10-CM

## 2024-08-09 DIAGNOSIS — I10 BENIGN ESSENTIAL HYPERTENSION: ICD-10-CM

## 2024-08-09 DIAGNOSIS — N18.31 STAGE 3A CHRONIC KIDNEY DISEASE (H): ICD-10-CM

## 2024-08-09 DIAGNOSIS — I63.9 CEREBELLAR STROKE (H): ICD-10-CM

## 2024-08-09 PROCEDURE — G2211 COMPLEX E/M VISIT ADD ON: HCPCS | Performed by: INTERNAL MEDICINE

## 2024-08-09 PROCEDURE — G0463 HOSPITAL OUTPT CLINIC VISIT: HCPCS

## 2024-08-09 PROCEDURE — 99214 OFFICE O/P EST MOD 30 MIN: CPT | Performed by: INTERNAL MEDICINE

## 2024-08-09 RX ORDER — ROSUVASTATIN CALCIUM 5 MG/1
5 TABLET, COATED ORAL
Qty: 50 TABLET | Refills: 4 | Status: SHIPPED | OUTPATIENT
Start: 2024-08-09

## 2024-08-09 ASSESSMENT — ENCOUNTER SYMPTOMS
HEMATURIA: 0
BRUISES/BLEEDS EASILY: 0
CHEST TIGHTNESS: 0
CHILLS: 0
ABDOMINAL PAIN: 0
PALPITATIONS: 1
SHORTNESS OF BREATH: 0
FEVER: 0
BACK PAIN: 0
WOUND: 0
CONFUSION: 0

## 2024-08-09 ASSESSMENT — PAIN SCALES - GENERAL: PAINLEVEL: NO PAIN (0)

## 2024-08-09 NOTE — PATIENT INSTRUCTIONS
Blood pressure recheck was improved.     Medications refilled.       Restart Rosuvastatin / Crestor -- 3 times weekly -- for cholesterol / blood / heart health.         There is no immunization history on file for this patient.     Consider:  -- Annual Flu / Influenza vaccination - Every Fall (Starting about October 1st)    Consider:  -- COVID-19 Vaccination shot -- TWICE Yearly (Spring and Fall)    -- Tetanus shot updated - from one of the local pharmacies, at your convenience -- Check cost/coverage.     -- Shingles shot (2 in series) (Shingrix) - from one of the local pharmacies, at your convenience -- Check cost/coverage.     One Dose:  -- RSV vaccine for age 60+ with medical issues, and one time for all individuals age 75+ -- In the Fall (Starting about October 1st)  (If Medicare insurance - get vaccine at the Pharmacy.     Private insurance may be able to get in clinic with clinic shot nurse.)  --- Check Cost $$$    Pneumococcal vaccine --- x1 --- anytime.

## 2024-08-09 NOTE — PROGRESS NOTES
Assessment & Plan     ICD-10-CM    1. 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) -- 1st stroke May 2021, 2nd stroke Nov 2022  I63.22 rosuvastatin (CRESTOR) 5 MG tablet      2. Cerebellar stroke (H)  I63.9 rosuvastatin (CRESTOR) 5 MG tablet      3. 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 Benign essential hypertension  I10       5. Bilateral femoral artery stenosis (H24)  I70.203 rosuvastatin (CRESTOR) 5 MG tablet      6. Permanent atrial fibrillation (H)  I48.21       7. Hypercoagulable state due to paroxysmal atrial fibrillation (H)  D68.69     I48.0       8. On continuous oral anticoagulation - Eliquis  Z79.01       9. Stage 3a chronic kidney disease (H)  N18.31       10. Chronic heart failure with preserved ejection fraction (H)  I50.32          Patient presents for follow-up multiple issues.  Had a stroke back in May 2021 and November 22.  Previously has had issues with statin therapy.  Recommend restarting low-dose statin.  She was on Crestor 5 mg daily and seem to be tolerating well.  Daughter is wondering if she needs to be on cholesterol medications anymore.    Patient states her overall health seems to be doing fairly well.  Patient feels pretty good.  Recommend restarting Crestor 5 mg 3 times weekly.  LDL cholesterol was checked  in July and was in the 70s otherwise.   -- Has been doing well.  Updated prescription sent to pharmacy.    Heart failure with preserved ejection fraction.  Currently appears reasonably controlled.  Does have some chronic lower extremity swelling.  Close follow-up and monitoring advised.    Previously stopped statin therapy due to fecal incontinence.  Close monitoring advised.    HYPERTENSION - Ongoing. Blood pressure is currently controlled.  Medication side effects: None. Denies syncope or presyncope.  Continue current medications.   Medication list reviewed/updated. Refills completed as needed.       Atrial Fibrillation - Type: Paroxysmal Atrial Fibrillation, chronic, ongoing.  + Hypercoagulable state due to atrial fibrillation.  Continues with apixaban (ELIQUIS) for oral anticoagulation.  Heart rates are controlled with diltiazem.  Tolerating well.  Denies excessive bleeding issues.  Minimal bruising. Medication list reviewed/updated. Refills completed as needed.     Vaccine counseling completed.  Encourage routine / annual vaccinations.    The longitudinal plan of care for the diagnosis(es)/condition(s) as documented were addressed during this visit. Due to the added complexity in care, I will continue to support Janna in the subsequent management and with ongoing continuity of care.                 Return in about 7 months (around 3/9/2025) for Annual Medicare Wellness Visit.      Dwayne Gaona MD  [facility]    Review of Systems   Constitutional:  Negative for chills and fever.   HENT:  Negative for congestion.    Eyes:  Negative for visual disturbance.   Respiratory:  Negative for chest tightness and shortness of breath.    Cardiovascular:  Positive for palpitations. Negative for chest pain.   Gastrointestinal:  Negative for abdominal pain.   Genitourinary:  Negative for hematuria.   Musculoskeletal:  Negative for back pain.   Skin:  Negative for rash and wound.   Allergic/Immunologic: Negative for immunocompromised state.   Neurological:  Negative for syncope.   Hematological:  Does not bruise/bleed easily.   Psychiatric/Behavioral:  Negative for confusion.        Ramila Saldivar is a 89 year old, presenting for the following health issues:  Follow Up        8/9/2024     2:15 PM   Additional Questions   Roomed by Maggie Rivas LPN     History of Present Illness       Reason for visit:  BP / Cholesterol    She eats 2-3 servings of fruits and vegetables daily.She consumes 0 sweetened beverage(s) daily.She exercises with enough effort to increase her heart rate 10 to 19  "minutes per day.  She exercises with enough effort to increase her heart rate 7 days per week.   She is taking medications regularly.         ED/UC Followup:    Facility:  [facility]  Date of visit: 2024   Reason for visit: UTI, Bowel concerns   Current Status: Stable.               Objective    /80   Pulse 76   Temp 98.5  F (36.9  C) (Temporal)   Resp 16   Ht 1.626 m (5' 4\")   Wt 61.7 kg (136 lb)   LMP 1974 (Approximate)   SpO2 98%   Breastfeeding No   BMI 23.34 kg/m    Body mass index is 23.34 kg/m .  Physical Exam  Constitutional:       General: She is not in acute distress.     Appearance: Normal appearance. She is well-developed. She is not ill-appearing.   HENT:      Head: Normocephalic and atraumatic.   Eyes:      General: No scleral icterus.     Conjunctiva/sclera: Conjunctivae normal.   Neck:      Thyroid: No thyromegaly.      Vascular: No carotid bruit.   Cardiovascular:      Rate and Rhythm: Normal rate. Rhythm irregularly irregular.      Pulses: Normal pulses.   Pulmonary:      Effort: Pulmonary effort is normal. No respiratory distress.   Abdominal:      Palpations: Abdomen is soft.      Tenderness: There is no abdominal tenderness.   Musculoskeletal:         General: No deformity.      Right lower le+ Pitting Edema present.      Left lower le+ Pitting Edema present.   Lymphadenopathy:      Cervical: No cervical adenopathy.   Skin:     General: Skin is warm and dry.      Findings: No rash.   Neurological:      Mental Status: She is alert. Mental status is at baseline.      Cranial Nerves: No cranial nerve deficit.   Psychiatric:         Mood and Affect: Mood normal.         Behavior: Behavior normal.                    Signed Electronically by: Dwayne Gaona MD    "

## 2024-08-09 NOTE — NURSING NOTE
"Chief Complaint   Patient presents with    Follow Up       Initial BP (!) 180/90   Pulse 76   Temp 98.5  F (36.9  C) (Temporal)   Resp 16   Ht 1.626 m (5' 4\")   Wt 61.7 kg (136 lb)   LMP 01/25/1974 (Approximate)   SpO2 98%   Breastfeeding No   BMI 23.34 kg/m   Estimated body mass index is 23.34 kg/m  as calculated from the following:    Height as of this encounter: 1.626 m (5' 4\").    Weight as of this encounter: 61.7 kg (136 lb).  Medication Review: complete    The next two questions are to help us understand your food security.  If you are feeling you need any assistance in this area, we have resources available to support you today.          4/24/2024   SDOH- Food Insecurity   Within the past 12 months, did you worry that your food would run out before you got money to buy more? N   Within the past 12 months, did the food you bought just not last and you didn t have money to get more? N            Health Care Directive:  Patient has a Health Care Directive on file      Hanna Rivas LPN      "

## 2024-08-12 ENCOUNTER — TRANSFERRED RECORDS (OUTPATIENT)
Dept: HEALTH INFORMATION MANAGEMENT | Facility: OTHER | Age: 89
End: 2024-08-12
Payer: COMMERCIAL

## 2024-09-03 ENCOUNTER — OFFICE VISIT (OUTPATIENT)
Dept: INTERNAL MEDICINE | Facility: OTHER | Age: 89
End: 2024-09-03
Attending: INTERNAL MEDICINE
Payer: COMMERCIAL

## 2024-09-03 VITALS
HEIGHT: 60 IN | OXYGEN SATURATION: 100 % | WEIGHT: 135 LBS | SYSTOLIC BLOOD PRESSURE: 124 MMHG | RESPIRATION RATE: 16 BRPM | DIASTOLIC BLOOD PRESSURE: 78 MMHG | HEART RATE: 86 BPM | BODY MASS INDEX: 26.5 KG/M2

## 2024-09-03 DIAGNOSIS — L97.511 RIGHT SECOND TOE ULCER, LIMITED TO BREAKDOWN OF SKIN (H): ICD-10-CM

## 2024-09-03 DIAGNOSIS — M20.41 HAMMER TOES OF BOTH FEET: Primary | ICD-10-CM

## 2024-09-03 DIAGNOSIS — L97.521 ULCER OF LEFT SECOND TOE, LIMITED TO BREAKDOWN OF SKIN (H): ICD-10-CM

## 2024-09-03 DIAGNOSIS — E03.9 ACQUIRED HYPOTHYROIDISM: Chronic | ICD-10-CM

## 2024-09-03 DIAGNOSIS — Z71.85 VACCINE COUNSELING: ICD-10-CM

## 2024-09-03 DIAGNOSIS — M79.672 LEFT FOOT PAIN: ICD-10-CM

## 2024-09-03 DIAGNOSIS — M20.42 HAMMER TOES OF BOTH FEET: Primary | ICD-10-CM

## 2024-09-03 DIAGNOSIS — M77.42 METATARSALGIA OF LEFT FOOT: ICD-10-CM

## 2024-09-03 DIAGNOSIS — I10 BENIGN ESSENTIAL HYPERTENSION: ICD-10-CM

## 2024-09-03 PROCEDURE — 99214 OFFICE O/P EST MOD 30 MIN: CPT | Performed by: INTERNAL MEDICINE

## 2024-09-03 PROCEDURE — G0463 HOSPITAL OUTPT CLINIC VISIT: HCPCS

## 2024-09-03 PROCEDURE — G2211 COMPLEX E/M VISIT ADD ON: HCPCS | Performed by: INTERNAL MEDICINE

## 2024-09-03 RX ORDER — THYROID,PORK 90 MG
90 TABLET ORAL DAILY
Qty: 90 TABLET | Refills: 4 | Status: SHIPPED | OUTPATIENT
Start: 2024-09-03 | End: 2024-09-05

## 2024-09-03 ASSESSMENT — ENCOUNTER SYMPTOMS
FEVER: 0
WOUND: 1
COUGH: 0
CHILLS: 0

## 2024-09-03 ASSESSMENT — PAIN SCALES - GENERAL: PAINLEVEL: NO PAIN (0)

## 2024-09-03 NOTE — NURSING NOTE
Chief Complaint   Patient presents with    Musculoskeletal Problem     Hammer toes       Initial /78   Pulse 86   Resp 16   Ht 1.524 m (5')   Wt 61.2 kg (135 lb)   LMP 01/25/1974 (Approximate)   SpO2 100%   Breastfeeding No   BMI 26.37 kg/m   Estimated body mass index is 26.37 kg/m  as calculated from the following:    Height as of this encounter: 1.524 m (5').    Weight as of this encounter: 61.2 kg (135 lb).  Medication Review: complete    The next two questions are to help us understand your food security.  If you are feeling you need any assistance in this area, we have resources available to support you today.          4/24/2024   SDOH- Food Insecurity   Within the past 12 months, did you worry that your food would run out before you got money to buy more? N   Within the past 12 months, did the food you bought just not last and you didn t have money to get more? N            Health Care Directive:  Patient has a Health Care Directive on file      Josiane Wayne LPN

## 2024-09-03 NOTE — PROGRESS NOTES
Assessment & Plan     ICD-10-CM    1. Hammer toes of both feet  M20.41     M20.42       2. Metatarsalgia of left foot  M77.42       3. Left foot pain  M79.672       4. Right second toe ulcer, limited to breakdown of skin (H)  L97.511       5. Ulcer of left second toe, limited to breakdown of skin (H)  L97.521       6. Vaccine counseling  Z71.85       7. Acquired hypothyroidism  E03.9 ARMOUR THYROID 90 MG tablet      8. Benign essential hypertension  I10          Patient presents for follow-up multiple issues.    Still dealing with bilateral foot pain.  Has ulcers on his bilateral second toes, hammertoes.  Was following with foot surgery.  She is not interested in surgery.  Subsequently they referred her back to primary care.    Recommend some toe spreaders and toe pads to help with pressure reduction to these areas.  We reviewed a few OTC options at local pharmacy versus online.  Recommend trying a few different options to see what might be helpful and tolerated.    Declines vaccines.    HYPERTENSION - Ongoing. Blood pressure is currently well controlled.  Medication side effects: None. Denies syncope or presyncope.  Continue current medications.   Medication list reviewed/updated. Refills completed as needed.      HYPOTHYROIDISM - Patient has a longstanding history of hypothyroidism. Reports doing reasonably well. Reports: denies fatigue, weight changes, heat/cold intolerance, bowel/skin changes or CVS symptoms.  Continue: [place] Thyroid oral thyroid replacement.  Denies adverse medication reactions or side effects.                       TSH   Date Value Ref Range Status   07/26/2024 6.37 (H) 0.30 - 4.20 uIU/mL Final   10/31/2022 13.65 (H) 0.40 - 4.00 mU/L Final   03/26/2021 3.90 0.40 - 4.00 mU/L Final              BMI  Estimated body mass index is 26.37 kg/m  as calculated from the following:    Height as of this encounter: 1.524 m (5').    Weight as of this encounter: 61.2 kg (135 lb).           Return for  follow-up as needed for new or worsening symptoms, Appointments: [phone].      Dwayne Gaona MD  [facility]    Review of Systems   Constitutional:  Negative for chills and fever.   Respiratory:  Negative for cough.    Skin:  Positive for wound.   Allergic/Immunologic: Negative for immunocompromised state.         Ramila Saldivar is a 89 year old, presenting for the following health issues:  Musculoskeletal Problem (Hammer toes)        9/3/2024     8:47 AM   Additional Questions   Roomed by Osvaldo Wayne LPN     History of Present Illness       Reason for visit:  Recheck hammr toe    She eats 2-3 servings of fruits and vegetables daily.She consumes 0 sweetened beverage(s) daily.She exercises with enough effort to increase her heart rate 10 to 19 minutes per day.  She exercises with enough effort to increase her heart rate 5 days per week.   She is taking medications regularly.                     Objective    /78   Pulse 86   Resp 16   Ht 1.524 m (5')   Wt 61.2 kg (135 lb)   LMP 01/25/1974 (Approximate)   SpO2 100%   Breastfeeding No   BMI 26.37 kg/m    Body mass index is 26.37 kg/m .  Physical Exam  Constitutional:       Appearance: Normal appearance.   Cardiovascular:      Rate and Rhythm: Normal rate and regular rhythm.   Pulmonary:      Effort: Pulmonary effort is normal.   Musculoskeletal:         General: Deformity present.      Right foot: Deformity and prominent metatarsal heads present.      Left foot: Deformity and prominent metatarsal heads present.   Skin:     Comments: Pressure ulcers of 2nd toes bilaterally   Neurological:      Mental Status: She is alert. Mental status is at baseline.   Psychiatric:         Mood and Affect: Mood normal.                                Signed Electronically by: Dwayne Gaona MD

## 2024-09-03 NOTE — PATIENT INSTRUCTIONS
"Consider trying different \" Hammer Toe Splints\"   --- some are at local [facility] vs [facility].     Continue to try off-loading of pressure of the toe ulcers.  Check daily for infection.   Toe ulcers are slowly healing at this time.       Return as needed for follow-up for new / worsening symptoms.    Clinic : [phone]  Appointment line: [phone]    "

## 2024-09-05 ENCOUNTER — MYC MEDICAL ADVICE (OUTPATIENT)
Dept: INTERNAL MEDICINE | Facility: OTHER | Age: 89
End: 2024-09-05
Payer: COMMERCIAL

## 2024-09-05 DIAGNOSIS — E03.9 ACQUIRED HYPOTHYROIDISM: Chronic | ICD-10-CM

## 2024-09-05 RX ORDER — THYROID,PORK 90 MG
90 TABLET ORAL DAILY
Qty: 90 TABLET | Refills: 4 | Status: SHIPPED | OUTPATIENT
Start: 2024-09-05

## 2024-09-05 NOTE — TELEPHONE ENCOUNTER
[place] Thyroid 90 mg tablet moved from [facility] to [facility] per patient request.      Non-controlled substance.  Order date adjusted to ordered date.  Fulfilling signed order.    Patient update on MyChart.    Christiana Hall RN on 9/5/2024 at 3:42 PM

## 2024-10-27 ENCOUNTER — APPOINTMENT (OUTPATIENT)
Dept: CT IMAGING | Facility: OTHER | Age: 89
End: 2024-10-27
Attending: EMERGENCY MEDICINE
Payer: MEDICARE

## 2024-10-27 ENCOUNTER — HOSPITAL ENCOUNTER (EMERGENCY)
Facility: OTHER | Age: 89
Discharge: HOME | End: 2024-10-27
Attending: EMERGENCY MEDICINE | Admitting: EMERGENCY MEDICINE
Payer: MEDICARE

## 2024-10-27 VITALS
OXYGEN SATURATION: 91 % | WEIGHT: 132 LBS | SYSTOLIC BLOOD PRESSURE: 140 MMHG | HEIGHT: 60 IN | RESPIRATION RATE: 12 BRPM | HEART RATE: 104 BPM | BODY MASS INDEX: 25.91 KG/M2 | DIASTOLIC BLOOD PRESSURE: 90 MMHG | TEMPERATURE: 99.2 F

## 2024-10-27 DIAGNOSIS — N39.0 ACUTE LOWER UTI: ICD-10-CM

## 2024-10-27 LAB
ALBUMIN SERPL BCG-MCNC: 4.2 G/DL (ref 3.5–5.2)
ALBUMIN UR-MCNC: 20 MG/DL
ALP SERPL-CCNC: 77 U/L (ref 40–150)
ALT SERPL W P-5'-P-CCNC: 20 U/L (ref 0–50)
ANION GAP SERPL CALCULATED.3IONS-SCNC: 17 MMOL/L (ref 7–15)
APPEARANCE UR: CLEAR
AST SERPL W P-5'-P-CCNC: 26 U/L (ref 0–45)
BASOPHILS # BLD AUTO: 0 10E3/UL (ref 0–0.2)
BASOPHILS NFR BLD AUTO: 0 %
BILIRUB SERPL-MCNC: 0.9 MG/DL
BILIRUB UR QL STRIP: NEGATIVE
BUN SERPL-MCNC: 35.6 MG/DL (ref 8–23)
CALCIUM SERPL-MCNC: 9.8 MG/DL (ref 8.8–10.4)
CHLORIDE SERPL-SCNC: 102 MMOL/L (ref 98–107)
COLOR UR AUTO: YELLOW
CREAT SERPL-MCNC: 0.97 MG/DL (ref 0.51–0.95)
EGFRCR SERPLBLD CKD-EPI 2021: 54 ML/MIN/1.73M2
EOSINOPHIL # BLD AUTO: 0 10E3/UL (ref 0–0.7)
EOSINOPHIL NFR BLD AUTO: 0 %
ERYTHROCYTE [DISTWIDTH] IN BLOOD BY AUTOMATED COUNT: 14.3 % (ref 10–15)
GLUCOSE SERPL-MCNC: 217 MG/DL (ref 70–99)
GLUCOSE UR STRIP-MCNC: 100 MG/DL
HCO3 SERPL-SCNC: 20 MMOL/L (ref 22–29)
HCT VFR BLD AUTO: 49.7 % (ref 35–47)
HGB BLD-MCNC: 15.7 G/DL (ref 11.7–15.7)
HGB UR QL STRIP: NEGATIVE
HOLD SPECIMEN: NORMAL
IMM GRANULOCYTES # BLD: 0 10E3/UL
IMM GRANULOCYTES NFR BLD: 0 %
INR PPP: 1.2 (ref 0.85–1.15)
KETONES UR STRIP-MCNC: 20 MG/DL
LACTATE SERPL-SCNC: 1.6 MMOL/L (ref 0.7–2)
LACTATE SERPL-SCNC: 3 MMOL/L (ref 0.7–2)
LEUKOCYTE ESTERASE UR QL STRIP: ABNORMAL
LIPASE SERPL-CCNC: 20 U/L (ref 13–60)
LYMPHOCYTES # BLD AUTO: 0.3 10E3/UL (ref 0.8–5.3)
LYMPHOCYTES NFR BLD AUTO: 4 %
MAGNESIUM SERPL-MCNC: 2 MG/DL (ref 1.7–2.3)
MCH RBC QN AUTO: 31.7 PG (ref 26.5–33)
MCHC RBC AUTO-ENTMCNC: 31.6 G/DL (ref 31.5–36.5)
MCV RBC AUTO: 100 FL (ref 78–100)
MONOCYTES # BLD AUTO: 0.1 10E3/UL (ref 0–1.3)
MONOCYTES NFR BLD AUTO: 1 %
MUCOUS THREADS #/AREA URNS LPF: PRESENT /LPF
NEUTROPHILS # BLD AUTO: 7.9 10E3/UL (ref 1.6–8.3)
NEUTROPHILS NFR BLD AUTO: 94 %
NITRATE UR QL: NEGATIVE
NRBC # BLD AUTO: 0 10E3/UL
NRBC BLD AUTO-RTO: 0 /100
PH UR STRIP: 5.5 [PH] (ref 5–9)
PLATELET # BLD AUTO: 183 10E3/UL (ref 150–450)
POTASSIUM SERPL-SCNC: 4.4 MMOL/L (ref 3.4–5.3)
PROCALCITONIN SERPL IA-MCNC: 0.09 NG/ML
PROT SERPL-MCNC: 7.2 G/DL (ref 6.4–8.3)
RBC # BLD AUTO: 4.95 10E6/UL (ref 3.8–5.2)
RBC URINE: 1 /HPF
SODIUM SERPL-SCNC: 139 MMOL/L (ref 135–145)
SP GR UR STRIP: 1.02 (ref 1–1.03)
TROPONIN T SERPL HS-MCNC: 33 NG/L
TROPONIN T SERPL HS-MCNC: 34 NG/L
UROBILINOGEN UR STRIP-MCNC: NORMAL MG/DL
WBC # BLD AUTO: 8.4 10E3/UL (ref 4–11)
WBC URINE: 16 /HPF

## 2024-10-27 PROCEDURE — 83605 ASSAY OF LACTIC ACID: CPT | Performed by: EMERGENCY MEDICINE

## 2024-10-27 PROCEDURE — 250N000011 HC RX IP 250 OP 636: Performed by: EMERGENCY MEDICINE

## 2024-10-27 PROCEDURE — 80053 COMPREHEN METABOLIC PANEL: CPT | Performed by: EMERGENCY MEDICINE

## 2024-10-27 PROCEDURE — G1010 CDSM STANSON: HCPCS

## 2024-10-27 PROCEDURE — 96361 HYDRATE IV INFUSION ADD-ON: CPT | Performed by: EMERGENCY MEDICINE

## 2024-10-27 PROCEDURE — 93010 ELECTROCARDIOGRAM REPORT: CPT | Performed by: INTERNAL MEDICINE

## 2024-10-27 PROCEDURE — 250N000013 HC RX MED GY IP 250 OP 250 PS 637: Performed by: FAMILY MEDICINE

## 2024-10-27 PROCEDURE — 84145 PROCALCITONIN (PCT): CPT | Performed by: EMERGENCY MEDICINE

## 2024-10-27 PROCEDURE — 85025 COMPLETE CBC W/AUTO DIFF WBC: CPT | Performed by: EMERGENCY MEDICINE

## 2024-10-27 PROCEDURE — 99285 EMERGENCY DEPT VISIT HI MDM: CPT | Mod: 25 | Performed by: EMERGENCY MEDICINE

## 2024-10-27 PROCEDURE — 87186 SC STD MICRODIL/AGAR DIL: CPT | Performed by: EMERGENCY MEDICINE

## 2024-10-27 PROCEDURE — 96360 HYDRATION IV INFUSION INIT: CPT | Mod: XU | Performed by: EMERGENCY MEDICINE

## 2024-10-27 PROCEDURE — 81001 URINALYSIS AUTO W/SCOPE: CPT | Performed by: EMERGENCY MEDICINE

## 2024-10-27 PROCEDURE — 258N000003 HC RX IP 258 OP 636: Performed by: EMERGENCY MEDICINE

## 2024-10-27 PROCEDURE — 250N000013 HC RX MED GY IP 250 OP 250 PS 637: Performed by: EMERGENCY MEDICINE

## 2024-10-27 PROCEDURE — 93005 ELECTROCARDIOGRAM TRACING: CPT | Performed by: EMERGENCY MEDICINE

## 2024-10-27 PROCEDURE — 99284 EMERGENCY DEPT VISIT MOD MDM: CPT | Performed by: EMERGENCY MEDICINE

## 2024-10-27 PROCEDURE — 83735 ASSAY OF MAGNESIUM: CPT | Performed by: EMERGENCY MEDICINE

## 2024-10-27 PROCEDURE — 250N000009 HC RX 250: Performed by: EMERGENCY MEDICINE

## 2024-10-27 PROCEDURE — 84484 ASSAY OF TROPONIN QUANT: CPT | Performed by: EMERGENCY MEDICINE

## 2024-10-27 PROCEDURE — 83690 ASSAY OF LIPASE: CPT | Performed by: EMERGENCY MEDICINE

## 2024-10-27 PROCEDURE — 36415 COLL VENOUS BLD VENIPUNCTURE: CPT | Performed by: EMERGENCY MEDICINE

## 2024-10-27 PROCEDURE — 85610 PROTHROMBIN TIME: CPT | Performed by: EMERGENCY MEDICINE

## 2024-10-27 RX ORDER — IOPAMIDOL 755 MG/ML
100 INJECTION, SOLUTION INTRAVASCULAR ONCE
Status: COMPLETED | OUTPATIENT
Start: 2024-10-27 | End: 2024-10-27

## 2024-10-27 RX ORDER — DILTIAZEM HYDROCHLORIDE 120 MG/1
120 CAPSULE, COATED, EXTENDED RELEASE ORAL ONCE
Status: DISCONTINUED | OUTPATIENT
Start: 2024-10-27 | End: 2024-10-27 | Stop reason: HOSPADM

## 2024-10-27 RX ORDER — DILTIAZEM HYDROCHLORIDE 120 MG/1
120 CAPSULE, COATED, EXTENDED RELEASE ORAL ONCE
Status: COMPLETED | OUTPATIENT
Start: 2024-10-27 | End: 2024-10-27

## 2024-10-27 RX ORDER — CEPHALEXIN 500 MG/1
500 CAPSULE ORAL 2 TIMES DAILY
Qty: 10 CAPSULE | Refills: 0 | Status: SHIPPED | OUTPATIENT
Start: 2024-10-27 | End: 2024-11-01

## 2024-10-27 RX ORDER — THYROID 15 MG/1
15 TABLET ORAL DAILY
Status: DISCONTINUED | OUTPATIENT
Start: 2024-10-27 | End: 2024-10-27

## 2024-10-27 RX ORDER — LOSARTAN POTASSIUM 25 MG/1
25 TABLET ORAL ONCE
Status: COMPLETED | OUTPATIENT
Start: 2024-10-27 | End: 2024-10-27

## 2024-10-27 RX ORDER — THYROID 90 MG/1
90 TABLET ORAL DAILY
Status: DISCONTINUED | OUTPATIENT
Start: 2024-10-27 | End: 2024-10-27

## 2024-10-27 RX ADMIN — SODIUM CHLORIDE 500 ML: 9 INJECTION, SOLUTION INTRAVENOUS at 07:32

## 2024-10-27 RX ADMIN — LOSARTAN POTASSIUM 25 MG: 25 TABLET, FILM COATED ORAL at 09:51

## 2024-10-27 RX ADMIN — IOPAMIDOL 100 ML: 755 INJECTION, SOLUTION INTRAVENOUS at 07:52

## 2024-10-27 RX ADMIN — CEPHALEXIN 500 MG: 250 CAPSULE ORAL at 08:03

## 2024-10-27 RX ADMIN — SODIUM CHLORIDE 90 ML: 9 INJECTION, SOLUTION INTRAVENOUS at 07:53

## 2024-10-27 ASSESSMENT — ENCOUNTER SYMPTOMS
CONSTITUTIONAL NEGATIVE: 1
MYALGIAS: 0
ENDOCRINE NEGATIVE: 1
CARDIOVASCULAR NEGATIVE: 1
ALLERGIC/IMMUNOLOGIC NEGATIVE: 1
EYES NEGATIVE: 1
HEMATOLOGIC/LYMPHATIC NEGATIVE: 1
MUSCULOSKELETAL NEGATIVE: 1
ABDOMINAL PAIN: 1
VOMITING: 1
NECK PAIN: 0
NECK STIFFNESS: 0
NAUSEA: 1
RESPIRATORY NEGATIVE: 1
NEUROLOGICAL NEGATIVE: 1
PSYCHIATRIC NEGATIVE: 1

## 2024-10-27 ASSESSMENT — ACTIVITIES OF DAILY LIVING (ADL)
ADLS_ACUITY_SCORE: 0

## 2024-10-27 ASSESSMENT — COLUMBIA-SUICIDE SEVERITY RATING SCALE - C-SSRS
2. HAVE YOU ACTUALLY HAD ANY THOUGHTS OF KILLING YOURSELF IN THE PAST MONTH?: NO
1. IN THE PAST MONTH, HAVE YOU WISHED YOU WERE DEAD OR WISHED YOU COULD GO TO SLEEP AND NOT WAKE UP?: NO
6. HAVE YOU EVER DONE ANYTHING, STARTED TO DO ANYTHING, OR PREPARED TO DO ANYTHING TO END YOUR LIFE?: NO

## 2024-10-27 NOTE — ED NOTES
Pt set up with  breakfast, medications given per MAR. Pt and friend request call be given to daughter to update of status.     Daughter contacted and updated, agreeable to plan of care.

## 2024-10-27 NOTE — ED PROVIDER NOTES
History     Chief Complaint   Patient presents with    Nausea, Vomiting, & Diarrhea     HPI  Janna Mcdermott is a 89 year old female who presents today with complaints of nausea vomiting and diarrhea.  Symptoms began about 12 hours ago.  Symptoms gradually worsening prompting ER visit.  Currently states that she feels fine.  Denies any chest pain or shortness of breath.  Denies any recent travel outside the country.  States she has been compliant with all her medications.    Allergies:  Allergies   Allergen Reactions    Bee Venom Anaphylaxis    Benazepril Cough    Ciprofloxacin Other (See Comments)     Possible GI illness    Erythromycin Other (See Comments)     Unsure of allergy time, possible hives.    Gluten Meal GI Disturbance    Hydrochlorothiazide      Other reaction(s): Hyponatremia    Penicillins Other (See Comments)     Unsure of allergy time, possible hives       Problem List:    Patient Active Problem List    Diagnosis Date Noted    Right second toe ulcer, limited to breakdown of skin (H) 09/03/2024     Priority: Medium    Ulcer of left second toe, limited to breakdown of skin (H) 09/03/2024     Priority: Medium    Hammer toes of both feet 09/03/2024     Priority: Medium    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) 08/09/2024     Priority: Medium    Hypercoagulable state due to paroxysmal atrial fibrillation (H) 08/09/2024     Priority: Medium    Incontinence of feces, unspecified fecal incontinence type -- Since starting Statin Medications 05/04/2024     Priority: Medium    Recurrent UTI 05/04/2024     Priority: Medium    Statin intolerance 05/04/2024     Priority: Medium    On continuous oral anticoagulation - Eliquis 05/02/2024     Priority: Medium    History of CVA on 3/26/2021 05/02/2024     Priority: Medium    PAD (peripheral artery disease) (H) 05/02/2024     Priority: Medium    Bilateral femoral artery stenosis (H) 05/02/2024     Priority: Medium     On CT scan  from 3/25/2021      Subclavian artery stenosis, left (H) 05/02/2024     Priority: Medium     90% 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      Coronary artery disease involving native coronary artery of native heart without angina pectoris 05/02/2024     Priority: Medium     Moderate on CTA chest 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      Peripheral edema 05/02/2024     Priority: Medium    Chronic heart failure with preserved ejection fraction (H) 05/02/2024     Priority: Medium    Pre-diabetes 06/04/2021     Priority: Medium    History of stroke 06/03/2021     Priority: Medium    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) 04/05/2021     Priority: Medium    Ascending aortic aneurysm (H) 04/05/2021     Priority: Medium    Basilar artery stenosis/occlusion 04/05/2021     Priority: Medium    Cerebellar stroke (H) 03/25/2021     Priority: Medium    Mixed hyperlipidemia 07/31/2018     Priority: Medium    Benign essential hypertension 07/31/2018     Priority: Medium    Acquired hypothyroidism 07/31/2018     Priority: Medium    External ear ulcer (H) 09/20/2016     Priority: Medium    Biatrial enlargement 05/28/2015     Priority: Medium    Permanent atrial fibrillation (H) 03/09/2015     Priority: Medium    Stage 3a chronic kidney disease (H) 02/15/2011     Priority: Medium    Other symptoms involving cardiovascular system 01/26/2011     Priority: Medium     Overview:   No obstruction on CUS in [place]      Contact dermatitis and eczema 09/01/2010     Priority: Medium    Gambling problem 03/07/2009     Priority: Medium     Formatting of this note might be different from the original.  Daughter feels she is addicted.  History of some financial problems due to gambling, but she denies addiction  IMO Update 10/11          Past Medical History:    Past Medical History:   Diagnosis Date    Asymptomatic menopausal state     Chronic kidney disease, stage I     Dermatitis     Essential (primary) hypertension     Hyperlipidemia     Hypothyroidism     Other specified symptoms  and signs involving the circulatory and respiratory systems     Paroxysmal atrial fibrillation (H)        Past Surgical History:    Past Surgical History:   Procedure Laterality Date    APPENDECTOMY OPEN      No Comments Provided    CHOLECYSTECTOMY      No Comments Provided    HYSTERECTOMY TOTAL ABDOMINAL      No Comments Provided       Family History:    Family History   Problem Relation Age of Onset    Coronary Artery Disease Mother     Hypertension Mother     Heart Disease Mother         Heart Disease    Heart Disease Sister         Heart Disease,    Heart Disease Sister         Heart Disease,    Other - See Comments Sister         at 6 months    Other - See Comments Brother           in service    Other - See Comments Brother          of Parkinson's    Other - See Comments Brother          of pneumonia    Other - See Comments Other         No cancer or diabetes in the family       Social History:  Marital Status:   [5]  Social History     Tobacco Use    Smoking status: Never    Smokeless tobacco: Never   Vaping Use    Vaping status: Never Used   Substance Use Topics    Alcohol use: No     Alcohol/week: 0.0 standard drinks of alcohol    Drug use: Never        Medications:    apixaban ANTICOAGULANT (ELIQUIS ANTICOAGULANT) 5 MG tablet  ARMOUR THYROID 90 MG tablet  diltiazem ER COATED BEADS (CARDIZEM CD/CARTIA XT) 120 MG 24 hr capsule  losartan (COZAAR) 25 MG tablet  rosuvastatin (CRESTOR) 5 MG tablet  thyroid (ARMOUR) 15 MG tablet  Cholecalciferol 10 MCG (400 UNIT) CAPS  Cranberry-Vitamin C (CRANBERRY CONCENTRATE/VITAMINC) 63512-786 MG CAPS  fish oil-omega-3 fatty acids 1000 MG capsule  Flaxseed Oil OIL  latanoprost (XALATAN) 0.005 % ophthalmic solution  Sesame Oil OIL  thyroid (ARMOUR) 15 MG tablet  triamcinolone (KENALOG) 0.1 % external cream          Review of Systems   Constitutional: Negative.    HENT: Negative.     Eyes: Negative.    Respiratory: Negative.     Cardiovascular:  Negative.  Negative for chest pain.   Gastrointestinal:  Positive for abdominal pain, nausea and vomiting.   Endocrine: Negative.    Genitourinary: Negative.    Musculoskeletal: Negative.  Negative for myalgias, neck pain and neck stiffness.   Skin: Negative.    Allergic/Immunologic: Negative.    Neurological: Negative.    Hematological: Negative.    Psychiatric/Behavioral: Negative.         Physical Exam   BP: (!) 182/98  Pulse: (!) 137  Temp: 99.2  F (37.3  C)  Resp: 20  Height: 152.4 cm (5')  Weight: 59.9 kg (132 lb)  SpO2: 95 %      Physical Exam  Constitutional:       General: She is not in acute distress.     Appearance: Normal appearance. She is normal weight. She is not toxic-appearing.   HENT:      Mouth/Throat:      Mouth: Mucous membranes are moist.   Cardiovascular:      Rate and Rhythm: Tachycardia present. Rhythm irregular.   Pulmonary:      Effort: Pulmonary effort is normal.   Abdominal:      General: Abdomen is flat.      Palpations: Abdomen is soft.      Tenderness: There is no right CVA tenderness or left CVA tenderness.   Musculoskeletal:         General: Normal range of motion.      Cervical back: Normal range of motion.   Skin:     General: Skin is warm.      Capillary Refill: Capillary refill takes less than 2 seconds.   Neurological:      General: No focal deficit present.      Mental Status: She is alert.   Psychiatric:         Mood and Affect: Mood normal.         Behavior: Behavior normal.         ED Course     ED Course as of 11/01/24 0514   Sun Oct 27, 2024   0702 Heart rate 97   0800 Signed out to Dr. Mc. Await CT results and repeat troponin.    0905 Possible ileus vs early obstruction. Patient feeling improved. Ate crackers. Will PO challenge at this time     Procedures         EKG - irregular rhythm without ST elevation         Results for orders placed or performed during the hospital encounter of 10/27/24 (from the past 24 hours)   [place] Draw    Narrative    The following  orders were created for panel order [place] Draw.  Procedure                               Abnormality         Status                     ---------                               -----------         ------                     Extra Blue Top Tube[[id number]]                                                         Extra Red Top Tube[[id number]]                                                          Extra Green Top (Lithium...[[id number]]                                                 Extra Purple Top Tube[[id number]]                                                       Extra Green Top (Lithium...[[id number]]                                                   Please view results for these tests on the individual orders.       Medications - No data to display    Assessments & Plan (with Medical Decision Making)           New Prescriptions    No medications on file       Final diagnoses:   Acute lower UTI       10/27/2024   [facility]       Gautam Mcgee MD  11/01/24 [id number]

## 2024-10-27 NOTE — DISCHARGE INSTRUCTIONS
You might have a urinary tract infection.  You will given a dose of antibiotic.  The remainder of the antibiotic was sent to the pharmacy.  Please  and take as directed.  You have several incidental findings on imaging.  This includes enlarging of the aorta of the part in your belly.  Sometimes it is recommended you have surgery.  I recommend you follow-up with your regular doctor to discuss whether or not this is recommended given your age and other comorbidities.  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  For now continue all of your current medications.  Close follow-up with your regular doctor for ongoing monitoring and recommendations.  If any new or worsening symptoms please return to the emergency room.

## 2024-10-27 NOTE — ED NOTES
Rounded on patient, is alert and oriented answering all questions appropriately. Denies any needs at this time. Call light within reach.

## 2024-10-27 NOTE — ED PROVIDER NOTES
History     Chief Complaint   Patient presents with    Nausea, Vomiting, & Diarrhea     HPI  Janna Mcdermott is a 89 year old female who I assumed care of at shift change. CT pending. Presented with abdominal pain, nausea, diarrhea.  For UTI.  Reviewed CT results.  Possible ileus versus early obstruction.  Patient was able to eat a full meal and drink without any abdominal pain nausea or vomiting.  She wishes to discharge home at this time.  Will treat UTI with Keflex.    Allergies:  Allergies   Allergen Reactions    Bee Venom Anaphylaxis    Benazepril Cough    Ciprofloxacin Other (See Comments)     Possible GI illness    Erythromycin Other (See Comments)     Unsure of allergy time, possible hives.    Gluten Meal GI Disturbance    Hydrochlorothiazide      Other reaction(s): Hyponatremia    Penicillins Other (See Comments)     Unsure of allergy time, possible hives       Problem List:    Patient Active Problem List    Diagnosis Date Noted    Right second toe ulcer, limited to breakdown of skin (H) 09/03/2024     Priority: Medium    Ulcer of left second toe, limited to breakdown of skin (H) 09/03/2024     Priority: Medium    Hammer toes of both feet 09/03/2024     Priority: Medium    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) 08/09/2024     Priority: Medium    Hypercoagulable state due to paroxysmal atrial fibrillation (H) 08/09/2024     Priority: Medium    Incontinence of feces, unspecified fecal incontinence type -- Since starting Statin Medications 05/04/2024     Priority: Medium    Recurrent UTI 05/04/2024     Priority: Medium    Statin intolerance 05/04/2024     Priority: Medium    On continuous oral anticoagulation - Eliquis 05/02/2024     Priority: Medium    History of CVA on 3/26/2021 05/02/2024     Priority: Medium    PAD (peripheral artery disease) (H) 05/02/2024     Priority: Medium    Bilateral femoral artery stenosis (H) 05/02/2024     Priority: Medium     On CT scan from  3/25/2021      Subclavian artery stenosis, left (H) 05/02/2024     Priority: Medium     90% 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      Coronary artery disease involving native coronary artery of native heart without angina pectoris 05/02/2024     Priority: Medium     Moderate on CTA chest 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      Peripheral edema 05/02/2024     Priority: Medium    Chronic heart failure with preserved ejection fraction (H) 05/02/2024     Priority: Medium    Pre-diabetes 06/04/2021     Priority: Medium    History of stroke 06/03/2021     Priority: Medium    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) 04/05/2021     Priority: Medium    Ascending aortic aneurysm (H) 04/05/2021     Priority: Medium    Basilar artery stenosis/occlusion 04/05/2021     Priority: Medium    Cerebellar stroke (H) 03/25/2021     Priority: Medium    Mixed hyperlipidemia 07/31/2018     Priority: Medium    Benign essential hypertension 07/31/2018     Priority: Medium    Acquired hypothyroidism 07/31/2018     Priority: Medium    External ear ulcer (H) 09/20/2016     Priority: Medium    Biatrial enlargement 05/28/2015     Priority: Medium    Permanent atrial fibrillation (H) 03/09/2015     Priority: Medium    Stage 3a chronic kidney disease (H) 02/15/2011     Priority: Medium    Other symptoms involving cardiovascular system 01/26/2011     Priority: Medium     Overview:   No obstruction on CUS in [place]      Contact dermatitis and eczema 09/01/2010     Priority: Medium    Gambling problem 03/07/2009     Priority: Medium     Formatting of this note might be different from the original.  Daughter feels she is addicted.  History of some financial problems due to gambling, but she denies addiction  IMO Update 10/11          Past Medical History:    Past Medical History:   Diagnosis Date    Asymptomatic menopausal state     Chronic kidney disease, stage I     Dermatitis     Essential (primary) hypertension     Hyperlipidemia     Hypothyroidism     Other specified symptoms and  signs involving the circulatory and respiratory systems     Paroxysmal atrial fibrillation (H)        Past Surgical History:    Past Surgical History:   Procedure Laterality Date    APPENDECTOMY OPEN      No Comments Provided    CHOLECYSTECTOMY      No Comments Provided    HYSTERECTOMY TOTAL ABDOMINAL      No Comments Provided       Family History:    Family History   Problem Relation Age of Onset    Coronary Artery Disease Mother     Hypertension Mother     Heart Disease Mother         Heart Disease    Heart Disease Sister         Heart Disease,    Heart Disease Sister         Heart Disease,    Other - See Comments Sister         at 6 months    Other - See Comments Brother           in service    Other - See Comments Brother          of Parkinson's    Other - See Comments Brother          of pneumonia    Other - See Comments Other         No cancer or diabetes in the family       Social History:  Marital Status:   [5]  Social History     Tobacco Use    Smoking status: Never    Smokeless tobacco: Never   Vaping Use    Vaping status: Never Used   Substance Use Topics    Alcohol use: No     Alcohol/week: 0.0 standard drinks of alcohol    Drug use: Never        Medications:    apixaban ANTICOAGULANT (ELIQUIS ANTICOAGULANT) 5 MG tablet  ARMOUR THYROID 90 MG tablet  cephALEXin (KEFLEX) 500 MG capsule  diltiazem ER COATED BEADS (CARDIZEM CD/CARTIA XT) 120 MG 24 hr capsule  losartan (COZAAR) 25 MG tablet  rosuvastatin (CRESTOR) 5 MG tablet  thyroid (ARMOUR) 15 MG tablet  Cholecalciferol 10 MCG (400 UNIT) CAPS  Cranberry-Vitamin C (CRANBERRY CONCENTRATE/VITAMINC) 96782-335 MG CAPS  fish oil-omega-3 fatty acids 1000 MG capsule  Flaxseed Oil OIL  latanoprost (XALATAN) 0.005 % ophthalmic solution  Sesame Oil OIL  thyroid (ARMOUR) 15 MG tablet  triamcinolone (KENALOG) 0.1 % external cream          Review of Systems    Physical Exam   BP: (!) 182/98  Pulse: (!) 137  Temp: 99.2  F (37.3  C)  Resp:  20  Height: 152.4 cm (5')  Weight: 59.9 kg (132 lb)  SpO2: 95 %      Physical Exam    ED Course     ED Course as of 10/27/24 1051   Sun Oct 27, 2024   0702 Heart rate 97   0800 Signed out to Dr. Mc. Await CT results and repeat troponin.    0905 Possible ileus vs early obstruction. Patient feeling improved. Ate crackers. Will PO challenge at this time     Procedures             Results for orders placed or performed during the hospital encounter of 10/27/24 (from the past 24 hours)   [facility] Draw    Narrative    The following orders were created for panel order [facility] Draw.  Procedure                               Abnormality         Status                     ---------                               -----------         ------                     Extra Blue Top Tube[[id number]]                              Final result               Extra Red Top Tube[[id number]]                               Final result               Extra Green Top (Lithium...[[id number]]                      In process                 Extra Purple Top Tube[[id number]]                            Final result               Extra Green Top (Lithium...[[id number]]                      Final result                 Please view results for these tests on the individual orders.   Extra Blue Top Tube   Result Value Ref Range    Hold Specimen JIC    Extra Red Top Tube   Result Value Ref Range    Hold Specimen JIC    Extra Purple Top Tube   Result Value Ref Range    Hold Specimen JIC    Extra Green Top (Lithium Heparin) ON ICE   Result Value Ref Range    Hold Specimen JIC    CBC with platelets differential    Narrative    The following orders were created for panel order CBC with platelets differential.  Procedure                               Abnormality         Status                     ---------                               -----------         ------                     CBC with platelets and d...[[id number]]  Abnormal            Final result                  Please view results for these tests on the individual orders.   Comprehensive metabolic panel   Result Value Ref Range    Sodium 139 135 - 145 mmol/L    Potassium 4.4 3.4 - 5.3 mmol/L    Carbon Dioxide (CO2) 20 (L) 22 - 29 mmol/L    Anion Gap 17 (H) 7 - 15 mmol/L    Urea Nitrogen 35.6 (H) 8.0 - 23.0 mg/dL    Creatinine 0.97 (H) 0.51 - 0.95 mg/dL    GFR Estimate 54 (L) >60 mL/min/1.73m2    Calcium 9.8 8.8 - 10.4 mg/dL    Chloride 102 98 - 107 mmol/L    Glucose 217 (H) 70 - 99 mg/dL    Alkaline Phosphatase 77 40 - 150 U/L    AST 26 0 - 45 U/L    ALT 20 0 - 50 U/L    Protein Total 7.2 6.4 - 8.3 g/dL    Albumin 4.2 3.5 - 5.2 g/dL    Bilirubin Total 0.9 <=1.2 mg/dL   Lipase   Result Value Ref Range    Lipase 20 13 - 60 U/L   Troponin T, High Sensitivity   Result Value Ref Range    Troponin T, High Sensitivity 34 (H) <=14 ng/L   Procalcitonin   Result Value Ref Range    Procalcitonin 0.09 <0.50 ng/mL   Lactic acid whole blood with 1x repeat in 2 hr when >2   Result Value Ref Range    Lactic Acid, Initial 3.0 (H) 0.7 - 2.0 mmol/L   INR   Result Value Ref Range    INR 1.20 (H) 0.85 - 1.15   Magnesium   Result Value Ref Range    Magnesium 2.0 1.7 - 2.3 mg/dL   CBC with platelets and differential   Result Value Ref Range    WBC Count 8.4 4.0 - 11.0 10e3/uL    RBC Count 4.95 3.80 - 5.20 10e6/uL    Hemoglobin 15.7 11.7 - 15.7 g/dL    Hematocrit 49.7 (H) 35.0 - 47.0 %     78 - 100 fL    MCH 31.7 26.5 - 33.0 pg    MCHC 31.6 31.5 - 36.5 g/dL    RDW 14.3 10.0 - 15.0 %    Platelet Count 183 150 - 450 10e3/uL    % Neutrophils 94 %    % Lymphocytes 4 %    % Monocytes 1 %    % Eosinophils 0 %    % Basophils 0 %    % Immature Granulocytes 0 %    NRBCs per 100 WBC 0 <1 /100    Absolute Neutrophils 7.9 1.6 - 8.3 10e3/uL    Absolute Lymphocytes 0.3 (L) 0.8 - 5.3 10e3/uL    Absolute Monocytes 0.1 0.0 - 1.3 10e3/uL    Absolute Eosinophils 0.0 0.0 - 0.7 10e3/uL    Absolute Basophils 0.0 0.0 - 0.2 10e3/uL    Absolute Immature  Granulocytes 0.0 <=0.4 10e3/uL    Absolute NRBCs 0.0 10e3/uL   UA with Microscopic reflex to Culture    Specimen: Urine, Clean Catch   Result Value Ref Range    Color Urine Yellow Colorless, Straw, Light Yellow, Yellow    Appearance Urine Clear Clear    Glucose Urine 100 (A) Negative mg/dL    Bilirubin Urine Negative Negative    Ketones Urine 20 (A) Negative mg/dL    Specific Gravity Urine 1.022 1.000 - 1.030    Blood Urine Negative Negative    pH Urine 5.5 5.0 - 9.0    Protein Albumin Urine 20 (A) Negative mg/dL    Urobilinogen Urine Normal Normal, 2.0 mg/dL    Nitrite Urine Negative Negative    Leukocyte Esterase Urine Small (A) Negative    Mucus Urine Present (A) None Seen /LPF    RBC Urine 1 <=2 /HPF    WBC Urine 16 (H) <=5 /HPF    Narrative    Urine Culture ordered based on laboratory criteria   CTA Chest Abdomen Pelvis w Contrast    Narrative    PROCEDURE INFORMATION:   Exam:   CTA Chest With Contrast  CTA Abdomen and Pelvis With Contrast   Exam date and time: 10/27/2024 7:40 AM   Age: 89 years old   Clinical indication: Sternal or substernal pain; Abdominal pain; Generalized;   Additional info: 89-year-old female who presents today with complaints of   nausea, vomiting and abdominal pain. HX of ascending thoracic aorta dilatation.   Rule out ischemic bowel versus aneurysm     TECHNIQUE:   Imaging protocol: Computed tomographic angiography of the chest with contrast.   Exam focused on the arteries. Computed tomographic angiography of the abdomen   and pelvis with contrast. Exam focused on the arteries.   3D rendering (Not supervised by radiologist): MIP and/or 3D reconstructed   images were created by the technologist.   Radiation optimization: All CT scans at this facility use at least one of these   dose optimization techniques: automated exposure control; mA and/or kV   adjustment per patient size (includes targeted exams where dose is matched to   clinical indication); or iterative reconstruction.    Contrast material: ISOVUE 370; Contrast volume: 100 ml; Contrast route:   INTRAVENOUS (IV);      COMPARISON:   CT Thorax^Dissection (Adult) 3/26/2021 4:25 PM     FINDINGS:     VASCULATURE:   Pulmonary arteries: No evidence of pulmonary embolus to the segmental level.   Aorta: Ascending aortic aneurysm 4.7 x 4.9 cm. Unruptured. No dissection of the   aorta.   Celiac trunk and mesenteric arteries: No occlusion or significant stenosis.   Renal arteries: No occlusion or significant stenosis.   Right iliac arteries: No occlusion or significant stenosis.   Left iliac arteries: No occlusion or significant stenosis.     CHEST:   Lungs: Consolidation in the right lower lobe may represent atelectasis or   pneumonia.   Pleural spaces: Moderate right pleural effusion. Smaller left pleural effusion.   Heart: Unremarkable. No cardiomegaly. No pericardial effusion.     ABDOMEN AND PELVIS:   Liver: No mass.   Gallbladder and biliary ducts: Cholecystectomy   Pancreas: Stable cystic structure in the body of the pancreas 2 cm. It was   present in 2021.   Spleen: Unremarkable. No splenomegaly.   Adrenal glands: Unremarkable. No mass.   Kidneys and ureters: 10 mm nodule posterior left kidney 27 Hounsfield units..   Stomach and bowel: Evidence of slowed motility in the small bowel (series 4,   image 235. This may represent developing obstruction or ileus.. No bowel wall   thickening or pneumatosis or portal venous air to suggest ischemic bowel.   Diverticulosis of the colon. No milla diverticulitis   Appendix: No evidence of appendicitis.   Intraperitoneal space: Unremarkable. No free air. No significant fluid   collection.   Urinary bladder: Unremarkable. No mass.   Reproductive: Unremarkable as visualized.     Lymph nodes: Pathologic node anterior to the trachea 17 x 15 mm.   Bones/joints: Stable compression fracture L1   Soft tissues: Unremarkable.       Impression    IMPRESSION:   1.   No evidence of pulmonary embolus to the  segmental level.   2.   Ascending aortic aneurysm 4.7 x 4.9 cm. Unruptured.   3.   Moderate right pleural effusion. Smaller left pleural effusion.   4.   Consolidation in the right lower lobe may represent atelectasis or   pneumonia.     5.   10 mm nodule posterior left kidney 27 Hounsfield units.. Recommend MR   without and with contrast or CT without and with contrast. MR is preferred for   masses under 1.5 cm.    6.   Stable cystic structure in the body of the pancreas 2 cm. It was present   in 2021. . Differential includes pancreatic cyst, pancreatic pseudocyst,   pancreatic cystic neoplasm    7.   Evidence of slowed motility in the small bowel (series 4, image 235. This   may represent developing obstruction or ileus..     8.   No bowel wall thickening or pneumatosis or portal venous air to suggest   ischemic bowel.     COMMENTS:   Consistent with the American College of Radiology's Incidental Findings   Committee white paper (J Am Wilton Radiol 2018): Any incidental renal lesion less   than 1 cm or classified as too small to characterize, or any incidental cystic   renal lesion characterized as simple-appearing, is likely benign. No follow-up   imaging is recommended for these lesions per consensus recommendations based on   imaging criteria.     THIS DOCUMENT HAS BEEN ELECTRONICALLY SIGNED BY NANETTE TORRES MD   Lactic acid whole blood   Result Value Ref Range    Lactic Acid 1.6 0.7 - 2.0 mmol/L   Troponin T, High Sensitivity   Result Value Ref Range    Troponin T, High Sensitivity 33 (H) <=14 ng/L       Medications   diltiazem ER COATED BEADS (CARDIZEM CD/CARTIA XT) 24 hr capsule 120 mg (has no administration in time range)   sodium chloride 0.9% BOLUS 500 mL (0 mLs Intravenous Stopped 10/27/24 0908)   sodium chloride 0.9 % bag 500 mL for CT scan flush use (90 mLs Intravenous $Given 10/27/24 7739)   iopamidol (ISOVUE-370) solution 100 mL (100 mLs Intravenous $Given 10/27/24 6902)   cephALEXin (KEFLEX) capsule  500 mg (500 mg Oral $Given 10/27/24 0803)   apixaban ANTICOAGULANT (ELIQUIS) tablet 5 mg (5 mg Oral Not Given 10/27/24 1013)   diltiazem ER COATED BEADS (CARDIZEM CD/CARTIA XT) 24 hr capsule 120 mg (120 mg Oral Not Given 10/27/24 1010)   losartan (COZAAR) tablet 25 mg (25 mg Oral $Given 10/27/24 0951)       Assessments & Plan (with Medical Decision Making)     I have reviewed the nursing notes.    I have reviewed the findings, diagnosis, plan and need for follow up with the patient.        Medical Decision Making  The patient's presentation was of low complexity (an acute and uncomplicated illness or injury).    The patient's evaluation involved:  ordering and/or review of 3+ test(s) in this encounter (see separate area of note for details)    The patient's management necessitated moderate risk (prescription drug management including medications given in the ED).        Current Discharge Medication List        START taking these medications    Details   cephALEXin (KEFLEX) 500 MG capsule Take 1 capsule (500 mg) by mouth 2 times daily for 5 days.  Qty: 10 capsule, Refills: 0             Final diagnoses:   Acute lower UTI       10/27/2024   [facility]       Nasra Mc, DO  10/27/24 1051       Nasra Mc, DO  10/27/24 1051

## 2024-10-27 NOTE — ED NOTES
Assisted pt up to the commode in room, pt denies any complaints of being dizzy or lightheaded while moving around. PT was able to give urine sample. Moved back to bed and more pillows given to keep pt comfortable. Water provided. Call light within reach.

## 2024-10-27 NOTE — ED NOTES
Updated pt on plan of care to wait for lab and CT results. Pt wanting to go home but willing and agreeable to plan. Crackers given. Call light within reach.

## 2024-10-27 NOTE — ED TRIAGE NOTES
Pt presents to ED with c/o n/v/d since yesterday evening. Pt has h/o diverticulitis and is not sure if she is having a flare up. Pt has afib. 's-130's.    Leann Bazan RN on 10/27/2024 at 5:56 AM       Triage Assessment (Adult)       Row Name 10/27/24 0555          Skin Circulation/Temperature WDL    Skin Circulation/Temperature WDL WDL        Cardiac WDL    Cardiac WDL X;all     Pulse Rate & Regularity tachycardic        Cognitive/Neuro/Behavioral WDL    Cognitive/Neuro/Behavioral WDL WDL

## 2024-10-28 ENCOUNTER — TELEPHONE (OUTPATIENT)
Dept: INTERNAL MEDICINE | Facility: OTHER | Age: 89
End: 2024-10-28
Payer: COMMERCIAL

## 2024-10-28 LAB
ATRIAL RATE - MUSE: 144 BPM
DIASTOLIC BLOOD PRESSURE - MUSE: NORMAL MMHG
INTERPRETATION ECG - MUSE: NORMAL
P AXIS - MUSE: NORMAL DEGREES
PR INTERVAL - MUSE: NORMAL MS
QRS DURATION - MUSE: 68 MS
QT - MUSE: 316 MS
QTC - MUSE: 444 MS
R AXIS - MUSE: -51 DEGREES
SYSTOLIC BLOOD PRESSURE - MUSE: NORMAL MMHG
T AXIS - MUSE: 104 DEGREES
VENTRICULAR RATE- MUSE: 119 BPM

## 2024-10-28 NOTE — TELEPHONE ENCOUNTER
[facility]    Courtney Paredes on 10/28/2024 at 11:22 AM

## 2024-10-28 NOTE — TELEPHONE ENCOUNTER
Please put patient in [facility] schedule 0800 Nov. 5th. And call the patient to let her know.   Thanks.    Hanna Rivas LPN on 10/28/2024 at 11:06 AM   Ext. [phone]

## 2024-10-28 NOTE — TELEPHONE ENCOUNTER
This patient wants to see her primary for a ED follow up. Where do you want to put her?    Naomi Chapin on 10/28/2024 at 10:57 AM

## 2024-10-29 ENCOUNTER — TELEPHONE (OUTPATIENT)
Dept: NURSING | Facility: CLINIC | Age: 89
End: 2024-10-29
Payer: COMMERCIAL

## 2024-10-29 LAB — BACTERIA UR CULT: ABNORMAL

## 2024-10-29 NOTE — TELEPHONE ENCOUNTER
[facility]    Reason for call: Lab Result Notification     Lab Result (including Rx patient on, if applicable).  If culture, copy of lab report at bottom.  Lab Result: Urine Culture - See Below    ED Rx: cephALEXin (KEFLEX) 500 MG capsule - Take 1 capsule (500 mg) by mouth 2 times daily for 5 days (SUSCEPTIBLE)    Creatinine Level (mg/dl)   Creatinine   Date Value Ref Range Status   10/27/2024 0.97 (H) 0.51 - 0.95 mg/dL Final   06/03/2021 0.91 0.60 - 1.20 mg/dL Final    Creatinine clearance (ml/min), if applicable    Serum creatinine: 0.97 mg/dL (H) 10/27/24 0608  Estimated creatinine clearance: 29.3 mL/min (A)     ED Symptoms: Presented to the ED with abdominal pain, nausea, and diarrhea.      Current Symptoms: Unable to assess.    RN Recommendations/Instructions per [place] ED lab result protocol:   [facility] ED lab result protocol utilized: Urine Culture-Continue with current treatment protocol.     Unable to reach patient/caregiver.     Left voicemail message requesting a call back to [phone] between 9 a.m. and 5:30 p.m. for patient's ED/UC lab results.    Letter pended to be sent via [employer].       DRAKE BRADLYE RN

## 2024-10-29 NOTE — LETTER
October 29, 2024        Janna Mcdermott  [address] [address]          Dear Janna Mcdermott:    You were seen in the [facility] Emergency Department at [facility] on 10/27/2024.  We are unable to reach you by phone, so we are sending you this letter.     It is important that you call [facility] Emergency Department lab result nurse at [phone], as we have information to relay to you AND/OR we MAY have to make some changes in your treatment.    Best time to call back is between 9AM and 5:30PM, 7 days a week.      Sincerely,     [facility] Emergency Department Lab Result RN  [phone]

## 2024-11-05 ENCOUNTER — OFFICE VISIT (OUTPATIENT)
Dept: INTERNAL MEDICINE | Facility: OTHER | Age: 89
End: 2024-11-05
Attending: INTERNAL MEDICINE
Payer: MEDICARE

## 2024-11-05 VITALS
OXYGEN SATURATION: 93 % | RESPIRATION RATE: 16 BRPM | BODY MASS INDEX: 26.76 KG/M2 | SYSTOLIC BLOOD PRESSURE: 138 MMHG | DIASTOLIC BLOOD PRESSURE: 84 MMHG | WEIGHT: 137 LBS | HEART RATE: 86 BPM

## 2024-11-05 DIAGNOSIS — I10 BENIGN ESSENTIAL HYPERTENSION: ICD-10-CM

## 2024-11-05 DIAGNOSIS — K29.60 BILE REFLUX GASTRITIS: ICD-10-CM

## 2024-11-05 DIAGNOSIS — I50.32 CHRONIC HEART FAILURE WITH PRESERVED EJECTION FRACTION (H): ICD-10-CM

## 2024-11-05 DIAGNOSIS — J90 BILATERAL PLEURAL EFFUSION: ICD-10-CM

## 2024-11-05 DIAGNOSIS — R11.2 NAUSEA AND VOMITING, UNSPECIFIED VOMITING TYPE: Primary | ICD-10-CM

## 2024-11-05 PROCEDURE — G0463 HOSPITAL OUTPT CLINIC VISIT: HCPCS

## 2024-11-05 RX ORDER — SPIRONOLACTONE 25 MG/1
25 TABLET ORAL DAILY
Qty: 90 TABLET | Refills: 4 | Status: ON HOLD | OUTPATIENT
Start: 2024-11-05

## 2024-11-05 ASSESSMENT — ENCOUNTER SYMPTOMS
CHILLS: 0
BRUISES/BLEEDS EASILY: 0
BACK PAIN: 0
CHEST TIGHTNESS: 0
FEVER: 0
ABDOMINAL PAIN: 0
HEMATURIA: 0
PALPITATIONS: 1
WOUND: 0
SHORTNESS OF BREATH: 0
CONFUSION: 0

## 2024-11-05 ASSESSMENT — PAIN SCALES - GENERAL: PAINLEVEL_OUTOF10: NO PAIN (0)

## 2024-11-05 NOTE — NURSING NOTE
Chief Complaint   Patient presents with    RECHECK       Initial BP (!) 144/84   Pulse 86   Resp 16   Wt 62.1 kg (137 lb)   LMP 01/25/1974 (Approximate)   SpO2 93%   Breastfeeding No   BMI 26.76 kg/m   Estimated body mass index is 26.76 kg/m  as calculated from the following:    Height as of 10/27/24: 1.524 m (5').    Weight as of this encounter: 62.1 kg (137 lb).  Medication Review: complete    The next two questions are to help us understand your food security.  If you are feeling you need any assistance in this area, we have resources available to support you today.          4/24/2024   SDOH- Food Insecurity   Within the past 12 months, did you worry that your food would run out before you got money to buy more? N   Within the past 12 months, did the food you bought just not last and you didn t have money to get more? N            Health Care Directive:  Patient has a Health Care Directive on file      Josiane Wayne LPN

## 2024-11-05 NOTE — PROGRESS NOTES
Assessment & Plan     ICD-10-CM    1. Nausea and vomiting, unspecified vomiting type  R11.2       2. Bile reflux gastritis  K29.60       3. Chronic heart failure with preserved ejection fraction (H)  I50.32 spironolactone (ALDACTONE) 25 MG tablet      4. Benign essential hypertension  I10 spironolactone (ALDACTONE) 25 MG tablet      5. Bilateral pleural effusion  J90 spironolactone (ALDACTONE) 25 MG tablet         Patient presents for emergency room follow-up.    She states that she had bile reflux, no abdominal pain.  Occasionally has some difficulty swallowing.  Typically has been noting the symptoms after eating eggs.    She has chronic diastolic heart failure/chronic heart failure with preserved ejection fraction.  She has bilateral pleural effusions noted on chest CT from recent emergency room visit, also has been having more leg swelling issues.  Has not been raising her legs  much recently.  She is not currently taking any diuretics.    Discussed treatment options.  Start low-dose spironolactone 25 mg daily.  Continue low-dose losartan daily.  Check blood pressures at home.    Initial blood pressure today was elevated.    HYPERTENSION - Ongoing. Blood pressure is currently controlled.  Medication side effects: None. Denies syncope or presyncope.  Continue current medications.   Medication list reviewed/updated. Refills completed as needed.      Vaccine counseling completed.  Encourage routine / annual vaccinations.    The longitudinal plan of care for the diagnosis(es)/condition(s) as documented were addressed during this visit. Due to the added complexity in care, I will continue to support Janna in the subsequent management and with ongoing continuity of care.             MED REC REQUIRED  Post Medication Reconciliation Status: discharge medications reconciled and changed, per note/orders    BMI  Estimated body mass index is 26.76 kg/m  as calculated from the following:    Height as of 10/27/24: 1.524 m  (5').    Weight as of this encounter: 62.1 kg (137 lb).         Return for follow-up as needed with Dr. Gaona., Appointments: [phone].      Dwayne Gaona MD  [facility]    Review of Systems   Constitutional:  Negative for chills and fever.   HENT:  Negative for congestion.    Eyes:  Negative for visual disturbance.   Respiratory:  Negative for chest tightness and shortness of breath.    Cardiovascular:  Positive for palpitations and peripheral edema. Negative for chest pain.   Gastrointestinal:  Negative for abdominal pain.   Genitourinary:  Negative for hematuria.   Musculoskeletal:  Negative for back pain.   Skin:  Negative for rash and wound.   Allergic/Immunologic: Negative for immunocompromised state.   Neurological:  Negative for syncope.   Hematological:  Does not bruise/bleed easily.   Psychiatric/Behavioral:  Negative for confusion.         Ramila Saldivar is a 89 year old, presenting for the following health issues:  RECHECK        11/5/2024     7:57 AM   Additional Questions   Roomed by Osvaldo Wayne LPN     History of Present Illness       Reason for visit:  Follow up from ER visit    She eats 4 or more servings of fruits and vegetables daily.She exercises with enough effort to increase her heart rate 10 to 19 minutes per day.  She exercises with enough effort to increase her heart rate 7 days per week.   She is taking medications regularly.                     Objective    /84   Pulse 86   Resp 16   Wt 62.1 kg (137 lb)   LMP 01/25/1974 (Approximate)   SpO2 93%   Breastfeeding No   BMI 26.76 kg/m    Body mass index is 26.76 kg/m .  Physical Exam  Constitutional:       General: She is not in acute distress.     Appearance: Normal appearance. She is well-developed. She is not ill-appearing.   Eyes:      General: No scleral icterus.     Conjunctiva/sclera: Conjunctivae normal.   Neck:      Thyroid: No thyromegaly.      Vascular: No carotid bruit.    Cardiovascular:      Rate and Rhythm: Normal rate. Rhythm irregularly irregular.      Pulses: Normal pulses.   Pulmonary:      Effort: Pulmonary effort is normal. No respiratory distress.   Abdominal:      Palpations: Abdomen is soft.      Tenderness: There is no abdominal tenderness.   Musculoskeletal:         General: No deformity.      Right lower le+ Pitting Edema present.      Left lower le+ Pitting Edema present.   Skin:     General: Skin is warm and dry.      Findings: No rash.   Neurological:      Mental Status: She is alert. Mental status is at baseline.      Cranial Nerves: No cranial nerve deficit.   Psychiatric:         Mood and Affect: Mood normal.         Behavior: Behavior normal.                    Signed Electronically by: Dwayne Gaona MD

## 2024-11-05 NOTE — PATIENT INSTRUCTIONS
Blood pressure is reasonably controlled.     Glad your bladder symptoms are okay at this time.     Pleural effusion and leg swelling --- Start Spironolactone to help with your heart function / fluid retention.      Get your legs up to above your heart level - 2 to 4 times daily to help with leg swelling.     Consider wearing compression stockings -- put on in AM and remove in PM.     Compression Stockings:   -- Knee high   -- 20 mmHg   -- Wear when you get up until bedtime    -- Consider F[employer]to brand from [employer]...       Return as needed for follow-up for new / worsening symptoms.    Clinic : [phone]  Appointment line: [phone]

## 2024-11-13 ENCOUNTER — HOSPITAL ENCOUNTER (INPATIENT)
Facility: OTHER | Age: 89
DRG: 291 | End: 2024-11-13
Admitting: INTERNAL MEDICINE
Payer: MEDICARE

## 2024-11-13 ENCOUNTER — APPOINTMENT (OUTPATIENT)
Dept: GENERAL RADIOLOGY | Facility: OTHER | Age: 89
DRG: 291 | End: 2024-11-13
Payer: MEDICARE

## 2024-11-13 ENCOUNTER — APPOINTMENT (OUTPATIENT)
Dept: CARDIOLOGY | Facility: OTHER | Age: 89
DRG: 291 | End: 2024-11-13
Payer: MEDICARE

## 2024-11-13 DIAGNOSIS — R63.4 WEIGHT LOSS: Primary | ICD-10-CM

## 2024-11-13 DIAGNOSIS — N17.9 AKI (ACUTE KIDNEY INJURY) (H): ICD-10-CM

## 2024-11-13 DIAGNOSIS — I50.33 ACUTE ON CHRONIC HEART FAILURE WITH PRESERVED EJECTION FRACTION (H): ICD-10-CM

## 2024-11-13 DIAGNOSIS — I50.9 ACUTE ON CHRONIC CONGESTIVE HEART FAILURE, UNSPECIFIED HEART FAILURE TYPE (H): ICD-10-CM

## 2024-11-13 DIAGNOSIS — J90 BILATERAL PLEURAL EFFUSION: ICD-10-CM

## 2024-11-13 DIAGNOSIS — I10 ESSENTIAL HYPERTENSION: Chronic | ICD-10-CM

## 2024-11-13 LAB
ALBUMIN SERPL BCG-MCNC: 4.4 G/DL (ref 3.5–5.2)
ALBUMIN UR-MCNC: NEGATIVE MG/DL
ALP SERPL-CCNC: 86 U/L (ref 40–150)
ALT SERPL W P-5'-P-CCNC: 23 U/L (ref 0–50)
ANION GAP SERPL CALCULATED.3IONS-SCNC: 13 MMOL/L (ref 7–15)
APPEARANCE UR: CLEAR
AST SERPL W P-5'-P-CCNC: 27 U/L (ref 0–45)
BASOPHILS # BLD AUTO: 0.1 10E3/UL (ref 0–0.2)
BASOPHILS NFR BLD AUTO: 1 %
BILIRUB SERPL-MCNC: 1 MG/DL
BILIRUB UR QL STRIP: NEGATIVE
BUN SERPL-MCNC: 34.8 MG/DL (ref 8–23)
CALCIUM SERPL-MCNC: 9.9 MG/DL (ref 8.8–10.4)
CHLORIDE SERPL-SCNC: 101 MMOL/L (ref 98–107)
COLOR UR AUTO: NORMAL
CREAT SERPL-MCNC: 1.35 MG/DL (ref 0.51–0.95)
EGFRCR SERPLBLD CKD-EPI 2021: 36 ML/MIN/1.73M2
EOSINOPHIL # BLD AUTO: 0 10E3/UL (ref 0–0.7)
EOSINOPHIL NFR BLD AUTO: 0 %
ERYTHROCYTE [DISTWIDTH] IN BLOOD BY AUTOMATED COUNT: 13.9 % (ref 10–15)
GLUCOSE SERPL-MCNC: 152 MG/DL (ref 70–99)
GLUCOSE UR STRIP-MCNC: NEGATIVE MG/DL
HCO3 SERPL-SCNC: 24 MMOL/L (ref 22–29)
HCT VFR BLD AUTO: 53 % (ref 35–47)
HGB BLD-MCNC: 17.3 G/DL (ref 11.7–15.7)
HGB UR QL STRIP: NEGATIVE
HOLD SPECIMEN: NORMAL
IMM GRANULOCYTES # BLD: 0 10E3/UL
IMM GRANULOCYTES NFR BLD: 0 %
KETONES UR STRIP-MCNC: NEGATIVE MG/DL
LEUKOCYTE ESTERASE UR QL STRIP: NEGATIVE
LVEF ECHO: NORMAL
LYMPHOCYTES # BLD AUTO: 0.6 10E3/UL (ref 0.8–5.3)
LYMPHOCYTES NFR BLD AUTO: 9 %
MAGNESIUM SERPL-MCNC: 2.2 MG/DL (ref 1.7–2.3)
MCH RBC QN AUTO: 31.8 PG (ref 26.5–33)
MCHC RBC AUTO-ENTMCNC: 32.6 G/DL (ref 31.5–36.5)
MCV RBC AUTO: 97 FL (ref 78–100)
MONOCYTES # BLD AUTO: 0.6 10E3/UL (ref 0–1.3)
MONOCYTES NFR BLD AUTO: 9 %
NEUTROPHILS # BLD AUTO: 5.8 10E3/UL (ref 1.6–8.3)
NEUTROPHILS NFR BLD AUTO: 82 %
NITRATE UR QL: NEGATIVE
NRBC # BLD AUTO: 0 10E3/UL
NRBC BLD AUTO-RTO: 0 /100
NT-PROBNP SERPL-MCNC: 2029 PG/ML (ref 0–1800)
PH UR STRIP: 6.5 [PH] (ref 5–9)
PLATELET # BLD AUTO: 182 10E3/UL (ref 150–450)
POTASSIUM SERPL-SCNC: 4.8 MMOL/L (ref 3.4–5.3)
PROT SERPL-MCNC: 7.5 G/DL (ref 6.4–8.3)
RBC # BLD AUTO: 5.44 10E6/UL (ref 3.8–5.2)
RBC URINE: <1 /HPF
SODIUM SERPL-SCNC: 138 MMOL/L (ref 135–145)
SP GR UR STRIP: 1.01 (ref 1–1.03)
T4 FREE SERPL-MCNC: 1.56 NG/DL (ref 0.9–1.7)
TROPONIN T SERPL HS-MCNC: 21 NG/L
TROPONIN T SERPL HS-MCNC: 33 NG/L
TSH SERPL DL<=0.005 MIU/L-ACNC: 10.07 UIU/ML (ref 0.3–4.2)
UROBILINOGEN UR STRIP-MCNC: NORMAL MG/DL
WBC # BLD AUTO: 7.2 10E3/UL (ref 4–11)
WBC URINE: 2 /HPF

## 2024-11-13 PROCEDURE — 120N000001 HC R&B MED SURG/OB

## 2024-11-13 PROCEDURE — 99285 EMERGENCY DEPT VISIT HI MDM: CPT

## 2024-11-13 PROCEDURE — 85025 COMPLETE CBC W/AUTO DIFF WBC: CPT

## 2024-11-13 PROCEDURE — 250N000011 HC RX IP 250 OP 636

## 2024-11-13 PROCEDURE — 84443 ASSAY THYROID STIM HORMONE: CPT

## 2024-11-13 PROCEDURE — 36415 COLL VENOUS BLD VENIPUNCTURE: CPT

## 2024-11-13 PROCEDURE — 93005 ELECTROCARDIOGRAM TRACING: CPT

## 2024-11-13 PROCEDURE — 99285 EMERGENCY DEPT VISIT HI MDM: CPT | Mod: 25

## 2024-11-13 PROCEDURE — 80053 COMPREHEN METABOLIC PANEL: CPT

## 2024-11-13 PROCEDURE — 84439 ASSAY OF FREE THYROXINE: CPT

## 2024-11-13 PROCEDURE — 71045 X-RAY EXAM CHEST 1 VIEW: CPT

## 2024-11-13 PROCEDURE — 93306 TTE W/DOPPLER COMPLETE: CPT | Mod: 26 | Performed by: INTERNAL MEDICINE

## 2024-11-13 PROCEDURE — 81001 URINALYSIS AUTO W/SCOPE: CPT

## 2024-11-13 PROCEDURE — 83735 ASSAY OF MAGNESIUM: CPT

## 2024-11-13 PROCEDURE — 93306 TTE W/DOPPLER COMPLETE: CPT

## 2024-11-13 PROCEDURE — 83880 ASSAY OF NATRIURETIC PEPTIDE: CPT

## 2024-11-13 PROCEDURE — 84484 ASSAY OF TROPONIN QUANT: CPT

## 2024-11-13 PROCEDURE — 93010 ELECTROCARDIOGRAM REPORT: CPT | Performed by: INTERNAL MEDICINE

## 2024-11-13 PROCEDURE — 99223 1ST HOSP IP/OBS HIGH 75: CPT | Mod: AI | Performed by: INTERNAL MEDICINE

## 2024-11-13 PROCEDURE — 96374 THER/PROPH/DIAG INJ IV PUSH: CPT

## 2024-11-13 PROCEDURE — 250N000013 HC RX MED GY IP 250 OP 250 PS 637: Performed by: INTERNAL MEDICINE

## 2024-11-13 RX ORDER — ROSUVASTATIN CALCIUM 5 MG/1
5 TABLET, COATED ORAL
Status: DISCONTINUED | OUTPATIENT
Start: 2024-11-13 | End: 2024-11-18 | Stop reason: HOSPADM

## 2024-11-13 RX ORDER — THYROID 15 MG/1
15 TABLET ORAL
Status: DISCONTINUED | OUTPATIENT
Start: 2024-11-14 | End: 2024-11-14

## 2024-11-13 RX ORDER — AMOXICILLIN 250 MG
2 CAPSULE ORAL 2 TIMES DAILY PRN
Status: DISCONTINUED | OUTPATIENT
Start: 2024-11-13 | End: 2024-11-18 | Stop reason: HOSPADM

## 2024-11-13 RX ORDER — ONDANSETRON 2 MG/ML
4 INJECTION INTRAMUSCULAR; INTRAVENOUS EVERY 6 HOURS PRN
Status: DISCONTINUED | OUTPATIENT
Start: 2024-11-13 | End: 2024-11-18 | Stop reason: HOSPADM

## 2024-11-13 RX ORDER — THYROID 15 MG/1
30 TABLET ORAL
Status: DISCONTINUED | OUTPATIENT
Start: 2024-11-15 | End: 2024-11-14

## 2024-11-13 RX ORDER — ACETAMINOPHEN 325 MG/1
650 TABLET ORAL EVERY 4 HOURS PRN
Status: DISCONTINUED | OUTPATIENT
Start: 2024-11-13 | End: 2024-11-18 | Stop reason: HOSPADM

## 2024-11-13 RX ORDER — THYROID 15 MG/1
TABLET ORAL
Status: ON HOLD | COMMUNITY
End: 2024-11-18

## 2024-11-13 RX ORDER — THYROID 90 MG/1
90 TABLET ORAL DAILY
Status: DISCONTINUED | OUTPATIENT
Start: 2024-11-13 | End: 2024-11-13 | Stop reason: DRUGHIGH

## 2024-11-13 RX ORDER — POLYETHYLENE GLYCOL 3350 17 G/17G
17 POWDER, FOR SOLUTION ORAL DAILY
Status: DISCONTINUED | OUTPATIENT
Start: 2024-11-14 | End: 2024-11-18 | Stop reason: HOSPADM

## 2024-11-13 RX ORDER — TRIAMCINOLONE ACETONIDE 1 MG/G
CREAM TOPICAL 2 TIMES DAILY PRN
COMMUNITY

## 2024-11-13 RX ORDER — DILTIAZEM HYDROCHLORIDE 120 MG/1
120 CAPSULE, COATED, EXTENDED RELEASE ORAL DAILY
Status: DISCONTINUED | OUTPATIENT
Start: 2024-11-13 | End: 2024-11-18 | Stop reason: HOSPADM

## 2024-11-13 RX ORDER — LIDOCAINE 40 MG/G
CREAM TOPICAL
Status: DISCONTINUED | OUTPATIENT
Start: 2024-11-13 | End: 2024-11-18 | Stop reason: HOSPADM

## 2024-11-13 RX ORDER — ONDANSETRON 4 MG/1
4 TABLET, ORALLY DISINTEGRATING ORAL EVERY 6 HOURS PRN
Status: DISCONTINUED | OUTPATIENT
Start: 2024-11-13 | End: 2024-11-18 | Stop reason: HOSPADM

## 2024-11-13 RX ORDER — FUROSEMIDE 10 MG/ML
40 INJECTION INTRAMUSCULAR; INTRAVENOUS ONCE
Status: COMPLETED | OUTPATIENT
Start: 2024-11-13 | End: 2024-11-13

## 2024-11-13 RX ORDER — FUROSEMIDE 10 MG/ML
40 INJECTION INTRAMUSCULAR; INTRAVENOUS EVERY 12 HOURS
Status: DISCONTINUED | OUTPATIENT
Start: 2024-11-14 | End: 2024-11-14

## 2024-11-13 RX ORDER — ACETAMINOPHEN 650 MG/1
650 SUPPOSITORY RECTAL EVERY 4 HOURS PRN
Status: DISCONTINUED | OUTPATIENT
Start: 2024-11-13 | End: 2024-11-18 | Stop reason: HOSPADM

## 2024-11-13 RX ORDER — THYROID 90 MG/1
90 TABLET ORAL DAILY
Status: DISCONTINUED | OUTPATIENT
Start: 2024-11-14 | End: 2024-11-14

## 2024-11-13 RX ORDER — SENNOSIDES 8.6 MG
8.6 TABLET ORAL 2 TIMES DAILY
Status: DISCONTINUED | OUTPATIENT
Start: 2024-11-13 | End: 2024-11-18 | Stop reason: HOSPADM

## 2024-11-13 RX ORDER — AMOXICILLIN 250 MG
1 CAPSULE ORAL 2 TIMES DAILY PRN
Status: DISCONTINUED | OUTPATIENT
Start: 2024-11-13 | End: 2024-11-18 | Stop reason: HOSPADM

## 2024-11-13 RX ORDER — PROCHLORPERAZINE MALEATE 5 MG/1
5 TABLET ORAL EVERY 6 HOURS PRN
Status: DISCONTINUED | OUTPATIENT
Start: 2024-11-13 | End: 2024-11-18 | Stop reason: HOSPADM

## 2024-11-13 RX ORDER — CALCIUM CARBONATE 500 MG/1
1000 TABLET, CHEWABLE ORAL 4 TIMES DAILY PRN
Status: DISCONTINUED | OUTPATIENT
Start: 2024-11-13 | End: 2024-11-18 | Stop reason: HOSPADM

## 2024-11-13 RX ADMIN — FUROSEMIDE 40 MG: 10 INJECTION, SOLUTION INTRAVENOUS at 12:01

## 2024-11-13 RX ADMIN — APIXABAN 5 MG: 5 TABLET, FILM COATED ORAL at 21:07

## 2024-11-13 RX ADMIN — DILTIAZEM HYDROCHLORIDE 120 MG: 120 CAPSULE, COATED, EXTENDED RELEASE ORAL at 14:11

## 2024-11-13 ASSESSMENT — ACTIVITIES OF DAILY LIVING (ADL)
ADLS_ACUITY_SCORE: 0

## 2024-11-13 ASSESSMENT — ENCOUNTER SYMPTOMS
NAUSEA: 1
APPETITE CHANGE: 1

## 2024-11-13 ASSESSMENT — COLUMBIA-SUICIDE SEVERITY RATING SCALE - C-SSRS
6. HAVE YOU EVER DONE ANYTHING, STARTED TO DO ANYTHING, OR PREPARED TO DO ANYTHING TO END YOUR LIFE?: NO
1. IN THE PAST MONTH, HAVE YOU WISHED YOU WERE DEAD OR WISHED YOU COULD GO TO SLEEP AND NOT WAKE UP?: NO
2. HAVE YOU ACTUALLY HAD ANY THOUGHTS OF KILLING YOURSELF IN THE PAST MONTH?: NO

## 2024-11-13 NOTE — ED PROVIDER NOTES
History     Chief Complaint   Patient presents with    Nausea     X2 days     HPI  Janna Mcdermott is a 89 year old female with PMH mixed hyperlipidemia, hypothyroidism, CKD, cerebellar stroke, atrial fibrillation on eliquis, PAD, CAD, recurrent UTIs who presents with her daughter for not feeling well. She reports nausea the past two mornings after eating her breakfast. She has had no appetite over the past week. Pt's daughter is concerned that her thyroid levels are elevated. Similar symptoms in the past when her thyroid levels were elevated. She recently was diagnosed with pleural effusions and started on spironolactone. Denies fever or chills. Denies chest pain. Denies abdominal pain.     Allergies:  Allergies   Allergen Reactions    Bee Venom Anaphylaxis    Benazepril Cough    Ciprofloxacin Other (See Comments)     Possible GI illness    Erythromycin Other (See Comments)     Unsure of allergy time, possible hives.    Gluten Meal GI Disturbance    Hydrochlorothiazide      Other reaction(s): Hyponatremia    Penicillins Other (See Comments)     Unsure of allergy time, possible hives       Problem List:    Patient Active Problem List    Diagnosis Date Noted    DIMITRI (acute kidney injury) (H) 11/13/2024     Priority: Medium    Right second toe ulcer, limited to breakdown of skin (H) 09/03/2024     Priority: Medium    Ulcer of left second toe, limited to breakdown of skin (H) 09/03/2024     Priority: Medium    Hammer toes of both feet 09/03/2024     Priority: Medium    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) 08/09/2024     Priority: Medium    Hypercoagulable state due to paroxysmal atrial fibrillation (H) 08/09/2024     Priority: Medium    Incontinence of feces, unspecified fecal incontinence type -- Since starting Statin Medications 05/04/2024     Priority: Medium    Recurrent UTI 05/04/2024     Priority: Medium    Statin intolerance 05/04/2024     Priority: Medium    On continuous  oral anticoagulation - Eliquis 05/02/2024     Priority: Medium    History of CVA on 3/26/2021 05/02/2024     Priority: Medium    PAD (peripheral artery disease) (H) 05/02/2024     Priority: Medium    Bilateral femoral artery stenosis (H) 05/02/2024     Priority: Medium     On CT scan from 3/25/2021      Subclavian artery stenosis, left (H) 05/02/2024     Priority: Medium     90% 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      Coronary artery disease involving native coronary artery of native heart without angina pectoris 05/02/2024     Priority: Medium     Moderate on CTA chest 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      Peripheral edema 05/02/2024     Priority: Medium    Chronic heart failure with preserved ejection fraction (H) 05/02/2024     Priority: Medium    Pre-diabetes 06/04/2021     Priority: Medium    History of stroke 06/03/2021     Priority: Medium    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) 04/05/2021     Priority: Medium    Ascending aortic aneurysm (H) 04/05/2021     Priority: Medium    Basilar artery stenosis/occlusion 04/05/2021     Priority: Medium    Cerebellar stroke (H) 03/25/2021     Priority: Medium    Mixed hyperlipidemia 07/31/2018     Priority: Medium    Benign essential hypertension 07/31/2018     Priority: Medium    Acquired hypothyroidism 07/31/2018     Priority: Medium    External ear ulcer (H) 09/20/2016     Priority: Medium    Biatrial enlargement 05/28/2015     Priority: Medium    Permanent atrial fibrillation (H) 03/09/2015     Priority: Medium    Stage 3a chronic kidney disease (H) 02/15/2011     Priority: Medium    Other symptoms involving cardiovascular system 01/26/2011     Priority: Medium     Overview:   No obstruction on CUS in [place]      Contact dermatitis and eczema 09/01/2010     Priority: Medium    Gambling problem 03/07/2009     Priority: Medium     Formatting of this note might be different from the original.  Daughter feels she is addicted.  History of some financial problems due to gambling, but she denies  addiction  IMO Update 10/11          Past Medical History:    Past Medical History:   Diagnosis Date    Asymptomatic menopausal state     Chronic kidney disease, stage I     Dermatitis     Essential (primary) hypertension     Hyperlipidemia     Hypothyroidism     Other specified symptoms and signs involving the circulatory and respiratory systems     Paroxysmal atrial fibrillation (H)        Past Surgical History:    Past Surgical History:   Procedure Laterality Date    APPENDECTOMY OPEN      No Comments Provided    CHOLECYSTECTOMY      No Comments Provided    HYSTERECTOMY TOTAL ABDOMINAL      No Comments Provided       Family History:    Family History   Problem Relation Age of Onset    Coronary Artery Disease Mother     Hypertension Mother     Heart Disease Mother         Heart Disease    Heart Disease Sister         Heart Disease,    Heart Disease Sister         Heart Disease,    Other - See Comments Sister         at 6 months    Other - See Comments Brother           in service    Other - See Comments Brother          of Parkinson's    Other - See Comments Brother          of pneumonia    Other - See Comments Other         No cancer or diabetes in the family       Social History:  Marital Status:   [5]  Social History     Tobacco Use    Smoking status: Never    Smokeless tobacco: Never   Vaping Use    Vaping status: Never Used   Substance Use Topics    Alcohol use: No     Alcohol/week: 0.0 standard drinks of alcohol    Drug use: Never        Medications:    No current outpatient medications on file.    Review of Systems   Constitutional:  Positive for appetite change.   Gastrointestinal:  Positive for nausea.   All other systems reviewed and are negative.      Physical Exam   BP: 137/77  Pulse: 73  Temp: (!) 96.7  F (35.9  C)  Resp: 16  Height: 152.4 cm (5')  Weight: 59.9 kg (132 lb)  SpO2: 97 %      Physical Exam  Vitals and nursing note reviewed.   Constitutional:       General: She  is not in acute distress.     Appearance: She is ill-appearing.   HENT:      Head: Normocephalic.      Right Ear: Tympanic membrane normal.      Left Ear: Tympanic membrane normal.      Nose: Nose normal.      Mouth/Throat:      Mouth: Mucous membranes are moist.   Eyes:      Conjunctiva/sclera: Conjunctivae normal.   Cardiovascular:      Rate and Rhythm: Normal rate. Rhythm irregular.      Pulses: Normal pulses.      Heart sounds: Normal heart sounds.   Pulmonary:      Effort: Pulmonary effort is normal.      Breath sounds: Examination of the right-lower field reveals decreased breath sounds. Decreased breath sounds present.   Abdominal:      General: Bowel sounds are normal.      Palpations: Abdomen is soft.      Tenderness: There is no abdominal tenderness.   Musculoskeletal:         General: Normal range of motion.      Cervical back: Normal range of motion and neck supple.      Right lower leg: No edema.      Left lower leg: No edema.   Skin:     General: Skin is warm and dry.   Neurological:      General: No focal deficit present.      Mental Status: She is alert and oriented to person, place, and time. Mental status is at baseline.      GCS: GCS eye subscore is 4. GCS verbal subscore is 5. GCS motor subscore is 6.      Cranial Nerves: Cranial nerves 2-12 are intact.      Sensory: Sensation is intact.      Motor: Motor function is intact.      Coordination: Coordination is intact.      Gait: Gait is intact.   Psychiatric:         Mood and Affect: Mood normal.            Results for orders placed or performed during the hospital encounter of 11/13/24 (from the past 24 hours)   [facility] Draw    Narrative    The following orders were created for panel order [facility] Draw.  Procedure                               Abnormality         Status                     ---------                               -----------         ------                     Extra Blue Top Tube[[id number]]                              Final result                Extra Red Top Tube[[id number]]                               Final result               Extra Green Top (Lithium...[[id number]]                      Final result               Extra Green Top (Lithium...[[id number]]                      Final result               Extra Purple Top Tube[[id number]]                            Final result                 Please view results for these tests on the individual orders.   Extra Blue Top Tube   Result Value Ref Range    Hold Specimen JIC    Extra Red Top Tube   Result Value Ref Range    Hold Specimen JIC    Extra Green Top (Lithium Heparin) Tube   Result Value Ref Range    Hold Specimen JIC    Extra Green Top (Lithium Heparin) Tube   Result Value Ref Range    Hold Specimen JIC    Extra Purple Top Tube   Result Value Ref Range    Hold Specimen JIC    CBC with platelets differential    Narrative    The following orders were created for panel order CBC with platelets differential.  Procedure                               Abnormality         Status                     ---------                               -----------         ------                     CBC with platelets and d...[[id number]]  Abnormal            Final result                 Please view results for these tests on the individual orders.   Comprehensive metabolic panel   Result Value Ref Range    Sodium 138 135 - 145 mmol/L    Potassium 4.8 3.4 - 5.3 mmol/L    Carbon Dioxide (CO2) 24 22 - 29 mmol/L    Anion Gap 13 7 - 15 mmol/L    Urea Nitrogen 34.8 (H) 8.0 - 23.0 mg/dL    Creatinine 1.35 (H) 0.51 - 0.95 mg/dL    GFR Estimate 36 (L) >60 mL/min/1.73m2    Calcium 9.9 8.8 - 10.4 mg/dL    Chloride 101 98 - 107 mmol/L    Glucose 152 (H) 70 - 99 mg/dL    Alkaline Phosphatase 86 40 - 150 U/L    AST 27 0 - 45 U/L    ALT 23 0 - 50 U/L    Protein Total 7.5 6.4 - 8.3 g/dL    Albumin 4.4 3.5 - 5.2 g/dL    Bilirubin Total 1.0 <=1.2 mg/dL   Troponin T, High Sensitivity   Result Value Ref Range    Troponin T, High  Sensitivity 33 (H) <=14 ng/L   Magnesium   Result Value Ref Range    Magnesium 2.2 1.7 - 2.3 mg/dL   TSH Reflex GH   Result Value Ref Range    TSH 10.07 (H) 0.30 - 4.20 uIU/mL   CBC with platelets and differential   Result Value Ref Range    WBC Count 7.2 4.0 - 11.0 10e3/uL    RBC Count 5.44 (H) 3.80 - 5.20 10e6/uL    Hemoglobin 17.3 (H) 11.7 - 15.7 g/dL    Hematocrit 53.0 (H) 35.0 - 47.0 %    MCV 97 78 - 100 fL    MCH 31.8 26.5 - 33.0 pg    MCHC 32.6 31.5 - 36.5 g/dL    RDW 13.9 10.0 - 15.0 %    Platelet Count 182 150 - 450 10e3/uL    % Neutrophils 82 %    % Lymphocytes 9 %    % Monocytes 9 %    % Eosinophils 0 %    % Basophils 1 %    % Immature Granulocytes 0 %    NRBCs per 100 WBC 0 <1 /100    Absolute Neutrophils 5.8 1.6 - 8.3 10e3/uL    Absolute Lymphocytes 0.6 (L) 0.8 - 5.3 10e3/uL    Absolute Monocytes 0.6 0.0 - 1.3 10e3/uL    Absolute Eosinophils 0.0 0.0 - 0.7 10e3/uL    Absolute Basophils 0.1 0.0 - 0.2 10e3/uL    Absolute Immature Granulocytes 0.0 <=0.4 10e3/uL    Absolute NRBCs 0.0 10e3/uL   T4 free   Result Value Ref Range    Free T4 1.56 0.90 - 1.70 ng/dL   Nt probnp inpatient (BNP)   Result Value Ref Range    N terminal Pro BNP Inpatient 2,029 (H) 0 - 1,800 pg/mL   XR Chest Port 1 View    Narrative    PROCEDURE:  XR CHEST PORT 1 VIEW    HISTORY: nausea, not feeling well.    COMPARISON: CT chest 10/27/2024    FINDINGS: Single view chest radiograph    Cardiomediastinal silhouette is enlarged, stable. There is calcific  aortic atherosclerosis.  Perihilar and peripheral interstitial opacities. Dependent mixed  opacities. Bilateral pleural effusions, right greater than left,  similar to comparison. No pneumothorax.    No suspicious osseous lesion or subdiaphragmatic free air.      Impression    IMPRESSION:    Changes in the chest which are likely due to heart failure, including  effusions, cardiomegaly and bibasilar pulmonary opacities. Pneumonia  cannot be excluded.    ARELIS VORA MD         SYSTEM ID:   [id number]   Troponin T, High Sensitivity   Result Value Ref Range    Troponin T, High Sensitivity 21 (H) <=14 ng/L       Medications   apixaban ANTICOAGULANT (ELIQUIS) tablet 5 mg (has no administration in time range)   diltiazem ER COATED BEADS (CARDIZEM CD/CARTIA XT) 24 hr capsule 120 mg (has no administration in time range)   thyroid (ARMOUR) TABS 90 mg (has no administration in time range)   lidocaine 1 % 0.1-1 mL (has no administration in time range)   lidocaine (LMX4) cream (has no administration in time range)   sodium chloride (PF) 0.9% PF flush 3 mL (has no administration in time range)   sodium chloride (PF) 0.9% PF flush 3 mL (has no administration in time range)   senna-docusate (SENOKOT-S/PERICOLACE) 8.6-50 MG per tablet 1 tablet (has no administration in time range)     Or   senna-docusate (SENOKOT-S/PERICOLACE) 8.6-50 MG per tablet 2 tablet (has no administration in time range)   calcium carbonate (TUMS) chewable tablet 1,000 mg (has no administration in time range)   Patient is already receiving anticoagulation with heparin, enoxaparin (LOVENOX), warfarin (COUMADIN)  or other anticoagulant medication (has no administration in time range)   acetaminophen (TYLENOL) tablet 650 mg (has no administration in time range)     Or   acetaminophen (TYLENOL) Suppository 650 mg (has no administration in time range)   prochlorperazine (COMPAZINE) injection 5 mg (has no administration in time range)     Or   prochlorperazine (COMPAZINE) tablet 5 mg (has no administration in time range)   ondansetron (ZOFRAN ODT) ODT tab 4 mg (has no administration in time range)     Or   ondansetron (ZOFRAN) injection 4 mg (has no administration in time range)   furosemide (LASIX) injection 40 mg (has no administration in time range)   Continuing ACE inhibitor/ARB/ARNI from home medication list OR ACE inhibitor/ARB/ARNI order already placed during this visit (has no administration in time range)   Continuing beta blocker from home  medication list OR beta blocker order already placed during this visit (has no administration in time range)   sennosides (SENOKOT) tablet 8.6 mg (has no administration in time range)   polyethylene glycol (MIRALAX) Packet 17 g (has no administration in time range)   rosuvastatin (CRESTOR) tablet 5 mg (has no administration in time range)   furosemide (LASIX) injection 40 mg (40 mg Intravenous $Given 11/13/24 1201)       Assessments & Plan (with Medical Decision Making)  Janna Mcdermott is a 89 year old female with PMH mixed hyperlipidemia, hypothyroidism, CKD, cerebellar stroke, atrial fibrillation on eliquis, PAD, CAD, recurrent UTIs who presents with her daughter for not feeling well. She reports nausea the past two mornings after eating her breakfast. She has had no appetite over the past week. Pt's daughter is concerned that her thyroid levels are elevated. Similar symptoms in the past when her thyroid levels were elevated. She recently was diagnosed with pleural effusions and started on spironolactone. Denies fever or chills. Denies chest pain. Denies abdominal pain.   VS in the ED. /77   Pulse 73   Temp (!) 96.7  F (35.9  C) (Tympanic)   Resp 16   Ht 1.524 m (5')   Wt 59.9 kg (132 lb)   LMP 01/25/1974 (Approximate)   SpO2 97%   BMI 25.78 kg/m    Diagnostics:    Lab: CBC- okay. CMP- BUN- 34.8, creatinine- 1.35, GFR- 36. BNP- 2, 029. Magnesium- normal. UA- Troponin- 33. Repeat troponin- 21. TSH- 10.07.     X-ray: CXR- Changes in the chest which are likely due to heart failure, including  effusions, cardiomegaly and bibasilar pulmonary opacities. Pneumonia  cannot be excluded.     ECHO- ordered.   EKG  Rhythm- Atrial fibrillation   Rate- 64  Axis- left axis deviation.   Any abnormal   No significant change when compared to previous EKGs.   I have independently reviewed and interpreted today's EKG, pending cardiologist over read.    ED Course as of 11/13/24 1406   Wed Nov 13, 2024   1040  TSH(!): 10.07   1040 Troponin T, High Sensitivity(!): 33  Stable compared to previous    1151 I spoke with Dr. Marin who graciously accepts the patient for admission for CHF exacerbation.      Janna is a 89 y/o female evaluated today for no appetite and nausea after eating the past couple of days. She saw Dr. Gaona recently for similar symptoms. She was started on spironolactone recently for increased leg swelling and bilateral pleural effusions. She is awake and alert in no acute distress. She is ill appearing. No focal neuro deficits on exam. Lips are dry. HR irregular, controlled rate. LS diminished R > L. No edema.   She is afebrile. No leukocytosis.  Kidney function slightly worse compared to prior. TSH elevated at 10.07 with a normal T4 free. Stable compared to previous levels. EKG with no acute findings. Initial troponin elevated. Repeat trending down. I suspect elevated related to CKD and volume overload. She denies chest pain. CXR reveals findings likely due to HF with bilateral effusions. Her BNP is elevated. Her last echo was 2 years ago. Will plan to admit for diuresis and monitor renal function. I spoke with Dr. Marin who graciously accepts the patient for admission. Pt and pt's daughter agreeable with the plan.      I have reviewed the nursing notes.    I have reviewed the findings, diagnosis, plan and need for follow up with the patient.  Medical Decision Making  The patient's presentation was of moderate complexity (an acute illness with systemic symptoms).    The patient's evaluation involved:  an assessment requiring an independent historian (see separate area of note for details)  review of 3+ test result(s) ordered prior to this encounter (see separate area of note for details)  ordering and/or review of 3+ test(s) in this encounter (see separate area of note for details)    The patient's management necessitated moderate risk (prescription drug management including medications given in the ED)  and high risk (a decision regarding hospitalization).    Final diagnoses:   DIMITRI (acute kidney injury) (H)   Acute on chronic congestive heart failure, unspecified heart failure type (H)   Bilateral pleural effusion     11/13/2024   [facility], APRN CNP  11/13/24 7735

## 2024-11-13 NOTE — ED TRIAGE NOTES
"Pt is here today with her daughter for nausea in the morning for the past 2 days.   Pt states that over all, she just is not feeling well.   Pt is on thyroid medications and daughter is concerned that pt is \"overdosing\" on her thyroid medications.   Daughter states that in the past, pt's has been on too high of a tyroid dose.  BP (!) 137/7   Pulse 73   Temp (!) 96.7  F (35.9  C) (Tympanic)   Resp 16   Ht 1.524 m (5')   Wt 59.9 kg (132 lb)   LMP 01/25/1974 (Approximate)   SpO2 97%   BMI 25.78 kg/m      Hayley Combs RN on 11/13/2024 at 9:47 AM       Triage Assessment (Adult)       Row Name 11/13/24 0945          Triage Assessment    Airway WDL WDL        Respiratory WDL    Respiratory WDL WDL        Skin Circulation/Temperature WDL    Skin Circulation/Temperature WDL WDL        Cardiac WDL    Cardiac WDL WDL        Peripheral/Neurovascular WDL    Peripheral Neurovascular WDL WDL        Cognitive/Neuro/Behavioral WDL    Cognitive/Neuro/Behavioral WDL WDL                     "

## 2024-11-13 NOTE — PROGRESS NOTES
Chart reviewed per MST screen: pt reported wt loss with decreased appetite. She did have ~10% wt loss in past 6 months. Was started on a diuretic on 11/5 and is down ~5# since starting this. Pt was unavailable this afternoon to visit with, will try again next available time.   Diet: regular   Intakes: monitor   Wt Readings from Last 10 Encounters:   11/13/24 59.9 kg (132 lb)   11/05/24 62.1 kg (137 lb)   10/27/24 59.9 kg (132 lb)   09/03/24 61.2 kg (135 lb)   08/09/24 61.7 kg (136 lb)   07/29/24 61.7 kg (136 lb)   07/22/24 64 kg (141 lb)   05/22/24 66.5 kg (146 lb 9.6 oz)   05/04/24 65.3 kg (144 lb)   05/02/24 66.7 kg (147 lb)      Thea Ba RD on 11/13/2024 at 3:49 PM

## 2024-11-13 NOTE — PROGRESS NOTES
NS ADMISSION NOTE    Patient admitted to room 311 at approximately 1240 via wheel chair from emergency room. Patient was accompanied by daughter.     Verbal SBAR report received from ED Nurse prior to patient arrival.     Patient ambulated to bed with stand-by assist. Patient alert and oriented X 3. The patient is not having any pain.  . Admission vital signs: Blood pressure 137/77, pulse 73, temperature (!) 96.7  F (35.9  C), temperature source Tympanic, resp. rate 16, height 1.524 m (5'), weight 59.9 kg (132 lb), last menstrual period 01/25/1974, SpO2 97%, not currently breastfeeding. Patient and daughter was oriented to plan of care, call light, bed controls, tv, telephone, bathroom, and visiting hours.     Risk Assessment    The following safety risks were identified during admission: fall. Yellow risk band applied: YES.     Skin Initial Assessment    This writer admitted this patient and completed a full skin assessment and Enmanuel score in the Adult PCS flowsheet.   Photo documentation of skin problem and/or wound competed via Evernote application (located under Media):  N/A    Appropriate interventions initiated as needed.       Education    Patient has a [place] to Observation order: No  Observation education completed and documented: N/A    Katarina Chapin RN

## 2024-11-13 NOTE — H&P
[facility]    History and Physical  Hospitalist       Date of Admission:  11/13/2024    Assessment & Plan   Janna Mcdermott is a 89 year old female history of diastolic CHF, CKD 3A, essential hypertension, hypothyroidism, chronic A-fib, CAD who presents with acute diastolic CHF exacerbation and DIMITRI.    Clinically Significant Risk Factors Present on Admission                # Drug Induced Coagulation Defect: home medication list includes an anticoagulant medication    # Hypertension: Noted on problem list           # Overweight: Estimated body mass index is 25.78 kg/m  as calculated from the following:    Height as of this encounter: 1.524 m (5').    Weight as of this encounter: 59.9 kg (132 lb).       # Financial/Environmental Concerns:         Principal Problem:    Chronic heart failure with preserved ejection fraction (H)    Assessment: Persistent volume overload with ongoing lower extremity edema, pleural effusions, probable contribution of cardiorenal phenomenon would benefit from additional diuresis and close monitoring of renal function to ensure improvement.    Plan:   -Obtain TTE  -Lasix 40 mg IV every 12  -hold aldactone  -Strict I's and O's, daily weights, telemetry  -Appreciate PT/OT/social work involvement for consideration of addition of home care services.    Active Problems:    Stage 3a chronic kidney disease (H)    Assessment: Baseline creatinine1.0, cr: 1.3 on admit and likely cardiorenal contribution    Plan:   -Monitor daily      Benign essential hypertension    Assessment: Stable    Plan:   -Hold home ARB and restart pending clinical course      Acquired hypothyroidism    Assessment: Unclear home Central Thyroid dosing, free T4 within normal limits    Plan:   -Home regimen once verified by pharmacy      Permanent atrial fibrillation (H)    Assessment: Chronic stable and rate controlled    Plan:   -Continue home diltiazem and Eliquis      Coronary artery disease involving  native coronary artery of native heart without angina pectoris    Assessment: Chronic troponin elevation with no cardiopulmonary symptoms or EKG changes concerning for ACS    Plan:   - resume home statin     DVT Prophylaxis: DOAC  Code Status: No CPR- Do NOT Intubate    Raphael Marin MD    Primary Care Physician   Dwayne Gaona    Chief Complaint   Nausea    History is obtained from the patient and chart review.    History of Present Illness   Janna Mcdermott is a 89 year old female history of diastolic CHF, CKD 3A, essential hypertension, hypothyroidism, chronic A-fib, CAD who presents with acute diastolic CHF exacerbation and DIMITRI.    Patient was most recently seen by her PCP on 11/5, noted to have significant lower extremity edema, orthopnea pleural effusions and was started on Aldactone 25 mg daily.  Since that point her lower extremity edema has improved, her orthopnea has improved but she still felt fatigued, she is able to walk only about a half a block before limitation by dyspnea, no chest pain with exertion, no falls or presyncopal or syncopal symptoms.    Today she presented to the ER, there was noted to be hemodynamically stable but had significant pleural effusions, acute kidney injury, was given Lasix 40 mg IV x 1 and subsequently admitted for further management.    Past Medical History    I have reviewed this patient's medical history and updated it with pertinent information if needed.   Past Medical History:   Diagnosis Date    Asymptomatic menopausal state     on estrogen    Chronic kidney disease, stage I     No Comments Provided    Dermatitis     No Comments Provided    Essential (primary) hypertension     No Comments Provided    Hyperlipidemia     No Comments Provided    Hypothyroidism     No Comments Provided    Other specified symptoms and signs involving the circulatory and respiratory systems     No Comments Provided    Paroxysmal atrial fibrillation (H)     No Comments Provided       Past  Surgical History   I have reviewed this patient's surgical history and updated it with pertinent information if needed.  Past Surgical History:   Procedure Laterality Date    APPENDECTOMY OPEN      No Comments Provided    CHOLECYSTECTOMY      No Comments Provided    HYSTERECTOMY TOTAL ABDOMINAL      No Comments Provided       Prior to Admission Medications   Prior to Admission Medications   Prescriptions Last Dose Informant Patient Reported? Taking?   ARMOUR THYROID 90 MG tablet   No No   Sig: Take 1 tablet (90 mg) by mouth daily.   Cholecalciferol 10 MCG (400 UNIT) CAPS   Yes No   Sig: Take 800 Units by mouth daily   Cranberry-Vitamin C (CRANBERRY CONCENTRATE/VITAMINC) 59353-494 MG CAPS   No No   Sig: Take 1 capsule by mouth 2 times daily - for UTI prevention   Flaxseed Oil OIL   Yes No   Sig: Take daily, puts liquid on oatmeal every morning   Sesame Oil OIL   Yes No   Sig: Take 1 capsule by mouth daily   apixaban ANTICOAGULANT (ELIQUIS ANTICOAGULANT) 5 MG tablet   No No   Sig: Take 1 tablet (5 mg) by mouth 2 times daily   diltiazem ER COATED BEADS (CARDIZEM CD/CARTIA XT) 120 MG 24 hr capsule   No No   Sig: Take 1 capsule (120 mg) by mouth daily   fish oil-omega-3 fatty acids 1000 MG capsule   Yes No   Sig: Take 1,000 capsules by mouth daily   latanoprost (XALATAN) 0.005 % ophthalmic solution   Yes No   Sig: Place 1 drop into both eyes At Bedtime    losartan (COZAAR) 25 MG tablet   No No   Sig: Take 1 tablet (25 mg) by mouth daily   rosuvastatin (CRESTOR) 5 MG tablet   No No   Sig: Take 1 tablet (5 mg) by mouth three times a week   spironolactone (ALDACTONE) 25 MG tablet   No No   Sig: Take 1 tablet (25 mg) by mouth daily.   thyroid (ARMOUR) 15 MG tablet   No No   Si tablet daily alternating with 2 tablets daily in addition to 90 mg tablet once daily   thyroid (ARMOUR) 15 MG tablet   No No   Sig: Alternate dosing by taking 1 tablet daily every other day, and 2 tablets daily on the other days.  To be taken with  90mg dose.   triamcinolone (KENALOG) 0.1 % external cream   No No   Sig: Apply topically 3 times daily      Facility-Administered Medications: None     Allergies   Allergies   Allergen Reactions    Bee Venom Anaphylaxis    Benazepril Cough    Ciprofloxacin Other (See Comments)     Possible GI illness    Erythromycin Other (See Comments)     Unsure of allergy time, possible hives.    Gluten Meal GI Disturbance    Hydrochlorothiazide      Other reaction(s): Hyponatremia    Penicillins Other (See Comments)     Unsure of allergy time, possible hives       Social History   I have reviewed this patient's social history and updated it with pertinent information if needed. Janna Mcdermott  reports that she has never smoked. She has never used smokeless tobacco. She reports that she does not drink alcohol and does not use drugs.    Family History   I have reviewed this patient's family history and updated it with pertinent information if needed.   Family History   Problem Relation Age of Onset    Coronary Artery Disease Mother     Hypertension Mother     Heart Disease Mother         Heart Disease    Heart Disease Sister         Heart Disease,    Heart Disease Sister         Heart Disease,    Other - See Comments Sister         at 6 months    Other - See Comments Brother           in service    Other - See Comments Brother          of Parkinson's    Other - See Comments Brother          of pneumonia    Other - See Comments Other         No cancer or diabetes in the family       Review of Systems     Constitutional: normal energy and appetite, no recent sick contacts  Ears, nose, mouth, throat, and face: no mouth sores, dysphagia, or odynophagia  Respiratory: no shortness of breath, cough, or wheezing.   Cardiovascular: no chest pain, palpitations, or syncope.   Gastrointestinal: mild constipation, no diarrhea, nausea, vomiting or abdominal pain currently.   Genitourinary: no dysuria, hematuria,  urgency or frequency.   Hematologic/lymphatic: no unintentional weight loss or night sweats.  Musculoskeletal: no pain to extremities or falls.   Neurological: no new weakness, tingling, numbness.   Endocrine: not a known diabetic.     Physical Exam   Temp: (!) 96.7  F (35.9  C) Temp src: Tympanic BP: 137/77 Pulse: 73   Resp: 16 SpO2: 97 % O2 Device: None (Room air)    Vital Signs with Ranges  Temp:  [96.7  F (35.9  C)] 96.7  F (35.9  C)  Pulse:  [73] 73  Resp:  [16] 16  BP: (137)/(77) 137/77  SpO2:  [97 %] 97 %  132 lbs 0 oz    GENERAL: Talkative, laying in bed, in no apparent distress.  Head: normocephalic and atraumatic  Eyes: anicteric and non-injected sclera  Nose: no rhinorrhea or epistaxis.   Throat: moist mucous membranes with no active oral lesions.  NECK: Supple, jugular venous distension not present.  CARDIOVASCULAR: Irregularly irregular rate and rhythm, no murmurs, rubs, or gallops. Normal S1/S2.  1+ bilateral pitting and symmetric lower extremity edema.  RESPIRATORY: clear to auscultation bilaterally, no wheezes, no crackles.    GI: soft, non-tender, non-distended, normoactive bowel sounds.  : no suprapubic pain with palpation.   MUSCULOSKELETAL: warm and well perfused, 2+ dorsalis pedis pulses.    SKIN: no pallor, jaundice or rashes.  NEUROLOGY: AAOx3, follows commands, speech and language without focal deficits.     Data   Data reviewed today:  I personally reviewed the EKG tracing showing irregularly irregular rhythm consistent with atrial fibrillation, stable axis, no ST-T wave inversions elevations or depressions in comparison to prior. .  Recent Labs   Lab 11/13/24  0957   WBC 7.2   HGB 17.3*   MCV 97         POTASSIUM 4.8   CHLORIDE 101   CO2 24   BUN 34.8*   CR 1.35*   ANIONGAP 13   CORY 9.9   *   ALBUMIN 4.4   PROTTOTAL 7.5   BILITOTAL 1.0   ALKPHOS 86   ALT 23   AST 27       Recent Results (from the past 24 hours)   XR Chest Port 1 View    Narrative    PROCEDURE:  XR  CHEST PORT 1 VIEW    HISTORY: nausea, not feeling well.    COMPARISON: CT chest 10/27/2024    FINDINGS: Single view chest radiograph    Cardiomediastinal silhouette is enlarged, stable. There is calcific  aortic atherosclerosis.  Perihilar and peripheral interstitial opacities. Dependent mixed  opacities. Bilateral pleural effusions, right greater than left,  similar to comparison. No pneumothorax.    No suspicious osseous lesion or subdiaphragmatic free air.      Impression    IMPRESSION:    Changes in the chest which are likely due to heart failure, including  effusions, cardiomegaly and bibasilar pulmonary opacities. Pneumonia  cannot be excluded.    ARELIS VORA MD         SYSTEM ID:  [id number]       Disposition Plan     Medically Ready for Discharge: Anticipated in 2-4 Days

## 2024-11-13 NOTE — PROVIDER NOTIFICATION
11/13/24 1439   Valuables   Patient Belongings remains with patient   Patient Belongings Remaining with Patient cell phone/electronics;clothing;medical/assistive equipment;purse/wallet;vision aids   Did you bring any home meds/supplements to the hospital?  No     A               Admission:  I am responsible for any personal items that are not sent to the safe or pharmacy.  [facility] is not responsible for loss, theft or damage of any property in my possession.    Signature:  _________________________________ Date: _______  Time: _____                                              Staff Signature:  ____________________________ Date: ________  Time: _____      2nd Staff person, if patient is unable/unwilling to sign:    Signature: ________________________________ Date: ________  Time: _____     Discharge:  [facility] has returned all of my personal belongings:    Signature: _________________________________ Date: ________  Time: _____                                          Staff Signature:  ____________________________ Date: ________  Time: _____

## 2024-11-13 NOTE — PHARMACY-ADMISSION MEDICATION HISTORY
Pharmacist Admission Medication History    Admission medication history is complete. The information provided in this note is only as accurate as the sources available at the time of the update.    Information Source(s): Patient, Family member, and CareEverywhere/SureScripts via in-person    Pertinent Information: Patient stopped oil-based supplements due to diarrhea. Take thyroid pills as directed from PCP- 120 mg MWF, 105 mg AOD.    Changes made to PTA medication list:  Added: None  Deleted: fish oil, flaxseed, sesame oil  Changed: Triamcinolone to PRN, updated thyroid medication    Allergies reviewed with patient and updates made in EHR: yes    Medication History Completed By: Areli Maldonado RPH 11/13/2024 2:24 PM    PTA Med List   Medication Sig Last Dose/Taking    apixaban ANTICOAGULANT (ELIQUIS ANTICOAGULANT) 5 MG tablet Take 1 tablet (5 mg) by mouth 2 times daily 11/13/2024 Morning    ARMOUR THYROID 90 MG tablet Take 1 tablet (90 mg) by mouth daily. 11/13/2024 Morning    Cholecalciferol 10 MCG (400 UNIT) CAPS Take 800 Units by mouth daily 11/12/2024 Noon    Cranberry-Vitamin C (CRANBERRY CONCENTRATE/VITAMINC) 73742-840 MG CAPS Take 1 capsule by mouth 2 times daily - for UTI prevention 11/13/2024 Morning    diltiazem ER COATED BEADS (CARDIZEM CD/CARTIA XT) 120 MG 24 hr capsule Take 1 capsule (120 mg) by mouth daily 11/12/2024 Noon    latanoprost (XALATAN) 0.005 % ophthalmic solution Place 1 drop into both eyes At Bedtime  11/12/2024 Bedtime    losartan (COZAAR) 25 MG tablet Take 1 tablet (25 mg) by mouth daily 11/13/2024 Morning    rosuvastatin (CRESTOR) 5 MG tablet Take 1 tablet (5 mg) by mouth three times a week 11/11/2024 Bedtime    spironolactone (ALDACTONE) 25 MG tablet Take 1 tablet (25 mg) by mouth daily. 11/13/2024 Morning    thyroid (ARMOUR) 15 MG tablet Take 2 tablets (30 mg) in addition to 90 mg tablet on Monday, Wednesday, and Friday. Take 1 tablet (15 mg) in addition to 90 mg tablet on all other  days. 11/13/2024 Morning    triamcinolone (KENALOG) 0.1 % external cream Apply topically 2 times daily as needed for irritation. Past Month

## 2024-11-13 NOTE — PHARMACY-CONSULT NOTE
"Pharmacy: Patient Own Medication Identification    The requirements of Policy 13-03 from the Pharmacy Policy Manual have been met and the patient will be allowed to use their own supply of: [place] Thyroid 90 mg and 15 mg tablets.    Areli Maldonado RPH has verified the name, dose, route, and directions for each medication. The integrity has been assessed and deemed safe.     The product(s) have been labeled as being inspected by a pharmacist and the medication has been placed in the patient-specific bin in the medication room.    Nursing will remove medication at the appropriately scheduled times and document the administration on the MAR with the designation of \"patient own\".    Nursing to return medication back to patient at time of discharge.     Areli Maldonado RPH ....................  11/13/2024   5:39 PM   "

## 2024-11-14 ENCOUNTER — APPOINTMENT (OUTPATIENT)
Dept: OCCUPATIONAL THERAPY | Facility: OTHER | Age: 89
DRG: 291 | End: 2024-11-14
Attending: INTERNAL MEDICINE
Payer: MEDICARE

## 2024-11-14 ENCOUNTER — APPOINTMENT (OUTPATIENT)
Dept: PHYSICAL THERAPY | Facility: OTHER | Age: 89
DRG: 291 | End: 2024-11-14
Attending: INTERNAL MEDICINE
Payer: MEDICARE

## 2024-11-14 LAB
ANION GAP SERPL CALCULATED.3IONS-SCNC: 12 MMOL/L (ref 7–15)
ATRIAL RATE - MUSE: 468 BPM
BUN SERPL-MCNC: 34.6 MG/DL (ref 8–23)
CALCIUM SERPL-MCNC: 9.5 MG/DL (ref 8.8–10.4)
CHLORIDE SERPL-SCNC: 98 MMOL/L (ref 98–107)
CREAT SERPL-MCNC: 1.1 MG/DL (ref 0.51–0.95)
DIASTOLIC BLOOD PRESSURE - MUSE: NORMAL MMHG
EGFRCR SERPLBLD CKD-EPI 2021: 47 ML/MIN/1.73M2
ERYTHROCYTE [DISTWIDTH] IN BLOOD BY AUTOMATED COUNT: 13.7 % (ref 10–15)
GLUCOSE SERPL-MCNC: 127 MG/DL (ref 70–99)
HCO3 SERPL-SCNC: 26 MMOL/L (ref 22–29)
HCT VFR BLD AUTO: 49.4 % (ref 35–47)
HGB BLD-MCNC: 16.7 G/DL (ref 11.7–15.7)
INTERPRETATION ECG - MUSE: NORMAL
MAGNESIUM SERPL-MCNC: 2.1 MG/DL (ref 1.7–2.3)
MCH RBC QN AUTO: 31.5 PG (ref 26.5–33)
MCHC RBC AUTO-ENTMCNC: 33.8 G/DL (ref 31.5–36.5)
MCV RBC AUTO: 93 FL (ref 78–100)
P AXIS - MUSE: NORMAL DEGREES
PLATELET # BLD AUTO: 157 10E3/UL (ref 150–450)
POTASSIUM SERPL-SCNC: 4 MMOL/L (ref 3.4–5.3)
PR INTERVAL - MUSE: NORMAL MS
QRS DURATION - MUSE: 68 MS
QT - MUSE: 396 MS
QTC - MUSE: 408 MS
R AXIS - MUSE: -35 DEGREES
RBC # BLD AUTO: 5.3 10E6/UL (ref 3.8–5.2)
SODIUM SERPL-SCNC: 136 MMOL/L (ref 135–145)
SYSTOLIC BLOOD PRESSURE - MUSE: NORMAL MMHG
T AXIS - MUSE: 65 DEGREES
VENTRICULAR RATE- MUSE: 64 BPM
WBC # BLD AUTO: 7.5 10E3/UL (ref 4–11)

## 2024-11-14 PROCEDURE — 97530 THERAPEUTIC ACTIVITIES: CPT | Mod: GO | Performed by: OCCUPATIONAL THERAPIST

## 2024-11-14 PROCEDURE — 85014 HEMATOCRIT: CPT | Performed by: INTERNAL MEDICINE

## 2024-11-14 PROCEDURE — 82374 ASSAY BLOOD CARBON DIOXIDE: CPT | Performed by: INTERNAL MEDICINE

## 2024-11-14 PROCEDURE — 250N000013 HC RX MED GY IP 250 OP 250 PS 637: Performed by: INTERNAL MEDICINE

## 2024-11-14 PROCEDURE — 97116 GAIT TRAINING THERAPY: CPT | Mod: GP

## 2024-11-14 PROCEDURE — 97530 THERAPEUTIC ACTIVITIES: CPT | Mod: GP

## 2024-11-14 PROCEDURE — 83735 ASSAY OF MAGNESIUM: CPT | Performed by: INTERNAL MEDICINE

## 2024-11-14 PROCEDURE — 99232 SBSQ HOSP IP/OBS MODERATE 35: CPT | Performed by: INTERNAL MEDICINE

## 2024-11-14 PROCEDURE — 80048 BASIC METABOLIC PNL TOTAL CA: CPT | Performed by: INTERNAL MEDICINE

## 2024-11-14 PROCEDURE — 97161 PT EVAL LOW COMPLEX 20 MIN: CPT | Mod: GP

## 2024-11-14 PROCEDURE — 97165 OT EVAL LOW COMPLEX 30 MIN: CPT | Mod: GO | Performed by: OCCUPATIONAL THERAPIST

## 2024-11-14 PROCEDURE — 36415 COLL VENOUS BLD VENIPUNCTURE: CPT | Performed by: INTERNAL MEDICINE

## 2024-11-14 PROCEDURE — 120N000001 HC R&B MED SURG/OB

## 2024-11-14 PROCEDURE — 250N000011 HC RX IP 250 OP 636: Performed by: INTERNAL MEDICINE

## 2024-11-14 RX ORDER — THYROID 90 MG/1
90 TABLET ORAL DAILY
Status: DISCONTINUED | OUTPATIENT
Start: 2024-11-14 | End: 2024-11-18 | Stop reason: HOSPADM

## 2024-11-14 RX ORDER — THYROID 15 MG/1
30 TABLET ORAL
Status: DISCONTINUED | OUTPATIENT
Start: 2024-11-15 | End: 2024-11-15

## 2024-11-14 RX ORDER — THYROID 15 MG/1
15 TABLET ORAL
Status: DISCONTINUED | OUTPATIENT
Start: 2024-11-14 | End: 2024-11-15

## 2024-11-14 RX ORDER — THYROID 90 MG/1
90 TABLET ORAL DAILY
Status: DISCONTINUED | OUTPATIENT
Start: 2024-11-14 | End: 2024-11-14 | Stop reason: ALTCHOICE

## 2024-11-14 RX ORDER — FUROSEMIDE 10 MG/ML
20 INJECTION INTRAMUSCULAR; INTRAVENOUS EVERY 12 HOURS
Status: DISCONTINUED | OUTPATIENT
Start: 2024-11-14 | End: 2024-11-15

## 2024-11-14 RX ORDER — LOSARTAN POTASSIUM 25 MG/1
25 TABLET ORAL DAILY
Status: DISCONTINUED | OUTPATIENT
Start: 2024-11-14 | End: 2024-11-17

## 2024-11-14 RX ADMIN — SENNOSIDES 8.6 MG: 8.6 TABLET, FILM COATED ORAL at 09:24

## 2024-11-14 RX ADMIN — APIXABAN 5 MG: 5 TABLET, FILM COATED ORAL at 21:03

## 2024-11-14 RX ADMIN — FUROSEMIDE 40 MG: 10 INJECTION, SOLUTION INTRAVENOUS at 00:35

## 2024-11-14 RX ADMIN — LOSARTAN POTASSIUM 25 MG: 25 TABLET, FILM COATED ORAL at 09:23

## 2024-11-14 RX ADMIN — DILTIAZEM HYDROCHLORIDE 120 MG: 120 CAPSULE, COATED, EXTENDED RELEASE ORAL at 12:12

## 2024-11-14 RX ADMIN — THYROID 15 MG: 15 TABLET ORAL at 10:04

## 2024-11-14 RX ADMIN — FUROSEMIDE 20 MG: 10 INJECTION, SOLUTION INTRAMUSCULAR; INTRAVENOUS at 12:12

## 2024-11-14 RX ADMIN — THYROID 90 MG: 90 TABLET ORAL at 10:04

## 2024-11-14 RX ADMIN — APIXABAN 5 MG: 5 TABLET, FILM COATED ORAL at 09:23

## 2024-11-14 RX ADMIN — FUROSEMIDE 20 MG: 10 INJECTION, SOLUTION INTRAMUSCULAR; INTRAVENOUS at 23:37

## 2024-11-14 NOTE — PROGRESS NOTES
[facility]    Hospitalist Progress Note      Assessment & Plan   Janna Mcdermott is a 89 year old female who was admitted on 11/13/2024.     Clinically Significant Risk Factors Present on Admission                # Drug Induced Coagulation Defect: home medication list includes an anticoagulant medication    # Hypertension: Noted on problem list               # Financial/Environmental Concerns:         Principal Problem:    Chronic heart failure with preserved ejection fraction (H)    Assessment: improving. Persistent volume overload with ongoing lower extremity edema, pleural effusions, probable contribution of cardiorenal phenomenon would benefit from additional diuresis and close monitoring of renal function to ensure improvement.    Plan:   -decrease Lasix 40->20 mg IV every 12  -hold aldactone  -Strict I's and O's, daily weights, telemetry  -Appreciate PT/OT/social work involvement for consideration of addition of home care services.     Active Problems:    Stage 3a chronic kidney disease (H)    Assessment: Baseline creatinine1.0, cr: 1.3 on admit and likely cardiorenal contribution and improving.     Plan:   -Monitor daily       Benign essential hypertension    Assessment: Stable    Plan:   -resume home ARB and restart pending clinical course       Acquired hypothyroidism    Assessment: continue home [place] Thyroid dosing, free T4 within normal limits       Permanent atrial fibrillation (H)    Assessment: Chronic stable and rate controlled    Plan:   -Continue home diltiazem and Eliquis       Coronary artery disease involving native coronary artery of native heart without angina pectoris    Assessment: Chronic troponin elevation with no cardiopulmonary symptoms or EKG changes concerning for ACS    Plan:   - resume home statin      Diet: Combination Diet Regular Diet Adult    DVT Prophylaxis: DOAC  Grubbs Catheter: Not present  Code Status: No CPR- Do NOT Intubate           Disposition  Plan     Expected Discharge Date: 11/14/2024             Entered: Raphael Marin MD 11/14/2024, 10:27 AM       The patient's care was discussed with the Bedside Nurse, Patient, and Patient's Family.    Raphael Marin MD  Hospitalist Service  [facility]  Contact information available via [facility] Paging/Directory    ______________________________________________________________________    Interval History   CC: Leg swelling    Events and afebrile, lymphedema significantly improved, no shortness of breath cough nausea vomiting diarrhea constipation new complaints.  Very happy with how he is progressing.    -Data reviewed today: I reviewed all new labs and imaging results over the last 24 hours.     Physical Exam   Temp: 98.2  F (36.8  C) Temp src: Tympanic BP: 122/82 Pulse: 61   Resp: 16 SpO2: 92 % O2 Device: None (Room air)    Vitals:    11/13/24 0945 11/14/24 0500   Weight: 59.9 kg (132 lb) 57.2 kg (126 lb 3.2 oz)     Vital Signs with Ranges  Temp:  [97.3  F (36.3  C)-98.7  F (37.1  C)] 98.2  F (36.8  C)  Pulse:  [61-78] 61  Resp:  [15-20] 16  BP: (108-171)/(63-82) 122/82  SpO2:  [91 %-95 %] 92 %  I/O last 3 completed shifts:  In: -   Out: 3750 [Urine:3750]    GENERAL: Talkative, sitting up in bed, pleasant and appropriate, in no apparent distress.  CARDIOVASCULAR: regular rate and rhythm, no murmurs, rubs, or gallops. Normal S1/S2.  Trace bilateral pitting and symmetric lower extremity edema.   RESPIRATORY: clear to auscultation bilaterally, no wheezes, no crackles.   GI: soft, non-tender, non-distended, normoactive bowel sounds.  MUSCULOSKELETAL: warm and well perfused, 2+ dorsalis pedis pulses bilaterally.    SKIN: no pallor,jaundice, or rashes    Medications   Current Facility-Administered Medications   Medication Dose Route Frequency Provider Last Rate Last Admin    Continuing ACE inhibitor/ARB/ARNI from home medication list OR ACE inhibitor/ARB/ARNI order already placed during this visit   Does  not apply DOES NOT GO TO Raphael Monroy MD        Continuing beta blocker from home medication list OR beta blocker order already placed during this visit   Does not apply DOES NOT GO TO Raphael Monroy MD        Patient is already receiving anticoagulation with heparin, enoxaparin (LOVENOX), warfarin (COUMADIN)  or other anticoagulant medication   Does not apply Continuous PRN Raphael Marin MD         Current Facility-Administered Medications   Medication Dose Route Frequency Provider Last Rate Last Admin    apixaban ANTICOAGULANT (ELIQUIS) tablet 5 mg  5 mg Oral BID Raphael Marin MD   5 mg at 11/14/24 0923    diltiazem ER COATED BEADS (CARDIZEM CD/CARTIA XT) 24 hr capsule 120 mg  120 mg Oral Daily Raphael Marin MD   120 mg at 11/13/24 1411    furosemide (LASIX) injection 20 mg  20 mg Intravenous Q12H Raphael Marin MD        losartan (COZAAR) tablet 25 mg  25 mg Oral Daily Raphael Marin MD   25 mg at 11/14/24 0923    polyethylene glycol (MIRALAX) Packet 17 g  17 g Oral Daily Raphael Marin MD        rosuvastatin (CRESTOR) tablet 5 mg  5 mg Oral Once per day on Monday Wednesday Friday Raphael Marin MD        sennosides (SENOKOT) tablet 8.6 mg  8.6 mg Oral BID Raphael Marin MD   8.6 mg at 11/14/24 0924    sodium chloride (PF) 0.9% PF flush 3 mL  3 mL Intracatheter Q8H Raphael Marin MD   3 mL at 11/13/24 1412    thyroid (ARMOUR) tablet 15 mg  15 mg Oral Once per day on Sunday Tuesday Thursday Saturday Raphael Marin MD   15 mg at 11/14/24 1004    [START ON 11/15/2024] thyroid (ARMOUR) tablet 30 mg  30 mg Oral Q Mon Wed Fri AM Raphael Marin MD        thyroid (ARMOUR) TABS 90 mg  90 mg Oral Daily Raphael Marin MD   90 mg at 11/14/24 1004       Data   Recent Labs   Lab 11/14/24  0530 11/13/24  0957   WBC 7.5 7.2   HGB 16.7* 17.3*   MCV 93 97    182    138   POTASSIUM 4.0 4.8   CHLORIDE 98 101   CO2 26 24   BUN 34.6* 34.8*   CR 1.10* 1.35*   ANIONGAP 12 13   CORY 9.5 9.9   * 152*    ALBUMIN  --  4.4   PROTTOTAL  --  7.5   BILITOTAL  --  1.0   ALKPHOS  --  86   ALT  --  23   AST  --  27       Recent Results (from the past 24 hours)   XR Chest Port 1 View    Narrative    PROCEDURE:  XR CHEST PORT 1 VIEW    HISTORY: nausea, not feeling well.    COMPARISON: CT chest 10/27/2024    FINDINGS: Single view chest radiograph    Cardiomediastinal silhouette is enlarged, stable. There is calcific  aortic atherosclerosis.  Perihilar and peripheral interstitial opacities. Dependent mixed  opacities. Bilateral pleural effusions, right greater than left,  similar to comparison. No pneumothorax.    No suspicious osseous lesion or subdiaphragmatic free air.      Impression    IMPRESSION:    Changes in the chest which are likely due to heart failure, including  effusions, cardiomegaly and bibasilar pulmonary opacities. Pneumonia  cannot be excluded.    ARELIS VORA MD         SYSTEM ID:  [id number]   Echocardiogram Complete   Result Value    LVEF  70%    Narrative    [id number]  [id number]  [id number]  [id number]^HELLEN^CATHY     [facility]  [address] [place] [address]     Name: MARIA EUGENIA ADAMES  MRN: [id number]  : 1931  Study Date: 2024 01:24 PM  Age: 89 yrs  Gender: Female  Patient Location: [facility]  Reason For Study: Heart Failure, Unspecified  Ordering Physician: CATHY MACEDO  Performed By: VERA Atwood, RDCS, RVT     BSA: 1.6 m2  Height: 60 in  Weight: 132 lb  HR: 79  BP: 137/77 mmHg  ______________________________________________________________________________  Procedure  Complete Portable Echo Adult.  ______________________________________________________________________________  Interpretation Summary  Global and regional left ventricular function is hyperkinetic with an EF >70%.  Right ventricular function, chamber size, wall motion, and thickness are  normal.  Mild to moderate tricuspid insufficiency is present.  Pulmonary artery systolic pressure  is normal.  Ascending aorta 4.7 cm.  Ascending Aorta aneurysm is present.  The inferior vena cava is normal.  No pericardial effusion is present.  ______________________________________________________________________________  Left Ventricle  Global and regional left ventricular function is hyperkinetic with an EF >70%.  Left ventricular wall thickness is normal. Left ventricular size is normal.  Thickening of the anterobasal septum is present. Left ventricular diastolic  function is indeterminate. No regional wall motion abnormalities are seen.     Right Ventricle  Right ventricular function, chamber size, wall motion, and thickness are  normal.     Atria  Moderate biatrial enlargement is present.     Mitral Valve  The mitral valve is normal.     Aortic Valve  Trileaflet aortic sclerosis without stenosis. Trace to mild aortic  insufficiency is present. The mean AoV pressure gradient is 4.6 mmHg.     Tricuspid Valve  Mild to moderate tricuspid insufficiency is present. The right ventricular  systolic pressure is approximated at 30.6 mmHg plus the right atrial pressure.  Pulmonary artery systolic pressure is normal.     Pulmonic Valve  The pulmonic valve is normal.     Vessels  The pulmonary artery and bifurcation cannot be assessed. The inferior vena  cava is normal. Ascending aorta 4.7 cm. Ascending Aorta aneurysm is present.     Pericardium  No pericardial effusion is present.     ______________________________________________________________________________  MMode/2D Measurements & Calculations  IVSd: 0.70 cm  LVIDd: 3.8 cm  LVIDs: 2.3 cm  LVPWd: 0.93 cm  FS: 38.8 %  LV mass(C)d: 87.5 grams  LV mass(C)dI: 55.9 grams/m2  Ao root diam: 2.6 cm  asc Aorta Diam: 4.7 cm     LVOT diam: 1.8 cm  LVOT area: 2.4 cm2  Ao root diam index Ht(cm/m): 1.7  Ao root diam index BSA (cm/m2): 1.7  Asc Ao diam index BSA (cm/m2): 3.0  Asc Ao diam index Ht(cm/m): 3.1  LA Volume (BP): 72.3 ml  LA Volume Index (BP): 46.3 ml/m2  RWT:  0.49  TAPSE: 1.4 cm     Doppler Measurements & Calculations  MV E max dominic: 96.4 cm/sec  MV A max dominic: 20.1 cm/sec  MV E/A: 4.8  MV dec time: 0.21 sec  Ao V2 max: 142.2 cm/sec  Ao max P.0 mmHg  Ao V2 mean: 98.4 cm/sec  Ao mean P.6 mmHg  Ao V2 VTI: 25.1 cm  BETH(I,D): 1.7 cm2  BETH(V,D): 1.9 cm2  LV V1 max P.8 mmHg  LV V1 max: 109.4 cm/sec  LV V1 VTI: 17.2 cm  SV(LVOT): 42.0 ml  SI(LVOT): 26.9 ml/m2  PA acc time: 0.11 sec  TR max dominic: 275.9 cm/sec  TR max P.6 mmHg  AV Dominic Ratio (DI): 0.77  BETH Index (cm2/m2): 1.1     ______________________________________________________________________________  Report approved by: Cruz Olvera 2024 04:34 PM

## 2024-11-14 NOTE — PROGRESS NOTES
11/14/24 6232   Appointment Info   Signing Clinician's Name / Credentials (OT) Kenisha Valadez, OTR/L   Living Environment   People in Home alone   Current Living Arrangements house   Home Accessibility no concerns;stairs to enter home   Number of Stairs, Main Entrance 4   Stair Railings, Main Entrance railings safe and in good condition   Transportation Anticipated family or friend will provide   Living Environment Comments pt has PCA assist a few times/week for cleaning, bathing, etc   Self-Care   Usual Activity Tolerance moderate   Current Activity Tolerance fair   Equipment Currently Used at Home cane, straight  (pt does hve a 4WW and a FWW)   Cognitive Status Examination   Orientation Status orientation to person, place and time   Affect/Mental Status (Cognitive) WFL   Follows Commands WFL   Pain Assessment   Patient Currently in Pain No   Range of Motion Comprehensive   General Range of Motion bilateral upper extremity ROM WFL   Coordination   Upper Extremity Coordination No deficits were identified   Bed Mobility   Bed Mobility supine-sit   Supine-Sit [place] (Bed Mobility) supervision   Transfers   Transfers sit-stand transfer   Sit-Stand Transfer   Sit-Stand [place] (Transfers) contact guard;1 person to manage equipment   Clinical Impression   Criteria for Skilled Therapeutic Interventions Met (OT) Yes, treatment indicated   OT Diagnosis decreased endurance for function   Influenced by the following impairments decreased activity tolerance   OT Problem List-Impairments impacting ADL activity tolerance impaired   Assessment of Occupational Performance 1-3 Performance Deficits   Identified Performance Deficits mobility and self care   Planned Therapy Interventions (OT) ADL retraining;progressive activity/exercise   Clinical Decision Making Complexity (OT) problem focused assessment/low complexity   Risk & Benefits of therapy have been explained risks/benefits reviewed   OT Total Evaluation Time    OT Eval, Low Complexity Minutes (33187) 15   OT Goals   Therapy Frequency (OT) Daily   OT Predicted Duration/Target Date for Goal Attainment 11/16/24   OT Goals Hygiene/Grooming;Upper Body Dressing;Lower Body Dressing;Transfers;Toilet Transfer/Toileting   OT: Hygiene/Grooming supervision/stand-by assist   OT: Upper Body Dressing Supervision/stand-by assist   OT: Lower Body Dressing Supervision/stand-by assist   OT: Transfer Supervision/stand-by assist   OT: Toilet Transfer/Toileting Supervision/stand-by assist   Interventions   Interventions Quick Adds Therapeutic Activity   Therapeutic Activities   Therapeutic Activity Minutes (33447) 30   Symptoms noted during/after treatment fatigue   Treatment Detail/Skilled Intervention Pt ambulated in room and hallway with CGA using SEC, pt states she has both kinds of walkers at home   OT Discharge Planning   OT Plan progress activity tolerance   OT Discharge Recommendation (DC Rec) home with assist;home with home care occupational therapy   OT Rationale for DC Rec Pt has PCA assist at home and recommend continued home care PT/OT to address safe, functional mobility in the home/safety   OT Brief overview of current status see above, pt ambulated in hallway using SEC with CGA, had some fatigue but tolerated this well   OT Equipment Needed at Discharge shower chair   Total Session Time   Timed Code Treatment Minutes 30   Total Session Time (sum of timed and untimed services) 45

## 2024-11-14 NOTE — PROGRESS NOTES
:    Met with patient and her daughter to discuss discharge planning needs. Patient reports that she has PCA services at home and her daughter lives next door. Patient reports that she is currently home bound and is interested in homecare services.     Pt/family was given the Medicare Compare list for Home Care, with associated star ratings to assist with choice for referrals/discharge planning Yes    Education was given to pt/family that star ratings are updated/maintained by Medicare and can be reviewed by visiting www.medicare.gov Yes    Referral sent to [facility] as requested by patient.     ARIANA Abraham on 11/14/2024 at 2:04 PM    Received message from [facility] and they are able to start homecare services with patient on Tuesday next week.     ARIANA Abraham on 11/14/2024 at 3:28 PM

## 2024-11-14 NOTE — PROGRESS NOTES
11/14/24 1253   Appointment Info   Signing Clinician's Name / Credentials (PT) Dangelo Musa MPT   Living Environment   People in Home alone   Current Living Arrangements house   Home Accessibility no concerns;stairs to enter home   Number of Stairs, Main Entrance 4   Stair Railings, Main Entrance railings safe and in good condition   Transportation Anticipated family or friend will provide   Self-Care   Usual Activity Tolerance moderate   Current Activity Tolerance fair   Equipment Currently Used at Home cane, straight   Fall history within last six months no  (patient reports that she has a Fww for her use at home)   General Information   Referring Physician Tomas   Patient/Family Therapy Goals Statement (PT) return home   Existing Precautions/Restrictions fall   Weight-Bearing Status - LLE full weight-bearing   Weight-Bearing Status - RLE full weight-bearing   Cognition   Affect/Mental Status (Cognition) WFL   Orientation Status (Cognition) oriented x 4   Follows Commands (Cognition) WFL   Integumentary/Edema   Integumentary/Edema no deficits were identifed   Posture    Posture Not impaired   Range of Motion (ROM)   Range of Motion ROM is WFL   Strength (Manual Muscle Testing)   Strength (Manual Muscle Testing) strength is WFL   Bed Mobility   Impairments Contributing to Impaired Bed Mobility decreased strength   Comment, (Bed Mobility) minimal assist to SBA   Transfers   Impairments Contributing to Impaired Transfers decreased strength   Gait/Stairs (Locomotion)   [place] Level (Gait) contact guard   Assistive Device (Gait) cane, straight   Distance in Feet (Gait) 150   Pattern (Gait) step-through   Balance   Balance Comments good with Fww   Sensory Examination   Sensory Perception WFL   Coordination   Coordination no deficits were identified   Muscle Tone   Muscle Tone no deficits were identified   Clinical Impression   Criteria for Skilled Therapeutic Intervention Yes, treatment indicated   PT Diagnosis  (PT) impaired mobility   Influenced by the following impairments fatigue and weakness   Functional limitations due to impairments activity/gait tolerance   Clinical Presentation (PT Evaluation Complexity) stable   Clinical Decision Making (Complexity) low complexity   Planned Therapy Interventions (PT) bed mobility training;gait training;transfer training   Risk & Benefits of therapy have been explained evaluation/treatment results reviewed;risks/benefits reviewed;patient   PT Total Evaluation Time   PT Stephan, Low Complexity Minutes (84152) 15   Physical Therapy Goals   PT Frequency Daily   PT Predicted Duration/Target Date for Goal Attainment 11/18/24   PT Goals Bed Mobility;Transfers;Gait   PT: Bed Mobility Supervision/stand-by assist   PT: Transfers Supervision/stand-by assist;Assistive device   PT: Gait Supervision/stand-by assist;Straight cane   PT Discharge Planning   PT Plan Continue PT   PT Discharge Recommendation (DC Rec) home with assist;home with home care physical therapy   PT Rationale for DC Rec to promote strength, stability and safe mobility   PT Brief overview of current status Pleasant patient requiring minimal assist to CGA for safety using a SE cane for gait; fatigue presently limiting patient's activity tolerance; she will benefit from continued PT through home care services   PT Equipment Needed at Discharge cane, straight;walker, rolling

## 2024-11-14 NOTE — PLAN OF CARE
A&O, VSS, lung sounds diminished bilaterally, IV lasix received in ED prior to admission-2050 output this shift, good intake- patient is a gluten free diet, ambulated halls this shift with daughter- tolerated well.     /63 (BP Location: Right arm, Patient Position: Semi-Hadley's, Cuff Size: Adult Regular)   Pulse 68   Temp 98.4  F (36.9  C) (Tympanic)   Resp 18   Ht 1.524 m (5')   Wt 59.9 kg (132 lb)   LMP 01/25/1974 (Approximate)   SpO2 95%   BMI 25.78 kg/m      Katarina Chapin RN on 11/13/2024 at 7:37 PM

## 2024-11-14 NOTE — PLAN OF CARE
/66 (BP Location: Left arm, Patient Position: Semi-Hadley's, Cuff Size: Adult Regular)   Pulse 61   Temp 97.9  F (36.6  C) (Tympanic)   Resp 15   Ht 1.524 m (5')   Wt 59.9 kg (132 lb)   LMP 01/25/1974 (Approximate)   SpO2 91%   BMI 25.78 kg/m          Pt A&O, VSS, afebrile, denies pain at this time. On room air, LS clear and diminished. Pt has edema to BLE. Bowel and bladder continent. Denies nausea. Pt refused stool softener and Crestor. Pt's daughter brought Crestor from home. Pt and daughter instructed to take hospital supplied medications unless told otherwise. On IV lasix. Slight redness to coccyx. Saline locked. SBA.

## 2024-11-14 NOTE — PROGRESS NOTES
Interdisciplinary Discharge Planning Note    Anticipated Discharge Date: 11/16    Anticipated Discharge Location: Home    Clinical Needs Before Discharge:  stable cardiac status (angina, heart failure, arrhythmia) and stable functional status    Treatment Needs After Discharge:  homecare    Potential Barriers to Discharge: None identified at this time     ARIANA Abraham  11/14/2024,  12:32 PM

## 2024-11-15 ENCOUNTER — APPOINTMENT (OUTPATIENT)
Dept: OCCUPATIONAL THERAPY | Facility: OTHER | Age: 89
DRG: 291 | End: 2024-11-15
Payer: MEDICARE

## 2024-11-15 ENCOUNTER — APPOINTMENT (OUTPATIENT)
Dept: PHYSICAL THERAPY | Facility: OTHER | Age: 89
DRG: 291 | End: 2024-11-15
Payer: MEDICARE

## 2024-11-15 LAB
ANION GAP SERPL CALCULATED.3IONS-SCNC: 13 MMOL/L (ref 7–15)
BUN SERPL-MCNC: 42 MG/DL (ref 8–23)
CALCIUM SERPL-MCNC: 9.3 MG/DL (ref 8.8–10.4)
CHLORIDE SERPL-SCNC: 99 MMOL/L (ref 98–107)
CREAT SERPL-MCNC: 1.41 MG/DL (ref 0.51–0.95)
EGFRCR SERPLBLD CKD-EPI 2021: 35 ML/MIN/1.73M2
GLUCOSE SERPL-MCNC: 135 MG/DL (ref 70–99)
HCO3 SERPL-SCNC: 25 MMOL/L (ref 22–29)
HOLD SPECIMEN: NORMAL
MAGNESIUM SERPL-MCNC: 2.1 MG/DL (ref 1.7–2.3)
POTASSIUM SERPL-SCNC: 4.1 MMOL/L (ref 3.4–5.3)
SODIUM SERPL-SCNC: 137 MMOL/L (ref 135–145)

## 2024-11-15 PROCEDURE — 97530 THERAPEUTIC ACTIVITIES: CPT | Mod: GP

## 2024-11-15 PROCEDURE — 97116 GAIT TRAINING THERAPY: CPT | Mod: GP

## 2024-11-15 PROCEDURE — 82565 ASSAY OF CREATININE: CPT | Performed by: INTERNAL MEDICINE

## 2024-11-15 PROCEDURE — 80048 BASIC METABOLIC PNL TOTAL CA: CPT | Performed by: INTERNAL MEDICINE

## 2024-11-15 PROCEDURE — 120N000001 HC R&B MED SURG/OB

## 2024-11-15 PROCEDURE — 83735 ASSAY OF MAGNESIUM: CPT | Performed by: INTERNAL MEDICINE

## 2024-11-15 PROCEDURE — 97530 THERAPEUTIC ACTIVITIES: CPT | Mod: GO | Performed by: OCCUPATIONAL THERAPIST

## 2024-11-15 PROCEDURE — 36415 COLL VENOUS BLD VENIPUNCTURE: CPT | Performed by: INTERNAL MEDICINE

## 2024-11-15 PROCEDURE — 82310 ASSAY OF CALCIUM: CPT | Performed by: INTERNAL MEDICINE

## 2024-11-15 PROCEDURE — 250N000011 HC RX IP 250 OP 636: Performed by: INTERNAL MEDICINE

## 2024-11-15 PROCEDURE — 250N000013 HC RX MED GY IP 250 OP 250 PS 637: Performed by: FAMILY MEDICINE

## 2024-11-15 PROCEDURE — 250N000013 HC RX MED GY IP 250 OP 250 PS 637: Performed by: INTERNAL MEDICINE

## 2024-11-15 PROCEDURE — 97535 SELF CARE MNGMENT TRAINING: CPT | Mod: GO | Performed by: OCCUPATIONAL THERAPIST

## 2024-11-15 RX ORDER — SACCHAROMYCES BOULARDII 250 MG
250 CAPSULE ORAL 2 TIMES DAILY
Status: DISCONTINUED | OUTPATIENT
Start: 2024-11-15 | End: 2024-11-18 | Stop reason: HOSPADM

## 2024-11-15 RX ADMIN — POLYETHYLENE GLYCOL 3350 17 G: 17 POWDER, FOR SOLUTION ORAL at 09:37

## 2024-11-15 RX ADMIN — Medication 250 MG: at 21:22

## 2024-11-15 RX ADMIN — LOSARTAN POTASSIUM 25 MG: 25 TABLET, FILM COATED ORAL at 09:37

## 2024-11-15 RX ADMIN — THYROID 30 MG: 15 TABLET ORAL at 06:16

## 2024-11-15 RX ADMIN — DILTIAZEM HYDROCHLORIDE 120 MG: 120 CAPSULE, COATED, EXTENDED RELEASE ORAL at 12:54

## 2024-11-15 RX ADMIN — THYROID 90 MG: 90 TABLET ORAL at 06:16

## 2024-11-15 RX ADMIN — APIXABAN 5 MG: 5 TABLET, FILM COATED ORAL at 09:37

## 2024-11-15 RX ADMIN — ONDANSETRON 4 MG: 2 INJECTION INTRAMUSCULAR; INTRAVENOUS at 11:48

## 2024-11-15 RX ADMIN — APIXABAN 5 MG: 5 TABLET, FILM COATED ORAL at 21:22

## 2024-11-15 RX ADMIN — PROCHLORPERAZINE EDISYLATE 5 MG: 5 INJECTION INTRAMUSCULAR; INTRAVENOUS at 14:21

## 2024-11-15 RX ADMIN — SENNOSIDES 8.6 MG: 8.6 TABLET, FILM COATED ORAL at 09:37

## 2024-11-15 RX ADMIN — ROSUVASTATIN CALCIUM 5 MG: 5 TABLET, FILM COATED ORAL at 21:52

## 2024-11-15 NOTE — PLAN OF CARE
Goal Outcome Evaluation:    Patient alert and oriented. Denies pain. VSS. Afebrile. Assist of x1 with cane and gait belt. Up ambulating hallways with daughter and tolerated well. Patient reported decreased appetite, encouraging protein shakes and fluids. Patient verbalized understanding.        Plan of Care Reviewed With: patient, child    Overall Patient Progress: improvingOverall Patient Progress: improving    Outcome Evaluation: Patient BLE edema decreased.

## 2024-11-15 NOTE — PROGRESS NOTES
SAFETY CHECKLIST  ID Bands and Risk clasps correct and in place (DNR, Fall risk, Allergy, Latex, Limb):  Yes  All Lines Reconciled and labeled correctly: Yes  Whiteboard updated:Yes  Environmental interventions: Yes  Verify Tele #:  2

## 2024-11-15 NOTE — PLAN OF CARE
Goal Outcome Evaluation:      Plan of Care Reviewed With: patient, child    Overall Patient Progress: improving    Patient has been alert, oriented and has denied any pain.  Patient up to chair for meals and ambulated in halls with PT.  Patient has steady gait with assist x1, walker and gait belt.  Patient has denied any shortness of breath, lungs are clear and diminished with Sp02 >90% on room air.  Patient has decreased appetite and recent weight loss per daughter.  Ensure ordered with meals.  VSS and afebrile this shift.

## 2024-11-15 NOTE — PROGRESS NOTES
:    Met with patient and her daughter and updated them that [facility] would be able to start seeing patient on Tuesday next week. Patients daughter will plan on transporting her home when she is discharged. Patient is anticipated to be discharged on Saturday. Patient and daughter reported having no other questions or concerns.     ARIANA Abraham on 11/15/2024 at 1:22 PM    Homecare orders signed by MD faxed to [facility]. A copy of the orders has been placed in the patients folder at the nurse's station.     No further needs from  at this time.     ARIANA Abraham on 11/15/2024 at 2:48 PM

## 2024-11-15 NOTE — PROGRESS NOTES
11/15/24 1301   Appointment Info   Signing Clinician's Name / Credentials (PT) Dangelo Musa MPT   Interventions   Interventions Quick Adds Gait Training;Therapeutic Activity   Therapeutic Activity   Treatment Detail/Skilled Intervention sit to stand and stand pivot transfers with CGA of 1 and use of SE cane   Gait Training   Symptoms Noted During/After Treatment (Gait Training) fatigue   Treatment Detail/Skilled Intervention ambulation in hallway   Distance in Feet 150   [place] Level (Gait Training) contact guard   Physical Assistance Level (Gait Training) 1 person assist   Weight Bearing (Gait Training) full weight-bearing   Assistive Device (Gait Training) straight cane  (and one hand hold)   Pattern Analysis (Gait Training) swing-through gait   Gait Analysis Deviations decreased munir;decreased velocity of limb motion;decreased step length   Impairments (Gait Analysis/Training) balance impaired;strength decreased   PT Discharge Planning   PT Plan Continue PT   PT Discharge Recommendation (DC Rec) home with assist;home with home care physical therapy   PT Rationale for DC Rec to promote strength, stability and safe mobility   PT Brief overview of current status Pleasant patient requiring minimal assist to CGA for safety using a SE cane for gait; fatigue presently limiting patient's activity tolerance; she will benefit from continued PT through home care services   PT Equipment Needed at Discharge cane, straight;walker, rolling  (PT receommended to patient that she use her Fww at home for increased stability and energy conservation)

## 2024-11-15 NOTE — PLAN OF CARE
Pt up SBA/A1 w GB and cane.   Continent of bowel and bladder throughout shift. BR voiding.   Trace edema noted to bilateral lower extremities.   Work of breathing increases w/ ambulation, pt states this is d/t chronic A-fib. Sating low-mid 90's on room air.   Pt on tele #2.   Pt has blanchable redness to sacrum and buttocks and c/o itchiness. Skin is dry and intact. Barrier cream applied.   Pt slept well throughout the night, only waking to void.   Pt denies pain for this writer throughout shift.

## 2024-11-15 NOTE — PROGRESS NOTES
11/15/24 8577   Appointment Info   Signing Clinician's Name / Credentials (OT) Kenisha Valadez, OTR/L   Interventions   Interventions Quick Adds Self-Care/Home Management   Self-Care/Home Management   Self-Care/Home Mgmt/ADL, Compensatory, Meal Prep Minutes (73844) 30   Treatment Detail/Skilled Intervention Pt was able to complete light upper body sponge bathing while seated, and requested Therapist to assist with washing her legs and denise area. Max assist for sponge bathing to denise area and L/E's. Pt declined a shower as she states she likes to take her bath at home with assist from her PCA.   OT Discharge Planning   OT Plan progress activity tolerance   OT Discharge Recommendation (DC Rec) home with assist;home with home care occupational therapy   OT Rationale for DC Rec Pt has PCA assist at home and recommend continued home care PT/OT to address safe, functional mobility in the home/safety   OT Brief overview of current status see above   Total Session Time   Timed Code Treatment Minutes 30   Total Session Time (sum of timed and untimed services) 30

## 2024-11-15 NOTE — PROGRESS NOTES
[facility]    Medicine Progress Note - Hospitalist Service    Date of Admission:  11/13/2024    Assessment & Plan   Janna Mcdermott is a 89 year old female history of diastolic CHF, CKD 3A, essential hypertension, hypothyroidism, chronic A-fib, CAD who presents with acute diastolic CHF exacerbation and DIMITRI.  Seen by PCP on 11/5, noted to have significant lower extremity edema, orthopnea, pleural effusions and was started on spironolactone 25 mg daily.  Her lower extremity edema and orthopnea improved. Still had significant dyspnea on exertion.  In the ED found to have pleural effusions and an DIMITRI.    Principal Problem:    Chronic heart failure with preserved ejection fraction (H)  Persistent volume overload with ongoing lower extremity edema, pleural effusions, probable contribution of cardiorenal phenomenon  Held spironolactone  Received IV furosemide 40 mg IV 12 hours, lowered to 20 mg twice daily  Creatinine increased, holding on further diuresis  We discussed limiting sodium to less than 2000 mg daily  She needs further lab monitoring  Ideally home tomorrow with home care  Appreciate PT, OT, and social work involvement     Active Problems:    Stage 3a chronic kidney disease (H)  Baseline creatinine1.0, cr: 1.3 on admit and likely cardiorenal contribution   Variable depending on diuresis, monitor       Benign essential hypertension  Continue losartan       Acquired hypothyroidism  Continue home [place] Thyroid dosing, free T4 within normal limits  Daughter reports patient had more trouble with nausea in the past when overtreated with thyroid medication and would like a dose reduction on discharge       Permanent atrial fibrillation (H)  Chronic stable and rate controlled  Continue home diltiazem and Eliquis       Coronary artery disease involving native coronary artery of native heart without angina pectoris  Chronic troponin elevation with no cardiopulmonary symptoms or EKG changes  concerning for ACS  Continue home statin         Diet: Combination Diet Regular Diet Adult  Snacks/Supplements Adult: Ensure Enlive; With Meals    DVT Prophylaxis: DOAC  Grubbs Catheter: Not present  Lines: None     Cardiac Monitoring: ACTIVE order. Indication: Acute decompensated heart failure (48 hours)  Code Status: No CPR- Do NOT Intubate      Clinically Significant Risk Factors                   # Hypertension: Noted on problem list             # Moderate Malnutrition: based on nutrition assessment, PRESENT ON ADMISSION   # Financial/Environmental Concerns:           Social Drivers of Health    Physical Activity: Insufficiently Active (3/20/2024)    Exercise Vital Sign     Days of Exercise per Week: 7 days     Minutes of Exercise per Session: 20 min   Social Connections: Unknown (3/20/2024)    Social Connection and Isolation Panel [NHANES]     Frequency of Social Gatherings with Friends and Family: Three times a week          Disposition Plan     Medically Ready for Discharge: Anticipated Tomorrow             Alexis York MD  Hospitalist Service  [facility]  Securely message with [employer] (more info)  Text page via Company Paging/Directory   ______________________________________________________________________    Interval History   Nauseated and does not want to eat today.  Her daughter does not want her to discharge home if she is not eating.  Has dropped 20 pounds in the past few months.  Her daughter thinks this is from a change in the [place] Thyroid dosing.  Yesterday the patient ate quite well including an omelette, soup, and a pork chop throughout the day.  She is gluten-free, but indulged in soup that had noodles and so her daughter thinks that may be influencing diet today.  Leg edema has resolved.  At home patient likes to use  Sea salt regularly, but does not eat any other packaged foods and has otherwise low sodium exposure    Physical Exam   Vital Signs: Temp: 98.4  F (36.9   C) Temp src: Tympanic BP: 122/73 Pulse: 68   Resp: 20 SpO2: 92 % O2 Device: None (Room air)    Weight: 126 lbs 9.6 oz    General Appearance: Alert. No acute distress  Chest/Respiratory Exam: Clear to auscultation bilaterally  Cardiovascular Exam: Regular rate and rhythm. S1, S2, no murmur, gallop, or rubs.  GI: Mild diffuse abdominal tenderness  Extremities: No lower extremity edema.  Psychiatric: Normal affect and mentation      Medical Decision Making             Data     I have personally reviewed the following data over the past 24 hrs:    N/A  \   N/A   / N/A     137 99 42.0 (H) /  135 (H)   4.1 25 1.41 (H) \       Imaging results reviewed over the past 24 hrs:   No results found for this or any previous visit (from the past 24 hours).

## 2024-11-15 NOTE — PROGRESS NOTES
Clinical Nutrition / reassessment     Assessment:   Client History:  visited with pt today about her appetite and weight. She reported a decreased appetite over the past few days. She stated she does eat better at home but her appetite has been down because she is getting tired of eating the same things all the time. She his gluten free and tired of rice. Offered additional resources for more meal options but she declined. Discussed foods to try to vary her intakes. She does not drink supplements at home but was drinking an Ensure and was able to tolerate this. Encouraged her to have 1 supplement daily between meals at home and she agreed. Her daughter is very supportive, per pt. Pt felt her weight loss was fluid related but discussed that she is now down ~ 20# in past 6 months, not all likely related to fluid.   Diet Order:  gluten free  Oral Intake:  25%  Supplement Intake:  Ensure TID on trays, prefers strawberry  Weight:   Wt Readings from Last 10 Encounters:   11/15/24 57.4 kg (126 lb 9.6 oz)   11/05/24 62.1 kg (137 lb)   10/27/24 59.9 kg (132 lb)   09/03/24 61.2 kg (135 lb)   08/09/24 61.7 kg (136 lb)   07/29/24 61.7 kg (136 lb)   07/22/24 64 kg (141 lb)   05/22/24 66.5 kg (146 lb 9.6 oz)   05/04/24 65.3 kg (144 lb)   05/02/24 66.7 kg (147 lb)      Estimated nutritional needs based on:  current body weight  57 kg / 126 lbs   Estimated energy needs: 3161-0946 kcal/day (25-30 kcal/kg)  Estimated protein needs:  57-68 gm/day (1-1.2 g/kg)  Estimated fluid needs:  4451-2422 ml/day (1 ml/kcal)    Malnutrition Criteria:  (Need to have 2 indicators to qualify recommendation)  Energy Intake:  Chronic Moderate: < 75% of estimated energy requirement for >/= 1 month  Interpretation of Weight Loss:  Chronic Moderate:   10% in 6 months-suspect some to be fluid related as well  Physical Findings:  Chronic Body Fat Loss:  mild and Chronic Muscle Mass Loss:  mild  Reduced  Strength:  Not Measured  Recommended  Diagnosis:  Recommended Nutrition Diagnosis:   Moderate Malnutrition in the context of chronic illness - based on AND/ASPEN Clinical Characterstics of Malnutrition May 2012  Malnutrition:    - Level of malnutrition: Moderate       Nutrition Education: Nutrition education will be provided as appropriate.    Nutrition Diagnosis: Weight:   weight loss related to inadequate energy intakes as evidenced by 20# wt loss in past 6 months, some fluid related wt loss as well with decreased intakes.    Intervention:  Nutrition Prescription:     Nutrition Intervention(s):Recommended general, healthful diet  1. Meals and Snacks: gluten free  2. Medical Food Supplement: ensure TID on trays     Nutrition Goal(s):  1. Pt will tolerate diet as ordered  2. Pt will not have unplanned wt loss during hospitalization  3. Pt will consume 50% or more of meals and supplements     Monitoring and Evaluation:   Food Intake, diet tolerance, weights     Discharge Recommendation:   Nutrition Discharge Planning  1 supplement daily at home, Ensure or equivalent     RD will reassess in within 1-5 days or sooner.  Thea Ba RD on 11/15/2024 at 9:57 AM

## 2024-11-16 ENCOUNTER — APPOINTMENT (OUTPATIENT)
Dept: OCCUPATIONAL THERAPY | Facility: OTHER | Age: 89
DRG: 291 | End: 2024-11-16
Payer: MEDICARE

## 2024-11-16 ENCOUNTER — APPOINTMENT (OUTPATIENT)
Dept: GENERAL RADIOLOGY | Facility: OTHER | Age: 89
DRG: 291 | End: 2024-11-16
Attending: FAMILY MEDICINE
Payer: MEDICARE

## 2024-11-16 ENCOUNTER — APPOINTMENT (OUTPATIENT)
Dept: PHYSICAL THERAPY | Facility: OTHER | Age: 89
DRG: 291 | End: 2024-11-16
Payer: MEDICARE

## 2024-11-16 VITALS
BODY MASS INDEX: 24.85 KG/M2 | DIASTOLIC BLOOD PRESSURE: 75 MMHG | TEMPERATURE: 98.2 F | OXYGEN SATURATION: 94 % | RESPIRATION RATE: 16 BRPM | WEIGHT: 126.6 LBS | HEIGHT: 60 IN | HEART RATE: 70 BPM | SYSTOLIC BLOOD PRESSURE: 127 MMHG

## 2024-11-16 LAB
ANION GAP SERPL CALCULATED.3IONS-SCNC: 10 MMOL/L (ref 7–15)
BUN SERPL-MCNC: 44.6 MG/DL (ref 8–23)
CALCIUM SERPL-MCNC: 9.2 MG/DL (ref 8.8–10.4)
CHLORIDE SERPL-SCNC: 98 MMOL/L (ref 98–107)
CREAT SERPL-MCNC: 1.37 MG/DL (ref 0.51–0.95)
EGFRCR SERPLBLD CKD-EPI 2021: 36 ML/MIN/1.73M2
ERYTHROCYTE [DISTWIDTH] IN BLOOD BY AUTOMATED COUNT: 13.6 % (ref 10–15)
GLUCOSE SERPL-MCNC: 129 MG/DL (ref 70–99)
HCO3 SERPL-SCNC: 26 MMOL/L (ref 22–29)
HCT VFR BLD AUTO: 49.7 % (ref 35–47)
HGB BLD-MCNC: 16.7 G/DL (ref 11.7–15.7)
HOLD SPECIMEN: NORMAL
MCH RBC QN AUTO: 31.8 PG (ref 26.5–33)
MCHC RBC AUTO-ENTMCNC: 33.6 G/DL (ref 31.5–36.5)
MCV RBC AUTO: 95 FL (ref 78–100)
NT-PROBNP SERPL-MCNC: 1694 PG/ML (ref 0–1800)
PLATELET # BLD AUTO: 145 10E3/UL (ref 150–450)
POTASSIUM SERPL-SCNC: 3.9 MMOL/L (ref 3.4–5.3)
RBC # BLD AUTO: 5.25 10E6/UL (ref 3.8–5.2)
SODIUM SERPL-SCNC: 134 MMOL/L (ref 135–145)
WBC # BLD AUTO: 10.2 10E3/UL (ref 4–11)

## 2024-11-16 PROCEDURE — 36415 COLL VENOUS BLD VENIPUNCTURE: CPT | Performed by: FAMILY MEDICINE

## 2024-11-16 PROCEDURE — 71046 X-RAY EXAM CHEST 2 VIEWS: CPT

## 2024-11-16 PROCEDURE — 85048 AUTOMATED LEUKOCYTE COUNT: CPT | Performed by: FAMILY MEDICINE

## 2024-11-16 PROCEDURE — 80048 BASIC METABOLIC PNL TOTAL CA: CPT | Performed by: FAMILY MEDICINE

## 2024-11-16 PROCEDURE — 83880 ASSAY OF NATRIURETIC PEPTIDE: CPT | Performed by: FAMILY MEDICINE

## 2024-11-16 PROCEDURE — 97530 THERAPEUTIC ACTIVITIES: CPT | Mod: GP

## 2024-11-16 PROCEDURE — 250N000013 HC RX MED GY IP 250 OP 250 PS 637: Performed by: INTERNAL MEDICINE

## 2024-11-16 PROCEDURE — 97535 SELF CARE MNGMENT TRAINING: CPT | Mod: GO | Performed by: OCCUPATIONAL THERAPIST

## 2024-11-16 PROCEDURE — 82374 ASSAY BLOOD CARBON DIOXIDE: CPT | Performed by: FAMILY MEDICINE

## 2024-11-16 PROCEDURE — 120N000001 HC R&B MED SURG/OB

## 2024-11-16 PROCEDURE — 250N000013 HC RX MED GY IP 250 OP 250 PS 637: Performed by: FAMILY MEDICINE

## 2024-11-16 PROCEDURE — 85018 HEMOGLOBIN: CPT | Performed by: FAMILY MEDICINE

## 2024-11-16 PROCEDURE — 250N000011 HC RX IP 250 OP 636: Performed by: INTERNAL MEDICINE

## 2024-11-16 PROCEDURE — 97116 GAIT TRAINING THERAPY: CPT | Mod: GP

## 2024-11-16 RX ADMIN — DILTIAZEM HYDROCHLORIDE 120 MG: 120 CAPSULE, COATED, EXTENDED RELEASE ORAL at 12:40

## 2024-11-16 RX ADMIN — ONDANSETRON 4 MG: 2 INJECTION INTRAMUSCULAR; INTRAVENOUS at 20:44

## 2024-11-16 RX ADMIN — Medication 250 MG: at 21:25

## 2024-11-16 RX ADMIN — THYROID 90 MG: 90 TABLET ORAL at 06:44

## 2024-11-16 RX ADMIN — APIXABAN 5 MG: 5 TABLET, FILM COATED ORAL at 10:28

## 2024-11-16 RX ADMIN — APIXABAN 5 MG: 5 TABLET, FILM COATED ORAL at 21:25

## 2024-11-16 RX ADMIN — Medication 250 MG: at 10:28

## 2024-11-16 RX ADMIN — LOSARTAN POTASSIUM 25 MG: 25 TABLET, FILM COATED ORAL at 10:28

## 2024-11-16 NOTE — PLAN OF CARE
No GI symptoms for this writer on this shift, pt denies stomach pain and had no episodes of diarrhea for this writer.  Pt sating at 87% on room air, 1 LPM of oxygen applied via nasal cannula and pt has been sating low to mid 90's since oxygen was applied.   Up SBA w GB and cane. Gluten free diet.

## 2024-11-16 NOTE — PROGRESS NOTES
[facility]    Medicine Progress Note - Hospitalist Service    Date of Admission:  11/13/2024    Assessment & Plan   Janna Mcdermott is a 89 year old female history of diastolic CHF, CKD 3A, essential hypertension, hypothyroidism, chronic A-fib, CAD who presents with acute diastolic CHF exacerbation and DIMITRI.  Seen by PCP on 11/5, noted to have significant lower extremity edema, orthopnea, pleural effusions and was started on spironolactone 25 mg daily.  Her lower extremity edema and orthopnea improved. Still had significant dyspnea on exertion.  In the ED found to have pleural effusions and an DIMITRI.    Principal Problem:    Chronic heart failure with preserved ejection fraction (H)  Persistent volume overload with ongoing lower extremity edema, pleural effusions, probable contribution of cardiorenal phenomenon  Held spironolactone  Received IV furosemide 40 mg IV 12 hours, lowered to 20 mg twice daily  Creatinine increased, holding on further diuresis  We discussed limiting sodium to less than 2000 mg daily  She needs further lab monitoring to see improvement in Cr  Follow-up CXR to see if effusions resolved  Ideally home tomorrow with home care  Appreciate PT, OT, and social work involvement     Active Problems:    Stage 3a chronic kidney disease (H)  Baseline creatinine1.0, cr: 1.3 on admit and likely cardiorenal contribution   Variable depending on diuresis, monitor       Benign essential hypertension  Continue losartan       Acquired hypothyroidism  Continue home [place] Thyroid dosing, free T4 within normal limits  Daughter reports patient had more trouble with nausea in the past when overtreated with thyroid medication and would like a dose reduction on discharge  Currently on 90 mg daily       Permanent atrial fibrillation (H)  Chronic stable and rate controlled  Continue home diltiazem and Eliquis       Coronary artery disease involving native coronary artery of native heart without  angina pectoris  Chronic troponin elevation with no cardiopulmonary symptoms or EKG changes concerning for ACS  Continue home statin         Diet: Combination Diet Regular Diet Adult  Snacks/Supplements Adult: Ensure Enlive; With Meals    DVT Prophylaxis: DOAC  Grubbs Catheter: Not present  Lines: None     Cardiac Monitoring: None  Code Status: No CPR- Do NOT Intubate      Clinically Significant Risk Factors         # Hyponatremia: Lowest Na = 134 mmol/L in last 2 days, will monitor as appropriate           # Hypertension: Noted on problem list             # Moderate Malnutrition: based on nutrition assessment, PRESENT ON ADMISSION   # Financial/Environmental Concerns:           Social Drivers of Health    Physical Activity: Insufficiently Active (3/20/2024)    Exercise Vital Sign     Days of Exercise per Week: 7 days     Minutes of Exercise per Session: 20 min   Social Connections: Unknown (3/20/2024)    Social Connection and Isolation Panel [NHANES]     Frequency of Social Gatherings with Friends and Family: Three times a week          Disposition Plan     Medically Ready for Discharge: Anticipated Tomorrow             Alexis York MD  Hospitalist Service  [facility]  Securely message with [employer] (more info)  Text page via [employer] Paging/Directory   ______________________________________________________________________    Interval History   Still has poor appetite today.  Has been drinking more fluids, but not much oral intake.  No further diarrhea today.    Physical Exam   Vital Signs: Temp: 97.9  F (36.6  C) Temp src: Tympanic BP: (!) 142/70 Pulse: 83   Resp: 16 SpO2: 94 % O2 Device: None (Room air) Oxygen Delivery: 1 LPM  Weight: 126 lbs 9.6 oz    General Appearance: Alert. No acute distress  Chest/Respiratory Exam: Clear to auscultation bilaterally  Cardiovascular Exam: Regular rate and rhythm. S1, S2, no murmur, gallop, or rubs.  GI: Mild diffuse abdominal tenderness  Extremities: No  lower extremity edema.  Psychiatric: Normal affect and mentation      Medical Decision Making             Data     I have personally reviewed the following data over the past 24 hrs:    10.2  \   16.7 (H)   / 145 (L)     134 (L) 98 44.6 (H) /  129 (H)   3.9 26 1.37 (H) \     Trop: N/A BNP: 1,694       Imaging results reviewed over the past 24 hrs:   No results found for this or any previous visit (from the past 24 hours).

## 2024-11-16 NOTE — PLAN OF CARE
Pt slept well throughout night. Pt used bed pan for this writer and tolerated it well, otherwise can be incontinent of bowel and bladder at times.   Denies pain for this writer.   IDDSI 1 diet. Up A2 w GB and FWW

## 2024-11-16 NOTE — PROGRESS NOTES
11/16/24 1500   Appointment Info   Signing Clinician's Name / Credentials (OT) Michelle Pickering OTR/L   Interventions   Interventions Quick Adds Self-Care/Home Management   Self-Care/Home Management   Self-Care/Home Mgmt/ADL, Compensatory, Meal Prep Minutes (20056) 45   Treatment Detail/Skilled Intervention Pt completed showering with Mod Assist for LB washing.   [place] Level (Bathing Training) moderate assist (50% patient effort)   Assistance (Bathing Training) 1 person assist   Assistive Device (Bathing Training) tub seat;grab bars   OT Discharge Planning   OT Plan progress activity tolerance   OT Discharge Recommendation (DC Rec) home with assist;home with home care occupational therapy   OT Rationale for DC Rec Pt has PCA assist at home and recommend continued home care PT/OT to address safe, functional mobility in the home/safety   OT Brief overview of current status Showered with MOd A for legs and back.   Total Session Time   Timed Code Treatment Minutes 45   Total Session Time (sum of timed and untimed services) 45

## 2024-11-16 NOTE — PROGRESS NOTES
11/16/24 1400   Appointment Info   Signing Clinician's Name / Credentials (PT) Jaime Piña DPT   Interventions   Interventions Quick Adds Gait Training;Therapeutic Activity   Therapeutic Activity   Therapeutic Activities: dynamic activities to improve functional performance Minutes (53478) 14   Treatment Detail/Skilled Intervention sit to stand and stand pivot transfers with CGA of 1 and use of SE cane/FWW, transfer to shower chair for OT assist with self cares, educ to daughter regarding walker use, indications and limitations of home care services and what they might cover upon return to home   Gait Training   Gait Training Minutes (59932) 15   Symptoms Noted During/After Treatment (Gait Training) fatigue   Treatment Detail/Skilled Intervention ambulation in hallway   Distance in Feet 150, 15, 15   Physical Assistance Level (Gait Training) 1 person assist;supervision   Assistive Device (Gait Training) standard walker   Gait Analysis Deviations decreased munir;decreased velocity of limb motion;decreased step length   PT Discharge Planning   PT Plan Continue PT   PT Discharge Recommendation (DC Rec) home with assist;home with home care physical therapy   PT Rationale for DC Rec to promote strength, stability and safe mobility   PT Brief overview of current status Pleasant patient requiring minimal assist to CGA for safety using a FWW for gait to sig improve safety awareness, gait speed, and navigation throughout busy environments; fatigue ongoing limiting patient's activity tolerance; she will benefit from continued PT through home care services   PT Equipment Needed at Discharge cane, straight;walker, rolling  (PT receommended to patient that she use her Fww at home for increased stability and energy conservation)   Physical Therapy Time and Intention   Timed Code Treatment Minutes 29   Total Session Time (sum of timed and untimed services) 29

## 2024-11-16 NOTE — PROGRESS NOTES
SAFETY CHECKLIST  ID Bands and Risk clasps correct and in place (DNR, Fall risk, Allergy, Latex, Limb):  Yes  All Lines Reconciled and labeled correctly: Yes  Whiteboard updated:Yes  Environmental interventions: Yes  Verify Tele #: LEI Estrada RN on 11/15/2024 at 7:24 PM

## 2024-11-16 NOTE — PLAN OF CARE
Goal Outcome Evaluation:    A&O, VSS, afebrile, on room air.  PT has had nausea/vomiting this afternoon.  PRN IV zofran and compazine given.  PT has had multiple loose stools this afternoon; MD aware.  PT and PT daughter did state that she had chicken noodle soup yesterday which had noodles in it which they wonder if it may be part of why she has diminished appetite and loose stools today.  PT is up with assist of 1 uses cane and gait belt.      Plan of Care Reviewed With: patient, child    Overall Patient Progress: no changeOverall Patient Progress: no change    Outcome Evaluation: Pt has diminished appetite; loose stools

## 2024-11-17 ENCOUNTER — APPOINTMENT (OUTPATIENT)
Dept: OCCUPATIONAL THERAPY | Facility: OTHER | Age: 89
DRG: 291 | End: 2024-11-17
Payer: MEDICARE

## 2024-11-17 LAB
ANION GAP SERPL CALCULATED.3IONS-SCNC: 11 MMOL/L (ref 7–15)
BUN SERPL-MCNC: 51.4 MG/DL (ref 8–23)
CALCIUM SERPL-MCNC: 8.8 MG/DL (ref 8.8–10.4)
CHLORIDE SERPL-SCNC: 94 MMOL/L (ref 98–107)
CREAT SERPL-MCNC: 1.33 MG/DL (ref 0.51–0.95)
EGFRCR SERPLBLD CKD-EPI 2021: 37 ML/MIN/1.73M2
FLUAV RNA SPEC QL NAA+PROBE: NEGATIVE
FLUBV RNA RESP QL NAA+PROBE: NEGATIVE
GLUCOSE SERPL-MCNC: 141 MG/DL (ref 70–99)
HCO3 SERPL-SCNC: 25 MMOL/L (ref 22–29)
HOLD SPECIMEN: NORMAL
MAGNESIUM SERPL-MCNC: 2.1 MG/DL (ref 1.7–2.3)
POTASSIUM SERPL-SCNC: 4 MMOL/L (ref 3.4–5.3)
RSV RNA SPEC NAA+PROBE: NEGATIVE
SARS-COV-2 RNA RESP QL NAA+PROBE: NEGATIVE
SODIUM SERPL-SCNC: 130 MMOL/L (ref 135–145)

## 2024-11-17 PROCEDURE — 250N000013 HC RX MED GY IP 250 OP 250 PS 637: Performed by: INTERNAL MEDICINE

## 2024-11-17 PROCEDURE — 97530 THERAPEUTIC ACTIVITIES: CPT | Mod: GO | Performed by: OCCUPATIONAL THERAPIST

## 2024-11-17 PROCEDURE — 87637 SARSCOV2&INF A&B&RSV AMP PRB: CPT | Performed by: FAMILY MEDICINE

## 2024-11-17 PROCEDURE — 120N000001 HC R&B MED SURG/OB

## 2024-11-17 PROCEDURE — 82310 ASSAY OF CALCIUM: CPT | Performed by: FAMILY MEDICINE

## 2024-11-17 PROCEDURE — 82565 ASSAY OF CREATININE: CPT | Performed by: FAMILY MEDICINE

## 2024-11-17 PROCEDURE — 83735 ASSAY OF MAGNESIUM: CPT | Performed by: FAMILY MEDICINE

## 2024-11-17 PROCEDURE — 258N000003 HC RX IP 258 OP 636: Performed by: FAMILY MEDICINE

## 2024-11-17 PROCEDURE — 97535 SELF CARE MNGMENT TRAINING: CPT | Mod: GO | Performed by: OCCUPATIONAL THERAPIST

## 2024-11-17 PROCEDURE — 250N000013 HC RX MED GY IP 250 OP 250 PS 637: Performed by: FAMILY MEDICINE

## 2024-11-17 PROCEDURE — 36415 COLL VENOUS BLD VENIPUNCTURE: CPT | Performed by: FAMILY MEDICINE

## 2024-11-17 PROCEDURE — 80048 BASIC METABOLIC PNL TOTAL CA: CPT | Performed by: FAMILY MEDICINE

## 2024-11-17 RX ORDER — MIRTAZAPINE 7.5 MG/1
7.5 TABLET, FILM COATED ORAL EVERY EVENING
Status: DISCONTINUED | OUTPATIENT
Start: 2024-11-17 | End: 2024-11-18 | Stop reason: HOSPADM

## 2024-11-17 RX ADMIN — APIXABAN 5 MG: 5 TABLET, FILM COATED ORAL at 21:24

## 2024-11-17 RX ADMIN — MIRTAZAPINE 7.5 MG: 7.5 TABLET, FILM COATED ORAL at 21:24

## 2024-11-17 RX ADMIN — APIXABAN 5 MG: 5 TABLET, FILM COATED ORAL at 10:00

## 2024-11-17 RX ADMIN — Medication 250 MG: at 21:24

## 2024-11-17 RX ADMIN — SODIUM CHLORIDE 250 ML: 9 INJECTION, SOLUTION INTRAVENOUS at 13:48

## 2024-11-17 RX ADMIN — DILTIAZEM HYDROCHLORIDE 120 MG: 120 CAPSULE, COATED, EXTENDED RELEASE ORAL at 12:38

## 2024-11-17 RX ADMIN — Medication 250 MG: at 10:00

## 2024-11-17 RX ADMIN — SENNOSIDES 8.6 MG: 8.6 TABLET, FILM COATED ORAL at 21:24

## 2024-11-17 RX ADMIN — THYROID 90 MG: 90 TABLET ORAL at 06:33

## 2024-11-17 RX ADMIN — LOSARTAN POTASSIUM 25 MG: 25 TABLET, FILM COATED ORAL at 10:00

## 2024-11-17 NOTE — PROGRESS NOTES
SAFETY CHECKLIST  ID Bands and Risk clasps correct and in place (DNR, Fall risk, Allergy, Latex, Limb):  Yes  All Lines Reconciled and labeled correctly: Yes  Whiteboard updated:Yes  Environmental interventions: Yes  Verify Tele #:  NA

## 2024-11-17 NOTE — PLAN OF CARE
Goal Outcome Evaluation:    VSS, A&O and pleasant. Pt up and walking the floor with staff. CO some abdominal discomfort and nausea. Dose of Zofran given with relief. Slept well between cares. All questions and concerns addressed.       Plan of Care Reviewed With: patient, child    Overall Patient Progress: improving         Courtney Estrada RN on 11/17/2024 at 2:53 AM

## 2024-11-17 NOTE — PROGRESS NOTES
11/17/24 1400   Appointment Info   Signing Clinician's Name / Credentials (OT) Michelle Pickering OTR/L   OT Goals   OT: Hygiene/Grooming supervision/stand-by assist   OT: Upper Body Dressing Supervision/stand-by assist   OT: Lower Body Dressing Supervision/stand-by assist   OT: Transfer Supervision/stand-by assist   OT: Toilet Transfer/Toileting Supervision/stand-by assist   Interventions   Interventions Quick Adds Self-Care/Home Management   Self-Care/Home Management   Self-Care/Home Mgmt/ADL, Compensatory, Meal Prep Minutes (19633) 30   Treatment Detail/Skilled Intervention P tcoplted G/H standing at sink with assist for back and non reachable areas.   [place] Level (Grooming Training) minimum assist (75% patient effort)   Assistance (Grooming Training) verbal cues;1 person assist   Therapeutic Activities   Therapeutic Activity Minutes (55435) 15   Symptoms noted during/after treatment fatigue   Treatment Detail/Skilled Intervention ambulated 1002 feet in hallway with FWW   OT Discharge Planning   OT Plan progress activity tolerance   OT Discharge Recommendation (DC Rec) Transitional Care Facility   OT Rationale for DC Rec Pt would benefit from STR to increase strength adn functional endurance for safe return home.   OT Brief overview of current status Pt fatigues quickly with activity.   Total Session Time   Timed Code Treatment Minutes 45   Total Session Time (sum of timed and untimed services) 45

## 2024-11-17 NOTE — PLAN OF CARE
"Goal Outcome Evaluation:    A&O, VSS, afebrile, on room air. Pt reports \"feeling much better today.\"  Pt has not had any loose stools.  Appetite has increased some, still decreased from baseline however.  Pt has gone for walks in the [place].  PT ambulates better with FWW than with cane from home.       Plan of Care Reviewed With: patient, child    Overall Patient Progress: improvingOverall Patient Progress: improving    Outcome Evaluation: VSS, appetite better today; no loose stools today      "

## 2024-11-17 NOTE — PROGRESS NOTES
[facility]    Medicine Progress Note - Hospitalist Service    Date of Admission:  11/13/2024    Assessment & Plan   Janna Mcdermott is a 89 year old female history of diastolic CHF, CKD 3A, essential hypertension, hypothyroidism, chronic A-fib, CAD who presents with acute diastolic CHF exacerbation and DIMITRI.  Seen by PCP on 11/5, noted to have significant lower extremity edema, orthopnea, pleural effusions and was started on spironolactone 25 mg daily.  Her lower extremity edema and orthopnea improved. Still had significant dyspnea on exertion.  In the ED found to have pleural effusions and an DIMITRI.    Principal Problem:    Chronic heart failure with preserved ejection fraction (H)  Persistent volume overload with ongoing lower extremity edema, pleural effusions, probable contribution of cardiorenal phenomenon  Held spironolactone  Received IV furosemide 40 mg IV 12 hours, lowered to 20 mg twice daily  Creatinine increased, holding on further diuresis  Giving 250 mL IVF bolus as Cr remained elevated with poor PO intake  Follow-up CXR to show effusions resolved  Daughter reports that patient is struggling at home with self care. We decided she may be best served with SNF rehab  Appreciate PT, OT, and social work involvement     Active Problems:    Stage 3a chronic kidney disease (H)  Baseline creatinine1.0, cr: 1.3 on admit and likely cardiorenal contribution   IVF and recheck in AM       Benign essential hypertension  Has been on losartan, hold due to DIMITRI       Acquired hypothyroidism  Continue home [place] Thyroid dosing, free T4 within normal limits  Daughter reports patient had more trouble with nausea in the past when overtreated with thyroid medication and would like a dose reduction on discharge  Currently on 90 mg daily       Permanent atrial fibrillation (H)  Chronic stable and rate controlled  Continue home diltiazem and Eliquis       Coronary artery disease involving native coronary  artery of native heart without angina pectoris  Chronic troponin elevation with no cardiopulmonary symptoms or EKG changes concerning for ACS  Continue home statin          Anorexia  Poor appetite. Could be illness, but has been present in the past. Has some food sensitivity.  Check COVID swab  Start mirtazapine 7.5 mg at bedtime    Spent over 70 minutes on care of patient including conversation with family        Diet: Combination Diet Regular Diet Adult  Snacks/Supplements Adult: Ensure Enlive; With Meals  Fluid restriction 2000 ML FLUID    DVT Prophylaxis: DOAC  Grubbs Catheter: Not present  Lines: None     Cardiac Monitoring: None  Code Status: No CPR- Do NOT Intubate      Clinically Significant Risk Factors         # Hyponatremia: Lowest Na = 130 mmol/L in last 2 days, will monitor as appropriate  # Hypochloremia: Lowest Cl = 94 mmol/L in last 2 days, will monitor as appropriate          # Hypertension: Noted on problem list            # Overweight: Estimated body mass index is 25.04 kg/m  as calculated from the following:    Height as of this encounter: 1.524 m (5').    Weight as of this encounter: 58.2 kg (128 lb 3.2 oz).   # Moderate Malnutrition: based on nutrition assessment    # Financial/Environmental Concerns:           Social Drivers of Health    Physical Activity: Insufficiently Active (3/20/2024)    Exercise Vital Sign     Days of Exercise per Week: 7 days     Minutes of Exercise per Session: 20 min   Social Connections: Unknown (3/20/2024)    Social Connection and Isolation Panel [NHANES]     Frequency of Social Gatherings with Friends and Family: Three times a week          Disposition Plan     Medically Ready for Discharge: Anticipated Tomorrow             Alexis York MD  Hospitalist Service  [facility]  Securely message with [employer] (more info)  Text page via [facility] Paging/Directory   ______________________________________________________________________    Interval  History   Poor appetite today.  Has been drinking more fluids, but not much oral intake. Did better yesterday. Still no further diarrhea today.  Has problems with intake off and on, more the past 2 weeks. No fever. Definitely has some food sensitivities.  Patient seems to appreciate receiving care, not sure if she can do ADLs herself with nausea and weakness  Daughter wondering about depression contributing to symptoms       Physical Exam   Vital Signs: Temp: 98.4  F (36.9  C) Temp src: Tympanic BP: 116/66 Pulse: 76   Resp: 14 SpO2: 94 % O2 Device: None (Room air)    Weight: 128 lbs 3.2 oz    General Appearance: Alert. No acute distress  Chest/Respiratory Exam: Clear to auscultation bilaterally  Cardiovascular Exam: Regular rate and rhythm. S1, S2, no murmur, gallop, or rubs.  GI: Mild diffuse abdominal tenderness  Extremities: No lower extremity edema.  Psychiatric: Normal affect and mentation      Medical Decision Making             Data     I have personally reviewed the following data over the past 24 hrs:    N/A  \   N/A   / N/A     130 (L) 94 (L) 51.4 (H) /  141 (H)   4.0 25 1.33 (H) \       Imaging results reviewed over the past 24 hrs:   Recent Results (from the past 24 hours)   X-ray Chest 2 vws*    Narrative    PROCEDURE:  XR CHEST 2 VIEWS    HISTORY: follow up heart failure    COMPARISON: Chest radiograph 11/13/2024; CT chest abdomen pelvis  10/27/2024    FINDINGS: PA and lateral chest radiographs    Cardiomediastinal silhouette is enlarged, stable. There is calcific  aortic atherosclerosis.  Slightly decreased bilateral pleural effusions. Decreased overload  mixed opacities. No pneumothorax.    No suspicious osseous lesion or subdiaphragmatic free air.      Impression    IMPRESSION:    Decreased effusions and decreased overlying mixed opacities.    ARELIS VORA MD         SYSTEM ID:  [id number]

## 2024-11-18 ENCOUNTER — APPOINTMENT (OUTPATIENT)
Dept: PHYSICAL THERAPY | Facility: OTHER | Age: 89
DRG: 291 | End: 2024-11-18
Payer: MEDICARE

## 2024-11-18 VITALS
BODY MASS INDEX: 25.09 KG/M2 | WEIGHT: 127.8 LBS | DIASTOLIC BLOOD PRESSURE: 78 MMHG | TEMPERATURE: 97.8 F | RESPIRATION RATE: 16 BRPM | HEART RATE: 87 BPM | HEIGHT: 60 IN | OXYGEN SATURATION: 91 % | SYSTOLIC BLOOD PRESSURE: 130 MMHG

## 2024-11-18 PROBLEM — R63.4 WEIGHT LOSS: Status: ACTIVE | Noted: 2024-11-18

## 2024-11-18 LAB
ANION GAP SERPL CALCULATED.3IONS-SCNC: 6 MMOL/L (ref 7–15)
BUN SERPL-MCNC: 38.1 MG/DL (ref 8–23)
CALCIUM SERPL-MCNC: 8.8 MG/DL (ref 8.8–10.4)
CHLORIDE SERPL-SCNC: 101 MMOL/L (ref 98–107)
CREAT SERPL-MCNC: 1.12 MG/DL (ref 0.51–0.95)
EGFRCR SERPLBLD CKD-EPI 2021: 46 ML/MIN/1.73M2
ERYTHROCYTE [DISTWIDTH] IN BLOOD BY AUTOMATED COUNT: 13.4 % (ref 10–15)
GLUCOSE SERPL-MCNC: 127 MG/DL (ref 70–99)
HCO3 SERPL-SCNC: 27 MMOL/L (ref 22–29)
HCT VFR BLD AUTO: 49.3 % (ref 35–47)
HGB BLD-MCNC: 16.5 G/DL (ref 11.7–15.7)
HOLD SPECIMEN: NORMAL
HOLD SPECIMEN: NORMAL
MCH RBC QN AUTO: 31.9 PG (ref 26.5–33)
MCHC RBC AUTO-ENTMCNC: 33.5 G/DL (ref 31.5–36.5)
MCV RBC AUTO: 95 FL (ref 78–100)
PLATELET # BLD AUTO: 145 10E3/UL (ref 150–450)
POTASSIUM SERPL-SCNC: 4.3 MMOL/L (ref 3.4–5.3)
RBC # BLD AUTO: 5.18 10E6/UL (ref 3.8–5.2)
SODIUM SERPL-SCNC: 134 MMOL/L (ref 135–145)
WBC # BLD AUTO: 8 10E3/UL (ref 4–11)

## 2024-11-18 PROCEDURE — 97530 THERAPEUTIC ACTIVITIES: CPT | Mod: GP

## 2024-11-18 PROCEDURE — 85027 COMPLETE CBC AUTOMATED: CPT | Performed by: FAMILY MEDICINE

## 2024-11-18 PROCEDURE — 36415 COLL VENOUS BLD VENIPUNCTURE: CPT | Performed by: FAMILY MEDICINE

## 2024-11-18 PROCEDURE — 250N000013 HC RX MED GY IP 250 OP 250 PS 637: Performed by: INTERNAL MEDICINE

## 2024-11-18 PROCEDURE — 97116 GAIT TRAINING THERAPY: CPT | Mod: GP

## 2024-11-18 PROCEDURE — 80048 BASIC METABOLIC PNL TOTAL CA: CPT | Performed by: FAMILY MEDICINE

## 2024-11-18 PROCEDURE — 250N000013 HC RX MED GY IP 250 OP 250 PS 637: Performed by: FAMILY MEDICINE

## 2024-11-18 PROCEDURE — 84520 ASSAY OF UREA NITROGEN: CPT | Performed by: FAMILY MEDICINE

## 2024-11-18 RX ORDER — ACETAMINOPHEN 325 MG/1
650 TABLET ORAL EVERY 4 HOURS PRN
DISCHARGE
Start: 2024-11-18 | End: 2024-11-22

## 2024-11-18 RX ORDER — MIRTAZAPINE 7.5 MG/1
7.5 TABLET, FILM COATED ORAL EVERY EVENING
DISCHARGE
Start: 2024-11-18 | End: 2024-11-20

## 2024-11-18 RX ORDER — FUROSEMIDE 20 MG/1
20 TABLET ORAL DAILY PRN
DISCHARGE
Start: 2024-11-18 | End: 2024-11-26

## 2024-11-18 RX ADMIN — SENNOSIDES 8.6 MG: 8.6 TABLET, FILM COATED ORAL at 09:47

## 2024-11-18 RX ADMIN — Medication 250 MG: at 09:47

## 2024-11-18 RX ADMIN — POLYETHYLENE GLYCOL 3350 17 G: 17 POWDER, FOR SOLUTION ORAL at 09:47

## 2024-11-18 RX ADMIN — THYROID 90 MG: 90 TABLET ORAL at 06:26

## 2024-11-18 RX ADMIN — APIXABAN 5 MG: 5 TABLET, FILM COATED ORAL at 09:47

## 2024-11-18 ASSESSMENT — ACTIVITIES OF DAILY LIVING (ADL)
ADLS_ACUITY_SCORE: 0

## 2024-11-18 NOTE — PLAN OF CARE
Goal Outcome Evaluation:    VSS, A&O and pleasant. Pt up walking the halls this shift with staff. Lungs are clear and equal. Afebrile, on RA. Slept well between cares tonight. Received first dose of Remeron. Tolerated well. All questions and concerns addressed.     /74 (BP Location: Right arm, Patient Position: Semi-Hadley's, Cuff Size: Adult Small)   Pulse 73   Temp 98.6  F (37  C) (Tympanic)   Resp 16   Ht 1.524 m (5')   Wt 58.2 kg (128 lb 3.2 oz)   LMP 01/25/1974 (Approximate)   SpO2 91%   BMI 25.04 kg/m          Plan of Care Reviewed With: patient    Overall Patient Progress: improving       Courtney Estrada RN on 11/18/2024 at 3:57 AM

## 2024-11-18 NOTE — PROGRESS NOTES
:    Met with patient and family and they would like patient to go to [facility].     Pt/family was given the Medicare Compare list for SNF, with associated star ratings to assist with choice for referrals/discharge planning Yes    Education was given to pt/family that star ratings are updated/maintained by Medicare and can be reviewed by visiting www.medicare.gov Yes    Referral sent to [facility] as requested by patient and family.     ARIANA Abraham on 11/18/2024 at 10:40 AM    [facility] is able to take patient today. Updated patient and family about this plan. Patients PCA will transport patient to  and have her there at 1300 today.     ARIANA Abraham on 11/18/2024 at 10:54 AM    Preadmission Screening Completed: [id number]    No further needs from  at this time.    ARIANA Abraham on 11/18/2024 at 12:29 PM

## 2024-11-18 NOTE — PROGRESS NOTES
SAFETY CHECKLIST  ID Bands and Risk clasps correct and in place (DNR, Fall risk, Allergy, Latex, Limb):  Yes  All Lines Reconciled and labeled correctly: Yes  Whiteboard updated:Yes  Environmental interventions: Yes  Verify Tele #: LEI Estrada RN on 11/17/2024 at 7:09 PM

## 2024-11-18 NOTE — DISCHARGE SUMMARY
[facility]  Hospitalist Discharge Summary      Date of Admission:  11/13/2024  Date of Discharge:  11/18/2024  Discharging Provider: Alexis York MD  Discharge Service: Hospitalist Service    Discharge Diagnoses   Principal Problem:    Chronic heart failure with preserved ejection fraction (H)  Active Problems:    Stage 3a chronic kidney disease (H)    Benign essential hypertension    Acquired hypothyroidism    Permanent atrial fibrillation (H)    Coronary artery disease involving native coronary artery of native heart without angina pectoris    DIMITRI (acute kidney injury) (H)    Weight loss      Clinically Significant Risk Factors     # Moderate Malnutrition: based on nutrition assessment      Follow-ups Needed After Discharge   Follow-up Appointments       Follow Up and recommended labs and tests      Hospital follow up with nursing home provider within a week.   Check BMP next week Diagnosis diastolic HF              Unresulted Labs Ordered in the Past 30 Days of this Admission       No orders found from 10/14/2024 to 11/14/2024.        These results will be followed up by NA    Discharge Disposition   Discharged to short-term rehab at [facility]  Condition at discharge: Stable    Hospital Course   Janna Mcdermott is a 89 year old female history of diastolic CHF, CKD 3A, essential hypertension, hypothyroidism, chronic A-fib, CAD who presents with acute diastolic CHF exacerbation and DIMITRI.  Seen by PCP on 11/5, noted to have significant lower extremity edema, orthopnea, pleural effusions and was started on spironolactone 25 mg daily.  Her lower extremity edema and orthopnea improved. Still had significant dyspnea on exertion.  In the ED found to have pleural effusions and an DIMITRI.    She was diuresed with IV furosemide until creatinine increased slightly.  Coincidently, she took MiraLAX and ate some soup that had gluten in it.  She then had stomach upset and some diarrhea the next  day.  Since then has not been eating very well, which sounds more chronic.  Her thyroid dose was lowered slightly as patient's daughter felt like this may be contributing.  She really continued to have less than ideal oral intake and was requiring a lot of assistance at home.  Patient and daughter felt she might do better at an SNF for rehab.  We can monitor her nutritional intake and weights to help determine furosemide dosing.  She is discharged to [facility] for acute rehab.    Principal Problem:    Chronic heart failure with preserved ejection fraction (H)  Persistent volume overload with ongoing lower extremity edema, pleural effusions, probable contribution of cardiorenal phenomenon  Stopped spironolactone  Received IV furosemide 40 mg IV 12 hours, lowered to 20 mg twice daily  Creatinine increased, received no further diuresis for 3 days before discharge  Giving 250 mL IVF bolus as Cr remained elevated with poor PO intake  Follow-up CXR showed effusions resolved  Daughter reports that patient is struggling at home with self care. We decided she may be best served with SNF rehab  Appreciate PT, OT, and social work involvement  Obtain daily weights.  Provide furosemide 20 mg daily for weight gain of 2 pounds in a day or 5 pounds in a week     Active Problems:    Stage 3a chronic kidney disease (H)  Baseline creatinine1.0, cr: 1.3 on admit and likely cardiorenal contribution   Discharged at Cr of 1.1       Benign essential hypertension  Continue losartan 25 mg daily       Acquired hypothyroidism  Continue home [place] Thyroid dosing, free T4 within normal limits  Daughter reports patient had more trouble with nausea in the past when overtreated with thyroid medication and would like a dose reduction on discharge  Currently on 90 mg daily  This will likely need to be increased in a few weeks.  Her total weekly dose was dropped by 90 mg, consider increasing by about 45 mg to maintain TSH around 10        Permanent atrial fibrillation (H)  Chronic stable and rate controlled  Continue home diltiazem and Eliquis       Coronary artery disease involving native coronary artery of native heart without angina pectoris  Chronic troponin elevation with no cardiopulmonary symptoms or EKG changes concerning for ACS  Continue home statin          Anorexia  Poor appetite. Could be illness, but has been present in the past. Has some food sensitivity.  Start mirtazapine 7.5 mg at bedtime to help stimulate appetite    Consultations This Hospital Stay   CARE MANAGEMENT / SOCIAL WORK IP CONSULT  PHYSICAL THERAPY ADULT IP CONSULT  OCCUPATIONAL THERAPY ADULT IP CONSULT  SOCIAL WORK IP CONSULT  SOCIAL WORK IP CONSULT  NUTRITION SERVICES ADULT IP CONSULT  PHYSICAL THERAPY ADULT IP CONSULT  OCCUPATIONAL THERAPY ADULT IP CONSULT    Code Status   No CPR- Do NOT Intubate    Time Spent on this Encounter   I, Alexis York MD, personally saw the patient today and spent greater than 30 minutes discharging this patient.       Alexis York MD  [facility]  [address]  [facility] [address]  Phone: [phone]  Fax: [phone]  ______________________________________________________________________    Physical Exam   Vital Signs: Temp: 98.6  F (37  C) Temp src: Tympanic BP: 122/74 Pulse: 73   Resp: 16 SpO2: 91 % O2 Device: None (Room air)    Weight: 127 lbs 12.8 oz  General Appearance: Alert. No acute distress  Chest/Respiratory Exam: Clear to auscultation bilaterally  Cardiovascular Exam: Regular rate and rhythm. S1, S2, no murmur, gallop, or rubs.  Extremities: No lower extremity edema.  Psychiatric: Normal affect and mentation         Primary Care Physician   Dwayne Gaona    Discharge Orders      General info for SNF    Length of Stay Estimate: Short Term Care: Estimated # of Days <30  Condition at Discharge: Stable  Level of care:skilled   Rehabilitation Potential: Good  Admission H&P remains  valid and up-to-date: Yes  Recent Chemotherapy: N/A  Use Nursing Home Standing Orders: Yes     Mantoux instructions    Give two-step Mantoux (PPD) Per Facility Policy Yes     Follow Up and recommended labs and tests    Hospital follow up with nursing home provider within a week.   Check BMP next week Diagnosis diastolic HF     Reason for your hospital stay    Heart failure with fluid buildup on your lungs     Daily weights    Give furosemide for weight gain of more than 2 pounds per day or 5 pounds per week.     Activity - Up with assistive device     Additional Discharge Instructions    OK to take home herbal supplements.     Nutrition Services Adult IP Consult    Reason:  weight loss, use of supplements     Physical Therapy Adult Consult    Evaluate and treat as clinically indicated.    Reason:  mobility impairment with CHF     Occupational Therapy Adult Consult    Evaluate and treat as clinically indicated.    Reason:  impaired ADLs with CHF     Diet    Follow this diet upon discharge: Gluten free, low sodium diet (less than 2,000 mg daily)  Needs supplements with meals  OK to take home supplements and protein drinks       Significant Results and Procedures   Most Recent 3 CBC's:  Recent Labs   Lab Test 11/18/24  0649 11/16/24  0617 11/14/24  0530   WBC 8.0 10.2 7.5   HGB 16.5* 16.7* 16.7*   MCV 95 95 93   * 145* 157     Most Recent 3 BMP's:  Recent Labs   Lab Test 11/18/24  0649 11/17/24  0547 11/16/24  0617   * 130* 134*   POTASSIUM 4.3 4.0 3.9   CHLORIDE 101 94* 98   CO2 27 25 26   BUN 38.1* 51.4* 44.6*   CR 1.12* 1.33* 1.37*   ANIONGAP 6* 11 10   CORY 8.8 8.8 9.2   * 141* 129*     Most Recent 2 LFT's:  Recent Labs   Lab Test 11/13/24  0957 10/27/24  0608   AST 27 26   ALT 23 20   ALKPHOS 86 77   BILITOTAL 1.0 0.9   ,   Results for orders placed or performed during the hospital encounter of 11/13/24   XR Chest Port 1 View    Narrative    PROCEDURE:  XR CHEST PORT 1 VIEW    HISTORY: nausea,  not feeling well.    COMPARISON: CT chest 10/27/2024    FINDINGS: Single view chest radiograph    Cardiomediastinal silhouette is enlarged, stable. There is calcific  aortic atherosclerosis.  Perihilar and peripheral interstitial opacities. Dependent mixed  opacities. Bilateral pleural effusions, right greater than left,  similar to comparison. No pneumothorax.    No suspicious osseous lesion or subdiaphragmatic free air.      Impression    IMPRESSION:    Changes in the chest which are likely due to heart failure, including  effusions, cardiomegaly and bibasilar pulmonary opacities. Pneumonia  cannot be excluded.    ARELIS VORA MD         SYSTEM ID:  [id number]   X-ray Chest 2 vws*    Narrative    PROCEDURE:  XR CHEST 2 VIEWS    HISTORY: follow up heart failure    COMPARISON: Chest radiograph 2024; CT chest abdomen pelvis  10/27/2024    FINDINGS: PA and lateral chest radiographs    Cardiomediastinal silhouette is enlarged, stable. There is calcific  aortic atherosclerosis.  Slightly decreased bilateral pleural effusions. Decreased overload  mixed opacities. No pneumothorax.    No suspicious osseous lesion or subdiaphragmatic free air.      Impression    IMPRESSION:    Decreased effusions and decreased overlying mixed opacities.    ARELIS VORA MD         SYSTEM ID:  [id number]   Echocardiogram Complete     Value    LVEF  70%    Narrative    [id number]  [id number]  [id number]  [id number]^HELLEN^CATHY     [facility]  [address] [place] [address]     Name: MARIA EUGENIA ADAMES  MRN: [id number]  : 1931  Study Date: 2024 01:24 PM  Age: 89 yrs  Gender: Female  Patient Location: [facility]  Reason For Study: Heart Failure, Unspecified  Ordering Physician: CATHY MACEDO  Performed By: VERA Atwood, RDCS, RVT     BSA: 1.6 m2  Height: 60 in  Weight: 132 lb  HR: 79  BP: 137/77  mmHg  ______________________________________________________________________________  Procedure  Complete Portable Echo Adult.  ______________________________________________________________________________  Interpretation Summary  Global and regional left ventricular function is hyperkinetic with an EF >70%.  Right ventricular function, chamber size, wall motion, and thickness are  normal.  Mild to moderate tricuspid insufficiency is present.  Pulmonary artery systolic pressure is normal.  Ascending aorta 4.7 cm.  Ascending Aorta aneurysm is present.  The inferior vena cava is normal.  No pericardial effusion is present.  ______________________________________________________________________________  Left Ventricle  Global and regional left ventricular function is hyperkinetic with an EF >70%.  Left ventricular wall thickness is normal. Left ventricular size is normal.  Thickening of the anterobasal septum is present. Left ventricular diastolic  function is indeterminate. No regional wall motion abnormalities are seen.     Right Ventricle  Right ventricular function, chamber size, wall motion, and thickness are  normal.     Atria  Moderate biatrial enlargement is present.     Mitral Valve  The mitral valve is normal.     Aortic Valve  Trileaflet aortic sclerosis without stenosis. Trace to mild aortic  insufficiency is present. The mean AoV pressure gradient is 4.6 mmHg.     Tricuspid Valve  Mild to moderate tricuspid insufficiency is present. The right ventricular  systolic pressure is approximated at 30.6 mmHg plus the right atrial pressure.  Pulmonary artery systolic pressure is normal.     Pulmonic Valve  The pulmonic valve is normal.     Vessels  The pulmonary artery and bifurcation cannot be assessed. The inferior vena  cava is normal. Ascending aorta 4.7 cm. Ascending Aorta aneurysm is present.     Pericardium  No pericardial effusion is present.      ______________________________________________________________________________  MMode/2D Measurements & Calculations  IVSd: 0.70 cm  LVIDd: 3.8 cm  LVIDs: 2.3 cm  LVPWd: 0.93 cm  FS: 38.8 %  LV mass(C)d: 87.5 grams  LV mass(C)dI: 55.9 grams/m2  Ao root diam: 2.6 cm  asc Aorta Diam: 4.7 cm     LVOT diam: 1.8 cm  LVOT area: 2.4 cm2  Ao root diam index Ht(cm/m): 1.7  Ao root diam index BSA (cm/m2): 1.7  Asc Ao diam index BSA (cm/m2): 3.0  Asc Ao diam index Ht(cm/m): 3.1  LA Volume (BP): 72.3 ml  LA Volume Index (BP): 46.3 ml/m2  RWT: 0.49  TAPSE: 1.4 cm     Doppler Measurements & Calculations  MV E max dominic: 96.4 cm/sec  MV A max dominic: 20.1 cm/sec  MV E/A: 4.8  MV dec time: 0.21 sec  Ao V2 max: 142.2 cm/sec  Ao max P.0 mmHg  Ao V2 mean: 98.4 cm/sec  Ao mean P.6 mmHg  Ao V2 VTI: 25.1 cm  BETH(I,D): 1.7 cm2  BETH(V,D): 1.9 cm2  LV V1 max P.8 mmHg  LV V1 max: 109.4 cm/sec  LV V1 VTI: 17.2 cm  SV(LVOT): 42.0 ml  SI(LVOT): 26.9 ml/m2  PA acc time: 0.11 sec  TR max dominic: 275.9 cm/sec  TR max P.6 mmHg  AV Dominic Ratio (DI): 0.77  BETH Index (cm2/m2): 1.1     ______________________________________________________________________________  Report approved by: Cruz Olvera 2024 04:34 PM             Discharge Medications   Current Discharge Medication List        START taking these medications    Details   acetaminophen (TYLENOL) 325 MG tablet Take 2 tablets (650 mg) by mouth every 4 hours as needed for mild pain, fever or headaches.    Associated Diagnoses: Acute on chronic heart failure with preserved ejection fraction (H)      furosemide (LASIX) 20 MG tablet Take 1 tablet (20 mg) by mouth daily as needed (for weight gain of 2 lbs in a day or 5 lbs in a week).    Associated Diagnoses: Acute on chronic heart failure with preserved ejection fraction (H); Bilateral pleural effusion      mirtazapine (REMERON) 7.5 MG tablet Take 1 tablet (7.5 mg) by mouth every evening.    Associated Diagnoses: Weight loss            CONTINUE these medications which have NOT CHANGED    Details   apixaban ANTICOAGULANT (ELIQUIS ANTICOAGULANT) 5 MG tablet Take 1 tablet (5 mg) by mouth 2 times daily  Qty: 180 tablet, Refills: 4    Associated Diagnoses: Paroxysmal atrial fibrillation (H)      ARMOUR THYROID 90 MG tablet Take 1 tablet (90 mg) by mouth daily.  Qty: 90 tablet, Refills: 4    Associated Diagnoses: Acquired hypothyroidism      Cholecalciferol 10 MCG (400 UNIT) CAPS Take 800 Units by mouth daily      Cranberry-Vitamin C (CRANBERRY CONCENTRATE/VITAMINC) 33748-257 MG CAPS Take 1 capsule by mouth 2 times daily - for UTI prevention  Qty: 180 capsule, Refills: 4    Associated Diagnoses: Recurrent UTI      diltiazem ER COATED BEADS (CARDIZEM CD/CARTIA XT) 120 MG 24 hr capsule Take 1 capsule (120 mg) by mouth daily  Qty: 90 capsule, Refills: 4    Associated Diagnoses: Paroxysmal atrial fibrillation (H); Essential hypertension      latanoprost (XALATAN) 0.005 % ophthalmic solution Place 1 drop into both eyes At Bedtime       losartan (COZAAR) 25 MG tablet Take 1 tablet (25 mg) by mouth daily  Qty: 90 tablet, Refills: 4    Associated Diagnoses: Essential hypertension      rosuvastatin (CRESTOR) 5 MG tablet Take 1 tablet (5 mg) by mouth three times a week  Qty: 50 tablet, Refills: 4    Associated Diagnoses: 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           STOP taking these medications       spironolactone (ALDACTONE) 25 MG tablet Comments:   Reason for Stopping:             Allergies   Allergies   Allergen Reactions    Bee Venom Anaphylaxis    Benazepril Cough    Ciprofloxacin Other (See Comments)     Possible GI illness    Erythromycin Other (See Comments)     Unsure of allergy time, possible hives.    Gluten Meal GI  Disturbance    Hydrochlorothiazide      Other reaction(s): Hyponatremia    Penicillins Other (See Comments)     Unsure of allergy time, possible hives

## 2024-11-18 NOTE — PHARMACY-CONSULT NOTE
[facility]  Part of [address] [address]    November 18, 2024    Dear Pharmacist,    Your customer, Janna Mcdermott, born on 2/23/1931, was recently discharged from [facility].  We have updated her medication list and want to alert you to the following:       Review of your medicines        START taking        Dose / Directions   acetaminophen 325 MG tablet  Commonly known as: TYLENOL  Used for: Acute on chronic heart failure with preserved ejection fraction (H)      Dose: 650 mg  Take 2 tablets (650 mg) by mouth every 4 hours as needed for mild pain, fever or headaches.  Refills: 0     furosemide 20 MG tablet  Commonly known as: LASIX  Used for: Acute on chronic heart failure with preserved ejection fraction (H), Bilateral pleural effusion      Dose: 20 mg  Take 1 tablet (20 mg) by mouth daily as needed (for weight gain of 2 lbs in a day or 5 lbs in a week).  Refills: 0     mirtazapine 7.5 MG tablet  Commonly known as: REMERON  Used for: Weight loss      Dose: 7.5 mg  Take 1 tablet (7.5 mg) by mouth every evening.  Refills: 0            CONTINUE these medicines which may have CHANGED, or have new prescriptions. If we are uncertain of the size of tablets/capsules you have at home, strength may be listed as something that might have changed.        Dose / Directions   [place] Thyroid 90 MG tablet  This may have changed: Another medication with the same name was removed. Continue taking this medication, and follow the directions you see here.  Used for: Acquired hypothyroidism  Generic drug: thyroid      Dose: 90 mg  Take 1 tablet (90 mg) by mouth daily.  Quantity: 90 tablet  Refills: 4            CONTINUE these medicines which have NOT CHANGED        Dose / Directions   apixaban ANTICOAGULANT 5 MG tablet  Commonly known as: ELIQUIS ANTICOAGULANT  Used for: Paroxysmal atrial fibrillation (H)      Dose: 5 mg  Take 1 tablet (5 mg) by  mouth 2 times daily  Quantity: 180 tablet  Refills: 4     Cholecalciferol 10 MCG (400 UNIT) Caps      Dose: 800 Units  Take 800 Units by mouth daily  Refills: 0     Cranberry Concentrate/VitaminC 78231-825 MG Caps  Used for: Recurrent UTI  Generic drug: Cranberry-Vitamin C      Dose: 1 capsule  Take 1 capsule by mouth 2 times daily - for UTI prevention  Quantity: 180 capsule  Refills: 4     diltiazem ER COATED BEADS 120 MG 24 hr capsule  Commonly known as: CARDIZEM CD/CARTIA XT  Used for: Paroxysmal atrial fibrillation (H), Essential hypertension      Dose: 120 mg  Take 1 capsule (120 mg) by mouth daily  Quantity: 90 capsule  Refills: 4     latanoprost 0.005 % ophthalmic solution  Commonly known as: XALATAN      Dose: 1 drop  Place 1 drop into both eyes At Bedtime  Refills: 0     losartan 25 MG tablet  Commonly known as: COZAAR  Used for: Essential hypertension      Dose: 25 mg  Take 1 tablet (25 mg) by mouth daily  Quantity: 90 tablet  Refills: 4     rosuvastatin 5 MG tablet  Commonly known as: CRESTOR  Used for: 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  Refills: 0            STOP taking      spironolactone 25 MG tablet  Commonly known as: ALDACTONE                 We also reviewed Janna Mcdermott's allergy list and updated it as needed:  Allergies: Bee venom, Benazepril, Ciprofloxacin, Erythromycin, Gluten meal, Hydrochlorothiazide, and Penicillins    Thank you for continuing to care for Janna Mcdermott.  We look forward to working together with you in the future.    Sincerely,  London Weaver [facility]  [place]  [facility]

## 2024-11-18 NOTE — PLAN OF CARE
Goal Outcome Evaluation:      A&O, VSS, afebrile, on room air.  Pt has not had any loose stools.  Appetite still decreased from baseline.  Pt has gone for walks in [place].  PT ambulates better with FWW.  Received 250mL bolus.          Plan of Care Reviewed With: patient    Overall Patient Progress: improvingOverall Patient Progress: improving    Outcome Evaluation: VSS; appetite still minimal; no loose stools

## 2024-11-18 NOTE — PROGRESS NOTES
11/18/24 8435   Appointment Info   Signing Clinician's Name / Credentials (PT) Dangelo Musa MPT   Therapeutic Activity   Symptoms Noted During/After Treatment Increased pain   Treatment Detail/Skilled Intervention bed mobilities with minimal assist; sit to stand and stand pivot with CGA for safety using a Fww for gait   Gait Training   Symptoms Noted During/After Treatment (Gait Training) fatigue   Treatment Detail/Skilled Intervention ambulation in hallway   Distance in Feet 150   [place] Level (Gait Training) contact guard   Physical Assistance Level (Gait Training) 1 person assist   Weight Bearing (Gait Training) full weight-bearing   Assistive Device (Gait Training) rolling walker   Pattern Analysis (Gait Training) swing-through gait   Impairments (Gait Analysis/Training) balance impaired;strength decreased   PT Discharge Planning   PT Plan Patient discharging   PT Discharge Recommendation (DC Rec) Transitional Care Facility   PT Rationale for DC Rec to promote strength, stability and safe mobility   PT Brief overview of current status Pleasant patient requiring minimal assist to CGA for safety using a FWW for gait to improve safety awareness, gait speed, and navigation throughout busy environments; fatigue ongoing limiting patient's activity tolerance; she will benefit from continued PT in short term rehab   PT Equipment Needed at Discharge cane, straight;walker, rolling

## 2024-11-18 NOTE — PHARMACY - DISCHARGE MEDICATION RECONCILIATION AND EDUCATION
Pharmacy:  Discharge Counseling and Medication Reconciliation    Janna Mcdermott  [address] [place] [address]  [phone] (home)   89 year old female  PCP: Dwayne Gaona    Allergies: Bee venom, Benazepril, Ciprofloxacin, Erythromycin, Gluten meal, Hydrochlorothiazide, and Penicillins    Discharge Counseling:    Pharmacist met with patient (and/or family) today to review the medication portion of the After Visit Summary (with an emphasis on NEW medications) and to address patient's questions/concerns.    Summary of Education: Patient going to facility for STR. Patient had changes to medication list. Educated patient on use of mirtazapine: educated to reported side effects such as worsening mood increased appetite and s/s of serotonin syndrome. Patient knows to take with good adherence for benefit and that benefits may not be fully realized for 4-6 weeks. Also educated to stop spironolactone and New dose of [place] thyroid going home. Also  discussed  use of PRN lasix for weight gain.     Materials Provided:  MedCounselor sheets printed from Clinical Pharmacology on: Mirtazapine     Discharge Medication Reconciliation:    It has been determined that the patient has an adequate supply of medications available or which can be obtained from [facility] pharmacy, which is confirmed as:   [An updated medication list will be faxed to the patient's pharmacy.]    Thank you for the consult.    London Weaver [facility]........November 18, 2024 1:48 PM

## 2024-11-18 NOTE — PLAN OF CARE
"  Problem: Adult Inpatient Plan of Care  Goal: Plan of Care Review  Description: The Plan of Care Review/Shift note should be completed every shift.  The Outcome Evaluation is a brief statement about your assessment that the patient is improving, declining, or no change.  This information will be displayed automatically on your shift  note.  Outcome: Progressing  Goal: Patient-Specific Goal (Individualized)  Description: You can add care plan individualizations to a care plan. Examples of Individualization might be:  \"Parent requests to be called daily at 9am for status\", \"I have a hard time hearing out of my right ear\", or \"Do not touch me to wake me up as it startles  me\".  Outcome: Progressing  Goal: Absence of Hospital-Acquired Illness or Injury  Outcome: Progressing  Intervention: Prevent Infection  Recent Flowsheet Documentation  Taken 11/18/2024 1100 by Víctor Solis RN  Infection Prevention:   hand hygiene promoted   single patient room provided  Goal: Optimal Comfort and Wellbeing  Outcome: Progressing  Intervention: Monitor Pain and Promote Comfort  Recent Flowsheet Documentation  Taken 11/18/2024 1100 by Víctor Solis RN  Pain Management Interventions:   rest   repositioned  Goal: Readiness for Transition of Care  Outcome: Progressing     Problem: Heart Failure  Goal: Optimal Coping  Outcome: Progressing  Intervention: Support Psychosocial Response  Recent Flowsheet Documentation  Taken 11/18/2024 1100 by Víctor Solis RN  Family/Support System Care: support provided  Goal: Optimal Cardiac Output  Outcome: Progressing  Goal: Stable Heart Rate and Rhythm  Outcome: Progressing  Goal: Fluid and Electrolyte Balance  Outcome: Progressing  Goal: Optimal Functional Ability  Outcome: Progressing  Goal: Improved Oral Intake  Outcome: Progressing   Goal Outcome Evaluation:    NSG DISCHARGE NOTE    Patient discharged to acute rehab at 12:11 PM via wheel chair. Accompanied by daughter and staff. Discharge " instructions reviewed with patient, opportunity offered to ask questions. Prescriptions sent to patients preferred pharmacy. All belongings sent with patient.    Víctor Solis RN

## 2024-11-18 NOTE — PROGRESS NOTES
11/18/24 9761   Appointment Info   Signing Clinician's Name / Credentials (OT) Kenisha Valadez OTR/L   Self-Care/Home Management   Self-Care/Home Mgmt/ADL, Compensatory, Meal Prep Minutes (46850) 15   [place] Level (Grooming Training) stand-by assist   Assistance (Grooming Training) supervision;set-up required   [place] Level (Bathing Training) stand-by assist   Assistance (Bathing Training) supervision;set-up required   [place] Level (Upper Body Dressing Training) minimum assist (75% patient effort)   Assistance (Upper Body Dressing Training) 1 person assist   Lower Body Dressing Training Assistance maximum assist (25% patient effort)   Lower Body Dressing Training Assistance 1 person assist   Therapeutic Activities   Therapeutic Activity Minutes (13792) 15   Symptoms noted during/after treatment fatigue   Treatment Detail/Skilled Intervention Pt ambulated in room and hallway with FWW and CGA, did have some fatige   OT Discharge Planning   OT Plan d/c to STR today   OT Discharge Recommendation (DC Rec) Transitional Care Facility   OT Rationale for DC Rec Pt would benefit from STR to increase strength adn functional endurance for safe return home.   OT Brief overview of current status see above for details   Total Session Time   Timed Code Treatment Minutes 30   Total Session Time (sum of timed and untimed services) 30

## 2024-11-19 ENCOUNTER — PATIENT OUTREACH (OUTPATIENT)
Dept: INTERNAL MEDICINE | Facility: OTHER | Age: 89
End: 2024-11-19
Payer: COMMERCIAL

## 2024-11-19 ENCOUNTER — MYC MEDICAL ADVICE (OUTPATIENT)
Dept: INTERNAL MEDICINE | Facility: OTHER | Age: 89
End: 2024-11-19
Payer: COMMERCIAL

## 2024-11-19 DIAGNOSIS — R11.2 NAUSEA AND VOMITING, UNSPECIFIED VOMITING TYPE: Primary | ICD-10-CM

## 2024-11-19 DIAGNOSIS — K29.60 BILE REFLUX GASTRITIS: ICD-10-CM

## 2024-11-19 DIAGNOSIS — R63.4 WEIGHT LOSS: ICD-10-CM

## 2024-11-19 NOTE — TELEPHONE ENCOUNTER
Transitional Care Management    Patient discharged to skilled nursing facility for short term rehab. No TCM call required per policy.   Yareli Hand RN on 11/19/2024 at 9:38 AM

## 2024-11-20 RX ORDER — MIRTAZAPINE 7.5 MG/1
TABLET, FILM COATED ORAL
Qty: 90 TABLET | Refills: 3 | Status: SHIPPED | OUTPATIENT
Start: 2024-11-20

## 2024-11-20 RX ORDER — SACCHAROMYCES BOULARDII 250 MG
250 CAPSULE ORAL 2 TIMES DAILY
Qty: 180 CAPSULE | Refills: 3 | Status: SHIPPED | OUTPATIENT
Start: 2024-11-20

## 2024-11-20 NOTE — TELEPHONE ENCOUNTER
Dwayne,    Orders adjusted (Remeron from evening to 1 hour prior to HS) and added pro-biotic (requested by family).  Patient is at [facility] for rehab.      We will need to have updated med list sent to [facility] when done.  RJ and I are both working from home today so if your nurse could fax this updated med list after you sign- that would be great.     [facility] fax [phone].  Attn:  Nurse    _______________________________________________________________________________    Remeron- Kimo'd up for 1 hour prior to HS.     Florastor. Not on med list- kimo'd up.    The cranberry twice a day is still on there- yes    Michelle Weaver RN on 11/20/2024 at 10:07 AM

## 2024-11-20 NOTE — TELEPHONE ENCOUNTER
RJ,    Please print med list and fax to [facility] fax [phone]. Attn: Nurse     Michelle Weaver RN on 11/20/2024 at 4:39pm

## 2024-11-25 ENCOUNTER — LAB REQUISITION (OUTPATIENT)
Dept: LAB | Facility: OTHER | Age: 89
End: 2024-11-25

## 2024-11-25 ENCOUNTER — DOCUMENTATION ONLY (OUTPATIENT)
Dept: OTHER | Facility: CLINIC | Age: 89
End: 2024-11-25
Payer: COMMERCIAL

## 2024-11-25 DIAGNOSIS — I50.9 HEART FAILURE, UNSPECIFIED (H): ICD-10-CM

## 2024-11-25 LAB
ANION GAP SERPL CALCULATED.3IONS-SCNC: 9 MMOL/L (ref 7–15)
BUN SERPL-MCNC: 27.4 MG/DL (ref 8–23)
CALCIUM SERPL-MCNC: 9.1 MG/DL (ref 8.8–10.4)
CHLORIDE SERPL-SCNC: 101 MMOL/L (ref 98–107)
CREAT SERPL-MCNC: 1.33 MG/DL (ref 0.51–0.95)
EGFRCR SERPLBLD CKD-EPI 2021: 37 ML/MIN/1.73M2
GLUCOSE SERPL-MCNC: 146 MG/DL (ref 70–99)
HCO3 SERPL-SCNC: 24 MMOL/L (ref 22–29)
POTASSIUM SERPL-SCNC: 5.1 MMOL/L (ref 3.4–5.3)
SODIUM SERPL-SCNC: 134 MMOL/L (ref 135–145)

## 2024-11-25 PROCEDURE — 80048 BASIC METABOLIC PNL TOTAL CA: CPT | Performed by: INTERNAL MEDICINE

## 2024-11-26 ENCOUNTER — NURSING HOME VISIT (OUTPATIENT)
Dept: GERIATRICS | Facility: OTHER | Age: 89
End: 2024-11-26
Payer: COMMERCIAL

## 2024-11-26 DIAGNOSIS — R63.4 WEIGHT LOSS: ICD-10-CM

## 2024-11-26 DIAGNOSIS — D68.69 HYPERCOAGULABLE STATE DUE TO PAROXYSMAL ATRIAL FIBRILLATION (H): ICD-10-CM

## 2024-11-26 DIAGNOSIS — I48.0 HYPERCOAGULABLE STATE DUE TO PAROXYSMAL ATRIAL FIBRILLATION (H): ICD-10-CM

## 2024-11-26 DIAGNOSIS — I10 ESSENTIAL HYPERTENSION: Chronic | ICD-10-CM

## 2024-11-26 DIAGNOSIS — I50.33 ACUTE ON CHRONIC HEART FAILURE WITH PRESERVED EJECTION FRACTION (H): ICD-10-CM

## 2024-11-26 DIAGNOSIS — I50.32 CHRONIC HEART FAILURE WITH PRESERVED EJECTION FRACTION (H): Primary | ICD-10-CM

## 2024-11-26 DIAGNOSIS — J90 BILATERAL PLEURAL EFFUSION: ICD-10-CM

## 2024-11-26 DIAGNOSIS — N18.31 STAGE 3A CHRONIC KIDNEY DISEASE (H): ICD-10-CM

## 2024-11-26 DIAGNOSIS — E03.9 ACQUIRED HYPOTHYROIDISM: Chronic | ICD-10-CM

## 2024-11-26 DIAGNOSIS — I10 BENIGN ESSENTIAL HYPERTENSION: ICD-10-CM

## 2024-11-26 DIAGNOSIS — I73.9 PAD (PERIPHERAL ARTERY DISEASE) (H): ICD-10-CM

## 2024-11-26 DIAGNOSIS — R60.0 PERIPHERAL EDEMA: ICD-10-CM

## 2024-11-26 RX ORDER — FUROSEMIDE 20 MG/1
10 TABLET ORAL DAILY
COMMUNITY
Start: 2024-11-26

## 2024-11-26 RX ORDER — LOSARTAN POTASSIUM 25 MG/1
12.5 TABLET ORAL DAILY
COMMUNITY
Start: 2024-11-26

## 2024-11-26 NOTE — PROGRESS NOTES
[facility]  Acute Care Visit    Patient Name: Janna Mcdermott   : 1931  MRN: [id number]    Place of Service: [facility]  DOS: 2024    CC: follow up    HPI:  Janna Mcdermott is a 89 year old female with PMH of multiple chronic medical concerns, who is seen today regarding pt has been progressing in therapy and per PT she has showered on her own, she dresses herself and performs all ADLs on her own. She is ambulating well with SBA but will be independent by next week. She has desires to go back home. SLUMS . She does have a PCA that helps her for 5 hours a week but does have room to increase her services and help her more.   Daughter did send a message on my chart re: concerns for UTI due to increased confusion a couple days ago but that this confusion has now cleared. Pt reports no burning with urination, no frequency or urgency or incontinence. She has no fever or nausea or pelvic discomfort or flank pain. At this time, I do not see symptoms that support checking a UA/UC.     Pt is a poor eater but her weight has remained stable between 127-130# but is also getting lasix 20 mg PRN weight gain of 2# which I think she needs a small dose on a daily basis as she does have some lower extremity edema and yesterday she had reported more dyspnea with exertion and chest tightness and that is gone now after receiving lasix. Pt also states she really dislikes the food served in rehab and also needs gluten free which limits what she can have. She discussed in detail what she would make at home.     Reviewed labs and K is up some to 5.1. She may be taking some supplemental drinks with potassium in it but no other medications other than ARB that may be increasing K.     Pt talks about her desire to get to [place] [facility] [place]. She has an active social life.      Daughter does have concerns with mom going back home. Daughter does live next door and uses a baby  monitor to help keep an eye out for mom if she needs anything. Per therapy, pt is independent with all ADLs and is ready to go home. Information given to daughter re: [facility] if that is how she would like to proceed but pt desires to go home. I did talk to pt re: weight loss and if that were to continue given her poor appetite, than further care would be needed. We discussed hospice and what services they offer when the time comes but at this time as pt is able to make her own decisions, she has a desire to go back home and therapy deems she is safe to do so.         Multidisciplinary notes, laboratory values, medications, vital signs, weight and orders arereviewed from nursing home records. I have reviewed the patient s medical history and updated the computerized patient record.     PMH:  Past Medical History:   Diagnosis Date    Asymptomatic menopausal state     on estrogen    Chronic kidney disease, stage I     No Comments Provided    Dermatitis     No Comments Provided    Essential (primary) hypertension     No Comments Provided    Hyperlipidemia     No Comments Provided    Hypothyroidism     No Comments Provided    Other specified symptoms and signs involving the circulatory and respiratory systems     No Comments Provided    Paroxysmal atrial fibrillation (H)     No Comments Provided       Medications:  Current Outpatient Medications   Medication Sig Dispense Refill    acetaminophen (TYLENOL) 160 MG/5ML liquid Take 5 mLs (160 mg) by mouth every 6 hours as needed for mild pain or fever.      apixaban ANTICOAGULANT (ELIQUIS ANTICOAGULANT) 5 MG tablet Take 1 tablet (5 mg) by mouth 2 times daily 180 tablet 4    [place] THYROID 90 MG tablet Take 1 tablet (90 mg) by mouth daily. 90 tablet 4    Cholecalciferol 10 MCG (400 UNIT) CAPS Take 800 Units by mouth daily      Cranberry-Vitamin C (CRANBERRY CONCENTRATE/VITAMINC) 71952-249 MG CAPS Take 1 capsule by mouth 2 times daily - for UTI prevention 180 capsule 4    diltiazem  ER COATED BEADS (CARDIZEM CD/CARTIA XT) 120 MG 24 hr capsule Take 1 capsule (120 mg) by mouth daily 90 capsule 4    furosemide (LASIX) 20 MG tablet Take 1 tablet (20 mg) by mouth daily as needed (for weight gain of 2 lbs in a day or 5 lbs in a week).      latanoprost (XALATAN) 0.005 % ophthalmic solution Place 1 drop into both eyes At Bedtime       losartan (COZAAR) 25 MG tablet Take 1 tablet (25 mg) by mouth daily 90 tablet 4    mirtazapine (REMERON) 7.5 MG tablet Give 1 tablet 1 hour before bedtime. 90 tablet 3    ondansetron (ZOFRAN) 4 MG tablet Take 1 tablet (4 mg) by mouth every 8 hours as needed for nausea.      rosuvastatin (CRESTOR) 5 MG tablet Take 1 tablet (5 mg) by mouth three times a week 50 tablet 4    saccharomyces boulardii (FLORASTOR) 250 MG capsule Take 1 capsule (250 mg) by mouth 2 times daily. 180 capsule 3    triamcinolone (KENALOG) 0.1 % external cream Apply topically 2 times daily as needed for irritation.        Medication reconciliation complete between Epic record and NH MAR.     Allergies:  Allergies   Allergen Reactions    Bee Venom Anaphylaxis    Benazepril Cough    Ciprofloxacin Other (See Comments)     Possible GI illness    Erythromycin Other (See Comments)     Unsure of allergy time, possible hives.    Gluten Meal GI Disturbance    Hydrochlorothiazide      Other reaction(s): Hyponatremia    Penicillins Other (See Comments)     Unsure of allergy time, possible hives       Review of Systems:  See HPI    Vital Signs:  Temp 97.5   Pulse 56   Respirations 16   /82   Oxygen Sats 94%   Weight 127.8#    Physical Exam:     Pleasant and alert without distress.    Sclera nonicteric, conjunctiva non-inflamed.  Skin color pink. Mucous membranes moist.   Lungs clear to auscultation throughout. No wheezes or rales noted.   Cardiovascular regular, S1, S2 auscultated. No murmur noted.  Abdomen soft and without tenderness   Extremities with mild edema. Tubi  on    Gait is stable.   Able to  move upper and lower extremities       New Labs/Diagnostics:  Last Comprehensive Metabolic Panel:  Lab Results   Component Value Date     (L) 11/25/2024    POTASSIUM 5.1 11/25/2024    CHLORIDE 101 11/25/2024    CO2 24 11/25/2024    ANIONGAP 9 11/25/2024     (H) 11/25/2024    BUN 27.4 (H) 11/25/2024    CR 1.33 (H) 11/25/2024    GFRESTIMATED 37 (L) 11/25/2024    CORY 9.1 11/25/2024       Hemoglobin   Date Value Ref Range Status   11/18/2024 16.5 (H) 11.7 - 15.7 g/dL Final   03/29/2021 15.1 11.7 - 15.7 g/dL Final       Assessment/Plan:  (I50.32) Chronic heart failure with preserved ejection fraction (H)  (primary encounter diagnosis)  Comment: chronic--pt has take lasix 20 mg every other day to keep weight between 127-130# and edema, dyspnea  Plan: will schedule  lasix 10 mg daily, check BMP next week    (E03.9) Acquired hypothyroidism  Comment: chronic TSH at 10 she seems to feel best at this level  Plan: cont [place] thyroid 90 mg daily    (I10) Benign essential hypertension  Comment: stable  Plan: decrease losartan to 12.5 mg daily due to elevating potassium    (D68.69,  I48.0) Hypercoagulable state due to paroxysmal atrial fibrillation (H)  Comment: chronic  Plan: cont apixaban    (I73.9) PAD (peripheral artery disease) (H)  Comment: chronic  Plan: cont apixaban, crestor    (R60.0) Peripheral edema  Comment: mild to mod  Plan: schedule lasix 10 mg daily    (N18.31) Stage 3a chronic kidney disease (H)  Comment: chronic  Plan: avoid nephrotoxic meds    (R63.4) Weight loss  Comment: has remained stable since admit 11/18/24 between 127-130#  Plan: monitor    Called daughter and left message.     A total of 42 minutes spent by me on the date of the encounter doing chart review, review of test results, interpretation of tests, review of medications, patient visit, and documentation.    DEEDEE Coombs, CNP ....................  11/26/2024   1:59 PM

## 2024-11-27 ENCOUNTER — DOCUMENTATION ONLY (OUTPATIENT)
Dept: OTHER | Facility: CLINIC | Age: 89
End: 2024-11-27
Payer: COMMERCIAL

## 2024-11-27 ENCOUNTER — NURSING HOME VISIT (OUTPATIENT)
Dept: GERIATRICS | Facility: OTHER | Age: 89
End: 2024-11-27
Payer: COMMERCIAL

## 2024-11-27 DIAGNOSIS — Z71.89 GOALS OF CARE, COUNSELING/DISCUSSION: Primary | ICD-10-CM

## 2024-12-02 ENCOUNTER — LAB REQUISITION (OUTPATIENT)
Dept: LAB | Facility: OTHER | Age: 89
End: 2024-12-02

## 2024-12-02 DIAGNOSIS — N18.31 CHRONIC KIDNEY DISEASE, STAGE 3A (H): ICD-10-CM

## 2024-12-02 LAB
ANION GAP SERPL CALCULATED.3IONS-SCNC: 9 MMOL/L (ref 7–15)
BUN SERPL-MCNC: 22.9 MG/DL (ref 8–23)
CALCIUM SERPL-MCNC: 9.1 MG/DL (ref 8.8–10.4)
CHLORIDE SERPL-SCNC: 102 MMOL/L (ref 98–107)
CREAT SERPL-MCNC: 1.09 MG/DL (ref 0.51–0.95)
EGFRCR SERPLBLD CKD-EPI 2021: 47 ML/MIN/1.73M2
GLUCOSE SERPL-MCNC: 148 MG/DL (ref 70–99)
HCO3 SERPL-SCNC: 27 MMOL/L (ref 22–29)
POTASSIUM SERPL-SCNC: 3.9 MMOL/L (ref 3.4–5.3)
SODIUM SERPL-SCNC: 138 MMOL/L (ref 135–145)

## 2024-12-02 PROCEDURE — 80048 BASIC METABOLIC PNL TOTAL CA: CPT | Performed by: NURSE PRACTITIONER

## 2024-12-02 PROCEDURE — 82565 ASSAY OF CREATININE: CPT | Performed by: NURSE PRACTITIONER

## 2024-12-04 NOTE — PROGRESS NOTES
[facility]  Acute Care Visit    Patient Name: Janna Mcdermott   : 1931  MRN: [id number]    Place of Service: [facility]  DOS: 2024    CC: Phone call to daughter to discuss goals of care per her request    HPI:  Janna Mcdermott is a 89 year old female with PMH of multiple chronic medical concerns, who is seen today regarding called daughter and discussed goals of care, updates on her progress and plans to discharge home per pt goals, she will have increased services with her PCA as well as PT/OT/nursing through [facility]. Updated daughter that per therapy, pt has been independent with all her needs and her PCA is currently coming to the house to help with showers and is willing and able to come for longer periods of time and help with more meal prepping. Daughter does live next door and helps with a lot of these cares as well but sense there is some caregiver burnout and she is needing more help and possibly considering moving mom to an LIZZ.   At this time, pt has been cleared by therapy and insurance will deny her to stay longer in rehab as she has met all requirements and if she stays longer in rehab/LTC she will pay out of pocket or she can go home and family can continue looking for [facility] and transition her from home.   Pt does not like the food in rehab and so has not been eating as well but a review of her weights shows she has been maintaining her weight and has had no weight loss during stay.   Vitals stable.     Multidisciplinary notes, laboratory values, medications, vital signs, weight and orders arereviewed from nursing home records. I have reviewed the patient s medical history and updated the computerized patient record.     PMH:  Past Medical History:   Diagnosis Date    Asymptomatic menopausal state     on estrogen    Chronic kidney disease, stage I     No Comments Provided    Dermatitis     No Comments Provided    Essential (primary)  hypertension     No Comments Provided    Hyperlipidemia     No Comments Provided    Hypothyroidism     No Comments Provided    Other specified symptoms and signs involving the circulatory and respiratory systems     No Comments Provided    Paroxysmal atrial fibrillation (H)     No Comments Provided       Medications:  Current Outpatient Medications   Medication Sig Dispense Refill    acetaminophen (TYLENOL) 160 MG/5ML liquid Take 5 mLs (160 mg) by mouth every 6 hours as needed for mild pain or fever.      apixaban ANTICOAGULANT (ELIQUIS ANTICOAGULANT) 5 MG tablet Take 1 tablet (5 mg) by mouth 2 times daily 180 tablet 4    ARMOUR THYROID 90 MG tablet Take 1 tablet (90 mg) by mouth daily. 90 tablet 4    Cholecalciferol 10 MCG (400 UNIT) CAPS Take 800 Units by mouth daily      Cranberry-Vitamin C (CRANBERRY CONCENTRATE/VITAMINC) 53787-437 MG CAPS Take 1 capsule by mouth 2 times daily - for UTI prevention 180 capsule 4    diltiazem ER COATED BEADS (CARDIZEM CD/CARTIA XT) 120 MG 24 hr capsule Take 1 capsule (120 mg) by mouth daily 90 capsule 4    furosemide (LASIX) 20 MG tablet Take 0.5 tablets (10 mg) by mouth daily.      latanoprost (XALATAN) 0.005 % ophthalmic solution Place 1 drop into both eyes At Bedtime       losartan (COZAAR) 25 MG tablet Take 0.5 tablets (12.5 mg) by mouth daily.      mirtazapine (REMERON) 7.5 MG tablet Give 1 tablet 1 hour before bedtime. 90 tablet 3    ondansetron (ZOFRAN) 4 MG tablet Take 1 tablet (4 mg) by mouth every 8 hours as needed for nausea.      rosuvastatin (CRESTOR) 5 MG tablet Take 1 tablet (5 mg) by mouth three times a week 50 tablet 4    saccharomyces boulardii (FLORASTOR) 250 MG capsule Take 1 capsule (250 mg) by mouth 2 times daily. 180 capsule 3    triamcinolone (KENALOG) 0.1 % external cream Apply topically 2 times daily as needed for irritation.        Medication reconciliation complete between Epic record and NH MAR.     Allergies:  Allergies   Allergen Reactions    Bee  Venom Anaphylaxis    Benazepril Cough    Ciprofloxacin Other (See Comments)     Possible GI illness    Erythromycin Other (See Comments)     Unsure of allergy time, possible hives.    Gluten Meal GI Disturbance    Hydrochlorothiazide      Other reaction(s): Hyponatremia    Penicillins Other (See Comments)     Unsure of allergy time, possible hives       Review of Systems:  See HPI          Assessment/Plan:  (Z71.89) Goals of care, counseling/discussion  (primary encounter diagnosis)  Comment: 33 min phone call and coordination of care with  and nursing in planning for her discharge  Plan: Discharge plan as far as medical concerns were addressed, updated daughter and answered questions.         A total of 33 minutes spent by me on the date of the encounter doing chart review, review of test results, interpretation of tests, review of medications, patient visit, and documentation.    DEEDEE Coombs, CNP ....................  12/4/2024   9:39 AM

## 2024-12-09 ENCOUNTER — MYC MEDICAL ADVICE (OUTPATIENT)
Dept: INTERNAL MEDICINE | Facility: OTHER | Age: 89
End: 2024-12-09
Payer: COMMERCIAL

## 2024-12-09 ENCOUNTER — TELEPHONE (OUTPATIENT)
Dept: INTERNAL MEDICINE | Facility: OTHER | Age: 89
End: 2024-12-09
Payer: COMMERCIAL

## 2024-12-09 DIAGNOSIS — R63.4 WEIGHT LOSS: ICD-10-CM

## 2024-12-09 DIAGNOSIS — I50.33 ACUTE ON CHRONIC HEART FAILURE WITH PRESERVED EJECTION FRACTION (H): ICD-10-CM

## 2024-12-09 DIAGNOSIS — J90 BILATERAL PLEURAL EFFUSION: ICD-10-CM

## 2024-12-09 DIAGNOSIS — K29.60 BILE REFLUX GASTRITIS: ICD-10-CM

## 2024-12-09 DIAGNOSIS — I10 ESSENTIAL HYPERTENSION: Chronic | ICD-10-CM

## 2024-12-09 DIAGNOSIS — R11.2 NAUSEA AND VOMITING, UNSPECIFIED VOMITING TYPE: ICD-10-CM

## 2024-12-09 DIAGNOSIS — I48.0 PAROXYSMAL ATRIAL FIBRILLATION (H): ICD-10-CM

## 2024-12-09 NOTE — TELEPHONE ENCOUNTER
Patient discharged from [facility] on 12/3 with orders for home care. Patient wanted to wait to start services until this week and has agreed to home care eval tomorrow.   OK to start home care on 12/10/24? She is scheduled with Dr. Olivarez on 12/13 for face to face for home care.     Thank you.     Elza Jean LPN.....12/9/2024  1:49 PM

## 2024-12-10 RX ORDER — LOSARTAN POTASSIUM 25 MG/1
12.5 TABLET ORAL DAILY
Qty: 45 TABLET | Refills: 4 | Status: CANCELLED | OUTPATIENT
Start: 2024-12-10

## 2024-12-10 RX ORDER — FUROSEMIDE 20 MG/1
10 TABLET ORAL DAILY
Qty: 45 TABLET | Refills: 4 | Status: CANCELLED | OUTPATIENT
Start: 2024-12-10

## 2024-12-10 RX ORDER — MIRTAZAPINE 7.5 MG/1
TABLET, FILM COATED ORAL
Qty: 90 TABLET | Refills: 3 | Status: CANCELLED | OUTPATIENT
Start: 2024-12-10

## 2024-12-10 RX ORDER — SACCHAROMYCES BOULARDII 250 MG
250 CAPSULE ORAL 2 TIMES DAILY
Qty: 180 CAPSULE | Refills: 3 | Status: CANCELLED | OUTPATIENT
Start: 2024-12-10

## 2024-12-10 NOTE — TELEPHONE ENCOUNTER
"Pt needs refills on Lasix, Florastor, Remeron, Eliquis, and Cozaar. Prescriptions got \"messed up\" when admitted to [facility].     Kimo'd up orders.     Routing to provider to review and respond.  Christiana Hall RN on 12/10/2024 at 9:02 AM    "

## 2024-12-10 NOTE — TELEPHONE ENCOUNTER
Are you still rotating at [facility]?    Are you able to review her current meds vs these her daughter is talking about please.     Thank you!    Electronically signed by:  Dwayne Gaona MD  on December 10, 2024 1:04 PM

## 2024-12-11 ENCOUNTER — TELEPHONE (OUTPATIENT)
Dept: INTERNAL MEDICINE | Facility: OTHER | Age: 89
End: 2024-12-11
Payer: COMMERCIAL

## 2024-12-11 RX ORDER — MAGNESIUM HYDROXIDE/ALUMINUM HYDROXICE/SIMETHICONE 120; 1200; 1200 MG/30ML; MG/30ML; MG/30ML
10 SUSPENSION ORAL EVERY 4 HOURS PRN
Status: ON HOLD | COMMUNITY
Start: 2024-12-11

## 2024-12-11 NOTE — TELEPHONE ENCOUNTER
Request for Home Care Skilled Nursing orders as follows:        Continuation frequency =  ____1___ X week X _____1__ weeks             Effective date= 12/16/24    Code status = DNR    Interventions include:    Assessment    Medication management  O2 sat monitoring (all disciplines as needed)  Patient/caregiver education      Fall risk: instruct on fall prevention strategies, home safety, assess environment Observe for signs and symptoms of depression, assess effectiveness of medication, if at risk  Instruct on pain relief measures and assess pain with each visit, if has pain. Assess effectiveness of medications.  Instruct on pressure relief methods to prevent pressure ulcers        Wound care as follows:  DX:_____left second toe abrasion_________________  TX: wash with wound cleanser         Apply mepilex AG         Tape with medipore tape         Place toe spacer        Vital signs as follows:       check oxygen saturation - goal > 90%       check blood pressure - abnormal ranges = < 95/60 or > 140/90       check pulse - abnormal range < 60 or > 100       check respirations - abnormal range < 12 or > 20       check vital signs: temperature abnormal range > 100.4      Please respond with .HOMECAREAGREE, if you are in agreement. Please sign with your comments and signature, if you are not in agreement.

## 2024-12-11 NOTE — TELEPHONE ENCOUNTER
I agree with the Home Care order requests.    Electronically signed by:  Dwayne Gaona MD  on December 11, 2024 12:09 PM

## 2024-12-11 NOTE — TELEPHONE ENCOUNTER
Gave verbal order for skilled nursing as directed by Dr Gaona.    Gretel Quigley LPN on 12/11/2024 at 1:26 PM

## 2024-12-11 NOTE — TELEPHONE ENCOUNTER
"URGENT: per CMS best practice, response is requested within 24 hours.    Home Care regulation mandates that you are notified about drug discrepancies, interactions & contraindications. Response within a 24 hour timeframe is established by CMS as \"best practice\" for the delivery of home health care. Home Care is required to report if the 24 hour timeframe was met. The home health clinician will contact you again if this timeframe is not met or if the response does not address all concerns.       Situation:        The patient was admitted to [facility], after being discharged from [facility] on 12/3/24. Upon admission, a med reconciliation was completed to identify any drug discrepancies, interactions or contraindications. Home Care's drug regime review has revealed significant medication issues.     You are being contacted for clarifying orders related to the medication issues.     Background:        Assessment:     Patient is not taking the following medications which are on her clinic list:  Tylenol  Patient is taking the following medications which are not on her clinic list:  Milk of magnesia: 30 ml daily as needed  Mylanta: 10 ml 4 times a day as needed    During a routine med reconciliation , the following severe med interaction was noted:  Aluminum hydroxide and cholecalciferol      Recommendations:    Please evaluate this information and indicate below whether or not changes are required. A copy of the patient's drug interaction/contraindications report is available upon request.       CLINIC NURSE - If changes are made, please update the Epic medication profile.     Please sign this encounter with your electronic signature.       "

## 2024-12-11 NOTE — TELEPHONE ENCOUNTER
Under the direction and supervision of Dr Gaona, MAR has been updated.    Gretel Quigley LPN on 12/11/2024 at 1:31 PM

## 2024-12-12 ENCOUNTER — APPOINTMENT (OUTPATIENT)
Dept: GENERAL RADIOLOGY | Facility: OTHER | Age: 89
End: 2024-12-12
Payer: MEDICARE

## 2024-12-12 ENCOUNTER — HOSPITAL ENCOUNTER (OUTPATIENT)
Facility: OTHER | Age: 89
Setting detail: OBSERVATION
End: 2024-12-12
Admitting: FAMILY MEDICINE
Payer: MEDICARE

## 2024-12-12 VITALS
SYSTOLIC BLOOD PRESSURE: 123 MMHG | OXYGEN SATURATION: 93 % | TEMPERATURE: 98.2 F | DIASTOLIC BLOOD PRESSURE: 71 MMHG | RESPIRATION RATE: 16 BRPM | HEART RATE: 86 BPM | BODY MASS INDEX: 24.97 KG/M2 | WEIGHT: 127.87 LBS

## 2024-12-12 DIAGNOSIS — I48.0 PAROXYSMAL ATRIAL FIBRILLATION (H): ICD-10-CM

## 2024-12-12 DIAGNOSIS — E03.9 ACQUIRED HYPOTHYROIDISM: Chronic | ICD-10-CM

## 2024-12-12 DIAGNOSIS — R06.02 SHORTNESS OF BREATH: ICD-10-CM

## 2024-12-12 DIAGNOSIS — R11.0 NAUSEA: ICD-10-CM

## 2024-12-12 DIAGNOSIS — I10 ESSENTIAL HYPERTENSION: Chronic | ICD-10-CM

## 2024-12-12 DIAGNOSIS — I50.9 ACUTE EXACERBATION OF CHRONIC HEART FAILURE (H): ICD-10-CM

## 2024-12-12 DIAGNOSIS — J90 BILATERAL PLEURAL EFFUSION: ICD-10-CM

## 2024-12-12 DIAGNOSIS — I48.21 PERMANENT ATRIAL FIBRILLATION (H): Primary | ICD-10-CM

## 2024-12-12 DIAGNOSIS — I50.33 ACUTE ON CHRONIC HEART FAILURE WITH PRESERVED EJECTION FRACTION (H): ICD-10-CM

## 2024-12-12 DIAGNOSIS — G47.00 INSOMNIA, UNSPECIFIED TYPE: ICD-10-CM

## 2024-12-12 LAB
ALBUMIN SERPL BCG-MCNC: 3.6 G/DL (ref 3.5–5.2)
ALBUMIN UR-MCNC: NEGATIVE MG/DL
ALP SERPL-CCNC: 70 U/L (ref 40–150)
ALT SERPL W P-5'-P-CCNC: 14 U/L (ref 0–50)
ANION GAP SERPL CALCULATED.3IONS-SCNC: 8 MMOL/L (ref 7–15)
APPEARANCE UR: CLEAR
AST SERPL W P-5'-P-CCNC: 23 U/L (ref 0–45)
BASOPHILS # BLD AUTO: 0.1 10E3/UL (ref 0–0.2)
BASOPHILS NFR BLD AUTO: 1 %
BILIRUB SERPL-MCNC: 1 MG/DL
BILIRUB UR QL STRIP: NEGATIVE
BUN SERPL-MCNC: 16.8 MG/DL (ref 8–23)
CALCIUM SERPL-MCNC: 9.3 MG/DL (ref 8.8–10.4)
CHLORIDE SERPL-SCNC: 99 MMOL/L (ref 98–107)
COLOR UR AUTO: YELLOW
CREAT SERPL-MCNC: 1.09 MG/DL (ref 0.51–0.95)
EGFRCR SERPLBLD CKD-EPI 2021: 47 ML/MIN/1.73M2
EOSINOPHIL # BLD AUTO: 0 10E3/UL (ref 0–0.7)
EOSINOPHIL NFR BLD AUTO: 1 %
ERYTHROCYTE [DISTWIDTH] IN BLOOD BY AUTOMATED COUNT: 13.8 % (ref 10–15)
FLUAV RNA SPEC QL NAA+PROBE: NEGATIVE
FLUBV RNA RESP QL NAA+PROBE: NEGATIVE
GLUCOSE SERPL-MCNC: 135 MG/DL (ref 70–99)
GLUCOSE UR STRIP-MCNC: NEGATIVE MG/DL
HCO3 SERPL-SCNC: 27 MMOL/L (ref 22–29)
HCT VFR BLD AUTO: 50 % (ref 35–47)
HGB BLD-MCNC: 16.8 G/DL (ref 11.7–15.7)
HGB UR QL STRIP: NEGATIVE
HOLD SPECIMEN: NORMAL
HOLD SPECIMEN: NORMAL
HYALINE CASTS: 2 /LPF
IMM GRANULOCYTES # BLD: 0 10E3/UL
IMM GRANULOCYTES NFR BLD: 0 %
KETONES UR STRIP-MCNC: NEGATIVE MG/DL
LEUKOCYTE ESTERASE UR QL STRIP: NEGATIVE
LYMPHOCYTES # BLD AUTO: 0.7 10E3/UL (ref 0.8–5.3)
LYMPHOCYTES NFR BLD AUTO: 10 %
MAGNESIUM SERPL-MCNC: 1.8 MG/DL (ref 1.7–2.3)
MCH RBC QN AUTO: 32 PG (ref 26.5–33)
MCHC RBC AUTO-ENTMCNC: 33.6 G/DL (ref 31.5–36.5)
MCV RBC AUTO: 95 FL (ref 78–100)
MONOCYTES # BLD AUTO: 0.6 10E3/UL (ref 0–1.3)
MONOCYTES NFR BLD AUTO: 9 %
NEUTROPHILS # BLD AUTO: 5.5 10E3/UL (ref 1.6–8.3)
NEUTROPHILS NFR BLD AUTO: 80 %
NITRATE UR QL: NEGATIVE
NRBC # BLD AUTO: 0 10E3/UL
NRBC BLD AUTO-RTO: 0 /100
NT-PROBNP SERPL-MCNC: 2387 PG/ML (ref 0–1800)
PH UR STRIP: 7 [PH] (ref 5–9)
PLATELET # BLD AUTO: 169 10E3/UL (ref 150–450)
POTASSIUM SERPL-SCNC: 4 MMOL/L (ref 3.4–5.3)
PROT SERPL-MCNC: 6.1 G/DL (ref 6.4–8.3)
RBC # BLD AUTO: 5.25 10E6/UL (ref 3.8–5.2)
RBC URINE: 0 /HPF
RSV RNA SPEC NAA+PROBE: NEGATIVE
SARS-COV-2 RNA RESP QL NAA+PROBE: NEGATIVE
SODIUM SERPL-SCNC: 134 MMOL/L (ref 135–145)
SP GR UR STRIP: 1 (ref 1–1.03)
TROPONIN T SERPL HS-MCNC: 42 NG/L
TROPONIN T SERPL HS-MCNC: 43 NG/L
UROBILINOGEN UR STRIP-MCNC: NORMAL MG/DL
WBC # BLD AUTO: 6.9 10E3/UL (ref 4–11)
WBC URINE: 1 /HPF

## 2024-12-12 PROCEDURE — 250N000011 HC RX IP 250 OP 636

## 2024-12-12 PROCEDURE — 84484 ASSAY OF TROPONIN QUANT: CPT

## 2024-12-12 PROCEDURE — 83735 ASSAY OF MAGNESIUM: CPT

## 2024-12-12 PROCEDURE — G0378 HOSPITAL OBSERVATION PER HR: HCPCS

## 2024-12-12 PROCEDURE — 96375 TX/PRO/DX INJ NEW DRUG ADDON: CPT

## 2024-12-12 PROCEDURE — 85025 COMPLETE CBC W/AUTO DIFF WBC: CPT

## 2024-12-12 PROCEDURE — 93010 ELECTROCARDIOGRAM REPORT: CPT | Performed by: INTERNAL MEDICINE

## 2024-12-12 PROCEDURE — 71046 X-RAY EXAM CHEST 2 VIEWS: CPT

## 2024-12-12 PROCEDURE — 93005 ELECTROCARDIOGRAM TRACING: CPT

## 2024-12-12 PROCEDURE — 99285 EMERGENCY DEPT VISIT HI MDM: CPT

## 2024-12-12 PROCEDURE — 99223 1ST HOSP IP/OBS HIGH 75: CPT | Performed by: FAMILY MEDICINE

## 2024-12-12 PROCEDURE — 87637 SARSCOV2&INF A&B&RSV AMP PRB: CPT

## 2024-12-12 PROCEDURE — 36415 COLL VENOUS BLD VENIPUNCTURE: CPT

## 2024-12-12 PROCEDURE — 83880 ASSAY OF NATRIURETIC PEPTIDE: CPT

## 2024-12-12 PROCEDURE — 80053 COMPREHEN METABOLIC PANEL: CPT

## 2024-12-12 PROCEDURE — 96374 THER/PROPH/DIAG INJ IV PUSH: CPT

## 2024-12-12 PROCEDURE — 81001 URINALYSIS AUTO W/SCOPE: CPT

## 2024-12-12 PROCEDURE — 250N000013 HC RX MED GY IP 250 OP 250 PS 637: Performed by: INTERNAL MEDICINE

## 2024-12-12 PROCEDURE — 250N000011 HC RX IP 250 OP 636: Performed by: FAMILY MEDICINE

## 2024-12-12 PROCEDURE — 96376 TX/PRO/DX INJ SAME DRUG ADON: CPT

## 2024-12-12 PROCEDURE — 250N000013 HC RX MED GY IP 250 OP 250 PS 637: Performed by: FAMILY MEDICINE

## 2024-12-12 PROCEDURE — 99285 EMERGENCY DEPT VISIT HI MDM: CPT | Mod: 25

## 2024-12-12 RX ORDER — FUROSEMIDE 10 MG/ML
20 INJECTION INTRAMUSCULAR; INTRAVENOUS ONCE
Status: COMPLETED | OUTPATIENT
Start: 2024-12-12 | End: 2024-12-12

## 2024-12-12 RX ORDER — FUROSEMIDE 20 MG/1
10 TABLET ORAL DAILY
Status: DISCONTINUED | OUTPATIENT
Start: 2024-12-13 | End: 2024-12-14 | Stop reason: HOSPADM

## 2024-12-12 RX ORDER — THYROID 90 MG/1
90 TABLET ORAL DAILY
Status: DISCONTINUED | OUTPATIENT
Start: 2024-12-12 | End: 2024-12-12

## 2024-12-12 RX ORDER — PROCHLORPERAZINE MALEATE 5 MG/1
5 TABLET ORAL EVERY 6 HOURS PRN
Status: DISCONTINUED | OUTPATIENT
Start: 2024-12-12 | End: 2024-12-14 | Stop reason: HOSPADM

## 2024-12-12 RX ORDER — LOSARTAN POTASSIUM 25 MG/1
25 TABLET ORAL DAILY
Status: DISCONTINUED | OUTPATIENT
Start: 2024-12-12 | End: 2024-12-14 | Stop reason: HOSPADM

## 2024-12-12 RX ORDER — MAGNESIUM SULFATE HEPTAHYDRATE 40 MG/ML
2 INJECTION, SOLUTION INTRAVENOUS ONCE
Status: COMPLETED | OUTPATIENT
Start: 2024-12-12 | End: 2024-12-12

## 2024-12-12 RX ORDER — MIRTAZAPINE 7.5 MG/1
7.5 TABLET, FILM COATED ORAL AT BEDTIME
Status: DISCONTINUED | OUTPATIENT
Start: 2024-12-12 | End: 2024-12-14 | Stop reason: HOSPADM

## 2024-12-12 RX ORDER — MAGNESIUM HYDROXIDE/ALUMINUM HYDROXICE/SIMETHICONE 120; 1200; 1200 MG/30ML; MG/30ML; MG/30ML
10 SUSPENSION ORAL EVERY 4 HOURS PRN
Status: DISCONTINUED | OUTPATIENT
Start: 2024-12-12 | End: 2024-12-14 | Stop reason: HOSPADM

## 2024-12-12 RX ORDER — AMOXICILLIN 250 MG
2 CAPSULE ORAL 2 TIMES DAILY PRN
Status: DISCONTINUED | OUTPATIENT
Start: 2024-12-12 | End: 2024-12-14 | Stop reason: HOSPADM

## 2024-12-12 RX ORDER — ONDANSETRON 2 MG/ML
4 INJECTION INTRAMUSCULAR; INTRAVENOUS EVERY 6 HOURS PRN
Status: DISCONTINUED | OUTPATIENT
Start: 2024-12-12 | End: 2024-12-14 | Stop reason: HOSPADM

## 2024-12-12 RX ORDER — ACETAMINOPHEN 325 MG/1
650 TABLET ORAL EVERY 4 HOURS PRN
Status: DISCONTINUED | OUTPATIENT
Start: 2024-12-12 | End: 2024-12-14 | Stop reason: HOSPADM

## 2024-12-12 RX ORDER — ROSUVASTATIN CALCIUM 5 MG/1
5 TABLET, COATED ORAL
Status: DISCONTINUED | OUTPATIENT
Start: 2024-12-13 | End: 2024-12-13

## 2024-12-12 RX ORDER — ROSUVASTATIN CALCIUM 5 MG/1
5 TABLET, COATED ORAL DAILY
Status: DISCONTINUED | OUTPATIENT
Start: 2024-12-12 | End: 2024-12-12

## 2024-12-12 RX ORDER — SACCHAROMYCES BOULARDII 250 MG
250 CAPSULE ORAL 2 TIMES DAILY
Status: DISCONTINUED | OUTPATIENT
Start: 2024-12-12 | End: 2024-12-14 | Stop reason: HOSPADM

## 2024-12-12 RX ORDER — LATANOPROST 50 UG/ML
1 SOLUTION/ DROPS OPHTHALMIC AT BEDTIME
Status: DISCONTINUED | OUTPATIENT
Start: 2024-12-12 | End: 2024-12-14 | Stop reason: HOSPADM

## 2024-12-12 RX ORDER — LIDOCAINE 40 MG/G
CREAM TOPICAL
Status: DISCONTINUED | OUTPATIENT
Start: 2024-12-12 | End: 2024-12-14 | Stop reason: HOSPADM

## 2024-12-12 RX ORDER — ACETAMINOPHEN 650 MG/1
650 SUPPOSITORY RECTAL EVERY 4 HOURS PRN
Status: DISCONTINUED | OUTPATIENT
Start: 2024-12-12 | End: 2024-12-14 | Stop reason: HOSPADM

## 2024-12-12 RX ORDER — ONDANSETRON 4 MG/1
4 TABLET, ORALLY DISINTEGRATING ORAL EVERY 6 HOURS PRN
Status: DISCONTINUED | OUTPATIENT
Start: 2024-12-12 | End: 2024-12-14 | Stop reason: HOSPADM

## 2024-12-12 RX ORDER — AMOXICILLIN 250 MG
1 CAPSULE ORAL 2 TIMES DAILY PRN
Status: DISCONTINUED | OUTPATIENT
Start: 2024-12-12 | End: 2024-12-14 | Stop reason: HOSPADM

## 2024-12-12 RX ORDER — THYROID 90 MG/1
90 TABLET ORAL DAILY
Status: DISCONTINUED | OUTPATIENT
Start: 2024-12-13 | End: 2024-12-14 | Stop reason: HOSPADM

## 2024-12-12 RX ORDER — DILTIAZEM HYDROCHLORIDE 120 MG/1
120 CAPSULE, COATED, EXTENDED RELEASE ORAL DAILY
Status: DISCONTINUED | OUTPATIENT
Start: 2024-12-13 | End: 2024-12-14 | Stop reason: HOSPADM

## 2024-12-12 RX ADMIN — Medication 250 MG: at 22:02

## 2024-12-12 RX ADMIN — LATANOPROST 1 DROP: 50 SOLUTION/ DROPS OPHTHALMIC at 22:03

## 2024-12-12 RX ADMIN — FUROSEMIDE 20 MG: 10 INJECTION, SOLUTION INTRAMUSCULAR; INTRAVENOUS at 14:56

## 2024-12-12 RX ADMIN — MAGNESIUM SULFATE HEPTAHYDRATE 2 G: 2 INJECTION, SOLUTION INTRAVENOUS at 20:32

## 2024-12-12 RX ADMIN — APIXABAN 5 MG: 5 TABLET, FILM COATED ORAL at 22:03

## 2024-12-12 RX ADMIN — FUROSEMIDE 20 MG: 10 INJECTION, SOLUTION INTRAMUSCULAR; INTRAVENOUS at 20:33

## 2024-12-12 RX ADMIN — MIRTAZAPINE 7.5 MG: 7.5 TABLET, FILM COATED ORAL at 22:03

## 2024-12-12 ASSESSMENT — COLUMBIA-SUICIDE SEVERITY RATING SCALE - C-SSRS
2. HAVE YOU ACTUALLY HAD ANY THOUGHTS OF KILLING YOURSELF IN THE PAST MONTH?: NO
6. HAVE YOU EVER DONE ANYTHING, STARTED TO DO ANYTHING, OR PREPARED TO DO ANYTHING TO END YOUR LIFE?: NO
1. IN THE PAST MONTH, HAVE YOU WISHED YOU WERE DEAD OR WISHED YOU COULD GO TO SLEEP AND NOT WAKE UP?: NO

## 2024-12-12 ASSESSMENT — ACTIVITIES OF DAILY LIVING (ADL)
ADLS_ACUITY_SCORE: 58
ADLS_ACUITY_SCORE: 56
ADLS_ACUITY_SCORE: 58
ADLS_ACUITY_SCORE: 58
ADLS_ACUITY_SCORE: 56
ADLS_ACUITY_SCORE: 55
ADLS_ACUITY_SCORE: 56
ADLS_ACUITY_SCORE: 58
ADLS_ACUITY_SCORE: 55
ADLS_ACUITY_SCORE: 58

## 2024-12-12 ASSESSMENT — ENCOUNTER SYMPTOMS
APPETITE CHANGE: 1
SHORTNESS OF BREATH: 1

## 2024-12-12 NOTE — ED NOTES
Writer received a call from home care nurse, Katarina, who stated that the daughter had called stating she was more short of breath and wheezing and needed to be seen. When home care nurse assessed pt she was 94 percent on room air and heard very little wheezing in bases. Per home care nurse it is pts baseline to be short of breath and wheezing when up and moving around. Home care nurse states there is a PRN order for an extra dose of lasix to give the pt with weight gain, daughter refused to give this and stated that she needs to go in and be seen and then be admitted and placed at a nursing home. Per home care, daughter said she is over it and cannot take care of her.

## 2024-12-12 NOTE — PROGRESS NOTES
:    Patient presented to the ED with her PCA for SOB.    Call placed to daughter Melonie and she states she lives next door.  Patient has PCA services that check on her at 9am, noon, 5pm, and at bedtime. Patient currently has [facility] home care.    Daughter stated she was recently at [facility] for about 2 weeks but she did not like it there.     Call placed to Daylin at [facility] and they are at full capacity.     Patient was there 11/18- 12/3.  Daughter has been filling out [facility] papers for possible placement at [facility].  Also recommended her completing Medical assistance paperwork  and [place] [employer] incase she may need long term care in the future.   She was wondering if they are going to keep patient here overnight. Daughter stated she feels overwhelmed sometimes, but is glad she has PCA support.    ADALID Mcqueen on 12/12/2024 at 3:22 PM

## 2024-12-12 NOTE — ED PROVIDER NOTES
History     Chief Complaint   Patient presents with    Shortness of Breath    Leg Swelling     Bilateral feet     The history is provided by medical records, a caregiver and the patient.     Janna Mcdermott is a 89 year old female who presents to the ER today with her home health aid. Patient reports she feels short of breath with activity such as ambulation and bathing. She cannot tell me how long this has been occurring but does tell me that she did not have symptom improvement from her recent hospitalization for heart failure about one month ago. She is also experiencing some lower extremity swelling, which neither patient or her home health aid cannot tell me if this is improved or worsening compared to her usual. Her weights are checked daily by her aids and it is reported that hey have been 129, 130, 131lb. She denies chest pain or feeling ill, no fevers. She has a loss of appetite, but per patient and chart review, this is not new.  According to home health aide who is here with patient, family is also concerned about a UTI as they tested her urine today and that was positive for leukocytes.  Patient denies any symptoms of UTI.  She lives alone, daughter lives next door. Was recently at [facility] for rehab.     PMH hyperlipidemia, hypothyroidism, biatrial enlargement, stage III CKD, hypertension, stroke, mesenteric cardial stenosis, ascending aortic aneurysm, permanent atrial fibrillation on anticoagulation, coronary artery disease, peripheral edema, chronic heart failure with preserved ejection fraction, recurrent UTI, weight loss    11/13-11/18/2024 admitted to the hospital for heart failure and DIMITRI- diuresed with furosemide, chest xray with effusions that improved during stay, echo completed     Allergies:  Allergies   Allergen Reactions    Bee Venom Anaphylaxis    Benazepril Cough    Ciprofloxacin Other (See Comments)     Possible GI illness    Erythromycin Other (See Comments)     Unsure of  allergy time, possible hives.    Gluten Meal GI Disturbance    Hydrochlorothiazide      Other reaction(s): Hyponatremia    Penicillins Other (See Comments)     Unsure of allergy time, possible hives       Problem List:    Patient Active Problem List    Diagnosis Date Noted    Shortness of breath 12/12/2024     Priority: Medium    Acute exacerbation of chronic heart failure (H) 12/12/2024     Priority: Medium    Weight loss 11/18/2024     Priority: Medium    DIMITRI (acute kidney injury) (H) 11/13/2024     Priority: Medium    Right second toe ulcer, limited to breakdown of skin (H) 09/03/2024     Priority: Medium    Ulcer of left second toe, limited to breakdown of skin (H) 09/03/2024     Priority: Medium    Hammer toes of both feet 09/03/2024     Priority: Medium    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) 08/09/2024     Priority: Medium    Hypercoagulable state due to paroxysmal atrial fibrillation (H) 08/09/2024     Priority: Medium    Incontinence of feces, unspecified fecal incontinence type -- Since starting Statin Medications 05/04/2024     Priority: Medium    Recurrent UTI 05/04/2024     Priority: Medium    Statin intolerance 05/04/2024     Priority: Medium    On continuous oral anticoagulation - Eliquis 05/02/2024     Priority: Medium    History of CVA on 3/26/2021 05/02/2024     Priority: Medium    PAD (peripheral artery disease) (H) 05/02/2024     Priority: Medium    Bilateral femoral artery stenosis (H) 05/02/2024     Priority: Medium     On CT scan from 3/25/2021      Subclavian artery stenosis, left (H) 05/02/2024     Priority: Medium     90% 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      Coronary artery disease involving native coronary artery of native heart without angina pectoris 05/02/2024     Priority: Medium     Moderate on CTA chest 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      Peripheral edema 05/02/2024     Priority: Medium    Chronic heart failure with preserved ejection fraction (H) 05/02/2024     Priority: Medium     Pre-diabetes 06/04/2021     Priority: Medium    History of stroke 06/03/2021     Priority: Medium    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) 04/05/2021     Priority: Medium    Ascending aortic aneurysm (H) 04/05/2021     Priority: Medium    Basilar artery stenosis/occlusion 04/05/2021     Priority: Medium    Cerebellar stroke (H) 03/25/2021     Priority: Medium    Mixed hyperlipidemia 07/31/2018     Priority: Medium    Benign essential hypertension 07/31/2018     Priority: Medium    Acquired hypothyroidism 07/31/2018     Priority: Medium    External ear ulcer (H) 09/20/2016     Priority: Medium    Biatrial enlargement 05/28/2015     Priority: Medium    Permanent atrial fibrillation (H) 03/09/2015     Priority: Medium    Stage 3a chronic kidney disease (H) 02/15/2011     Priority: Medium    Other symptoms involving cardiovascular system 01/26/2011     Priority: Medium     Overview:   No obstruction on CUS in [place]      Contact dermatitis and eczema 09/01/2010     Priority: Medium    Gambling problem 03/07/2009     Priority: Medium     Formatting of this note might be different from the original.  Daughter feels she is addicted.  History of some financial problems due to gambling, but she denies addiction  IMO Update 10/11          Past Medical History:    Past Medical History:   Diagnosis Date    Asymptomatic menopausal state     Chronic kidney disease, stage I     Dermatitis     Essential (primary) hypertension     Hyperlipidemia     Hypothyroidism     Other specified symptoms and signs involving the circulatory and respiratory systems     Paroxysmal atrial fibrillation (H)        Past Surgical History:    Past Surgical History:   Procedure Laterality Date    APPENDECTOMY OPEN      No Comments Provided    CHOLECYSTECTOMY      No Comments Provided    HYSTERECTOMY TOTAL ABDOMINAL      No Comments Provided       Family History:    Family History   Problem Relation Age of Onset    Coronary Artery Disease  Mother     Hypertension Mother     Heart Disease Mother         Heart Disease    Heart Disease Sister         Heart Disease,    Heart Disease Sister         Heart Disease,    Other - See Comments Sister         at 6 months    Other - See Comments Brother           in service    Other - See Comments Brother          of Parkinson's    Other - See Comments Brother          of pneumonia    Other - See Comments Other         No cancer or diabetes in the family       Social History:  Marital Status:   [5]  Social History     Tobacco Use    Smoking status: Never    Smokeless tobacco: Never   Vaping Use    Vaping status: Never Used   Substance Use Topics    Alcohol use: No     Alcohol/week: 0.0 standard drinks of alcohol    Drug use: Never        Medications:    alum & mag hydroxide-simethicone (MAALOX) 200-200-20 MG/5ML SUSP suspension  apixaban ANTICOAGULANT (ELIQUIS ANTICOAGULANT) 5 MG tablet  ARMOUR THYROID 90 MG tablet  Cholecalciferol 10 MCG (400 UNIT) CAPS  Cranberry-Vitamin C (CRANBERRY CONCENTRATE/VITAMINC) 64084-700 MG CAPS  diltiazem ER COATED BEADS (CARDIZEM CD/CARTIA XT) 120 MG 24 hr capsule  furosemide (LASIX) 20 MG tablet  latanoprost (XALATAN) 0.005 % ophthalmic solution  losartan (COZAAR) 25 MG tablet  magnesium hydroxide (MILK OF MAGNESIA) 400 MG/5ML suspension  mirtazapine (REMERON) 7.5 MG tablet  ondansetron (ZOFRAN) 4 MG tablet  rosuvastatin (CRESTOR) 5 MG tablet  saccharomyces boulardii (FLORASTOR) 250 MG capsule  triamcinolone (KENALOG) 0.1 % external cream          Review of Systems   Constitutional:  Positive for appetite change.   Respiratory:  Positive for shortness of breath.    Cardiovascular:  Positive for leg swelling.   All other systems reviewed and are negative.  See HPI    Physical Exam   BP: 134/84  Pulse: 85  Temp: 97.1  F (36.2  C)  Resp: 16  Weight: 58 kg (127 lb 13.9 oz)  SpO2: 96 %      Physical Exam  Vitals and nursing note reviewed.   Constitutional:        General: She is not in acute distress.     Appearance: She is not ill-appearing or toxic-appearing.   HENT:      Mouth/Throat:      Mouth: Mucous membranes are moist.   Cardiovascular:      Rate and Rhythm: Normal rate. Rhythm irregular.   Pulmonary:      Effort: Pulmonary effort is normal. No tachypnea or accessory muscle usage.      Breath sounds: Examination of the right-upper field reveals wheezing. Examination of the right-middle field reveals decreased breath sounds. Examination of the right-lower field reveals decreased breath sounds. Decreased breath sounds and wheezing present.   Musculoskeletal:      Right lower le+ Edema present.      Left lower le+ Edema present.   Skin:     General: Skin is warm.      Capillary Refill: Capillary refill takes less than 2 seconds.   Neurological:      General: No focal deficit present.      Mental Status: She is alert.   Psychiatric:         Mood and Affect: Mood normal.         ED Course            EKG Interpretation:      Interpreted by DEEDEE Figueredo CNP  Time reviewed: 7625  Symptoms at time of EKG: none; feels short of breath with activity.    Rhythm: atrial fibrillation - controlled  Rate: 85  Ectopy: none  Conduction: normal  ST Segments/ T Waves: slight ST depression V5 V6, no STEMI  Comparison to prior: ST changes above not seen on recent . Previous inferior infarct and anteroseptal infarct has been seen on previous  Clinical Impression: chronic afib  Pending  EKG over read by internal medicine         Results for orders placed or performed during the hospital encounter of 24 (from the past 24 hours)   CBC with platelets differential    Narrative    The following orders were created for panel order CBC with platelets differential.  Procedure                               Abnormality         Status                     ---------                               -----------         ------                     CBC with platelets and  thanh..[[id number]]  Abnormal            Final result                 Please view results for these tests on the individual orders.   Comprehensive metabolic panel   Result Value Ref Range    Sodium 134 (L) 135 - 145 mmol/L    Potassium 4.0 3.4 - 5.3 mmol/L    Carbon Dioxide (CO2) 27 22 - 29 mmol/L    Anion Gap 8 7 - 15 mmol/L    Urea Nitrogen 16.8 8.0 - 23.0 mg/dL    Creatinine 1.09 (H) 0.51 - 0.95 mg/dL    GFR Estimate 47 (L) >60 mL/min/1.73m2    Calcium 9.3 8.8 - 10.4 mg/dL    Chloride 99 98 - 107 mmol/L    Glucose 135 (H) 70 - 99 mg/dL    Alkaline Phosphatase 70 40 - 150 U/L    AST 23 0 - 45 U/L    ALT 14 0 - 50 U/L    Protein Total 6.1 (L) 6.4 - 8.3 g/dL    Albumin 3.6 3.5 - 5.2 g/dL    Bilirubin Total 1.0 <=1.2 mg/dL   Troponin T, High Sensitivity   Result Value Ref Range    Troponin T, High Sensitivity 43 (H) <=14 ng/L   Magnesium   Result Value Ref Range    Magnesium 1.8 1.7 - 2.3 mg/dL   Nt probnp inpatient (BNP)   Result Value Ref Range    N terminal Pro BNP Inpatient 2,387 (H) 0 - 1,800 pg/mL   CBC with platelets and differential   Result Value Ref Range    WBC Count 6.9 4.0 - 11.0 10e3/uL    RBC Count 5.25 (H) 3.80 - 5.20 10e6/uL    Hemoglobin 16.8 (H) 11.7 - 15.7 g/dL    Hematocrit 50.0 (H) 35.0 - 47.0 %    MCV 95 78 - 100 fL    MCH 32.0 26.5 - 33.0 pg    MCHC 33.6 31.5 - 36.5 g/dL    RDW 13.8 10.0 - 15.0 %    Platelet Count 169 150 - 450 10e3/uL    % Neutrophils 80 %    % Lymphocytes 10 %    % Monocytes 9 %    % Eosinophils 1 %    % Basophils 1 %    % Immature Granulocytes 0 %    NRBCs per 100 WBC 0 <1 /100    Absolute Neutrophils 5.5 1.6 - 8.3 10e3/uL    Absolute Lymphocytes 0.7 (L) 0.8 - 5.3 10e3/uL    Absolute Monocytes 0.6 0.0 - 1.3 10e3/uL    Absolute Eosinophils 0.0 0.0 - 0.7 10e3/uL    Absolute Basophils 0.1 0.0 - 0.2 10e3/uL    Absolute Immature Granulocytes 0.0 <=0.4 10e3/uL    Absolute NRBCs 0.0 10e3/uL   Extra Tube    Narrative    The following orders were created for panel order Extra  Tube.  Procedure                               Abnormality         Status                     ---------                               -----------         ------                     Extra Blue Top Tube[[id number]]                              Final result                 Please view results for these tests on the individual orders.   Extra Blue Top Tube   Result Value Ref Range    Hold Specimen JIC    Extra Tube    Narrative    The following orders were created for panel order Extra Tube.  Procedure                               Abnormality         Status                     ---------                               -----------         ------                     Extra Red Top Tube[[id number]]                               Final result                 Please view results for these tests on the individual orders.   Extra Red Top Tube   Result Value Ref Range    Hold Specimen JIC    Influenza A/B, RSV and SARS-CoV2 PCR (COVID-19) Nose    Specimen: Nose; Swab   Result Value Ref Range    Influenza A PCR Negative Negative    Influenza B PCR Negative Negative    RSV PCR Negative Negative    SARS CoV2 PCR Negative Negative    Narrative    Testing was performed using the Xpert Xpress CoV2/Flu/RSV Assay on the [employer] GeneXpert Instrument. This test should be ordered for the detection of SARS-CoV2, influenza, and RSV viruses in individuals with signs and symptoms of respiratory tract infection. This test is for in vitro diagnostic use under the [place] FDA for laboratories certified under CLIA to perform high or moderate complexity testing. This test has been [place] FDA cleared. A negative result does not rule out the presence of PCR inhibitors in the specimen or target RNA in concentration below the limit of detection for the assay. If only one viral target is positive but coinfection with multiple targets is suspected, the sample should be re-tested with another FDA cleared, approved, or authorized test, if coninfection would change  clinical management. This test was validated by the [facility]. These laboratories are certified under the Clinical Laboratory Improvement Amendments of 1988 (CLIA-88) as qualified to perfom high complexity laboratory testing.   XR Chest 2 Views    Narrative    Exam:  XR CHEST 2 VIEWS    HISTORY: shortness of breath, effusion? pneumonia?.    COMPARISON:  11/16/2024    FINDINGS:     The cardiomediastinal contours are unchanged.      Small bilateral pleural effusions, slightly worsened since 11/16/2024.  No pneumothorax. There are streaky bibasilar opacities.      No acute osseous abnormality.       Impression    IMPRESSION:      Small bilateral pleural effusions, slightly worsened since 11/16/2024.  There are bibasilar streaky opacities which are likely due to  compressive atelectasis, however superimposed infection is not  excluded.    CHRISTOPHER GRADY MD         SYSTEM ID:  [id number]   UA with Microscopic reflex to Culture    Specimen: Urine, Clean Catch   Result Value Ref Range    Color Urine Yellow Colorless, Straw, Light Yellow, Yellow    Appearance Urine Clear Clear    Glucose Urine Negative Negative mg/dL    Bilirubin Urine Negative Negative    Ketones Urine Negative Negative mg/dL    Specific Gravity Urine 1.005 1.000 - 1.030    Blood Urine Negative Negative    pH Urine 7.0 5.0 - 9.0    Protein Albumin Urine Negative Negative mg/dL    Urobilinogen Urine Normal Normal, 2.0 mg/dL    Nitrite Urine Negative Negative    Leukocyte Esterase Urine Negative Negative    RBC Urine 0 <=2 /HPF    WBC Urine 1 <=5 /HPF    Hyaline Casts Urine 2 <=2 /LPF    Narrative    Urine Culture not indicated   Troponin T, High Sensitivity   Result Value Ref Range    Troponin T, High Sensitivity 42 (H) <=14 ng/L       Medications   furosemide (LASIX) injection 20 mg (20 mg Intravenous $Given 12/12/24 2518)       Assessments & Plan (with Medical Decision Making)  Janna Mcdermott is a 89 year old female who presents  to the ER today with her home health aid. Patient reports she feels short of breath with activity such as ambulation and bathing. She cannot tell me how long this has been occurring but does tell me that she did not have symptom improvement from her recent hospitalization for heart failure about one month ago. She is also experiencing some lower extremity swelling, which neither patient or her home health aid cannot tell me if this is improved or worsening compared to her usual. Her weights are checked daily by her aids and it is reported that hey have been 129, 130, 131lb. She denies chest pain or feeling ill, no fevers. She has a loss of appetite, but per patient and chart review, this is not new.  According to home health aide who is here with patient, family is also concerned about a UTI as they tested her urine today and that was positive for leukocytes.  Patient denies any symptoms of UTI.  She lives alone, daughter lives next door. Was recently at [facility] for rehab.   PMH hyperlipidemia, hypothyroidism, biatrial enlargement, stage III CKD, hypertension, stroke, mesenteric cardial stenosis, ascending aortic aneurysm, permanent atrial fibrillation on anticoagulation, coronary artery disease, peripheral edema, chronic heart failure with preserved ejection fraction, recurrent UTI, weight loss  11/13-11/18/2024 admitted to the hospital for heart failure and DIMITRI- diuresed with furosemide, chest xray with effusions that improved during stay, echo completed. Reviewed hospital stay progress notes, labs, ekg  Initial vitals 134/84, HR 85, temp 97.1, RR 16 96% on room air- vitally stable  She is alert, orientated, non-distressed, nontoxic. Upon arrival her breathing is easy, unlabored. Her oxygen saturation is appropriate on room air. Right lung mid/ lower lobe has some decreased breath sounds, trace wheeze. Lower extremity edema + 1.   EKG shows chronic a-fib.   Differential diagnosis includes but not limited to  viral illness, bacterial infection including pneumonia, heart failure exacerbation- pleural effusion, ACS  Labs & Radiology results interpreted by radiologist:   ED Course as of 12/12/24 1646   Thu Dec 12, 2024   1404 CBC with platelets differential(!)  Stable. No leukocytosis, no anemia   1414 Troponin T, High Sensitivity(!)  43; she does have a history of elevation in her troponins; she does have a history of CAD, CKD- will trend   1428 XR Chest 2 Views  Small bilateral pleural effusions, slightly worsened since 11/16/2024.  There are bibasilar streaky opacities which are likely due to  compressive atelectasis, however superimposed infection is not  excluded     1431 Comprehensive metabolic panel(!)  Stable. Renal function appears baseline   1431 Magnesium  normal   1436 Influenza A/B, RSV and SARS-CoV2 PCR (COVID-19) Nose  negative   1445 Nt probnp inpatient (BNP)(!)  Elevated today 2387, increased from previous hospital stay. She is given 20 Mg IV lasix here   1603 UA with Microscopic reflex to Culture  No UTI appreciated.    1628 Troponin T, High Sensitivity(!)  stable      Meds: lasix 20 mg IV given  Nursing staff communicated that daughter called and is requesting patient was not given her PRN lasix today, concern for possible nursing home placement. Patient tells me that she does not want to go back to [facility]. I did place a social work referral today. Please see Ciera's note  3:46 PM It was reported to me that patient was up ambulating in the hallway with a walker, walked steady on her feet, sats 93% with activity.   Patient's lab and x-ray  results show an acute exacerbation of her heart failure, sats 88-91% on room air after activity to the bathroom. She tolerated activity walking in the hallway without respiratory distress per nursing staff, however patient reports she felt fatigued and short of breath with the activity.   4:48 PM consulted with hosptailist on call, Dr. Rodriguez, who offered  observation for patient. Patient and daughter Sara accepted admission on observation and is aware that observation stay does not qualify for nursing home placement.   Patient Vitals for the past 24 hrs:   BP Temp Temp src Pulse Resp SpO2 Weight   12/12/24 1545 134/65 -- -- 83 21 91 % --   12/12/24 1530 129/72 -- -- 83 26 91 % --   12/12/24 1515 (!) 145/83 -- -- 93 -- 92 % --   12/12/24 1500 136/78 -- -- 79 20 90 % --   12/12/24 1445 121/76 -- -- 75 26 90 % --   12/12/24 1430 -- -- -- 80 23 90 % --   12/12/24 1415 114/70 -- -- 80 20 92 % --   12/12/24 1400 -- -- -- 75 28 90 % --   12/12/24 1345 123/81 -- -- -- -- 91 % --   12/12/24 1307 134/84 97.1  F (36.2  C) Temporal 85 16 96 % 58 kg (127 lb 13.9 oz)           I have reviewed the nursing notes.    I have reviewed the findings, diagnosis, plan and need for follow up with the patient.    Medical Decision Making  The patient's presentation was of moderate complexity (a chronic illness mild to moderate exacerbation, progression, or side effect of treatment).    The patient's evaluation involved:  review of 3+ test result(s) ordered prior to this encounter (see separate area of note for details)  strong consideration of a test (see separate area of note for details) that was ultimately deferred  ordering and/or review of 3+ test(s) in this encounter (see separate area of note for details)  discussion of management or test interpretation with another health professional (see separate area of note for details)    The patient's management necessitated high risk (a decision regarding hospitalization).      New Prescriptions    No medications on file       Final diagnoses:   Acute exacerbation of chronic heart failure (H)   Shortness of breath       12/12/2024   [facility] Yvonne, APRN CNP  12/12/24 3322

## 2024-12-12 NOTE — ED TRIAGE NOTES
Pt is here today with her home care aid for SOB and edema in her bilateral feet.   Pt was recently discharge from acute rehab.   Her Home Care nurse noticied today that pt was SOB and had wheezes in the bases.    There is also concern for a potential UTI.   Pt appears to be in no acute distress at this time.   /84   Pulse 85   Temp 97.1  F (36.2  C) (Temporal)   Resp 16   Wt 58 kg (127 lb 13.9 oz)   LMP 01/25/1974 (Approximate)   SpO2 96%   BMI 24.97 kg/m    Hayley Combs RN on 12/12/2024 at 1:10 PM       Triage Assessment (Adult)       Row Name 12/12/24 1308          Triage Assessment    Airway WDL WDL        Respiratory WDL    Respiratory WDL WDL        Skin Circulation/Temperature WDL    Skin Circulation/Temperature WDL WDL        Cardiac WDL    Cardiac WDL WDL        Peripheral/Neurovascular WDL    Peripheral Neurovascular WDL X;capillary refill     Capillary Refill, General greater than 3 secs        Cognitive/Neuro/Behavioral WDL    Cognitive/Neuro/Behavioral WDL WDL        Ilya Coma Scale    Best Eye Response 4-->(E4) spontaneous     Best Motor Response 6-->(M6) obeys commands     Best Verbal Response 5-->(V5) oriented     [place] Coma Scale Score 15

## 2024-12-12 NOTE — ED NOTES
This writer took patient for a walk down hallway after she used the restroom. Patient stated she was not short of breath while walking but stated she was really cold and wanted to go back to the room. Patient walked quickly with steady gait. The portable pulse oximeter was not reading well while patient was holding on to the walker. Upon returning to the room her oxygen was 93%. Update given to primary RN.

## 2024-12-13 ENCOUNTER — APPOINTMENT (OUTPATIENT)
Dept: PHYSICAL THERAPY | Facility: OTHER | Age: 89
End: 2024-12-13
Attending: FAMILY MEDICINE
Payer: MEDICARE

## 2024-12-13 ENCOUNTER — APPOINTMENT (OUTPATIENT)
Dept: OCCUPATIONAL THERAPY | Facility: OTHER | Age: 89
End: 2024-12-13
Attending: FAMILY MEDICINE
Payer: MEDICARE

## 2024-12-13 LAB
ANION GAP SERPL CALCULATED.3IONS-SCNC: 9 MMOL/L (ref 7–15)
ATRIAL RATE - MUSE: 159 BPM
BUN SERPL-MCNC: 14.3 MG/DL (ref 8–23)
CALCIUM SERPL-MCNC: 8.8 MG/DL (ref 8.8–10.4)
CHLORIDE SERPL-SCNC: 100 MMOL/L (ref 98–107)
CREAT SERPL-MCNC: 1 MG/DL (ref 0.51–0.95)
DIASTOLIC BLOOD PRESSURE - MUSE: NORMAL MMHG
EGFRCR SERPLBLD CKD-EPI 2021: 52 ML/MIN/1.73M2
ERYTHROCYTE [DISTWIDTH] IN BLOOD BY AUTOMATED COUNT: 13.8 % (ref 10–15)
GLUCOSE SERPL-MCNC: 103 MG/DL (ref 70–99)
HCO3 SERPL-SCNC: 28 MMOL/L (ref 22–29)
HCT VFR BLD AUTO: 45.9 % (ref 35–47)
HGB BLD-MCNC: 15.5 G/DL (ref 11.7–15.7)
INTERPRETATION ECG - MUSE: NORMAL
MAGNESIUM SERPL-MCNC: 2.1 MG/DL (ref 1.7–2.3)
MCH RBC QN AUTO: 31.7 PG (ref 26.5–33)
MCHC RBC AUTO-ENTMCNC: 33.8 G/DL (ref 31.5–36.5)
MCV RBC AUTO: 94 FL (ref 78–100)
P AXIS - MUSE: NORMAL DEGREES
PLATELET # BLD AUTO: 133 10E3/UL (ref 150–450)
POTASSIUM SERPL-SCNC: 3.3 MMOL/L (ref 3.4–5.3)
POTASSIUM SERPL-SCNC: 3.9 MMOL/L (ref 3.4–5.3)
PR INTERVAL - MUSE: NORMAL MS
QRS DURATION - MUSE: 74 MS
QT - MUSE: 380 MS
QTC - MUSE: 452 MS
R AXIS - MUSE: -40 DEGREES
RBC # BLD AUTO: 4.89 10E6/UL (ref 3.8–5.2)
SODIUM SERPL-SCNC: 137 MMOL/L (ref 135–145)
SYSTOLIC BLOOD PRESSURE - MUSE: NORMAL MMHG
T AXIS - MUSE: 77 DEGREES
VENTRICULAR RATE- MUSE: 85 BPM
WBC # BLD AUTO: 5.8 10E3/UL (ref 4–11)

## 2024-12-13 PROCEDURE — 250N000011 HC RX IP 250 OP 636: Performed by: FAMILY MEDICINE

## 2024-12-13 PROCEDURE — 85027 COMPLETE CBC AUTOMATED: CPT | Performed by: FAMILY MEDICINE

## 2024-12-13 PROCEDURE — 250N000013 HC RX MED GY IP 250 OP 250 PS 637: Performed by: INTERNAL MEDICINE

## 2024-12-13 PROCEDURE — G0378 HOSPITAL OBSERVATION PER HR: HCPCS

## 2024-12-13 PROCEDURE — 36415 COLL VENOUS BLD VENIPUNCTURE: CPT | Performed by: FAMILY MEDICINE

## 2024-12-13 PROCEDURE — 84132 ASSAY OF SERUM POTASSIUM: CPT | Performed by: FAMILY MEDICINE

## 2024-12-13 PROCEDURE — 97165 OT EVAL LOW COMPLEX 30 MIN: CPT | Mod: GO | Performed by: OCCUPATIONAL THERAPIST

## 2024-12-13 PROCEDURE — 97530 THERAPEUTIC ACTIVITIES: CPT | Mod: GP

## 2024-12-13 PROCEDURE — 96376 TX/PRO/DX INJ SAME DRUG ADON: CPT

## 2024-12-13 PROCEDURE — 97116 GAIT TRAINING THERAPY: CPT | Mod: GP

## 2024-12-13 PROCEDURE — 94618 PULMONARY STRESS TESTING: CPT

## 2024-12-13 PROCEDURE — 250N000013 HC RX MED GY IP 250 OP 250 PS 637: Performed by: FAMILY MEDICINE

## 2024-12-13 PROCEDURE — 97161 PT EVAL LOW COMPLEX 20 MIN: CPT | Mod: GP

## 2024-12-13 PROCEDURE — 84520 ASSAY OF UREA NITROGEN: CPT | Performed by: FAMILY MEDICINE

## 2024-12-13 PROCEDURE — 99232 SBSQ HOSP IP/OBS MODERATE 35: CPT | Performed by: FAMILY MEDICINE

## 2024-12-13 PROCEDURE — 97530 THERAPEUTIC ACTIVITIES: CPT | Mod: GO | Performed by: OCCUPATIONAL THERAPIST

## 2024-12-13 PROCEDURE — 83735 ASSAY OF MAGNESIUM: CPT | Performed by: FAMILY MEDICINE

## 2024-12-13 PROCEDURE — 80048 BASIC METABOLIC PNL TOTAL CA: CPT | Performed by: FAMILY MEDICINE

## 2024-12-13 PROCEDURE — 999N000157 HC STATISTIC RCP TIME EA 10 MIN

## 2024-12-13 RX ORDER — FUROSEMIDE 10 MG/ML
20 INJECTION INTRAMUSCULAR; INTRAVENOUS ONCE
Status: COMPLETED | OUTPATIENT
Start: 2024-12-13 | End: 2024-12-13

## 2024-12-13 RX ORDER — ROSUVASTATIN CALCIUM 5 MG/1
5 TABLET, COATED ORAL
Status: DISCONTINUED | OUTPATIENT
Start: 2024-12-16 | End: 2024-12-14 | Stop reason: HOSPADM

## 2024-12-13 RX ORDER — POTASSIUM CHLORIDE 1.5 G/1.58G
20 POWDER, FOR SOLUTION ORAL ONCE
Status: COMPLETED | OUTPATIENT
Start: 2024-12-13 | End: 2024-12-13

## 2024-12-13 RX ADMIN — LOSARTAN POTASSIUM 25 MG: 25 TABLET, FILM COATED ORAL at 09:32

## 2024-12-13 RX ADMIN — LATANOPROST 1 DROP: 50 SOLUTION/ DROPS OPHTHALMIC at 21:14

## 2024-12-13 RX ADMIN — APIXABAN 2.5 MG: 2.5 TABLET, FILM COATED ORAL at 21:14

## 2024-12-13 RX ADMIN — DILTIAZEM HYDROCHLORIDE 120 MG: 120 CAPSULE, COATED, EXTENDED RELEASE ORAL at 09:32

## 2024-12-13 RX ADMIN — THYROID 90 MG: 90 TABLET ORAL at 08:05

## 2024-12-13 RX ADMIN — MIRTAZAPINE 7.5 MG: 7.5 TABLET, FILM COATED ORAL at 21:52

## 2024-12-13 RX ADMIN — APIXABAN 2.5 MG: 2.5 TABLET, FILM COATED ORAL at 09:32

## 2024-12-13 RX ADMIN — Medication 250 MG: at 21:14

## 2024-12-13 RX ADMIN — POTASSIUM CHLORIDE 20 MEQ: 1.5 POWDER, FOR SOLUTION ORAL at 09:31

## 2024-12-13 RX ADMIN — Medication 250 MG: at 09:32

## 2024-12-13 RX ADMIN — FUROSEMIDE 20 MG: 10 INJECTION, SOLUTION INTRAMUSCULAR; INTRAVENOUS at 09:31

## 2024-12-13 ASSESSMENT — ACTIVITIES OF DAILY LIVING (ADL)
ADLS_ACUITY_SCORE: 59
ADLS_ACUITY_SCORE: 59
ADLS_ACUITY_SCORE: 55
ADLS_ACUITY_SCORE: 59
ADLS_ACUITY_SCORE: 55
ADLS_ACUITY_SCORE: 59
ADLS_ACUITY_SCORE: 58
ADLS_ACUITY_SCORE: 58
ADLS_ACUITY_SCORE: 59
ADLS_ACUITY_SCORE: 58
ADLS_ACUITY_SCORE: 55
ADLS_ACUITY_SCORE: 59
ADLS_ACUITY_SCORE: 55
ADLS_ACUITY_SCORE: 55
ADLS_ACUITY_SCORE: 59
ADLS_ACUITY_SCORE: 55
ADLS_ACUITY_SCORE: 59
ADLS_ACUITY_SCORE: 58
ADLS_ACUITY_SCORE: 59

## 2024-12-13 NOTE — PHARMACY-ADMISSION MEDICATION HISTORY
"Pharmacy: Patient Own Medication Identification    The requirements of Policy 13-03 from the Pharmacy Policy Manual have been met and the patient will be allowed to use their own supply of: [place] Thyroid 90mg. Patient medications are being stored in the patients bin.     London Weaver RP has verified the name, dose, route, and directions for each medication. The integrity has been assessed and deemed safe.     The product(s) have been labeled as being inspected by a pharmacist and the medication has been placed in the patient-specific bin in the medication room.    Nursing will remove medication at the appropriately scheduled times and document the administration on the MAR with the designation of \"patient own\".    Nursing to return medication back to patient at time of discharge.     London Weaver RPH ....................  12/13/2024   7:53 AM    "

## 2024-12-13 NOTE — PROGRESS NOTES
Interdisciplinary Discharge Planning Note    Anticipated Discharge Date: 12/14     Anticipated Discharge Location: Home     Clinical Needs Before Discharge:  adequate comfort achieved    Treatment Needs After Discharge:  homecare    Potential Barriers to Discharge: None identified     ADALID Santoro  12/13/2024,  2:37 PM

## 2024-12-13 NOTE — PROGRESS NOTES
:     Spoke with patient in the room about discharge planning. Patient wanted me to call her daughter Génesis. Called and spoke with Génesis. She wasn't sure if they wanted STR or LTC at an AL. Spoke about a payer source and qualifying stays. Left a MA application in the room for patient and daughter to fill out.     Spoke with Daylin at [facility] who stated she would be able to return to a STR facility under the same diagnoses as her last admission but they are not able to take her back at .     Daughter stated she couldn't find facilities with openings and asked for  to find out about availabilities.     [facility] - has availabilities  [facility] - Full  [facility] - left voicemail  [facility] - Has availabilities   [facility] - Full  [facility] - Has availabilities   [facility] - left voicemail  [facility] - Left voicemail   [facility] - Full  [facility] - Full  [facility] - has availabilities   [facility] - Left voicemail, Full  [facility] - Left voicemail   [facility] [place] - Full  [facility] - Left voicemail.     Called to update Génesis about the the facilities who has openings and she stated she wanted patient to decide.     Spoke with patient and her PCA, Mayda, in the room about AL options. Patient isn't sure what she wants to do.     Mayda called Génesis on the phone and we had a conference call. They would like patient to go somewhere for STR. They do not want [facility] or [facility]. They are willing to have a referral sent to [facility] in [place]. Left a list of local AL with patient to continue to work on after discharge.     Sent a referral to [place].     Pt/family was given the Medicare Compare list for SNF, with associated star ratings to assist with choice for referrals/discharge planning Yes    Education was given to pt/family that star ratings are updated/maintained by Medicare and can be reviewed by  visiting www.medicare.gov Yes    ADALID Santoro on 12/13/2024 at 11:11 AM    Received an email from [facility] and they do not have an opening at this time. They are unsure when their next opening will be but potentially late next week.     ADALID Santoro on 12/13/2024 at 2:28 PM    MD spoke with patient and family in the room.   The anticipated discharge plan is for patient to discharge home tomorrow with [facility] and home care support. Family will be responsible for continuing the search for AL long term placement for patient.     ADALID Santoro on 12/13/2024 at 2:39 PM

## 2024-12-13 NOTE — PLAN OF CARE
SAFETY CHECKLIST  ID Bands and Risk clasps correct and in place (DNR, Fall risk, Allergy, Latex, Limb):  Yes  All Lines Reconciled and labeled correctly: Yes  Whiteboard updated:Yes  Environmental interventions: Yes  Verify Tele #: [phone]    Etelvina Holden RN .......  12/13/2024  7:20 AM

## 2024-12-13 NOTE — H&P
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  Patient reports her O2 sat was 80% at some point today but was 96% on RA in ER.  She was hospitalized from 11/13-18/2024 for CHF and DIMITRI.  Was at [facility] for rehab but \"it was a terrible place.  The food was slop.  I will never go back there.\"  Daughter has been trying to get her into assisted living facilities but hopes the social workers here can help because she cannot do it on her own.  Daughter lives next door to the patient and the patient has PCAs who come 4 times daily to help her.  BNP elevated to 2,387, troponin 43 and 42 with no other significant lab abnormalities and VS stable for her.  She is placed in observation now for continued diuresis.    Principal Problem:    Acute on chronic heart failure with preserved ejection fraction (HFpEF) (H)    Symptoms of shortness of breath with walking and after her bath today.  Given Lasix 20 mg IV in the ER and the patient reports the swelling in her legs is better and on exam she no longer has wheezing.  Give another dose of IV Lasix this evening and resume her normal 10 mg po dose in the AM.  Recheck labs in AM.    Active Problems:    Stage 3a chronic kidney disease (H)    Stable with Cr and GFR unchanged since 12/2 and improved since 11/25 at 1.09 and 47.  Follow closely with the increased Lasix tonight to help treat dyspnea.       Mixed hyperlipidemia    Last lipid " profile from 7/26/2024 with total chol 151, HDL 62, LDL 75 and TG 72 so excellent control, especially for age.  Continue Crestor 5 gm daily.      Benign essential hypertension    Overall very good control with diltiazem 120 mg daily, Lasix 10 mg daily, losartan 25 mg daily.  BP elevated at the time of admission but has been under good control.      Acquired hypothyroidism    Taking armour thyroid 90 mg daily.  Had a higher dose of medication for a while but noted decreased appetite and weight loss so back down to the 90 mg she has been stable on for years.  Does not base her dose on TSH levels.      Permanent atrial fibrillation (H)    Continues on Eliquis 5 mg BID.  Rate controlled with diltiazem.      PAD (peripheral artery disease) (H)    Stable.  Continue Eliquis and Crestor.      Peripheral edema    Improved since this morning per patient.  Give additional Lasix this evening, elevate feet as much as possible.         Observation Goals: -diagnostic tests and consults completed and resulted, -vital signs normal or at patient baseline, -tolerating oral intake to maintain hydration, -dyspnea improved and O2 sats greater than 88% on room air or prior home oxygen levels, -safe disposition plan has been identified, Nurse to notify provider when observation goals have been met and patient is ready for discharge.  Diet: Combination Diet Regular Diet; 2 gm NA Diet    DVT Prophylaxis: DOAC  Grubbs Catheter: Not present  Lines: None     Cardiac Monitoring: ACTIVE order. Indication: Acute decompensated heart failure (48 hours)  Code Status: No CPR- Do NOT Intubate      Clinically Significant Risk Factors Present on Admission         # Hyponatremia: Lowest Na = 134 mmol/L in last 2 days, will monitor as appropriate        # Drug Induced Coagulation Defect: home medication list includes an anticoagulant medication    # Hypertension: Noted on problem list  # Acute heart failure with preserved ejection fraction: heart failure  "noted on problem list, last echo with EF >50%, and receiving IV diuretics              # Financial/Environmental Concerns:           Disposition Plan     Medically Ready for Discharge: Anticipated Tomorrow           Mis Rodriguez MD  Hospitalist Service  [facility]  Securely message with [facility] (more info)  Text page via [facility] Paging/Directory     ______________________________________________________________________    Chief Complaint   Shortness of breath and leg swelling    History is obtained from the patient, electronic health record, emergency department physician, and patient's daughter    History of Present Illness   Janna Mcdermott is a 89 year old female with a PMH significant for CKD-3a, permanent atrial fib on Eliquis, PAD, hyperlipidemia, HTN, hypothyroidism, mesenteric artery stenosis, CAD, chronic HFpEF, 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  Patient reports her O2 sat was 80% at some point today but was 96% on RA in ER.  She was hospitalized from 11/13-18/2024 for CHF and DIMITRI.  Was at [facility] for rehab but \"it was a terrible place.  The food was slop.  I will never go back there.\"  Daughter has been trying to get her into assisted living facilities but hopes the social workers here can help because she cannot do it on her own.  Daughter lives next door to the patient and the patient has PCAs who come 4 times daily to help her.  BNP elevated to 2,387, troponin 43 and 42 with no other significant lab abnormalities and VS stable for her.  She is placed in observation now for continued diuresis.      Past Medical History    Past Medical History:   Diagnosis Date    Asymptomatic menopausal state     on estrogen    Chronic kidney disease, stage I     No " Comments Provided    Dermatitis     No Comments Provided    Essential (primary) hypertension     No Comments Provided    Hyperlipidemia     No Comments Provided    Hypothyroidism     No Comments Provided    Other specified symptoms and signs involving the circulatory and respiratory systems     No Comments Provided    Paroxysmal atrial fibrillation (H)     No Comments Provided       Past Surgical History   Past Surgical History:   Procedure Laterality Date    APPENDECTOMY OPEN      No Comments Provided    CHOLECYSTECTOMY      No Comments Provided    HYSTERECTOMY TOTAL ABDOMINAL      No Comments Provided       Prior to Admission Medications   Prior to Admission Medications   Prescriptions Last Dose Informant Patient Reported? Taking?   ARMOUR THYROID 90 MG tablet   No No   Sig: Take 1 tablet (90 mg) by mouth daily.   Cholecalciferol 10 MCG (400 UNIT) CAPS   Yes No   Sig: Take 800 Units by mouth daily   Cranberry-Vitamin C (CRANBERRY CONCENTRATE/VITAMINC) 04319-524 MG CAPS   No No   Sig: Take 1 capsule by mouth 2 times daily - for UTI prevention   alum & mag hydroxide-simethicone (MAALOX) 200-200-20 MG/5ML SUSP suspension   Yes No   Sig: Take 10 mLs by mouth every 4 hours as needed for indigestion.   apixaban ANTICOAGULANT (ELIQUIS ANTICOAGULANT) 5 MG tablet   No No   Sig: Take 1 tablet (5 mg) by mouth 2 times daily   diltiazem ER COATED BEADS (CARDIZEM CD/CARTIA XT) 120 MG 24 hr capsule   No No   Sig: Take 1 capsule (120 mg) by mouth daily   furosemide (LASIX) 20 MG tablet   Yes No   Sig: Take 0.5 tablets (10 mg) by mouth daily.   latanoprost (XALATAN) 0.005 % ophthalmic solution   Yes No   Sig: Place 1 drop into both eyes At Bedtime    losartan (COZAAR) 25 MG tablet   Yes No   Sig: Take 0.5 tablets (12.5 mg) by mouth daily.   magnesium hydroxide (MILK OF MAGNESIA) 400 MG/5ML suspension   Yes No   Sig: Take 30 mLs by mouth daily as needed for constipation or heartburn.   mirtazapine (REMERON) 7.5 MG tablet   No No    Sig: Give 1 tablet 1 hour before bedtime.   ondansetron (ZOFRAN) 4 MG tablet   Yes No   Sig: Take 1 tablet (4 mg) by mouth every 8 hours as needed for nausea.   rosuvastatin (CRESTOR) 5 MG tablet   No No   Sig: Take 1 tablet (5 mg) by mouth three times a week   saccharomyces boulardii (FLORASTOR) 250 MG capsule   No No   Sig: Take 1 capsule (250 mg) by mouth 2 times daily.   triamcinolone (KENALOG) 0.1 % external cream   Yes No   Sig: Apply topically 2 times daily as needed for irritation.      Facility-Administered Medications: None        Review of Systems    The 5 point Review of Systems is negative other than noted in the HPI or here.     Social History   I have reviewed this patient's social history and updated it with pertinent information if needed.  Social History     Tobacco Use    Smoking status: Never    Smokeless tobacco: Never   Vaping Use    Vaping status: Never Used   Substance Use Topics    Alcohol use: No     Alcohol/week: 0.0 standard drinks of alcohol    Drug use: Never         Family History   I have reviewed this patient's family history and updated it with pertinent information if needed.  Family History   Problem Relation Age of Onset    Coronary Artery Disease Mother     Hypertension Mother     Heart Disease Mother         Heart Disease    Heart Disease Sister         Heart Disease,    Heart Disease Sister         Heart Disease,    Other - See Comments Sister         at 6 months    Other - See Comments Brother           in service    Other - See Comments Brother          of Parkinson's    Other - See Comments Brother          of pneumonia    Other - See Comments Other         No cancer or diabetes in the family         Allergies   Allergies   Allergen Reactions    Bee Venom Anaphylaxis    Benazepril Cough    Ciprofloxacin Other (See Comments)     Possible GI illness    Erythromycin Other (See Comments)     Unsure of allergy time, possible hives.    Gluten Meal GI  Disturbance    Hydrochlorothiazide      Other reaction(s): Hyponatremia    Penicillins Other (See Comments)     Unsure of allergy time, possible hives        Physical Exam   Vital Signs: Temp: 97.1  F (36.2  C) Temp src: Tympanic BP: (!) 150/101 Pulse: 84   Resp: 24 SpO2: 92 % O2 Device: None (Room air)    Weight: 127 lbs 13.87 oz    Constitutional: awake, alert, cooperative, no apparent distress, appears younger than stated age, and thin  Eyes: pupils equal, round and reactive to light, extra-ocular muscles intact, and conjunctiva normal  ENT: normocephalic, atraumatic  Respiratory: no increased work of breathing, decreased air exchange throughout with a few rales LLL, very faint RLL, no wheezing  Cardiovascular: regular rate and rhythm, normal S1 and S2, and no murmur noted, trace lower leg edema  GI: normal bowel sounds, soft, non-distended, and non-tender  Skin: shiny skin on legs, no weeping, no redness, warmth, or swelling  Musculoskeletal: there is no redness, warmth, or swelling of the joints  full range of motion noted  motor strength is 5 out of 5 all extremities bilaterally  tone is normal  Neurologic: Mental Status Exam:  Fund of Knowledge:  normal  Attention/Concentration:  normal  Language:  normal  Cranial Nerves:  cranial nerves II-XII are grossly intact  Motor Exam:  Motor exam is symmetrical 5 out of 5 all extremities bilaterally  Neuropsychiatric: General: normal and normal eye contact  Level of consciousness: alert / normal  Affect: normal  Orientation: oriented to self, place, time and situation  Memory and insight: normal, memory for past and recent events intact, and thought process normal    Medical Decision Making             Data     I have personally reviewed the following data over the past 24 hrs:    6.9  \   16.8 (H)   / 169     134 (L) 99 16.8 /  135 (H)   4.0 27 1.09 (H) \     ALT: 14 AST: 23 AP: 70 TBILI: 1.0   ALB: 3.6 TOT PROTEIN: 6.1 (L) LIPASE: N/A     Trop: 42 (H) BNP: 2,387 (H)        Imaging results reviewed over the past 24 hrs:   Recent Results (from the past 24 hours)   XR Chest 2 Views    Narrative    Exam:  XR CHEST 2 VIEWS    HISTORY: shortness of breath, effusion? pneumonia?.    COMPARISON:  11/16/2024    FINDINGS:     The cardiomediastinal contours are unchanged.      Small bilateral pleural effusions, slightly worsened since 11/16/2024.  No pneumothorax. There are streaky bibasilar opacities.      No acute osseous abnormality.       Impression    IMPRESSION:      Small bilateral pleural effusions, slightly worsened since 11/16/2024.  There are bibasilar streaky opacities which are likely due to  compressive atelectasis, however superimposed infection is not  excluded.    CHRISTOPHER GRADY MD         SYSTEM ID:  [id number]

## 2024-12-13 NOTE — PLAN OF CARE
"PRIMARY DIAGNOSIS: \"GENERIC\" NURSING  OUTPATIENT/OBSERVATION GOALS TO BE MET BEFORE DISCHARGE:  ADLs back to baseline: Yes    Activity and level of assistance: Up with standby assistance.    Pain status: Pain free.    Return to near baseline physical activity: Yes     Discharge Planner Nurse   Safe discharge environment identified: No  Barriers to discharge: Yes       Entered by: Etelvina Holden RN 12/13/2024      Please review provider order for any additional goals.   Nurse to notify provider when observation goals have been met and patient is ready for discharge.  "

## 2024-12-13 NOTE — PROGRESS NOTES
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  Patient reports her O2 sat was 80% at some point the day of admission She was hospitalized from 11/13-18/2024 for CHF and DIMITRI.  Was at [facility] for rehab but \"it was a terrible place.  The food was slop.  I will never go back there.\"  Daughter has been trying to get her into assisted living facilities but hopes the social workers here can help because she cannot do it on her own.  Daughter lives next door to the patient and the patient has PCAs who come 4 times daily to help her.  BNP elevated to 2,387, troponin 43 and 42 with no other significant lab abnormalities and VS stable for her.  She is placed in observation now for continued diuresis.       Clinically Significant Risk Factors Present on Admission        # Hypokalemia: Lowest K = 3.3 mmol/L in last 2 days, will replace as needed  # Hyponatremia: Lowest Na = 134 mmol/L in last 2 days, will monitor as appropriate          # Drug Induced Coagulation Defect: home medication list includes an anticoagulant medication    # Hypertension: Noted on problem list  # Acute heart failure with preserved ejection fraction: heart failure noted on problem list, last echo with EF >50%, and receiving IV diuretics              # Financial/Environmental Concerns:              Principal Problem:    Acute on chronic heart failure with preserved ejection fraction (HFpEF) (H)    Symptoms of shortness of breath " with walking and after her bath the day of admission.  Given Lasix 20 mg IV in the ER and another dose of IV Lasix yesterday with improvement in symptoms.  Labs improved.  Will resume her normal 10 mg po dose in the AM.  Recheck labs in AM.     Active Problems:    Stage 3a chronic kidney disease (H)    Labs improved overnight. Follow closely with the increased Lasix to help treat dyspnea.       Mixed hyperlipidemia    Last lipid profile from 7/26/2024 with total chol 151, HDL 62, LDL 75 and TG 72 so excellent control, especially for age.  Continue Crestor 5 gm daily.       Benign essential hypertension    Overall very good control with diltiazem 120 mg daily, Lasix 10 mg daily, losartan 25 mg daily.  BP elevated at the time of admission but has been under good control.       Acquired hypothyroidism    Taking armour thyroid 90 mg daily.  Had a higher dose of medication for a while but noted decreased appetite and weight loss so back down to the 90 mg she has been stable on for years.  Does not base her dose on TSH levels.       Permanent atrial fibrillation (H)    Continues on Eliquis 5 mg BID.  Rate controlled with diltiazem.       PAD (peripheral artery disease) (H)    Stable.  Continue Eliquis and Crestor.       Peripheral edema    Improved per patient.      Diet: Combination Diet Regular Diet; 2 gm NA Diet    DVT Prophylaxis: DOAC  Grubbs Catheter: Not present  Code Status: No CPR- Do NOT Intubate           Disposition Plan      Expected Discharge Date: 12/14/2024             Entered: Matthew Duvall MD 12/13/2024, 9:09 AM    Social service to help with halfway placement and setting up funding source.      Documentation of Face to Face and Certification for Home Health Services    I certify that patient: Janna Mcdermott is under my care and that I, or a nurse practitioner or physician's assistant working with me, had a face-to-face encounter that meets the physician face-to-face encounter requirements with this  patient on: 12/13/2024.    This encounter with the patient was in whole, or in part, for the following medical condition, which is the primary reason for home health care: congestive heart failure, atrial fib, dyspnea, weakness and balance issues.    I certify that, based on my findings, the following services are medically necessary home health services: Nursing, Occupational Therapy, Physical Therapy, and Social Work.    My clinical findings support the need for the above services because: Nurse is needed: To assess clinical improvements after changes in medications or other medical regimen. and To provide assessment and oversight required in the home to assure adherence to the medical plan due to: complexity of medication regimen.., Occupational Therapy Services are needed to assess and treat cognitive ability and address ADL safety due to impairment in mobility and breathing., and Physical Therapy Services are needed to assess and treat the following functional impairments: weakness, balance.    Further, I certify that my clinical findings support that this patient is homebound (i.e. absences from home require considerable and taxing effort and are for medical reasons or [employer] services or infrequently or of short duration when for other reasons) because: Requires assistance of another person or specialized equipment to access medical services because patient: Is unable to exit home safely on own due to: breathing issues., Is unable to walk greater than 50 feet without rest., and Requires supervision of another for safe transfer...    Based on the above findings. I certify that this patient is confined to the home and needs intermittent skilled nursing care, physical therapy and/or speech therapy.  The patient is under my care, and I have initiated the establishment of the plan of care.  This patient will be followed by a physician who will periodically review the plan of care.  Physician/Provider to provide  follow up care: Dwayne Gaona    Attending hospital physician (the Medicare certified PECOS provider): Matthew Duvall MD  Physician Signature: See electronic signature associated with these discharge orders.  Date: 12/13/2024         The patient's care was discussed with the Patient.    Matthew Duvall MD  Hospitalist Service  [facility]  Contact information available via [facility] Paging/Directory    ______________________________________________________________________    Interval History     Patient feels little better today.  Breathing is a little bit better.  No cough.  Feels that her swelling is down.  Still requiring a bit of oxygen.  Labs okay slight improvement    -Data reviewed today: I reviewed all new labs and imaging results over the last 24 hours. I personally reviewed no images or EKG's today.    Physical Exam   Temp: 98.3  F (36.8  C) Temp src: Tympanic BP: (!) 143/72 Pulse: 84   Resp: 14 SpO2: 93 % O2 Device: Nasal cannula Oxygen Delivery: 3 LPM  Vitals:    12/12/24 1307 12/13/24 0455   Weight: 58 kg (127 lb 13.9 oz) 57.7 kg (127 lb 4.8 oz)     Vital Signs with Ranges  Temp:  [97.1  F (36.2  C)-98.6  F (37  C)] 98.3  F (36.8  C)  Pulse:  [70-95] 84  Resp:  [14-28] 14  BP: (114-150)/() 143/72  SpO2:  [86 %-96 %] 93 %  I/O last 3 completed shifts:  In: 200 [P.O.:200]  Out: 1700 [Urine:1700]    Constitutional: Pleasant elderly lady who appears younger than her stated age, in no acute distress wearing nasal cannula oxygen.  Respiratory: Significant kyphoscoliosis noted.  Air movement is fair.  Bibasilar crackles are audible.  No coarse breath sounds.  No wheezing.  No JVD or HJR at 45 degrees.  Cardiovascular: Irregularly irregular.  No audible murmur  GI: Abdomen soft and nontender  Skin/Integumen: No rashes or open areas.  She does have a dressing over her left second toe from her hammertoe      Medications   Current Facility-Administered Medications   Medication Dose Route  Frequency Provider Last Rate Last Admin    Patient is already receiving anticoagulation with heparin, enoxaparin (LOVENOX), warfarin (COUMADIN)  or other anticoagulant medication   Does not apply Continuous PRN Mis Rodriguez MD         Current Facility-Administered Medications   Medication Dose Route Frequency Provider Last Rate Last Admin    apixaban ANTICOAGULANT (ELIQUIS) tablet 5 mg  5 mg Oral BID Mis Rodriguez MD   5 mg at 12/12/24 2203    diltiazem ER COATED BEADS (CARDIZEM CD/CARTIA XT) 24 hr capsule 120 mg  120 mg Oral Daily Mis Rodriguez MD        furosemide (LASIX) half-tab 10 mg  10 mg Oral Daily Mis Rodriguez MD        furosemide (LASIX) injection 20 mg  20 mg Intravenous Once Matthew Duvall MD        latanoprost (XALATAN) 0.005 % ophthalmic solution 1 drop  1 drop Both Eyes At Bedtime Mis Rodriguez MD   1 drop at 12/12/24 2203    losartan (COZAAR) tablet 25 mg  25 mg Oral Daily Mis Rodriguez MD        mirtazapine (REMERON) tablet TABS 7.5 mg  7.5 mg Oral At Bedtime Mis Rodriguez MD   7.5 mg at 12/12/24 2203    potassium chloride (KLOR-CON) Packet 20 mEq  20 mEq Oral or Feeding Tube Once Matthew Duvall MD        rosuvastatin (CRESTOR) tablet 5 mg  5 mg Oral Q Mon Wed Fri AM Mis Rodriguez MD        saccharomyces boulardii (FLORASTOR) capsule 250 mg  250 mg Oral BID Emil Mann MD   250 mg at 12/12/24 2202    sodium chloride (PF) 0.9% PF flush 3 mL  3 mL Intracatheter Q8H Mis Rodriguez MD   3 mL at 12/12/24 1804    thyroid (ARMOUR) TABS 90 mg  90 mg Oral Daily Mis Rodriguez MD   90 mg at 12/13/24 0805       Data   Recent Labs   Lab 12/13/24  0537 12/12/24  1348   WBC 5.8 6.9   HGB 15.5 16.8*   MCV 94 95   * 169    134*   POTASSIUM 3.3* 4.0   CHLORIDE 100 99   CO2 28 27   BUN 14.3 16.8   CR 1.00* 1.09*   ANIONGAP 9 8   CORY 8.8 9.3   * 135*   ALBUMIN  --  3.6   PROTTOTAL  --  6.1*   BILITOTAL  --  1.0   ALKPHOS  --  70   ALT  --  14   AST  --  23       Recent  Results (from the past 24 hours)   XR Chest 2 Views    Narrative    Exam:  XR CHEST 2 VIEWS    HISTORY: shortness of breath, effusion? pneumonia?.    COMPARISON:  11/16/2024    FINDINGS:     The cardiomediastinal contours are unchanged.      Small bilateral pleural effusions, slightly worsened since 11/16/2024.  No pneumothorax. There are streaky bibasilar opacities.      No acute osseous abnormality.       Impression    IMPRESSION:      Small bilateral pleural effusions, slightly worsened since 11/16/2024.  There are bibasilar streaky opacities which are likely due to  compressive atelectasis, however superimposed infection is not  excluded.    CHRISTOPHER GRADY MD         SYSTEM ID:  [id number]

## 2024-12-13 NOTE — PROGRESS NOTES
12/13/24 1121   Appointment Info   Signing Clinician's Name / Credentials (OT) Kenisha Valadez, OTR/L   Living Environment   People in Home alone   Current Living Arrangements house   Home Accessibility no concerns;stairs to enter home   Number of Stairs, Main Entrance 4   Stair Railings, Main Entrance railings safe and in good condition   Transportation Anticipated family or friend will provide   Self-Care   Usual Activity Tolerance moderate   Current Activity Tolerance moderate   Equipment Currently Used at Home cane, straight;walker, rolling;walker, standard   Cognitive Status Examination   Orientation Status orientation to person, place and time   Affect/Mental Status (Cognitive) WFL   Follows Commands WFL   Pain Assessment   Patient Currently in Pain No   Range of Motion Comprehensive   General Range of Motion bilateral upper extremity ROM WFL   Coordination   Upper Extremity Coordination No deficits were identified   Bed Mobility   Supine-Sit [place] (Bed Mobility) supervision   Transfers   Transfers sit-stand transfer   Sit-Stand Transfer   Sit-Stand [place] (Transfers) contact guard;1 person to manage equipment   Assistive Device (Sit-Stand Transfers) walker, front-wheeled   Clinical Impression   Criteria for Skilled Therapeutic Interventions Met (OT) Yes, treatment indicated   OT Diagnosis CHF   OT Problem List-Impairments impacting ADL activity tolerance impaired   Identified Performance Deficits mobility and self care   Planned Therapy Interventions (OT) ADL retraining;progressive activity/exercise   Clinical Decision Making Complexity (OT) problem focused assessment/low complexity   OT Total Evaluation Time   OT Eval, Low Complexity Minutes (14355) 15   OT Goals   Therapy Frequency (OT) Daily   OT Predicted Duration/Target Date for Goal Attainment 12/15/24   OT: Hygiene/Grooming supervision/stand-by assist   OT: Upper Body Dressing Supervision/stand-by assist   OT: Lower Body Dressing  Supervision/stand-by assist   OT: Transfer Supervision/stand-by assist   OT: Toilet Transfer/Toileting Supervision/stand-by assist   Therapeutic Activities   Therapeutic Activity Minutes (76864) 25   OT Discharge Planning   OT Plan uncertain at this time, pt states she does not want to return home alone   OT Discharge Recommendation (DC Rec) home with assist;home with home care occupational therapy   OT Rationale for DC Rec Pt states she does not want to return home   OT Brief overview of current status Pt needed encouragement to participate in therapy, but once she did she was able to move supine to sit with SBA-CGA and ambulate in hallway with SBA/CGA using FWW without needing O2. Pt declined to address self cares as she did not want her meal interrupted   Total Session Time   Timed Code Treatment Minutes 25   Total Session Time (sum of timed and untimed services) 40

## 2024-12-13 NOTE — PLAN OF CARE
"PRIMARY DIAGNOSIS: \"GENERIC\" NURSING  OUTPATIENT/OBSERVATION GOALS TO BE MET BEFORE DISCHARGE:  ADLs back to baseline: Yes    Activity and level of assistance: Up with standby assistance.    Pain status: Pain free.    Return to near baseline physical activity: Yes     Discharge Planner Nurse   Safe discharge environment identified: Yes  Barriers to discharge: No       Entered by: Etelvina Holden RN 12/13/2024      Please review provider order for any additional goals.   Nurse to notify provider when observation goals have been met and patient is ready for discharge.  "

## 2024-12-13 NOTE — PROGRESS NOTES
Incentive Spirometry education completed.  Pt goal 1250 mls.  Pt achieved 500 mls.  Pt instructed to perform 10/hr while awake with at least one deep breath and cough per hour until able to perform baseline activity.  How to use an Incentive Spirometer written instructions provided to patient. RT will follow and re-assess as need.      Julisa Payne, RT on 12/13/2024 at 11:08 AM

## 2024-12-13 NOTE — PLAN OF CARE
Goal Outcome Evaluation:  A&O. VSS. Afebrile. Pt requiring supplemental O2 2L/NC due to desatting during HS. Daughter's questions and concerns addressed. Blanchable redness to coccyx.     Plan of Care Reviewed With: patient    Overall Patient Progress: improvingOverall Patient Progress: improving

## 2024-12-13 NOTE — PLAN OF CARE
Increased patient's O2 to 3 LPM. Pt Sp02 between 89-90%. Increased to see if patient Sp02 will go above 92%.     Etelvina Holden RN .......  12/13/2024  7:52 AM

## 2024-12-13 NOTE — PHARMACY-ADMISSION MEDICATION HISTORY
"Pharmacist Admission Medication History    Admission medication history is complete. The information provided in this note is only as accurate as the sources available at the time of the update.    Information Source(s): Patient, Family member, Clinic records, Prescription bottles, and CareEverywhere/SureScripts via in-person    Pertinent Information: Patient and daughter very good historians providing multiple resources for med confirmation. Per daughter patient is now taking 25mg losartan at home. When asked if they take a half tablet of lasix at home daughter attests that patient takes a \"full tablet for 10mg dose.\"  10mg strength tablets do not seem available? Question at discharge as patient may be getting 20mg daily.      Statin is taken M-W-F     Daughter has many requests for refills, most notably Lasix \"Ran out completely\" and Mirtazapine Family is particular about where medications are sent. States that GI must supply rosuvastatin \"I-29\" tablets d/t reaction with white tablets. Wants [facility] pharmacy to supply Diltiazem d/t reaction with other types.      Changes made to PTA medication list:  Added: None  Deleted: None   Changed: Losartan 12.5mg -> 25mg daily     Allergies reviewed: yes    Medication History Completed By: London Weaver [facility] 12/13/2024 8:14 AM    PTA Med List   Medication Sig Last Dose/Taking    alum & mag hydroxide-simethicone (MAALOX) 200-200-20 MG/5ML SUSP suspension Take 10 mLs by mouth every 4 hours as needed for indigestion. Unknown    apixaban ANTICOAGULANT (ELIQUIS ANTICOAGULANT) 5 MG tablet Take 1 tablet (5 mg) by mouth 2 times daily 12/12/2024 Morning    ARMOUR THYROID 90 MG tablet Take 1 tablet (90 mg) by mouth daily. 12/12/2024 Morning    Cholecalciferol 10 MCG (400 UNIT) CAPS Take 800 Units by mouth daily 12/12/2024 Evening    Cranberry-Vitamin C (CRANBERRY CONCENTRATE/VITAMINC) 11726-351 MG CAPS Take 1 capsule by mouth 2 times daily - for UTI prevention 12/12/2024 Morning "    diltiazem ER COATED BEADS (CARDIZEM CD/CARTIA XT) 120 MG 24 hr capsule Take 1 capsule (120 mg) by mouth daily 12/12/2024 Noon    furosemide (LASIX) 20 MG tablet Take 0.5 tablets (10 mg) by mouth daily. 12/12/2024 Morning    latanoprost (XALATAN) 0.005 % ophthalmic solution Place 1 drop into both eyes At Bedtime  12/11/2024 Bedtime    losartan (COZAAR) 25 MG tablet Take 25 mg by mouth daily. 12/12/2024 Morning    magnesium hydroxide (MILK OF MAGNESIA) 400 MG/5ML suspension Take 30 mLs by mouth daily as needed for constipation or heartburn. Taking As Needed    mirtazapine (REMERON) 7.5 MG tablet Give 1 tablet 1 hour before bedtime. 12/11/2024 Bedtime    ondansetron (ZOFRAN) 4 MG tablet Take 1 tablet (4 mg) by mouth every 8 hours as needed for nausea. Past Week    rosuvastatin (CRESTOR) 5 MG tablet Take 1 tablet (5 mg) by mouth three times a week 12/11/2024 Evening    saccharomyces boulardii (FLORASTOR) 250 MG capsule Take 1 capsule (250 mg) by mouth 2 times daily. 12/12/2024    triamcinolone (KENALOG) 0.1 % external cream Apply topically 2 times daily as needed for irritation. Past Month

## 2024-12-13 NOTE — PROGRESS NOTES
Patient has been assessed for Home Oxygen needs. Oxygen readings:    *Pulse oximetry (SpO2) = 92% on room air at rest while awake..    *SpO2 = 90% on room air during activity/with exercise.    Patient does not qualify for home oxygen at this time.      Julisa Payne, RT

## 2024-12-13 NOTE — PROGRESS NOTES
Titrated pt to room air, SpO2 low 90s. Did 6 min walk with pt and she did not qualify for home O2. No further respiratory interventions at this time.    Julisa Payne, RT

## 2024-12-13 NOTE — PLAN OF CARE
A&Ox4. VSS, afebrile. Denies pain. On room air with Sp02 91%. LS diminished. SOB/[place]. Enforced IS teaching. On telemetry. BLE edema 1+-2+. Blanchable redness to buttocks. Scab to left toe. Plan for patient to possible discharge home tomorrow.     Etelvina Holden RN .......  12/13/2024  3:06 PM      Goal Outcome Evaluation:      Plan of Care Reviewed With: patient    Overall Patient Progress: improving    Outcome Evaluation: VSS, afebrile, on RA, ambulating with SBA

## 2024-12-13 NOTE — PROGRESS NOTES
12/13/24 1208   Appointment Info   Signing Clinician's Name / Credentials (PT) Dangelo Musa MPT   Living Environment   People in Home alone   Current Living Arrangements house   Home Accessibility no concerns;stairs to enter home   Number of Stairs, Main Entrance 4   Stair Railings, Main Entrance railings safe and in good condition   Transportation Anticipated family or friend will provide   Self-Care   Usual Activity Tolerance moderate   Current Activity Tolerance moderate   Equipment Currently Used at Home cane, straight;walker, rolling;walker, standard   Fall history within last six months no   General Information   Referring Physician Jennifer   Patient/Family Therapy Goals Statement (PT) return home when able   Existing Precautions/Restrictions fall   Weight-Bearing Status - LLE full weight-bearing   Weight-Bearing Status - RLE full weight-bearing   Cognition   Affect/Mental Status (Cognition) WFL   Orientation Status (Cognition) oriented x 4   Follows Commands (Cognition) WFL   Pain Assessment   Patient Currently in Pain No   Integumentary/Edema   Integumentary/Edema no deficits were identifed   Posture    Posture Not impaired   Range of Motion (ROM)   Range of Motion ROM is WFL   Strength (Manual Muscle Testing)   Strength (Manual Muscle Testing) strength is WFL   Strength Comments (age appropriate LE strength)   Bed Mobility   Comment, (Bed Mobility) minimal to SBA   Transfers   Comment, (Transfers) sit to stand and stand pivot with SBA and use of Fww   Gait/Stairs (Locomotion)   [place] Level (Gait) supervision   Assistive Device (Gait) walker, front-wheeled   Distance in Feet (Gait) 150   Pattern (Gait) step-through   Balance   Balance Comments good with Fww   Sensory Examination   Sensory Perception WFL   Coordination   Coordination no deficits were identified   Muscle Tone   Muscle Tone no deficits were identified   Clinical Impression   Criteria for Skilled Therapeutic Intervention Yes, treatment  indicated  (through home care services)   PT Diagnosis (PT) impaired mobility   Influenced by the following impairments fatigue   Functional limitations due to impairments activity/gait tolerance   Clinical Presentation (PT Evaluation Complexity) stable   Clinical Decision Making (Complexity) low complexity   Planned Therapy Interventions (PT) gait training   Risk & Benefits of therapy have been explained evaluation/treatment results reviewed;risks/benefits reviewed;patient   PT Total Evaluation Time   PT Stephan, Low Complexity Minutes (90530) 15   Physical Therapy Goals   PT Frequency Daily   PT Predicted Duration/Target Date for Goal Attainment 12/15/24   PT Goals Gait   PT: Gait Supervision/stand-by assist;Rolling walker;Greater than 200 feet   PT Discharge Planning   PT Plan Continue PT   PT Discharge Recommendation (DC Rec) home with assist;home with home care physical therapy   PT Rationale for DC Rec to promote strength, stability and safe mobility   PT Brief overview of current status Patient recently discharged from short term rehab able to demonstrate functional mobilities with SBA and use of Fww; patient able to ambulate with Fww up to 150 while maintaining O2 sats above 90% on room air; patient does report fatigue with activity; she will benefit from continued PT through home care services to promote increased functional activity tolerance and strength   PT Equipment Needed at Discharge cane, straight;walker, rolling   Physical Therapy Time and Intention   Total Session Time (sum of timed and untimed services) 15

## 2024-12-13 NOTE — PROGRESS NOTES
NS ADMISSION NOTE    Patient admitted to room 1800 at approximately 311 via wheel chair from emergency room. Patient was accompanied by nurse.     Verbal SBAR report received from ED RN  prior to patient arrival.     Patient ambulated to bed with stand-by assist. Patient alert and oriented X 3. The patient is not having any pain.  . Admission vital signs: Blood pressure (!) 150/101, pulse 84, temperature 97.1  F (36.2  C), temperature source Tympanic, resp. rate 24, weight 58 kg (127 lb 13.9 oz), last menstrual period 01/25/1974, SpO2 92%, not currently breastfeeding. Patient was oriented to plan of care, call light, bed controls, tv, telephone, bathroom, and visiting hours.     Risk Assessment    The following safety risks were identified during admission: fall and skin. Yellow risk band applied: YES.     Skin Initial Assessment    This writer admitted this patient and completed a full skin assessment and Enmanuel score in the Adult PCS flowsheet.   Photo documentation of skin problem and/or wound competed via [employer] application (located under Media):  N/A    Appropriate interventions initiated as needed.       Enmanuel Risk Assessment  Sensory Perception: 4-->no impairment  Moisture: 4-->rarely moist  Activity: 3-->walks occasionally  Mobility: 3-->slightly limited  Nutrition: 3-->adequate  Friction and Shear: 2-->potential problem  Enmanuel Score: 19  Friction/Shear Interventions: HOB 30 degrees or less  Mattress: Standard gel/foam mattress (IsoFlex, Atmos Air, etc.)  Bed Frame: Standard width and length    Education    Patient has a [place] to Observation order: Yes  Observation education completed and documented: Yes      Jenn Bryson RN

## 2024-12-14 VITALS
WEIGHT: 126.6 LBS | BODY MASS INDEX: 24.72 KG/M2 | HEART RATE: 74 BPM | OXYGEN SATURATION: 90 % | RESPIRATION RATE: 12 BRPM | DIASTOLIC BLOOD PRESSURE: 81 MMHG | TEMPERATURE: 98.6 F | SYSTOLIC BLOOD PRESSURE: 122 MMHG

## 2024-12-14 LAB
ANION GAP SERPL CALCULATED.3IONS-SCNC: 9 MMOL/L (ref 7–15)
BUN SERPL-MCNC: 19.3 MG/DL (ref 8–23)
CALCIUM SERPL-MCNC: 9.1 MG/DL (ref 8.8–10.4)
CHLORIDE SERPL-SCNC: 102 MMOL/L (ref 98–107)
CREAT SERPL-MCNC: 0.98 MG/DL (ref 0.51–0.95)
EGFRCR SERPLBLD CKD-EPI 2021: 54 ML/MIN/1.73M2
GLUCOSE SERPL-MCNC: 117 MG/DL (ref 70–99)
HCO3 SERPL-SCNC: 28 MMOL/L (ref 22–29)
HOLD SPECIMEN: NORMAL
POTASSIUM SERPL-SCNC: 3.5 MMOL/L (ref 3.4–5.3)
SODIUM SERPL-SCNC: 139 MMOL/L (ref 135–145)

## 2024-12-14 PROCEDURE — 250N000013 HC RX MED GY IP 250 OP 250 PS 637: Performed by: FAMILY MEDICINE

## 2024-12-14 PROCEDURE — 80048 BASIC METABOLIC PNL TOTAL CA: CPT | Performed by: FAMILY MEDICINE

## 2024-12-14 PROCEDURE — 99239 HOSP IP/OBS DSCHRG MGMT >30: CPT | Performed by: FAMILY MEDICINE

## 2024-12-14 PROCEDURE — 250N000011 HC RX IP 250 OP 636: Performed by: FAMILY MEDICINE

## 2024-12-14 PROCEDURE — G0378 HOSPITAL OBSERVATION PER HR: HCPCS

## 2024-12-14 PROCEDURE — 36415 COLL VENOUS BLD VENIPUNCTURE: CPT | Performed by: FAMILY MEDICINE

## 2024-12-14 PROCEDURE — 82374 ASSAY BLOOD CARBON DIOXIDE: CPT | Performed by: FAMILY MEDICINE

## 2024-12-14 PROCEDURE — 96376 TX/PRO/DX INJ SAME DRUG ADON: CPT

## 2024-12-14 PROCEDURE — 250N000013 HC RX MED GY IP 250 OP 250 PS 637: Performed by: INTERNAL MEDICINE

## 2024-12-14 RX ORDER — ONDANSETRON 4 MG/1
4 TABLET, FILM COATED ORAL EVERY 8 HOURS PRN
Qty: 30 TABLET | Refills: 1 | Status: SHIPPED | OUTPATIENT
Start: 2024-12-14 | End: 2024-12-17

## 2024-12-14 RX ORDER — LOSARTAN POTASSIUM 25 MG/1
25 TABLET ORAL DAILY
Qty: 90 TABLET | Refills: 4 | Status: ON HOLD | OUTPATIENT
Start: 2024-12-14 | End: 2024-12-23

## 2024-12-14 RX ORDER — DILTIAZEM HYDROCHLORIDE 120 MG/1
120 CAPSULE, COATED, EXTENDED RELEASE ORAL DAILY
Qty: 90 CAPSULE | Refills: 4 | Status: ON HOLD | OUTPATIENT
Start: 2024-12-14

## 2024-12-14 RX ORDER — THYROID,PORK 90 MG
90 TABLET ORAL DAILY
Qty: 90 TABLET | Refills: 4 | Status: ON HOLD | OUTPATIENT
Start: 2024-12-14 | End: 2024-12-23

## 2024-12-14 RX ORDER — MIRTAZAPINE 7.5 MG/1
7.5 TABLET, FILM COATED ORAL AT BEDTIME
Qty: 30 TABLET | Refills: 11 | Status: ON HOLD | OUTPATIENT
Start: 2024-12-14

## 2024-12-14 RX ORDER — FUROSEMIDE 20 MG/1
TABLET ORAL
Qty: 30 TABLET | Refills: 3 | Status: SHIPPED | OUTPATIENT
Start: 2024-12-14 | End: 2024-12-17

## 2024-12-14 RX ORDER — FUROSEMIDE 10 MG/ML
20 INJECTION INTRAMUSCULAR; INTRAVENOUS ONCE
Status: COMPLETED | OUTPATIENT
Start: 2024-12-14 | End: 2024-12-14

## 2024-12-14 RX ADMIN — FUROSEMIDE 20 MG: 10 INJECTION, SOLUTION INTRAMUSCULAR; INTRAVENOUS at 11:30

## 2024-12-14 RX ADMIN — LOSARTAN POTASSIUM 25 MG: 25 TABLET, FILM COATED ORAL at 11:30

## 2024-12-14 RX ADMIN — APIXABAN 2.5 MG: 2.5 TABLET, FILM COATED ORAL at 11:29

## 2024-12-14 RX ADMIN — THYROID 90 MG: 90 TABLET ORAL at 07:49

## 2024-12-14 RX ADMIN — Medication 250 MG: at 11:30

## 2024-12-14 RX ADMIN — DILTIAZEM HYDROCHLORIDE 120 MG: 120 CAPSULE, COATED, EXTENDED RELEASE ORAL at 11:30

## 2024-12-14 ASSESSMENT — ACTIVITIES OF DAILY LIVING (ADL)
ADLS_ACUITY_SCORE: 58

## 2024-12-14 NOTE — PLAN OF CARE
"PRIMARY DIAGNOSIS: \"GENERIC\" NURSING  OUTPATIENT/OBSERVATION GOALS TO BE MET BEFORE DISCHARGE:  ADLs back to baseline: Yes    Activity and level of assistance: Up with standby assistance.    Pain status: Pain free.    Return to near baseline physical activity: Yes     Discharge Planner Nurse   Safe discharge environment identified: Yes  Barriers to discharge: No       Entered by: Etelvina Holden RN 12/14/2024 8:04 AM     Please review provider order for any additional goals.   Nurse to notify provider when observation goals have been met and patient is ready for discharge.  "

## 2024-12-14 NOTE — PLAN OF CARE
A&Ox4. VSS, afebrile. Denies pain. On room air with Sp02 90-91%. Sp02 not changing with oxygen. Ambulating to bathroom with 1 person assist. SOB/FR[place] improving per patient. LS diminished. Educated on importance of IS use. Plan for patient to discharge home today.     Temp: 98.6  F (37  C) Temp src: Tympanic BP: 122/81 Pulse: 74   Resp: 12 SpO2: 90 % O2 Device: None (Room air)     Etelvina Holden RN .......  12/14/2024  2:04 PM      Goal Outcome Evaluation:      Plan of Care Reviewed With: patient    Overall Patient Progress: improving    Outcome Evaluation: VSS, afebrile, on RA with Sp02 low 90's

## 2024-12-14 NOTE — PROGRESS NOTES
NSG DISCHARGE NOTE    Patient discharged to home at 3:13 PM via wheel chair. Accompanied by daughter and staff. Discharge instructions reviewed with patient and daughter, opportunity offered to ask questions. Prescriptions sent to patients preferred pharmacy. All belongings sent with patient.  All questions asked and answered.   Katarina Villegas, RN  Katarina Villegas, RN on 12/14/2024 at 3:14 PM

## 2024-12-14 NOTE — CARE PLAN
PRIMARY DIAGNOSIS: CHF  OUTPATIENT/OBSERVATION GOALS TO BE MET BEFORE DISCHARGE:  vital signs normal or at patient baseline- yes     tolerating oral intake to maintain hydration- yes    dyspnea improved and O2 sats greater than 88% on room air or prior home oxygen levels- yes    safe disposition plan has been identified-yes    Discharge Planner Nurse   Safe discharge environment identified: Yes  Barriers to discharge: No       Entered by: Courtney Estrada RN 12/14/2024 1:27 AM     Nurse to notify provider when observation goals have been met and patient is ready for discharge.     Courtney Estrada RN on 12/14/2024 at 1:30 AM

## 2024-12-14 NOTE — DISCHARGE SUMMARY
"[facility]    Discharge Summary  Hospitalist    Date of Admission:  12/12/2024  Date of Discharge:  12/14/2024  Discharging Provider: Matthew Duvall MD  Date of Service (when I saw the patient): 12/14/24    Discharge Diagnoses   Principal Problem:    Acute on chronic heart failure with preserved ejection fraction (HFpEF) (H)    Date Noted: 12/12/2024  Active Problems:    Stage 3a chronic kidney disease (H)    Date Noted: 2/15/2011    Mixed hyperlipidemia    Date Noted: 7/31/2018    Benign essential hypertension    Date Noted: 7/31/2018    Acquired hypothyroidism    Date Noted: 7/31/2018    Permanent atrial fibrillation (H)    Date Noted: 3/9/2015    PAD (peripheral artery disease) (H)    Date Noted: 5/2/2024    Peripheral edema    Date Noted: 5/2/2024      History of Present Illness   Janna Mcdermott is an 89 year old female who presented with a PMH significant for CKD-3a, permanent atrial fib on Eliquis, PAD, hyperlipidemia, HTN, hypothyroidism, mesenteric artery stenosis, CAD, chronic HFpEF, 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  Patient reports her O2 sat was 80% at some point today but was 96% on RA in ER.  She was hospitalized from 11/13-18/2024 for CHF and DIMITRI.  Was at [facility] for rehab but \"it was a terrible place.  The food was slop.  I will never go back there.\"  Daughter has been trying to get her into assisted living facilities but hopes the social workers here can help because she cannot do it on her own.  Daughter lives next door to the patient and the patient has PCAs who come 4 times daily to help her.  BNP elevated to 2,387, troponin 43 and 42 with no other significant lab abnormalities and VS stable for her.  She is placed in observation now for " continued diuresis.     Hospital Course   Janna Mcdermott was admitted on 12/12/2024.  Patient was given 20 mg of IV Lasix with reasonable diuresis.  She did lose a couple of pounds while here and oxygen saturations stayed good.  She was seen by respiratory therapy and walked and did not desaturate.  Did not have any oxygen desaturations that would qualify for oxygen supplementally.  The pharmacist did recommend that she could decrease her Eliquis to 2.5 mg twice daily rather than 5 mg twice daily.      We sent her home on 20 mg of Lasix rather than 10 mg orally daily for a week and then drop back down to 10 mg orally daily thereafter.      Because the patient had to miss her follow-up appointment in the clinic, we sent her renewed prescriptions to [facility] as she was out of some of them.    She has in-home assistance with 4 different people coming in over the course of the day.  Her daughter lives next-door and has a baby monitor in her mom's house so she can hear what is going on and then the neighbors also have a monitor so that they can keep tabs on her as well.  The daughter had a fair amount of discomfort around the possibility that her mom may have recurrent symptoms of shortness of breath.  It appears that we have her down to her dry weight and she seems to be clinically stable.  The daughter is interested in potentially trying to get her back into subacute rehab at the nursing home for a period of time until assisted living can be arranged.  She has been looking at different places but has not yet chosen a place.  The daughter that I feel comfortable sending her mom home on her current regimen and that I felt confident that the care that they have in place is outstanding and it is hoped that she will do well until such time that an assisted living can be arranged for her.    Schedule a follow-up visit with her in the clinic for this next week.  Will also ask  to check with [facility]  to see if she may still have some eligibility from her last qualifying stay for short-term rehab    Matthew Duvall MD    Significant Results and Procedures       Pending Results   Unresulted Labs Ordered in the Past 30 Days of this Admission       No orders found from 11/12/2024 to 12/13/2024.            Code Status   DNR / DNI       Primary Care Physician   Dwayne Gaona    Physical Exam   Temp: 98.6  F (37  C) Temp src: Tympanic BP: 122/81 Pulse: 74   Resp: 12 SpO2: 90 % O2 Device: None (Room air) Oxygen Delivery: 2 LPM  Vitals:    12/12/24 1307 12/13/24 0455 12/14/24 0541   Weight: 58 kg (127 lb 13.9 oz) 57.7 kg (127 lb 4.8 oz) 57.4 kg (126 lb 9.6 oz)     Vital Signs with Ranges  Temp:  [97.6  F (36.4  C)-98.6  F (37  C)] 98.6  F (37  C)  Pulse:  [65-77] 74  Resp:  [12-20] 12  BP: (122-154)/(75-84) 122/81  SpO2:  [88 %-92 %] 90 %  I/O last 3 completed shifts:  In: 1200 [P.O.:1200]  Out: 925 [Urine:925]    Constitutional: Pleasant elderly lady who appears younger than her stated age, in no acute distress.  Respiratory:     Significant kyphoscoliosis noted.  Air movement is fair.'s are clear.  No coarse breath sounds.  No wheezing.  No JVD or HJR at 45 degrees.  Cardiovascular: Irregularly irregular.  No audible murmur  GI: Abdomen soft and nontender  Skin/Integumen: No rashes or open areas.  She does have a dressing over her left second toe from her hammertoe    Discharge Disposition   Discharged to home  Condition at discharge: Stable    Consultations This Hospital Stay   SOCIAL WORK IP CONSULT  SOCIAL WORK IP CONSULT  CARE MANAGEMENT / SOCIAL WORK IP CONSULT  PHYSICAL THERAPY ADULT IP CONSULT  OCCUPATIONAL THERAPY ADULT IP CONSULT  SOCIAL WORK IP CONSULT    Time Spent on this Encounter   I, Matthew Duvall MD, personally saw the patient today and spent greater than 30 minutes discharging this patient.    Discharge Orders   No discharge procedures on file.  Discharge Medications   Current Discharge Medication  List        CONTINUE these medications which have NOT CHANGED    Details   alum & mag hydroxide-simethicone (MAALOX) 200-200-20 MG/5ML SUSP suspension Take 10 mLs by mouth every 4 hours as needed for indigestion.      apixaban ANTICOAGULANT (ELIQUIS ANTICOAGULANT) 5 MG tablet Take 1 tablet (5 mg) by mouth 2 times daily  Qty: 180 tablet, Refills: 4    Associated Diagnoses: Paroxysmal atrial fibrillation (H)      ARMOUR THYROID 90 MG tablet Take 1 tablet (90 mg) by mouth daily.  Qty: 90 tablet, Refills: 4    Associated Diagnoses: Acquired hypothyroidism      Cholecalciferol 10 MCG (400 UNIT) CAPS Take 800 Units by mouth daily      Cranberry-Vitamin C (CRANBERRY CONCENTRATE/VITAMINC) 75294-273 MG CAPS Take 1 capsule by mouth 2 times daily - for UTI prevention  Qty: 180 capsule, Refills: 4    Associated Diagnoses: Recurrent UTI      diltiazem ER COATED BEADS (CARDIZEM CD/CARTIA XT) 120 MG 24 hr capsule Take 1 capsule (120 mg) by mouth daily  Qty: 90 capsule, Refills: 4    Associated Diagnoses: Paroxysmal atrial fibrillation (H); Essential hypertension      furosemide (LASIX) 20 MG tablet Take 0.5 tablets (10 mg) by mouth daily.    Associated Diagnoses: Acute on chronic heart failure with preserved ejection fraction (H); Bilateral pleural effusion      latanoprost (XALATAN) 0.005 % ophthalmic solution Place 1 drop into both eyes At Bedtime       losartan (COZAAR) 25 MG tablet Take 25 mg by mouth daily.    Associated Diagnoses: Essential hypertension      magnesium hydroxide (MILK OF MAGNESIA) 400 MG/5ML suspension Take 30 mLs by mouth daily as needed for constipation or heartburn.      mirtazapine (REMERON) 7.5 MG tablet Give 1 tablet 1 hour before bedtime.  Qty: 90 tablet, Refills: 3    Comments: Patient at [facility].  Associated Diagnoses: Weight loss      ondansetron (ZOFRAN) 4 MG tablet Take 1 tablet (4 mg) by mouth every 8 hours as needed for nausea.      rosuvastatin (CRESTOR) 5 MG tablet Take 1 tablet (5 mg)  by mouth three times a week  Qty: 50 tablet, Refills: 4    Associated Diagnoses: 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); Cerebellar stroke (H); Mesenteric artery stenosis (H); Bilateral femoral artery stenosis (H)      saccharomyces boulardii (FLORASTOR) 250 MG capsule Take 1 capsule (250 mg) by mouth 2 times daily.  Qty: 180 capsule, Refills: 3    Comments: Patient at [facility].  Associated Diagnoses: Nausea and vomiting, unspecified vomiting type; Bile reflux gastritis      triamcinolone (KENALOG) 0.1 % external cream Apply topically 2 times daily as needed for irritation.           Allergies   Allergies   Allergen Reactions    Bee Venom Anaphylaxis    Benazepril Cough    Ciprofloxacin Other (See Comments)     Possible GI illness    Erythromycin Other (See Comments)     Unsure of allergy time, possible hives.    Gluten Meal GI Disturbance    Hydrochlorothiazide      Other reaction(s): Hyponatremia    Penicillins Other (See Comments)     Unsure of allergy time, possible hives     Data   Most Recent 3 CBC's:  Recent Labs   Lab Test 12/13/24  0537 12/12/24  1348 11/18/24  0649   WBC 5.8 6.9 8.0   HGB 15.5 16.8* 16.5*   MCV 94 95 95   * 169 145*      Most Recent 3 BMP's:  Recent Labs   Lab Test 12/14/24  0607 12/13/24  1320 12/13/24  0537 12/12/24  1348     --  137 134*   POTASSIUM 3.5 3.9 3.3* 4.0   CHLORIDE 102  --  100 99   CO2 28  --  28 27   BUN 19.3  --  14.3 16.8   CR 0.98*  --  1.00* 1.09*   ANIONGAP 9  --  9 8   CORY 9.1  --  8.8 9.3   *  --  103* 135*     Most Recent 2 LFT's:  Recent Labs   Lab Test 12/12/24  1348 11/13/24  0957   AST 23 27   ALT 14 23   ALKPHOS 70 86   BILITOTAL 1.0 1.0     Most Recent INR's and Anticoagulation Dosing History:  Anticoagulation Dose History          Latest Ref Rng & Units 10/15/2013 3/26/2021 11/23/2022 10/27/2024   Recent Dosing and Labs   INR 0.85 - 1.15 1.0  1.06  1.18  1.20       Details                 Most Recent 3  Troponin's:  Recent Labs   Lab Test 03/27/21  0938 03/26/21  1218 03/26/21  0554   TROPI 0.116* 0.094* 0.036     Most Recent Cholesterol Panel:  Recent Labs   Lab Test 07/26/24  0735   CHOL 151   LDL 75   HDL 62   TRIG 72     Most Recent 6 Bacteria Isolates From Any Culture (See EPIC Reports for Culture Details):No lab results found.  Most Recent TSH, T4 and A1c Labs:  Recent Labs   Lab Test 11/13/24  0957 04/24/24  1418 03/04/24  0711   TSH 10.07*   < > 16.90*   T4 1.56   < > 1.27   A1C  --   --  5.9    < > = values in this interval not displayed.     Results for orders placed or performed during the hospital encounter of 12/12/24   XR Chest 2 Views    Narrative    Exam:  XR CHEST 2 VIEWS    HISTORY: shortness of breath, effusion? pneumonia?.    COMPARISON:  11/16/2024    FINDINGS:     The cardiomediastinal contours are unchanged.      Small bilateral pleural effusions, slightly worsened since 11/16/2024.  No pneumothorax. There are streaky bibasilar opacities.      No acute osseous abnormality.       Impression    IMPRESSION:      Small bilateral pleural effusions, slightly worsened since 11/16/2024.  There are bibasilar streaky opacities which are likely due to  compressive atelectasis, however superimposed infection is not  excluded.    CHRISTOPHER GRADY MD         SYSTEM ID:  [id number]

## 2024-12-14 NOTE — PLAN OF CARE
"Patient able to discharge to home with daughter, all outcomes met. Katarina Villegas RN on 12/14/2024 at 2:55 PM    Problem: Adult Inpatient Plan of Care  Goal: Plan of Care Review  Description: The Plan of Care Review/Shift note should be completed every shift.  The Outcome Evaluation is a brief statement about your assessment that the patient is improving, declining, or no change.  This information will be displayed automatically on your shift  note.  Outcome: Met  Goal: Patient-Specific Goal (Individualized)  Description: You can add care plan individualizations to a care plan. Examples of Individualization might be:  \"Parent requests to be called daily at 9am for status\", \"I have a hard time hearing out of my right ear\", or \"Do not touch me to wake me up as it startles  me\".  Outcome: Met  Goal: Absence of Hospital-Acquired Illness or Injury  Outcome: Met  Goal: Optimal Comfort and Wellbeing  Outcome: Met  Goal: Readiness for Transition of Care  Outcome: Met     Problem: Skin Injury Risk Increased  Goal: Skin Health and Integrity  Outcome: Met     Problem: Gas Exchange Impaired  Goal: Optimal Gas Exchange  Outcome: Met   Goal Outcome Evaluation:                        "

## 2024-12-14 NOTE — PLAN OF CARE
PRIMARY DIAGNOSIS: CHF  OUTPATIENT/OBSERVATION GOALS TO BE MET BEFORE DISCHARGE:  vital signs normal or at patient baseline- yes      tolerating oral intake to maintain hydration- yes     dyspnea improved and O2 sats greater than 88% on room air or prior home oxygen levels- yes     safe disposition plan has been identified-yes     Discharge Planner Nurse   Safe discharge environment identified: Yes  Barriers to discharge: Yes       Entered by: Courtney Estrada RN 12/14/2024 4:47 AM     Goal Outcome Evaluation:    VSS, A&O, Pleasant. Afebrile, 92% on RA. Lungs diminished. BLE edema +1-2. Pt slept well between cares. All questions and concerns addressed.     /81 (BP Location: Left arm, Patient Position: Semi-Hadley's, Cuff Size: Adult Small)   Pulse 65   Temp 98.6  F (37  C) (Tympanic)   Resp 20   Wt 57.7 kg (127 lb 4.8 oz)   LMP 01/25/1974 (Approximate)   SpO2 92%   BMI 24.86 kg/m          Plan of Care Reviewed With: patient    Overall Patient Progress: improving         Courtney Estrada RN on 12/14/2024 at 4:49 AM

## 2024-12-14 NOTE — CARE PLAN
PRIMARY DIAGNOSIS: CHF  OUTPATIENT/OBSERVATION GOALS TO BE MET BEFORE DISCHARGE:  vital signs normal or at patient baseline- yes      tolerating oral intake to maintain hydration- yes     dyspnea improved and O2 sats greater than 88% on room air or prior home oxygen levels- yes     safe disposition plan has been identified-yes     Discharge Planner Nurse   Safe discharge environment identified: Yes  Barriers to discharge: Yes       Entered by: Courtney Estrada RN 12/14/2024 1:31 AM      Nurse to notify provider when observation goals have been met and patient is ready for discharge.    Courtney Estrada RN on 12/14/2024 at 1:32 AM

## 2024-12-14 NOTE — PROGRESS NOTES
SAFETY CHECKLIST  ID Bands and Risk clasps correct and in place (DNR, Fall risk, Allergy, Latex, Limb):  Yes  All Lines Reconciled and labeled correctly: Yes  Whiteboard updated:Yes  Environmental interventions: Yes  Verify Tele #: [facility] [phone]    Courtney Estrada RN on 12/13/2024 at 7:21 PM

## 2024-12-14 NOTE — PHARMACY
Ezekiel thyroid was returning to Patient's daughter at [facility] on 3:00 PM. Verified patient's  and name.     Jesús Curtis, [facility] ....................  2024   3:48 PM

## 2024-12-14 NOTE — PLAN OF CARE
SAFETY CHECKLIST  ID Bands and Risk clasps correct and in place (DNR, Fall risk, Allergy, Latex, Limb):  Yes  All Lines Reconciled and labeled correctly: Yes  Whiteboard updated:Yes  Environmental interventions: Yes  Verify Tele #: N/A    Etelvina Holden RN .......  12/14/2024  8:33 AM

## 2024-12-14 NOTE — PLAN OF CARE
PRIMARY DIAGNOSIS: CHF  OUTPATIENT/OBSERVATION GOALS TO BE MET BEFORE DISCHARGE:  ADLs back to baseline: Yes    Activity and level of assistance: Up with standby assistance.    Pain status: Pain free.    Return to near baseline physical activity: Yes     Discharge Planner Nurse   Safe discharge environment identified: Yes  Barriers to discharge: No       Entered by: Etelvina Holden RN 12/14/2024 11:55 AM     Please review provider order for any additional goals.   Nurse to notify provider when observation goals have been met and patient is ready for discharge.

## 2024-12-14 NOTE — PROGRESS NOTES
VS assessed O2 was 84% on RA. Placed on 2L NC. O2 improved to 90%.     Courtney Estrada RN on 12/14/2024 at 5:52 AM

## 2024-12-14 NOTE — PHARMACY - DISCHARGE MEDICATION RECONCILIATION AND EDUCATION
Pharmacy:  Discharge Counseling and Medication Reconciliation    Janna Mcdermott  [address] [address]  [phone] (home)   89 year old female  PCP: Dwayne Gaona    Allergies: Bee venom, Benazepril, Ciprofloxacin, Erythromycin, Gluten meal, Hydrochlorothiazide, and Penicillins    Discharge Counseling:    Pharmacist met with patient (and/or family) today to review the medication portion of the After Visit Summary (with an emphasis on NEW medications) and to address patient's questions/concerns.    Summary of Education: Patient is not receiving any new medication. Patient's daughter was consulted on her mother medication changes. Patient daughter was concerned about buying additional apixaban. She was made aware that she can split her tablets  in half for the time being.     Materials Provided:  MedCounselor sheets printed from Clinical Pharmacology on: N/A    Discharge Medication Reconciliation:    It has been determined that the patient has an adequate supply of medications available or which can be obtained from the patient's preferred pharmacy, which HE/SHE has confirmed as: [facility], but medication was sent to [facility]. Patient's daughter was advised to contacted [facility] trasfer the patient's medication to [facility] after initial fill  [An updated medication list will be faxed to the patient's pharmacy.]    Thank you for the consult.    Jesús Curtis [facility]........December 14, 2024 3:13 PM

## 2024-12-15 ENCOUNTER — MYC MEDICAL ADVICE (OUTPATIENT)
Dept: FAMILY MEDICINE | Facility: OTHER | Age: 89
End: 2024-12-15
Payer: COMMERCIAL

## 2024-12-16 ENCOUNTER — PATIENT OUTREACH (OUTPATIENT)
Dept: INTERNAL MEDICINE | Facility: OTHER | Age: 89
End: 2024-12-16
Payer: COMMERCIAL

## 2024-12-16 ENCOUNTER — TELEPHONE (OUTPATIENT)
Dept: INTERNAL MEDICINE | Facility: OTHER | Age: 89
End: 2024-12-16
Payer: COMMERCIAL

## 2024-12-16 NOTE — TELEPHONE ENCOUNTER
Patient's daughter trying to reach Dr. Olviarez regarding medication error yesterday on patient's lasix. Daughter would like to know if she should give 20 mg dose today or hold it. Patient has lost 3 lbs since yesterday.  Please call to advise.    Gretel Kwok on 12/16/2024 at 9:57 AM

## 2024-12-16 NOTE — TELEPHONE ENCOUNTER
Dr. Olivarez,    Patient has hospital follow-up scheduled with you for tomorrow.      Patient has been taking too much lasix (med error).      Was supposed to be takinmg for 7 days and then down to 10mg daily.   She HAS been takinmg daily.      Has lost 3 lbs since yesterday and daughter is wondering how much to give her today.     OK to hold today and give 10mg tomorrow morning?  (See's you tomorrow).       ___________________________________________________________________        - Patient hospitalized for CHF exacerbation and A-fib.    Dr. Duvall's discharge note:    We sent her home on 20 mg of Lasix rather than 10 mg orally daily for a week and then drop back down to 10 mg orally daily thereafter.

## 2024-12-16 NOTE — TELEPHONE ENCOUNTER
Patient has a hospital follow-up visit tomorrow. No TCM call required per policy. Yareli Hand RN on 12/16/2024 at 8:41 AM

## 2024-12-16 NOTE — TELEPHONE ENCOUNTER
Daughter of the patient called and stated that she mistakenly gave the patient 40mg of furosemide yesterday instead of 20 mg and is wondering how to proceed with medications today. Please call.      Kiara Brock on 12/16/2024 at 8:45 AM

## 2024-12-16 NOTE — TELEPHONE ENCOUNTER
Duplicate encounter. MyChart encounters from yesterday routed to Dr. Olivarez for review. Yareli Hand RN on 12/16/2024 at 8:57 AM

## 2024-12-16 NOTE — TELEPHONE ENCOUNTER
OK to hold today and then 10 mg tomorrow as below.  Then we will address that at tomorrow's visit.

## 2024-12-16 NOTE — TELEPHONE ENCOUNTER
Duplicate message. [employer] message was also sent.     Linda Barnes CMA on 12/16/2024 at 10:12 AM

## 2024-12-17 ENCOUNTER — OFFICE VISIT (OUTPATIENT)
Dept: FAMILY MEDICINE | Facility: OTHER | Age: 89
End: 2024-12-17
Attending: FAMILY MEDICINE
Payer: MEDICARE

## 2024-12-17 ENCOUNTER — MYC MEDICAL ADVICE (OUTPATIENT)
Dept: FAMILY MEDICINE | Facility: OTHER | Age: 89
End: 2024-12-17

## 2024-12-17 ENCOUNTER — MEDICAL CORRESPONDENCE (OUTPATIENT)
Dept: HEALTH INFORMATION MANAGEMENT | Facility: OTHER | Age: 89
End: 2024-12-17

## 2024-12-17 VITALS
BODY MASS INDEX: 24.41 KG/M2 | WEIGHT: 125 LBS | DIASTOLIC BLOOD PRESSURE: 62 MMHG | OXYGEN SATURATION: 95 % | RESPIRATION RATE: 16 BRPM | SYSTOLIC BLOOD PRESSURE: 112 MMHG | HEART RATE: 84 BPM | TEMPERATURE: 98.1 F

## 2024-12-17 DIAGNOSIS — I50.32 CHRONIC HEART FAILURE WITH PRESERVED EJECTION FRACTION (H): Primary | ICD-10-CM

## 2024-12-17 DIAGNOSIS — E03.9 ACQUIRED HYPOTHYROIDISM: Primary | Chronic | ICD-10-CM

## 2024-12-17 DIAGNOSIS — E03.9 ACQUIRED HYPOTHYROIDISM: Chronic | ICD-10-CM

## 2024-12-17 DIAGNOSIS — I10 ESSENTIAL HYPERTENSION: Chronic | ICD-10-CM

## 2024-12-17 DIAGNOSIS — I50.33 ACUTE ON CHRONIC HEART FAILURE WITH PRESERVED EJECTION FRACTION (H): ICD-10-CM

## 2024-12-17 DIAGNOSIS — I25.10 CORONARY ARTERY DISEASE INVOLVING NATIVE CORONARY ARTERY OF NATIVE HEART WITHOUT ANGINA PECTORIS: ICD-10-CM

## 2024-12-17 DIAGNOSIS — R11.0 NAUSEA: ICD-10-CM

## 2024-12-17 DIAGNOSIS — I48.21 PERMANENT ATRIAL FIBRILLATION (H): ICD-10-CM

## 2024-12-17 DIAGNOSIS — R11.2 NAUSEA AND VOMITING, UNSPECIFIED VOMITING TYPE: ICD-10-CM

## 2024-12-17 LAB
ANION GAP SERPL CALCULATED.3IONS-SCNC: 11 MMOL/L (ref 7–15)
BUN SERPL-MCNC: 29.7 MG/DL (ref 8–23)
CALCIUM SERPL-MCNC: 9.6 MG/DL (ref 8.8–10.4)
CHLORIDE SERPL-SCNC: 95 MMOL/L (ref 98–107)
CREAT SERPL-MCNC: 1.19 MG/DL (ref 0.51–0.95)
EGFRCR SERPLBLD CKD-EPI 2021: 42 ML/MIN/1.73M2
GLUCOSE SERPL-MCNC: 129 MG/DL (ref 70–99)
HCO3 SERPL-SCNC: 29 MMOL/L (ref 22–29)
POTASSIUM SERPL-SCNC: 3.8 MMOL/L (ref 3.4–5.3)
SODIUM SERPL-SCNC: 135 MMOL/L (ref 135–145)
TSH SERPL DL<=0.005 MIU/L-ACNC: 17.05 UIU/ML (ref 0.3–4.2)

## 2024-12-17 PROCEDURE — 82565 ASSAY OF CREATININE: CPT | Mod: ZL | Performed by: FAMILY MEDICINE

## 2024-12-17 PROCEDURE — 36415 COLL VENOUS BLD VENIPUNCTURE: CPT | Mod: ZL | Performed by: FAMILY MEDICINE

## 2024-12-17 PROCEDURE — G0463 HOSPITAL OUTPT CLINIC VISIT: HCPCS

## 2024-12-17 PROCEDURE — 99496 TRANSJ CARE MGMT HIGH F2F 7D: CPT | Performed by: FAMILY MEDICINE

## 2024-12-17 PROCEDURE — 80048 BASIC METABOLIC PNL TOTAL CA: CPT | Mod: ZL | Performed by: FAMILY MEDICINE

## 2024-12-17 PROCEDURE — 84443 ASSAY THYROID STIM HORMONE: CPT | Mod: ZL | Performed by: FAMILY MEDICINE

## 2024-12-17 RX ORDER — FUROSEMIDE 20 MG/1
20 TABLET ORAL DAILY
Qty: 90 TABLET | Refills: 3 | Status: SHIPPED | OUTPATIENT
Start: 2024-12-17

## 2024-12-17 RX ORDER — ONDANSETRON 4 MG/1
4 TABLET, FILM COATED ORAL EVERY 8 HOURS PRN
Qty: 40 TABLET | Refills: 2 | Status: SHIPPED | OUTPATIENT
Start: 2024-12-17

## 2024-12-17 RX ORDER — POTASSIUM CHLORIDE 750 MG/1
10 TABLET, EXTENDED RELEASE ORAL DAILY
Qty: 90 TABLET | Refills: 3 | Status: SHIPPED | OUTPATIENT
Start: 2024-12-17

## 2024-12-17 ASSESSMENT — PAIN SCALES - GENERAL: PAINLEVEL_OUTOF10: NO PAIN (0)

## 2024-12-17 NOTE — PROGRESS NOTES
Assessment & Plan     Chronic heart failure with preserved ejection fraction (H)  Improved after the hospitalization.  For improved diuresis and consistency and ease of administration, we will just stick with the 20 mg 1 tab daily for now.  Add potassium chloride 10 milliequivalents daily to that.  Check BMP today as well as in 1 month.  We will maintain on her other medications without changes.  Discussed fluid management with her, although difficult with her significantly low appetite and the nausea as below.  Follow-up then with PCP as scheduled in 3 months, sooner if any worsening symptoms.  - Basic metabolic panel; Future  - potassium chloride vsailiy ER (KLOR-CON M10) 10 MEQ CR tablet; Take 1 tablet (10 mEq) by mouth daily.  - Basic metabolic panel    Permanent atrial fibrillation (H)  On anticoagulation.  No acute issues.    Coronary artery disease involving native coronary artery of native heart without angina pectoris  No acute issues.    Nausea  With low appetite.  Since this is worse for the supper meal, could use Zofran more on a regular basis for the supper meal for now.  Continue to monitor and let me know if anything worsens or changes.  - ondansetron (ZOFRAN) 4 MG tablet; Take 1 tablet (4 mg) by mouth every 8 hours as needed for nausea.    Acute on chronic heart failure with preserved ejection fraction (H)  As above.  - furosemide (LASIX) 20 MG tablet; Take 1 tablet (20 mg) by mouth daily.    Acquired hypothyroidism  Check TSH today and adjust thyroid armour dose based on that.  - TSH; Future  - TSH        MED REC REQUIRED  Post Medication Reconciliation Status:  Discharge medications reconciled and changed, see notes/orders        No follow-ups on file.    Ramila Saldivar is a 89 year old, presenting for the following health issues:  Hospital F/U ([facility]    CHF and low O2)        11/5/2024     7:57 AM   Additional Questions   Roomed by Osvaldo Wayne LPN     HPI   89-year-old female here for  posthospital follow-up for acute on chronic congestive heart failure with hypoxia.  She is here with daughter and caregiver today.  She was admitted last month for 5-day hospitalization for acute on chronic congestive heart failure with preserved ejection fraction.  She at that time was discharged to [facility] and was there for a couple weeks where she completed physical therapy rehabilitation with improvement.  She is able to get back to her usual home situation, which is living alone with her daughter living next-door and her caregiver who lives 2 houses down.  She does have a baby monitor in her home so she has direct contact with the daughter and the caregiver at all times.  She then had worsening dyspnea last week and presented back to the emergency room on 12/12/2024.  She was hypoxic with bilateral pleural effusions and exacerbation of CHF again.  She had diuresis and then was discharged 2 days later on 12/14/2024.  She is back in her usual home situation.  They have multiple questions on fluid management and the diuretic.  They had planned to get her back to her usual Lasix dose of 10 mg daily, but that has always been a difficult thing for her to cut those in half since the tablets are so small.  She also has low appetite and persistent nausea so even p.o. fluids sometimes can be a challenge.  She basically drinks ginger ale and water and tries to drink a quart a day.  Daughter feels like that is probably not enough as her urine is still a darker yellow.  At any rate she appears to be pretty stable as far as the dyspnea right now.  Lower extremity edema is stable.  They do monitor her weights daily.  Blood pressure has been excellent with systolic blood pressure 108-141 over diastolics 58-87.  O2 sats have been 93-94 at rest and sitting.  They will go a little bit lower if she is leaning forward or lying down where they will be closer to 89 to 90%.  They are looking into assisted living  facilities for her, but currently feel like she is doing okay with the daily check-in's with the daughter and the caregiver.  The daughter was concerned that she maybe gave her too much of the Lasix because she was continuing the 20 mg daily when they were supposed to cut it back to the 10 mg daily.  She actually did not get the Lasix dose this morning and were looking for today's guidance on further recommendations on that moving forward.    She continues on anticoagulation with lower dose of Eliquis for the atrial fibrillation.  That seems to be stable.  There are no changes with her other medications at all other than the Lasix.  She does have the persistent nausea with low appetite.  That has been evaluated in the past.  This seems to be worse with the supper meal.  They expect her not to eat that much anyway due to multiple medical problems as well as her age, but she does have Zofran available to take on an as-needed basis.  They wonder if possibly she could use a little bit more regularly.    She also has hypothyroidism and is on thyroid [place].  They had lowered the dose down to 90 daily with the TSH over 10 last month.  They would like that rechecked and medication adjusted as indicated.  She overall is otherwise doing well.  No other complaints.    I have reviewed the patient's medical history in detail and updated the computerized patient record.        Hospital Follow-up Visit:    Hospital/Nursing Home/IP Rehab Facility: [facility]  Date of Admission: 12-12-24  Date of Discharge: 12-14-24   Reason(s) for Admission: CHF  and low O2  Was the patient in the ICU or did the patient experience delirium during hospitalization?  No  Do you have any other stressors you would like to discuss with your provider? No    Problems taking medications regularly:  None  Medication changes since discharge: None  Problems adhering to non-medication therapy:  None    Summary of hospitalization:  [facility] discharge summary reviewed  Diagnostic Tests/Treatments reviewed.  Follow up needed: none  Other Healthcare Providers Involved in Patient s Care:         None  Update since discharge: stable.         Plan of care communicated with patient, family, and caregiver                       Objective    /62 (BP Location: Right arm, Patient Position: Sitting, Cuff Size: Adult Regular)   Pulse 84   Temp 98.1  F (36.7  C) (Temporal)   Resp 16   Wt 56.7 kg (125 lb)   LMP 01/25/1974 (Approximate)   SpO2 95%   BMI 24.41 kg/m    Body mass index is 24.41 kg/m .  Physical Exam   GENERAL: pleasant elderly female, alert and no distress  RESP: lungs clear to auscultation, no crackles, no wheezes, mild decreased breath sounds at the bases bilaterally  CV: irregularly irregular rhythm, normal rate, normal S1 S2, no S3 or S4, no murmur, click or rub  Extremities: 1+ edema foot to ankles bilaterally.            Signed Electronically by: Atilio Olivarez MD

## 2024-12-17 NOTE — NURSING NOTE
Chief Complaint   Patient presents with    Hospital F/U     [facility]    CHF and low O2       Medication reconciliation completed.    FOOD SECURITY SCREENING QUESTIONS:    The next two questions are to help us understand your food security.  If you are feeling you need any assistance in this area, we have resources available to support you today.    Hunger Vital Signs:  Within the past 12 months we worried whether our food would run out before we got money to buy more. Never  Within the past 12 months the food we bought just didn't last and we didn't have money to get more. Never    Initial /62 (BP Location: Right arm, Patient Position: Sitting, Cuff Size: Adult Regular)   Pulse 84   Temp 98.1  F (36.7  C) (Temporal)   Resp 16   Wt 56.7 kg (125 lb)   LMP 01/25/1974 (Approximate)   SpO2 95%   BMI 24.41 kg/m   Estimated body mass index is 24.41 kg/m  as calculated from the following:    Height as of 11/18/24: 1.524 m (5').    Weight as of this encounter: 56.7 kg (125 lb).       Claudette Flores LPN .......  12/17/2024  12:46 PM

## 2024-12-18 NOTE — TELEPHONE ENCOUNTER
Patient daughter is concerned after CHF and Zofran use.     Currently prescribed Zofran 4 mg q 8 hours PRN     Per EKG from 12/12/24      Routing to provider to review and respond.  Adolph Rutherford RN on 12/18/2024 at 2:15 PM

## 2024-12-19 RX ORDER — LOSARTAN POTASSIUM 25 MG/1
12.5 TABLET ORAL DAILY
Qty: 45 TABLET | Refills: 4 | OUTPATIENT
Start: 2024-12-19

## 2024-12-19 RX ORDER — THYROID 120 MG/1
120 TABLET ORAL DAILY
Qty: 90 TABLET | Refills: 0 | Status: SHIPPED | OUTPATIENT
Start: 2024-12-19

## 2024-12-19 NOTE — TELEPHONE ENCOUNTER
90 day filled per Dr. Kirkpatrick's recommendations on 12/17 lab results. Needs repeat labs in 6-8 weeks to follow up on dose change.     ePri Archuleta PA-C on 12/19/2024 at 8:19 AM

## 2024-12-19 NOTE — TELEPHONE ENCOUNTER
Please route to Dr. Olivarez for his review upon his return tomorrow as I have not seen this patient previously.   Peri Archuleta PA-C on 12/19/2024 at 1:43 PM

## 2024-12-19 NOTE — TELEPHONE ENCOUNTER
Also wondering if her Losartan dose should be decreased. Has lost 20 lbs now AM b/p are lower: 111/60, 118/59. Has had low energy and lightheadedness.     Currently prescribed Losartan 25 mg daily and Furosemide 20 mg daily.    Recommend decreasing to Losartan 12.5 mg daily.     Kimo'd up order.     Routing to provider to review and respond.  Christiana Hall RN on 12/19/2024 at 1:36 PM

## 2024-12-22 ENCOUNTER — HOSPITAL ENCOUNTER (INPATIENT)
Facility: OTHER | Age: 89
End: 2024-12-22
Attending: PHYSICIAN ASSISTANT | Admitting: HOSPITALIST
Payer: MEDICARE

## 2024-12-22 ENCOUNTER — APPOINTMENT (OUTPATIENT)
Dept: GENERAL RADIOLOGY | Facility: OTHER | Age: 89
End: 2024-12-22
Attending: PHYSICIAN ASSISTANT
Payer: MEDICARE

## 2024-12-22 DIAGNOSIS — I50.33 ACUTE ON CHRONIC HEART FAILURE WITH PRESERVED EJECTION FRACTION (H): ICD-10-CM

## 2024-12-22 DIAGNOSIS — J96.01 ACUTE HYPOXEMIC RESPIRATORY FAILURE (H): ICD-10-CM

## 2024-12-22 DIAGNOSIS — I50.33 ACUTE ON CHRONIC HEART FAILURE WITH PRESERVED EJECTION FRACTION (HFPEF) (H): ICD-10-CM

## 2024-12-22 DIAGNOSIS — R11.0 NAUSEA: ICD-10-CM

## 2024-12-22 DIAGNOSIS — I50.23 ACUTE ON CHRONIC SYSTOLIC HEART FAILURE (H): Primary | ICD-10-CM

## 2024-12-22 DIAGNOSIS — Z20.822 LAB TEST NEGATIVE FOR COVID-19 VIRUS: ICD-10-CM

## 2024-12-22 DIAGNOSIS — I50.9 ACUTE ON CHRONIC CONGESTIVE HEART FAILURE, UNSPECIFIED HEART FAILURE TYPE (H): ICD-10-CM

## 2024-12-22 DIAGNOSIS — Z79.01 LONG TERM (CURRENT) USE OF ANTICOAGULANTS: ICD-10-CM

## 2024-12-22 DIAGNOSIS — R11.2 NAUSEA AND VOMITING, UNSPECIFIED VOMITING TYPE: ICD-10-CM

## 2024-12-22 DIAGNOSIS — I48.21 PERMANENT ATRIAL FIBRILLATION (H): ICD-10-CM

## 2024-12-22 DIAGNOSIS — E03.9 ACQUIRED HYPOTHYROIDISM: Chronic | ICD-10-CM

## 2024-12-22 LAB
ALBUMIN SERPL BCG-MCNC: 3.8 G/DL (ref 3.5–5.2)
ALP SERPL-CCNC: 73 U/L (ref 40–150)
ALT SERPL W P-5'-P-CCNC: 14 U/L (ref 0–50)
ANION GAP SERPL CALCULATED.3IONS-SCNC: 9 MMOL/L (ref 7–15)
AST SERPL W P-5'-P-CCNC: 24 U/L (ref 0–45)
BASE EXCESS BLDV CALC-SCNC: 9.9 MMOL/L (ref -3–3)
BASOPHILS # BLD AUTO: 0 10E3/UL (ref 0–0.2)
BASOPHILS NFR BLD AUTO: 0 %
BILIRUB SERPL-MCNC: 0.9 MG/DL
BUN SERPL-MCNC: 22.7 MG/DL (ref 8–23)
CALCIUM SERPL-MCNC: 9.7 MG/DL (ref 8.8–10.4)
CHLORIDE SERPL-SCNC: 89 MMOL/L (ref 98–107)
CREAT SERPL-MCNC: 1.13 MG/DL (ref 0.51–0.95)
EGFRCR SERPLBLD CKD-EPI 2021: 45 ML/MIN/1.73M2
EOSINOPHIL # BLD AUTO: 0 10E3/UL (ref 0–0.7)
EOSINOPHIL NFR BLD AUTO: 0 %
ERYTHROCYTE [DISTWIDTH] IN BLOOD BY AUTOMATED COUNT: 13.8 % (ref 10–15)
FLUAV RNA SPEC QL NAA+PROBE: NEGATIVE
FLUBV RNA RESP QL NAA+PROBE: NEGATIVE
GLUCOSE SERPL-MCNC: 178 MG/DL (ref 70–99)
HCO3 BLDV-SCNC: 36 MMOL/L (ref 21–28)
HCO3 SERPL-SCNC: 34 MMOL/L (ref 22–29)
HCT VFR BLD AUTO: 50.9 % (ref 35–47)
HGB BLD-MCNC: 17.5 G/DL (ref 11.7–15.7)
HOLD SPECIMEN: NORMAL
IMM GRANULOCYTES # BLD: 0 10E3/UL
IMM GRANULOCYTES NFR BLD: 0 %
LYMPHOCYTES # BLD AUTO: 0.8 10E3/UL (ref 0.8–5.3)
LYMPHOCYTES NFR BLD AUTO: 9 %
MAGNESIUM SERPL-MCNC: 2 MG/DL (ref 1.7–2.3)
MCH RBC QN AUTO: 31.5 PG (ref 26.5–33)
MCHC RBC AUTO-ENTMCNC: 34.4 G/DL (ref 31.5–36.5)
MCV RBC AUTO: 92 FL (ref 78–100)
MONOCYTES # BLD AUTO: 0.7 10E3/UL (ref 0–1.3)
MONOCYTES NFR BLD AUTO: 8 %
NEUTROPHILS # BLD AUTO: 7.6 10E3/UL (ref 1.6–8.3)
NEUTROPHILS NFR BLD AUTO: 82 %
NRBC # BLD AUTO: 0 10E3/UL
NRBC BLD AUTO-RTO: 0 /100
NT-PROBNP SERPL-MCNC: 2481 PG/ML (ref 0–1800)
O2/TOTAL GAS SETTING VFR VENT: 21 %
OXYHGB MFR BLDV: 50 % (ref 70–75)
PCO2 BLDV: 51 MM HG (ref 40–50)
PH BLDV: 7.46 [PH] (ref 7.32–7.43)
PLATELET # BLD AUTO: 194 10E3/UL (ref 150–450)
PO2 BLDV: 24 MM HG (ref 25–47)
POTASSIUM SERPL-SCNC: 4 MMOL/L (ref 3.4–5.3)
PROT SERPL-MCNC: 6.8 G/DL (ref 6.4–8.3)
RBC # BLD AUTO: 5.55 10E6/UL (ref 3.8–5.2)
RSV RNA SPEC NAA+PROBE: NEGATIVE
SAO2 % BLDV: 50.6 % (ref 70–75)
SARS-COV-2 RNA RESP QL NAA+PROBE: NEGATIVE
SODIUM SERPL-SCNC: 132 MMOL/L (ref 135–145)
WBC # BLD AUTO: 9.2 10E3/UL (ref 4–11)

## 2024-12-22 PROCEDURE — 84132 ASSAY OF SERUM POTASSIUM: CPT | Performed by: PHYSICIAN ASSISTANT

## 2024-12-22 PROCEDURE — 87637 SARSCOV2&INF A&B&RSV AMP PRB: CPT | Performed by: PHYSICIAN ASSISTANT

## 2024-12-22 PROCEDURE — 99285 EMERGENCY DEPT VISIT HI MDM: CPT | Mod: 25 | Performed by: PHYSICIAN ASSISTANT

## 2024-12-22 PROCEDURE — 120N000001 HC R&B MED SURG/OB

## 2024-12-22 PROCEDURE — 71045 X-RAY EXAM CHEST 1 VIEW: CPT

## 2024-12-22 PROCEDURE — 93005 ELECTROCARDIOGRAM TRACING: CPT | Performed by: PHYSICIAN ASSISTANT

## 2024-12-22 PROCEDURE — 96374 THER/PROPH/DIAG INJ IV PUSH: CPT | Performed by: PHYSICIAN ASSISTANT

## 2024-12-22 PROCEDURE — 83880 ASSAY OF NATRIURETIC PEPTIDE: CPT | Performed by: PHYSICIAN ASSISTANT

## 2024-12-22 PROCEDURE — 85025 COMPLETE CBC W/AUTO DIFF WBC: CPT | Performed by: PHYSICIAN ASSISTANT

## 2024-12-22 PROCEDURE — 99223 1ST HOSP IP/OBS HIGH 75: CPT | Mod: AI | Performed by: HOSPITALIST

## 2024-12-22 PROCEDURE — 82805 BLOOD GASES W/O2 SATURATION: CPT | Performed by: PHYSICIAN ASSISTANT

## 2024-12-22 PROCEDURE — 83735 ASSAY OF MAGNESIUM: CPT | Performed by: PHYSICIAN ASSISTANT

## 2024-12-22 PROCEDURE — 93010 ELECTROCARDIOGRAM REPORT: CPT | Performed by: INTERNAL MEDICINE

## 2024-12-22 PROCEDURE — 258N000003 HC RX IP 258 OP 636: Performed by: PHYSICIAN ASSISTANT

## 2024-12-22 PROCEDURE — 96361 HYDRATE IV INFUSION ADD-ON: CPT | Performed by: PHYSICIAN ASSISTANT

## 2024-12-22 PROCEDURE — 99285 EMERGENCY DEPT VISIT HI MDM: CPT | Performed by: PHYSICIAN ASSISTANT

## 2024-12-22 PROCEDURE — 74018 RADEX ABDOMEN 1 VIEW: CPT

## 2024-12-22 PROCEDURE — 96375 TX/PRO/DX INJ NEW DRUG ADDON: CPT | Performed by: PHYSICIAN ASSISTANT

## 2024-12-22 PROCEDURE — 36415 COLL VENOUS BLD VENIPUNCTURE: CPT | Performed by: PHYSICIAN ASSISTANT

## 2024-12-22 RX ORDER — FUROSEMIDE 10 MG/ML
20 INJECTION INTRAMUSCULAR; INTRAVENOUS ONCE
Status: DISCONTINUED | OUTPATIENT
Start: 2024-12-22 | End: 2024-12-22

## 2024-12-22 RX ORDER — ONDANSETRON 2 MG/ML
4 INJECTION INTRAMUSCULAR; INTRAVENOUS ONCE
Status: DISCONTINUED | OUTPATIENT
Start: 2024-12-22 | End: 2024-12-25

## 2024-12-22 RX ORDER — FUROSEMIDE 10 MG/ML
20 INJECTION INTRAMUSCULAR; INTRAVENOUS ONCE
Status: COMPLETED | OUTPATIENT
Start: 2024-12-22 | End: 2024-12-23

## 2024-12-22 RX ADMIN — SODIUM CHLORIDE 1000 ML: 9 INJECTION, SOLUTION INTRAVENOUS at 19:38

## 2024-12-22 ASSESSMENT — ENCOUNTER SYMPTOMS
SHORTNESS OF BREATH: 1
FEVER: 0
FATIGUE: 1
NAUSEA: 1
VOMITING: 1
ABDOMINAL PAIN: 0
COUGH: 0
DYSURIA: 0
CHILLS: 0

## 2024-12-22 ASSESSMENT — ACTIVITIES OF DAILY LIVING (ADL)
ADLS_ACUITY_SCORE: 67
ADLS_ACUITY_SCORE: 58

## 2024-12-23 ENCOUNTER — APPOINTMENT (OUTPATIENT)
Dept: PHYSICAL THERAPY | Facility: OTHER | Age: 89
End: 2024-12-23
Attending: HOSPITALIST
Payer: MEDICARE

## 2024-12-23 ENCOUNTER — APPOINTMENT (OUTPATIENT)
Dept: OCCUPATIONAL THERAPY | Facility: OTHER | Age: 89
End: 2024-12-23
Attending: HOSPITALIST
Payer: MEDICARE

## 2024-12-23 ENCOUNTER — TELEPHONE (OUTPATIENT)
Dept: INTERNAL MEDICINE | Facility: OTHER | Age: 89
End: 2024-12-23
Payer: COMMERCIAL

## 2024-12-23 LAB
ANION GAP SERPL CALCULATED.3IONS-SCNC: 7 MMOL/L (ref 7–15)
BUN SERPL-MCNC: 19.7 MG/DL (ref 8–23)
CALCIUM SERPL-MCNC: 8.5 MG/DL (ref 8.8–10.4)
CHLORIDE SERPL-SCNC: 98 MMOL/L (ref 98–107)
CREAT SERPL-MCNC: 1.04 MG/DL (ref 0.51–0.95)
EGFRCR SERPLBLD CKD-EPI 2021: 50 ML/MIN/1.73M2
ERYTHROCYTE [DISTWIDTH] IN BLOOD BY AUTOMATED COUNT: 13.9 % (ref 10–15)
GLUCOSE SERPL-MCNC: 108 MG/DL (ref 70–99)
HCO3 SERPL-SCNC: 32 MMOL/L (ref 22–29)
HCT VFR BLD AUTO: 43.6 % (ref 35–47)
HGB BLD-MCNC: 14.7 G/DL (ref 11.7–15.7)
HOLD SPECIMEN: NORMAL
HOLD SPECIMEN: NORMAL
MCH RBC QN AUTO: 31.4 PG (ref 26.5–33)
MCHC RBC AUTO-ENTMCNC: 33.7 G/DL (ref 31.5–36.5)
MCV RBC AUTO: 93 FL (ref 78–100)
PLATELET # BLD AUTO: 132 10E3/UL (ref 150–450)
POTASSIUM SERPL-SCNC: 3.9 MMOL/L (ref 3.4–5.3)
RBC # BLD AUTO: 4.68 10E6/UL (ref 3.8–5.2)
SODIUM SERPL-SCNC: 137 MMOL/L (ref 135–145)
WBC # BLD AUTO: 7.6 10E3/UL (ref 4–11)

## 2024-12-23 PROCEDURE — 250N000011 HC RX IP 250 OP 636: Performed by: HOSPITALIST

## 2024-12-23 PROCEDURE — 97535 SELF CARE MNGMENT TRAINING: CPT | Mod: GO | Performed by: OCCUPATIONAL THERAPIST

## 2024-12-23 PROCEDURE — 120N000001 HC R&B MED SURG/OB

## 2024-12-23 PROCEDURE — 250N000013 HC RX MED GY IP 250 OP 250 PS 637: Performed by: HOSPITALIST

## 2024-12-23 PROCEDURE — 99232 SBSQ HOSP IP/OBS MODERATE 35: CPT | Performed by: HOSPITALIST

## 2024-12-23 PROCEDURE — 85018 HEMOGLOBIN: CPT | Performed by: HOSPITALIST

## 2024-12-23 PROCEDURE — 36415 COLL VENOUS BLD VENIPUNCTURE: CPT | Performed by: HOSPITALIST

## 2024-12-23 PROCEDURE — 80051 ELECTROLYTE PANEL: CPT | Performed by: HOSPITALIST

## 2024-12-23 PROCEDURE — 80048 BASIC METABOLIC PNL TOTAL CA: CPT | Performed by: HOSPITALIST

## 2024-12-23 PROCEDURE — 97161 PT EVAL LOW COMPLEX 20 MIN: CPT | Mod: GP

## 2024-12-23 PROCEDURE — 97530 THERAPEUTIC ACTIVITIES: CPT | Mod: GO | Performed by: OCCUPATIONAL THERAPIST

## 2024-12-23 PROCEDURE — 999N000157 HC STATISTIC RCP TIME EA 10 MIN

## 2024-12-23 PROCEDURE — 97116 GAIT TRAINING THERAPY: CPT | Mod: GP

## 2024-12-23 PROCEDURE — 93010 ELECTROCARDIOGRAM REPORT: CPT | Performed by: INTERNAL MEDICINE

## 2024-12-23 PROCEDURE — 97530 THERAPEUTIC ACTIVITIES: CPT | Mod: GP

## 2024-12-23 RX ORDER — TRIAMCINOLONE ACETONIDE 1 MG/G
CREAM TOPICAL 2 TIMES DAILY PRN
Status: ACTIVE | OUTPATIENT
Start: 2024-12-22

## 2024-12-23 RX ORDER — PANTOPRAZOLE SODIUM 40 MG/1
40 TABLET, DELAYED RELEASE ORAL
Status: DISCONTINUED | OUTPATIENT
Start: 2024-12-23 | End: 2024-12-26

## 2024-12-23 RX ORDER — ACETAMINOPHEN 650 MG/1
650 SUPPOSITORY RECTAL EVERY 4 HOURS PRN
Status: ACTIVE | OUTPATIENT
Start: 2024-12-23

## 2024-12-23 RX ORDER — ONDANSETRON 4 MG/1
4 TABLET, ORALLY DISINTEGRATING ORAL EVERY 6 HOURS PRN
Status: DISPENSED | OUTPATIENT
Start: 2024-12-23

## 2024-12-23 RX ORDER — LOSARTAN POTASSIUM 25 MG/1
12.5 TABLET ORAL DAILY
Status: ON HOLD | COMMUNITY

## 2024-12-23 RX ORDER — ACETAMINOPHEN 325 MG/1
650 TABLET ORAL EVERY 4 HOURS PRN
Status: ACTIVE | OUTPATIENT
Start: 2024-12-23

## 2024-12-23 RX ORDER — LOSARTAN POTASSIUM 25 MG/1
25 TABLET ORAL DAILY
Status: DISCONTINUED | OUTPATIENT
Start: 2024-12-23 | End: 2024-12-23

## 2024-12-23 RX ORDER — SACCHAROMYCES BOULARDII 250 MG
250 CAPSULE ORAL 2 TIMES DAILY
Status: ACTIVE | OUTPATIENT
Start: 2024-12-23

## 2024-12-23 RX ORDER — AMOXICILLIN 250 MG
2 CAPSULE ORAL 2 TIMES DAILY PRN
Status: DISCONTINUED | OUTPATIENT
Start: 2024-12-22 | End: 2024-12-23

## 2024-12-23 RX ORDER — AMOXICILLIN 250 MG
1 CAPSULE ORAL 2 TIMES DAILY PRN
Status: DISCONTINUED | OUTPATIENT
Start: 2024-12-22 | End: 2024-12-23

## 2024-12-23 RX ORDER — LATANOPROST 50 UG/ML
1 SOLUTION/ DROPS OPHTHALMIC AT BEDTIME
Status: DISPENSED | OUTPATIENT
Start: 2024-12-23

## 2024-12-23 RX ORDER — ONDANSETRON 4 MG/1
4 TABLET, FILM COATED ORAL EVERY 8 HOURS PRN
Status: DISCONTINUED | OUTPATIENT
Start: 2024-12-22 | End: 2024-12-23

## 2024-12-23 RX ORDER — LIDOCAINE 40 MG/G
CREAM TOPICAL
Status: ACTIVE | OUTPATIENT
Start: 2024-12-22

## 2024-12-23 RX ORDER — ONDANSETRON 2 MG/ML
4 INJECTION INTRAMUSCULAR; INTRAVENOUS EVERY 6 HOURS PRN
Status: DISPENSED | OUTPATIENT
Start: 2024-12-23

## 2024-12-23 RX ORDER — ROSUVASTATIN CALCIUM 5 MG/1
5 TABLET, COATED ORAL
Status: DISPENSED | OUTPATIENT
Start: 2024-12-23

## 2024-12-23 RX ORDER — AMOXICILLIN 250 MG
2 CAPSULE ORAL AT BEDTIME
Status: ACTIVE | OUTPATIENT
Start: 2024-12-23

## 2024-12-23 RX ORDER — AMOXICILLIN 250 MG
1 CAPSULE ORAL AT BEDTIME
Status: DISPENSED | OUTPATIENT
Start: 2024-12-23

## 2024-12-23 RX ORDER — THYROID 120 MG/1
120 TABLET ORAL DAILY
Status: ACTIVE | OUTPATIENT
Start: 2024-12-23

## 2024-12-23 RX ORDER — MIRTAZAPINE 7.5 MG/1
7.5 TABLET, FILM COATED ORAL AT BEDTIME
Status: DISCONTINUED | OUTPATIENT
Start: 2024-12-23 | End: 2024-12-24

## 2024-12-23 RX ORDER — POTASSIUM CHLORIDE 750 MG/1
10 TABLET, EXTENDED RELEASE ORAL DAILY
Status: DISPENSED | OUTPATIENT
Start: 2024-12-23

## 2024-12-23 RX ORDER — CARVEDILOL 6.25 MG/1
6.25 TABLET ORAL 2 TIMES DAILY
Status: DISCONTINUED | OUTPATIENT
Start: 2024-12-23 | End: 2024-12-24

## 2024-12-23 RX ORDER — FUROSEMIDE 20 MG/1
20 TABLET ORAL DAILY
Status: ACTIVE | OUTPATIENT
Start: 2024-12-23

## 2024-12-23 RX ORDER — POLYETHYLENE GLYCOL 3350 17 G/17G
17 POWDER, FOR SOLUTION ORAL DAILY
Status: DISCONTINUED | OUTPATIENT
Start: 2024-12-23 | End: 2024-12-26

## 2024-12-23 RX ORDER — DILTIAZEM HYDROCHLORIDE 120 MG/1
120 CAPSULE, COATED, EXTENDED RELEASE ORAL DAILY
Status: DISCONTINUED | OUTPATIENT
Start: 2024-12-23 | End: 2024-12-24

## 2024-12-23 RX ORDER — FUROSEMIDE 10 MG/ML
40 INJECTION INTRAMUSCULAR; INTRAVENOUS
Status: DISCONTINUED | OUTPATIENT
Start: 2024-12-23 | End: 2024-12-24

## 2024-12-23 RX ORDER — CALCIUM CARBONATE 500 MG/1
1000 TABLET, CHEWABLE ORAL 4 TIMES DAILY PRN
Status: ACTIVE | OUTPATIENT
Start: 2024-12-22

## 2024-12-23 RX ORDER — MAGNESIUM HYDROXIDE/ALUMINUM HYDROXICE/SIMETHICONE 120; 1200; 1200 MG/30ML; MG/30ML; MG/30ML
10 SUSPENSION ORAL EVERY 4 HOURS PRN
Status: ACTIVE | OUTPATIENT
Start: 2024-12-22

## 2024-12-23 RX ORDER — VITAMIN B COMPLEX
25 TABLET ORAL DAILY
Status: ACTIVE | OUTPATIENT
Start: 2024-12-23

## 2024-12-23 RX ADMIN — POTASSIUM CHLORIDE 10 MEQ: 750 TABLET, FILM COATED, EXTENDED RELEASE ORAL at 10:04

## 2024-12-23 RX ADMIN — FUROSEMIDE 40 MG: 10 INJECTION, SOLUTION INTRAMUSCULAR; INTRAVENOUS at 13:57

## 2024-12-23 RX ADMIN — CARVEDILOL 6.25 MG: 6.25 TABLET, FILM COATED ORAL at 21:56

## 2024-12-23 RX ADMIN — SENNOSIDES AND DOCUSATE SODIUM 1 TABLET: 50; 8.6 TABLET ORAL at 21:56

## 2024-12-23 RX ADMIN — MIRTAZAPINE 7.5 MG: 7.5 TABLET, FILM COATED ORAL at 21:56

## 2024-12-23 RX ADMIN — FUROSEMIDE 40 MG: 10 INJECTION, SOLUTION INTRAMUSCULAR; INTRAVENOUS at 08:51

## 2024-12-23 RX ADMIN — CARVEDILOL 6.25 MG: 6.25 TABLET, FILM COATED ORAL at 01:07

## 2024-12-23 RX ADMIN — THYROID 120 MG: 120 TABLET ORAL at 10:03

## 2024-12-23 RX ADMIN — PANTOPRAZOLE SODIUM 40 MG: 40 TABLET, DELAYED RELEASE ORAL at 13:57

## 2024-12-23 RX ADMIN — APIXABAN 2.5 MG: 2.5 TABLET, FILM COATED ORAL at 01:07

## 2024-12-23 RX ADMIN — CARVEDILOL 6.25 MG: 6.25 TABLET, FILM COATED ORAL at 10:08

## 2024-12-23 RX ADMIN — APIXABAN 2.5 MG: 2.5 TABLET, FILM COATED ORAL at 21:56

## 2024-12-23 RX ADMIN — MIRTAZAPINE 7.5 MG: 7.5 TABLET, FILM COATED ORAL at 01:07

## 2024-12-23 RX ADMIN — DILTIAZEM HYDROCHLORIDE 120 MG: 120 CAPSULE, COATED, EXTENDED RELEASE ORAL at 10:04

## 2024-12-23 RX ADMIN — LOSARTAN POTASSIUM 12.5 MG: 25 TABLET, FILM COATED ORAL at 10:04

## 2024-12-23 RX ADMIN — FUROSEMIDE 20 MG: 10 INJECTION, SOLUTION INTRAMUSCULAR; INTRAVENOUS at 00:03

## 2024-12-23 RX ADMIN — ROSUVASTATIN CALCIUM 5 MG: 5 TABLET, FILM COATED ORAL at 10:04

## 2024-12-23 RX ADMIN — LATANOPROST 1 DROP: 50 SOLUTION/ DROPS OPHTHALMIC at 21:56

## 2024-12-23 RX ADMIN — APIXABAN 2.5 MG: 2.5 TABLET, FILM COATED ORAL at 10:04

## 2024-12-23 ASSESSMENT — ACTIVITIES OF DAILY LIVING (ADL)
ADLS_ACUITY_SCORE: 70
ADLS_ACUITY_SCORE: 68
ADLS_ACUITY_SCORE: 70
ADLS_ACUITY_SCORE: 69
ADLS_ACUITY_SCORE: 70
ADLS_ACUITY_SCORE: 69
ADLS_ACUITY_SCORE: 69
ADLS_ACUITY_SCORE: 68
ADLS_ACUITY_SCORE: 69
ADLS_ACUITY_SCORE: 70
ADLS_ACUITY_SCORE: 70
ADLS_ACUITY_SCORE: 69
ADLS_ACUITY_SCORE: 70
ADLS_ACUITY_SCORE: 68
ADLS_ACUITY_SCORE: 70
ADLS_ACUITY_SCORE: 68
ADLS_ACUITY_SCORE: 69
ADLS_ACUITY_SCORE: 70
ADLS_ACUITY_SCORE: 69

## 2024-12-23 NOTE — H&P
[facility]    History and Physical - Hospitalist Service       Date of Admission:  12/22/2024    Assessment & Plan      Janna Mcdermott is a 89 year old female admitted on 12/22/2024. She come in with dypsnea.    CHF:  Recurrent dyspnea and fluid overload per CXR.  Will continue lasix IV  Unclear if due to to afib, dietary indiscretions or nocturnal hypoxia.  Was an issue 2 weeks ago.  Started low dose coreg to diltiazem and ARB.  Monitor weights.    ACUTE RESPIRATORY FAILURE:  On RA at home and now on 3L  Diurese and monitor.    POLYCYTHEMIA:  Unclear if it is normal aging or cardiac or hypoxia.  She is polycythemic even though she did not qualify for oxygen last admission.  Consider overnight pulse ox at home.    AFIB:  Monitor on tele.  Continue eliquis.  Added BB    FATIGUE:  Issue for months.  PT/OT  Due to hypoxia, deconditioning or medical issues.    PROTEIN CALORIE MALNUTRITION MODERATE:  Weight was #141 in July and now #128.  Consulted nutrition.  Added supplements  Has been vomiting and might need EGD?? Does have a lot of stool and will start meds for this.    FULL CODE:  Not sure why she changed her mind from DNR.  Please talk to her again.        Diet: Fluid restriction 2000 ML FLUID (and additional linked orders)  Combination Diet Regular Diet Adult; 2 gm NA Diet    DVT Prophylaxis: DOAC  Grubbs Catheter: Not present  Lines: None     Cardiac Monitoring: ACTIVE order. Indication: Acute decompensated heart failure (48 hours)  Code Status: Full Code      Clinically Significant Risk Factors Present on Admission         # Hyponatremia: Lowest Na = 132 mmol/L in last 2 days, will monitor as appropriate  # Hypochloremia: Lowest Cl = 89 mmol/L in last 2 days, will monitor as appropriate       # Drug Induced Coagulation Defect: home medication list includes an anticoagulant medication    # Hypertension: Noted on problem list  # Acute heart failure with preserved ejection fraction: heart  failure noted on problem list, last echo with EF >50%, and receiving IV diuretics    # Acute Hypoxic Respiratory Failure: Documented O2 saturation < 90%. Continue supplemental oxygen as needed            # Financial/Environmental Concerns:           Disposition Plan     Medically Ready for Discharge:            Mercedes Quiroz MD  Hospitalist Service  [facility]  Securely message with xkoto (more info)  Text page via [facility] Paging/Directory     ______________________________________________________________________    Chief Complaint   fatigue, dyspnea.  History is obtained from the patient    History of Present Illness   Janna Mcdermott is a 89 year old female who comes in for dyspnea and fatigue.  Hardly can walk in house.  No N/V but no appetite. ER notes states has been vomiting. Taking her meds and recently increased her thyroid medicine.  No chest pain or palpitations.  No cough but does have dyspnea.  No increase in abd pain/girth or LE edema.      Past Medical History    Past Medical History:   Diagnosis Date    Asymptomatic menopausal state     on estrogen    Chronic kidney disease, stage I     No Comments Provided    Dermatitis     No Comments Provided    Essential (primary) hypertension     No Comments Provided    Hyperlipidemia     No Comments Provided    Hypothyroidism     No Comments Provided    Other specified symptoms and signs involving the circulatory and respiratory systems     No Comments Provided    Paroxysmal atrial fibrillation (H)     No Comments Provided       Past Surgical History   Past Surgical History:   Procedure Laterality Date    APPENDECTOMY OPEN      No Comments Provided    CHOLECYSTECTOMY      No Comments Provided    HYSTERECTOMY TOTAL ABDOMINAL      No Comments Provided       Prior to Admission Medications   Prior to Admission Medications   Prescriptions Last Dose Informant Patient Reported? Taking?   JACOB THYROID 90 MG tablet   No No   Sig: Take 1  tablet (90 mg) by mouth daily.   Cholecalciferol 10 MCG (400 UNIT) CAPS  Self, Daughter Yes No   Sig: Take 800 Units by mouth daily   Cranberry-Vitamin C (CRANBERRY CONCENTRATE/VITAMINC) 51360-889 MG CAPS  Self, Daughter No No   Sig: Take 1 capsule by mouth 2 times daily - for UTI prevention   alum & mag hydroxide-simethicone (MAALOX) 200-200-20 MG/5ML SUSP suspension  Self, Daughter Yes No   Sig: Take 10 mLs by mouth every 4 hours as needed for indigestion.   apixaban ANTICOAGULANT (ELIQUIS) 2.5 MG tablet   No No   Sig: Take 1 tablet (2.5 mg) by mouth 2 times daily.   diltiazem ER COATED BEADS (CARDIZEM CD/CARTIA XT) 120 MG 24 hr capsule   No No   Sig: Take 1 capsule (120 mg) by mouth daily.   furosemide (LASIX) 20 MG tablet   No No   Sig: Take 1 tablet (20 mg) by mouth daily.   latanoprost (XALATAN) 0.005 % ophthalmic solution  Self, Daughter Yes No   Sig: Place 1 drop into both eyes At Bedtime    losartan (COZAAR) 25 MG tablet   No No   Sig: Take 1 tablet (25 mg) by mouth daily.   magnesium hydroxide (MILK OF MAGNESIA) 400 MG/5ML suspension  Self, Daughter Yes No   Sig: Take 30 mLs by mouth daily as needed for constipation or heartburn.   mirtazapine (REMERON) 7.5 MG tablet   No No   Sig: Take 1 tablet (7.5 mg) by mouth at bedtime.   ondansetron (ZOFRAN) 4 MG tablet   No No   Sig: Take 1 tablet (4 mg) by mouth every 8 hours as needed for nausea.   potassium chloride vasiliy ER (KLOR-CON M10) 10 MEQ CR tablet   No No   Sig: Take 1 tablet (10 mEq) by mouth daily.   rosuvastatin (CRESTOR) 5 MG tablet  Self, Daughter No No   Sig: Take 1 tablet (5 mg) by mouth three times a week   saccharomyces boulardii (FLORASTOR) 250 MG capsule  Self, Daughter No No   Sig: Take 1 capsule (250 mg) by mouth 2 times daily.   thyroid (ARMOUR) 120 MG tablet   No No   Sig: Take 1 tablet (120 mg) by mouth daily.   triamcinolone (KENALOG) 0.1 % external cream  Self, Daughter Yes No   Sig: Apply topically 2 times daily as needed for  irritation.      Facility-Administered Medications: None        Review of Systems    The 10 point Review of Systems is negative other than noted in the HPI or here.     Social History   I have reviewed this patient's social history and updated it with pertinent information if needed.  Social History     Tobacco Use    Smoking status: Never    Smokeless tobacco: Never   Vaping Use    Vaping status: Never Used   Substance Use Topics    Alcohol use: No     Alcohol/week: 0.0 standard drinks of alcohol    Drug use: Never         Family History   I have reviewed this patient's family history and updated it with pertinent information if needed.  Family History   Problem Relation Age of Onset    Coronary Artery Disease Mother     Hypertension Mother     Heart Disease Mother         Heart Disease    Heart Disease Sister         Heart Disease,    Heart Disease Sister         Heart Disease,    Other - See Comments Sister         at 6 months    Other - See Comments Brother           in service    Other - See Comments Brother          of Parkinson's    Other - See Comments Brother          of pneumonia    Other - See Comments Other         No cancer or diabetes in the family         Allergies   Allergies   Allergen Reactions    Bee Venom Anaphylaxis    Benazepril Cough    Ciprofloxacin Other (See Comments)     Possible GI illness    Erythromycin Other (See Comments)     Unsure of allergy time, possible hives.    Gluten Meal GI Disturbance    Hydrochlorothiazide      Other reaction(s): Hyponatremia    Penicillins Other (See Comments)     Unsure of allergy time, possible hives        Physical Exam   Vital Signs: Temp: 98  F (36.7  C) Temp src: Tympanic BP: 130/53 Pulse: 74   Resp: 25 SpO2: 92 % O2 Device: Nasal cannula Oxygen Delivery: 5 LPM  Weight: 128 lbs 1.6 oz    General Appearance: Wdwn woman in nad, A&O  Respiratory: lcta, no wheezing or dypsnea  Cardiovascular: rrr, good perfusion  GI: no  distension  Skin: no rashes/lesions on exposed skin.    Medical Decision Making             Data     I have personally reviewed the following data over the past 24 hrs:    9.2  \   17.5 (H)   / 194     132 (L) 89 (L) 22.7 /  178 (H)   4.0 34 (H) 1.13 (H) \     ALT: 14 AST: 24 AP: 73 TBILI: 0.9   ALB: 3.8 TOT PROTEIN: 6.8 LIPASE: N/A     Trop: N/A BNP: 2,481 (H)       Imaging results reviewed over the past 24 hrs:   Recent Results (from the past 24 hours)   XR Chest Port 1 View    Narrative    EXAM: XR CHEST PORT 1 VIEW  LOCATION: [facility]  DATE: 12/22/2024    INDICATION: sob  COMPARISON: 12/12/2024      Impression    IMPRESSION: Bibasilar opacities represent pleural fluid and atelectasis. Interstitial edema has increased. The heart is not well assessed.    XR Abdomen Port 1 View    Narrative    EXAM: XR ABDOMEN PORT 1 VIEW  LOCATION: [facility]  DATE: 12/22/2024    INDICATION: N/V, decreased bowel movements. Obstruction?  COMPARISON: None.      Impression    IMPRESSION: Nonobstructive bowel gas pattern. Moderate stool within the colon. No free air. Small bibasilar pleural effusions and bibasilar atelectasis or airspace disease at the lower lungs. Spine degenerative changes.           As the provider for the telehealth service, I attest that I introduced myself to the patient and provided my credentials. Based on review of the patient s chart and/or a discussion with members of the patient s treatment team, I determined that telemedicine via a real-time, two-way, interactive audio and video platform is an appropriate means of providing this service. The patient and I mutually agreed that this visit is appropriate for telemedicine as well.  The RN, or tech on duty in the ER, assisted me with the patient.  The patient was located at [facility] and I am located in [place], [place].  The video portion of the visit was approximately 20 mins.

## 2024-12-23 NOTE — ED PROVIDER NOTES
History     Chief Complaint   Patient presents with    Nausea & Vomiting    Shortness of Breath     HPI  Janna Mcdermott is a 89 year old female who presents to the ED for evaluation of n/v, sob. Pt arrives via private car from home with complaints of nausea, vomiting and shortness of breath. Symptoms started yesterday. Daughter states pt hasn't been eating or drinking anything and has been vomiting large amounts. Pt was just recently hospitalized for a CHF exacerbation.     Allergies:  Allergies   Allergen Reactions    Bee Venom Anaphylaxis    Penicillins Other (See Comments)     Unsure of allergy time, possible hives    Benazepril Cough    Ciprofloxacin Other (See Comments)     Possible GI illness    Erythromycin Other (See Comments)     Unsure of allergy time, possible hives.    Gluten Meal GI Disturbance    Hydrochlorothiazide      hyponatremia       Problem List:    Patient Active Problem List    Diagnosis Date Noted    Acute hypoxemic respiratory failure (H) 12/22/2024     Priority: Medium    Acute on chronic congestive heart failure, unspecified heart failure type (H) 12/22/2024     Priority: Medium    Shortness of breath 12/12/2024     Priority: Medium    Acute on chronic heart failure with preserved ejection fraction (HFpEF) (H) 12/12/2024     Priority: Medium    Weight loss 11/18/2024     Priority: Medium    DIMITRI (acute kidney injury) (H) 11/13/2024     Priority: Medium    Right second toe ulcer, limited to breakdown of skin (H) 09/03/2024     Priority: Medium    Ulcer of left second toe, limited to breakdown of skin (H) 09/03/2024     Priority: Medium    Hammer toes of both feet 09/03/2024     Priority: Medium    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) 08/09/2024     Priority: Medium    Hypercoagulable state due to paroxysmal atrial fibrillation (H) 08/09/2024     Priority: Medium    Incontinence of feces, unspecified fecal incontinence type -- Since starting Statin  Medications 05/04/2024     Priority: Medium    Recurrent UTI 05/04/2024     Priority: Medium    Statin intolerance 05/04/2024     Priority: Medium    On continuous oral anticoagulation - Eliquis 05/02/2024     Priority: Medium    History of CVA on 3/26/2021 05/02/2024     Priority: Medium    PAD (peripheral artery disease) (H) 05/02/2024     Priority: Medium    Bilateral femoral artery stenosis (H) 05/02/2024     Priority: Medium     On CT scan from 3/25/2021      Subclavian artery stenosis, left (H) 05/02/2024     Priority: Medium     90% 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      Coronary artery disease involving native coronary artery of native heart without angina pectoris 05/02/2024     Priority: Medium     Moderate on CTA chest 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      Peripheral edema 05/02/2024     Priority: Medium    Chronic heart failure with preserved ejection fraction (H) 05/02/2024     Priority: Medium    Pre-diabetes 06/04/2021     Priority: Medium    History of stroke 06/03/2021     Priority: Medium    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) 04/05/2021     Priority: Medium    Ascending aortic aneurysm (H) 04/05/2021     Priority: Medium    Basilar artery stenosis/occlusion 04/05/2021     Priority: Medium    Cerebellar stroke (H) 03/25/2021     Priority: Medium    Mixed hyperlipidemia 07/31/2018     Priority: Medium    Benign essential hypertension 07/31/2018     Priority: Medium    Acquired hypothyroidism 07/31/2018     Priority: Medium    External ear ulcer (H) 09/20/2016     Priority: Medium    Biatrial enlargement 05/28/2015     Priority: Medium    Permanent atrial fibrillation (H) 03/09/2015     Priority: Medium    Stage 3a chronic kidney disease (H) 02/15/2011     Priority: Medium    Other symptoms involving cardiovascular system 01/26/2011     Priority: Medium     Overview:   No obstruction on CUS in [place]      Contact dermatitis and eczema 09/01/2010     Priority: Medium    Gambling problem 03/07/2009     Priority: Medium      Formatting of this note might be different from the original.  Daughter feels she is addicted.  History of some financial problems due to gambling, but she denies addiction  IMO Update 10/11          Past Medical History:    Past Medical History:   Diagnosis Date    Asymptomatic menopausal state     Chronic kidney disease, stage I     Dermatitis     Essential (primary) hypertension     Hyperlipidemia     Hypothyroidism     Other specified symptoms and signs involving the circulatory and respiratory systems     Paroxysmal atrial fibrillation (H)        Past Surgical History:    Past Surgical History:   Procedure Laterality Date    APPENDECTOMY OPEN      No Comments Provided    CHOLECYSTECTOMY      No Comments Provided    HYSTERECTOMY TOTAL ABDOMINAL      No Comments Provided       Family History:    Family History   Problem Relation Age of Onset    Coronary Artery Disease Mother     Hypertension Mother     Heart Disease Mother         Heart Disease    Heart Disease Sister         Heart Disease,    Heart Disease Sister         Heart Disease,    Other - See Comments Sister         at 6 months    Other - See Comments Brother           in service    Other - See Comments Brother          of Parkinson's    Other - See Comments Brother          of pneumonia    Other - See Comments Other         No cancer or diabetes in the family       Social History:  Marital Status:   [5]  Social History     Tobacco Use    Smoking status: Never    Smokeless tobacco: Never   Vaping Use    Vaping status: Never Used   Substance Use Topics    Alcohol use: No     Alcohol/week: 0.0 standard drinks of alcohol    Drug use: Never        Medications:    No current outpatient medications on file.        Review of Systems   Constitutional:  Positive for fatigue. Negative for chills and fever.   HENT:  Negative for congestion.    Respiratory:  Positive for shortness of breath. Negative for cough.    Cardiovascular:  Negative  for chest pain.   Gastrointestinal:  Positive for nausea and vomiting. Negative for abdominal pain.        Decreased BMs.    Genitourinary:  Negative for dysuria.       Physical Exam   BP: 139/70  Pulse: 78  Temp: 97.9  F (36.6  C)  Resp: 22  Weight: 58.1 kg (128 lb 1.6 oz)  SpO2: (!) 88 %      Physical Exam  Constitutional:       General: She is not in acute distress.     Appearance: She is not diaphoretic.      Comments: Cachectic appearing   HENT:      Head: Normocephalic and atraumatic.   Eyes:      General: No scleral icterus.     Conjunctiva/sclera: Conjunctivae normal.   Neck:      Vascular: No carotid bruit.   Cardiovascular:      Rate and Rhythm: Normal rate. Rhythm irregular.   Pulmonary:      Effort: Pulmonary effort is normal.      Breath sounds: Normal breath sounds.   Abdominal:      Palpations: Abdomen is soft.      Tenderness: There is no abdominal tenderness.   Musculoskeletal:         General: No deformity.      Cervical back: Neck supple.      Right lower leg: Edema present.      Left lower leg: Edema present.   Skin:     General: Skin is warm and dry.      Coloration: Skin is not jaundiced.      Findings: No rash.   Neurological:      Mental Status: She is alert. Mental status is at baseline.   Psychiatric:         Mood and Affect: Mood normal.         Behavior: Behavior normal.         ED Course        Procedures         EKG read at 1930. HR variable, ~82, atrial fibrillation, similar to previous EKGs.     Critical Care time:  none              Results for orders placed or performed during the hospital encounter of 12/22/24 (from the past 24 hours)   Blood gas venous   Result Value Ref Range    pH Venous 7.46 (H) 7.32 - 7.43    pCO2 Venous 51 (H) 40 - 50 mm Hg    pO2 Venous 24 (L) 25 - 47 mm Hg    Bicarbonate Venous 36 (H) 21 - 28 mmol/L    Base Excess/Deficit Venous 9.9 (H) -3.0 - 3.0 mmol/L    FIO2 21     Oxyhemoglobin Venous 50 (L) 70 - 75 %    O2 Sat, Venous 50.6 (L) 70.0 - 75.0 %     Narrative    In healthy individuals, oxyhemoglobin (O2Hb) and oxygen saturation (SO2) are approximately equal. In the presence of dyshemoglobins, oxyhemoglobin can be considerably lower than oxygen saturation.   CBC with platelets differential    Narrative    The following orders were created for panel order CBC with platelets differential.  Procedure                               Abnormality         Status                     ---------                               -----------         ------                     CBC with platelets and d...[[id number]]  Abnormal            Final result                 Please view results for these tests on the individual orders.   Comprehensive metabolic panel   Result Value Ref Range    Sodium 132 (L) 135 - 145 mmol/L    Potassium 4.0 3.4 - 5.3 mmol/L    Carbon Dioxide (CO2) 34 (H) 22 - 29 mmol/L    Anion Gap 9 7 - 15 mmol/L    Urea Nitrogen 22.7 8.0 - 23.0 mg/dL    Creatinine 1.13 (H) 0.51 - 0.95 mg/dL    GFR Estimate 45 (L) >60 mL/min/1.73m2    Calcium 9.7 8.8 - 10.4 mg/dL    Chloride 89 (L) 98 - 107 mmol/L    Glucose 178 (H) 70 - 99 mg/dL    Alkaline Phosphatase 73 40 - 150 U/L    AST 24 0 - 45 U/L    ALT 14 0 - 50 U/L    Protein Total 6.8 6.4 - 8.3 g/dL    Albumin 3.8 3.5 - 5.2 g/dL    Bilirubin Total 0.9 <=1.2 mg/dL   Extra Tube ([place] Draw)    Narrative    The following orders were created for panel order Extra Tube ([place] Draw).  Procedure                               Abnormality         Status                     ---------                               -----------         ------                     Extra Blue Top Tube[[id number]]                              Final result               Extra Red Top Tube[[id number]]                               Final result               Extra Green Top (Lithium...[[id number]]                      Final result                 Please view results for these tests on the individual orders.   CBC with platelets and differential   Result Value  Ref Range    WBC Count 9.2 4.0 - 11.0 10e3/uL    RBC Count 5.55 (H) 3.80 - 5.20 10e6/uL    Hemoglobin 17.5 (H) 11.7 - 15.7 g/dL    Hematocrit 50.9 (H) 35.0 - 47.0 %    MCV 92 78 - 100 fL    MCH 31.5 26.5 - 33.0 pg    MCHC 34.4 31.5 - 36.5 g/dL    RDW 13.8 10.0 - 15.0 %    Platelet Count 194 150 - 450 10e3/uL    % Neutrophils 82 %    % Lymphocytes 9 %    % Monocytes 8 %    % Eosinophils 0 %    % Basophils 0 %    % Immature Granulocytes 0 %    NRBCs per 100 WBC 0 <1 /100    Absolute Neutrophils 7.6 1.6 - 8.3 10e3/uL    Absolute Lymphocytes 0.8 0.8 - 5.3 10e3/uL    Absolute Monocytes 0.7 0.0 - 1.3 10e3/uL    Absolute Eosinophils 0.0 0.0 - 0.7 10e3/uL    Absolute Basophils 0.0 0.0 - 0.2 10e3/uL    Absolute Immature Granulocytes 0.0 <=0.4 10e3/uL    Absolute NRBCs 0.0 10e3/uL   Extra Blue Top Tube   Result Value Ref Range    Hold Specimen JIC    Extra Red Top Tube   Result Value Ref Range    Hold Specimen JIC    Extra Green Top (Lithium Heparin) Tube   Result Value Ref Range    Hold Specimen JIC    Magnesium   Result Value Ref Range    Magnesium 2.0 1.7 - 2.3 mg/dL   Nt probnp inpatient (BNP)   Result Value Ref Range    N terminal Pro BNP Inpatient 2,481 (H) 0 - 1,800 pg/mL   Influenza A/B, RSV and SARS-CoV2 PCR (COVID-19) Nose    Specimen: Nose; Swab   Result Value Ref Range    Influenza A PCR Negative Negative    Influenza B PCR Negative Negative    RSV PCR Negative Negative    SARS CoV2 PCR Negative Negative    Narrative    Testing was performed using the Xpert Xpress CoV2/Flu/RSV Assay on the Cepheid GeneXpert Instrument. This test should be ordered for the detection of SARS-CoV2, influenza, and RSV viruses in individuals with signs and symptoms of respiratory tract infection. This test is for in vitro diagnostic use under the [place] FDA for laboratories certified under CLIA to perform high or moderate complexity testing. This test has been [place] FDA cleared. A negative result does not rule out the presence of PCR  inhibitors in the specimen or target RNA in concentration below the limit of detection for the assay. If only one viral target is positive but coinfection with multiple targets is suspected, the sample should be re-tested with another FDA cleared, approved, or authorized test, if coninfection would change clinical management. This test was validated by the [facility]. These laboratories are certified under the Clinical Laboratory Improvement Amendments of 1988 (CLIA-88) as qualified to perfom high complexity laboratory testing.   XR Chest Port 1 View    Narrative    EXAM: XR CHEST PORT 1 VIEW  LOCATION: [facility]  DATE: 12/22/2024    INDICATION: sob  COMPARISON: 12/12/2024      Impression    IMPRESSION: Bibasilar opacities represent pleural fluid and atelectasis. Interstitial edema has increased. The heart is not well assessed.    XR Abdomen Port 1 View    Narrative    EXAM: XR ABDOMEN PORT 1 VIEW  LOCATION: [facility]  DATE: 12/22/2024    INDICATION: N/V, decreased bowel movements. Obstruction?  COMPARISON: None.      Impression    IMPRESSION: Nonobstructive bowel gas pattern. Moderate stool within the colon. No free air. Small bibasilar pleural effusions and bibasilar atelectasis or airspace disease at the lower lungs. Spine degenerative changes.       EKG 12-lead, tracing only   Result Value Ref Range    Systolic Blood Pressure  mmHg    Diastolic Blood Pressure  mmHg    Ventricular Rate 52 BPM    Atrial Rate 234 BPM    LA Interval  ms    QRS Duration 72 ms     ms    QTc 394 ms    P Axis  degrees    R AXIS -48 degrees    T Axis 77 degrees    Interpretation ECG       Atrial fibrillation with slow ventricular response  Left axis deviation  Inferior infarct (cited on or before 25-Mar-2021)  Anteroseptal infarct (cited on or before 25-Mar-2021)  Abnormal ECG  When compared with ECG of 22-Dec-2024 19:29, (unconfirmed)  Vent. rate has decreased by   30 bpm     Basic metabolic panel   Result Value Ref Range    Sodium 137 135 - 145 mmol/L    Potassium 3.9 3.4 - 5.3 mmol/L    Chloride 98 98 - 107 mmol/L    Carbon Dioxide (CO2) 32 (H) 22 - 29 mmol/L    Anion Gap 7 7 - 15 mmol/L    Urea Nitrogen 19.7 8.0 - 23.0 mg/dL    Creatinine 1.04 (H) 0.51 - 0.95 mg/dL    GFR Estimate 50 (L) >60 mL/min/1.73m2    Calcium 8.5 (L) 8.8 - 10.4 mg/dL    Glucose 108 (H) 70 - 99 mg/dL   CBC with platelets   Result Value Ref Range    WBC Count 7.6 4.0 - 11.0 10e3/uL    RBC Count 4.68 3.80 - 5.20 10e6/uL    Hemoglobin 14.7 11.7 - 15.7 g/dL    Hematocrit 43.6 35.0 - 47.0 %    MCV 93 78 - 100 fL    MCH 31.4 26.5 - 33.0 pg    MCHC 33.7 31.5 - 36.5 g/dL    RDW 13.9 10.0 - 15.0 %    Platelet Count 132 (L) 150 - 450 10e3/uL   Extra Tube    Narrative    The following orders were created for panel order Extra Tube.  Procedure                               Abnormality         Status                     ---------                               -----------         ------                     Extra Blue Top Tube[[id number]]                              Final result               Extra Serum Separator Tu...[[id number]]                      Final result                 Please view results for these tests on the individual orders.   Extra Blue Top Tube   Result Value Ref Range    Hold Specimen JIC    Extra Serum Separator Tube (SST)   Result Value Ref Range    Hold Specimen JIC        Medications   ondansetron (ZOFRAN) injection 4 mg (0 mg Intravenous Hold 12/22/24 2008)   apixaban ANTICOAGULANT (ELIQUIS) tablet 2.5 mg (2.5 mg Oral $Given 12/23/24 1004)   thyroid (ARMOUR) tablet 120 mg (120 mg Oral $Given 12/23/24 1003)   furosemide (LASIX) tablet 20 mg ( Oral Automatically Held 12/26/24 1000)   diltiazem ER COATED BEADS (CARDIZEM CD/CARTIA XT) 24 hr capsule 120 mg (120 mg Oral $Given 12/23/24 1004)   latanoprost (XALATAN) 0.005 % ophthalmic solution 1 drop (has no administration in time range)   magnesium  hydroxide (MILK OF MAGNESIA) suspension 30 mL ( Oral Held by provider 12/23/24 0018)   mirtazapine (REMERON) tablet TABS 7.5 mg (7.5 mg Oral $Given 12/23/24 0107)   potassium chloride ER (K-TAB/KLOR-CON) CR tablet 10 mEq (10 mEq Oral $Given 12/23/24 1004)   rosuvastatin (CRESTOR) tablet 5 mg (5 mg Oral $Given 12/23/24 1004)   saccharomyces boulardii (FLORASTOR) capsule 250 mg ( Oral Automatically Held 12/26/24 2200)   triamcinolone (KENALOG) 0.1 % cream ( Topical Held by provider 12/23/24 0018)   Vitamin D3 (CHOLECALCIFEROL) tablet 25 mcg ( Oral Automatically Held 12/26/24 1000)   alum & mag hydroxide-simethicone (MAALOX) suspension 10 mL ( Oral Held by provider 12/23/24 0018)   lidocaine 1 % 0.1-1 mL (has no administration in time range)   lidocaine (LMX4) cream (has no administration in time range)   sodium chloride (PF) 0.9% PF flush 3 mL (3 mLs Intracatheter $Given 12/23/24 1357)   sodium chloride (PF) 0.9% PF flush 3 mL (has no administration in time range)   calcium carbonate (TUMS) chewable tablet 1,000 mg (has no administration in time range)   Patient is already receiving anticoagulation with heparin, enoxaparin (LOVENOX), warfarin (COUMADIN)  or other anticoagulant medication (has no administration in time range)   acetaminophen (TYLENOL) tablet 650 mg (has no administration in time range)     Or   acetaminophen (TYLENOL) Suppository 650 mg (has no administration in time range)   melatonin tablet 3 mg (has no administration in time range)   ondansetron (ZOFRAN ODT) ODT tab 4 mg (has no administration in time range)     Or   ondansetron (ZOFRAN) injection 4 mg (has no administration in time range)   furosemide (LASIX) injection 40 mg (40 mg Intravenous $Given 12/23/24 1357)   Continuing ACE inhibitor/ARB/ARNI from home medication list OR ACE inhibitor/ARB/ARNI order already placed during this visit (has no administration in time range)   carvedilol (COREG) tablet 6.25 mg (6.25 mg Oral $Given 12/23/24 1711)    losartan (COZAAR) half-tab 12.5 mg (12.5 mg Oral $Given 12/23/24 1004)   senna-docusate (SENOKOT-S/PERICOLACE) 8.6-50 MG per tablet 1 tablet (0 tablets Oral Hold 12/23/24 1228)     Or   senna-docusate (SENOKOT-S/PERICOLACE) 8.6-50 MG per tablet 2 tablet ( Oral See Alternative 12/23/24 1228)   polyethylene glycol (MIRALAX) Packet 17 g (0 g Oral Hold 12/23/24 1228)   pantoprazole (PROTONIX) EC tablet 40 mg (40 mg Oral $Given 12/23/24 1357)   sodium chloride 0.9% BOLUS 1,000 mL (0 mLs Intravenous Stopped 12/22/24 2048)   furosemide (LASIX) injection 20 mg (20 mg Intravenous $Given 12/23/24 0003)       Assessments & Plan (with Medical Decision Making)   Nontoxic but cachectic appearing.  Oxygen saturations do drop down below 80% on room air.  She is not on home oxygen.  She otherwise has stable vital signs and is afebrile.  She has no abdominal tenderness to palpation with good bowel sounds.  They are reporting ongoing nausea and vomiting, decreased food intake and increased weakness.  She does live in her own private home however her daughter lives next door.    BNP slight increase from previous, no leukocytosis, stable ckd. Neg viral panel.     CXR:  Bibasilar opacities represent pleural fluid and atelectasis. Interstitial edema has increased. The heart is not well assessed.     Abd xray:  Nonobstructive bowel gas pattern. Moderate stool within the colon. No free air. Small bibasilar pleural effusions and bibasilar atelectasis or airspace disease at the lower lungs. Spine degenerative changes.     She does have a new O2 requirement, seems to be fluid overloaded but is struggling with nausea/vomiting. She will need hospitalization this evening.     Her care did occur during shift change, report given and care transferred to Dr. Monroy.    Hilario Buchanan PA-C      I have reviewed the nursing notes.    I have reviewed the findings, diagnosis, plan and need for follow up with the patient.        Current Discharge  Medication List          Final diagnoses:   Acute hypoxemic respiratory failure (H)   Acute on chronic congestive heart failure, unspecified heart failure type (H)       12/22/2024   [facility] AND \[facility]\""       Hilario Buchanan PA  12/23/24 0984

## 2024-12-23 NOTE — PROGRESS NOTES
SAFETY CHECKLIST  ID Bands and Risk clasps correct and in place (DNR, Fall risk, Allergy, Latex, Limb):  Yes  All Lines Reconciled and labeled correctly: Yes  Whiteboard updated:Yes  Environmental interventions: Yes  Verify Tele #:  N/A

## 2024-12-23 NOTE — PROGRESS NOTES
Clinical Nutrition / Initial Assessment     Reason for Assessment:  Malnutrition:  Chart reviewed per MST screen: reported wt loss and decreased appetite. She lives home alone with her daughter next door. She does have daily PCA services. Her appetite has been decreased for some time now, she often has nausea/vomiting following her evening meal. She does not relate this to any specific kind of food or amount eaten. She has been taking and tolerating strawberry Ensure at home, will add to orders instead of the magic cups which she did not like. She is on a gluten free diet. Her weight is stable now over past month, but down in past 5 months ~14# (9%). Encouraged small, frequent meals/snacks and supplements between meals. Her family is looking into assisted living facilities. She had no concerns at this time.   Diet: 2 g Na, gluten free, 2000 ml fluid restriction (consider liberalizing as she eats very little and has a limited selection with gluten free foods).   Intakes: ordered lunch but she said she has not eaten yet today  Supplement: will change from magic cups to strawberry ensure per pt preference    Estimated nutritional needs based on:  current body weight  58 kg / 127 lbs  Estimated energy needs:  6246-5298 kcal/day (25-30 kcal/kg)  Estimated protein needs:  58-70 gm/day (1-1.2 g/kg)  Estimated fluid needs:  8623-1775 ml/day (1 ml/kcal)  Wt Readings from Last 10 Encounters:   12/23/24 58 kg (127 lb 12.8 oz)   12/17/24 56.7 kg (125 lb)   12/14/24 57.4 kg (126 lb 9.6 oz)   11/18/24 58 kg (127 lb 12.8 oz)   11/05/24 62.1 kg (137 lb)   10/27/24 59.9 kg (132 lb)   09/03/24 61.2 kg (135 lb)   08/09/24 61.7 kg (136 lb)   07/29/24 61.7 kg (136 lb)   07/22/24 64 kg (141 lb)      Malnutrition Criteria:  (Need to have 2 indicators to qualify recommendation)  Energy Intake:  Chronic Moderate: < 75% of estimated energy requirement for >/= 1 month  Interpretation of Weight Loss:  Chronic Severe:   >10% in 6 months (9.9%  in 5 months)  Physical Findings:  Chronic Body Fat Loss: moderate to severe and Chronic Muscle Mass Loss:  moderate to severe  Reduced  Strength:  Not Measured    Recommended Nutrition Diagnosis:   Severe Malnutrition in the context of chronic illness - based on AND/ASPEN Clinical Characterstics of Malnutrition May 2012  Malnutrition:    - Level of malnutrition: Severe       Nutrition Education: Nutrition education will be provided as appropriate.    Nutrition Diagnosis: Nutrient Intake:  inadequate nutrient intakes related to decreased appetite, nausea as evidenced by poor appetite, hx of wt loss.    Intervention:  Nutrition Prescription:     Nutrition Intervention(s):Recommended general, healthful diet  1. Meals and Snacks: 2 g Na, gluten free with 2000 ml fluid restriction  2. Medical Food Supplement:changed to ensure per pt preference     Nutrition Goal(s):  1. Pt will consume 50% or more of meals and supplements   2. She will tolerate diet as ordered (consider liberalizing for more options at meals with the gluten intolerance and poor po)  3. Pt will not have unplanned wt loss during hospitalization    Monitoring and Evaluation:   Food Intake, diet tolerance, weights    Discharge Recommendation:   Nutrition Discharge Planning  Continue supplements at home, enc BID.     RD will reassess in within 1-5 days or sooner.  Thea Ba RD on 12/23/2024 at 12:13 PM

## 2024-12-23 NOTE — ED TRIAGE NOTES
Pt arrives via private car from home with complaints of nausea, vomiting and shortness of breath. Symptoms started yesterday. Daughter states pt hasn't been eating or drinking anything and has been vomiting large amounts. Pt was just recently hospitalized for a CHF exacerbation.     Triage Assessment (Adult)       Row Name 12/22/24 1916          Triage Assessment    Airway WDL WDL        Respiratory WDL    Respiratory WDL X;cough;rhythm/pattern     Rhythm/Pattern, Respiratory shortness of breath        Cardiac WDL    Cardiac WDL WDL        Peripheral/Neurovascular WDL    Peripheral Neurovascular WDL WDL        Cognitive/Neuro/Behavioral WDL    Cognitive/Neuro/Behavioral WDL WDL

## 2024-12-23 NOTE — PROGRESS NOTES
Patient remains on 5l Nasal Cannula with Sp02 92%.  Will continue to wean O2 as tolerated.  Respiratory Therapy following.    Kenisha Becker, RT

## 2024-12-23 NOTE — PROGRESS NOTES
Patient continent. Voided 400ml into purewick cannister. Requested purewick removal after void due to discomfort from dampness of device. Stated she'd like to use the toilet/commode, but if that's difficult, she will consider having it replaced at a later time.    Guerda Cruz RN on 12/23/2024 at 5:16 AM

## 2024-12-23 NOTE — PROGRESS NOTES
Patient has been assessed for Home Oxygen needs. Oxygen readings:    *Pulse oximetry (SpO2) = 85% on room air at rest while awake.    *SpO2 improved to 90% on 2liters/minute at rest.

## 2024-12-23 NOTE — PLAN OF CARE
Goal Outcome Evaluation: Patient is A&Ox4 and pleasant. VSS. Here with CHF exacerbation; currently on 5L oxygen with sats at 92%. On room air at baseline. Reports shortness of breath. Call light appropriate and makes needs known. Denies pain.     Strict I/O and 2000 ml fluid restriction. Patient reports no appetite and vomiting when she does eat. Has lost 20 pounds in last few months.    Purewick in place with no output since admission. Patient has not had BM for several days and would like assistance with this in the morning. Open to use of suppository or oral meds/supplements. Assist of 1-2 with walker and gait belt to commode or bathroom for BM.    Patient is fearful of falling out of bed and requested all siderails up. Placed 3 siderails up and tray table on fourth side, at patient request.      Plan of Care Reviewed With: patient, child    Overall Patient Progress: no change    Outcome Evaluation: Patient currently on 5L O2 and reports shortness of breath and difficulty breathing. Edema in lower legs.    Problem: Adult Inpatient Plan of Care  Individualized Care Needs: IV lasix. Oxygen to maintain sats >92%. Encourage food and fluids and dietary supplements.  Anxieties, Fears or Concerns: Fear of falling out of bed. Requested 3 siderails up.  Patient/Family-Specific Goals (Include Timeframe): Reduce and treat CHF fluid overload. Return pt to room air. Nutrition consult to address weight loss and poor appetite/intake. Consider placement options.     Vital signs:  Temp: 97.8  F (36.6  C) Temp src: Tympanic BP: 116/64 Pulse: 73   Resp: 24 SpO2: 92 % O2 Device: Nasal cannula Oxygen Delivery: 5 LPM   Weight: 58.1 kg (128 lb 1.6 oz)  Estimated body mass index is 25.02 kg/m  as calculated from the following:    Height as of 11/18/24: 1.524 m (5').    Weight as of this encounter: 58.1 kg (128 lb 1.6 oz).

## 2024-12-23 NOTE — PLAN OF CARE
Goal Outcome Evaluation: Patient rested comfortably most of shift. C/o abdominal comfort at times; declined interventions. C/o constipation after 2 large bowel movements this AM. Voiding without difficulty. Patient is tolerating oral intake. Per patient, ambulating at baseline. Dyspnea on exertion noted. O2 stable on 2 L via n/c. VSS. BLE edema reduced to trace amounts. Skin is intact. Buttocks is red & raw; barrier cream applied. Family at bedside throughout day.      Plan of Care Reviewed With: patient    Overall Patient Progress: improvingOverall Patient Progress: improving

## 2024-12-23 NOTE — PHARMACY-CONSULT NOTE
"Pharmacy: Patient Own Medication Identification    The requirements of Policy 13-03 from the Pharmacy Policy Manual have been met and the patient will be allowed to use their own supply of: Wilcox Thyroid 120 mg tablets.    Areli Maldonado RPH has verified the name, dose, route, and directions for each medication. The integrity has been assessed and deemed safe. Medication picked up from [facility] pharmacy, verbal permission given by daughter as referenced in med rec note. Receipt placed in patient's folder in [facility].    The product(s) have been labeled as being inspected by a pharmacist and the medication has been placed in the patient-specific bin in the medication room.    Nursing will remove medication at the appropriately scheduled times and document the administration on the MAR with the designation of \"patient own\".    Nursing to return medication back to patient at time of discharge.     Areli Maldonado RPH ....................  12/23/2024   9:10 AM   "

## 2024-12-23 NOTE — PROGRESS NOTES
:     Spoke with patient and PCA in room to discuss discharge planning. Per discharge planning meeting patient is anticipated to discharge home tomorrow with [facility]. Per patient her daughter, Génesis, has been looking for an AL facility and has patient on a couple of lists.     PCA states that hospice is something they would like more information on.     Spoke with patient's daughter, Génesis, about discharge planning. Génesis was unhappy at the thought of patient discharge home tomorrow. Génesis would like patient to go to [facility] and then to an AL. She states patient is on waiting list for a couple AL facilities. Génesis would like to speak with MD at 15:00. Updated MD and he will meet with patient and family then.     Génesis was agreeable to get further information on hospice. [facility] will be here at 15:30 to speak with patient and family.     Placed a referral to [facility]ne at [facility] for [facility].     Pt/family was given the Medicare Compare list for SNF, with associated star ratings to assist with choice for referrals/discharge planning Yes    Education was given to pt/family that star ratings are updated/maintained by Medicare and can be reviewed by visiting www.medicare.gov Yes    ADALID Santoro on 12/23/2024 at 2:00 PM       from [facility] hospice is in speaking with patient and family now. MD will go and speak with patient and family after.     ADALID Santoro on 12/23/2024 at 3:46 PM

## 2024-12-23 NOTE — PROGRESS NOTES
Interdisciplinary Discharge Planning Note    Anticipated Discharge Date: 12/24-12/26    Anticipated Discharge Location: TBD    Clinical Needs Before Discharge:  stable functional status and stable oxygen requirement    Treatment Needs After Discharge:   TBD    Potential Barriers to Discharge: None identified     ADALID Santoro  12/23/2024,  2:22 PM

## 2024-12-23 NOTE — PROGRESS NOTES
12/23/24 3981   Appointment Info   Signing Clinician's Name / Credentials (PT) Dangelo Perezjusmarlee MPT   Living Environment   People in Home alone   Current Living Arrangements house   Home Accessibility no concerns;stairs to enter home   Number of Stairs, Main Entrance 4   Stair Railings, Main Entrance railings safe and in good condition   Transportation Anticipated family or friend will provide   Self-Care   Usual Activity Tolerance moderate   Current Activity Tolerance moderate   Equipment Currently Used at Home cane, straight;walker, rolling;walker, standard   Fall history within last six months no   General Information   Referring Physician Mailing   Patient/Family Therapy Goals Statement (PT) return home when able   Existing Precautions/Restrictions fall  (fatigue with activity)   Weight-Bearing Status - LLE full weight-bearing   Weight-Bearing Status - RLE full weight-bearing   Cognition   Affect/Mental Status (Cognition) WFL   Orientation Status (Cognition) oriented x 4   Follows Commands (Cognition) WFL   Pain Assessment   Patient Currently in Pain No   Integumentary/Edema   Integumentary/Edema no deficits were identifed   Posture    Posture Not impaired   Range of Motion (ROM)   Range of Motion ROM is WFL   Strength (Manual Muscle Testing)   Strength (Manual Muscle Testing) strength is WFL   Bed Mobility   Comment, (Bed Mobility) minimal assist to SBA   Transfers   Impairments Contributing to Impaired Transfers decreased strength   Comment, (Transfers) sit to stand and stand pivot with CGA to SBA using a Fww   Gait/Stairs (Locomotion)   [place] Level (Gait) contact guard;supervision   Assistive Device (Gait) walker, front-wheeled   Distance in Feet (Gait) 150   Pattern (Gait) step-through   Balance   Balance Comments good with Fww   Sensory Examination   Sensory Perception WFL   Coordination   Coordination no deficits were identified   Muscle Tone   Muscle Tone no deficits were identified   Clinical  Impression   Criteria for Skilled Therapeutic Intervention Yes, treatment indicated   PT Diagnosis (PT) impaired mobility   Influenced by the following impairments fatigue   Functional limitations due to impairments activity/gait tolerance   Clinical Presentation (PT Evaluation Complexity) stable   Clinical Decision Making (Complexity) low complexity   Planned Therapy Interventions (PT) gait training   Risk & Benefits of therapy have been explained evaluation/treatment results reviewed;risks/benefits reviewed;patient   PT Total Evaluation Time   PT Eval, Low Complexity Minutes (70416) 15   Physical Therapy Goals   PT Frequency Daily   PT Predicted Duration/Target Date for Goal Attainment 12/27/24   PT Goals Gait   PT: Gait Supervision/stand-by assist;Rolling walker;Greater than 200 feet   PT Discharge Planning   PT Plan Continue PT   PT Discharge Recommendation (DC Rec) home with assist;home with home care physical therapy   PT Rationale for DC Rec to promote strength, stability and safe mobility   PT Brief overview of current status Patient able to demonstrate functional mobility using a Fww; fatigue noted to decreased activity/gait tolerance; she may benefit from continued PT through home care services   PT Equipment Needed at Discharge cane, straight;walker, rolling

## 2024-12-23 NOTE — PROGRESS NOTES
[facility]    Medicine Progress Note - Hospitalist Service    Date of Admission:  12/22/2024    Assessment & Plan   89 years old female with history of hypertension, chronic HFpEF, atrial fibrillation was admitted with acute respiratory failure.  She has the following acute medical issues:    Acute respiratory failure: The patient does not require any oxygen at home.  She is requiring 2 to 3 L now.  This is due to volume overload leading to acute on chronic HFpEF.    Acute on chronic HFpEF: The patient's left ventricular ejection fraction is more than 70%.  She is being diuresed now with IV Lasix.  Monitor intake and output with daily weights.  Chest x-ray findings consistent with pulmonary edema.    Atrial fibrillation: The patient is anticoagulated with Eliquis.  Also on beta-blockade with Coreg.    Polycythemia: Hemoglobin level is 14.7 today.    Protein calorie malnutrition moderate: Nutrition consulted.    CKD stage III: Creatinine is around baseline.  Avoid nephrotoxins.      CODE STATUS: The patient's CODE STATUS was discussed in detail.  She would not like to be resuscitated or placed on a ventilator.  She would like to go in peace if suffered from cardiac or respiratory arrest.      Diet: Fluid restriction 2000 ML FLUID (and additional linked orders)  Combination Diet Regular Diet Adult; 2 gm NA Diet  Snacks/Supplements Adult: Magic Cup; With Meals    DVT Prophylaxis: DOAC  Grubbs Catheter: Not present  Lines: None     Cardiac Monitoring: ACTIVE order. Indication: Acute decompensated heart failure (48 hours)  Code Status: No CPR- Do NOT Intubate      Clinically Significant Risk Factors Present on Admission         # Hyponatremia: Lowest Na = 132 mmol/L in last 2 days, will monitor as appropriate  # Hypochloremia: Lowest Cl = 89 mmol/L in last 2 days, will monitor as appropriate  # Hypocalcemia: Lowest Ca = 8.5 mg/dL in last 2 days, will monitor and replace as appropriate      # Drug  Induced Coagulation Defect: home medication list includes an anticoagulant medication    # Hypertension: Noted on problem list  # Acute heart failure with preserved ejection fraction: heart failure noted on problem list, last echo with EF >50%, and receiving IV diuretics    # Acute Hypoxic Respiratory Failure: Documented O2 saturation < 90%. Continue supplemental oxygen as needed            # Financial/Environmental Concerns:           Social Drivers of Health    Physical Activity: Insufficiently Active (3/20/2024)    Exercise Vital Sign     Days of Exercise per Week: 7 days     Minutes of Exercise per Session: 20 min   Social Connections: Unknown (3/20/2024)    Social Connection and Isolation Panel [NHANES]     Frequency of Social Gatherings with Friends and Family: Three times a week          Disposition Plan     Medically Ready for Discharge: Anticipated Tomorrow    The patient lives by herself.  Will get PT/OT evaluation.  She does have a good support at home.         Holley Lopez MD  Hospitalist Service  [facility]  Securely message with [employer] (more info)  Text page via [employer] Paging/Directory   ______________________________________________________________________    Interval History   The patient was seen and examined.  Overnight events reviewed.  Discussed with nursing staff.  She is feeling much better with diuresis.  Shortness of breath is improving.  Denies any abdominal pain nausea or vomiting.  Denies any chest pain.    Gen: No fevers/chills  CVS: No chest pain or orthopnea  GI: no nausea/vomiting. No abdominal pain  Neuro: No headaches or Seizures     Physical Exam   Vital Signs: Temp: 98.8  F (37.1  C) Temp src: Tympanic BP: 112/64 Pulse: 72   Resp: 18 SpO2: 96 % O2 Device: Nasal cannula Oxygen Delivery: 2 LPM  Weight: 127 lbs 12.8 oz    GENERAL APPEARANCE: In no acute distress.  HEENT: No oropharyngeal lesions.  NECK: Supple.   CHEST: Symmetric. Nontender to palpation.  LUNGS:  Bibasilar fine crackles   HEART: S1+S2 [place],  ABDOMEN: Soft, flat, and benign. BS +ve  EXTREMITIES: +edema.  NEUROLOGIC: Grossly nonfocal examination  PSYCHIATRIC: Appropriate mood and affect.  SKIN: No new rashes.        Medical Decision Making       38 MINUTES SPENT BY ME on the date of service doing chart review, history, exam, documentation & further activities per the note.      Current Facility-Administered Medications   Medication Dose Route Frequency Provider Last Rate Last Admin    acetaminophen (TYLENOL) tablet 650 mg  650 mg Oral Q4H PRN Mercedes Quiroz MD        Or    acetaminophen (TYLENOL) Suppository 650 mg  650 mg Rectal Q4H PRN Mercedes Quiroz MD        [Held by provider] alum & mag hydroxide-simethicone (MAALOX) suspension 10 mL  10 mL Oral Q4H PRN Mercedes Quiroz MD        apixaban ANTICOAGULANT (ELIQUIS) tablet 2.5 mg  2.5 mg Oral BID Mercedes Quiroz MD   2.5 mg at 12/23/24 1004    calcium carbonate (TUMS) chewable tablet 1,000 mg  1,000 mg Oral 4x Daily PRN Mercedes Quiroz MD        carvedilol (COREG) tablet 6.25 mg  6.25 mg Oral BID Mercedes Quiroz MD   6.25 mg at 12/23/24 1008    Continuing ACE inhibitor/ARB/ARNI from home medication list OR ACE inhibitor/ARB/ARNI order already placed during this visit   Does not apply DOES NOT GO TO Mercedes Baig MD        diltiazem ER COATED BEADS (CARDIZEM CD/CARTIA XT) 24 hr capsule 120 mg  120 mg Oral Daily MARAL'Mercedes Jefferson MD   120 mg at 12/23/24 1004    furosemide (LASIX) injection 40 mg  40 mg Intravenous bid 08 & 14 Mercedes Quiroz MD   40 mg at 12/23/24 0851    [Held by provider] furosemide (LASIX) tablet 20 mg  20 mg Oral Daily Mercedes Quiroz MD        latanoprost (XALATAN) 0.005 % ophthalmic solution 1 drop  1 drop Both Eyes At Bedtime Mercedes Quiroz MD        lidocaine (LMX4) cream   Topical Q1H PRN Mercedes Quiroz MD        lidocaine 1 % 0.1-1 mL  0.1-1 mL Other Q1H PRN Mercedes Quiroz MD        losartan (COZAAR) half-tab  12.5 mg  12.5 mg Oral Daily Holley Lopez MD   12.5 mg at 12/23/24 1004    [Held by provider] magnesium hydroxide (MILK OF MAGNESIA) suspension 30 mL  30 mL Oral Daily PRN Mercedes Quiroz MD        melatonin tablet 3 mg  3 mg Oral At Bedtime PRN Mercedes Quiroz MD        mirtazapine (REMERON) tablet TABS 7.5 mg  7.5 mg Oral At Bedtime Mercedes Quiroz MD   7.5 mg at 12/23/24 0107    ondansetron (ZOFRAN ODT) ODT tab 4 mg  4 mg Oral Q6H PRN Mercedes Quiroz MD        Or    ondansetron (ZOFRAN) injection 4 mg  4 mg Intravenous Q6H PRN Mercedes Quiroz MD        ondansetron (ZOFRAN) injection 4 mg  4 mg Intravenous Once Mercedes Quiroz MD        Patient is already receiving anticoagulation with heparin, enoxaparin (LOVENOX), warfarin (COUMADIN)  or other anticoagulant medication   Does not apply Continuous PRN Mercedes Quiroz MD        potassium chloride ER (K-TAB/KLOR-CON) CR tablet 10 mEq  10 mEq Oral Daily Mercedes Quiroz MD   10 mEq at 12/23/24 1004    rosuvastatin (CRESTOR) tablet 5 mg  5 mg Oral Once per day on Monday Wednesday Friday Mercedes Quiroz MD   5 mg at 12/23/24 1004    [Held by provider] saccharomyces boulardii (FLORASTOR) capsule 250 mg  250 mg Oral BID Mercedes Quiroz MD        senemil-docusate (SENOKOT-S/PERICOLACE) 8.6-50 MG per tablet 1 tablet  1 tablet Oral BID PRN Mercedes Quiroz MD        Or    senna-docusate (SENOKOT-S/PERICOLACE) 8.6-50 MG per tablet 2 tablet  2 tablet Oral BID PRN Mercedes Quiroz MD        sodium chloride (PF) 0.9% PF flush 3 mL  3 mL Intracatheter Q8H Mercedes Quiroz MD   3 mL at 12/23/24 0107    sodium chloride (PF) 0.9% PF flush 3 mL  3 mL Intracatheter q1 min prn Mercedes Quiroz MD        thyroid (ARMOUR) tablet 120 mg  120 mg Oral Daily Mercedes Quiroz MD   120 mg at 12/23/24 1003    [Held by provider] triamcinolone (KENALOG) 0.1 % cream   Topical BID PRN Mercedes Quiroz MD        [Held by provider] Vitamin D3 (CHOLECALCIFEROL) tablet 25 mcg  25 mcg Oral Daily  Mercedes Qurioz MD          Data     I have personally reviewed the following data over the past 24 hrs:    7.6  \   14.7   / 132 (L)     137 98 19.7 /  108 (H)   3.9 32 (H) 1.04 (H) \     ALT: 14 AST: 24 AP: 73 TBILI: 0.9   ALB: 3.8 TOT PROTEIN: 6.8 LIPASE: N/A     Trop: N/A BNP: 2,481 (H)       Imaging results reviewed over the past 24 hrs:   Recent Results (from the past 24 hours)   XR Chest Port 1 View    Narrative    EXAM: XR CHEST PORT 1 VIEW  LOCATION: [facility]  DATE: 12/22/2024    INDICATION: sob  COMPARISON: 12/12/2024      Impression    IMPRESSION: Bibasilar opacities represent pleural fluid and atelectasis. Interstitial edema has increased. The heart is not well assessed.    XR Abdomen Port 1 View    Narrative    EXAM: XR ABDOMEN PORT 1 VIEW  LOCATION: [facility]  DATE: 12/22/2024    INDICATION: N/V, decreased bowel movements. Obstruction?  COMPARISON: None.      Impression    IMPRESSION: Nonobstructive bowel gas pattern. Moderate stool within the colon. No free air. Small bibasilar pleural effusions and bibasilar atelectasis or airspace disease at the lower lungs. Spine degenerative changes.

## 2024-12-23 NOTE — PROGRESS NOTES
12/23/24 1400   Appointment Info   Signing Clinician's Name / Credentials (OT) Michelle Pickering OTR/L   Living Environment   People in Home alone   Current Living Arrangements house   Home Accessibility no concerns;stairs to enter home   Number of Stairs, Main Entrance 4   Stair Railings, Main Entrance railings safe and in good condition   Transportation Anticipated family or friend will provide   Self-Care   Usual Activity Tolerance moderate   Current Activity Tolerance moderate   Equipment Currently Used at Home cane, straight;walker, rolling;walker, standard   Cognitive Status Examination   Orientation Status orientation to person, place and time   Affect/Mental Status (Cognitive) WFL   Follows Commands WFL   Range of Motion Comprehensive   General Range of Motion bilateral upper extremity ROM WFL   Coordination   Upper Extremity Coordination No deficits were identified   Bed Mobility   Supine-Sit [place] (Bed Mobility) supervision   Transfers   Transfers sit-stand transfer   Sit-Stand Transfer   Sit-Stand [place] (Transfers) contact guard;1 person to manage equipment   Assistive Device (Sit-Stand Transfers) walker, front-wheeled   Clinical Impression   Criteria for Skilled Therapeutic Interventions Met (OT) Yes, treatment indicated   OT Problem List-Impairments impacting ADL activity tolerance impaired   Identified Performance Deficits mobility and self care   Planned Therapy Interventions (OT) ADL retraining;progressive activity/exercise   Clinical Decision Making Complexity (OT) problem focused assessment/low complexity   Risk & Benefits of therapy have been explained evaluation/treatment results reviewed;risks/benefits reviewed   OT Goals   Therapy Frequency (OT) Daily   OT Predicted Duration/Target Date for Goal Attainment 12/15/24   OT: Hygiene/Grooming supervision/stand-by assist   OT: Upper Body Dressing Supervision/stand-by assist   OT: Lower Body Dressing Supervision/stand-by assist   OT:  Transfer Supervision/stand-by assist   OT: Toilet Transfer/Toileting Supervision/stand-by assist   Self-Care/Home Management   Self-Care/Home Mgmt/ADL, Compensatory, Meal Prep Minutes (50657) 25   Treatment Detail/Skilled Intervention Ambulated in hallway with FWW and CGA   [place] Level (Grooming Training) independent   Assistance (Grooming Training) set-up required   [place] Level (Bathing Training) stand-by assist   Assistance (Bathing Training) supervision   Therapeutic Activities   Therapeutic Activity Minutes (21869) 15   Symptoms noted during/after treatment none;fatigue   Treatment Detail/Skilled Intervention ambulated in hallway with FWW and CGA   OT Discharge Planning   OT Plan uncertain at this time, pt states she does not want to return home alone   OT Discharge Recommendation (DC Rec) home with assist;home with home care occupational therapy   OT Rationale for DC Rec Pt states she does not want to return home   OT Brief overview of current status P tis at [facility] for G/H and toiletting   Total Session Time   Timed Code Treatment Minutes 40   Total Session Time (sum of timed and untimed services) 40

## 2024-12-23 NOTE — PROVIDER NOTIFICATION
12/22/24 2200   Initial Information   Patient Belongings remains with patient   Patient Belongings Remaining with Patient cash/credit card;cell phone/electronics;clothing;dental appliance;purse/wallet;vision aids   Did you bring any home meds/supplements to the hospital?  No     A               Admission:  I am responsible for any personal items that are not sent to the safe or pharmacy.  [facility] is not responsible for loss, theft or damage of any property in my possession.    Signature:  _________________________________ Date: _______  Time: _____                                              Staff Signature:  ____________________________ Date: ________  Time: _____      2nd Staff person, if patient is unable/unwilling to sign:    Signature: ________________________________ Date: ________  Time: _____     Discharge:  [facility] has returned all of my personal belongings:    Signature: _________________________________ Date: ________  Time: _____                                          Staff Signature:  ____________________________ Date: ________  Time: _____

## 2024-12-23 NOTE — ED NOTES
Patient denies SOB but her oxygen saturation is the mid to low 80's on RA.  Place initially on 2L, but she again desaturated to 88%, oxygen turned up to 90%.  LS clear anteriorly.  HR irregular but equal to pulses.  Patient denies any nausea at this time, last took zofran at home around 5pm.  Her family, who is in room,  report that she has been vomiting large amounts today despite not eating much.  Vomiting clear, white matter.  BS are hypoactive and quiet.  She reports not having had a bowel movement for several days and she has been constipated.  They have been trying OTC meds to help remedy this.  Her LE are ACE wrapped. MD updated.

## 2024-12-23 NOTE — PROGRESS NOTES
NURSING ADMISSION NOTE    Patient admitted to room 331 at approximately 10:30 pm via bed from emergency room. Patient was accompanied by daughter.     Verbal SBAR report received from JANAE Montero RN prior to patient arrival.     Patient trasferred to bed via air michelle. Patient alert and oriented X 3. The patient is not having any pain.  . Admission vital signs: Blood pressure 130/53, pulse 74, temperature 98  F (36.7  C), temperature source Tympanic, resp. rate 25, weight 58.1 kg (128 lb 1.6 oz), last menstrual period 01/25/1974, SpO2 92%, not currently breastfeeding. Patient and daughter were oriented to plan of care, call light, bed controls, tv, telephone, bathroom, and visiting hours.     Risk Assessment    The following safety risks were identified during admission: fall. Yellow risk band applied: YES.     Skin Initial Assessment    This writer admitted this patient and completed a full skin assessment and Enmanuel score in the Adult PCS flowsheet.   Photo documentation of skin problem and/or wound competed via Plastio application (located under Media):  N/A    Appropriate interventions initiated as needed.     Enmanuel Risk Assessment  Sensory Perception: 4-->no impairment  Moisture: 4-->rarely moist  Activity: 3-->walks occasionally  Mobility: 3-->slightly limited  Nutrition: 1-->very poor  Friction and Shear: 2-->potential problem  Enmanuel Score: 17  Nutrition Interventions: Encourage protein intake, Maintain adequate hydration, Nutrition consult  Mattress: Standard gel/foam mattress (IsoFlex, Atmos Air, etc.)  Bed Frame: Standard width and length    Education    Patient has a Cunningham to Observation order: No  Observation education completed and documented: N/A    Guerda Cruz RN

## 2024-12-23 NOTE — TELEPHONE ENCOUNTER
Reason for call: Patient wanting a work in appointment.    Is the appointment for a Hospital Follow up?  YES     Patient is having the following symptoms and/or what is the appt for:  Hospital follow up - [facility]    The patient is requesting an appointment with  CHRISTINAS    Was an appointment offered for this call? Yes    If Yes, what is the date of the appointment?  2024 with RICHARD     Preferred method for responding to this message: Telephone Call    Phone number patient can be reached at? Other phone number:  Med [phone]    If we can't reach you directly, may we leave a detailed response at the number you provided? No    Can this message wait until your PCP/provider returns if unavailable today? Yes      Ayah Lee on 2024 at 3:04 PM

## 2024-12-23 NOTE — ED PROVIDER NOTES
Transfer of care from previous shift provider:. SOB desating to lower 80s off oxygen. Not on oxygen at home. Permanent AF on doac. HFrEF. Also with worsening N/V but present for a couple months. Worsening CXR with edema. AXR without obvious obstruction. Is it possible throwing up doac and has PE??? Current nocturnist hospitalized himself so looking into alternative hospitalist.     Assessment and Plan:  Final diagnoses:   Acute hypoxemic respiratory failure (H)   Acute on chronic congestive heart failure, unspecified heart failure type (H)      Disposition: Admission accepted by Dr Quiroz.      Julio César Monroy MD  12/22/24 5619

## 2024-12-23 NOTE — PHARMACY-ADMISSION MEDICATION HISTORY
Pharmacist Admission Medication History    Admission medication history is complete. The information provided in this note is only as accurate as the sources available at the time of the update.    Information Source(s): Family member, Clinic records, and CareEverywhere/SureScripts via in-person and phone    Pertinent Information: Patient's daughter Melonie sets up medications. Sent pharmacist a list of medications with very clear instructions with how she sets up medications, and daughter attests near perfect compliance to medication regimen. Of note, patient did have significant n/v prior to admission and Melonie attests that patient threw up Zofran tablets twice 12/22.  -Of note, lasix is on patient own list as 10 mg-> melonie confirmed it is 20 mg daily, no variances from that dosing.  -Patient was receiving 25 mg daily of losartan at [facility], this was decreased by Dr. Olivarez on 12/20 (as evidenced in zappit messaging) due to lower blood pressures  -Eliquis is currently being cut in half for a dose of 2.5 mg BID  -Patient's daughter has significant concerns about mirtazepine- this was started for appetite but patient's daughter attests that patient has been very sedated since beginning this medication, even to the point of being concerned with her not waking up in the morning  -Enders Thyroid recently increased from 90 mg -> 120 mg. Patient's daughter states she believes patient's nausea increased after dose increase. This medication is currently ready for pickup at [facility], and gave writer her verbal OK to  medication    Changes made to PTA medication list:  Added: None  Deleted: Enders Thyroid 90 mg  Changed: Losartan from 25 mg daily -> 12.5 mg daily    Allergies reviewed with patient and updates made in EHR: yes    Medication History Completed By: Areli Maldonado RPH 12/23/2024 8:32 AM

## 2024-12-24 ENCOUNTER — APPOINTMENT (OUTPATIENT)
Dept: OCCUPATIONAL THERAPY | Facility: OTHER | Age: 89
End: 2024-12-24
Payer: MEDICARE

## 2024-12-24 ENCOUNTER — APPOINTMENT (OUTPATIENT)
Dept: GENERAL RADIOLOGY | Facility: OTHER | Age: 89
End: 2024-12-24
Attending: HOSPITALIST
Payer: MEDICARE

## 2024-12-24 ENCOUNTER — APPOINTMENT (OUTPATIENT)
Dept: PHYSICAL THERAPY | Facility: OTHER | Age: 89
End: 2024-12-24
Payer: MEDICARE

## 2024-12-24 LAB
ANION GAP SERPL CALCULATED.3IONS-SCNC: 6 MMOL/L (ref 7–15)
ATRIAL RATE - MUSE: 234 BPM
ATRIAL RATE - MUSE: 357 BPM
BASOPHILS # BLD AUTO: 0 10E3/UL (ref 0–0.2)
BASOPHILS NFR BLD AUTO: 1 %
BUN SERPL-MCNC: 24.8 MG/DL (ref 8–23)
CALCIUM SERPL-MCNC: 9 MG/DL (ref 8.8–10.4)
CHLORIDE SERPL-SCNC: 96 MMOL/L (ref 98–107)
CREAT SERPL-MCNC: 1.37 MG/DL (ref 0.51–0.95)
DIASTOLIC BLOOD PRESSURE - MUSE: NORMAL MMHG
DIASTOLIC BLOOD PRESSURE - MUSE: NORMAL MMHG
EGFRCR SERPLBLD CKD-EPI 2021: 36 ML/MIN/1.73M2
EOSINOPHIL # BLD AUTO: 0.1 10E3/UL (ref 0–0.7)
EOSINOPHIL NFR BLD AUTO: 2 %
ERYTHROCYTE [DISTWIDTH] IN BLOOD BY AUTOMATED COUNT: 14 % (ref 10–15)
GLUCOSE SERPL-MCNC: 111 MG/DL (ref 70–99)
HCO3 SERPL-SCNC: 32 MMOL/L (ref 22–29)
HCT VFR BLD AUTO: 44.6 % (ref 35–47)
HGB BLD-MCNC: 14.9 G/DL (ref 11.7–15.7)
HOLD SPECIMEN: NORMAL
IMM GRANULOCYTES # BLD: 0 10E3/UL
IMM GRANULOCYTES NFR BLD: 0 %
INTERPRETATION ECG - MUSE: NORMAL
INTERPRETATION ECG - MUSE: NORMAL
LYMPHOCYTES # BLD AUTO: 0.8 10E3/UL (ref 0.8–5.3)
LYMPHOCYTES NFR BLD AUTO: 10 %
MAGNESIUM SERPL-MCNC: 2.2 MG/DL (ref 1.7–2.3)
MCH RBC QN AUTO: 31.4 PG (ref 26.5–33)
MCHC RBC AUTO-ENTMCNC: 33.4 G/DL (ref 31.5–36.5)
MCV RBC AUTO: 94 FL (ref 78–100)
MONOCYTES # BLD AUTO: 0.9 10E3/UL (ref 0–1.3)
MONOCYTES NFR BLD AUTO: 11 %
NEUTROPHILS # BLD AUTO: 6.1 10E3/UL (ref 1.6–8.3)
NEUTROPHILS NFR BLD AUTO: 76 %
NRBC # BLD AUTO: 0 10E3/UL
NRBC BLD AUTO-RTO: 0 /100
P AXIS - MUSE: NORMAL DEGREES
P AXIS - MUSE: NORMAL DEGREES
PLATELET # BLD AUTO: 141 10E3/UL (ref 150–450)
POTASSIUM SERPL-SCNC: 4.1 MMOL/L (ref 3.4–5.3)
PR INTERVAL - MUSE: NORMAL MS
PR INTERVAL - MUSE: NORMAL MS
QRS DURATION - MUSE: 70 MS
QRS DURATION - MUSE: 72 MS
QT - MUSE: 362 MS
QT - MUSE: 424 MS
QTC - MUSE: 394 MS
QTC - MUSE: 422 MS
R AXIS - MUSE: -41 DEGREES
R AXIS - MUSE: -48 DEGREES
RBC # BLD AUTO: 4.75 10E6/UL (ref 3.8–5.2)
SODIUM SERPL-SCNC: 134 MMOL/L (ref 135–145)
SYSTOLIC BLOOD PRESSURE - MUSE: NORMAL MMHG
SYSTOLIC BLOOD PRESSURE - MUSE: NORMAL MMHG
T AXIS - MUSE: 77 DEGREES
T AXIS - MUSE: 98 DEGREES
VENTRICULAR RATE- MUSE: 52 BPM
VENTRICULAR RATE- MUSE: 82 BPM
WBC # BLD AUTO: 8 10E3/UL (ref 4–11)

## 2024-12-24 PROCEDURE — 250N000013 HC RX MED GY IP 250 OP 250 PS 637: Performed by: HOSPITALIST

## 2024-12-24 PROCEDURE — 258N000003 HC RX IP 258 OP 636: Performed by: HOSPITALIST

## 2024-12-24 PROCEDURE — 97535 SELF CARE MNGMENT TRAINING: CPT | Mod: GO | Performed by: OCCUPATIONAL THERAPIST

## 2024-12-24 PROCEDURE — 83735 ASSAY OF MAGNESIUM: CPT | Performed by: HOSPITALIST

## 2024-12-24 PROCEDURE — 120N000001 HC R&B MED SURG/OB

## 2024-12-24 PROCEDURE — 97530 THERAPEUTIC ACTIVITIES: CPT | Mod: GP

## 2024-12-24 PROCEDURE — 93005 ELECTROCARDIOGRAM TRACING: CPT

## 2024-12-24 PROCEDURE — 71045 X-RAY EXAM CHEST 1 VIEW: CPT

## 2024-12-24 PROCEDURE — 99232 SBSQ HOSP IP/OBS MODERATE 35: CPT | Performed by: HOSPITALIST

## 2024-12-24 PROCEDURE — 80048 BASIC METABOLIC PNL TOTAL CA: CPT | Performed by: HOSPITALIST

## 2024-12-24 PROCEDURE — 250N000011 HC RX IP 250 OP 636: Performed by: HOSPITALIST

## 2024-12-24 PROCEDURE — 36415 COLL VENOUS BLD VENIPUNCTURE: CPT | Performed by: HOSPITALIST

## 2024-12-24 PROCEDURE — 85025 COMPLETE CBC W/AUTO DIFF WBC: CPT | Performed by: HOSPITALIST

## 2024-12-24 PROCEDURE — 93010 ELECTROCARDIOGRAM REPORT: CPT | Performed by: INTERNAL MEDICINE

## 2024-12-24 RX ORDER — FUROSEMIDE 40 MG/1
40 TABLET ORAL DAILY
Status: DISCONTINUED | OUTPATIENT
Start: 2024-12-25 | End: 2024-12-25

## 2024-12-24 RX ADMIN — POTASSIUM CHLORIDE 10 MEQ: 750 TABLET, FILM COATED, EXTENDED RELEASE ORAL at 09:57

## 2024-12-24 RX ADMIN — SODIUM CHLORIDE, POTASSIUM CHLORIDE, SODIUM LACTATE AND CALCIUM CHLORIDE 500 ML: 600; 310; 30; 20 INJECTION, SOLUTION INTRAVENOUS at 15:37

## 2024-12-24 RX ADMIN — FUROSEMIDE 40 MG: 10 INJECTION, SOLUTION INTRAMUSCULAR; INTRAVENOUS at 08:05

## 2024-12-24 RX ADMIN — APIXABAN 2.5 MG: 2.5 TABLET, FILM COATED ORAL at 21:12

## 2024-12-24 RX ADMIN — THYROID 120 MG: 120 TABLET ORAL at 09:58

## 2024-12-24 RX ADMIN — LOSARTAN POTASSIUM 12.5 MG: 25 TABLET, FILM COATED ORAL at 09:56

## 2024-12-24 RX ADMIN — PANTOPRAZOLE SODIUM 40 MG: 40 TABLET, DELAYED RELEASE ORAL at 08:05

## 2024-12-24 RX ADMIN — APIXABAN 2.5 MG: 2.5 TABLET, FILM COATED ORAL at 09:57

## 2024-12-24 RX ADMIN — POLYETHYLENE GLYCOL 3350 17 G: 17 POWDER, FOR SOLUTION ORAL at 09:58

## 2024-12-24 RX ADMIN — CARVEDILOL 6.25 MG: 6.25 TABLET, FILM COATED ORAL at 09:57

## 2024-12-24 RX ADMIN — DILTIAZEM HYDROCHLORIDE 120 MG: 120 CAPSULE, COATED, EXTENDED RELEASE ORAL at 09:56

## 2024-12-24 RX ADMIN — LATANOPROST 1 DROP: 50 SOLUTION/ DROPS OPHTHALMIC at 21:12

## 2024-12-24 ASSESSMENT — ACTIVITIES OF DAILY LIVING (ADL)
ADLS_ACUITY_SCORE: 68
ADLS_ACUITY_SCORE: 69
ADLS_ACUITY_SCORE: 68
ADLS_ACUITY_SCORE: 69
ADLS_ACUITY_SCORE: 68
ADLS_ACUITY_SCORE: 67
ADLS_ACUITY_SCORE: 69
ADLS_ACUITY_SCORE: 68
ADLS_ACUITY_SCORE: 69
ADLS_ACUITY_SCORE: 68
ADLS_ACUITY_SCORE: 68
ADLS_ACUITY_SCORE: 69
ADLS_ACUITY_SCORE: 68
ADLS_ACUITY_SCORE: 68
ADLS_ACUITY_SCORE: 67
ADLS_ACUITY_SCORE: 68

## 2024-12-24 NOTE — PLAN OF CARE
" Goal Outcome Evaluation:  Temp: 97.6  F (36.4  C) Temp src: Tympanic BP: 102/47 Pulse: 52   Resp: 18 SpO2: 92 % O2 Device: Nasal cannula Oxygen Delivery: 2 LPM  Patient comfortable all shift, denies pain, SOB, nausea. Tele showed various pauses and HR down to low 30's. MD updated. Hold lasix, Coreg, Diltiazem and Mirtazapine.     Continue to monitor pressures;     Problem: Adult Inpatient Plan of Care  Goal: Plan of Care Review  Description: The Plan of Care Review/Shift note should be completed every shift.  The Outcome Evaluation is a brief statement about your assessment that the patient is improving, declining, or no change.  This information will be displayed automatically on your shift  note.  Outcome: Not Progressing  Goal: Patient-Specific Goal (Individualized)  Description: You can add care plan individualizations to a care plan. Examples of Individualization might be:  \"Parent requests to be called daily at 9am for status\", \"I have a hard time hearing out of my right ear\", or \"Do not touch me to wake me up as it startles  me\".  Outcome: Not Progressing  Goal: Absence of Hospital-Acquired Illness or Injury  Outcome: Not Progressing  Intervention: Prevent Infection  Recent Flowsheet Documentation  Taken 12/24/2024 0800 by Víctor Solis RN  Infection Prevention:   single patient room provided   rest/sleep promoted   hand hygiene promoted  Goal: Optimal Comfort and Wellbeing  Outcome: Not Progressing  Intervention: Monitor Pain and Promote Comfort  Recent Flowsheet Documentation  Taken 12/24/2024 0800 by Víctor Solis, RN  Pain Management Interventions: declines  Goal: Readiness for Transition of Care  Outcome: Not Progressing     Problem: Comorbidity Management  Goal: Maintenance of Heart Failure Symptom Control  Outcome: Not Progressing     Problem: Gas Exchange Impaired  Goal: Optimal Gas Exchange  Outcome: Not Progressing                          "

## 2024-12-24 NOTE — PROGRESS NOTES
Patient is noted to be having frequent bradycardic episodes with HR dropping in the 30's and having frequent 2.6- 3.3 second pauses. MSP aware. Multiple strips have been printed and sent to medical. Will continue to closely monitor on tele.

## 2024-12-24 NOTE — PROGRESS NOTES
[facility]    Medicine Progress Note - Hospitalist Service    Date of Admission:  12/22/2024    Assessment & Plan   89 years old female with history of hypertension, chronic HFpEF, atrial fibrillation was admitted with acute respiratory failure.  She has the following acute medical issues:    Acute respiratory failure: The patient does not require any oxygen at home.  She is requiring 2 to 3 L now.  This is due to volume overload leading to acute on chronic HFpEF.  Her oxygenation drops at rest as she does not have good inspiratory effort.  With exertion it improves remarkably.  Will have to be careful tapering down her oxygen.    Acute on chronic HFpEF: The patient's left ventricular ejection fraction is more than 70%.  She is being diuresed now with IV Lasix.  Continue to monitor intake and output with daily weights.  Chest x-ray findings consistent with pulmonary edema.  I will repeat the chest x-ray to see any improvement.    Atrial fibrillation: The patient is anticoagulated with Eliquis.  Also on beta-blockade with Coreg.    Polycythemia: Hemoglobin level has been stable now.    Protein calorie malnutrition moderate: Nutrition consulted.  The patient is on mirtazapine but it makes her rather lethargic.  I will hold it tonight and see how she does.    CKD stage III: Creatinine is going up but still around baseline.  I will adjust diuretics after the chest x-ray is done.   Avoid nephrotoxins.        Diet: Fluid restriction 2000 ML FLUID (and additional linked orders)  Combination Diet Regular Diet Adult; 2 gm NA Diet  Snacks/Supplements Adult: Ensure Enlive; With Meals    DVT Prophylaxis: DOAC  Grubbs Catheter: Not present  Lines: None     Cardiac Monitoring: ACTIVE order. Indication: Acute decompensated heart failure (48 hours)  Code Status: No CPR- Do NOT Intubate      Clinically Significant Risk Factors         # Hyponatremia: Lowest Na = 132 mmol/L in last 2 days, will monitor as  appropriate  # Hypochloremia: Lowest Cl = 89 mmol/L in last 2 days, will monitor as appropriate  # Hypocalcemia: Lowest Ca = 8.5 mg/dL in last 2 days, will monitor and replace as appropriate           # Hypertension: Noted on problem list  # Acute heart failure with preserved ejection fraction: heart failure noted on problem list, last echo with EF >50%, and receiving IV diuretics            # Severe Malnutrition: based on nutrition assessment, PRESENT ON ADMISSION   # Financial/Environmental Concerns:           Social Drivers of Health    Physical Activity: Insufficiently Active (3/20/2024)    Exercise Vital Sign     Days of Exercise per Week: 7 days     Minutes of Exercise per Session: 20 min   Social Connections: Unknown (3/20/2024)    Social Connection and Isolation Panel [NHANES]     Frequency of Social Gatherings with Friends and Family: Three times a week          Disposition Plan     Medically Ready for Discharge: Anticipated Tomorrow    The patient lives by herself.  I had a detailed discussion with the daughter yesterday.  The family is interested in short-term rehab.  They have had a discussion with hospice care as well.  They are considering hospice care in the long run.         Holley Lopez MD  Hospitalist Service  [facility]  Securely message with PacketSled (more info)  Text page via [facility] Paging/Directory   ______________________________________________________________________    Interval History   The patient was seen and examined.  Overnight events reviewed.  Discussed with nursing staff.  She feels tired today.  She told me that her mirtazapine was given a bit late last night.   Shortness of breath is improving.  Denies any abdominal pain nausea or vomiting.  Denies any chest pain.  Her oxygenation drops at rest as she does not have enough effort in taking deep breaths.    Gen: No fevers/chills  CVS: No chest pain   GI: no nausea/vomiting. No abdominal pain  Neuro: No headaches or  Seizures     Physical Exam   Vital Signs: Temp: 98.5  F (36.9  C) Temp src: Tympanic BP: 118/54 Pulse: 62   Resp: 18 SpO2: 92 % O2 Device: Nasal cannula Oxygen Delivery: 2 LPM  Weight: 126 lbs 11.2 oz    GENERAL APPEARANCE: In no acute distress.  HEENT: No oropharyngeal lesions.  NECK: Supple.   CHEST: Symmetric. Nontender to palpation.  LUNGS: Bibasilar fine crackles   HEART: S1+S2 [place],  ABDOMEN: Soft, flat, and benign. BS +ve  EXTREMITIES: +edema.  NEUROLOGIC: Grossly nonfocal examination  PSYCHIATRIC: Appropriate mood and affect.  SKIN: No new rashes.        Medical Decision Making       38 MINUTES SPENT BY ME on the date of service doing chart review, history, exam, documentation & further activities per the note.      Current Facility-Administered Medications   Medication Dose Route Frequency Provider Last Rate Last Admin    acetaminophen (TYLENOL) tablet 650 mg  650 mg Oral Q4H PRN Mercedes Quiroz MD        Or    acetaminophen (TYLENOL) Suppository 650 mg  650 mg Rectal Q4H PRN Mercedes Quiroz MD        [Held by provider] alum & mag hydroxide-simethicone (MAALOX) suspension 10 mL  10 mL Oral Q4H PRN Mercedes Quiroz MD        apixaban ANTICOAGULANT (ELIQUIS) tablet 2.5 mg  2.5 mg Oral BID MARAL'Mercedes Jefferson MD   2.5 mg at 12/23/24 2156    calcium carbonate (TUMS) chewable tablet 1,000 mg  1,000 mg Oral 4x Daily PRN Mercedes Quiroz MD        carvedilol (COREG) tablet 6.25 mg  6.25 mg Oral BID Mercedes Quiroz MD   6.25 mg at 12/23/24 2156    Continuing ACE inhibitor/ARB/ARNI from home medication list OR ACE inhibitor/ARB/ARNI order already placed during this visit   Does not apply DOES NOT GO TO Mercedes Baig MD        diltiazem ER COATED BEADS (CARDIZEM CD/CARTIA XT) 24 hr capsule 120 mg  120 mg Oral Daily Mercedes Quiroz MD   120 mg at 12/23/24 1004    furosemide (LASIX) injection 40 mg  40 mg Intravenous bid 08 & 14 Mercedes Quiroz MD   40 mg at 12/24/24 0805    [Held by provider] furosemide  (LASIX) tablet 20 mg  20 mg Oral Daily MARAL'Mercedes Jefferson MD        latanoprost (XALATAN) 0.005 % ophthalmic solution 1 drop  1 drop Both Eyes At Bedtime MARAL'Mercedes Jefferson MD   1 drop at 12/23/24 2156    lidocaine (LMX4) cream   Topical Q1H PRN Mercedes Quiroz MD        lidocaine 1 % 0.1-1 mL  0.1-1 mL Other Q1H PRN Mercedes Quiroz MD        losartan (COZAAR) half-tab 12.5 mg  12.5 mg Oral Daily Holley Lopez MD   12.5 mg at 12/23/24 1004    [Held by provider] magnesium hydroxide (MILK OF MAGNESIA) suspension 30 mL  30 mL Oral Daily PRN Mercedes Quiroz MD        melatonin tablet 3 mg  3 mg Oral At Bedtime PRN Mercedes Quiroz MD        mirtazapine (REMERON) tablet TABS 7.5 mg  7.5 mg Oral At Bedtime MARAL'Mercedes Jefferson MD   7.5 mg at 12/23/24 2156    ondansetron (ZOFRAN ODT) ODT tab 4 mg  4 mg Oral Q6H PRN Mercedes Quiroz MD        Or    ondansetron (ZOFRAN) injection 4 mg  4 mg Intravenous Q6H PRN Mercedes Quiroz MD        ondansetron (ZOFRAN) injection 4 mg  4 mg Intravenous Once MARAL'Mercedes Jefferson MD        pantoprazole (PROTONIX) EC tablet 40 mg  40 mg Oral QAM AC Holley Lopez MD   40 mg at 12/24/24 0805    Patient is already receiving anticoagulation with heparin, enoxaparin (LOVENOX), warfarin (COUMADIN)  or other anticoagulant medication   Does not apply Continuous PRN Mercedes Quiroz MD        polyethylene glycol (MIRALAX) Packet 17 g  17 g Oral Daily Holley Lopez MD        potassium chloride ER (K-TAB/KLOR-CON) CR tablet 10 mEq  10 mEq Oral Daily MARAL'Mercedes Jefferson MD   10 mEq at 12/23/24 1004    rosuvastatin (CRESTOR) tablet 5 mg  5 mg Oral Once per day on Monday Wednesday Friday Mercedes Quiroz MD   5 mg at 12/23/24 1004    [Held by provider] saccharomyces boulardii (FLORASTOR) capsule 250 mg  250 mg Oral BID Mercedes Quiroz MD senna-docusate (SENOKOT-S/PERICOLACE) 8.6-50 MG per tablet 1 tablet  1 tablet Oral At Bedtime Holley Lopez MD   1 tablet at 12/23/24 9556    Or    senna-docusate  (SENOKOT-S/PERICOLACE) 8.6-50 MG per tablet 2 tablet  2 tablet Oral At Bedtime Holley Lopez MD        sodium chloride (PF) 0.9% PF flush 3 mL  3 mL Intracatheter Q8H Mercedes Quiroz MD   3 mL at 12/24/24 0805    sodium chloride (PF) 0.9% PF flush 3 mL  3 mL Intracatheter q1 min prn Mercedes Quiroz MD        thyroid (ARMOUR) tablet 120 mg  120 mg Oral Daily Mercedes Quiroz MD   120 mg at 12/23/24 1003    [Held by provider] triamcinolone (KENALOG) 0.1 % cream   Topical BID PRN Mercedes Quiroz MD        [Held by provider] Vitamin D3 (CHOLECALCIFEROL) tablet 25 mcg  25 mcg Oral Daily Mercedes Quiroz MD          Data     I have personally reviewed the following data over the past 24 hrs:    8.0  \   14.9   / 141 (L)     134 (L) 96 (L) 24.8 (H) /  111 (H)   4.1 32 (H) 1.37 (H) \       Imaging results reviewed over the past 24 hrs:   No results found for this or any previous visit (from the past 24 hours).

## 2024-12-24 NOTE — PROGRESS NOTES
Patient started having bradycardia with asystole pauses up to 3 seconds.  Also the blood pressure has been rather low with systolic going into 80s and 70s.  I will hold both Coreg and Cardizem.  Will give her 500 cc bolus of LR.  Holding Lasix as well.  The patient herself is relatively asymptomatic.  Continue to monitor closely on telemetry.    Discussed with the nursing staff.    I went back and discussed the patient's CODE STATUS with the patient and the daughter by the bedside.  Over the phone the son Iker was present as well.  I discussed the option of pacemaker with the patient and the family.  They decided against placing a pacemaker.  The patient would like conservative measures but would not like invasive measures like pacemaker implant or invasive ventilation.  She does not need to be transferred to the ICU for pacemaker implant according to her own wishes.

## 2024-12-24 NOTE — PROGRESS NOTES
12/24/24 7847   Appointment Info   Signing Clinician's Name / Credentials (PT) Dangelo Musa MPT   PT Discharge Planning   PT Plan Continue to approach for PT   PT Discharge Recommendation (DC Rec) Transitional Care Facility;home with assist;home with home care physical therapy   PT Rationale for DC Rec to promote strength, stability and safe mobility   PT Brief overview of current status Patient approached for PT, however, she reports increased fatigue and wasn't certain that she would be able to participate; PT explained importance and benefit of increasing her activity; patient stated that she understood, but still felt very fatigued; she agreed to go for a walk with PT tomorrow; PT reported patient condition to RN; later RN reported that patient's BP was soft with systolic pressures in the 80 's and 70's; patient will continue to approach patient to promote increased functional activity tolerance as she is able   PT Equipment Needed at Discharge walker, rolling

## 2024-12-24 NOTE — PLAN OF CARE
Goal Outcome Evaluation:      Plan of Care Reviewed With: patient    Overall Patient Progress: improving    A/O, forgetful. Pleasant and cooperative. VSS, up with assist of one and walker/gaitbelt. Slept well this shift. O2 at 2 LPM with sats in low 90s. LS with exp insp wheeze on right side, diminished throughout. [place] noted, 1+ edema to BLE. Barrier cream to periarea, remains red. Purewick in place.

## 2024-12-24 NOTE — PROGRESS NOTES
Interdisciplinary Discharge Planning Note    Anticipated Discharge Date:12/26-12/27    Anticipated Discharge Location: SNF    Clinical Needs Before Discharge:   PT/OTemilio     Treatment Needs After Discharge:  rehab (PT, OT, ST)    Potential Barriers to Discharge: None Identified     ARIANA Contreras  12/24/2024,  1:07 PM

## 2024-12-24 NOTE — PROGRESS NOTES
Informed by Minnie of 2.9 sec pause on tele; a low HR of 38 but quick rebound. MD aware and updated.

## 2024-12-24 NOTE — PROGRESS NOTES
12/24/24 1300   Appointment Info   Signing Clinician's Name / Credentials (OT) Michelle Pickering OTR/L   OT Goals   OT: Upper Body Dressing Supervision/stand-by assist   OT: Lower Body Dressing Supervision/stand-by assist   OT: Transfer Supervision/stand-by assist   OT: Toilet Transfer/Toileting Supervision/stand-by assist   Self-Care/Home Management   Self-Care/Home Mgmt/ADL, Compensatory, Meal Prep Minutes (89256) 25   Treatment Detail/Skilled Intervention Pt able to ambulate into bathroom wiht FWW and SBA tiolet wthi verbal cues for initiation and complettion. stood at sink to wash face and check brief with SBA   [place] (Grooming Training) stand-by assist   Assistance (Grooming Training) verbal cues   [place] (Bathing Training) stand-by assist   [place] (Toilet Training) stand-by assist   Assistance (Toilet Training) verbal cues   Toilet Training Assistance - Assistive Device wall grab bar   OT Discharge Planning   OT Plan uncertain at this time, pt states she does not want to return home alone   OT Discharge Recommendation (DC Rec) home with assist;Transitional Care Facility   OT Rationale for DC Rec Pt states she does not want to return home   OT Brief overview of current status Pt is SBA with basic tioletting adn g/h at sink. No LOB. decrease motivation   Total Session Time   Timed Code Treatment Minutes 25   Total Session Time (sum of timed and untimed services) 25   Psychosocial Support   Trust Relationship/Rapport choices provided;care explained

## 2024-12-24 NOTE — PROGRESS NOTES
:     Spoke with patient and daughter regarding discharge planning. Patient and daughter stated that they do not want hospice at this time. They are wanting to pursue short term rehab. They requested that [facility] are [facility]. Informed patient and daughter that [facility] is full at this time. Referrals have been sent.     Spoke with Josefa at [facility]. She stated that they will not have an opening until Thursday (12/26) or Friday (12/27). She will plan to screen patients referral today.      will continue to follow for discharge planning needs.     ARIANA Contreras on 12/24/2024 at 9:48 AM     :     [facility] cannot accept patient due to bed availability.     ARIANA Contreras on 12/24/2024 at 10:17 AM

## 2024-12-25 ENCOUNTER — APPOINTMENT (OUTPATIENT)
Dept: OCCUPATIONAL THERAPY | Facility: OTHER | Age: 89
End: 2024-12-25
Payer: MEDICARE

## 2024-12-25 LAB
ANION GAP SERPL CALCULATED.3IONS-SCNC: 7 MMOL/L (ref 7–15)
BASOPHILS # BLD AUTO: 0 10E3/UL (ref 0–0.2)
BASOPHILS NFR BLD AUTO: 0 %
BUN SERPL-MCNC: 27.5 MG/DL (ref 8–23)
CALCIUM SERPL-MCNC: 8.8 MG/DL (ref 8.8–10.4)
CHLORIDE SERPL-SCNC: 94 MMOL/L (ref 98–107)
CREAT SERPL-MCNC: 1.39 MG/DL (ref 0.51–0.95)
EGFRCR SERPLBLD CKD-EPI 2021: 35 ML/MIN/1.73M2
EOSINOPHIL # BLD AUTO: 0.2 10E3/UL (ref 0–0.7)
EOSINOPHIL NFR BLD AUTO: 2 %
ERYTHROCYTE [DISTWIDTH] IN BLOOD BY AUTOMATED COUNT: 14.1 % (ref 10–15)
GLUCOSE SERPL-MCNC: 105 MG/DL (ref 70–99)
HCO3 SERPL-SCNC: 31 MMOL/L (ref 22–29)
HCT VFR BLD AUTO: 43.5 % (ref 35–47)
HGB BLD-MCNC: 14.6 G/DL (ref 11.7–15.7)
IMM GRANULOCYTES # BLD: 0 10E3/UL
IMM GRANULOCYTES NFR BLD: 0 %
LYMPHOCYTES # BLD AUTO: 0.8 10E3/UL (ref 0.8–5.3)
LYMPHOCYTES NFR BLD AUTO: 9 %
MAGNESIUM SERPL-MCNC: 2 MG/DL (ref 1.7–2.3)
MCH RBC QN AUTO: 31.5 PG (ref 26.5–33)
MCHC RBC AUTO-ENTMCNC: 33.6 G/DL (ref 31.5–36.5)
MCV RBC AUTO: 94 FL (ref 78–100)
MONOCYTES # BLD AUTO: 1 10E3/UL (ref 0–1.3)
MONOCYTES NFR BLD AUTO: 10 %
NEUTROPHILS # BLD AUTO: 7.5 10E3/UL (ref 1.6–8.3)
NEUTROPHILS NFR BLD AUTO: 78 %
NRBC # BLD AUTO: 0 10E3/UL
NRBC BLD AUTO-RTO: 0 /100
PLATELET # BLD AUTO: 145 10E3/UL (ref 150–450)
POTASSIUM SERPL-SCNC: 4.1 MMOL/L (ref 3.4–5.3)
RBC # BLD AUTO: 4.64 10E6/UL (ref 3.8–5.2)
SODIUM SERPL-SCNC: 132 MMOL/L (ref 135–145)
WBC # BLD AUTO: 9.6 10E3/UL (ref 4–11)

## 2024-12-25 PROCEDURE — 36415 COLL VENOUS BLD VENIPUNCTURE: CPT | Performed by: HOSPITALIST

## 2024-12-25 PROCEDURE — 83735 ASSAY OF MAGNESIUM: CPT | Performed by: HOSPITALIST

## 2024-12-25 PROCEDURE — 82435 ASSAY OF BLOOD CHLORIDE: CPT | Performed by: HOSPITALIST

## 2024-12-25 PROCEDURE — 85014 HEMATOCRIT: CPT | Performed by: HOSPITALIST

## 2024-12-25 PROCEDURE — 99232 SBSQ HOSP IP/OBS MODERATE 35: CPT | Performed by: HOSPITALIST

## 2024-12-25 PROCEDURE — 250N000013 HC RX MED GY IP 250 OP 250 PS 637: Performed by: HOSPITALIST

## 2024-12-25 PROCEDURE — 85025 COMPLETE CBC W/AUTO DIFF WBC: CPT | Performed by: HOSPITALIST

## 2024-12-25 PROCEDURE — 120N000001 HC R&B MED SURG/OB

## 2024-12-25 PROCEDURE — 80048 BASIC METABOLIC PNL TOTAL CA: CPT | Performed by: HOSPITALIST

## 2024-12-25 PROCEDURE — 999N000157 HC STATISTIC RCP TIME EA 10 MIN

## 2024-12-25 PROCEDURE — 250N000011 HC RX IP 250 OP 636: Performed by: HOSPITALIST

## 2024-12-25 RX ORDER — METOCLOPRAMIDE HYDROCHLORIDE 5 MG/ML
5 INJECTION INTRAMUSCULAR; INTRAVENOUS EVERY 6 HOURS PRN
Status: DISCONTINUED | OUTPATIENT
Start: 2024-12-25 | End: 2024-12-26

## 2024-12-25 RX ADMIN — ONDANSETRON 4 MG: 4 TABLET, ORALLY DISINTEGRATING ORAL at 16:12

## 2024-12-25 RX ADMIN — APIXABAN 2.5 MG: 2.5 TABLET, FILM COATED ORAL at 08:44

## 2024-12-25 RX ADMIN — LOSARTAN POTASSIUM 12.5 MG: 25 TABLET, FILM COATED ORAL at 08:47

## 2024-12-25 RX ADMIN — THYROID 120 MG: 120 TABLET ORAL at 06:06

## 2024-12-25 RX ADMIN — METOCLOPRAMIDE 5 MG: 5 INJECTION, SOLUTION INTRAMUSCULAR; INTRAVENOUS at 20:10

## 2024-12-25 RX ADMIN — LATANOPROST 1 DROP: 50 SOLUTION/ DROPS OPHTHALMIC at 19:36

## 2024-12-25 RX ADMIN — POTASSIUM CHLORIDE 10 MEQ: 750 TABLET, FILM COATED, EXTENDED RELEASE ORAL at 08:44

## 2024-12-25 RX ADMIN — APIXABAN 2.5 MG: 2.5 TABLET, FILM COATED ORAL at 19:35

## 2024-12-25 RX ADMIN — METOCLOPRAMIDE 5 MG: 5 INJECTION, SOLUTION INTRAMUSCULAR; INTRAVENOUS at 13:36

## 2024-12-25 RX ADMIN — PANTOPRAZOLE SODIUM 40 MG: 40 TABLET, DELAYED RELEASE ORAL at 08:44

## 2024-12-25 ASSESSMENT — ACTIVITIES OF DAILY LIVING (ADL)
ADLS_ACUITY_SCORE: 69
ADLS_ACUITY_SCORE: 68
ADLS_ACUITY_SCORE: 68
ADLS_ACUITY_SCORE: 69
ADLS_ACUITY_SCORE: 69
ADLS_ACUITY_SCORE: 68
ADLS_ACUITY_SCORE: 69
ADLS_ACUITY_SCORE: 68
ADLS_ACUITY_SCORE: 69
ADLS_ACUITY_SCORE: 68
ADLS_ACUITY_SCORE: 67
ADLS_ACUITY_SCORE: 67
ADLS_ACUITY_SCORE: 69
ADLS_ACUITY_SCORE: 67
ADLS_ACUITY_SCORE: 69

## 2024-12-25 NOTE — PROGRESS NOTES
Pt's sats at 86% with 0300 check.  Asymptomatic. Needed to increase O2 to 4L from 3L to get sats up to 91%.  RT updated.   Kenisha Saleem RN............................ 12/25/2024 3:15 AM

## 2024-12-25 NOTE — PLAN OF CARE
/46 (BP Location: Right arm)   Pulse 76   Temp 98.1  F (36.7  C) (Tympanic)   Resp 18   Wt 57.5 kg (126 lb 11.2 oz)   LMP 01/25/1974 (Approximate)   SpO2 91%   BMI 24.74 kg/m        Goal Outcome Evaluation:      Plan of Care Reviewed With: patient    Overall Patient Progress: improvingOverall Patient Progress: improving    Outcome Evaluation: needs increase in O2 when sleeping, on 4L to keep above 90      Problem: Adult Inpatient Plan of Care  Goal: Plan of Care Review  Outcome: Progressing  Flowsheets (Taken 12/25/2024 0454)  Outcome Evaluation: needs increase in O2 when sleeping, on 4L to keep above 90  Plan of Care Reviewed With: patient  Overall Patient Progress: improving  Goal: Patient-Specific Goal (Individualized)  Outcome: Progressing  Goal: Absence of Hospital-Acquired Illness or Injury  Outcome: Progressing  Intervention: Identify and Manage Fall Risk  Recent Flowsheet Documentation  Taken 12/24/2024 2309 by Kenisha Saleem RN  Safety Promotion/Fall Prevention:   activity supervised   assistive device/personal items within reach   clutter free environment maintained   lighting adjusted   nonskid shoes/slippers when out of bed   patient and family education   room door open   room organization consistent   safety round/check completed   supervised activity   treat reversible contributory factors   treat underlying cause  Goal: Optimal Comfort and Wellbeing  Outcome: Progressing  Intervention: Provide Person-Centered Care  Recent Flowsheet Documentation  Taken 12/24/2024 2309 by Kenisha Saleem RN  Trust Relationship/Rapport:   choices provided   care explained  Goal: Readiness for Transition of Care  Outcome: Progressing     Problem: Comorbidity Management  Goal: Maintenance of Heart Failure Symptom Control  Outcome: Progressing     Problem: Gas Exchange Impaired  Goal: Optimal Gas Exchange  Outcome: Progressing

## 2024-12-25 NOTE — PROGRESS NOTES
[facility]    Medicine Progress Note - Hospitalist Service    Date of Admission:  12/22/2024    Assessment & Plan   89 years old female with history of hypertension, chronic HFpEF, atrial fibrillation was admitted with acute respiratory failure.  She has the following acute medical issues:    Acute respiratory failure: The patient does not require any oxygen at home.  She continues to require 2 to 3 L now.  This is due to volume overload leading to acute on chronic HFpEF.  Her oxygenation drops at rest as she does not have good inspiratory effort.  With exertion it improves remarkably.  Will have to be careful tapering down her oxygen.    Acute on chronic HFpEF: The patient's left ventricular ejection fraction is more than 70%.  She was diuresed with IV Lasix.  Hold Lasix as creatinine started going up..  Continue to monitor intake and output with daily weights.  Chest x-ray findings consistent with pulmonary edema.  I will repeat the chest x-ray to see any improvement.    Atrial fibrillation with bradycardia: The patient is anticoagulated with Eliquis.  Hold Coreg and Cardizem for bradycardia and asystole pauses.  The patient and family decided against pacemaker implant.    Polycythemia: Hemoglobin level has been stable now.    Protein calorie malnutrition moderate: Nutrition consulted.  The patient is on mirtazapine but it makes her rather lethargic.  I will hold it tonight and see how she does.    Acute on CKD stage III: Creatinine is going up.  Chest x-ray still showing pulmonary congestion.  Will continue to monitor creatinine closely.  Hold Lasix avoid nephrotoxins.        Diet: Fluid restriction 2000 ML FLUID (and additional linked orders)  Combination Diet Regular Diet Adult; 2 gm NA Diet  Snacks/Supplements Adult: Gelatein Plus; With Meals    DVT Prophylaxis: DOAC  Grubbs Catheter: Not present  Lines: None     Cardiac Monitoring: ACTIVE order. Indication: Bradycardias (48 hours)  Code  Status: No CPR- Do NOT Intubate      Clinically Significant Risk Factors         # Hyponatremia: Lowest Na = 132 mmol/L in last 2 days, will monitor as appropriate  # Hypochloremia: Lowest Cl = 94 mmol/L in last 2 days, will monitor as appropriate            # Hypertension: Noted on problem list  # Acute heart failure with preserved ejection fraction: heart failure noted on problem list, last echo with EF >50%, and receiving IV diuretics            # Severe Malnutrition: based on nutrition assessment, PRESENT ON ADMISSION   # Financial/Environmental Concerns:           Social Drivers of Health    Physical Activity: Insufficiently Active (3/20/2024)    Exercise Vital Sign     Days of Exercise per Week: 7 days     Minutes of Exercise per Session: 20 min   Social Connections: Unknown (3/20/2024)    Social Connection and Isolation Panel [NHANES]     Frequency of Social Gatherings with Friends and Family: Three times a week          Disposition Plan     Medically Ready for Discharge: Anticipated Tomorrow    The patient lives by herself.   The patient is interested in short-term rehab.  They have had a discussion with hospice care as well.  They are considering hospice care in the long run.         Holley Lopez MD  Hospitalist Service  [facility]  Securely message with [employer] info)  Text page via [facility] Paging/Directory   ______________________________________________________________________    Interval History   The patient was seen and examined.  Overnight events reviewed.  Discussed with nursing staff.  She is feeling much better today.  Shortness of breath is improved.  Blood pressure has remained stable as well.  Yesterday she did have episodes of bradycardia with asystole pauses but last night her heart rate did hold up nicely. Denies any abdominal pain nausea or vomiting.  Denies any chest pain.  Her oxygenation drops at rest as she does not have enough effort in taking deep  breaths.    Gen: No fevers/chills  CVS: No chest pain   GI: no nausea/vomiting. No abdominal pain  Neuro: No headaches or Seizures     Physical Exam   Vital Signs: Temp: 98.6  F (37  C) Temp src: Tympanic BP: 112/55 Pulse: 65   Resp: 18 SpO2: (!) 89 % O2 Device: Nasal cannula Oxygen Delivery: 4 LPM  Weight: 126 lbs 11.2 oz    GENERAL APPEARANCE: In no acute distress.  HEENT: No oropharyngeal lesions.  NECK: Supple.   CHEST: Symmetric. Nontender to palpation.  LUNGS: Bibasilar fine crackles   HEART: S1+S2 Ness,  ABDOMEN: Soft, flat, and benign. BS +ve  EXTREMITIES: +edema.  NEUROLOGIC: Grossly nonfocal examination  PSYCHIATRIC: Appropriate mood and affect.  SKIN: No new rashes.        Medical Decision Making       38 MINUTES SPENT BY ME on the date of service doing chart review, history, exam, documentation & further activities per the note.      Current Facility-Administered Medications   Medication Dose Route Frequency Provider Last Rate Last Admin    acetaminophen (TYLENOL) tablet 650 mg  650 mg Oral Q4H PRN Mercedes Quiroz MD        Or    acetaminophen (TYLENOL) Suppository 650 mg  650 mg Rectal Q4H PRN Mercedes Quiroz MD        [Held by provider] alum & mag hydroxide-simethicone (MAALOX) suspension 10 mL  10 mL Oral Q4H PRN Mercedes Quiroz MD        apixaban ANTICOAGULANT (ELIQUIS) tablet 2.5 mg  2.5 mg Oral BID Mercedes Quiroz MD   2.5 mg at 12/25/24 0844    calcium carbonate (TUMS) chewable tablet 1,000 mg  1,000 mg Oral 4x Daily PRN Mercedes Quiroz MD        Continuing ACE inhibitor/ARB/ARNI from home medication list OR ACE inhibitor/ARB/ARNI order already placed during this visit   Does not apply DOES NOT GO TO Mercedes Baig MD        [Held by provider] furosemide (LASIX) tablet 20 mg  20 mg Oral Daily Mercedes Quiroz MD        furosemide (LASIX) tablet 40 mg  40 mg Oral Daily Holley Lopez MD        latanoprost (XALATAN) 0.005 % ophthalmic solution 1 drop  1 drop Both Eyes At Bedtime Richie  Mercedes AGUAYO MD   1 drop at 12/24/24 2112    lidocaine (LMX4) cream   Topical Q1H PRN Mercedes Quiroz MD        lidocaine 1 % 0.1-1 mL  0.1-1 mL Other Q1H PRN Mercedes Quiroz MD        losartan (COZAAR) half-tab 12.5 mg  12.5 mg Oral Daily Holley Lopez MD   12.5 mg at 12/25/24 0847    [Held by provider] magnesium hydroxide (MILK OF MAGNESIA) suspension 30 mL  30 mL Oral Daily PRN Mercedes Quiroz MD        melatonin tablet 3 mg  3 mg Oral At Bedtime PRN Mercedes Quiroz MD        ondansetron (ZOFRAN ODT) ODT tab 4 mg  4 mg Oral Q6H PRN Mercedes Quiroz MD        Or    ondansetron (ZOFRAN) injection 4 mg  4 mg Intravenous Q6H PRN Mercedes Quiroz MD        ondansetron (ZOFRAN) injection 4 mg  4 mg Intravenous Once Mercedes Quiroz MD        pantoprazole (PROTONIX) EC tablet 40 mg  40 mg Oral QAM AC Holley Lopez MD   40 mg at 12/25/24 0844    Patient is already receiving anticoagulation with heparin, enoxaparin (LOVENOX), warfarin (COUMADIN)  or other anticoagulant medication   Does not apply Continuous PRN Mercedes Quiroz MD        polyethylene glycol (MIRALAX) Packet 17 g  17 g Oral Daily Holley Lopez MD   17 g at 12/24/24 0958    potassium chloride ER (K-TAB/KLOR-CON) CR tablet 10 mEq  10 mEq Oral Daily MARAL'Mercedes Jefferson MD   10 mEq at 12/25/24 0844    rosuvastatin (CRESTOR) tablet 5 mg  5 mg Oral Once per day on Monday Wednesday Friday Mercedes Quiroz MD   5 mg at 12/23/24 1004    [Held by provider] saccharomyces boulardii (FLORASTOR) capsule 250 mg  250 mg Oral BID MARAL'Mercedes Jefferson MD        senna-docusate (SENOKOT-S/PERICOLACE) 8.6-50 MG per tablet 1 tablet  1 tablet Oral At Bedtime Holley Lopez MD   1 tablet at 12/23/24 2156    Or    senna-docusate (SENOKOT-S/PERICOLACE) 8.6-50 MG per tablet 2 tablet  2 tablet Oral At Bedtime Holley Lopez MD        sodium chloride (PF) 0.9% PF flush 3 mL  3 mL Intracatheter Q8H O'Mercedes Jefferson MD   3 mL at 12/25/24 0847    sodium chloride (PF) 0.9% PF flush 3 mL  3 mL  Intracatheter q1 min prn Mercedes Quiroz MD        thyroid (ARMOUR) tablet 120 mg  120 mg Oral Daily Mercedes Quiroz MD   120 mg at 12/25/24 0606    [Held by provider] triamcinolone (KENALOG) 0.1 % cream   Topical BID PRN Mercedes Quiroz MD        [Held by provider] Vitamin D3 (CHOLECALCIFEROL) tablet 25 mcg  25 mcg Oral Daily Mercedes Quiroz MD          Data     I have personally reviewed the following data over the past 24 hrs:    9.6  \   14.6   / 145 (L)     132 (L) 94 (L) 27.5 (H) /  105 (H)   4.1 31 (H) 1.39 (H) \       Imaging results reviewed over the past 24 hrs:   No results found for this or any previous visit (from the past 24 hours).

## 2024-12-25 NOTE — PLAN OF CARE
"Goal Outcome Evaluation:    Temp: 98.6  F (37  C) Temp src: Tympanic BP: 126/56 Pulse: 100   Resp: 18 SpO2: 94 % O2 Device: Nasal cannula Oxygen Delivery: 4 LPM    Patient had eventful day of nausea and vomiting throughout the day. Denied pain, SOB. PRN Reglan and Zofran administered. Very little oral intake. Encouraged to continue with soft, small amounts of foods and water.     Patient presenting with moderate fatigue, BP's continue to be soft. Family concerned of decline. Creatinine heading upward.     POC for possible STR placement at [facility] in [place]. See MD note for POC update.       Problem: Adult Inpatient Plan of Care  Goal: Plan of Care Review  Description: The Plan of Care Review/Shift note should be completed every shift.  The Outcome Evaluation is a brief statement about your assessment that the patient is improving, declining, or no change.  This information will be displayed automatically on your shift  note.  Outcome: Not Progressing  Goal: Patient-Specific Goal (Individualized)  Description: You can add care plan individualizations to a care plan. Examples of Individualization might be:  \"Parent requests to be called daily at 9am for status\", \"I have a hard time hearing out of my right ear\", or \"Do not touch me to wake me up as it startles  me\".  Outcome: Not Progressing  Goal: Absence of Hospital-Acquired Illness or Injury  Outcome: Not Progressing  Goal: Optimal Comfort and Wellbeing  Outcome: Not Progressing  Intervention: Monitor Pain and Promote Comfort  Recent Flowsheet Documentation  Taken 12/25/2024 1500 by Víctor Solis RN  Pain Management Interventions: declines  Goal: Readiness for Transition of Care  Outcome: Not Progressing     Problem: Comorbidity Management  Goal: Maintenance of Heart Failure Symptom Control  Outcome: Not Progressing     Problem: Gas Exchange Impaired  Goal: Optimal Gas Exchange  Outcome: Not Progressing                           "

## 2024-12-25 NOTE — PROGRESS NOTES
12/25/24 1300   Appointment Info   Signing Clinician's Name / Credentials (OT) Meryl Malone OTR/L   Therapeutic Activities   Treatment Detail/Skilled Intervention OT and PT attempted to see patient x2 today, She was pretty nauseous and reported she was vomiting earlier. Patient not feeling well enough for therapy today, will attempt again tomorrow.

## 2024-12-25 NOTE — PROGRESS NOTES
Called [facility] about pt's HR briefly dropping to 26, lowest seen this shift. Strip printed and sent to medical. Will continue to monitor closely on tele.

## 2024-12-26 ENCOUNTER — APPOINTMENT (OUTPATIENT)
Dept: PHYSICAL THERAPY | Facility: OTHER | Age: 89
End: 2024-12-26
Payer: MEDICARE

## 2024-12-26 ENCOUNTER — APPOINTMENT (OUTPATIENT)
Dept: GENERAL RADIOLOGY | Facility: OTHER | Age: 89
End: 2024-12-26
Attending: HOSPITALIST
Payer: MEDICARE

## 2024-12-26 ENCOUNTER — APPOINTMENT (OUTPATIENT)
Dept: OCCUPATIONAL THERAPY | Facility: OTHER | Age: 89
End: 2024-12-26
Payer: MEDICARE

## 2024-12-26 VITALS
BODY MASS INDEX: 24.74 KG/M2 | SYSTOLIC BLOOD PRESSURE: 125 MMHG | TEMPERATURE: 98.8 F | DIASTOLIC BLOOD PRESSURE: 74 MMHG | WEIGHT: 126.7 LBS | HEART RATE: 68 BPM | OXYGEN SATURATION: 92 % | RESPIRATION RATE: 16 BRPM

## 2024-12-26 LAB
ALBUMIN UR-MCNC: 20 MG/DL
ANION GAP SERPL CALCULATED.3IONS-SCNC: 9 MMOL/L (ref 7–15)
APPEARANCE UR: CLEAR
BACTERIA #/AREA URNS HPF: ABNORMAL /HPF
BASOPHILS # BLD AUTO: 0.1 10E3/UL (ref 0–0.2)
BASOPHILS NFR BLD AUTO: 0 %
BILIRUB UR QL STRIP: NEGATIVE
BUN SERPL-MCNC: 32 MG/DL (ref 8–23)
CALCIUM SERPL-MCNC: 9.2 MG/DL (ref 8.8–10.4)
CHLORIDE SERPL-SCNC: 96 MMOL/L (ref 98–107)
COLOR UR AUTO: YELLOW
CREAT SERPL-MCNC: 1.21 MG/DL (ref 0.51–0.95)
EGFRCR SERPLBLD CKD-EPI 2021: 42 ML/MIN/1.73M2
EOSINOPHIL # BLD AUTO: 0 10E3/UL (ref 0–0.7)
EOSINOPHIL NFR BLD AUTO: 0 %
ERYTHROCYTE [DISTWIDTH] IN BLOOD BY AUTOMATED COUNT: 13.9 % (ref 10–15)
GLUCOSE SERPL-MCNC: 127 MG/DL (ref 70–99)
GLUCOSE UR STRIP-MCNC: NEGATIVE MG/DL
HCO3 SERPL-SCNC: 29 MMOL/L (ref 22–29)
HCT VFR BLD AUTO: 49.1 % (ref 35–47)
HGB BLD-MCNC: 16.3 G/DL (ref 11.7–15.7)
HGB UR QL STRIP: NEGATIVE
HYALINE CASTS: 9 /LPF
IMM GRANULOCYTES # BLD: 0.1 10E3/UL
IMM GRANULOCYTES NFR BLD: 0 %
KETONES UR STRIP-MCNC: 10 MG/DL
LEUKOCYTE ESTERASE UR QL STRIP: ABNORMAL
LIPASE SERPL-CCNC: 26 U/L (ref 13–60)
LYMPHOCYTES # BLD AUTO: 0.7 10E3/UL (ref 0.8–5.3)
LYMPHOCYTES NFR BLD AUTO: 6 %
MCH RBC QN AUTO: 30.8 PG (ref 26.5–33)
MCHC RBC AUTO-ENTMCNC: 33.2 G/DL (ref 31.5–36.5)
MCV RBC AUTO: 93 FL (ref 78–100)
MONOCYTES # BLD AUTO: 0.9 10E3/UL (ref 0–1.3)
MONOCYTES NFR BLD AUTO: 7 %
MUCOUS THREADS #/AREA URNS LPF: PRESENT /LPF
NEUTROPHILS # BLD AUTO: 11.6 10E3/UL (ref 1.6–8.3)
NEUTROPHILS NFR BLD AUTO: 87 %
NITRATE UR QL: NEGATIVE
NRBC # BLD AUTO: 0 10E3/UL
NRBC BLD AUTO-RTO: 0 /100
PH UR STRIP: 5.5 [PH] (ref 5–9)
PLATELET # BLD AUTO: 176 10E3/UL (ref 150–450)
POTASSIUM SERPL-SCNC: 4.7 MMOL/L (ref 3.4–5.3)
RBC # BLD AUTO: 5.3 10E6/UL (ref 3.8–5.2)
RBC URINE: 2 /HPF
SODIUM SERPL-SCNC: 134 MMOL/L (ref 135–145)
SP GR UR STRIP: 1.02 (ref 1–1.03)
SQUAMOUS EPITHELIAL: 4 /HPF
UROBILINOGEN UR STRIP-MCNC: NORMAL MG/DL
WBC # BLD AUTO: 13.4 10E3/UL (ref 4–11)
WBC URINE: 28 /HPF

## 2024-12-26 PROCEDURE — 99232 SBSQ HOSP IP/OBS MODERATE 35: CPT | Performed by: HOSPITALIST

## 2024-12-26 PROCEDURE — 81001 URINALYSIS AUTO W/SCOPE: CPT | Performed by: HOSPITALIST

## 2024-12-26 PROCEDURE — 250N000013 HC RX MED GY IP 250 OP 250 PS 637: Performed by: HOSPITALIST

## 2024-12-26 PROCEDURE — 999N000157 HC STATISTIC RCP TIME EA 10 MIN

## 2024-12-26 PROCEDURE — 97530 THERAPEUTIC ACTIVITIES: CPT | Mod: GO | Performed by: OCCUPATIONAL THERAPIST

## 2024-12-26 PROCEDURE — 74018 RADEX ABDOMEN 1 VIEW: CPT

## 2024-12-26 PROCEDURE — 80048 BASIC METABOLIC PNL TOTAL CA: CPT | Performed by: HOSPITALIST

## 2024-12-26 PROCEDURE — 82435 ASSAY OF BLOOD CHLORIDE: CPT | Performed by: HOSPITALIST

## 2024-12-26 PROCEDURE — 36415 COLL VENOUS BLD VENIPUNCTURE: CPT | Performed by: HOSPITALIST

## 2024-12-26 PROCEDURE — 97530 THERAPEUTIC ACTIVITIES: CPT | Mod: GP

## 2024-12-26 PROCEDURE — 250N000011 HC RX IP 250 OP 636: Performed by: HOSPITALIST

## 2024-12-26 PROCEDURE — 97116 GAIT TRAINING THERAPY: CPT | Mod: GP

## 2024-12-26 PROCEDURE — 85025 COMPLETE CBC W/AUTO DIFF WBC: CPT | Performed by: HOSPITALIST

## 2024-12-26 PROCEDURE — 87186 SC STD MICRODIL/AGAR DIL: CPT | Performed by: HOSPITALIST

## 2024-12-26 PROCEDURE — 120N000001 HC R&B MED SURG/OB

## 2024-12-26 PROCEDURE — 83690 ASSAY OF LIPASE: CPT | Performed by: HOSPITALIST

## 2024-12-26 RX ORDER — PANTOPRAZOLE SODIUM 40 MG/1
40 TABLET, DELAYED RELEASE ORAL
Status: DISCONTINUED | OUTPATIENT
Start: 2024-12-26 | End: 2024-12-26

## 2024-12-26 RX ORDER — POLYETHYLENE GLYCOL 3350 17 G/17G
17 POWDER, FOR SOLUTION ORAL 2 TIMES DAILY
Status: DISPENSED | OUTPATIENT
Start: 2024-12-26

## 2024-12-26 RX ORDER — METOPROLOL SUCCINATE 25 MG/1
25 TABLET, EXTENDED RELEASE ORAL DAILY
Status: DISCONTINUED | OUTPATIENT
Start: 2024-12-26 | End: 2024-12-26

## 2024-12-26 RX ORDER — METOCLOPRAMIDE HYDROCHLORIDE 5 MG/ML
5 INJECTION INTRAMUSCULAR; INTRAVENOUS EVERY 6 HOURS PRN
Status: ACTIVE | OUTPATIENT
Start: 2024-12-26

## 2024-12-26 RX ORDER — METOCLOPRAMIDE HYDROCHLORIDE 5 MG/ML
5 INJECTION INTRAMUSCULAR; INTRAVENOUS 3 TIMES DAILY
Status: DISPENSED | OUTPATIENT
Start: 2024-12-26 | End: 2024-12-27

## 2024-12-26 RX ORDER — BISACODYL 10 MG
10 SUPPOSITORY, RECTAL RECTAL ONCE
Status: DISPENSED | OUTPATIENT
Start: 2024-12-26

## 2024-12-26 RX ADMIN — APIXABAN 2.5 MG: 2.5 TABLET, FILM COATED ORAL at 20:22

## 2024-12-26 RX ADMIN — ONDANSETRON 4 MG: 2 INJECTION INTRAMUSCULAR; INTRAVENOUS at 06:59

## 2024-12-26 RX ADMIN — APIXABAN 2.5 MG: 2.5 TABLET, FILM COATED ORAL at 07:46

## 2024-12-26 RX ADMIN — POTASSIUM CHLORIDE 10 MEQ: 750 TABLET, FILM COATED, EXTENDED RELEASE ORAL at 07:46

## 2024-12-26 RX ADMIN — POLYETHYLENE GLYCOL 3350 17 G: 17 POWDER, FOR SOLUTION ORAL at 07:46

## 2024-12-26 RX ADMIN — LOSARTAN POTASSIUM 12.5 MG: 25 TABLET, FILM COATED ORAL at 07:49

## 2024-12-26 RX ADMIN — METOCLOPRAMIDE 5 MG: 5 INJECTION, SOLUTION INTRAMUSCULAR; INTRAVENOUS at 14:50

## 2024-12-26 RX ADMIN — METOCLOPRAMIDE 5 MG: 5 INJECTION, SOLUTION INTRAMUSCULAR; INTRAVENOUS at 09:36

## 2024-12-26 RX ADMIN — METOCLOPRAMIDE 5 MG: 5 INJECTION, SOLUTION INTRAMUSCULAR; INTRAVENOUS at 21:56

## 2024-12-26 RX ADMIN — PANTOPRAZOLE SODIUM 40 MG: 40 TABLET, DELAYED RELEASE ORAL at 07:46

## 2024-12-26 ASSESSMENT — ACTIVITIES OF DAILY LIVING (ADL)
ADLS_ACUITY_SCORE: 69

## 2024-12-26 NOTE — PLAN OF CARE
/74 (BP Location: Left arm)   Pulse 76   Temp 99.4  F (37.4  C) (Tympanic)   Resp 18   Wt 57.5 kg (126 lb 11.2 oz)   LMP 01/25/1974 (Approximate)   SpO2 90%   BMI 24.74 kg/m      VSS, afebrile, denies pain.  Had some nausea and vomiting early in the shift after her daughter gave her some jello.  Slept most of the shift.    Goal Outcome Evaluation:      Plan of Care Reviewed With: patient    Overall Patient Progress: no change      Problem: Adult Inpatient Plan of Care  Goal: Plan of Care Review  12/26/2024 0437 by Kenisha Saleem RN  Outcome: Progressing  12/26/2024 0437 by Kenisha Saleem RN  Outcome: Progressing  Flowsheets (Taken 12/26/2024 0437)  Plan of Care Reviewed With: patient  Overall Patient Progress: no change  Goal: Patient-Specific Goal (Individualized)  12/26/2024 0437 by Kenisha Saleem RN  Outcome: Progressing  12/26/2024 0437 by Kenisha Saleem RN  Outcome: Progressing  Goal: Absence of Hospital-Acquired Illness or Injury  12/26/2024 0437 by Kenisha Saleem RN  Outcome: Progressing  12/26/2024 0437 by Kenisha Saleem RN  Outcome: Progressing  Intervention: Identify and Manage Fall Risk  Recent Flowsheet Documentation  Taken 12/25/2024 2300 by Kenisha Saleem RN  Safety Promotion/Fall Prevention:   clutter free environment maintained   assistive device/personal items within reach   activity supervised   lighting adjusted   nonskid shoes/slippers when out of bed   patient and family education   room organization consistent   safety round/check completed   supervised activity   treat reversible contributory factors   treat underlying cause  Intervention: Prevent Skin Injury  Recent Flowsheet Documentation  Taken 12/25/2024 2300 by Kenisha Saleem RN  Body Position:   position changed independently   weight shifting   supine, head elevated  Intervention: Prevent and Manage VTE (Venous Thromboembolism) Risk  Recent Flowsheet Documentation  Taken  12/25/2024 2300 by Kenisha Saleem RN  VTE Prevention/Management: SCDs on (sequential compression devices)  Goal: Optimal Comfort and Wellbeing  12/26/2024 0437 by Kenisha Saleem RN  Outcome: Progressing  12/26/2024 0437 by Kenisha Saleem RN  Outcome: Progressing  Goal: Readiness for Transition of Care  12/26/2024 0437 by Kenisha Saleem RN  Outcome: Progressing  12/26/2024 0437 by Kenisha Saleem RN  Outcome: Progressing     Problem: Comorbidity Management  Goal: Maintenance of Heart Failure Symptom Control  12/26/2024 0437 by Kenisha Saleem RN  Outcome: Progressing  12/26/2024 0437 by Kenisha Saleem RN  Outcome: Progressing     Problem: Gas Exchange Impaired  Goal: Optimal Gas Exchange  12/26/2024 0437 by Kenisha Saleem RN  Outcome: Progressing  12/26/2024 0437 by Kenisha Saleem RN  Outcome: Progressing

## 2024-12-26 NOTE — PROGRESS NOTES
Pt remains on 4L min overnight.  Respiratory will continue to provide support as needed.    Alma Paz, RT

## 2024-12-26 NOTE — PROGRESS NOTES
12/26/24 3639   Appointment Info   Signing Clinician's Name / Credentials (OT) Kenisha Valadez, OTR/L   Therapeutic Activities   Therapeutic Activity Minutes (94052) 35   Symptoms noted during/after treatment fatigue   Treatment Detail/Skilled Intervention Pt was in bed upon arrival and initially reluctant to get out of bed, but able to encourage pt to after explaining importance. Pt reported she felt ill earlier in the day and had some vomiting, but reported she felt better this afternoon and had no reports of pain, dizziness, etc. Pt was able to move supine to sit with moderate assist, and able to stand and take a few steps to recliner with FWW and minimal assist of 1-2 for safety.   OT Discharge Planning   OT Plan progress mobility and self cares   OT Discharge Recommendation (DC Rec) Transitional Care Facility   OT Rationale for DC Rec Pt would benefit from continued  strengthening at [facility] to promote safe D/C back home alone as previous   OT Brief overview of current status see above, pt progressing slowly   Total Session Time   Timed Code Treatment Minutes 35   Total Session Time (sum of timed and untimed services) 35

## 2024-12-26 NOTE — PROGRESS NOTES
12/26/24 0676   Appointment Info   Signing Clinician's Name / Credentials (PT) Dangelo Musa MPT   Therapeutic Activity   Treatment Detail/Skilled Intervention supine<>sit with minimal assist of 1; sit to stand and stand pivot with CGA to SBA  of 1 and use of Fww   Symptoms Noted During/After Treatment Fatigue   Gait Training   Symptoms Noted During/After Treatment (Gait Training) fatigue;shortness of breath   Treatment Detail/Skilled Intervention a few steps with transfers   Distance in Feet 3-4   [place] Level (Gait Training) stand-by assist   Physical Assistance Level (Gait Training) supervision   Weight Bearing (Gait Training) full weight-bearing   Assistive Device (Gait Training) rolling walker   Pattern Analysis (Gait Training) swing-to gait   Gait Analysis Deviations decreased munir;decreased velocity of limb motion;decreased step length   PT Discharge Planning   PT Plan Continue PT   PT Discharge Recommendation (DC Rec) Transitional Care Facility;home with assist;home with home care physical therapy   PT Rationale for DC Rec to promote strength, stability and safe mobility   PT Brief overview of current status Patient remains fatigued but willing to get up and out of bed to recliner; she will benefit from continued PT in short term rehab vs home with family and home care PT   PT Equipment Needed at Discharge walker, rolling

## 2024-12-26 NOTE — PROGRESS NOTES
[facility]    Medicine Progress Note - Hospitalist Service    Date of Admission:  12/22/2024    Assessment & Plan   89 years old female with history of hypertension, chronic HFpEF, atrial fibrillation was admitted with acute respiratory failure.  She was found to be in acute on chronic HFpEF, diastolic heart failure.  She was diuresed with IV Lasix with good response.  Also has had issues with atrial fibrillation with slow ventricular response developing bradycardia.  Hospital course complicated by nausea and vomiting.  She has the following acute medical issues:    Nausea/Vomiting: It could be due to gastritis.  I will check a UA as well.  KUB done today showed moderate stools.  I will give her Dulcolax suppository.  I will put her on metoclopramide 5 mg 3 times daily.  Clear liquid diet for now.  Check lipase levels but the patient does not have abdominal pain or tenderness.  She does have bowel sounds as well.  Abdominal examination is benign.    Acute respiratory failure: The patient does not require any oxygen at home.  She continues to require 2 to 3 L now.  This is due to volume overload leading to acute on chronic HFpEF.  Her oxygenation drops at rest as she does not have good inspiratory effort.  With exertion it improves remarkably.  Will have to be careful tapering down her oxygen.    Acute on chronic HFpEF: The patient's left ventricular ejection fraction is more than 70%.  She was diuresed with IV Lasix.  Hold Lasix as creatinine started going up..  Continue to monitor intake and output with daily weights.  Chest x-ray findings consistent with pulmonary edema.      Atrial fibrillation with bradycardia: The patient is anticoagulated with Eliquis.  Coreg and Cardizem were held due to significant bradycardia.  The patient and family decided against pacemaker implant.    Polycythemia: Hemoglobin level has been stable now.    Protein calorie malnutrition moderate: Nutrition consulted.   The patient is on mirtazapine but it makes her rather lethargic.  I will hold it tonight and see how she does.    Acute on CKD stage III: Creatinine is going up.  Chest x-ray still showing pulmonary congestion.  Will continue to monitor creatinine closely.  Hold Lasix avoid nephrotoxins.        Diet: Fluid restriction 2000 ML FLUID (and additional linked orders)  Snacks/Supplements Adult: Gelatein Plus; With Meals  Clear Liquid Diet    DVT Prophylaxis: DOAC  Grubbs Catheter: Not present  Lines: None     Cardiac Monitoring: ACTIVE order. Indication: Bradycardias (48 hours)  Code Status: No CPR- Do NOT Intubate      Clinically Significant Risk Factors         # Hyponatremia: Lowest Na = 132 mmol/L in last 2 days, will monitor as appropriate  # Hypochloremia: Lowest Cl = 94 mmol/L in last 2 days, will monitor as appropriate            # Hypertension: Noted on problem list  # Acute heart failure with preserved ejection fraction: heart failure noted on problem list, last echo with EF >50%, and receiving IV diuretics            # Severe Malnutrition: based on nutrition assessment, PRESENT ON ADMISSION   # Financial/Environmental Concerns:           Social Drivers of Health    Physical Activity: Insufficiently Active (3/20/2024)    Exercise Vital Sign     Days of Exercise per Week: 7 days     Minutes of Exercise per Session: 20 min   Social Connections: Unknown (3/20/2024)    Social Connection and Isolation Panel [NHANES]     Frequency of Social Gatherings with Friends and Family: Three times a week          Disposition Plan     Medically Ready for Discharge: Anticipated discharged in 2-3 days.     The patient lives by herself.   The patient is interested in short-term rehab.  Once her symptoms improved, she will be transferred to [facility] in [place] whenever accepted.   Family is considering hospice care in the long run.     Holley Lopez MD  Hospitalist Service  [facility]  Securely message with  Dom[employer]a (more info)  Text page via [facility] Paging/Directory   ______________________________________________________________________    Interval History   The patient was seen and examined.  Overnight events reviewed.  Discussed with nursing staff.    The patient has nausea and vomiting today.  She has had 2-3 episodes of emesis without hematemesis.  Denies significant abdominal pain as such.  No chest pain.  Shortness of breath is improved.  Has not been able to eat too much. She did have a BM    Gen: No fevers/chills  CVS: No chest pain   Neuro: No headaches or Seizures     Physical Exam   Vital Signs: Temp: 98.8  F (37.1  C) Temp src: Tympanic BP: 130/69 Pulse: 81   Resp: 16 SpO2: 93 % O2 Device: Nasal cannula Oxygen Delivery: 4 LPM  Weight: 126 lbs 11.2 oz    GENERAL APPEARANCE: In some distress due to Nausea.  HEENT: No oropharyngeal lesions.  NECK: Supple.   CHEST: Symmetric. Nontender to palpation.  LUNGS: Bibasilar fine crackles   HEART: S1+S2 [place],  ABDOMEN: Soft, flat, and benign. BS +ve  EXTREMITIES: +edema.  NEUROLOGIC: Grossly nonfocal examination  PSYCHIATRIC: Appropriate mood and affect.  SKIN: No new rashes.        Medical Decision Making       38 MINUTES SPENT BY ME on the date of service doing chart review, history, exam, documentation & further activities per the note.      Current Facility-Administered Medications   Medication Dose Route Frequency Provider Last Rate Last Admin    acetaminophen (TYLENOL) tablet 650 mg  650 mg Oral Q4H PRN Mercedes Quiroz MD        Or    acetaminophen (TYLENOL) Suppository 650 mg  650 mg Rectal Q4H PRN MARAL'Mercedes Jefferson MD        [Held by provider] alum & mag hydroxide-simethicone (MAALOX) suspension 10 mL  10 mL Oral Q4H PRN Mercedes Quiroz MD        apixaban ANTICOAGULANT (ELIQUIS) tablet 2.5 mg  2.5 mg Oral BID MARAL'Mercedes Jefferson MD   2.5 mg at 12/26/24 0746    bisacodyl (DULCOLAX) suppository 10 mg  10 mg Rectal Once Holley Lopez MD        calcium carbonate (TUMS)  chewable tablet 1,000 mg  1,000 mg Oral 4x Daily PRN Mercedes Quiroz MD        Continuing ACE inhibitor/ARB/ARNI from home medication list OR ACE inhibitor/ARB/ARNI order already placed during this visit   Does not apply DOES NOT GO TO Mercedes Baig MD        [Held by provider] furosemide (LASIX) tablet 20 mg  20 mg Oral Daily Mercedes Quiroz MD        latanoprost (XALATAN) 0.005 % ophthalmic solution 1 drop  1 drop Both Eyes At Bedtime Mercedes Quiroz MD   1 drop at 12/25/24 1936    lidocaine (LMX4) cream   Topical Q1H PRN Mercedes Quiroz MD        lidocaine 1 % 0.1-1 mL  0.1-1 mL Other Q1H PRN Mercedes Quiroz MD        [Held by provider] magnesium hydroxide (MILK OF MAGNESIA) suspension 30 mL  30 mL Oral Daily PRN Mercedes Quiroz MD        melatonin tablet 3 mg  3 mg Oral At Bedtime PRN Mercedes Quiroz MD        metoclopramide (REGLAN) injection 5 mg  5 mg Intravenous TID Holley Lopez MD   5 mg at 12/26/24 0936    metoclopramide (REGLAN) injection 5 mg  5 mg Intravenous Q6H PRN Holley Lopez MD        metoprolol succinate ER (TOPROL XL) 24 hr tablet 25 mg  25 mg Oral Daily Holley Lopez MD        ondansetron (ZOFRAN ODT) ODT tab 4 mg  4 mg Oral Q6H PRN Mercedes Quiroz MD   4 mg at 12/25/24 1612    Or    ondansetron (ZOFRAN) injection 4 mg  4 mg Intravenous Q6H PRN Mercedes Quiroz MD   4 mg at 12/26/24 0659    pantoprazole (PROTONIX) IV push injection 40 mg  40 mg Intravenous Daily with breakfast Holley Lopez MD        Patient is already receiving anticoagulation with heparin, enoxaparin (LOVENOX), warfarin (COUMADIN)  or other anticoagulant medication   Does not apply Continuous PRN Mercedes Quiroz MD        polyethylene glycol (MIRALAX) Packet 17 g  17 g Oral BID Holley Lopez MD        potassium chloride ER (K-TAB/KLOR-CON) CR tablet 10 mEq  10 mEq Oral Daily O'Mercedes Jefferson MD   10 mEq at 12/26/24 0746    rosuvastatin (CRESTOR) tablet 5 mg  5 mg Oral Once per day on Monday Wednesday Friday  Mercedes Quiroz MD   5 mg at 12/23/24 1004    [Held by provider] saccharomyces boulardii (FLORASTOR) capsule 250 mg  250 mg Oral BID Mercedes Quiroz MD        senna-docusate (SENOKOT-S/PERICOLACE) 8.6-50 MG per tablet 1 tablet  1 tablet Oral At Bedtime Holley Lopez MD   1 tablet at 12/23/24 2156    Or    senna-docusate (SENOKOT-S/PERICOLACE) 8.6-50 MG per tablet 2 tablet  2 tablet Oral At Bedtime Holley Lopez MD        sodium chloride (PF) 0.9% PF flush 3 mL  3 mL Intracatheter Q8H Mercedes Quiroz MD   3 mL at 12/25/24 1343    sodium chloride (PF) 0.9% PF flush 3 mL  3 mL Intracatheter q1 min prn Mercedes Quiroz MD   3 mL at 12/25/24 2010    thyroid (ARMOUR) tablet 120 mg  120 mg Oral Daily Mercedes Quiroz MD   120 mg at 12/25/24 0606    [Held by provider] triamcinolone (KENALOG) 0.1 % cream   Topical BID PRN Mercedes Quiroz MD        [Held by provider] Vitamin D3 (CHOLECALCIFEROL) tablet 25 mcg  25 mcg Oral Daily Mercedes Quiroz MD          Data     I have personally reviewed the following data over the past 24 hrs:    13.4 (H)  \   16.3 (H)   / 176     134 (L) 96 (L) 32.0 (H) /  127 (H)   4.7 29 1.21 (H) \       Imaging results reviewed over the past 24 hrs:   Recent Results (from the past 24 hours)   X-ray Abdomen flat port    Narrative    XR ABDOMEN PORT 1 VIEW    HISTORY: 89 years Female persistent vomiting , evaluate for ileus    COMPARISON: 12/22/2024    TECHNIQUE: 2 views abdomen    FINDINGS: No abnormally distended loops of bowel are present. There is  a moderate volume of stool in the colon. There is no evidence of bowel  obstruction or free air.      Impression    IMPRESSION: There is a moderate volume of stool. No evidence of bowel  obstruction or free air.    Small bilateral pleural effusions are present.    BENITEZ WILL MD         SYSTEM ID:  [id number]

## 2024-12-26 NOTE — PLAN OF CARE
Goal Outcome Evaluation: Patient has been drowsy. Responds to verbal stimuli. Denies pain. O2 stable on 4 L via n/c. Dyspnea on exertion noted. Patient denies shortness of breath while at rest. Tolerating sips of clears. Intermittent nausea. Scheduled medication providing effective relief. Afebrile. VSS. Family at bedside.     Plan of Care Reviewed With: patient    Overall Patient Progress: no change

## 2024-12-26 NOTE — PROGRESS NOTES
:    Spoke [facility] and they will screen patient. They wouldn't be able to take patient until Monday.    Spoke with patients daughter and updated her about this. She reports that she has spoken with [facility] but wants patient to go somewhere for STR first.     Tito Staton, ARIANA on 12/26/2024 at 3:01 PM

## 2024-12-26 NOTE — PROGRESS NOTES
Interdisciplinary Discharge Planning Note    Anticipated Discharge Date: 12/30    Anticipated Discharge Location: [facility]    Clinical Needs Before Discharge:  stable functional status    Treatment Needs After Discharge:  rehab (PT, OT, ST)    Potential Barriers to Discharge: None identified at this time     ARIANA Abraham  12/26/2024,  3:02 PM

## 2024-12-26 NOTE — PROGRESS NOTES
Shift Summary    Pt is currently on 4 LPM. SpO2 was 90% when patient lying flat and sleeping. When awake and sitting upright SpO2 between 92-94%. No further interventions at this time.    Priyanka Choi, RT

## 2024-12-27 ENCOUNTER — APPOINTMENT (OUTPATIENT)
Dept: GENERAL RADIOLOGY | Facility: OTHER | Age: 89
End: 2024-12-27
Attending: INTERNAL MEDICINE
Payer: MEDICARE

## 2024-12-27 LAB
ANION GAP SERPL CALCULATED.3IONS-SCNC: 12 MMOL/L (ref 7–15)
BASOPHILS # BLD AUTO: 0.1 10E3/UL (ref 0–0.2)
BASOPHILS NFR BLD AUTO: 1 %
BUN SERPL-MCNC: 38.7 MG/DL (ref 8–23)
CALCIUM SERPL-MCNC: 9.3 MG/DL (ref 8.8–10.4)
CHLORIDE SERPL-SCNC: 98 MMOL/L (ref 98–107)
CREAT SERPL-MCNC: 1.18 MG/DL (ref 0.51–0.95)
EGFRCR SERPLBLD CKD-EPI 2021: 43 ML/MIN/1.73M2
EOSINOPHIL # BLD AUTO: 0.1 10E3/UL (ref 0–0.7)
EOSINOPHIL NFR BLD AUTO: 1 %
ERYTHROCYTE [DISTWIDTH] IN BLOOD BY AUTOMATED COUNT: 14.2 % (ref 10–15)
GLUCOSE SERPL-MCNC: 99 MG/DL (ref 70–99)
HCO3 SERPL-SCNC: 27 MMOL/L (ref 22–29)
HCT VFR BLD AUTO: 50.2 % (ref 35–47)
HGB BLD-MCNC: 16.6 G/DL (ref 11.7–15.7)
HOLD SPECIMEN: NORMAL
HOLD SPECIMEN: NORMAL
IMM GRANULOCYTES # BLD: 0 10E3/UL
IMM GRANULOCYTES NFR BLD: 0 %
LYMPHOCYTES # BLD AUTO: 0.9 10E3/UL (ref 0.8–5.3)
LYMPHOCYTES NFR BLD AUTO: 8 %
MAGNESIUM SERPL-MCNC: 2.1 MG/DL (ref 1.7–2.3)
MCH RBC QN AUTO: 31.2 PG (ref 26.5–33)
MCHC RBC AUTO-ENTMCNC: 33.1 G/DL (ref 31.5–36.5)
MCV RBC AUTO: 94 FL (ref 78–100)
MONOCYTES # BLD AUTO: 1.1 10E3/UL (ref 0–1.3)
MONOCYTES NFR BLD AUTO: 9 %
NEUTROPHILS # BLD AUTO: 9.4 10E3/UL (ref 1.6–8.3)
NEUTROPHILS NFR BLD AUTO: 81 %
NRBC # BLD AUTO: 0 10E3/UL
NRBC BLD AUTO-RTO: 0 /100
PLATELET # BLD AUTO: 183 10E3/UL (ref 150–450)
POTASSIUM SERPL-SCNC: 4.8 MMOL/L (ref 3.4–5.3)
RBC # BLD AUTO: 5.32 10E6/UL (ref 3.8–5.2)
SODIUM SERPL-SCNC: 137 MMOL/L (ref 135–145)
WBC # BLD AUTO: 11.6 10E3/UL (ref 4–11)

## 2024-12-27 PROCEDURE — 250N000009 HC RX 250: Performed by: HOSPITALIST

## 2024-12-27 PROCEDURE — 36415 COLL VENOUS BLD VENIPUNCTURE: CPT | Performed by: HOSPITALIST

## 2024-12-27 PROCEDURE — 250N000013 HC RX MED GY IP 250 OP 250 PS 637: Performed by: HOSPITALIST

## 2024-12-27 PROCEDURE — 82310 ASSAY OF CALCIUM: CPT | Performed by: HOSPITALIST

## 2024-12-27 PROCEDURE — 250N000011 HC RX IP 250 OP 636: Performed by: HOSPITALIST

## 2024-12-27 PROCEDURE — 85014 HEMATOCRIT: CPT | Performed by: HOSPITALIST

## 2024-12-27 PROCEDURE — 85004 AUTOMATED DIFF WBC COUNT: CPT | Performed by: HOSPITALIST

## 2024-12-27 PROCEDURE — 71045 X-RAY EXAM CHEST 1 VIEW: CPT

## 2024-12-27 PROCEDURE — 120N000001 HC R&B MED SURG/OB

## 2024-12-27 PROCEDURE — 80048 BASIC METABOLIC PNL TOTAL CA: CPT | Performed by: HOSPITALIST

## 2024-12-27 PROCEDURE — 999N000157 HC STATISTIC RCP TIME EA 10 MIN

## 2024-12-27 PROCEDURE — 99233 SBSQ HOSP IP/OBS HIGH 50: CPT | Performed by: FAMILY MEDICINE

## 2024-12-27 PROCEDURE — 83735 ASSAY OF MAGNESIUM: CPT | Performed by: HOSPITALIST

## 2024-12-27 PROCEDURE — 250N000013 HC RX MED GY IP 250 OP 250 PS 637: Performed by: FAMILY MEDICINE

## 2024-12-27 RX ORDER — METOPROLOL TARTRATE 25 MG/1
25 TABLET, FILM COATED ORAL ONCE
Status: COMPLETED | OUTPATIENT
Start: 2024-12-27 | End: 2024-12-27

## 2024-12-27 RX ORDER — METOPROLOL SUCCINATE 25 MG/1
25 TABLET, EXTENDED RELEASE ORAL DAILY
Status: DISCONTINUED | OUTPATIENT
Start: 2024-12-27 | End: 2024-12-29

## 2024-12-27 RX ADMIN — POLYETHYLENE GLYCOL 3350 17 G: 17 POWDER, FOR SOLUTION ORAL at 21:12

## 2024-12-27 RX ADMIN — APIXABAN 2.5 MG: 2.5 TABLET, FILM COATED ORAL at 21:13

## 2024-12-27 RX ADMIN — METOPROLOL SUCCINATE 25 MG: 25 TABLET, FILM COATED, EXTENDED RELEASE ORAL at 09:23

## 2024-12-27 RX ADMIN — APIXABAN 2.5 MG: 2.5 TABLET, FILM COATED ORAL at 07:18

## 2024-12-27 RX ADMIN — LATANOPROST 1 DROP: 50 SOLUTION/ DROPS OPHTHALMIC at 21:12

## 2024-12-27 RX ADMIN — THYROID 120 MG: 120 TABLET ORAL at 06:30

## 2024-12-27 RX ADMIN — ROSUVASTATIN CALCIUM 5 MG: 5 TABLET, FILM COATED ORAL at 09:23

## 2024-12-27 RX ADMIN — METOCLOPRAMIDE 5 MG: 5 INJECTION, SOLUTION INTRAMUSCULAR; INTRAVENOUS at 06:31

## 2024-12-27 RX ADMIN — METOPROLOL TARTRATE 25 MG: 25 TABLET, FILM COATED ORAL at 08:16

## 2024-12-27 RX ADMIN — PANTOPRAZOLE SODIUM 40 MG: 40 INJECTION, POWDER, FOR SOLUTION INTRAVENOUS at 07:18

## 2024-12-27 RX ADMIN — SENNOSIDES AND DOCUSATE SODIUM 1 TABLET: 50; 8.6 TABLET ORAL at 21:12

## 2024-12-27 RX ADMIN — POTASSIUM CHLORIDE 10 MEQ: 750 TABLET, FILM COATED, EXTENDED RELEASE ORAL at 07:18

## 2024-12-27 ASSESSMENT — ACTIVITIES OF DAILY LIVING (ADL)
ADLS_ACUITY_SCORE: 69
ADLS_ACUITY_SCORE: 69
ADLS_ACUITY_SCORE: 70
ADLS_ACUITY_SCORE: 70
ADLS_ACUITY_SCORE: 74
ADLS_ACUITY_SCORE: 70
ADLS_ACUITY_SCORE: 74
ADLS_ACUITY_SCORE: 74
ADLS_ACUITY_SCORE: 70
ADLS_ACUITY_SCORE: 74
ADLS_ACUITY_SCORE: 69
ADLS_ACUITY_SCORE: 74
ADLS_ACUITY_SCORE: 74
ADLS_ACUITY_SCORE: 70
ADLS_ACUITY_SCORE: 70
ADLS_ACUITY_SCORE: 69
ADLS_ACUITY_SCORE: 74
ADLS_ACUITY_SCORE: 69
ADLS_ACUITY_SCORE: 70
ADLS_ACUITY_SCORE: 69
ADLS_ACUITY_SCORE: 74

## 2024-12-27 NOTE — PROGRESS NOTES
MD notified of pt in Afib RVR, sustaining in the 110s and jumping into 130s while laying in bed. New order for Metoprolol tartrate 25 mg once and Toprol XL daily.   Davina Yu RN on 12/27/2024 at 8:14 AM

## 2024-12-27 NOTE — PROGRESS NOTES
[facility]    Medicine Progress Note - Hospitalist Service    Date of Admission:  12/22/2024    Assessment & Plan   89 year old female with history of hypertension, chronic HFpEF, atrial fibrillation, CVA admitted with acute respiratory failure.  She was found to be in acute on chronic HFpEF, diastolic heart failure.  Diuresed with IV Lasix with good response.  Also has had issues with atrial fibrillation with slow ventricular response developing bradycardia.  Hospital course complicated by nausea and vomiting.        Acute hypoxemic respiratory failure (H)  Does not require any oxygen at home.   Despite diuresis, has required increasing O2 from 2 L now up to 4 L  Continue to try and wean O2, but she may be better served by comfort focused treatment with continued use of O2         Acute on chronic HFpEF  LV EF greater than 70% Nov 2024. Diuresed with IV Lasix.  Held Lasix as creatinine started going up..    Chest x-ray findings consistent with pulmonary edema.    Repeat chest ray x-ray shows pulmonary vascular congestion and small bilateral pleural effusions, unchanged.  There may be limits to diuresis given her age and overall functional status        Atrial fibrillation with bradycardia  Anticoagulated with Eliquis.   Chronically on diltiazem.  Carvedilol added on admission.  Patient started having bradycardia with asystole pauses up to 3 seconds. Patient and family decided against pacemaker implant.  Coreg and diltiazem were held on 12/24.  By 12/27 she had tachycardia with pulse in the 110s.  Started on metoprolol XL     Nausea/Vomiting: It could be due to gastritis. KUB showed moderate stools, placed on Dulcolax suppository and metoclopramide 5 mg 3 times daily.  Clear liquid diet for now.  Hypoactive bowel sounds present.  Suspect she may have a mild ileus due to acute diastolic heart failure.  She was having difficulty with similar symptoms with lack of appetite in November 2024.   Discussed with patient and daughter how her body may be shutting down.       Protein calorie malnutrition moderate  Nutrition consulted.  The patient is on mirtazapine but it makes her rather lethargic, so it was stopped      Polycythemia: Hemoglobin level has been stable now.       Acute on CKD stage III: Creatinine up from 1 to 1.4 with diuresis.  Chest x-ray still showing pulmonary congestion.  Will continue to monitor creatinine closely.  Hold Lasix avoid nephrotoxins.            Diet: Fluid restriction 2000 ML FLUID (and additional linked orders)  Snacks/Supplements Adult: Gelatein Plus; With Meals  Clear Liquid Diet    DVT Prophylaxis: DOAC  Grubbs Catheter: Not present  Lines: None     Cardiac Monitoring: ACTIVE order. Indication: Bradycardias (48 hours)  Code Status: No CPR- Do NOT Intubate      Clinically Significant Risk Factors         # Hyponatremia: Lowest Na = 134 mmol/L in last 2 days, will monitor as appropriate  # Hypochloremia: Lowest Cl = 96 mmol/L in last 2 days, will monitor as appropriate          # Hypertension: Noted on problem list  # Chronic heart failure with preserved ejection fraction: heart failure noted on problem list and last echo with EF >50%            # Severe Malnutrition: based on nutrition assessment    # Financial/Environmental Concerns:           Social Drivers of Health    Physical Activity: Insufficiently Active (3/20/2024)    Exercise Vital Sign     Days of Exercise per Week: 7 days     Minutes of Exercise per Session: 20 min   Social Connections: Unknown (3/20/2024)    Social Connection and Isolation Panel [NHANES]     Frequency of Social Gatherings with Friends and Family: Three times a week          Disposition Plan     Medically Ready for Discharge: Anticipated in 2-4 Days             Alexis York MD  Hospitalist Service  [facility]  Securely message with [employer] (more info)  Text page via [facility] Paging/Directory    ______________________________________________________________________    Interval History   No further emesis overnight.  She still does not feel hungry.  Feels tired  Required increased O2  Was bradycardic yesterday, now in atrial fibrillation with rate in the 110s    Physical Exam   Vital Signs: Temp: 98.1  F (36.7  C) Temp src: Tympanic BP: 115/63 Pulse: 105   Resp: 16 SpO2: 90 % O2 Device: Nasal cannula Oxygen Delivery: 4 LPM  Weight: 129 lbs 3.2 oz    General Appearance: Alert. No acute distress  Chest/Respiratory Exam: Decreased breath sounds bilaterally clear to auscultation bilaterally  Cardiovascular Exam: Regular rate and rhythm. S1, S2, no murmur, gallop, or rubs.  Gastrointestinal Exam: Hypoactive bowel sounds soft, nontender  Extremities: No lower extremity edema.  Psychiatric: Normal affect and mentation      Medical Decision Making             Data     I have personally reviewed the following data over the past 24 hrs:    11.6 (H)  \   16.6 (H)   / 183     137 98 38.7 (H) /  99   4.8 27 1.18 (H) \     ALT: N/A AST: N/A AP: N/A TBILI: N/A   ALB: N/A TOT PROTEIN: N/A LIPASE: N/A       Imaging results reviewed over the past 24 hrs:   Recent Results (from the past 24 hours)   XR Chest Port 1 View    Narrative    EXAM: XR CHEST PORT 1 VIEW  LOCATION: [facility]  DATE: 12/27/2024    INDICATION: hypoxia  COMPARISON: 12/24/2024      Impression    IMPRESSION: Heart size is within normal limits. Pulmonary vascular congestion and small bilateral pleural effusions are not significantly changed. Hazy opacities again seen in the bilateral lung bases, slightly improved, likely atelectasis. No   pneumothorax.

## 2024-12-27 NOTE — PLAN OF CARE
"Goal Outcome Evaluation:  Pt is A&OX4. Remains in Afib. HR now 90s-100s. BP stable. O2 weaned to 3 LPM via NC. LS diminished. Adequate U/O so far on shift. Urine is dark with red tinge. Appetite remains poor. Tolerating sips of ginger ale. No reported nausea or emesis. Denies pain. Up with AX1. Blanchable redness to sacrum.         Plan of Care Reviewed With: patient    Overall Patient Progress: improvingOverall Patient Progress: improving    Outcome Evaluation: O2 weaned to 3 LPM, HR decreased, sustaining in 90s-100s. Up with AX1. Appetite remains poor.      Problem: Adult Inpatient Plan of Care  Goal: Plan of Care Review  Description: The Plan of Care Review/Shift note should be completed every shift.  The Outcome Evaluation is a brief statement about your assessment that the patient is improving, declining, or no change.  This information will be displayed automatically on your shift  note.  Outcome: Progressing  Flowsheets (Taken 12/27/2024 1253)  Outcome Evaluation: O2 weaned to 3 LPM, HR decreased, sustaining in 90s-100s. Up with AX1. Appetite remains poor.  Plan of Care Reviewed With: patient  Overall Patient Progress: improving  Goal: Patient-Specific Goal (Individualized)  Description: You can add care plan individualizations to a care plan. Examples of Individualization might be:  \"Parent requests to be called daily at 9am for status\", \"I have a hard time hearing out of my right ear\", or \"Do not touch me to wake me up as it startles  me\".  Outcome: Progressing  Flowsheets (Taken 12/27/2024 1253)  Individualized Care Needs: Improved PO intake, HR monitor  Goal: Absence of Hospital-Acquired Illness or Injury  Outcome: Progressing  Intervention: Identify and Manage Fall Risk  Recent Flowsheet Documentation  Taken 12/27/2024 9617 by Davina Yu, TARIK  Safety Promotion/Fall Prevention:   activity supervised   assistive device/personal items within reach   clutter free environment maintained   safety round/check " What Type Of Note Output Would You Prefer (Optional)?: Standard Output Hpi Title: Evaluation of Skin Lesions completed  Intervention: Prevent Skin Injury  Recent Flowsheet Documentation  Taken 12/27/2024 0816 by Davina Yu, RN  Body Position:   turned   left   heels elevated   weight shifting  Taken 12/27/2024 0726 by Davina Yu RN  Skin Protection: adhesive use limited  Intervention: Prevent Infection  Recent Flowsheet Documentation  Taken 12/27/2024 0726 by Davina Yu RN  Infection Prevention:   single patient room provided   rest/sleep promoted   hand hygiene promoted  Goal: Optimal Comfort and Wellbeing  Outcome: Progressing  Intervention: Provide Person-Centered Care  Recent Flowsheet Documentation  Taken 12/27/2024 0726 by Davina Yu RN  Trust Relationship/Rapport:   care explained   choices provided  Goal: Readiness for Transition of Care  Outcome: Progressing      How Severe Are Your Spot(S)?: mild Have Your Spot(S) Been Treated In The Past?: has not been treated Additional History: \\n\\nPatient here today for a full- body skin exam

## 2024-12-27 NOTE — PROGRESS NOTES
Pt remains on 4 LPM sating in the low 90's. No other respiratory interventions.  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

## 2024-12-27 NOTE — PROGRESS NOTES
Patient reported having pain when staff assisted with denise care after voiding.  Skin assessment, patient labia is red with some abrasion on left side.  Skin intact, no open areas seen. Applied barrier ointment.  Purwick was previously used.

## 2024-12-27 NOTE — PROGRESS NOTES
Interdisciplinary Discharge Planning Note    Anticipated Discharge Date: 12/30    Anticipated Discharge Location: [facility] [place]    Clinical Needs Before Discharge:  adequate comfort achieved and stable functional status    Treatment Needs After Discharge:  hospice    Potential Barriers to Discharge: None identified     ARIANA Abraham  12/27/2024,  4:07 PM

## 2024-12-27 NOTE — PROGRESS NOTES
Clinical Nutrition / Reassessment      Appetite still poor, had emesis on 12/26 and downgraded to clear liquids at this time.   Diet: clear liquid, 2000 ml fluid restriction (consider liberalizing as she eats very little and has a limited selection with gluten free foods).   Intakes: 25%  Supplement: ensure changed to gelatein protein jello per MD orders    Estimated nutritional needs based on:  current body weight  58 kg / 127 lbs  Estimated energy needs:  3252-5816 kcal/day (25-30 kcal/kg)  Estimated protein needs:  58-70 gm/day (1-1.2 g/kg)  Estimated fluid needs:  4382-7397 ml/day (1 ml/kcal)  Wt Readings from Last 10 Encounters:   12/27/24 58.6 kg (129 lb 3.2 oz)   12/17/24 56.7 kg (125 lb)   12/14/24 57.4 kg (126 lb 9.6 oz)   11/18/24 58 kg (127 lb 12.8 oz)   11/05/24 62.1 kg (137 lb)   10/27/24 59.9 kg (132 lb)   09/03/24 61.2 kg (135 lb)   08/09/24 61.7 kg (136 lb)   07/29/24 61.7 kg (136 lb)   07/22/24 64 kg (141 lb)      Malnutrition Criteria:  (Need to have 2 indicators to qualify recommendation)  Energy Intake:  Chronic Moderate: < 75% of estimated energy requirement for >/= 1 month  Interpretation of Weight Loss:  Chronic Severe:   >10% in 6 months (9.9% in 5 months)  Physical Findings:  Chronic Body Fat Loss: moderate to severe and Chronic Muscle Mass Loss:  moderate to severe  Reduced  Strength:  Not Measured    Recommended Nutrition Diagnosis:   Severe Malnutrition in the context of chronic illness - based on AND/ASPEN Clinical Characterstics of Malnutrition May 2012  Malnutrition:    - Level of malnutrition: Severe       Nutrition Education: Nutrition education will be provided as appropriate.    Nutrition Diagnosis: Nutrient Intake:  inadequate nutrient intakes related to decreased appetite, nausea as evidenced by poor appetite, hx of wt loss.    Intervention:  Nutrition Prescription:     Nutrition Intervention(s):Recommended general, healthful diet  1. Meals and Snacks: clear liquid, 2000 ml  fluid restriction  2. Medical Food Supplement:changed to gelatein protein jello    Nutrition Goal(s):  1. Pt will consume 50% or more of meals and supplements - on going  2. She will tolerate diet as ordered (consider liberalizing for more options at meals with the gluten intolerance and poor po)- on going  3. Pt will not have unplanned wt loss during hospitalization- on going    Monitoring and Evaluation:   Food Intake, diet tolerance, weights    Discharge Recommendation:   Nutrition Discharge Planning  Continue supplements at home, enc BID.     RD will reassess in within 1-5 days or sooner.  Thea Ba RD on 12/27/2024 at 9:28 AM

## 2024-12-27 NOTE — PROVIDER NOTIFICATION
Spoke with Dr. Mann about pt's decreased urine output and increased oxygen need over last two days. Chest X-ray ordered, Labs already being collected per routine. Pt not complaining of any discomfort, does not feel like her work of breathing has worsened tonight. Daughter at bedside and aware of plan. Will re-evaluate when results are back.    /59 (BP Location: Right arm, Patient Position: Semi-Hadley's, Cuff Size: Adult Small)   Pulse 104   Temp 98.6  F (37  C) (Tympanic)   Resp 16   Wt 58.6 kg (129 lb 3.2 oz)   LMP 01/25/1974 (Approximate)   SpO2 90%   BMI 25.23 kg/m

## 2024-12-27 NOTE — PLAN OF CARE
Goal Outcome Evaluation:    Resumed care from TARIK Ghosh. Pt rested comfortably throughout night. VSS. 91% on 4L NC. LS diminshed. Denies pain. Family at bedside. No nausea reported. Purewick in place for incontinence. CCRQ2.    /59 (BP Location: Right arm, Patient Position: Semi-Hadley's, Cuff Size: Adult Small)   Pulse 93   Temp 98.6  F (37  C) (Tympanic)   Resp 16   Wt 57.5 kg (126 lb 11.2 oz)   LMP 01/25/1974 (Approximate)   SpO2 91%   BMI 24.74 kg/m         Plan of Care Reviewed With: patient    Overall Patient Progress: no changeOverall Patient Progress: no change    Outcome Evaluation: 91% on 4L NC

## 2024-12-27 NOTE — PROGRESS NOTES
Pt remains on 4 lpm NC. Will continue to wean O2 as tolerated. No further respiratory interventions at this time.    Julisa Payne, RT

## 2024-12-28 LAB
ANION GAP SERPL CALCULATED.3IONS-SCNC: 12 MMOL/L (ref 7–15)
BACTERIA UR CULT: ABNORMAL
BASOPHILS # BLD AUTO: 0.1 10E3/UL (ref 0–0.2)
BASOPHILS NFR BLD AUTO: 0 %
BUN SERPL-MCNC: 43.3 MG/DL (ref 8–23)
CALCIUM SERPL-MCNC: 9.2 MG/DL (ref 8.8–10.4)
CHLORIDE SERPL-SCNC: 99 MMOL/L (ref 98–107)
CREAT SERPL-MCNC: 1.18 MG/DL (ref 0.51–0.95)
EGFRCR SERPLBLD CKD-EPI 2021: 43 ML/MIN/1.73M2
EOSINOPHIL # BLD AUTO: 0.1 10E3/UL (ref 0–0.7)
EOSINOPHIL NFR BLD AUTO: 1 %
ERYTHROCYTE [DISTWIDTH] IN BLOOD BY AUTOMATED COUNT: 14 % (ref 10–15)
GLUCOSE SERPL-MCNC: 99 MG/DL (ref 70–99)
HCO3 SERPL-SCNC: 28 MMOL/L (ref 22–29)
HCT VFR BLD AUTO: 49.6 % (ref 35–47)
HGB BLD-MCNC: 16.8 G/DL (ref 11.7–15.7)
HOLD SPECIMEN: NORMAL
HOLD SPECIMEN: NORMAL
IMM GRANULOCYTES # BLD: 0.1 10E3/UL
IMM GRANULOCYTES NFR BLD: 0 %
LYMPHOCYTES # BLD AUTO: 0.8 10E3/UL (ref 0.8–5.3)
LYMPHOCYTES NFR BLD AUTO: 7 %
MCH RBC QN AUTO: 31.3 PG (ref 26.5–33)
MCHC RBC AUTO-ENTMCNC: 33.9 G/DL (ref 31.5–36.5)
MCV RBC AUTO: 93 FL (ref 78–100)
MONOCYTES # BLD AUTO: 1 10E3/UL (ref 0–1.3)
MONOCYTES NFR BLD AUTO: 9 %
NEUTROPHILS # BLD AUTO: 9.8 10E3/UL (ref 1.6–8.3)
NEUTROPHILS NFR BLD AUTO: 83 %
NRBC # BLD AUTO: 0 10E3/UL
NRBC BLD AUTO-RTO: 0 /100
PLATELET # BLD AUTO: 167 10E3/UL (ref 150–450)
POTASSIUM SERPL-SCNC: 5.6 MMOL/L (ref 3.4–5.3)
RBC # BLD AUTO: 5.36 10E6/UL (ref 3.8–5.2)
SODIUM SERPL-SCNC: 139 MMOL/L (ref 135–145)
WBC # BLD AUTO: 11.8 10E3/UL (ref 4–11)

## 2024-12-28 PROCEDURE — 82310 ASSAY OF CALCIUM: CPT | Performed by: HOSPITALIST

## 2024-12-28 PROCEDURE — 250N000013 HC RX MED GY IP 250 OP 250 PS 637: Performed by: INTERNAL MEDICINE

## 2024-12-28 PROCEDURE — 85025 COMPLETE CBC W/AUTO DIFF WBC: CPT | Performed by: HOSPITALIST

## 2024-12-28 PROCEDURE — 36415 COLL VENOUS BLD VENIPUNCTURE: CPT | Performed by: HOSPITALIST

## 2024-12-28 PROCEDURE — 250N000013 HC RX MED GY IP 250 OP 250 PS 637: Performed by: HOSPITALIST

## 2024-12-28 PROCEDURE — 99232 SBSQ HOSP IP/OBS MODERATE 35: CPT | Performed by: FAMILY MEDICINE

## 2024-12-28 PROCEDURE — 250N000013 HC RX MED GY IP 250 OP 250 PS 637: Performed by: FAMILY MEDICINE

## 2024-12-28 PROCEDURE — 80048 BASIC METABOLIC PNL TOTAL CA: CPT | Performed by: HOSPITALIST

## 2024-12-28 PROCEDURE — 120N000001 HC R&B MED SURG/OB

## 2024-12-28 PROCEDURE — 250N000009 HC RX 250: Performed by: INTERNAL MEDICINE

## 2024-12-28 RX ORDER — BISACODYL 10 MG
10 SUPPOSITORY, RECTAL RECTAL DAILY PRN
Status: DISCONTINUED | OUTPATIENT
Start: 2024-12-28 | End: 2024-12-31 | Stop reason: HOSPADM

## 2024-12-28 RX ORDER — PANTOPRAZOLE SODIUM 40 MG/1
40 TABLET, DELAYED RELEASE ORAL
Status: DISCONTINUED | OUTPATIENT
Start: 2024-12-28 | End: 2024-12-31 | Stop reason: HOSPADM

## 2024-12-28 RX ORDER — METOPROLOL TARTRATE 1 MG/ML
5 INJECTION, SOLUTION INTRAVENOUS EVERY 4 HOURS PRN
Status: DISCONTINUED | OUTPATIENT
Start: 2024-12-28 | End: 2024-12-31 | Stop reason: HOSPADM

## 2024-12-28 RX ADMIN — METOPROLOL SUCCINATE 25 MG: 25 TABLET, FILM COATED, EXTENDED RELEASE ORAL at 09:02

## 2024-12-28 RX ADMIN — POTASSIUM CHLORIDE 10 MEQ: 750 TABLET, FILM COATED, EXTENDED RELEASE ORAL at 09:02

## 2024-12-28 RX ADMIN — Medication 10 MG: at 20:27

## 2024-12-28 RX ADMIN — APIXABAN 2.5 MG: 2.5 TABLET, FILM COATED ORAL at 20:27

## 2024-12-28 RX ADMIN — METOPROLOL TARTRATE 5 MG: 5 INJECTION INTRAVENOUS at 20:27

## 2024-12-28 RX ADMIN — SENNOSIDES AND DOCUSATE SODIUM 1 TABLET: 50; 8.6 TABLET ORAL at 22:52

## 2024-12-28 RX ADMIN — THYROID 120 MG: 120 TABLET ORAL at 06:35

## 2024-12-28 RX ADMIN — APIXABAN 2.5 MG: 2.5 TABLET, FILM COATED ORAL at 09:02

## 2024-12-28 RX ADMIN — LATANOPROST 1 DROP: 50 SOLUTION/ DROPS OPHTHALMIC at 22:52

## 2024-12-28 RX ADMIN — PANTOPRAZOLE SODIUM 40 MG: 40 TABLET, DELAYED RELEASE ORAL at 09:16

## 2024-12-28 ASSESSMENT — ACTIVITIES OF DAILY LIVING (ADL)
ADLS_ACUITY_SCORE: 70
ADLS_ACUITY_SCORE: 69
ADLS_ACUITY_SCORE: 73
ADLS_ACUITY_SCORE: 70
ADLS_ACUITY_SCORE: 73
ADLS_ACUITY_SCORE: 69
ADLS_ACUITY_SCORE: 74
ADLS_ACUITY_SCORE: 70
ADLS_ACUITY_SCORE: 70
ADLS_ACUITY_SCORE: 69
ADLS_ACUITY_SCORE: 69
ADLS_ACUITY_SCORE: 74
ADLS_ACUITY_SCORE: 69
ADLS_ACUITY_SCORE: 70
ADLS_ACUITY_SCORE: 73
ADLS_ACUITY_SCORE: 73
ADLS_ACUITY_SCORE: 74
ADLS_ACUITY_SCORE: 73
ADLS_ACUITY_SCORE: 69
ADLS_ACUITY_SCORE: 70
ADLS_ACUITY_SCORE: 73
ADLS_ACUITY_SCORE: 70
ADLS_ACUITY_SCORE: 70

## 2024-12-28 NOTE — PLAN OF CARE
Goal Outcome Evaluation:  VSS. Afebrile. Int confusion. Spo2 maintained on 3L/NC. No n/v, denies pain. Up to commode AO1 with walker and gait belt. Redness to labia and buttock, barrier cream applied.     Plan of Care Reviewed With: patient    Overall Patient Progress: improvingOverall Patient Progress: improving

## 2024-12-28 NOTE — PLAN OF CARE
Goal Outcome Evaluation:    Poor nutrition.  Very poor fluid intake.  Less than 200 ml urine today on day shift.  Tea colored.  Coccyx redness is improving.      HR rate of 147 this morning.  Morning meds given.

## 2024-12-28 NOTE — PROGRESS NOTES
[facility]    Medicine Progress Note - Hospitalist Service    Date of Admission:  12/22/2024    Assessment & Plan   89 year old female with history of hypertension, chronic HFpEF, atrial fibrillation, CVA admitted with acute respiratory failure.  She was found to be in acute on chronic HFpEF, diastolic heart failure.  Diuresed with IV Lasix with good response.  Also has had issues with atrial fibrillation with slow ventricular response developing bradycardia.  Hospital course complicated by nausea and vomiting.        Acute hypoxemic respiratory failure (H)  Does not require any oxygen at home.   Despite diuresis, has required increasing O2 from 2 L up to 4 L  Weaned down to 3L  Continue to try and wean O2, but she may be better served by comfort focused treatment with continued use of O2  Waiting for assisted living on Monday       Acute on chronic HFpEF  LV EF greater than 70% Nov 2024. Diuresed with IV Lasix.  Held Lasix as creatinine started going up.    Chest x-ray findings consistent with pulmonary edema.    Repeat chest ray x-ray shows pulmonary vascular congestion and small bilateral pleural effusions, unchanged.  There may be limits to diuresis given her age and overall functional status. Holding further diuresis        Atrial fibrillation with bradycardia  Anticoagulated with Eliquis.   Chronically on diltiazem. Carvedilol added on admission.  Patient started having bradycardia with asystole pauses up to 3 seconds. Patient and family decided against pacemaker implant.  Coreg and diltiazem were held on 12/24.  By 12/27 she had tachycardia with pulse in the 110s.  Started on metoprolol XL with improved rate     Nausea/Vomiting: It could be due to gastritis. KUB showed moderate stools, placed on Dulcolax suppository and metoclopramide 5 mg 3 times daily.  Clear liquid diet for now.  Hypoactive bowel sounds present.  Suspect she may have a mild ileus due to acute diastolic heart  failure.  She was having difficulty with similar symptoms with lack of appetite in November 2024.  Discussed with patient and daughter how her body may be shutting down.       Protein calorie malnutrition moderate  Nutrition consulted.  The patient was on mirtazapine but it makes her rather lethargic, so it was stopped      Polycythemia: Hemoglobin level has been stable now.       Acute on CKD stage III: Creatinine up from 1 to 1.4 with diuresis.  Chest x-ray still showing pulmonary congestion.  Will continue to monitor creatinine closely.  Hold Lasix avoid nephrotoxins.            Diet: Fluid restriction 2000 ML FLUID (and additional linked orders)  Snacks/Supplements Adult: Gelatein Plus; With Meals  Regular Diet Adult    DVT Prophylaxis: DOAC  Grubbs Catheter: Not present  Lines: None     Cardiac Monitoring: ACTIVE order. Indication: Bradycardias (48 hours)  Code Status: No CPR- Do NOT Intubate      Clinically Significant Risk Factors        # Hyperkalemia: Highest K = 5.6 mmol/L in last 2 days, will monitor as appropriate            # Hypertension: Noted on problem list    # Chronic heart failure with preserved ejection fraction: heart failure noted on problem list and last echo with EF >50%            # Severe Malnutrition: based on nutrition assessment    # Financial/Environmental Concerns:           Social Drivers of Health    Physical Activity: Insufficiently Active (3/20/2024)    Exercise Vital Sign     Days of Exercise per Week: 7 days     Minutes of Exercise per Session: 20 min   Social Connections: Unknown (3/20/2024)    Social Connection and Isolation Panel [NHANES]     Frequency of Social Gatherings with Friends and Family: Three times a week          Disposition Plan     Medically Ready for Discharge: Anticipated in 2-4 Days             Alexis York MD  Hospitalist Service  [facility]  Securely message with [employer] (more info)  Text page via [employer] Paging/Directory    ______________________________________________________________________    Interval History   Tired today. Had a lot of visitors yesterday  Weaned O2 to 3 L  Wanted to try toast this morning, but then not hungry. No longer nauseated    Physical Exam   Vital Signs: Temp: 98.3  F (36.8  C) Temp src: Tympanic BP: 121/58 Pulse: (!) 147   Resp: 15 SpO2: 91 % O2 Device: Nasal cannula Oxygen Delivery: 3 LPM  Weight: 129 lbs 3.2 oz    General Appearance: Alert. No acute distress  Chest/Respiratory Exam: Decreased breath sounds bilaterally clear to auscultation bilaterally  Cardiovascular Exam: Regular rate and rhythm. S1, S2, no murmur, gallop, or rubs.  Gastrointestinal Exam: +BS.soft, nontender  Extremities: No lower extremity edema.  Psychiatric: Normal affect and mentation      Medical Decision Making             Data     I have personally reviewed the following data over the past 24 hrs:    11.8 (H)  \   16.8 (H)   / 167     139 99 43.3 (H) /  99   5.6 (H) 28 1.18 (H) \       Imaging results reviewed over the past 24 hrs:   No results found for this or any previous visit (from the past 24 hours).

## 2024-12-29 PROCEDURE — 250N000013 HC RX MED GY IP 250 OP 250 PS 637: Performed by: FAMILY MEDICINE

## 2024-12-29 PROCEDURE — 250N000013 HC RX MED GY IP 250 OP 250 PS 637: Performed by: HOSPITALIST

## 2024-12-29 PROCEDURE — 99233 SBSQ HOSP IP/OBS HIGH 50: CPT | Performed by: FAMILY MEDICINE

## 2024-12-29 PROCEDURE — 120N000001 HC R&B MED SURG/OB

## 2024-12-29 RX ORDER — METOPROLOL SUCCINATE 50 MG/1
50 TABLET, EXTENDED RELEASE ORAL DAILY
Status: DISCONTINUED | OUTPATIENT
Start: 2024-12-30 | End: 2024-12-30

## 2024-12-29 RX ORDER — METOPROLOL SUCCINATE 25 MG/1
25 TABLET, EXTENDED RELEASE ORAL ONCE
Status: COMPLETED | OUTPATIENT
Start: 2024-12-29 | End: 2024-12-29

## 2024-12-29 RX ADMIN — SENNOSIDES AND DOCUSATE SODIUM 1 TABLET: 50; 8.6 TABLET ORAL at 21:55

## 2024-12-29 RX ADMIN — PANTOPRAZOLE SODIUM 40 MG: 40 TABLET, DELAYED RELEASE ORAL at 05:40

## 2024-12-29 RX ADMIN — THYROID 120 MG: 120 TABLET ORAL at 05:39

## 2024-12-29 RX ADMIN — APIXABAN 2.5 MG: 2.5 TABLET, FILM COATED ORAL at 08:33

## 2024-12-29 RX ADMIN — METOPROLOL SUCCINATE 25 MG: 25 TABLET, FILM COATED, EXTENDED RELEASE ORAL at 12:20

## 2024-12-29 RX ADMIN — LATANOPROST 1 DROP: 50 SOLUTION/ DROPS OPHTHALMIC at 21:56

## 2024-12-29 RX ADMIN — METOPROLOL SUCCINATE 25 MG: 25 TABLET, FILM COATED, EXTENDED RELEASE ORAL at 08:33

## 2024-12-29 RX ADMIN — APIXABAN 2.5 MG: 2.5 TABLET, FILM COATED ORAL at 20:43

## 2024-12-29 ASSESSMENT — ACTIVITIES OF DAILY LIVING (ADL)
ADLS_ACUITY_SCORE: 69
ADLS_ACUITY_SCORE: 70
ADLS_ACUITY_SCORE: 74
ADLS_ACUITY_SCORE: 70
ADLS_ACUITY_SCORE: 76
ADLS_ACUITY_SCORE: 73
ADLS_ACUITY_SCORE: 69
ADLS_ACUITY_SCORE: 74
ADLS_ACUITY_SCORE: 69
ADLS_ACUITY_SCORE: 76
ADLS_ACUITY_SCORE: 74
ADLS_ACUITY_SCORE: 73
ADLS_ACUITY_SCORE: 76
ADLS_ACUITY_SCORE: 76
ADLS_ACUITY_SCORE: 73
ADLS_ACUITY_SCORE: 76
ADLS_ACUITY_SCORE: 70
ADLS_ACUITY_SCORE: 73
ADLS_ACUITY_SCORE: 74

## 2024-12-29 NOTE — PROGRESS NOTES
Patient is drowsy but AxOx4 with some forgetfulness. Patient denies pain. Patient heart irregular with periods of tachycardia, on tele, patient denies chest pain. Lungs clear throughout, denies SOB, on 3LPM nasal cannula. Patient abdomen soft, nontender with active bowel sounds, passing flatus per patient, denies n/v. Poor oral intake remains. Patient denies numbness or tingling. Patient has scattered bruising. Patient has bandage CDI to 2nd right toe r/t hammer toe. Patient is CCRq2h, purewick in place. A1 with walker and GB per report, patient did not ambulate for this writer this shift.

## 2024-12-29 NOTE — PROGRESS NOTES
"Pt a&o, VSS, pulse slightly tachy. Encouraging food and fluids. Daughter requested someone sit and encourage pt to eat and drink. Pt \"afraid of nausea and throwing up.\" Pt denies nausea. Pt has bruise on left hand. LS diminished in bases. ABD sounds audible and active. Denies pain.     /72 (BP Location: Right arm, Patient Position: Semi-Hadley's, Cuff Size: Adult Small)   Pulse 94   Temp 98.4  F (36.9  C) (Tympanic)   Resp 16   Wt 58.6 kg (129 lb 3.2 oz)   LMP 01/25/1974 (Approximate)   SpO2 90%   BMI 25.23 kg/m      "

## 2024-12-29 NOTE — PROGRESS NOTES
Patient was  up to commode with NA with urge to have BM. Pt was unsuccessful and requesting a suppository. Patient's HR was also elevated, maintaining in the 150s-160s, and into 170s periodically during this process. Nurse notified Dr. Fierro via vocera in real time requesting suppository and notifying of HR. BP was noted to be 84/61 on left arm and 175/105 on right arm. Writer obtained manual BP of 144/48 manual. MD requested to re-check manual BP in both arms /88 on left arm and 130/80 on right arm. After administration of IV metoprolol, HR decreased to 115-120 with stable BP of 134/68. Pt denied any symptoms at this time. Suppository administered.

## 2024-12-29 NOTE — PLAN OF CARE
Goal Outcome Evaluation:    Discharge tomorrow to [facility] with Hospice.    Poor appetite.  Poor intake.  Poor output.    .  Second dose of Metoprolol given.

## 2024-12-29 NOTE — PLAN OF CARE
Goal Outcome Evaluation:  Int confusion. HR tachy and irregular, all other VSS. Afebrile. LS clear but diminished bases. Blanchable redness to buttock, barrier cream applied. PT was given suppository with some effectiveness. Families questions and concerns addressed.     Plan of Care Reviewed With: patient    Overall Patient Progress: improvingOverall Patient Progress: improving

## 2024-12-29 NOTE — PROGRESS NOTES
[facility]    Medicine Progress Note - Hospitalist Service    Date of Admission:  12/22/2024    Assessment & Plan   89 year old female with history of hypertension, chronic HFpEF, atrial fibrillation, CVA admitted with acute respiratory failure.  She was found to be in acute on chronic HFpEF, diastolic heart failure.  Diuresed with IV Lasix with good response.  Also has had issues with atrial fibrillation with slow ventricular response developing bradycardia.  Hospital course complicated by nausea and vomiting.        Acute hypoxemic respiratory failure (H)  Does not require any oxygen at home.   Despite diuresis, has required increasing O2 from 2 L up to 4 L  Weaned down to 3L  Continue to try and wean O2, but she may be better served by comfort focused treatment with continued use of O2  Waiting for assisted living on Monday       Acute on chronic HFpEF  LV EF greater than 70% Nov 2024. Diuresed with IV Lasix.  Held Lasix as creatinine started going up.    Chest x-ray findings consistent with pulmonary edema.    Repeat chest ray x-ray shows pulmonary vascular congestion and small bilateral pleural effusions, unchanged.  There may be limits to diuresis given her age and overall functional status. Holding further diuresis  Previously utilized furosemide 20 mg daily for weight gain of 2 pounds in a day or 5 pounds in a week.  She needed to be restarted on daily furosemide while at [facility].  If appetite improves, reinstitute furosemide.        Atrial fibrillation with bradycardia  Anticoagulated with Eliquis.   Chronically on diltiazem. Carvedilol added on admission.  Patient started having bradycardia with asystole pauses up to 3 seconds. Patient and family decided against pacemaker implant.  Coreg and diltiazem were held on 12/24.  By 12/27 she had tachycardia with pulse in the 110s.  Started on metoprolol XL with improved rate  Rate generally in the 110s, but jumped up to the 140s.   Increase metoprolol up to 50 mg daily     Nausea/Vomiting: It could be due to gastritis. KUB showed moderate stools, placed on Dulcolax suppository and metoclopramide 5 mg 3 times daily.  Clear liquid diet for now.  Hypoactive bowel sounds present.  Suspect she may have a mild ileus due to acute diastolic heart failure.  She was having difficulty with similar symptoms with lack of appetite in November 2024.  Discussed with patient and daughter how her body may be shutting down.       Protein calorie malnutrition moderate  Nutrition consulted.  The patient was on mirtazapine but it makes her rather lethargic, so it was stopped      Polycythemia: Hemoglobin level has been stable now.       Acute on CKD stage III: Creatinine up from 1 to 1.4 with diuresis.  Chest x-ray still showing pulmonary congestion.  Improved by holding furosemide.          Diet: Fluid restriction 2000 ML FLUID (and additional linked orders)  Snacks/Supplements Adult: Gelatein Plus; With Meals  Regular Diet Adult    DVT Prophylaxis: DOAC  Grubbs Catheter: Not present  Lines: None     Cardiac Monitoring: ACTIVE order. Indication: Tachyarrhythmias, acute (48 hours)  Code Status: No CPR- Do NOT Intubate      Clinically Significant Risk Factors        # Hyperkalemia: Highest K = 5.6 mmol/L in last 2 days, will monitor as appropriate            # Hypertension: Noted on problem list    # Chronic heart failure with preserved ejection fraction: heart failure noted on problem list and last echo with EF >50%            # Severe Malnutrition: based on nutrition assessment    # Financial/Environmental Concerns:           Social Drivers of Health    Physical Activity: Insufficiently Active (3/20/2024)    Exercise Vital Sign     Days of Exercise per Week: 7 days     Minutes of Exercise per Session: 20 min   Social Connections: Unknown (3/20/2024)    Social Connection and Isolation Panel [NHANES]     Frequency of Social Gatherings with Friends and Family: Three  times a week          Disposition Plan     Medically Ready for Discharge: Anticipated Tomorrow             Alexis York MD  Hospitalist Service  [facility]  Securely message with [employer] (more info)  Text page via [employer] Paging/Directory   ______________________________________________________________________    Interval History   Eating a little better today.  Looking forward to going to [facility] assisted living  Heart rate bumped into the 140s briefly, but otherwise controlled around 110s  We discussed whether her body is potentially shutting down and how much to push activity and eating. Daughter believes patient still has a desire to live and do well.  Hospice seems very appropriate as it will focus on her quality of life.    Physical Exam   Vital Signs: Temp: 97.3  F (36.3  C) Temp src: Tympanic BP: (!) 140/76 Pulse: (!) 133   Resp: 16 SpO2: 96 % O2 Device: Nasal cannula Oxygen Delivery: 3 LPM  Weight: 129 lbs 3.2 oz    General Appearance: Alert. No acute distress  Chest/Respiratory Exam: Decreased breath sounds bilaterally clear to auscultation bilaterally  Cardiovascular Exam: Regular rate and rhythm. S1, S2, no murmur, gallop, or rubs.  Gastrointestinal Exam: +BS.soft, nontender  Extremities: No lower extremity edema.  Psychiatric: Normal affect and mentation      Medical Decision Making             Data         Imaging results reviewed over the past 24 hrs:   No results found for this or any previous visit (from the past 24 hours).

## 2024-12-30 PROBLEM — R11.0 NAUSEA: Status: ACTIVE | Noted: 2024-12-30

## 2024-12-30 PROCEDURE — 99232 SBSQ HOSP IP/OBS MODERATE 35: CPT | Performed by: HOSPITALIST

## 2024-12-30 PROCEDURE — 250N000013 HC RX MED GY IP 250 OP 250 PS 637: Performed by: HOSPITALIST

## 2024-12-30 PROCEDURE — 250N000013 HC RX MED GY IP 250 OP 250 PS 637: Performed by: FAMILY MEDICINE

## 2024-12-30 PROCEDURE — 120N000001 HC R&B MED SURG/OB

## 2024-12-30 RX ORDER — FUROSEMIDE 20 MG/1
20 TABLET ORAL DAILY PRN
Qty: 90 TABLET | Refills: 3 | Status: SHIPPED | OUTPATIENT
Start: 2024-12-30

## 2024-12-30 RX ORDER — METOCLOPRAMIDE 5 MG/1
5 TABLET ORAL 3 TIMES DAILY PRN
Qty: 60 TABLET | Refills: 3 | Status: SHIPPED | OUTPATIENT
Start: 2024-12-30

## 2024-12-30 RX ORDER — PANTOPRAZOLE SODIUM 40 MG/1
40 TABLET, DELAYED RELEASE ORAL
Qty: 90 TABLET | Refills: 0 | Status: SHIPPED | OUTPATIENT
Start: 2024-12-30

## 2024-12-30 RX ORDER — AMOXICILLIN 250 MG
1 CAPSULE ORAL AT BEDTIME
Qty: 90 TABLET | Refills: 0 | Status: SHIPPED | OUTPATIENT
Start: 2024-12-30

## 2024-12-30 RX ORDER — THYROID 90 MG/1
90 TABLET ORAL DAILY
Qty: 90 TABLET | Refills: 0 | Status: SHIPPED | OUTPATIENT
Start: 2024-12-30

## 2024-12-30 RX ORDER — METOPROLOL SUCCINATE 50 MG/1
50 TABLET, EXTENDED RELEASE ORAL DAILY
Status: CANCELLED | OUTPATIENT
Start: 2024-12-30

## 2024-12-30 RX ORDER — METOPROLOL SUCCINATE 25 MG/1
75 TABLET, EXTENDED RELEASE ORAL DAILY
Qty: 90 TABLET | Refills: 0 | Status: SHIPPED | OUTPATIENT
Start: 2024-12-30

## 2024-12-30 RX ADMIN — METOPROLOL SUCCINATE 75 MG: 25 TABLET, FILM COATED, EXTENDED RELEASE ORAL at 10:41

## 2024-12-30 RX ADMIN — SENNOSIDES AND DOCUSATE SODIUM 1 TABLET: 50; 8.6 TABLET ORAL at 20:01

## 2024-12-30 RX ADMIN — PANTOPRAZOLE SODIUM 40 MG: 40 TABLET, DELAYED RELEASE ORAL at 08:15

## 2024-12-30 RX ADMIN — APIXABAN 2.5 MG: 2.5 TABLET, FILM COATED ORAL at 08:15

## 2024-12-30 RX ADMIN — LATANOPROST 1 DROP: 50 SOLUTION/ DROPS OPHTHALMIC at 20:01

## 2024-12-30 RX ADMIN — APIXABAN 2.5 MG: 2.5 TABLET, FILM COATED ORAL at 20:01

## 2024-12-30 ASSESSMENT — ACTIVITIES OF DAILY LIVING (ADL)
ADLS_ACUITY_SCORE: 71
ADLS_ACUITY_SCORE: 70
ADLS_ACUITY_SCORE: 71
ADLS_ACUITY_SCORE: 72
ADLS_ACUITY_SCORE: 70
ADLS_ACUITY_SCORE: 73
ADLS_ACUITY_SCORE: 70
ADLS_ACUITY_SCORE: 72
ADLS_ACUITY_SCORE: 73
ADLS_ACUITY_SCORE: 71
ADLS_ACUITY_SCORE: 73
ADLS_ACUITY_SCORE: 70
ADLS_ACUITY_SCORE: 73
ADLS_ACUITY_SCORE: 70
ADLS_ACUITY_SCORE: 71
ADLS_ACUITY_SCORE: 73
ADLS_ACUITY_SCORE: 73
ADLS_ACUITY_SCORE: 70
ADLS_ACUITY_SCORE: 70
ADLS_ACUITY_SCORE: 71
ADLS_ACUITY_SCORE: 72
ADLS_ACUITY_SCORE: 71
ADLS_ACUITY_SCORE: 73

## 2024-12-30 NOTE — PROGRESS NOTES
[facility]    Medicine Progress Note - Hospitalist Service    Date of Admission:  12/22/2024    Assessment & Plan    89 year old female with history of hypertension, chronic HFpEF, atrial fibrillation, CVA admitted with acute respiratory failure.  She was found to be in acute on chronic HFpEF, diastolic heart failure.  Diuresed with IV Lasix with good response.  Also has had issues with atrial fibrillation with slow ventricular response developing bradycardia.  Hospital course complicated by nausea and vomiting.  Overall improved, was able to eat a little. Metoprolol XL started, rate in the 110s before discharge to [facility] living on hospice. She had the following acute medical issues:        Acute hypoxemic respiratory failure (H)  Does not require any oxygen at home.   Despite diuresis, has required increasing O2 from 2 L up to 4 L  Weaned down to 3L, continue on hospice        Acute on chronic HFpEF  LV EF greater than 70% Nov 2024. Diuresed with IV Lasix.  Held Lasix as creatinine started going up.    Chest x-ray findings consistent with pulmonary edema.    Repeat chest ray x-ray shows pulmonary vascular congestion and small bilateral pleural effusions, unchanged.  There may be limits to diuresis given her age and overall functional status. Holding further diuresis due to poor appetite  Previously utilized furosemide 20 mg daily for weight gain of 2 pounds in a day or 5 pounds in a week. Discharge on this dosing. She needed to be restarted on daily furosemide while at [facility].  If appetite improves, reinstitute furosemide.        Atrial fibrillation with bradycardia  Anticoagulated with Eliquis.   Chronically on diltiazem. Carvedilol added on admission.  Patient started having bradycardia with asystole pauses up to 3 seconds. Patient and family decided against pacemaker implant.  Coreg and diltiazem were held on 12/24.  By 12/27 she had tachycardia with pulse in the 110s.   Started on metoprolol XL with improved rate  Rate generally in the 110s. Uptitrated metoprolol to 75 mg daily      Nausea/Vomiting: It could be due to gastritis. KUB showed moderate stools, had bowel movement 12/29. Hypoactive bowel sounds present on 12/28.  Suspect she may have a mild ileus due to acute diastolic heart failure.  She was having difficulty with similar symptoms with lack of appetite in November 2024.    Started pantoprazole 40 mg daily. Has Reglan 5 mg TID as needed and Zofran as needed        Protein calorie malnutrition moderate  Nutrition consulted.  The patient was on mirtazapine but it makes her rather lethargic, so it was stopped       Polycythemia: Hemoglobin level stable       Acute on CKD stage III: Creatinine up from 1 to 1.4 with diuresis.  Chest x-ray still showing pulmonary congestion.  Improved by holding furosemide.        Hypothyroidism  Armor thyroid dose reduced at patient and daughter's request to 90 mg daily          Diet: Fluid restriction 2000 ML FLUID (and additional linked orders)  Snacks/Supplements Adult: Gelatein Plus; With Meals  Regular Diet Adult  Diet    DVT Prophylaxis: Hospice care patient  Grubbs Catheter: Not present  Lines: None     Cardiac Monitoring: None  Code Status: No CPR- Do NOT Intubate      Clinically Significant Risk Factors                   # Hypertension: Noted on problem list  # Chronic heart failure with preserved ejection fraction: heart failure noted on problem list and last echo with EF >50%            # Severe Malnutrition: based on nutrition assessment    # Financial/Environmental Concerns:           Social Drivers of Health    Physical Activity: Insufficiently Active (3/20/2024)    Exercise Vital Sign     Days of Exercise per Week: 7 days     Minutes of Exercise per Session: 20 min   Social Connections: Unknown (3/20/2024)    Social Connection and Isolation Panel [NHANES]     Frequency of Social Gatherings with Friends and Family: Three times a  week          Disposition Plan     Medically Ready for Discharge: Anticipated Tomorrow whenever accepted.     Patient is being transferred to a nursing home for hospice care.  Awaiting acceptance and bed opening.           Holley Lopez MD  Hospitalist Service  [facility]  Securely message with [employer] (more info)  Text page via [facility] Paging/Directory   ______________________________________________________________________    Interval History   The patient was seen and examined.  Overnight events reviewed.  Discussed with nursing staff.  She is doing well but feeling tired.  No other new complaints.  No fevers or chills.  No nausea or vomiting.    Physical Exam   Vital Signs: Temp: 98.3  F (36.8  C) Temp src: Tympanic BP: 129/77 Pulse: 100   Resp: 16 SpO2: 92 % O2 Device: Nasal cannula Oxygen Delivery: 3 LPM  Weight: 128 lbs 9.6 oz    GENERAL APPEARANCE: In no acute distress.  HEENT: No oropharyngeal lesions.  NECK: Supple.   CHEST: Symmetric. Nontender to palpation.  LUNGS: B/l CTA  HEART: S1+S2 [place]  ABDOMEN: Soft, flat, and benign. BS +ve  EXTREMITIES: No cyanosis, clubbing, or edema.  NEUROLOGIC: Grossly nonfocal examination  PSYCHIATRIC: Appropriate mood and affect.  SKIN: No new rashes.        Medical Decision Making       37 MINUTES SPENT BY ME on the date of service doing chart review, history, exam, documentation & further activities per the note.      Current Facility-Administered Medications   Medication Dose Route Frequency Provider Last Rate Last Admin    acetaminophen (TYLENOL) tablet 650 mg  650 mg Oral Q4H PRN Mercedes Quiroz MD        Or    acetaminophen (TYLENOL) Suppository 650 mg  650 mg Rectal Q4H PRN Mercedes Quiroz MD        apixaban ANTICOAGULANT (ELIQUIS) tablet 2.5 mg  2.5 mg Oral BID MARAL'Mercedes Jefferson MD   2.5 mg at 12/30/24 0815    bisacodyl (DULCOLAX) suppository 10 mg  10 mg Rectal Daily PRN Minoo Fierro MD   10 mg at 12/28/24 2027    bisacodyl (DULCOLAX)  suppository 10 mg  10 mg Rectal Once Holley Lopez MD        calcium carbonate (TUMS) chewable tablet 1,000 mg  1,000 mg Oral 4x Daily PRN Mercedes Quiroz MD        Continuing ACE inhibitor/ARB/ARNI from home medication list OR ACE inhibitor/ARB/ARNI order already placed during this visit   Does not apply DOES NOT GO TO Mercedes Baig MD        latanoprost (XALATAN) 0.005 % ophthalmic solution 1 drop  1 drop Both Eyes At Bedtime MARAL'Mercedes Jefferson MD   1 drop at 12/29/24 2156    lidocaine (LMX4) cream   Topical Q1H PRN Mercedes Quiroz MD        lidocaine 1 % 0.1-1 mL  0.1-1 mL Other Q1H PRN Mercedes Quiroz MD        melatonin tablet 3 mg  3 mg Oral At Bedtime PRN Mercedes Quiroz MD        metoclopramide (REGLAN) injection 5 mg  5 mg Intravenous Q6H PRN Holley Lopez MD        metoprolol (LOPRESSOR) injection 5 mg  5 mg Intravenous Q4H PRN Minoo Fierro MD   5 mg at 12/28/24 2027    metoprolol succinate ER (TOPROL XL) 24 hr tablet 75 mg  75 mg Oral Daily Alexis York MD   75 mg at 12/30/24 1041    ondansetron (ZOFRAN ODT) ODT tab 4 mg  4 mg Oral Q6H PRN Mercedes Quiroz MD   4 mg at 12/25/24 1612    Or    ondansetron (ZOFRAN) injection 4 mg  4 mg Intravenous Q6H PRN MARAL'Mercedes Jefferson MD   4 mg at 12/26/24 0659    pantoprazole (PROTONIX) EC tablet 40 mg  40 mg Oral QAM AC Alexis York MD   40 mg at 12/30/24 0815    Patient is already receiving anticoagulation with heparin, enoxaparin (LOVENOX), warfarin (COUMADIN)  or other anticoagulant medication   Does not apply Continuous PRN Mercedes Quiroz MD        polyethylene glycol (MIRALAX) Packet 17 g  17 g Oral BID Holley Lopez MD   17 g at 12/27/24 2112    Reason ACE/ARB/ARNI order not selected   Other DOES NOT GO TO Alexis Guillermo MD        senna-docusate (SENOKOT-S/PERICOLACE) 8.6-50 MG per tablet 1 tablet  1 tablet Oral At Bedtime Holley Lopez MD   1 tablet at 12/29/24 2155    Or    senna-docusate (SENOKOT-S/PERICOLACE) 8.6-50 MG per  tablet 2 tablet  2 tablet Oral At Bedtime Holley Lopez MD        sodium chloride (PF) 0.9% PF flush 3 mL  3 mL Intracatheter Q8H MARAL'Mercedes Jefferson MD   3 mL at 12/30/24 0821    sodium chloride (PF) 0.9% PF flush 3 mL  3 mL Intracatheter q1 min prn MARAL'Mercedes Jefferson MD   3 mL at 12/29/24 1604      Data         Imaging results reviewed over the past 24 hrs:   No results found for this or any previous visit (from the past 24 hours).

## 2024-12-30 NOTE — PROGRESS NOTES
:    Spoke with Rima from [facility] and Peri from [facility]. Everything is ready to go for today if transportation can be found.     Spoke with patients daughter and she no longer feels comfortable transporting patient. There are reportedly more family members coming to the hospital this afternoon who could potentially transport patient.    Spoke with [employer] and setup transportation for 1245 tomorrow. Patient will need to borrow a wheelchair. The cost of transportation will be $74 dollars.    ARIANA Abraham on 12/30/2024 at 11:10 AM    Updated [facility] and [facility] that family is not comfortable transporting patient and that [employer] will pick patient up at 1245 tomorrow. [facility] plans to have a nurse at [facility] at 1330.     Met with patient and daughter to discuss discharge plans for tomorrow. Patients daughter reports that she will have a check ready for [employer].     ARIANA Abraham on 12/30/2024 at 2:48 PM

## 2024-12-30 NOTE — DISCHARGE SUMMARY
[facility]  Hospitalist Discharge Summary      Date of Admission:  12/22/2024  Date of Discharge:  12/30/2024  Discharging Provider: Holley Lopez MD  Discharge Service: Hospitalist Service    Discharge Diagnoses   Principal Problem:    Acute hypoxemic respiratory failure (H)  Active Problems:    Stage 3a chronic kidney disease (H)    Benign essential hypertension    Acquired hypothyroidism    Cerebellar stroke (H)    Permanent atrial fibrillation (H)    Weight loss    Acute on chronic congestive heart failure, unspecified heart failure type (H)    Nausea    Clinically Significant Risk Factors     # Severe Malnutrition: based on nutrition assessment      Follow-ups Needed After Discharge   Follow-up Appointments       Follow-up and recommended labs and tests       Follow up as recommended by hospice              Unresulted Labs Ordered in the Past 30 Days of this Admission       No orders found from 11/22/2024 to 12/23/2024.        These results will be followed up by NA    Discharge Disposition   Discharged to [facility] living on hospice  Condition at discharge: Stable    Hospital Course   89 year old female with history of hypertension, chronic HFpEF, atrial fibrillation, CVA admitted with acute respiratory failure.  She was found to be in acute on chronic HFpEF, diastolic heart failure.  Diuresed with IV Lasix with good response.  Also has had issues with atrial fibrillation with slow ventricular response developing bradycardia.  Hospital course complicated by nausea and vomiting.  Overall improved, was able to eat a little. Metoprolol XL started, rate in the 110s before discharge to [facility] living on hospice.      Acute hypoxemic respiratory failure (H)  Does not require any oxygen at home.   Despite diuresis, has required increasing O2 from 2 L up to 4 L  Weaned down to 3L, continue on hospice       Acute on chronic HFpEF  LV EF greater than 70% Nov 2024. Diuresed  with IV Lasix.  Held Lasix as creatinine started going up.    Chest x-ray findings consistent with pulmonary edema.    Repeat chest ray x-ray shows pulmonary vascular congestion and small bilateral pleural effusions, unchanged.  There may be limits to diuresis given her age and overall functional status. Holding further diuresis due to poor appetite  Previously utilized furosemide 20 mg daily for weight gain of 2 pounds in a day or 5 pounds in a week. Discharge on this dosing. She needed to be restarted on daily furosemide while at [facility].  If appetite improves, reinstitute furosemide.        Atrial fibrillation with bradycardia  Anticoagulated with Eliquis.   Chronically on diltiazem. Carvedilol added on admission.  Patient started having bradycardia with asystole pauses up to 3 seconds. Patient and family decided against pacemaker implant.  Coreg and diltiazem were held on 12/24.  By 12/27 she had tachycardia with pulse in the 110s.  Started on metoprolol XL with improved rate  Rate generally in the 110s. Uptitrated metoprolol to 75 mg daily     Nausea/Vomiting: It could be due to gastritis. KUB showed moderate stools, had bowel movement 12/29. Hypoactive bowel sounds present on 12/28.  Suspect she may have a mild ileus due to acute diastolic heart failure.  She was having difficulty with similar symptoms with lack of appetite in November 2024.    Started pantoprazole 40 mg daily. Has Reglan 5 mg TID as needed and Zofran as needed        Protein calorie malnutrition moderate  Nutrition consulted.  The patient was on mirtazapine but it makes her rather lethargic, so it was stopped      Polycythemia: Hemoglobin level stable       Acute on CKD stage III: Creatinine up from 1 to 1.4 with diuresis.  Chest x-ray still showing pulmonary congestion.  Improved by holding furosemide.       Hypothyroidism  Armor thyroid dose reduced at patient and daughter's request to 90 mg daily    Consultations This Hospital Stay    OCCUPATIONAL THERAPY ADULT IP CONSULT  PHYSICAL THERAPY ADULT IP CONSULT  OCCUPATIONAL THERAPY ADULT IP CONSULT  CARE MANAGEMENT / SOCIAL WORK IP CONSULT  NUTRITION SERVICES ADULT IP CONSULT  SOCIAL WORK IP CONSULT  SOCIAL WORK IP CONSULT    Code Status   No CPR- Do NOT Intubate    Time Spent on this Encounter   IHolley MD, personally saw the patient today and spent greater than 30 minutes discharging this patient.       Holley Lopez MD  [facility]  [address]  [place] [address]  Phone: [phone]  Fax: [phone]  ______________________________________________________________________    Physical Exam   Vital Signs: Temp: 98.3  F (36.8  C) Temp src: Tympanic BP: 133/71 Pulse: 100   Resp: 16 SpO2: 90 % O2 Device: Nasal cannula Oxygen Delivery: 3 LPM  Weight: 128 lbs 9.6 oz  GENERAL APPEARANCE: In no acute distress.  HEENT: No oropharyngeal lesions.  NECK: Supple.   CHEST: Symmetric. Nontender to palpation.  LUNGS: B/l CTA  HEART: S1+S2 [place]  ABDOMEN: Soft, flat, and benign. BS +ve  EXTREMITIES: No cyanosis, clubbing, or edema.  NEUROLOGIC: Grossly nonfocal examination  PSYCHIATRIC: Appropriate mood and affect.  SKIN: No new rashes.        Primary Care Physician   Dwayne Gaona    Discharge Orders      Reason for your hospital stay    Heart failure     Follow-up and recommended labs and tests     Follow up as recommended by hospice     Activity    Your activity upon discharge: activity as tolerated     Discharge Instructions    Metoprolol will be used to control heart rate. This should lower your blood pressure less than diltiazem that you were on previously.   Pantoprazole added to help stomach acid. Use ondansetron or metoclopramide to help with nausea  Furosemide as needed for fluid gain. If you are not eating much, then furosemide is likely not needed. Hospice can adjust dosing instructions     Oxygen Adult/Peds     Diet    Follow this diet upon discharge:  regular diet       Significant Results and Procedures   Most Recent 3 CBC's:  Recent Labs   Lab Test 12/28/24  0523 12/27/24  0642 12/26/24  0533   WBC 11.8* 11.6* 13.4*   HGB 16.8* 16.6* 16.3*   MCV 93 94 93    183 176     Most Recent 3 BMP's:  Recent Labs   Lab Test 12/28/24  0523 12/27/24  0642 12/26/24  0533    137 134*   POTASSIUM 5.6* 4.8 4.7   CHLORIDE 99 98 96*   CO2 28 27 29   BUN 43.3* 38.7* 32.0*   CR 1.18* 1.18* 1.21*   ANIONGAP 12 12 9   CORY 9.2 9.3 9.2   GLC 99 99 127*     Most Recent TSH and T4:  Recent Labs   Lab Test 12/17/24  1341 11/13/24  0957   TSH 17.05* 10.07*   T4  --  1.56   ,   Results for orders placed or performed during the hospital encounter of 12/22/24   XR Chest Port 1 View    Narrative    EXAM: XR CHEST PORT 1 VIEW  LOCATION: [facility]  DATE: 12/22/2024    INDICATION: sob  COMPARISON: 12/12/2024      Impression    IMPRESSION: Bibasilar opacities represent pleural fluid and atelectasis. Interstitial edema has increased. The heart is not well assessed.    XR Abdomen Port 1 View    Narrative    EXAM: XR ABDOMEN PORT 1 VIEW  LOCATION: [facility]  DATE: 12/22/2024    INDICATION: N/V, decreased bowel movements. Obstruction?  COMPARISON: None.      Impression    IMPRESSION: Nonobstructive bowel gas pattern. Moderate stool within the colon. No free air. Small bibasilar pleural effusions and bibasilar atelectasis or airspace disease at the lower lungs. Spine degenerative changes.       XR Chest Port 1 View    Narrative    Exam:  XR CHEST PORT 1 VIEW    HISTORY: evaluate for pulmonary edema.    COMPARISON:  12/22/2024, 12/12/2024    FINDINGS:     The cardiomediastinal contours are similar to prior exam.      Small bilateral pleural effusions. Similar to slightly improved  interstitial opacities. There is bibasilar atelectasis. No  pneumothorax.      No acute osseous abnormality.       Impression    IMPRESSION:      Similar to slightly  improved interstitial opacities, possibly mild  residual CHF/fluid.    Small bilateral pleural effusions similar to 12/22/2024.      CHRISTOPHER GRADY MD         SYSTEM ID:  [id number]   X-ray Abdomen flat port    Narrative    XR ABDOMEN PORT 1 VIEW    HISTORY: 89 years Female persistent vomiting , evaluate for ileus    COMPARISON: 12/22/2024    TECHNIQUE: 2 views abdomen    FINDINGS: No abnormally distended loops of bowel are present. There is  a moderate volume of stool in the colon. There is no evidence of bowel  obstruction or free air.      Impression    IMPRESSION: There is a moderate volume of stool. No evidence of bowel  obstruction or free air.    Small bilateral pleural effusions are present.    BENITEZ WILL MD         SYSTEM ID:  [id number]   XR Chest Port 1 View    Narrative    EXAM: XR CHEST PORT 1 VIEW  LOCATION: [facility]  DATE: 12/27/2024    INDICATION: hypoxia  COMPARISON: 12/24/2024      Impression    IMPRESSION: Heart size is within normal limits. Pulmonary vascular congestion and small bilateral pleural effusions are not significantly changed. Hazy opacities again seen in the bilateral lung bases, slightly improved, likely atelectasis. No   pneumothorax.       Discharge Medications   Current Discharge Medication List        START taking these medications    Details   metoclopramide (REGLAN) 5 MG tablet Take 1 tablet (5 mg) by mouth 3 times daily as needed for vomiting.  Qty: 60 tablet, Refills: 3    Associated Diagnoses: Nausea      metoprolol succinate ER (TOPROL XL) 25 MG 24 hr tablet Take 3 tablets (75 mg) by mouth daily.  Qty: 90 tablet, Refills: 0    Associated Diagnoses: Permanent atrial fibrillation (H)      pantoprazole (PROTONIX) 40 MG EC tablet Take 1 tablet (40 mg) by mouth every morning (before breakfast).  Qty: 90 tablet, Refills: 0    Associated Diagnoses: Nausea      senna-docusate (SENOKOT-S/PERICOLACE) 8.6-50 MG tablet Take 1 tablet by mouth at  bedtime.  Qty: 90 tablet, Refills: 0    Associated Diagnoses: Nausea           CONTINUE these medications which have CHANGED    Details   furosemide (LASIX) 20 MG tablet Take 1 tablet (20 mg) by mouth daily as needed (weight gain of 2 lbs in a day or 5 lbs in a week).  Qty: 90 tablet, Refills: 3    Associated Diagnoses: Acute on chronic heart failure with preserved ejection fraction (H)      thyroid (ARMOUR) 90 MG tablet Take 1 tablet (90 mg) by mouth daily.  Qty: 90 tablet, Refills: 0    Associated Diagnoses: Acquired hypothyroidism           CONTINUE these medications which have NOT CHANGED    Details   apixaban ANTICOAGULANT (ELIQUIS) 2.5 MG tablet Take 1 tablet (2.5 mg) by mouth 2 times daily.  Qty: 60 tablet, Refills: 11    Associated Diagnoses: Permanent atrial fibrillation (H)      latanoprost (XALATAN) 0.005 % ophthalmic solution Place 1 drop into both eyes At Bedtime       ondansetron (ZOFRAN) 4 MG tablet Take 1 tablet (4 mg) by mouth every 8 hours as needed for nausea.  Qty: 40 tablet, Refills: 2    Associated Diagnoses: Nausea      triamcinolone (KENALOG) 0.1 % external cream Apply topically 2 times daily as needed for irritation.      Cranberry-Vitamin C (CRANBERRY CONCENTRATE/VITAMINC) 25240-064 MG CAPS Take 1 capsule by mouth 2 times daily - for UTI prevention  Qty: 180 capsule, Refills: 4    Associated Diagnoses: Recurrent UTI           STOP taking these medications       alum & mag hydroxide-simethicone (MAALOX) 200-200-20 MG/5ML SUSP suspension Comments:   Reason for Stopping:         Cholecalciferol 10 MCG (400 UNIT) CAPS Comments:   Reason for Stopping:         diltiazem ER COATED BEADS (CARDIZEM CD/CARTIA XT) 120 MG 24 hr capsule Comments:   Reason for Stopping:         losartan (COZAAR) 25 MG tablet Comments:   Reason for Stopping:         mirtazapine (REMERON) 7.5 MG tablet Comments:   Reason for Stopping:         potassium chloride vasiliy ER (KLOR-CON M10) 10 MEQ CR tablet Comments:   Reason for  Stopping:         rosuvastatin (CRESTOR) 5 MG tablet Comments:   Reason for Stopping:         saccharomyces boulardii (FLORASTOR) 250 MG capsule Comments:   Reason for Stopping:             Allergies   Allergies   Allergen Reactions    Bee Venom Anaphylaxis    Penicillins Other (See Comments)     Unsure of allergy time, possible hives    Benazepril Cough    Ciprofloxacin Other (See Comments)     Possible GI illness    Erythromycin Other (See Comments)     Unsure of allergy time, possible hives.    Gluten Meal GI Disturbance    Hydrochlorothiazide      hyponatremia

## 2024-12-30 NOTE — PROGRESS NOTES
Called pt and LM on VM that fasting orders are in. Fasting instructions given.   SAFETY CHECKLIST  ID Bands and Risk clasps correct and in place (DNR, Fall risk, Allergy, Latex, Limb):  Yes  All Lines Reconciled and labeled correctly: Yes  Whiteboard updated:Yes  Environmental interventions: Yes  Verify Tele #:  Tele d/c

## 2024-12-30 NOTE — PHARMACY
[facility]  [facility] of [facility]  [address] [place] [id number]    December 30, 2024    Dear Pharmacist,    Your customer, Janna Mcdermott, born on 2/23/1931, was recently discharged from [facility].  We have updated her medication list and want to alert you to the following:       Review of your medicines        START taking        Dose / Directions   metoclopramide 5 MG tablet  Commonly known as: REGLAN      Dose: 5 mg  Take 1 tablet (5 mg) by mouth 3 times daily as needed for vomiting.  Quantity: 60 tablet  Refills: 3     metoprolol succinate ER 25 MG 24 hr tablet  Commonly known as: TOPROL XL  Used for: Permanent atrial fibrillation (H)      Dose: 75 mg  Take 3 tablets (75 mg) by mouth daily.  Quantity: 90 tablet  Refills: 0     pantoprazole 40 MG EC tablet  Commonly known as: PROTONIX      Dose: 40 mg  Take 1 tablet (40 mg) by mouth every morning (before breakfast).  Quantity: 90 tablet  Refills: 0     senna-docusate 8.6-50 MG tablet  Commonly known as: SENOKOT-S/PERICOLACE      Dose: 1 tablet  Take 1 tablet by mouth at bedtime.  Quantity: 90 tablet  Refills: 0            CONTINUE these medicines which may have CHANGED, or have new prescriptions. If we are uncertain of the size of tablets/capsules you have at home, strength may be listed as something that might have changed.        Dose / Directions   furosemide 20 MG tablet  Commonly known as: LASIX  This may have changed:   when to take this  reasons to take this  Used for: Acute on chronic heart failure with preserved ejection fraction (H)      Dose: 20 mg  Take 1 tablet (20 mg) by mouth daily as needed (weight gain of 2 lbs in a day or 5 lbs in a week).  Quantity: 90 tablet  Refills: 3     thyroid 90 MG tablet  Commonly known as: ARMOUR  This may have changed:   medication strength  how much to take  Used for: Acquired hypothyroidism      Dose: 90 mg  Take 1 tablet (90 mg) by mouth  daily.  Quantity: 90 tablet  Refills: 0            CONTINUE these medicines which have NOT CHANGED        Dose / Directions   apixaban ANTICOAGULANT 2.5 MG tablet  Commonly known as: ELIQUIS  Used for: Permanent atrial fibrillation (H)      Dose: 2.5 mg  Take 1 tablet (2.5 mg) by mouth 2 times daily.  Quantity: 60 tablet  Refills: 11     Cranberry Concentrate/VitaminC 27126-624 MG Caps  Used for: Recurrent UTI  Generic drug: Cranberry-Vitamin C      Dose: 1 capsule  Take 1 capsule by mouth 2 times daily - for UTI prevention  Quantity: 180 capsule  Refills: 4     latanoprost 0.005 % ophthalmic solution  Commonly known as: XALATAN      Dose: 1 drop  Place 1 drop into both eyes At Bedtime  Refills: 0     ondansetron 4 MG tablet  Commonly known as: ZOFRAN      Dose: 4 mg  Take 1 tablet (4 mg) by mouth every 8 hours as needed for nausea.  Quantity: 40 tablet  Refills: 2     triamcinolone 0.1 % external cream  Commonly known as: KENALOG      Apply topically 2 times daily as needed for irritation.  Refills: 0            STOP taking      alum & mag hydroxide-simethicone 200-200-20 MG/5ML Susp suspension  Commonly known as: MAALOX        Cholecalciferol 10 MCG (400 UNIT) Caps        diltiazem ER COATED BEADS 120 MG 24 hr capsule  Commonly known as: CARDIZEM CD/CARTIA XT        losartan 25 MG tablet  Commonly known as: COZAAR        mirtazapine 7.5 MG tablet  Commonly known as: REMERON        potassium chloride vasiliy ER 10 MEQ CR tablet  Commonly known as: KLOR-CON M10        rosuvastatin 5 MG tablet  Commonly known as: CRESTOR        saccharomyces boulardii 250 MG capsule  Commonly known as: FLORASTOR                  Where to get your medicines        These medications were sent to [id number] ([facility] - [place], [place] - [address]  [address] [place] [address]      Phone: [phone]   furosemide 20 MG tablet  metoclopramide 5 MG tablet  metoprolol succinate ER 25 MG 24 hr  tablet  pantoprazole 40 MG EC tablet  senna-docusate 8.6-50 MG tablet  thyroid 90 MG tablet         We also reviewed Janna Mcdermott's allergy list and updated it as needed:  Allergies: Bee venom, Penicillins, Benazepril, Ciprofloxacin, Erythromycin, Gluten meal, and Hydrochlorothiazide    Thank you for continuing to care for Janna Mcdermott.  We look forward to working together with you in the future.    Sincerely,  [facility]

## 2024-12-30 NOTE — PROGRESS NOTES
Interdisciplinary Discharge Planning Note    Anticipated Discharge Date: 12/31    Anticipated Discharge Location: [place]    Clinical Needs Before Discharge:  stable functional status    Treatment Needs After Discharge:  hospice    Potential Barriers to Discharge: None identified at this time     ARIANA Abraham  12/30/2024,  3:57 PM

## 2024-12-30 NOTE — PLAN OF CARE
Goal Outcome Evaluation:  Pt is int confused. HR tachy 110s-115s, Hx of afib, tele MS2. All other VSS. Spo2 maintained on 3L/NC; acute. [id number]- Blanchable redness to coccyx and groin, barrier cream applied. Small BM smears throughout shift. PT is anticipating discharge on hospice to [facility]. Gluten free.     Plan of Care Reviewed With: patient    Overall Patient Progress: improvingOverall Patient Progress: improving

## 2024-12-31 ENCOUNTER — MEDICAL CORRESPONDENCE (OUTPATIENT)
Dept: HEALTH INFORMATION MANAGEMENT | Facility: OTHER | Age: 89
End: 2024-12-31
Payer: COMMERCIAL

## 2024-12-31 VITALS
DIASTOLIC BLOOD PRESSURE: 72 MMHG | BODY MASS INDEX: 24.08 KG/M2 | OXYGEN SATURATION: 92 % | HEART RATE: 106 BPM | SYSTOLIC BLOOD PRESSURE: 125 MMHG | TEMPERATURE: 97.8 F | RESPIRATION RATE: 22 BRPM | WEIGHT: 123.3 LBS

## 2024-12-31 PROCEDURE — 250N000013 HC RX MED GY IP 250 OP 250 PS 637: Performed by: FAMILY MEDICINE

## 2024-12-31 PROCEDURE — 999N000157 HC STATISTIC RCP TIME EA 10 MIN

## 2024-12-31 PROCEDURE — 99239 HOSP IP/OBS DSCHRG MGMT >30: CPT | Performed by: HOSPITALIST

## 2024-12-31 PROCEDURE — 250N000013 HC RX MED GY IP 250 OP 250 PS 637: Performed by: HOSPITALIST

## 2024-12-31 RX ADMIN — PANTOPRAZOLE SODIUM 40 MG: 40 TABLET, DELAYED RELEASE ORAL at 08:47

## 2024-12-31 RX ADMIN — METOPROLOL SUCCINATE 75 MG: 25 TABLET, FILM COATED, EXTENDED RELEASE ORAL at 11:12

## 2024-12-31 RX ADMIN — APIXABAN 2.5 MG: 2.5 TABLET, FILM COATED ORAL at 08:47

## 2024-12-31 ASSESSMENT — ACTIVITIES OF DAILY LIVING (ADL)
ADLS_ACUITY_SCORE: 77
ADLS_ACUITY_SCORE: 73
ADLS_ACUITY_SCORE: 70
ADLS_ACUITY_SCORE: 72
ADLS_ACUITY_SCORE: 77
ADLS_ACUITY_SCORE: 73
ADLS_ACUITY_SCORE: 72
ADLS_ACUITY_SCORE: 72

## 2024-12-31 NOTE — PLAN OF CARE
Goal Outcome Evaluation: VSS and tele discontinued. On 3L O2 NC. Patient mostly oriented and fatigued but alert when awake. Small amount of oral intake (water and broth). Call light appropriate and agreeable to repositioning. Family visited throughout the day.    CCRQ2 hours. Many episodes of incontinent soft BM smears this shift; one episode of urination. Offered patient commode, purewick, or to use brief to urinate -- she chose brief and urinated small amount. Blanchable redness to coccyx and groin -- applied mepilex and barrier cream.    Patient has discharge orders. AVS not printed yet. Ride planned from [facility] at 12:45 tomorrow (12/31) to [facility] on hospice.      Plan of Care Reviewed With: patient    Overall Patient Progress: improving    Outcome Evaluation: On 3L O2 NC. HR stable and tele d/c. Appetite and liquid intake poor. Small incontinent smears today.

## 2024-12-31 NOTE — PHARMACY
[facility]  Part of [facility]  [address] [place] [id number]    December 31, 2024    Dear Pharmacist,    Your customer, Janna Mcdermott, born on 2/23/1931, was recently discharged from [facility].  We have updated her medication list and want to alert you to the following:       Review of your medicines        START taking        Dose / Directions   metoclopramide 5 MG tablet  Commonly known as: REGLAN      Dose: 5 mg  Take 1 tablet (5 mg) by mouth 3 times daily as needed for vomiting.  Quantity: 60 tablet  Refills: 3     metoprolol succinate ER 25 MG 24 hr tablet  Commonly known as: TOPROL XL  Used for: Permanent atrial fibrillation (H)      Dose: 75 mg  Take 3 tablets (75 mg) by mouth daily.  Quantity: 90 tablet  Refills: 0     pantoprazole 40 MG EC tablet  Commonly known as: PROTONIX      Dose: 40 mg  Take 1 tablet (40 mg) by mouth every morning (before breakfast).  Quantity: 90 tablet  Refills: 0     senna-docusate 8.6-50 MG tablet  Commonly known as: SENOKOT-S/PERICOLACE      Dose: 1 tablet  Take 1 tablet by mouth at bedtime.  Quantity: 90 tablet  Refills: 0            CONTINUE these medicines which may have CHANGED, or have new prescriptions. If we are uncertain of the size of tablets/capsules you have at home, strength may be listed as something that might have changed.        Dose / Directions   furosemide 20 MG tablet  Commonly known as: LASIX  This may have changed:   when to take this  reasons to take this  Used for: Acute on chronic heart failure with preserved ejection fraction (H)      Dose: 20 mg  Take 1 tablet (20 mg) by mouth daily as needed (weight gain of 2 lbs in a day or 5 lbs in a week).  Quantity: 90 tablet  Refills: 3     thyroid 90 MG tablet  Commonly known as: ARMOUR  This may have changed:   medication strength  how much to take  Used for: Acquired hypothyroidism      Dose: 90 mg  Take 1 tablet (90 mg) by mouth  daily.  Quantity: 90 tablet  Refills: 0            CONTINUE these medicines which have NOT CHANGED        Dose / Directions   apixaban ANTICOAGULANT 2.5 MG tablet  Commonly known as: ELIQUIS  Used for: Permanent atrial fibrillation (H)      Dose: 2.5 mg  Take 1 tablet (2.5 mg) by mouth 2 times daily.  Quantity: 60 tablet  Refills: 11     Cranberry Concentrate/VitaminC 68122-410 MG Caps  Used for: Recurrent UTI  Generic drug: Cranberry-Vitamin C      Dose: 1 capsule  Take 1 capsule by mouth 2 times daily - for UTI prevention  Quantity: 180 capsule  Refills: 4     latanoprost 0.005 % ophthalmic solution  Commonly known as: XALATAN      Dose: 1 drop  Place 1 drop into both eyes At Bedtime  Refills: 0     ondansetron 4 MG tablet  Commonly known as: ZOFRAN      Dose: 4 mg  Take 1 tablet (4 mg) by mouth every 8 hours as needed for nausea.  Quantity: 40 tablet  Refills: 2     triamcinolone 0.1 % external cream  Commonly known as: KENALOG      Apply topically 2 times daily as needed for irritation.  Refills: 0            STOP taking      alum & mag hydroxide-simethicone 200-200-20 MG/5ML Susp suspension  Commonly known as: MAALOX        Cholecalciferol 10 MCG (400 UNIT) Caps        diltiazem ER COATED BEADS 120 MG 24 hr capsule  Commonly known as: CARDIZEM CD/CARTIA XT        losartan 25 MG tablet  Commonly known as: COZAAR        mirtazapine 7.5 MG tablet  Commonly known as: REMERON        potassium chloride vasiliy ER 10 MEQ CR tablet  Commonly known as: KLOR-CON M10        rosuvastatin 5 MG tablet  Commonly known as: CRESTOR        saccharomyces boulardii 250 MG capsule  Commonly known as: FLORASTOR                  Where to get your medicines        These medications were sent to [id number] ([place] - [place], [place] - [address]  [address] [place] [address]      Phone: [phone]   furosemide 20 MG tablet  metoclopramide 5 MG tablet  metoprolol succinate ER 25 MG 24 hr  tablet  pantoprazole 40 MG EC tablet  senna-docusate 8.6-50 MG tablet  thyroid 90 MG tablet         We also reviewed Janna Mcdermott's allergy list and updated it as needed:  Allergies: Bee venom, Penicillins, Benazepril, Ciprofloxacin, Erythromycin, Gluten meal, and Hydrochlorothiazide    Thank you for continuing to care for Janna Mcdermott.  We look forward to working together with you in the future.    Sincerely,  London Weaver [facility]

## 2024-12-31 NOTE — PROGRESS NOTES
Shift Summary    Pt is currently on 3 LPM. No further interventions at this time.    Priyanka Choi, RT

## 2024-12-31 NOTE — PROGRESS NOTES
:     Patient will be discharging to [facility]. Confirmed discharge plan with [facility] and [facility]. Tanja Booker requested a nurse to nurse. Updated patient's RN.     Joann will pick patient up today at 12:45. Patient will borrow a wheelchair for transportation. Patient's daughter will have a check to cover transportation fee. [facility] delivered portable O2 to patient's room yesterday.     ADALID Santoro on 12/31/2024 at 8:49 AM    Spoke with patient son. Confirmed with him of patient's discharge plan.     ADALID Santoro on 12/31/2024 at 11:08 AM

## 2024-12-31 NOTE — PROGRESS NOTES
NS[facility] DISCHARGE NOTE    Patient discharged to [facility] assisted living at 1:02 PM via wheel chair. Accompanied by son, Smaritan, and staff. Discharge instructions reviewed with patient and son, opportunity offered to ask questions. Prescriptions sent to patients preferred pharmacy. All belongings sent with patient.    Guerda Cruz RN

## 2024-12-31 NOTE — PLAN OF CARE
Goal Outcome Evaluation: Patient is A&Ox4 and pleasant, call light appropriate. On 3L oxygen via NC and tolerating well. Small oral intake (water and broth and pudding). CCRQ2. Patient is continent of urine and communicates need to urinate. Incontinent of stool with many small smears. Blanchable redness on bottom. Barrier cream applied to denise area and rectum, and mepilex applied to sacrum. Patient discharging to [facility] and will be followed by hospice.    Vital signs:  Temp: 97.8  F (36.6  C) Temp src: Tympanic BP: 125/72 Pulse: 106   Resp: 22 SpO2: 92 % O2 Device: Nasal cannula Oxygen Delivery: 3 LPM   Weight: 55.9 kg (123 lb 4.8 oz)  Estimated body mass index is 24.08 kg/m  as calculated from the following:    Height as of 11/18/24: 1.524 m (5').    Weight as of this encounter: 55.9 kg (123 lb 4.8 oz).      Plan of Care Reviewed With: patient    Overall Patient Progress: no change    Outcome Evaluation: On 3L O2 NC. Vitals stable. Appetite and intake poor. Small incontinent smears and low urine output.

## 2024-12-31 NOTE — PHARMACY - DISCHARGE MEDICATION RECONCILIATION AND EDUCATION
Pharmacy:  Discharge Counseling and Medication Reconciliation    Janna DAVIS Sharron  [address] [facility] [address]  [phone] (home)   89 year old female  PCP: Dwayne Gaona    Allergies: Bee venom, Penicillins, Benazepril, Ciprofloxacin, Erythromycin, Gluten meal, and Hydrochlorothiazide    Discharge Counseling:    Pharmacist did not meet with patient (and/or family) today to review the medication portion of the After Visit Summary as patient is being discharged to [facility] where medications will be managed. [facility] reviewed discharge orders and found no discrepancies      Discharge Medication Reconciliation:    It has been determined that the facilityhas an adequate supply of medications available or which can be obtained from the [facility]'s preferred pharmacy, which has confirmed as:  [An updated medication list will be faxed to the patient's pharmacy.]    Thank you for the consult.    London Weaver [facility]........December 31, 2024 12:07 PM

## 2024-12-31 NOTE — PLAN OF CARE
/89 (BP Location: Left arm)   Pulse 58   Temp 97.8  F (36.6  C) (Tympanic)   Resp 16   Wt 55.9 kg (123 lb 4.8 oz)   LMP 01/25/1974 (Approximate)   SpO2 93%   BMI 24.08 kg/m        Goal Outcome Evaluation:      Plan of Care Reviewed With: patient    Overall Patient Progress: no changeOverall Patient Progress: no change      Problem: Adult Inpatient Plan of Care  Goal: Plan of Care Review  12/31/2024 0602 by Kenisha Saleem RN  Outcome: Progressing  Flowsheets (Taken 12/31/2024 0602)  Plan of Care Reviewed With: patient  Overall Patient Progress: no change  12/31/2024 0601 by Kenisha Saleem RN  Outcome: Not Progressing  Flowsheets (Taken 12/31/2024 0601)  Plan of Care Reviewed With: patient  Goal: Patient-Specific Goal (Individualized)  Description: Patient will have improved nausea by discharge  Patient will have adequate pain control by discharge  12/31/2024 0602 by Kenisha Saleem RN  Outcome: Progressing  12/31/2024 0601 by Kenisha Saleem RN  Outcome: Not Progressing  Goal: Absence of Hospital-Acquired Illness or Injury  12/31/2024 0602 by Kenisha Saleem RN  Outcome: Progressing  12/31/2024 0601 by Kenisha Saleem RN  Outcome: Not Progressing  Intervention: Prevent Skin Injury  Recent Flowsheet Documentation  Taken 12/31/2024 0100 by Kenisha Saleem RN  Body Position:   log-rolled   turned   right  Goal: Optimal Comfort and Wellbeing  12/31/2024 0602 by Kenisha Saleem RN  Outcome: Progressing  12/31/2024 0601 by Kenisha Saleem RN  Outcome: Not Progressing  Intervention: Provide Person-Centered Care  Recent Flowsheet Documentation  Taken 12/31/2024 0323 by Kenisha Saleem RN  Trust Relationship/Rapport:   care explained   choices provided   questions answered   questions encouraged  Taken 12/30/2024 1950 by Kenisha Saleem RN  Trust Relationship/Rapport:   care explained   choices provided   questions answered   questions encouraged  Goal:  Readiness for Transition of Care  12/31/2024 0602 by Kenisha Saleem RN  Outcome: Progressing  12/31/2024 0601 by Kenisha Saleem RN  Outcome: Not Progressing     Problem: Comorbidity Management  Goal: Maintenance of Heart Failure Symptom Control  12/31/2024 0602 by Kenisha Saleem RN  Outcome: Progressing  12/31/2024 0601 by Kenisha Saleem RN  Outcome: Not Progressing     Problem: Gas Exchange Impaired  Goal: Optimal Gas Exchange  12/31/2024 0602 by Kenisha Saleem RN  Outcome: Progressing  12/31/2024 0601 by Kenisha Saleem RN  Outcome: Not Progressing

## 2025-01-01 ENCOUNTER — MEDICAL CORRESPONDENCE (OUTPATIENT)
Dept: HEALTH INFORMATION MANAGEMENT | Facility: OTHER | Age: OVER 89
End: 2025-01-01

## 2025-01-01 ENCOUNTER — HOSPITAL ENCOUNTER (INPATIENT)
Facility: OTHER | Age: OVER 89
LOS: 1 days | End: 2025-01-25
Attending: FAMILY MEDICINE | Admitting: INTERNAL MEDICINE
Payer: MEDICARE

## 2025-01-01 ENCOUNTER — APPOINTMENT (OUTPATIENT)
Dept: GENERAL RADIOLOGY | Facility: OTHER | Age: OVER 89
End: 2025-01-01
Attending: FAMILY MEDICINE
Payer: MEDICARE

## 2025-01-01 VITALS
SYSTOLIC BLOOD PRESSURE: 85 MMHG | TEMPERATURE: 97.5 F | OXYGEN SATURATION: 79 % | WEIGHT: 122.8 LBS | RESPIRATION RATE: 36 BRPM | HEART RATE: 89 BPM | BODY MASS INDEX: 23.98 KG/M2 | DIASTOLIC BLOOD PRESSURE: 69 MMHG

## 2025-01-01 DIAGNOSIS — I50.9 ACUTE DECOMPENSATED HEART FAILURE (H): ICD-10-CM

## 2025-01-01 DIAGNOSIS — J96.01 ACUTE RESPIRATORY FAILURE WITH HYPOXIA (H): ICD-10-CM

## 2025-01-01 DIAGNOSIS — G47.00 INSOMNIA, UNSPECIFIED TYPE: Primary | ICD-10-CM

## 2025-01-01 DIAGNOSIS — I48.91 ATRIAL FIBRILLATION WITH RVR (H): ICD-10-CM

## 2025-01-01 DIAGNOSIS — I50.9 ACUTE ON CHRONIC CONGESTIVE HEART FAILURE, UNSPECIFIED HEART FAILURE TYPE (H): ICD-10-CM

## 2025-01-01 LAB
ALBUMIN SERPL BCG-MCNC: 3.6 G/DL (ref 3.5–5.2)
ALBUMIN UR-MCNC: 50 MG/DL
ALLEN'S TEST: NO
ALLEN'S TEST: YES
ALP SERPL-CCNC: 60 U/L (ref 40–150)
ALT SERPL W P-5'-P-CCNC: 15 U/L (ref 0–50)
ANION GAP SERPL CALCULATED.3IONS-SCNC: 17 MMOL/L (ref 7–15)
ANION GAP SERPL CALCULATED.3IONS-SCNC: 22 MMOL/L (ref 7–15)
APPEARANCE UR: ABNORMAL
AST SERPL W P-5'-P-CCNC: 21 U/L (ref 0–45)
ATRIAL RATE - MUSE: 156 BPM
BASE EXCESS BLDA CALC-SCNC: -6.4 MMOL/L (ref -3–3)
BASE EXCESS BLDA CALC-SCNC: 0.7 MMOL/L (ref -3–3)
BASOPHILS # BLD AUTO: 0.1 10E3/UL (ref 0–0.2)
BASOPHILS NFR BLD AUTO: 0 %
BILIRUB SERPL-MCNC: 1 MG/DL
BILIRUB UR QL STRIP: NEGATIVE
BUN SERPL-MCNC: 49.5 MG/DL (ref 8–23)
BUN SERPL-MCNC: 56 MG/DL (ref 8–23)
CALCIUM SERPL-MCNC: 10.1 MG/DL (ref 8.8–10.4)
CALCIUM SERPL-MCNC: 9.7 MG/DL (ref 8.8–10.4)
CHLORIDE SERPL-SCNC: 96 MMOL/L (ref 98–107)
CHLORIDE SERPL-SCNC: 96 MMOL/L (ref 98–107)
COLOR UR AUTO: YELLOW
CREAT SERPL-MCNC: 1.21 MG/DL (ref 0.51–0.95)
CREAT SERPL-MCNC: 1.63 MG/DL (ref 0.51–0.95)
DIASTOLIC BLOOD PRESSURE - MUSE: NORMAL MMHG
EGFRCR SERPLBLD CKD-EPI 2021: 29 ML/MIN/1.73M2
EGFRCR SERPLBLD CKD-EPI 2021: 42 ML/MIN/1.73M2
EOSINOPHIL # BLD AUTO: 0 10E3/UL (ref 0–0.7)
EOSINOPHIL NFR BLD AUTO: 0 %
ERYTHROCYTE [DISTWIDTH] IN BLOOD BY AUTOMATED COUNT: 15.9 % (ref 10–15)
FLUAV RNA SPEC QL NAA+PROBE: NEGATIVE
FLUBV RNA RESP QL NAA+PROBE: NEGATIVE
GLUCOSE SERPL-MCNC: 158 MG/DL (ref 70–99)
GLUCOSE SERPL-MCNC: 177 MG/DL (ref 70–99)
GLUCOSE UR STRIP-MCNC: NEGATIVE MG/DL
HCO3 BLD-SCNC: 21 MMOL/L (ref 21–28)
HCO3 BLD-SCNC: 26 MMOL/L (ref 21–28)
HCO3 SERPL-SCNC: 18 MMOL/L (ref 22–29)
HCO3 SERPL-SCNC: 24 MMOL/L (ref 22–29)
HCT VFR BLD AUTO: 51.8 % (ref 35–47)
HGB BLD-MCNC: 16.7 G/DL (ref 11.7–15.7)
HGB UR QL STRIP: ABNORMAL
HOLD SPECIMEN: NORMAL
HYALINE CASTS: 16 /LPF
IMM GRANULOCYTES # BLD: 0.1 10E3/UL
IMM GRANULOCYTES NFR BLD: 1 %
INTERPRETATION ECG - MUSE: NORMAL
KETONES UR STRIP-MCNC: NEGATIVE MG/DL
LACTATE SERPL-SCNC: 2.4 MMOL/L (ref 0.7–2)
LACTATE SERPL-SCNC: 2.6 MMOL/L (ref 0.7–2)
LEUKOCYTE ESTERASE UR QL STRIP: ABNORMAL
LYMPHOCYTES # BLD AUTO: 0.4 10E3/UL (ref 0.8–5.3)
LYMPHOCYTES NFR BLD AUTO: 3 %
MAGNESIUM SERPL-MCNC: 2.5 MG/DL (ref 1.7–2.3)
MCH RBC QN AUTO: 31.3 PG (ref 26.5–33)
MCHC RBC AUTO-ENTMCNC: 32.2 G/DL (ref 31.5–36.5)
MCV RBC AUTO: 97 FL (ref 78–100)
MONOCYTES # BLD AUTO: 0.8 10E3/UL (ref 0–1.3)
MONOCYTES NFR BLD AUTO: 6 %
MUCOUS THREADS #/AREA URNS LPF: PRESENT /LPF
NEUTROPHILS # BLD AUTO: 11.4 10E3/UL (ref 1.6–8.3)
NEUTROPHILS NFR BLD AUTO: 90 %
NITRATE UR QL: NEGATIVE
NRBC # BLD AUTO: 0.1 10E3/UL
NRBC BLD AUTO-RTO: 0 /100
NT-PROBNP SERPL-MCNC: ABNORMAL PG/ML (ref 0–1800)
O2/TOTAL GAS SETTING VFR VENT: 100 %
O2/TOTAL GAS SETTING VFR VENT: 100 %
OXYHGB MFR BLDA: 78 % (ref 92–100)
OXYHGB MFR BLDA: 93 % (ref 92–100)
P AXIS - MUSE: NORMAL DEGREES
PCO2 BLD: 41 MM HG (ref 35–45)
PCO2 BLD: 45 MM HG (ref 35–45)
PEEP: 5 CM H2O
PH BLD: 7.27 [PH] (ref 7.35–7.45)
PH BLD: 7.41 [PH] (ref 7.35–7.45)
PH UR STRIP: 5.5 [PH] (ref 5–9)
PLATELET # BLD AUTO: 157 10E3/UL (ref 150–450)
PO2 BLD: 46 MM HG (ref 80–105)
PO2 BLD: 69 MM HG (ref 80–105)
POTASSIUM SERPL-SCNC: 4.9 MMOL/L (ref 3.4–5.3)
POTASSIUM SERPL-SCNC: 5.3 MMOL/L (ref 3.4–5.3)
PR INTERVAL - MUSE: NORMAL MS
PREALB SERPL-MCNC: 10.6 MG/DL (ref 20–40)
PROT SERPL-MCNC: 6.4 G/DL (ref 6.4–8.3)
QRS DURATION - MUSE: 64 MS
QT - MUSE: 284 MS
QTC - MUSE: 445 MS
R AXIS - MUSE: -40 DEGREES
RBC # BLD AUTO: 5.33 10E6/UL (ref 3.8–5.2)
RBC URINE: 15 /HPF
RSV RNA SPEC NAA+PROBE: NEGATIVE
SAO2 % BLDA: 78.3 % (ref 95–96)
SAO2 % BLDA: 93.5 % (ref 95–96)
SARS-COV-2 RNA RESP QL NAA+PROBE: NEGATIVE
SODIUM SERPL-SCNC: 136 MMOL/L (ref 135–145)
SODIUM SERPL-SCNC: 137 MMOL/L (ref 135–145)
SP GR UR STRIP: 1.02 (ref 1–1.03)
SQUAMOUS EPITHELIAL: 9 /HPF
SYSTOLIC BLOOD PRESSURE - MUSE: NORMAL MMHG
T AXIS - MUSE: 138 DEGREES
TROPONIN T SERPL HS-MCNC: 47 NG/L
TROPONIN T SERPL HS-MCNC: 52 NG/L
UROBILINOGEN UR STRIP-MCNC: 2 MG/DL
VENTRICULAR RATE- MUSE: 148 BPM
WBC # BLD AUTO: 12.7 10E3/UL (ref 4–11)
WBC CLUMPS #/AREA URNS HPF: PRESENT /HPF
WBC URINE: >182 /HPF

## 2025-01-01 PROCEDURE — 93010 ELECTROCARDIOGRAM REPORT: CPT | Performed by: INTERNAL MEDICINE

## 2025-01-01 PROCEDURE — 36600 WITHDRAWAL OF ARTERIAL BLOOD: CPT | Performed by: FAMILY MEDICINE

## 2025-01-01 PROCEDURE — 84134 ASSAY OF PREALBUMIN: CPT | Performed by: INTERNAL MEDICINE

## 2025-01-01 PROCEDURE — 84484 ASSAY OF TROPONIN QUANT: CPT | Performed by: FAMILY MEDICINE

## 2025-01-01 PROCEDURE — 99291 CRITICAL CARE FIRST HOUR: CPT | Mod: 25 | Performed by: FAMILY MEDICINE

## 2025-01-01 PROCEDURE — 94660 CPAP INITIATION&MGMT: CPT

## 2025-01-01 PROCEDURE — 82805 BLOOD GASES W/O2 SATURATION: CPT | Performed by: FAMILY MEDICINE

## 2025-01-01 PROCEDURE — 999N000157 HC STATISTIC RCP TIME EA 10 MIN

## 2025-01-01 PROCEDURE — 85041 AUTOMATED RBC COUNT: CPT | Performed by: FAMILY MEDICINE

## 2025-01-01 PROCEDURE — 250N000011 HC RX IP 250 OP 636: Performed by: HOSPITALIST

## 2025-01-01 PROCEDURE — 250N000013 HC RX MED GY IP 250 OP 250 PS 637: Performed by: INTERNAL MEDICINE

## 2025-01-01 PROCEDURE — 81001 URINALYSIS AUTO W/SCOPE: CPT | Performed by: FAMILY MEDICINE

## 2025-01-01 PROCEDURE — 96374 THER/PROPH/DIAG INJ IV PUSH: CPT | Performed by: FAMILY MEDICINE

## 2025-01-01 PROCEDURE — 250N000011 HC RX IP 250 OP 636: Performed by: FAMILY MEDICINE

## 2025-01-01 PROCEDURE — 5A09357 ASSISTANCE WITH RESPIRATORY VENTILATION, LESS THAN 24 CONSECUTIVE HOURS, CONTINUOUS POSITIVE AIRWAY PRESSURE: ICD-10-PCS | Performed by: FAMILY MEDICINE

## 2025-01-01 PROCEDURE — 83605 ASSAY OF LACTIC ACID: CPT | Performed by: FAMILY MEDICINE

## 2025-01-01 PROCEDURE — 85025 COMPLETE CBC W/AUTO DIFF WBC: CPT | Performed by: FAMILY MEDICINE

## 2025-01-01 PROCEDURE — 36416 COLLJ CAPILLARY BLOOD SPEC: CPT | Performed by: FAMILY MEDICINE

## 2025-01-01 PROCEDURE — 87088 URINE BACTERIA CULTURE: CPT | Performed by: FAMILY MEDICINE

## 2025-01-01 PROCEDURE — 36415 COLL VENOUS BLD VENIPUNCTURE: CPT | Performed by: INTERNAL MEDICINE

## 2025-01-01 PROCEDURE — 83880 ASSAY OF NATRIURETIC PEPTIDE: CPT | Performed by: FAMILY MEDICINE

## 2025-01-01 PROCEDURE — 250N000013 HC RX MED GY IP 250 OP 250 PS 637: Performed by: HOSPITALIST

## 2025-01-01 PROCEDURE — 87637 SARSCOV2&INF A&B&RSV AMP PRB: CPT | Performed by: FAMILY MEDICINE

## 2025-01-01 PROCEDURE — 71045 X-RAY EXAM CHEST 1 VIEW: CPT

## 2025-01-01 PROCEDURE — 80048 BASIC METABOLIC PNL TOTAL CA: CPT | Performed by: INTERNAL MEDICINE

## 2025-01-01 PROCEDURE — 83735 ASSAY OF MAGNESIUM: CPT | Performed by: INTERNAL MEDICINE

## 2025-01-01 PROCEDURE — 94799 UNLISTED PULMONARY SVC/PX: CPT

## 2025-01-01 PROCEDURE — 250N000011 HC RX IP 250 OP 636: Performed by: INTERNAL MEDICINE

## 2025-01-01 PROCEDURE — 200N000001 HC R&B ICU

## 2025-01-01 PROCEDURE — 99223 1ST HOSP IP/OBS HIGH 75: CPT | Mod: AI | Performed by: INTERNAL MEDICINE

## 2025-01-01 PROCEDURE — 36415 COLL VENOUS BLD VENIPUNCTURE: CPT | Performed by: FAMILY MEDICINE

## 2025-01-01 PROCEDURE — 82565 ASSAY OF CREATININE: CPT | Performed by: FAMILY MEDICINE

## 2025-01-01 PROCEDURE — 250N000009 HC RX 250: Performed by: INTERNAL MEDICINE

## 2025-01-01 PROCEDURE — 96375 TX/PRO/DX INJ NEW DRUG ADDON: CPT | Performed by: FAMILY MEDICINE

## 2025-01-01 PROCEDURE — 93005 ELECTROCARDIOGRAM TRACING: CPT | Performed by: FAMILY MEDICINE

## 2025-01-01 PROCEDURE — 99239 HOSP IP/OBS DSCHRG MGMT >30: CPT | Performed by: INTERNAL MEDICINE

## 2025-01-01 PROCEDURE — 84155 ASSAY OF PROTEIN SERUM: CPT | Performed by: FAMILY MEDICINE

## 2025-01-01 PROCEDURE — 99291 CRITICAL CARE FIRST HOUR: CPT | Performed by: FAMILY MEDICINE

## 2025-01-01 PROCEDURE — 87186 SC STD MICRODIL/AGAR DIL: CPT | Performed by: FAMILY MEDICINE

## 2025-01-01 RX ORDER — ERGOCALCIFEROL (VITAMIN D2) 10 MCG
2 TABLET ORAL DAILY
COMMUNITY

## 2025-01-01 RX ORDER — FUROSEMIDE 10 MG/ML
20 INJECTION INTRAMUSCULAR; INTRAVENOUS ONCE
Status: COMPLETED | OUTPATIENT
Start: 2025-01-01 | End: 2025-01-01

## 2025-01-01 RX ORDER — LIDOCAINE 40 MG/G
CREAM TOPICAL
Status: DISCONTINUED | OUTPATIENT
Start: 2025-01-01 | End: 2025-01-01 | Stop reason: HOSPADM

## 2025-01-01 RX ORDER — ACETAMINOPHEN 325 MG/1
650 TABLET ORAL EVERY 4 HOURS PRN
Status: DISCONTINUED | OUTPATIENT
Start: 2025-01-01 | End: 2025-01-01 | Stop reason: HOSPADM

## 2025-01-01 RX ORDER — HYOSCYAMINE SULFATE 0.125 MG
0.12 TABLET ORAL EVERY 4 HOURS PRN
COMMUNITY

## 2025-01-01 RX ORDER — ONDANSETRON 2 MG/ML
4 INJECTION INTRAMUSCULAR; INTRAVENOUS EVERY 6 HOURS PRN
Status: DISCONTINUED | OUTPATIENT
Start: 2025-01-01 | End: 2025-01-01 | Stop reason: HOSPADM

## 2025-01-01 RX ORDER — ONDANSETRON 4 MG/1
4 TABLET, FILM COATED ORAL 3 TIMES DAILY
COMMUNITY

## 2025-01-01 RX ORDER — IPRATROPIUM BROMIDE AND ALBUTEROL SULFATE 2.5; .5 MG/3ML; MG/3ML
3 SOLUTION RESPIRATORY (INHALATION)
Status: DISCONTINUED | OUTPATIENT
Start: 2025-01-01 | End: 2025-01-01 | Stop reason: HOSPADM

## 2025-01-01 RX ORDER — THYROID 90 MG/1
90 TABLET ORAL DAILY
Status: DISCONTINUED | OUTPATIENT
Start: 2025-01-01 | End: 2025-01-01

## 2025-01-01 RX ORDER — THYROID 15 MG/1
15 TABLET ORAL DAILY
Status: DISCONTINUED | OUTPATIENT
Start: 2025-01-01 | End: 2025-01-01

## 2025-01-01 RX ORDER — AMOXICILLIN 250 MG
1 CAPSULE ORAL 2 TIMES DAILY PRN
Status: DISCONTINUED | OUTPATIENT
Start: 2025-01-01 | End: 2025-01-01 | Stop reason: HOSPADM

## 2025-01-01 RX ORDER — GUAIFENESIN AND DEXTROMETHORPHAN HYDROBROMIDE 10; 100 MG/5ML; MG/5ML
10 SYRUP ORAL EVERY 4 HOURS PRN
COMMUNITY

## 2025-01-01 RX ORDER — DILTIAZEM HCL/D5W 125 MG/125
5-15 PLASTIC BAG, INJECTION (ML) INTRAVENOUS CONTINUOUS
Status: DISCONTINUED | OUTPATIENT
Start: 2025-01-01 | End: 2025-01-01 | Stop reason: HOSPADM

## 2025-01-01 RX ORDER — ACETAMINOPHEN 650 MG/1
650 SUPPOSITORY RECTAL EVERY 6 HOURS PRN
COMMUNITY

## 2025-01-01 RX ORDER — HYDROMORPHONE HYDROCHLORIDE 2 MG/1
2 TABLET ORAL
COMMUNITY

## 2025-01-01 RX ORDER — ALUMINA, MAGNESIA, AND SIMETHICONE 2400; 2400; 240 MG/30ML; MG/30ML; MG/30ML
20 SUSPENSION ORAL EVERY 4 HOURS PRN
COMMUNITY

## 2025-01-01 RX ORDER — MAGNESIUM HYDROXIDE/ALUMINUM HYDROXICE/SIMETHICONE 120; 1200; 1200 MG/30ML; MG/30ML; MG/30ML
20 SUSPENSION ORAL EVERY 4 HOURS PRN
Status: DISCONTINUED | OUTPATIENT
Start: 2025-01-01 | End: 2025-01-01 | Stop reason: HOSPADM

## 2025-01-01 RX ORDER — NALOXONE HYDROCHLORIDE 0.4 MG/ML
0.2 INJECTION, SOLUTION INTRAMUSCULAR; INTRAVENOUS; SUBCUTANEOUS
Status: DISCONTINUED | OUTPATIENT
Start: 2025-01-01 | End: 2025-01-01 | Stop reason: HOSPADM

## 2025-01-01 RX ORDER — GLIPIZIDE 10 MG/1
1 TABLET ORAL
Status: DISCONTINUED | OUTPATIENT
Start: 2025-01-01 | End: 2025-01-01 | Stop reason: HOSPADM

## 2025-01-01 RX ORDER — LOPERAMIDE HYDROCHLORIDE 2 MG/1
CAPSULE ORAL
COMMUNITY

## 2025-01-01 RX ORDER — ONDANSETRON 4 MG/1
4 TABLET, ORALLY DISINTEGRATING ORAL EVERY 6 HOURS PRN
Status: DISCONTINUED | OUTPATIENT
Start: 2025-01-01 | End: 2025-01-01 | Stop reason: HOSPADM

## 2025-01-01 RX ORDER — LATANOPROST 50 UG/ML
1 SOLUTION/ DROPS OPHTHALMIC AT BEDTIME
Status: DISCONTINUED | OUTPATIENT
Start: 2025-01-01 | End: 2025-01-01 | Stop reason: HOSPADM

## 2025-01-01 RX ORDER — FUROSEMIDE 10 MG/ML
40 INJECTION INTRAMUSCULAR; INTRAVENOUS ONCE
Status: DISCONTINUED | OUTPATIENT
Start: 2025-01-01 | End: 2025-01-01

## 2025-01-01 RX ORDER — PROCHLORPERAZINE MALEATE 10 MG
5 TABLET ORAL EVERY 6 HOURS PRN
COMMUNITY

## 2025-01-01 RX ORDER — ONDANSETRON 2 MG/ML
4 INJECTION INTRAMUSCULAR; INTRAVENOUS ONCE
Status: COMPLETED | OUTPATIENT
Start: 2025-01-01 | End: 2025-01-01

## 2025-01-01 RX ORDER — METOCLOPRAMIDE 5 MG/1
5 TABLET ORAL 3 TIMES DAILY
COMMUNITY

## 2025-01-01 RX ORDER — PROCHLORPERAZINE MALEATE 5 MG/1
5 TABLET ORAL EVERY 6 HOURS PRN
Status: DISCONTINUED | OUTPATIENT
Start: 2025-01-01 | End: 2025-01-01 | Stop reason: HOSPADM

## 2025-01-01 RX ORDER — MORPHINE SULFATE 4 MG/ML
5 INJECTION, SOLUTION INTRAMUSCULAR; INTRAVENOUS
Status: DISCONTINUED | OUTPATIENT
Start: 2025-01-01 | End: 2025-01-01

## 2025-01-01 RX ORDER — LORAZEPAM 0.5 MG/1
0.5 TABLET ORAL EVERY 4 HOURS PRN
COMMUNITY

## 2025-01-01 RX ORDER — PANTOPRAZOLE SODIUM 40 MG/1
40 TABLET, DELAYED RELEASE ORAL
Status: DISCONTINUED | OUTPATIENT
Start: 2025-01-01 | End: 2025-01-01

## 2025-01-01 RX ORDER — ONDANSETRON 2 MG/ML
4 INJECTION INTRAMUSCULAR; INTRAVENOUS EVERY 8 HOURS
Status: DISCONTINUED | OUTPATIENT
Start: 2025-01-01 | End: 2025-01-01 | Stop reason: HOSPADM

## 2025-01-01 RX ORDER — AMOXICILLIN 250 MG
2 CAPSULE ORAL 2 TIMES DAILY PRN
Status: DISCONTINUED | OUTPATIENT
Start: 2025-01-01 | End: 2025-01-01 | Stop reason: HOSPADM

## 2025-01-01 RX ORDER — NALOXONE HYDROCHLORIDE 0.4 MG/ML
0.4 INJECTION, SOLUTION INTRAMUSCULAR; INTRAVENOUS; SUBCUTANEOUS
Status: DISCONTINUED | OUTPATIENT
Start: 2025-01-01 | End: 2025-01-01 | Stop reason: HOSPADM

## 2025-01-01 RX ORDER — FUROSEMIDE 10 MG/ML
80 INJECTION INTRAMUSCULAR; INTRAVENOUS EVERY 8 HOURS
Status: DISCONTINUED | OUTPATIENT
Start: 2025-01-01 | End: 2025-01-01 | Stop reason: HOSPADM

## 2025-01-01 RX ORDER — FUROSEMIDE 10 MG/ML
40 INJECTION INTRAMUSCULAR; INTRAVENOUS ONCE
Status: COMPLETED | OUTPATIENT
Start: 2025-01-01 | End: 2025-01-01

## 2025-01-01 RX ORDER — AMOXICILLIN 250 MG
1 CAPSULE ORAL AT BEDTIME
COMMUNITY

## 2025-01-01 RX ORDER — BISACODYL 10 MG
10 SUPPOSITORY, RECTAL RECTAL DAILY PRN
COMMUNITY

## 2025-01-01 RX ORDER — FUROSEMIDE 10 MG/ML
40 INJECTION INTRAMUSCULAR; INTRAVENOUS
Status: DISCONTINUED | OUTPATIENT
Start: 2025-01-01 | End: 2025-01-01

## 2025-01-01 RX ORDER — DILTIAZEM HCL/D5W 125 MG/125
5-15 PLASTIC BAG, INJECTION (ML) INTRAVENOUS CONTINUOUS
Status: DISCONTINUED | OUTPATIENT
Start: 2025-01-01 | End: 2025-01-01

## 2025-01-01 RX ORDER — CALCIUM CARBONATE 500 MG/1
1000 TABLET, CHEWABLE ORAL 4 TIMES DAILY PRN
Status: DISCONTINUED | OUTPATIENT
Start: 2025-01-01 | End: 2025-01-01 | Stop reason: HOSPADM

## 2025-01-01 RX ORDER — ACETAMINOPHEN 325 MG/1
650 TABLET ORAL EVERY 4 HOURS PRN
COMMUNITY

## 2025-01-01 RX ORDER — GUAIFENESIN/DEXTROMETHORPHAN 100-10MG/5
10 SYRUP ORAL EVERY 4 HOURS PRN
Status: DISCONTINUED | OUTPATIENT
Start: 2025-01-01 | End: 2025-01-01 | Stop reason: HOSPADM

## 2025-01-01 RX ADMIN — ONDANSETRON 4 MG: 2 INJECTION INTRAMUSCULAR; INTRAVENOUS at 22:38

## 2025-01-01 RX ADMIN — TRAZODONE HYDROCHLORIDE 25 MG: 50 TABLET ORAL at 22:38

## 2025-01-01 RX ADMIN — LATANOPROST 1 DROP: 50 SOLUTION/ DROPS OPHTHALMIC at 22:38

## 2025-01-01 RX ADMIN — FUROSEMIDE 20 MG: 10 INJECTION, SOLUTION INTRAMUSCULAR; INTRAVENOUS at 16:02

## 2025-01-01 RX ADMIN — FUROSEMIDE 40 MG: 10 INJECTION, SOLUTION INTRAVENOUS at 05:41

## 2025-01-01 RX ADMIN — ONDANSETRON 4 MG: 2 INJECTION INTRAMUSCULAR; INTRAVENOUS at 06:58

## 2025-01-01 RX ADMIN — Medication 5 MG/HR: at 15:17

## 2025-01-01 RX ADMIN — APIXABAN 2.5 MG: 2.5 TABLET, FILM COATED ORAL at 22:38

## 2025-01-01 RX ADMIN — PANTOPRAZOLE SODIUM 40 MG: 40 INJECTION, POWDER, FOR SOLUTION INTRAVENOUS at 07:38

## 2025-01-01 RX ADMIN — ONDANSETRON 4 MG: 2 INJECTION INTRAMUSCULAR; INTRAVENOUS at 17:37

## 2025-01-01 RX ADMIN — FUROSEMIDE 40 MG: 10 INJECTION, SOLUTION INTRAVENOUS at 07:37

## 2025-01-01 ASSESSMENT — ACTIVITIES OF DAILY LIVING (ADL)
ADLS_ACUITY_SCORE: 69
ADLS_ACUITY_SCORE: 69
ADLS_ACUITY_SCORE: 73
ADLS_ACUITY_SCORE: 61
ADLS_ACUITY_SCORE: 73
ADLS_ACUITY_SCORE: 67
ADLS_ACUITY_SCORE: 61
ADLS_ACUITY_SCORE: 65
ADLS_ACUITY_SCORE: 69
ADLS_ACUITY_SCORE: 67
ADLS_ACUITY_SCORE: 61
ADLS_ACUITY_SCORE: 73
ADLS_ACUITY_SCORE: 69
ADLS_ACUITY_SCORE: 67
ADLS_ACUITY_SCORE: 65
ADLS_ACUITY_SCORE: 67
ADLS_ACUITY_SCORE: 61
ADLS_ACUITY_SCORE: 61
ADLS_ACUITY_SCORE: 67
ADLS_ACUITY_SCORE: 67

## 2025-01-02 ENCOUNTER — PATIENT OUTREACH (OUTPATIENT)
Dept: FAMILY MEDICINE | Facility: OTHER | Age: OVER 89
End: 2025-01-02
Payer: COMMERCIAL

## 2025-01-02 NOTE — TELEPHONE ENCOUNTER
Transitional Care Management    Patient discharged with orders for hospice. Hospice was admitting within 48 hours. No TCM call required per policy.    Brinda Muir RN on 1/2/2025 at 11:51 AM

## 2025-01-03 ENCOUNTER — MEDICAL CORRESPONDENCE (OUTPATIENT)
Dept: HEALTH INFORMATION MANAGEMENT | Facility: OTHER | Age: OVER 89
End: 2025-01-03

## 2025-01-03 LAB
ATRIAL RATE - MUSE: 535 BPM
DIASTOLIC BLOOD PRESSURE - MUSE: NORMAL MMHG
INTERPRETATION ECG - MUSE: NORMAL
P AXIS - MUSE: NORMAL DEGREES
PR INTERVAL - MUSE: NORMAL MS
QRS DURATION - MUSE: 70 MS
QT - MUSE: 424 MS
QTC - MUSE: 416 MS
R AXIS - MUSE: -44 DEGREES
SYSTOLIC BLOOD PRESSURE - MUSE: NORMAL MMHG
T AXIS - MUSE: 80 DEGREES
VENTRICULAR RATE- MUSE: 58 BPM

## 2025-01-13 ENCOUNTER — MYC MEDICAL ADVICE (OUTPATIENT)
Dept: FAMILY MEDICINE | Facility: OTHER | Age: OVER 89
End: 2025-01-13
Payer: COMMERCIAL

## 2025-01-13 DIAGNOSIS — I50.32 CHRONIC HEART FAILURE WITH PRESERVED EJECTION FRACTION (H): Primary | ICD-10-CM

## 2025-01-13 NOTE — TELEPHONE ENCOUNTER
1/15/25  Future OV with Dr. Olivarez.  (ED/Hospital follow-up)    She is wondering if you can do a glaucoma check at this OV to minimize appts?      On hospice at [facility].      My Thoughts:  We don't have the capabilities/machines to do glaucoma testing in the clinic- so this would need to be done with an eye doctor.      Michelle Weaver RN on 1/13/2025 at 11:26 AM

## 2025-01-14 NOTE — TELEPHONE ENCOUNTER
If she is on hospice do they want to continue with testing for glaucoma? If so, she will have to continue with appointment with eye doctor.

## 2025-01-14 NOTE — TELEPHONE ENCOUNTER
Yes wants to continue with Testing. Also requesting Lab orders for labs that were done in the hospital.     Kimo'd up BMP/CBC/Magnesium    Routing to provider to review and respond.  Christiana Hall RN on 1/14/2025 at 11:19 AM

## 2025-01-14 NOTE — TELEPHONE ENCOUNTER
Will forward to Dr. Olivarez so that he can get the results of testing that is being requested and continue to follow with patient.

## 2025-01-15 ENCOUNTER — OFFICE VISIT (OUTPATIENT)
Dept: FAMILY MEDICINE | Facility: OTHER | Age: OVER 89
End: 2025-01-15
Attending: NURSE PRACTITIONER
Payer: MEDICARE

## 2025-01-15 VITALS
RESPIRATION RATE: 20 BRPM | OXYGEN SATURATION: 99 % | TEMPERATURE: 96.9 F | DIASTOLIC BLOOD PRESSURE: 90 MMHG | SYSTOLIC BLOOD PRESSURE: 129 MMHG

## 2025-01-15 DIAGNOSIS — E03.9 ACQUIRED HYPOTHYROIDISM: ICD-10-CM

## 2025-01-15 DIAGNOSIS — L98.499 ULCER OF EXTERNAL EAR, UNSPECIFIED ULCER STAGE (H): ICD-10-CM

## 2025-01-15 DIAGNOSIS — N18.31 STAGE 3A CHRONIC KIDNEY DISEASE (H): ICD-10-CM

## 2025-01-15 DIAGNOSIS — F32.A DEPRESSION, UNSPECIFIED DEPRESSION TYPE: ICD-10-CM

## 2025-01-15 DIAGNOSIS — I63.22 CEREBROVASCULAR ACCIDENT (CVA) DUE TO STENOSIS OF BASILAR ARTERY (H): ICD-10-CM

## 2025-01-15 DIAGNOSIS — I48.21 PERMANENT ATRIAL FIBRILLATION (H): ICD-10-CM

## 2025-01-15 DIAGNOSIS — R11.0 NAUSEA: ICD-10-CM

## 2025-01-15 DIAGNOSIS — I50.33 ACUTE ON CHRONIC HEART FAILURE WITH PRESERVED EJECTION FRACTION (H): ICD-10-CM

## 2025-01-15 DIAGNOSIS — G47.9 SLEEP DISTURBANCE: ICD-10-CM

## 2025-01-15 DIAGNOSIS — I50.32 CHRONIC HEART FAILURE WITH PRESERVED EJECTION FRACTION (H): ICD-10-CM

## 2025-01-15 DIAGNOSIS — J96.01 ACUTE HYPOXEMIC RESPIRATORY FAILURE (H): ICD-10-CM

## 2025-01-15 DIAGNOSIS — Z79.01 ON APIXABAN THERAPY: ICD-10-CM

## 2025-01-15 DIAGNOSIS — I10 ESSENTIAL HYPERTENSION: ICD-10-CM

## 2025-01-15 DIAGNOSIS — Z09 HOSPITAL DISCHARGE FOLLOW-UP: Primary | ICD-10-CM

## 2025-01-15 LAB
ANION GAP SERPL CALCULATED.3IONS-SCNC: 14 MMOL/L (ref 7–15)
BASOPHILS # BLD AUTO: 0.1 10E3/UL (ref 0–0.2)
BASOPHILS NFR BLD AUTO: 1 %
BUN SERPL-MCNC: 41.8 MG/DL (ref 8–23)
CALCIUM SERPL-MCNC: 9.7 MG/DL (ref 8.8–10.4)
CHLORIDE SERPL-SCNC: 97 MMOL/L (ref 98–107)
CREAT SERPL-MCNC: 1.2 MG/DL (ref 0.51–0.95)
EGFRCR SERPLBLD CKD-EPI 2021: 42 ML/MIN/1.73M2
EOSINOPHIL # BLD AUTO: 0.1 10E3/UL (ref 0–0.7)
EOSINOPHIL NFR BLD AUTO: 1 %
ERYTHROCYTE [DISTWIDTH] IN BLOOD BY AUTOMATED COUNT: 15.1 % (ref 10–15)
GLUCOSE SERPL-MCNC: 134 MG/DL (ref 70–99)
HCO3 SERPL-SCNC: 26 MMOL/L (ref 22–29)
HCT VFR BLD AUTO: 51.9 % (ref 35–47)
HGB BLD-MCNC: 17.2 G/DL (ref 11.7–15.7)
IMM GRANULOCYTES # BLD: 0.1 10E3/UL
IMM GRANULOCYTES NFR BLD: 1 %
LYMPHOCYTES # BLD AUTO: 0.8 10E3/UL (ref 0.8–5.3)
LYMPHOCYTES NFR BLD AUTO: 7 %
MCH RBC QN AUTO: 31.5 PG (ref 26.5–33)
MCHC RBC AUTO-ENTMCNC: 33.1 G/DL (ref 31.5–36.5)
MCV RBC AUTO: 95 FL (ref 78–100)
MONOCYTES # BLD AUTO: 0.8 10E3/UL (ref 0–1.3)
MONOCYTES NFR BLD AUTO: 8 %
NEUTROPHILS # BLD AUTO: 8.9 10E3/UL (ref 1.6–8.3)
NEUTROPHILS NFR BLD AUTO: 83 %
NRBC # BLD AUTO: 0 10E3/UL
NRBC BLD AUTO-RTO: 0 /100
PLATELET # BLD AUTO: 168 10E3/UL (ref 150–450)
POTASSIUM SERPL-SCNC: 5.2 MMOL/L (ref 3.4–5.3)
RBC # BLD AUTO: 5.46 10E6/UL (ref 3.8–5.2)
SODIUM SERPL-SCNC: 137 MMOL/L (ref 135–145)
T4 FREE SERPL-MCNC: 1.13 NG/DL (ref 0.9–1.7)
TSH SERPL DL<=0.005 MIU/L-ACNC: 34.89 UIU/ML (ref 0.3–4.2)
WBC # BLD AUTO: 10.7 10E3/UL (ref 4–11)

## 2025-01-15 PROCEDURE — 84439 ASSAY OF FREE THYROXINE: CPT | Mod: ZL | Performed by: NURSE PRACTITIONER

## 2025-01-15 PROCEDURE — 85004 AUTOMATED DIFF WBC COUNT: CPT | Mod: ZL | Performed by: NURSE PRACTITIONER

## 2025-01-15 PROCEDURE — G0463 HOSPITAL OUTPT CLINIC VISIT: HCPCS

## 2025-01-15 PROCEDURE — 36415 COLL VENOUS BLD VENIPUNCTURE: CPT | Mod: ZL | Performed by: NURSE PRACTITIONER

## 2025-01-15 PROCEDURE — 82310 ASSAY OF CALCIUM: CPT | Mod: ZL | Performed by: NURSE PRACTITIONER

## 2025-01-15 PROCEDURE — 85014 HEMATOCRIT: CPT | Mod: ZL | Performed by: NURSE PRACTITIONER

## 2025-01-15 PROCEDURE — 84443 ASSAY THYROID STIM HORMONE: CPT | Mod: ZL | Performed by: NURSE PRACTITIONER

## 2025-01-15 PROCEDURE — 82374 ASSAY BLOOD CARBON DIOXIDE: CPT | Mod: ZL | Performed by: NURSE PRACTITIONER

## 2025-01-15 RX ORDER — SERTRALINE HYDROCHLORIDE 25 MG/1
25 TABLET, FILM COATED ORAL DAILY
Qty: 90 TABLET | Refills: 3 | Status: SHIPPED | OUTPATIENT
Start: 2025-01-15

## 2025-01-15 RX ORDER — THYROID 15 MG/1
15 TABLET ORAL DAILY
Qty: 30 TABLET | Refills: 1 | Status: SHIPPED | OUTPATIENT
Start: 2025-01-15

## 2025-01-15 ASSESSMENT — PAIN SCALES - GENERAL: PAINLEVEL_OUTOF10: NO PAIN (0)

## 2025-01-15 NOTE — PATIENT INSTRUCTIONS
We talked about how the cause for anorexia as well as sleep disturbance, daytime fatigue, and depression is uncertain. This could happen for many reasons including with heart failure.     Eliquis can make patients feel nauseous and have a decrease in appetite etc.     For now, let's start sertraline 25 mg daily and this can be increased if she is tolerating it. This is low dose.     Start 3 mg nightly of melatonin around 2000 or 2100 to see if this helps with sleep.

## 2025-01-15 NOTE — NURSING NOTE
Chief Complaint   Patient presents with    Hospital F/U     12/22/24, [facility], Chronic systolic heart failure       Initial BP (!) 129/90 (BP Location: Right arm, Patient Position: Sitting, Cuff Size: Adult Regular)   Temp 96.9  F (36.1  C) (Temporal)   Resp 20   LMP 01/25/1974 (Approximate)   SpO2 99%  Estimated body mass index is 24.08 kg/m  as calculated from the following:    Height as of 11/18/24: 1.524 m (5').    Weight as of 12/31/24: 55.9 kg (123 lb 4.8 oz).  Medication Review: complete    The next two questions are to help us understand your food security.  If you are feeling you need any assistance in this area, we have resources available to support you today.          12/22/2024   SDOH- Food Insecurity   Within the past 12 months, did you worry that your food would run out before you got money to buy more? N   Within the past 12 months, did the food you bought just not last and you didn t have money to get more? N         Health Care Directive:  Patient has a Health Care Directive on file      Zohreh Sharpe

## 2025-01-15 NOTE — PROGRESS NOTES
Assessment & Plan   Problem List Items Addressed This Visit       Stage 3a chronic kidney disease (H)    External ear ulcer (H)    Acquired hypothyroidism (Chronic)    Relevant Orders    TSH Reflex GH    Permanent atrial fibrillation (H)    Relevant Orders    Adult Cardiology Eval  Referral    Chronic heart failure with preserved ejection fraction (H)    Relevant Orders    Basic Metabolic Panel    TSH Reflex GH    CBC and Differential    Adult Cardiology Eval  Referral    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)    Acute hypoxemic respiratory failure (H)    Nausea     Other Visit Diagnoses       Hospital discharge follow-up    -  Primary    Relevant Orders    Basic Metabolic Panel    TSH Reflex GH    CBC and Differential    Adult Cardiology Eval  Referral    Acute on chronic heart failure with preserved ejection fraction (H)        On apixaban therapy        Relevant Orders    Adult Cardiology Eval  Referral    Essential hypertension        Relevant Orders    Basic Metabolic Panel    Depression, unspecified depression type        Relevant Medications    sertraline (ZOLOFT) 25 MG tablet    Sleep disturbance        Relevant Medications    melatonin 3 MG tablet           1. Hospital discharge follow-up (Primary)  2. Chronic heart failure with preserved ejection fraction (H)  - Adult Cardiology Eval  Referral; Future  - Basic Metabolic Panel  - TSH Reflex GH  - CBC and Differential  Will follow results   Patient is stable but about the same since hospital discharge. Anorexia and trouble staying asleep are her biggest concerns as well as depressed mood as observed by friends and family. She was agreeable to starting sertraline today. I am uncertain if her anorexia and depressive symptoms are related to cardiac status, medication side effects such as from eliquis, amongst other physiologic causes. She is on hospice. We discussed hospice patients  commonly do not have huge workups to look for things such as anorexia causes, however a scope could be considered down the road if this is ongoing. I encouraged patient to continue to take in nutrients as able and continue with zofran and reglan for nausea control. It was suggested she may have had gastritis recently versus a mild ileus due to acute diastolic heart failure. Anorexia has been similar and persistent since November of 2024.     3. Acute on chronic heart failure with preserved ejection fraction (H)  Chronic and stable currently; continue metoprolol, furosemide prn, monitor weights and edema. Continue with home oxygen which she was discharged home from the hospital on.     4. Permanent atrial fibrillation (H)  5. On apixaban therapy  - Adult Cardiology [facility] Referral; Future    6. Essential hypertension  - Basic Metabolic Panel    7. Acquired hypothyroidism  - TSH Reflex GH  Patient has not tolerated more than her current dose of thyroid (armour) 90 mg daily. Per hospitalization, hospitalist was ok with keeping current dose despite thyroid level. Patient did request tsh draw today; but was last drawn only 1 month ago. I will follow results.     8. Stage 3a chronic kidney disease (H)  Stable; followed by primary care bmp pending today - I will review today's results     9. Depression, unspecified depression type  - sertraline (ZOLOFT) 25 MG tablet; Take 1 tablet (25 mg) by mouth daily.  Dispense: 90 tablet; Refill: 3  Start; discussed common side effects. Less likely to cause drowsiness. No contraindications. Could certainly be increased in dose in 1-2 weeks if desired if tolerating or stay at this dose. Hospice will also follow      10. Sleep disturbance  - melatonin 3 MG tablet; Take 1 tablet (3 mg) by mouth nightly as needed for sleep.  Dispense: 30 tablet; Refill: 1  Trial of 3 mg melatonin as safer option than some other considerations for sleep medication. Daytime drowsiness/napping is not  helping with poor night time sleep habits.     11. Nausea  Improved; continue tid zofran and reglan     12. 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)  Follows stroke neurology; upcoming appointment in February with Dr. Garrett    13. Acute hypoxemic respiratory failure (H)  Stable; on 4 L 02 at this time     14. Ulcer of external ear, unspecified ulcer stage (H)  Sees wound specialist        MED REC REQUIRED  Post Medication Reconciliation Status: discharge medications reconciled, continue medications without change  ADD sertraline and melatonin   Cardiology referral placed for management of heart failure, persistent atrial fibrillation. Previously she saw a cardiologist in [place] but no longer will be transporting there on home oxygen.     No follow-ups on file.      Ramila Saldivar is a 89 year old, presenting for the following health issues:  Hospital F/U (12/22/24, [facility], Chronic systolic heart failure)        1/15/2025     1:48 PM   Additional Questions   Roomed by Zohreh GURROLA   Accompanied by Daughter     HPI       Hospital Follow-up Visit:    Hospital/Nursing Home/ Rehab Facility: [facility]  Date of Admission: 12-22-24  Date of Discharge:12-31-24  Reason(s) for Admission: Chronic systolic heart failure  Was the patient in the ICU or did the patient experience delirium during hospitalization?  No  Do you have any other stressors you would like to discuss with your provider? OTHER: Difficulty eating    Problems taking medications regularly:  None  Medication changes since discharge: Yes  Problems adhering to non-medication therapy:  None    Janna presents today for hospital discharge follow up with her daughter and her neighbor.     Biggest concerns are sleepiness, trouble staying asleep at night, depressed mood, and ongoing anorexia. No knew cardiac symptoms. She is on hospice with [facility] and she resides currently at  [facility]. She has seen a cardiologist in [place] but does not plan to see cardiology there again due to transportation challenges. She continues to wear 4 mg of home oxygen.   1 mg melatonin was not beneficial last night when tried. She was on remeron which did not help with Janna's appetite at all and she was quite groggy on this medication so it was stopped. Denies nausea or vomiting. nausea is minimal since taking anti nausea medication 3x per day. [facility].     Thryoid - did not do well above 90 mg daily. Dr. York was ok keeping it at 90 mg daily despite thyroid. Daughter mentions she does not want this adjusted even if tsh is out of range.     The patient states she is \"not ready to go\" and wants to stay around and have improved quality of life for as long as possible. She does not feel ready to die. She is on hospice but still wants to have things done to prolong her life within reason.     HR has been in the low 100s, BP stable. No edema or significant weight changes.       Summary of hospitalization:  [facility] discharge summary reviewed  Diagnostic Tests/Treatments reviewed.  Follow up needed: none  Other Healthcare Providers Involved in Patient s Care:         Hospice  Update since discharge: improved.         Plan of care communicated with patient and family             Review of Systems  Constitutional, neuro, ENT, endocrine, pulmonary, cardiac, gastrointestinal, genitourinary, musculoskeletal, integument and psychiatric systems are negative, except as otherwise noted.      Objective    BP (!) 129/90 (BP Location: Right arm, Patient Position: Sitting, Cuff Size: Adult Regular)   Temp 96.9  F (36.1  C) (Temporal)   Resp 20   LMP 01/25/1974 (Approximate)   SpO2 99%   There is no height or weight on file to calculate BMI.  Physical Exam  Vitals and nursing note reviewed.   Constitutional:       General: She is not in acute distress.     Appearance: Normal appearance. " She is normal weight. She is not ill-appearing or toxic-appearing.   Cardiovascular:      Rate and Rhythm: Tachycardia present. Rhythm irregular.      Heart sounds: Normal heart sounds.   Pulmonary:      Effort: Pulmonary effort is normal. No respiratory distress.      Breath sounds: Normal breath sounds. No stridor. No wheezing, rhonchi or rales.   Chest:      Chest wall: No tenderness.   Musculoskeletal:         General: Normal range of motion.      Right lower leg: Edema present.      Left lower leg: Edema present.      Comments: Trace lower extremity edema bilaterally    Skin:     General: Skin is warm and dry.      Capillary Refill: Capillary refill takes less than 2 seconds.   Neurological:      General: No focal deficit present.      Mental Status: She is alert and oriented to person, place, and time.   Psychiatric:         Mood and Affect: Mood normal.              Results for orders placed or performed in visit on 01/15/25   CBC and Differential     Status: None ()    Narrative    The following orders were created for panel order CBC and Differential.  Procedure                               Abnormality         Status                     ---------                               -----------         ------                     CBC with platelets and d...[[id number]]                                                   Please view results for these tests on the individual orders.         Signed Electronically by: Linda Fernandez NP

## 2025-01-17 DIAGNOSIS — E03.9 ACQUIRED HYPOTHYROIDISM: Chronic | ICD-10-CM

## 2025-01-17 NOTE — PROGRESS NOTES
:    Updated hospital notes have been sent to [facility].     Spoke with [facility] and they reported they will come with family today.     ARIANA Abraham on 12/27/2024 at 11:05 AM    Met with patient and family to discuss discharge plans. They reported that they have been in contact with [facility] who now has an opening.     Called Rima at [facility] and she reported that she will be here shortly to screen patient. Notes from patients current hospital stay were sent to Rima.     ARIANA Abraham on 12/27/2024 at 1:30 PM    Met with family and they are anticipating patient to go to [facility] on Monday with [facility] in place.     will continue to follow.     ARIANA Abraham on 12/27/2024 at 4:09 PM     spoke to michelle watt- peng'ed to l&d 23-Jan-2025

## 2025-01-21 DIAGNOSIS — H40.003 GLAUCOMA SUSPECT, BILATERAL: Primary | ICD-10-CM

## 2025-01-21 RX ORDER — THYROID 15 MG/1
15 TABLET ORAL DAILY
Qty: 30 TABLET | Refills: 1 | Status: SHIPPED | OUTPATIENT
Start: 2025-01-21

## 2025-01-21 RX ORDER — THYROID 90 MG/1
90 TABLET ORAL DAILY
Qty: 90 TABLET | Refills: 0 | Status: SHIPPED | OUTPATIENT
Start: 2025-01-21

## 2025-01-21 NOTE — TELEPHONE ENCOUNTER
"[id number] ([facility] of [place] sent Rx request for the following:      Requested Prescriptions   Pending Prescriptions Disp Refills    NP THYROID 90 MG tablet [Pharmacy Med Name: NP THYROID 90 TABLET] 90 tablet 0     Sig: TAKE 1 TABLET (90 MG) BY MOUTH EVERY DAY       Thyroid Protocol Failed - 1/21/2025  7:45 AM        Failed - Normal TSH on file in past 12 months     Recent Labs   Lab Test 01/15/25  1458   TSH 34.89*             Last Prescription Date:   1/15/25  Last Fill Qty/Refills:         30, R-1    Last Office Visit:              1/15/25   Future Office visit:           3/21/25    Per 1/15/25 lab result: \"will add another 15 mg tablet to take along with the 90 mg totaling 105 mg daily.\" Pharmacy requesting new prescriptions stating to take 15 mg with 90 mg dose.     Routing refill request to provider for review/approval because:  Labs out of range:  TSH      Dhara Villanueva RN on 1/21/2025 at 7:51 AM    "

## 2025-01-23 RX ORDER — LATANOPROST 50 UG/ML
1 SOLUTION/ DROPS OPHTHALMIC AT BEDTIME
Qty: 5 ML | Refills: 11 | Status: SHIPPED | OUTPATIENT
Start: 2025-01-23

## 2025-01-23 NOTE — TELEPHONE ENCOUNTER
[id number] of [place] sent Rx request for the following:    From pharmacy: PREVIOUS RX AT [id number] IS NO LONGER ACTIVE AS PATIENT IS NO LONGER AT [facility]. NEW LTC FACILITY ([facility]) IS REQUESTING REFILL. THANK YOU!     Requested Prescriptions   Pending Prescriptions Disp Refills    latanoprost (XALATAN) 0.005 % ophthalmic solution [Pharmacy Med Name: LATANOPROST 0.005% OPHTH SOLN] 5 mL 0     Sig: INSTILL 1 DROP INTO BOTH EYES AT BEDTIME       There is no refill protocol information for this order  Historical     Last Office Visit:              1/15/25   Future Office visit:           3/21/25    Unable to complete prescription refill per RN Medication Refill Policy. Kenisha Hutchinson RN .............. 1/23/2025  11:14 AM

## 2025-01-24 PROBLEM — I50.9 ACUTE DECOMPENSATED HEART FAILURE (H): Status: ACTIVE | Noted: 2025-01-01

## 2025-01-24 PROBLEM — J96.01 ACUTE HYPOXEMIC RESPIRATORY FAILURE (H): Status: ACTIVE | Noted: 2024-01-01

## 2025-01-24 PROBLEM — I50.9 ACUTE DECOMPENSATED HEART FAILURE (H): Status: RESOLVED | Noted: 2025-01-01 | Resolved: 2025-01-01

## 2025-01-24 PROBLEM — J96.01 ACUTE RESPIRATORY FAILURE WITH HYPOXIA (H): Status: ACTIVE | Noted: 2025-01-01

## 2025-01-24 PROBLEM — E03.9 ACQUIRED HYPOTHYROIDISM: Chronic | Status: ACTIVE | Noted: 2018-07-31

## 2025-01-24 PROBLEM — I50.33 ACUTE ON CHRONIC HEART FAILURE WITH PRESERVED EJECTION FRACTION (HFPEF) (H): Status: ACTIVE | Noted: 2024-01-01

## 2025-01-24 PROBLEM — I50.32 CHRONIC HEART FAILURE WITH PRESERVED EJECTION FRACTION (H): Status: RESOLVED | Noted: 2024-01-01 | Resolved: 2025-01-01

## 2025-01-24 PROBLEM — I48.91 ATRIAL FIBRILLATION WITH RVR (H): Status: ACTIVE | Noted: 2025-01-01

## 2025-01-24 NOTE — ED PROVIDER NOTES
History     Chief Complaint   Patient presents with    Shortness of Breath     HPI  Janna Mcdermott is a 89 year old female who is brought into the emergency department via EMS for acute agitation and possible shortness of breath.  Patient has history of A-fib and heart failure.  Patient has been evaluated by hospice and hospice care is being considered however the POLST sent by the [facility] states allow CPR but otherwise selective treatment only.  Patient has no specific complaints at this time, she appears to understand simple commands.  Patient is on high flow oxygen mask upon arrival and is not verbalizing intelligible words.    Reviewed nurses notes below, similar history is related to me.  Expand All Collapse All    Patient presents with SOB. Ems placed her on 12 lpm nonrebreather. Hx of heart failure and afib. Patient full code.         Allergies:  Allergies   Allergen Reactions    Bee Venom Anaphylaxis    Penicillins Other (See Comments)     Unsure of allergy time, possible hives    Benazepril Cough    Ciprofloxacin Other (See Comments)     Possible GI illness    Erythromycin Other (See Comments)     Unsure of allergy time, possible hives.    Gluten Meal GI Disturbance    Hydrochlorothiazide      hyponatremia       Problem List:    Patient Active Problem List    Diagnosis Date Noted    Nausea 12/30/2024     Priority: Medium    Acute hypoxemic respiratory failure (H) 12/22/2024     Priority: Medium    Acute on chronic congestive heart failure, unspecified heart failure type (H) 12/22/2024     Priority: Medium    Shortness of breath 12/12/2024     Priority: Medium    Acute on chronic heart failure with preserved ejection fraction (HFpEF) (H) 12/12/2024     Priority: Medium    Weight loss 11/18/2024     Priority: Medium    DIMITRI (acute kidney injury) 11/13/2024     Priority: Medium    Right second toe ulcer, limited to breakdown of skin (H) 09/03/2024     Priority: Medium    Ulcer of left second  toe, limited to breakdown of skin (H) 09/03/2024     Priority: Medium    Hammer toes of both feet 09/03/2024     Priority: Medium    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) 08/09/2024     Priority: Medium    Hypercoagulable state due to paroxysmal atrial fibrillation (H) 08/09/2024     Priority: Medium    Incontinence of feces, unspecified fecal incontinence type -- Since starting Statin Medications 05/04/2024     Priority: Medium    Recurrent UTI 05/04/2024     Priority: Medium    Statin intolerance 05/04/2024     Priority: Medium    On continuous oral anticoagulation - Eliquis 05/02/2024     Priority: Medium    History of CVA on 3/26/2021 05/02/2024     Priority: Medium    PAD (peripheral artery disease) 05/02/2024     Priority: Medium    Bilateral femoral artery stenosis 05/02/2024     Priority: Medium     On CT scan from 3/25/2021      Subclavian artery stenosis, left 05/02/2024     Priority: Medium     90% 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      Coronary artery disease involving native coronary artery of native heart without angina pectoris 05/02/2024     Priority: Medium     Moderate on CTA chest 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      Peripheral edema 05/02/2024     Priority: Medium    Chronic heart failure with preserved ejection fraction (H) 05/02/2024     Priority: Medium    Pre-diabetes 06/04/2021     Priority: Medium    History of stroke 06/03/2021     Priority: Medium    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) 04/05/2021     Priority: Medium    Ascending aortic aneurysm 04/05/2021     Priority: Medium    Basilar artery stenosis/occlusion 04/05/2021     Priority: Medium    Cerebellar stroke (H) 03/25/2021     Priority: Medium    Mixed hyperlipidemia 07/31/2018     Priority: Medium    Benign essential hypertension 07/31/2018     Priority: Medium    Acquired hypothyroidism 07/31/2018     Priority: Medium    External ear ulcer (H) 09/20/2016     Priority: Medium    Biatrial enlargement 05/28/2015     Priority:  Medium    Permanent atrial fibrillation (H) 2015     Priority: Medium    Stage 3a chronic kidney disease (H) 02/15/2011     Priority: Medium    Other symptoms involving cardiovascular system 2011     Priority: Medium     Overview:   No obstruction on CUS in [place]      Contact dermatitis and eczema 2010     Priority: Medium    Gambling problem 2009     Priority: Medium     Formatting of this note might be different from the original.  Daughter feels she is addicted.  History of some financial problems due to gambling, but she denies addiction  IMO Update 10/11          Past Medical History:    Past Medical History:   Diagnosis Date    Asymptomatic menopausal state     Chronic kidney disease, stage I     Dermatitis     Essential (primary) hypertension     Hyperlipidemia     Hypothyroidism     Other specified symptoms and signs involving the circulatory and respiratory systems     Paroxysmal atrial fibrillation (H)        Past Surgical History:    Past Surgical History:   Procedure Laterality Date    APPENDECTOMY OPEN      No Comments Provided    CHOLECYSTECTOMY      No Comments Provided    HYSTERECTOMY TOTAL ABDOMINAL      No Comments Provided       Family History:    Family History   Problem Relation Age of Onset    Coronary Artery Disease Mother     Hypertension Mother     Heart Disease Mother         Heart Disease    Heart Disease Sister         Heart Disease,    Heart Disease Sister         Heart Disease,    Other - See Comments Sister         at 6 months    Other - See Comments Brother           in service    Other - See Comments Brother          of Parkinson's    Other - See Comments Brother          of pneumonia    Other - See Comments Other         No cancer or diabetes in the family       Social History:  Marital Status:   [5]  Social History     Tobacco Use    Smoking status: Never    Smokeless tobacco: Never   Vaping Use    Vaping status: Never Used    Substance Use Topics    Alcohol use: No     Alcohol/week: 0.0 standard drinks of alcohol    Drug use: Never        Medications:    apixaban ANTICOAGULANT (ELIQUIS) 2.5 MG tablet  Cranberry-Vitamin C (CRANBERRY CONCENTRATE/VITAMINC) 44657-223 MG CAPS  furosemide (LASIX) 20 MG tablet  latanoprost (XALATAN) 0.005 % ophthalmic solution  melatonin 3 MG tablet  metoclopramide (REGLAN) 5 MG tablet  metoprolol succinate ER (TOPROL XL) 25 MG 24 hr tablet  ondansetron (ZOFRAN) 4 MG tablet  pantoprazole (PROTONIX) 40 MG EC tablet  senna-docusate (SENOKOT-S/PERICOLACE) 8.6-50 MG tablet  sertraline (ZOLOFT) 25 MG tablet  thyroid (ARMOUR) 15 MG tablet  thyroid (NP THYROID) 90 MG tablet  triamcinolone (KENALOG) 0.1 % external cream          Review of Systems   Unable to perform ROS: Acuity of condition       Physical Exam   BP: 122/81  Pulse: (!) 134  Temp: 97.4  F (36.3  C)  Resp: 18  Weight: 60 kg (132 lb 3.2 oz)  SpO2: (!) 87 %      Physical Exam  Vitals and nursing note reviewed.   Constitutional:       General: She is not in acute distress.     Appearance: She is ill-appearing. She is not toxic-appearing.   Eyes:      Pupils: Pupils are equal, round, and reactive to light.   Cardiovascular:      Rate and Rhythm: Tachycardia present. Rhythm irregular.   Pulmonary:      Effort: No tachypnea.      Breath sounds: No stridor. Examination of the right-upper field reveals decreased breath sounds. Examination of the left-upper field reveals decreased breath sounds. Examination of the right-middle field reveals decreased breath sounds. Examination of the left-middle field reveals decreased breath sounds. Examination of the right-lower field reveals decreased breath sounds. Examination of the left-lower field reveals decreased breath sounds. Decreased breath sounds present.   Chest:      Chest wall: No edema.   Musculoskeletal:      Right lower leg: No edema.      Left lower leg: No edema.   Skin:     Capillary Refill: Capillary  refill takes less than 2 seconds.      Coloration: Skin is cyanotic.   Neurological:      General: No focal deficit present.     EKG shows A-fib with RVR    Results for orders placed or performed during the hospital encounter of 01/24/25 (from the past 24 hours)   CBC with platelets differential    Narrative    The following orders were created for panel order CBC with platelets differential.  Procedure                               Abnormality         Status                     ---------                               -----------         ------                     CBC with platelets and d...[[id number]]  Abnormal            Final result                 Please view results for these tests on the individual orders.   Lactic Acid Whole Blood w/ 1x repeat in 2 hrs when >2   Result Value Ref Range    Lactic Acid, Initial 2.4 (H) 0.7 - 2.0 mmol/L   CBC with platelets and differential   Result Value Ref Range    WBC Count 12.7 (H) 4.0 - 11.0 10e3/uL    RBC Count 5.33 (H) 3.80 - 5.20 10e6/uL    Hemoglobin 16.7 (H) 11.7 - 15.7 g/dL    Hematocrit 51.8 (H) 35.0 - 47.0 %    MCV 97 78 - 100 fL    MCH 31.3 26.5 - 33.0 pg    MCHC 32.2 31.5 - 36.5 g/dL    RDW 15.9 (H) 10.0 - 15.0 %    Platelet Count 157 150 - 450 10e3/uL    % Neutrophils 90 %    % Lymphocytes 3 %    % Monocytes 6 %    % Eosinophils 0 %    % Basophils 0 %    % Immature Granulocytes 1 %    NRBCs per 100 WBC 0 <1 /100    Absolute Neutrophils 11.4 (H) 1.6 - 8.3 10e3/uL    Absolute Lymphocytes 0.4 (L) 0.8 - 5.3 10e3/uL    Absolute Monocytes 0.8 0.0 - 1.3 10e3/uL    Absolute Eosinophils 0.0 0.0 - 0.7 10e3/uL    Absolute Basophils 0.1 0.0 - 0.2 10e3/uL    Absolute Immature Granulocytes 0.1 <=0.4 10e3/uL    Absolute NRBCs 0.1 10e3/uL   Extra Tube    Narrative    The following orders were created for panel order Extra Tube.  Procedure                               Abnormality         Status                     ---------                               -----------          ------                     Extra Red Top Tube[[id number]]                               In process                 Extra Serum Separator Tu...[[id number]]                      In process                 Extra Purple Top Tube[[id number]]                                                       Extra Heparinized Syringe[[id number]]                        In process                   Please view results for these tests on the individual orders.       Medications   diltiazem (CARDIZEM) 125 mg in dextrose 5 % 125 mL infusion (10 mg/hr Intravenous Rate/Dose Change 1/24/25 2833)   ipratropium - albuterol 0.5 mg/2.5 mg/3 mL (DUONEB) neb solution 3 mL (has no administration in time range)       Assessments & Plan (with Medical Decision Making)     I have reviewed the nursing notes.    I have reviewed the findings, diagnosis, plan and need for follow up with the patient.  Critical Care time was 30 minutes for this patient excluding procedures.    Medical Decision Making  The patient's presentation was of high complexity (a chronic illness severe exacerbation, progression, or side effect of treatment).    The patient's evaluation involved:  review of 2 test result(s) ordered prior to this encounter (see separate area of note for details)  ordering and/or review of 2 test(s) in this encounter (see separate area of note for details)    The patient's management necessitated high risk (a decision regarding hospitalization).    No Rales noted on pulmonary exam however significant baseline artifact exists on bedside ultrasound consistent with pulmonary edema.    New Prescriptions    No medications on file     I did order BiPAP 10 and 5, discussed with respiratory therapy.  Ordered Lasix and a Cardizem drip.  No bolus due to extreme age of patient.  At time of shift change patient is hemodynamically stable and tolerating BiPAP well.  Signed patient out to Dr. Mc at shift change.  Hospitalist is aware of patient being here and anticipates  admission.    Final diagnoses:   Atrial fibrillation with RVR (H)   Acute decompensated heart failure (H)       1/24/2025   [facility]       Yayo Samaniego MD  01/24/25 5724

## 2025-01-24 NOTE — ED PROVIDER NOTES
History     Chief Complaint   Patient presents with    Shortness of Breath     HPI  Janna Mcdermott is a 89 year old female who care of at shift change.  Patiently recently moved to [facility] and was on hospice though daughter feels hospice was forced upon them and her mom has expressed interest in continuing to live and try to have as good of a quality of life as she can.  She has a history of heart failure.  Her sats were low at her care facility and she came over via EMS.  She was in A-fib RVR.  She has been placed on a diltiazem drip and a BiPAP.  Plan at this time is to admit to ICU for ongoing care.    Allergies:  Allergies   Allergen Reactions    Bee Venom Anaphylaxis    Penicillins Other (See Comments)     Unsure of allergy time, possible hives    Benazepril Cough    Ciprofloxacin Other (See Comments)     Possible GI illness    Erythromycin Other (See Comments)     Unsure of allergy time, possible hives.    Gluten Meal GI Disturbance    Hydrochlorothiazide      hyponatremia       Problem List:    Patient Active Problem List    Diagnosis Date Noted    Atrial fibrillation with RVR (H) 01/24/2025     Priority: Medium    Acute decompensated heart failure (H) 01/24/2025     Priority: Medium    Nausea 12/30/2024     Priority: Medium    Acute hypoxemic respiratory failure (H) 12/22/2024     Priority: Medium    Acute on chronic congestive heart failure, unspecified heart failure type (H) 12/22/2024     Priority: Medium    Shortness of breath 12/12/2024     Priority: Medium    Acute on chronic heart failure with preserved ejection fraction (HFpEF) (H) 12/12/2024     Priority: Medium    Weight loss 11/18/2024     Priority: Medium    DIMITRI (acute kidney injury) 11/13/2024     Priority: Medium    Right second toe ulcer, limited to breakdown of skin (H) 09/03/2024     Priority: Medium    Ulcer of left second toe, limited to breakdown of skin (H) 09/03/2024     Priority: Medium    Hammer toes of both feet  09/03/2024     Priority: Medium    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) 08/09/2024     Priority: Medium    Hypercoagulable state due to paroxysmal atrial fibrillation (H) 08/09/2024     Priority: Medium    Incontinence of feces, unspecified fecal incontinence type -- Since starting Statin Medications 05/04/2024     Priority: Medium    Recurrent UTI 05/04/2024     Priority: Medium    Statin intolerance 05/04/2024     Priority: Medium    On continuous oral anticoagulation - Eliquis 05/02/2024     Priority: Medium    History of CVA on 3/26/2021 05/02/2024     Priority: Medium    PAD (peripheral artery disease) 05/02/2024     Priority: Medium    Bilateral femoral artery stenosis 05/02/2024     Priority: Medium     On CT scan from 3/25/2021      Subclavian artery stenosis, left 05/02/2024     Priority: Medium     90% 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      Coronary artery disease involving native coronary artery of native heart without angina pectoris 05/02/2024     Priority: Medium     Moderate on CTA chest 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      Peripheral edema 05/02/2024     Priority: Medium    Chronic heart failure with preserved ejection fraction (H) 05/02/2024     Priority: Medium    Pre-diabetes 06/04/2021     Priority: Medium    History of stroke 06/03/2021     Priority: Medium    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) 04/05/2021     Priority: Medium    Ascending aortic aneurysm 04/05/2021     Priority: Medium    Basilar artery stenosis/occlusion 04/05/2021     Priority: Medium    Cerebellar stroke (H) 03/25/2021     Priority: Medium    Mixed hyperlipidemia 07/31/2018     Priority: Medium    Benign essential hypertension 07/31/2018     Priority: Medium    Acquired hypothyroidism 07/31/2018     Priority: Medium    External ear ulcer (H) 09/20/2016     Priority: Medium    Biatrial enlargement 05/28/2015     Priority: Medium    Permanent atrial fibrillation (H) 03/09/2015     Priority: Medium    Stage 3a chronic kidney  disease (H) 02/15/2011     Priority: Medium    Other symptoms involving cardiovascular system 2011     Priority: Medium     Overview:   No obstruction on CUS in [place]      Contact dermatitis and eczema 2010     Priority: Medium    Gambling problem 2009     Priority: Medium     Formatting of this note might be different from the original.  Daughter feels she is addicted.  History of some financial problems due to gambling, but she denies addiction  IMO Update 10/11          Past Medical History:    Past Medical History:   Diagnosis Date    Asymptomatic menopausal state     Chronic kidney disease, stage I     Dermatitis     Essential (primary) hypertension     Hyperlipidemia     Hypothyroidism     Other specified symptoms and signs involving the circulatory and respiratory systems     Paroxysmal atrial fibrillation (H)        Past Surgical History:    Past Surgical History:   Procedure Laterality Date    APPENDECTOMY OPEN      No Comments Provided    CHOLECYSTECTOMY      No Comments Provided    HYSTERECTOMY TOTAL ABDOMINAL      No Comments Provided       Family History:    Family History   Problem Relation Age of Onset    Coronary Artery Disease Mother     Hypertension Mother     Heart Disease Mother         Heart Disease    Heart Disease Sister         Heart Disease,    Heart Disease Sister         Heart Disease,    Other - See Comments Sister         at 6 months    Other - See Comments Brother           in service    Other - See Comments Brother          of Parkinson's    Other - See Comments Brother          of pneumonia    Other - See Comments Other         No cancer or diabetes in the family       Social History:  Marital Status:   [5]  Social History     Tobacco Use    Smoking status: Never    Smokeless tobacco: Never   Vaping Use    Vaping status: Never Used   Substance Use Topics    Alcohol use: No     Alcohol/week: 0.0 standard drinks of alcohol    Drug use:  Never        Medications:    apixaban ANTICOAGULANT (ELIQUIS) 2.5 MG tablet  Cranberry-Vitamin C (CRANBERRY CONCENTRATE/VITAMINC) 46703-332 MG CAPS  furosemide (LASIX) 20 MG tablet  latanoprost (XALATAN) 0.005 % ophthalmic solution  melatonin 3 MG tablet  metoclopramide (REGLAN) 5 MG tablet  metoprolol succinate ER (TOPROL XL) 25 MG 24 hr tablet  ondansetron (ZOFRAN) 4 MG tablet  pantoprazole (PROTONIX) 40 MG EC tablet  senna-docusate (SENOKOT-S/PERICOLACE) 8.6-50 MG tablet  sertraline (ZOLOFT) 25 MG tablet  thyroid (ARMOUR) 15 MG tablet  thyroid (NP THYROID) 90 MG tablet  triamcinolone (KENALOG) 0.1 % external cream          Review of Systems    Physical Exam   BP: 122/81  Pulse: (!) 134  Temp: 97.4  F (36.3  C)  Resp: 18  Weight: 60 kg (132 lb 3.2 oz)  SpO2: (!) 87 %      Physical Exam    ED Course     ED Course as of 01/24/25 1650   Fri Jan 24, 2025   1556 Patient here for afib RVR, on bipap. I assumed care at shift change. On diltiazem drip.      Procedures              Critical Care time:  none     IV Antibiotics given and/or elevated Lactate of 2.4 and no sepsis note found - Delete this reminder and enter the sepsis note or '.edcms' before signing chart.>>>         Results for orders placed or performed during the hospital encounter of 01/24/25 (from the past 24 hours)   EKG 12-lead, tracing only   Result Value Ref Range    Systolic Blood Pressure  mmHg    Diastolic Blood Pressure  mmHg    Ventricular Rate 148 BPM    Atrial Rate 156 BPM    DE Interval  ms    QRS Duration 64 ms     ms    QTc 445 ms    P Axis  degrees    R AXIS -40 degrees    T Axis 138 degrees    Interpretation ECG       Atrial fibrillation with rapid ventricular response  Left axis deviation  Inferior infarct (cited on or before 25-Mar-2021)  Anteroseptal infarct (cited on or before 25-Mar-2021)  Abnormal ECG  When compared with ECG of 24-Dec-2024 10:55,  Vent. rate has increased by  90 bpm  Nonspecific T wave abnormality, worse in  Lateral leads  Confirmed by MD BATEMAN KEITH ([id number]) on 1/24/2025 4:26:33 PM     CBC with platelets differential    Narrative    The following orders were created for panel order CBC with platelets differential.  Procedure                               Abnormality         Status                     ---------                               -----------         ------                     CBC with platelets and d...[[id number]]  Abnormal            Final result                 Please view results for these tests on the individual orders.   Comprehensive metabolic panel   Result Value Ref Range    Sodium 137 135 - 145 mmol/L    Potassium 4.9 3.4 - 5.3 mmol/L    Carbon Dioxide (CO2) 24 22 - 29 mmol/L    Anion Gap 17 (H) 7 - 15 mmol/L    Urea Nitrogen 49.5 (H) 8.0 - 23.0 mg/dL    Creatinine 1.21 (H) 0.51 - 0.95 mg/dL    GFR Estimate 42 (L) >60 mL/min/1.73m2    Calcium 9.7 8.8 - 10.4 mg/dL    Chloride 96 (L) 98 - 107 mmol/L    Glucose 158 (H) 70 - 99 mg/dL    Alkaline Phosphatase 60 40 - 150 U/L    AST 21 0 - 45 U/L    ALT 15 0 - 50 U/L    Protein Total 6.4 6.4 - 8.3 g/dL    Albumin 3.6 3.5 - 5.2 g/dL    Bilirubin Total 1.0 <=1.2 mg/dL   Troponin T, High Sensitivity   Result Value Ref Range    Troponin T, High Sensitivity 47 (H) <=14 ng/L   Nt probnp inpatient (BNP)   Result Value Ref Range    N terminal Pro BNP Inpatient 11,297 (H) 0 - 1,800 pg/mL   Lactic Acid Whole Blood w/ 1x repeat in 2 hrs when >2   Result Value Ref Range    Lactic Acid, Initial 2.4 (H) 0.7 - 2.0 mmol/L   CBC with platelets and differential   Result Value Ref Range    WBC Count 12.7 (H) 4.0 - 11.0 10e3/uL    RBC Count 5.33 (H) 3.80 - 5.20 10e6/uL    Hemoglobin 16.7 (H) 11.7 - 15.7 g/dL    Hematocrit 51.8 (H) 35.0 - 47.0 %    MCV 97 78 - 100 fL    MCH 31.3 26.5 - 33.0 pg    MCHC 32.2 31.5 - 36.5 g/dL    RDW 15.9 (H) 10.0 - 15.0 %    Platelet Count 157 150 - 450 10e3/uL    % Neutrophils 90 %    % Lymphocytes 3 %    % Monocytes 6 %    % Eosinophils 0 %     % Basophils 0 %    % Immature Granulocytes 1 %    NRBCs per 100 WBC 0 <1 /100    Absolute Neutrophils 11.4 (H) 1.6 - 8.3 10e3/uL    Absolute Lymphocytes 0.4 (L) 0.8 - 5.3 10e3/uL    Absolute Monocytes 0.8 0.0 - 1.3 10e3/uL    Absolute Eosinophils 0.0 0.0 - 0.7 10e3/uL    Absolute Basophils 0.1 0.0 - 0.2 10e3/uL    Absolute Immature Granulocytes 0.1 <=0.4 10e3/uL    Absolute NRBCs 0.1 10e3/uL   Extra Tube    Narrative    The following orders were created for panel order Extra Tube.  Procedure                               Abnormality         Status                     ---------                               -----------         ------                     Extra Red Top Tube[[id number]]                               Final result               Extra Serum Separator Tu...[[id number]]                      Final result               Extra Purple Top Tube[[id number]]                                                       Extra Heparinized Syringe[[id number]]                        Final result                 Please view results for these tests on the individual orders.   Extra Red Top Tube   Result Value Ref Range    Hold Specimen x    Extra Serum Separator Tube (SST)   Result Value Ref Range    Hold Specimen x    Extra Heparinized Syringe   Result Value Ref Range    Hold Specimen held    Blood gas arterial   Result Value Ref Range    pH Arterial 7.41 7.35 - 7.45    pCO2 Arterial 41 35 - 45 mm Hg    pO2 Arterial 69 (L) 80 - 105 mm Hg    FIO2 100     Bicarbonate Arterial 26 21 - 28 mmol/L    Base Excess/Deficit Arterial 0.7 -3.0 - 3.0 mmol/L    Charlie's Test No     Oxyhemoglobin Arterial 93 92 - 100 %    O2 Sat, Arterial 93.5 (L) 95.0 - 96.0 %    Narrative    In healthy individuals, oxyhemoglobin (O2Hb) and oxygen saturation (SO2) are approximately equal. In the presence of dyshemoglobins, oxyhemoglobin can be considerably lower than oxygen saturation.       Medications   diltiazem (CARDIZEM) 125 mg in dextrose 5 % 125 mL infusion  (15 mg/hr Intravenous Rate/Dose Change 1/24/25 7110)   ipratropium - albuterol 0.5 mg/2.5 mg/3 mL (DUONEB) neb solution 3 mL (has no administration in time range)   No lozenges or gum should be given while patient on BIPAP/AVAPS/AVAPS AE (has no administration in time range)   artificial tears (GENTEAL) 0.1-0.2-0.3 % ophthalmic solution 1 drop (has no administration in time range)   Patient may continue current oral medications (has no administration in time range)   furosemide (LASIX) injection 20 mg (20 mg Intravenous $Given 1/24/25 4177)       Assessments & Plan (with Medical Decision Making)     I have reviewed the nursing notes.    I have reviewed the findings, diagnosis, plan and need for follow up with the patient.           Medical Decision Making  The patient's presentation was of high complexity (a chronic illness severe exacerbation, progression, or side effect of treatment).    The patient's evaluation involved:  review of 3+ test result(s) ordered prior to this encounter (see separate area of note for details)  ordering and/or review of 3+ test(s) in this encounter (see separate area of note for details)    The patient's management necessitated high risk (a decision regarding hospitalization).        New Prescriptions    No medications on file       Final diagnoses:   Atrial fibrillation with RVR (H)   Acute decompensated heart failure (H)   Acute respiratory failure with hypoxia (H)       1/24/2025   [facility] AND \[facility] [place] [facility]\""       Nasra Mc DO  01/24/25 6469

## 2025-01-24 NOTE — ED TRIAGE NOTES
Patient presents with SOB. Ems placed her on 12 lpm nonrebreather. Hx of heart failure and afib. Patient full code.      Triage Assessment (Adult)       Row Name 01/24/25 1446          Triage Assessment    Airway WDL WDL        Respiratory WDL    Respiratory WDL X;cough;rhythm/pattern     Rhythm/Pattern, Respiratory shortness of breath        Skin Circulation/Temperature WDL    Skin Circulation/Temperature WDL WDL        Cardiac WDL    Cardiac WDL X;rhythm     Pulse Rate & Regularity tachycardic     Cardiac Rhythm Atrial fibrillation        Peripheral/Neurovascular WDL    Peripheral Neurovascular WDL WDL        Cognitive/Neuro/Behavioral WDL    Cognitive/Neuro/Behavioral WDL WDL        [place] Coma Scale    Best Eye Response 4-->(E4) spontaneous     Best Motor Response 6-->(M6) obeys commands     Best Verbal Response 5-->(V5) oriented     [place]ly[place] Coma Scale Score 15

## 2025-01-24 NOTE — PHARMACY-ADMISSION MEDICATION HISTORY
Pharmacist Admission Medication History    Admission medication history is complete. The information provided in this note is only as accurate as the sources available at the time of the update.    Information Source(s): Facility (U/NH/) medication list/MAR via  MAR review    Pertinent Information: Patient has all of her medications managed for her by nurising staff at [facility].     Changes made to PTA medication list:  Added:   Acetaminophen PRN (rectal and PO) (has not used on current MAR)  Bisacodyl rectal PRN (has not used on current MAR)  Hydromorphone PRN (has not used on current MAR)  Hyoscyamine sublingual PRN (has not used on current MAR)  Imodium PRN (has not used on current MAR)  Lorazepam PRN(has not used on current MAR)  Maalox PRN (has not used on current MAR)  Milk of Magnesia (has not used on current MAR)  Compazine PRN  Robitussin DM PRN - (on home med list not MAR - per verbal discussion she has not been using this recently)  Vitamin D scheduled  Deleted:   Sertraline - last dose 1/17/25  Changed:   Senna/docusate - both scheduled and PRN use  Metoclopramide - both scheduled and PRN use  Ondansetron - both scheduled and PRN use  Melatonin - dose of 1 mg scheduled  Triamcinolone cream -per MAR lotion is currently used (on home med list not MAR - per verbal discussion she has not been using this recently)    Allergies reviewed with patient's MAR and updates made in EHR: yes    Medication History Completed By: Madie Cifuentes [facility] 1/24/2025 5:25 PM    PTA Med List   Medication Sig Last Dose/Taking    acetaminophen (TYLENOL) 325 MG tablet Take 650 mg by mouth every 4 hours as needed for mild pain or fever. More than a month    acetaminophen (TYLENOL) 650 MG suppository Place 650 mg rectally every 6 hours as needed for fever or mild pain. More than a month    alum & mag hydroxide-simethicone (MAALOX  ES) 400-400-40 MG/5ML SUSP suspension Take 20 mLs by mouth every 4 hours as needed for  indigestion. More than a month    apixaban ANTICOAGULANT (ELIQUIS) 2.5 MG tablet Take 1 tablet (2.5 mg) by mouth 2 times daily. 1/24/2025 Morning    bisacodyl (DULCOLAX) 10 MG suppository Place 10 mg rectally daily as needed for constipation. More than a month    Cranberry-Vitamin C (CRANBERRY CONCENTRATE/VITAMINC) 31496-626 MG CAPS Take 1 capsule by mouth 2 times daily - for UTI prevention 1/24/2025 Morning    Dextromethorphan-guaiFENesin  MG/5ML syrup Take 10 mLs by mouth every 4 hours as needed for cough. Unknown    furosemide (LASIX) 20 MG tablet Take 1 tablet (20 mg) by mouth daily as needed (weight gain of 2 lbs in a day or 5 lbs in a week). More than a month    HYDROmorphone (DILAUDID) 2 MG tablet Take 2 mg by mouth every 2 hours as needed for shortness of breath or pain. More than a month    hyoscyamine (LEVSIN) 0.125 MG tablet Take 0.125 mg by mouth every 4 hours as needed (secretions). More than a month    latanoprost (XALATAN) 0.005 % ophthalmic solution INSTILL 1 DROP INTO BOTH EYES AT BEDTIME 1/23/2025 Bedtime    loperamide (IMODIUM) 2 MG capsule Give 4 mg by mouth as needed for diarrhea after first loose stool, then 2 mg after each subsequent loose stool More than a month    LORazepam (ATIVAN) 0.5 MG tablet Take 0.5 mg by mouth every 4 hours as needed for anxiety. More than a month    magnesium hydroxide (MILK OF MAGNESIA) 400 MG/5ML suspension Take 30 mLs by mouth daily as needed for constipation. More than a month    melatonin 1 MG TABS tablet Take 1 mg by mouth at bedtime. 1/23/2025 Bedtime    metoclopramide (REGLAN) 5 MG tablet Take 5 mg by mouth 3 times daily.  - may also give once daily as needed nausea/vomiting 1/24/2025 Noon    metoprolol succinate ER (TOPROL XL) 25 MG 24 hr tablet Take 3 tablets (75 mg) by mouth daily. 1/24/2025 Morning    ondansetron (ZOFRAN) 4 MG tablet Take 4 mg by mouth 3 times daily. - may also give once daily as needed for nausea/vomiting 1/24/2025 Noon     pantoprazole (PROTONIX) 40 MG EC tablet Take 1 tablet (40 mg) by mouth every morning (before breakfast). 1/24/2025 Morning    prochlorperazine (COMPAZINE) 10 MG tablet Take 5 mg by mouth every 6 hours as needed for nausea or vomiting. 1/2/2025 at  5:25 PM    senna-docusate (SENOKOT-S/PERICOLACE) 8.6-50 MG tablet Take 1 tablet by mouth at bedtime.  - may also take one tablet by mouth once daily as needed for constipation. 1/23/2025 Bedtime    thyroid (ARMOUR) 15 MG tablet Take 1 tablet (15 mg) by mouth daily. Take with 90 mg to equal 105 mg daily 1/24/2025 Morning    thyroid (NP THYROID) 90 MG tablet Take 1 tablet (90 mg) by mouth daily. Take with 15 mg to equal 105 mg daily 1/24/2025 Morning    triamcinolone (KENALOG) 0.1 % external lotion Apply topically 2 times daily as needed for irritation. Unknown    Vitamin D, Cholecalciferol, 10 MCG (400 UNIT) TABS Take 2 tablets by mouth daily. 1/24/2025 Morning

## 2025-01-25 NOTE — PROGRESS NOTES
Dtr left.  Took all belongings except blanket and pillow and will be back to grab them.  [employer] notified.  Will transport body to [place] until  home arrives.

## 2025-01-25 NOTE — PROGRESS NOTES
Pt received on 15L oxymask.  Pt has decreased perfusion to fingers and pulse ox reading has been intermittent.  Davina RN called and request that I assess for Vapotherm due to pulse ox stating sats as low as 79%.  Assessed Pt; her hands are very cold.  Placed heal warmer inside socks in order to make mittens to help increase perfusion.  When I hold hands between mine with pulse ox in place Sats read between 97-99% on 15L oxymask.  At this time pt will remain on Oxymask.  Respiratory will continue to provide support as needed.    Alma Paz, RT

## 2025-01-25 NOTE — PROGRESS NOTES
CC:  More short of breath and drowsy now.  RR-30, /68, P-74.     Chart reviewed:  Acute hypoxic respiratory failure    Assessment: Severe pulmonary edema, lower extremity edema, orthopnea, elevated BNP all clinically consistent.  She has not been taking diuretics over the last 3 weeks due to enrollment in hospice.  At this point patient wants to pursue IV diuretics and necessary labs but does not want to escalate her care and confirms her POLST is appropriate as noted in our record.  She also confirms her DNR/DNI status and discussed with daughter who also is in agreement.  4 Plex negative, no evidence of bacterial community-acquired pneumonia COPD exacerbation or ACS contributing to hypoxia.   Plan:   -Discontinue BiPAP given frequent nausea and intermittent vomiting  -Lasix 40 mg IV every 12  -Wean to oxime mask and consider Vapotherm  -Strict I's and O's and daily weights       Seen by [facility]    Air entry decreased at base. Crackle at base.    Give an extra dose of IV Lasix/Nebs/ may switch hiflow to Bipap/    Check ABG/CBC/CMP

## 2025-01-25 NOTE — PROGRESS NOTES
Pt became bradicardic.  No hrt rate with auscultation.  No resp effort.  .  House supervisor aware.

## 2025-01-25 NOTE — PLAN OF CARE
"Goal Outcome Evaluation:  Pt A&OX4 with intermittent confusion. Pt became more lethargic throughout shift. BP's soft. Cardizem gtt stopped due to SBP<100. HR has been controlled 70s-100. Tachypneic. LS are dim with crackles in bases. Pt on vapotherm 35 % FiO2. Satting 90%. [place]. Grubbs inserted for strict I&O's. Pt has had 50 mL of U/O overnight. Urine is dark mad[place]. UA sent. +2 edema to BLE. Bear hugger removed due to pt pulling it off. Temp 97.0. Extremities are cool. Fingers and toes and dusky/cyanotic. Mepilex to sacrum for blanchable redness. [id number].       Problem: Adult Inpatient Plan of Care  Goal: Plan of Care Review  Description: The Plan of Care Review/Shift note should be completed every shift.  The Outcome Evaluation is a brief statement about your assessment that the patient is improving, declining, or no change.  This information will be displayed automatically on your shift  note.  Outcome: Not Progressing  Goal: Patient-Specific Goal (Individualized)  Description: You can add care plan individualizations to a care plan. Examples of Individualization might be:  \"Parent requests to be called daily at 9am for status\", \"I have a hard time hearing out of my right ear\", or \"Do not touch me to wake me up as it startles  me\".  Outcome: Not Progressing  Goal: Absence of Hospital-Acquired Illness or Injury  Outcome: Not Progressing  Intervention: Identify and Manage Fall Risk  Recent Flowsheet Documentation  Taken 1/25/2025 0430 by Davina Yu, RN  Safety Promotion/Fall Prevention:   activity supervised   assistive device/personal items within reach   clutter free environment maintained   safety round/check completed  Taken 1/25/2025 0030 by Davina Yu, RN  Safety Promotion/Fall Prevention:   activity supervised   assistive device/personal items within reach   clutter free environment maintained   safety round/check completed  Taken 1/24/2025 1946 by Davina Yu, RN  Safety Promotion/Fall " Prevention:   activity supervised   assistive device/personal items within reach   clutter free environment maintained   safety round/check completed  Intervention: Prevent Skin Injury  Recent Flowsheet Documentation  Taken 1/25/2025 0030 by Davina Yu RN  Body Position:   turned   right   heels elevated   weight shifting  Taken 1/24/2025 2100 by Davina Yu RN  Body Position:   supine, legs elevated   weight shifting  Goal: Optimal Comfort and Wellbeing  Outcome: Not Progressing  Intervention: Provide Person-Centered Care  Recent Flowsheet Documentation  Taken 1/25/2025 0430 by Davina Yu RN  Trust Relationship/Rapport:   care explained   choices provided  Taken 1/25/2025 0030 by Davina Yu RN  Trust Relationship/Rapport:   care explained   choices provided  Taken 1/24/2025 1946 by Davina Yu RN  Trust Relationship/Rapport:   care explained   choices provided  Goal: Readiness for Transition of Care  Outcome: Not Progressing  Intervention: Mutually Develop Transition Plan  Recent Flowsheet Documentation  Taken 1/24/2025 1900 by Davina Yu RN  Equipment Currently Used at Home:   cane, straight   walker, rolling   walker, standard

## 2025-01-25 NOTE — PROGRESS NOTES
Resting in bed.  Attempts to pull vapotherm out of nose but weak and unable to at this time.  Asked for some ice water but unable to swallow.  Water just pools in her mouth then spills out. Will not attempt oral meds at this time.  Oriented to self only.  Very soft spoken and difficult to understand.  Hands are dusky and cool.  Unable to get an accurate sat reading on any extremity including her ear.  Repo'd and med with IV meds this am.  Cont to monitor.

## 2025-01-25 NOTE — H&P
[facility]    History and Physical  Hospitalist       Date of Admission:  1/24/2025    Assessment & Plan   Janna Mcdermott is a 89 year old female history of diastolic CHF, CKD 3A, hypothyroidism, chronic A-fib who presents with acute hypoxic respiratory failure secondary to CHF exacerbation and A-fib with RVR.    Clinically Significant Risk Factors Present on Admission          # Hypochloremia: Lowest Cl = 96 mmol/L in last 2 days, will monitor as appropriate       # Drug Induced Coagulation Defect: home medication list includes an anticoagulant medication    # Hypertension: Noted on problem list  # Acute heart failure with preserved ejection fraction: heart failure noted on problem list, last echo with EF >50%, and receiving IV diuretics    # Acute Hypoxic Respiratory Failure: Documented O2 saturation < 90%. Continue supplemental oxygen as needed            # Financial/Environmental Concerns:          Principal Problem:    Acute on chronic heart failure with preserved ejection fraction (HFpEF) (H)    Acute hypoxic respiratory failure    Assessment: Severe pulmonary edema, lower extremity edema, orthopnea, elevated BNP all clinically consistent.  She has not been taking diuretics over the last 3 weeks due to enrollment in hospice.  At this point patient wants to pursue IV diuretics and necessary labs but does not want to escalate her care and confirms her POLST is appropriate as noted in our record.  She also confirms her DNR/DNI status and discussed with daughter who also is in agreement.  4 Plex negative, no evidence of bacterial community-acquired pneumonia COPD exacerbation or ACS contributing to hypoxia.   Plan:   -Discontinue BiPAP given frequent nausea and intermittent vomiting  -Lasix 40 mg IV every 12  -Wean to oxime mask and consider Vapotherm  -Strict I's and O's and daily weights    Active Problems:    Stage 3a chronic kidney disease (H)    Assessment: Stable    Plan:    -Monitor daily given diuresis as above    Intractable nausea and vomiting  Severe protein calorie malnutrition  Assessment: Poor appetite, progressive cachexia and nausea likely playing a role.  No recent constipation contributing  Plan:  -Scheduled Zofran  -Compazine and Reglan as needed  -Bowel regimen as needed      Acquired hypothyroidism    Assessment: Stable    Plan:   -Continue home [place] replacement      Atrial fibrillation with RVR (H)    Assessment: Significantly improved with diltiazem drip    Plan:   -Continue Dilt drip overnight and transition to oral metoprolol in a.m.  -Strict electrolyte replacement per protocol  -Continue home DOAC    DVT Prophylaxis: DOAC  Code Status: Prior    Raphael Marin MD    Primary Care Physician   Dwayne Gaona    Chief Complaint   Dyspnea    History is obtained from the patient, daughter, ED providers and chart review.    History of Present Illness   Janna Mcdermott is a 89 year old female history of diastolic CHF, CKD 3A, hypothyroidism, chronic A-fib who presents with acute hypoxic respiratory failure secondary to CHF exacerbation and A-fib with RVR.  Had a prolonged hospitalization from the 22nd to 30 December, was discharged with hospice care.  Since that time her mental status has cleared and she is clinically improved although she has stopped taking any diuretics or rate controlling medications.  She was seen in primary care clinic on 1/15 and felt that she wanted to consider living longer but is otherwise remain in hospice care.    Today she felt increasingly dyspneic and was noted to be hypoxic at her assisted living, given her intractable symptoms she was brought to the ER, there started on BiPAP, diltiazem drip, given IV diuretics, significantly improved and was subsequently admitted to the ICU.    Past Medical History    I have reviewed this patient's medical history and updated it with pertinent information if needed.   Past Medical History:   Diagnosis  Date    Asymptomatic menopausal state     on estrogen    Chronic kidney disease, stage I     No Comments Provided    Dermatitis     No Comments Provided    Essential (primary) hypertension     No Comments Provided    Hyperlipidemia     No Comments Provided    Hypothyroidism     No Comments Provided    Other specified symptoms and signs involving the circulatory and respiratory systems     No Comments Provided    Paroxysmal atrial fibrillation (H)     No Comments Provided       Past Surgical History   I have reviewed this patient's surgical history and updated it with pertinent information if needed.  Past Surgical History:   Procedure Laterality Date    APPENDECTOMY OPEN      No Comments Provided    CHOLECYSTECTOMY      No Comments Provided    HYSTERECTOMY TOTAL ABDOMINAL      No Comments Provided       Prior to Admission Medications   Prior to Admission Medications   Prescriptions Last Dose Informant Patient Reported? Taking?   Cranberry-Vitamin C (CRANBERRY CONCENTRATE/VITAMINC) 38371-054 MG CAPS 1/24/2025 Morning Nursing Home No Yes   Sig: Take 1 capsule by mouth 2 times daily - for UTI prevention   Dextromethorphan-guaiFENesin  MG/5ML syrup Unknown Nursing Home Yes Yes   Sig: Take 10 mLs by mouth every 4 hours as needed for cough.   HYDROmorphone (DILAUDID) 2 MG tablet More than a month Nursing Home Yes Yes   Sig: Take 2 mg by mouth every 2 hours as needed for shortness of breath or pain.   LORazepam (ATIVAN) 0.5 MG tablet More than a month Nursing Home Yes Yes   Sig: Take 0.5 mg by mouth every 4 hours as needed for anxiety.   Vitamin D, Cholecalciferol, 10 MCG (400 UNIT) TABS 1/24/2025 Morning Nursing Home Yes Yes   Sig: Take 2 tablets by mouth daily.   acetaminophen (TYLENOL) 325 MG tablet More than a month Nursing Home Yes Yes   Sig: Take 650 mg by mouth every 4 hours as needed for mild pain or fever.   acetaminophen (TYLENOL) 650 MG suppository More than a month Nursing Home Yes Yes   Sig: Place 650 mg  rectally every 6 hours as needed for fever or mild pain.   alum & mag hydroxide-simethicone (MAALOX  ES) 400-400-40 MG/5ML SUSP suspension More than a month Nursing Home Yes Yes   Sig: Take 20 mLs by mouth every 4 hours as needed for indigestion.   apixaban ANTICOAGULANT (ELIQUIS) 2.5 MG tablet 1/24/2025 Morning Nursing Home No Yes   Sig: Take 1 tablet (2.5 mg) by mouth 2 times daily.   bisacodyl (DULCOLAX) 10 MG suppository More than a month Nursing Home Yes Yes   Sig: Place 10 mg rectally daily as needed for constipation.   furosemide (LASIX) 20 MG tablet More than a month Nursing Home No Yes   Sig: Take 1 tablet (20 mg) by mouth daily as needed (weight gain of 2 lbs in a day or 5 lbs in a week).   hyoscyamine (LEVSIN) 0.125 MG tablet More than a month Nursing Home Yes Yes   Sig: Take 0.125 mg by mouth every 4 hours as needed (secretions).   latanoprost (XALATAN) 0.005 % ophthalmic solution 1/23/2025 Bedtime Nursing Home No Yes   Sig: INSTILL 1 DROP INTO BOTH EYES AT BEDTIME   loperamide (IMODIUM) 2 MG capsule More than a month Nursing Home Yes Yes   Sig: Give 4 mg by mouth as needed for diarrhea after first loose stool, then 2 mg after each subsequent loose stool   magnesium hydroxide (MILK OF MAGNESIA) 400 MG/5ML suspension More than a month Nursing Home Yes Yes   Sig: Take 30 mLs by mouth daily as needed for constipation.   melatonin 1 MG TABS tablet 1/23/2025 Bedtime Nursing Home Yes Yes   Sig: Take 1 mg by mouth at bedtime.   metoclopramide (REGLAN) 5 MG tablet 1/24/2025 [place] Nursing Home Yes Yes   Sig: Take 5 mg by mouth 3 times daily.  - may also give once daily as needed nausea/vomiting   metoprolol succinate ER (TOPROL XL) 25 MG 24 hr tablet 1/24/2025 Morning Nursing Home No Yes   Sig: Take 3 tablets (75 mg) by mouth daily.   ondansetron (ZOFRAN) 4 MG tablet 1/24/2025 [facility] Nursing Home Yes Yes   Sig: Take 4 mg by mouth 3 times daily. - may also give once daily as needed for nausea/vomiting   pantoprazole  (PROTONIX) 40 MG EC tablet 1/24/2025 Morning Nursing Home No Yes   Sig: Take 1 tablet (40 mg) by mouth every morning (before breakfast).   prochlorperazine (COMPAZINE) 10 MG tablet 1/2/2025 at  5:25 PM Nursing Home Yes Yes   Sig: Take 5 mg by mouth every 6 hours as needed for nausea or vomiting.   senna-docusate (SENOKOT-S/PERICOLACE) 8.6-50 MG tablet 1/23/2025 Bedtime Nursing Home Yes Yes   Sig: Take 1 tablet by mouth at bedtime.  - may also take one tablet by mouth once daily as needed for constipation.   thyroid (ARMOUR) 15 MG tablet 1/24/2025 Morning Nursing Home No Yes   Sig: Take 1 tablet (15 mg) by mouth daily. Take with 90 mg to equal 105 mg daily   thyroid (NP THYROID) 90 MG tablet 1/24/2025 Morning Nursing Home No Yes   Sig: Take 1 tablet (90 mg) by mouth daily. Take with 15 mg to equal 105 mg daily   triamcinolone (KENALOG) 0.1 % external lotion Unknown Nursing Home Yes Yes   Sig: Apply topically 2 times daily as needed for irritation.      Facility-Administered Medications: None     Allergies   Allergies   Allergen Reactions    Bee Venom Anaphylaxis    Penicillins Other (See Comments)     Unsure of allergy time, possible hives    Benazepril Cough    Ciprofloxacin Other (See Comments)     Possible GI illness    Erythromycin Other (See Comments)     Unsure of allergy time, possible hives.    Gluten Meal GI Disturbance    Hydrochlorothiazide      hyponatremia       Social History   I have reviewed this patient's social history and updated it with pertinent information if needed. Janna Mcdermott  reports that she has never smoked. She has never used smokeless tobacco. She reports that she does not drink alcohol and does not use drugs.    Family History   I have reviewed this patient's family history and updated it with pertinent information if needed.   Family History   Problem Relation Age of Onset    Coronary Artery Disease Mother     Hypertension Mother     Heart Disease Mother         Heart Disease     Heart Disease Sister         Heart Disease,    Heart Disease Sister         Heart Disease,    Other - See Comments Sister         at 6 months    Other - See Comments Brother           in service    Other - See Comments Brother          of Parkinson's    Other - See Comments Brother          of pneumonia    Other - See Comments Other         No cancer or diabetes in the family       Review of Systems     Constitutional: Generally poor energy and appetite, no recent sick contacts, no COVID or influenza like illness related symptoms.  Eyes: no changes in vision  Ears, nose, mouth, throat, and face: no mouth sores, dysphagia, or odynophagia.  Respiratory: No productive cough, no wheezing, no aspiration symptoms or events.    Cardiovascular: no chest pain, significant palpitations worsening orthopnea and increased lower extremity edema but no presyncopal or syncopal symptoms.    Gastrointestinal: no constipation, diarrhea, moved her bowels 4 times yesterday, frequent nausea which has been a chronic problem for her no current vomiting and no abdominal pain.    Genitourinary: no dysuria, hematuria, urgency or frequency.   Hematologic/lymphatic: She has had ongoing unintentional weight loss due to poor appetite.  Musculoskeletal: no pain to extremities or falls.   Neurological: no new weakness, tingling, numbness.   Endocrine: not a known diabetic.     Physical Exam   Temp: 98  F (36.7  C) Temp src: Tympanic BP: 119/62 Pulse: (!) 120   Resp: 25 SpO2: (!) 89 % O2 Device: Oxymask Oxygen Delivery: 15 LPM  Vital Signs with Ranges  Temp:  [97.4  F (36.3  C)-98  F (36.7  C)] 98  F (36.7  C)  Pulse:  [] 120  Resp:  [18-36] 25  BP: (101-156)/() 119/62  SpO2:  [74 %-95 %] 89 %  132 lbs 3.2 oz    GENERAL: Talkative, sitting up in bed, pleasant and appropriate, in no apparent distress.  Head: normocephalic and atraumatic  Eyes: anicteric and non-injected sclera  Nose: no rhinorrhea or epistaxis.    Throat: moist mucous membranes with no active oral lesions.  NECK: Supple, jugular venous distension to the angle of the mandible  CARDIOVASCULAR: Irregularly irregular rate and rhythm, no murmurs, rubs, or gallops. Normal S1/S2.  1+ bilateral pitting and symmetric lower extremity edema.   RESPIRATORY: Diminished breath sounds at the bilateral bases with crackles at the mid lung fields, no significant wheezing, no accessory muscle use or evidence of respiratory distress.   GI: soft, non-tender, mild diffuse distention, normoactive bowel sounds.  MUSCULOSKELETAL: warm and well perfused, 2+ dorsalis pedis pulses.    SKIN: no pallor, jaundice or rashes.  NEUROLOGY: AAOx3 to person, year and place, follows commands, speech and language without focal deficits.     Data   Data reviewed today:  I personally reviewed the EKG tracing showing irregularly irregular rate and rhythm consistent with atrial fibrillation, no new ST-T wave inversions elevations or depressions. .  Recent Labs   Lab 01/24/25  1542   WBC 12.7*   HGB 16.7*   MCV 97         POTASSIUM 4.9   CHLORIDE 96*   CO2 24   BUN 49.5*   CR 1.21*   ANIONGAP 17*   CORY 9.7   *   ALBUMIN 3.6   PROTTOTAL 6.4   BILITOTAL 1.0   ALKPHOS 60   ALT 15   AST 21       Recent Results (from the past 24 hours)   XR Chest Port 1 View    Narrative    Procedure:XR CHEST PORT 1 VIEW    Clinical history:Female, 89 years, SOB    Technique: Single view was obtained.    Comparison: 12/27/2024    Findings: The cardiac silhouette is not well seen secondary to low  lung volumes and moderate-sized effusions.    The lungs demonstrate areas of airspace consolidation in the perihilar  regions. Compressive atelectasis is seen in the dependent portions of  the lungs. Bony structures are unremarkable.      Impression    Impression:   Moderate size pleural effusions have increased in volume compared to  the prior study with worsening atelectasis/consolidation involving  the  perihilar and dependent portions of both lungs.    XOCHILT MORGAN MD         SYSTEM ID:  [id number]       Disposition Plan     Medically Ready for Discharge: Anticipated in 2-4 Days

## 2025-01-25 NOTE — PROGRESS NOTES
Vapotherm started at 30 LPM, 100% FiO2.   Davina Yu RN on 1/25/2025 at 5:01 AM      Pt had legs thrown over bed upon RN entering. Pt is tachypneic, RR 32. Pt more lethargic than previously. SpO2 reading 77% on 15 LPM via oxymask after hands were warmed up. Pt has poor perfusion to extremities, hands and feet are cold and blue. Temp 95.9. Bare hugger applied. BP 88/52  MAP 65.   Diltiazem gtt paused for systolic BP's <100.   RT at bedside to initiate vapotherm. ABG's ordered.   MD Kim notified.   Davina Yu RN on 1/25/2025 at 4:59 AM

## 2025-01-25 NOTE — PROGRESS NOTES
01/24/25 2000   Initial Information   Patient Belongings remains with patient   Patient Belongings Remaining with Patient cell phone/electronics;clothing;vision aids;purse/wallet   Did you bring any home meds/supplements to the hospital?  No     A               Admission:  I am responsible for any personal items that are not sent to the safe or pharmacy.  [facility] is not responsible for loss, theft or damage of any property in my possession.    Signature:  _________________________________ Date: _______  Time: _____                                              Staff Signature:  ____________________________ Date: ________  Time: _____      2nd Staff person, if patient is unable/unwilling to sign:    Signature: ________________________________ Date: ________  Time: _____     Discharge:  [facility] has returned all of my personal belongings:    Signature: _________________________________ Date: ________  Time: _____                                          Staff Signature:  ____________________________ Date: ________  Time: _____

## 2025-01-25 NOTE — PROGRESS NOTES
Pt continued to show low Sats on pulse ox.  RN placed bear hugger due to low Pt temp. Placed on Vapotherm 30L 100%.  ABG ordered.  Respiratory will continue to provide support as needed.    Alma Paz, RT

## 2025-01-25 NOTE — PROGRESS NOTES
Body down to morgue with Christine MOREIRA supervisor.  [place] and pillow bagged and labeled and placed at ER reg.  Dtr to come back and grab at some point.  Staff aware at .

## 2025-01-25 NOTE — DISCHARGE SUMMARY
[facility]    Discharge Summary  Hospitalist    Date of Admission:  1/24/2025  Date of Discharge:  1/25/2025  Discharging Provider: Raphael Marin MD  Date of Service (when I saw the patient): 01/25/25    Discharge Diagnoses   Principal Problem:    Acute on chronic heart failure with preserved ejection fraction (HFpEF) (H)    Date Noted: 12/12/2024  Active Problems:    Stage 3a chronic kidney disease (H)    Date Noted: 2/15/2011    Acquired hypothyroidism    Date Noted: 7/31/2018    Acute hypoxemic respiratory failure (H)    Date Noted: 12/22/2024    Atrial fibrillation with RVR (H)    Date Noted: 1/24/2025    Acute respiratory failure with hypoxia (H)    Date Noted: 1/24/2025    Acute decompensated heart failure (H)    Date Noted: 1/24/2025      History of Present Illness   Janna Mcdermott is a 89 year old female history of diastolic CHF, CKD 3A, hypothyroidism, chronic A-fib who presents with acute hypoxic respiratory failure secondary to CHF exacerbation and A-fib with RVR.  Had a prolonged hospitalization from the 22nd to 30th of December, was discharged with hospice care.  Since that time her mental status has cleared and she is clinically improved although she has stopped taking any diuretics or rate controlling medications.  She was seen in primary care clinic on 1/15 and felt that she wanted to consider living longer but is otherwise remain in hospice care.     On day of admit she felt increasingly dyspneic and was noted to be hypoxic at her assisted living, given her intractable symptoms she was brought to the ER, there started on BiPAP, diltiazem drip, given IV diuretics, significantly improved and was subsequently admitted to the ICU.    Hospital Course   Janna Mcdermott was admitted on 1/24/2025.  The following problems were addressed during her hospitalization:    Acute on chronic heart failure with preserved ejection fraction (HFpEF) (H)    Acute hypoxic respiratory  failure  Severe pulmonary edema, lower extremity edema, orthopnea, elevated BNP all clinically consistent.  She has not been taking diuretics over the last 3 weeks due to enrollment in hospice.  On admit patient wanted to pursue IV diuretics and necessary labs but did not want to escalate her care and confirmed her POLST was appropriate as noted in our record.  She also confirms her DNR/DNI status and discussed with daughter who also is in agreement and patient certainly had decision making capacity.      Started on Lasix 40 mg IV, BiPAP because was discontinued given frequent episodes of nausea and intermittent vomiting, she was placed on Vapotherm and deteriorated overnight.  She had minimal urine output, increasing oxygenation needs.  She was evaluated prior to her deterioration and developed some metabolic encephalopathy.  She subsequently had rapid deterioration developing bradycardia and then asystole with time of death at 0914.  Family was personally updated and they were appreciative of the care.    Raphael Marin MD    Significant Results and Procedures   none    Pending Results   These results will be followed up by NA  Unresulted Labs Ordered in the Past 30 Days of this Admission       Date and Time Order Name Status Description    1/25/2025  6:27 AM Urine Culture In process             Code Status   DNR / DNI       Primary Care Physician   Dwayne Gaona    Physical Exam   Temp: 97.5  F (36.4  C) Temp src: Tympanic BP: (!) 85/69 Pulse: 89   Resp: (!) 36 SpO2: (!) 79 % O2 Device: High Flow Nasal Cannula (HFNC) Oxygen Delivery: 40 LPM  Vitals:    01/24/25 1444 01/24/25 1947 01/25/25 0600   Weight: 60 kg (132 lb 3.2 oz) 55.2 kg (121 lb 11.1 oz) 55.7 kg (122 lb 12.7 oz)     Vital Signs with Ranges  Temp:  [95.9  F (35.5  C)-98.1  F (36.7  C)] 97.5  F (36.4  C)  Pulse:  [] 89  Resp:  [18-37] 36  BP: ()/() 85/69  FiO2 (%):  [100 %] 100 %  SpO2:  [71 %-96 %] 79 %  I/O last 3 completed shifts:  In:  277.67 [I.V.:277.67]  Out: 50 [Urine:50]    Death exam  General: Unresponsive  HEENT: Pupils fixed and dilated  CV: No auscultated bowel heart rate   respiratory: No auscultated breath sounds      Discharge Disposition   Patient  during this admission  Condition at discharge:     Consultations This Hospital Stay   None    Time Spent on this Encounter   IRpahael MD, personally saw the patient today and spent greater than 30 minutes discharging this patient.    Discharge Orders   No discharge procedures on file.  Discharge Medications   Current Discharge Medication List        CONTINUE these medications which have NOT CHANGED    Details   acetaminophen (TYLENOL) 325 MG tablet Take 650 mg by mouth every 4 hours as needed for mild pain or fever.      acetaminophen (TYLENOL) 650 MG suppository Place 650 mg rectally every 6 hours as needed for fever or mild pain.      alum & mag hydroxide-simethicone (MAALOX  ES) 400-400-40 MG/5ML SUSP suspension Take 20 mLs by mouth every 4 hours as needed for indigestion.      apixaban ANTICOAGULANT (ELIQUIS) 2.5 MG tablet Take 1 tablet (2.5 mg) by mouth 2 times daily.  Qty: 60 tablet, Refills: 11    Associated Diagnoses: Permanent atrial fibrillation (H)      bisacodyl (DULCOLAX) 10 MG suppository Place 10 mg rectally daily as needed for constipation.      Cranberry-Vitamin C (CRANBERRY CONCENTRATE/VITAMINC) 01047-223 MG CAPS Take 1 capsule by mouth 2 times daily - for UTI prevention  Qty: 180 capsule, Refills: 4    Associated Diagnoses: Recurrent UTI      Dextromethorphan-guaiFENesin  MG/5ML syrup Take 10 mLs by mouth every 4 hours as needed for cough.      furosemide (LASIX) 20 MG tablet Take 1 tablet (20 mg) by mouth daily as needed (weight gain of 2 lbs in a day or 5 lbs in a week).  Qty: 90 tablet, Refills: 3    Associated Diagnoses: Acute on chronic heart failure with preserved ejection fraction (H)      HYDROmorphone (DILAUDID) 2 MG tablet Take 2 mg by  mouth every 2 hours as needed for shortness of breath or pain.      hyoscyamine (LEVSIN) 0.125 MG tablet Take 0.125 mg by mouth every 4 hours as needed (secretions).      latanoprost (XALATAN) 0.005 % ophthalmic solution INSTILL 1 DROP INTO BOTH EYES AT BEDTIME  Qty: 5 mL, Refills: 11    Associated Diagnoses: Glaucoma suspect, bilateral      loperamide (IMODIUM) 2 MG capsule Give 4 mg by mouth as needed for diarrhea after first loose stool, then 2 mg after each subsequent loose stool      LORazepam (ATIVAN) 0.5 MG tablet Take 0.5 mg by mouth every 4 hours as needed for anxiety.      magnesium hydroxide (MILK OF MAGNESIA) 400 MG/5ML suspension Take 30 mLs by mouth daily as needed for constipation.      melatonin 1 MG TABS tablet Take 1 mg by mouth at bedtime.      metoclopramide (REGLAN) 5 MG tablet Take 5 mg by mouth 3 times daily.  - may also give once daily as needed nausea/vomiting      metoprolol succinate ER (TOPROL XL) 25 MG 24 hr tablet Take 3 tablets (75 mg) by mouth daily.  Qty: 90 tablet, Refills: 0    Associated Diagnoses: Permanent atrial fibrillation (H)      ondansetron (ZOFRAN) 4 MG tablet Take 4 mg by mouth 3 times daily. - may also give once daily as needed for nausea/vomiting      pantoprazole (PROTONIX) 40 MG EC tablet Take 1 tablet (40 mg) by mouth every morning (before breakfast).  Qty: 90 tablet, Refills: 0    Associated Diagnoses: Nausea      prochlorperazine (COMPAZINE) 10 MG tablet Take 5 mg by mouth every 6 hours as needed for nausea or vomiting.      senna-docusate (SENOKOT-S/PERICOLACE) 8.6-50 MG tablet Take 1 tablet by mouth at bedtime.  - may also take one tablet by mouth once daily as needed for constipation.      !! thyroid (ARMOUR) 15 MG tablet Take 1 tablet (15 mg) by mouth daily. Take with 90 mg to equal 105 mg daily  Qty: 30 tablet, Refills: 1    Associated Diagnoses: Acquired hypothyroidism      !! thyroid (NP THYROID) 90 MG tablet Take 1 tablet (90 mg) by mouth daily. Take with  15 mg to equal 105 mg daily  Qty: 90 tablet, Refills: 0    Comments: *DOSE CHANGE REQUEST* PER [facility] FACILITY DOSE WAS INCREASED ON WED 1/15/25 TO 90+15 MG DAILY. PLEASE SEND NEW RX FOR 15 MG TABLET. THANK YOU!  Associated Diagnoses: Acquired hypothyroidism      triamcinolone (KENALOG) 0.1 % external lotion Apply topically 2 times daily as needed for irritation.      Vitamin D, Cholecalciferol, 10 MCG (400 UNIT) TABS Take 2 tablets by mouth daily.       !! - Potential duplicate medications found. Please discuss with provider.        Allergies   Allergies   Allergen Reactions    Bee Venom Anaphylaxis    Penicillins Other (See Comments)     Unsure of allergy time, possible hives    Benazepril Cough    Ciprofloxacin Other (See Comments)     Possible GI illness    Erythromycin Other (See Comments)     Unsure of allergy time, possible hives.    Gluten Meal GI Disturbance    Hydrochlorothiazide      hyponatremia     Data   Most Recent 3 CBC's:  Recent Labs   Lab Test 01/24/25  1542 01/15/25  1458 12/28/24  0523   WBC 12.7* 10.7 11.8*   HGB 16.7* 17.2* 16.8*   MCV 97 95 93    168 167      Most Recent 3 BMP's:  Recent Labs   Lab Test 01/25/25  0605 01/24/25  1542 01/15/25  1458    137 137   POTASSIUM 5.3 4.9 5.2   CHLORIDE 96* 96* 97*   CO2 18* 24 26   BUN 56.0* 49.5* 41.8*   CR 1.63* 1.21* 1.20*   ANIONGAP 22* 17* 14   CORY 10.1 9.7 9.7   * 158* 134*     Most Recent 2 LFT's:  Recent Labs   Lab Test 01/24/25  1542 12/22/24  1928   AST 21 24   ALT 15 14   ALKPHOS 60 73   BILITOTAL 1.0 0.9     Most Recent INR's and Anticoagulation Dosing History:  Anticoagulation Dose History          Latest Ref Rng & Units 10/15/2013 3/26/2021 11/23/2022 10/27/2024   Recent Dosing and Labs   INR 0.85 - 1.15 1.0  1.06  1.18  1.20      Most Recent 3 Troponin's:  Recent Labs   Lab Test 03/27/21  0938 03/26/21  1218 03/26/21  0554   TROPI 0.116* 0.094* 0.036     Most Recent Cholesterol Panel:  Recent Labs   Lab Test  07/26/24  0735   CHOL 151   LDL 75   HDL 62   TRIG 72     Most Recent 6 Bacteria Isolates From Any Culture (See EPIC Reports for Culture Details):No lab results found.  Most Recent TSH, T4 and A1c Labs:  Recent Labs   Lab Test 01/15/25  1458 04/24/24  1418 03/04/24  0711   TSH 34.89*   < > 16.90*   T4 1.13   < > 1.27   A1C  --   --  5.9    < > = values in this interval not displayed.     Results for orders placed or performed during the hospital encounter of 01/24/25   XR Chest Port 1 View    Narrative    Procedure:XR CHEST PORT 1 VIEW    Clinical history:Female, 89 years, SOB    Technique: Single view was obtained.    Comparison: 12/27/2024    Findings: The cardiac silhouette is not well seen secondary to low  lung volumes and moderate-sized effusions.    The lungs demonstrate areas of airspace consolidation in the perihilar  regions. Compressive atelectasis is seen in the dependent portions of  the lungs. Bony structures are unremarkable.      Impression    Impression:   Moderate size pleural effusions have increased in volume compared to  the prior study with worsening atelectasis/consolidation involving the  perihilar and dependent portions of both lungs.    XOCHILT MORGAN MD         SYSTEM ID:  [id number]

## 2025-01-25 NOTE — PROGRESS NOTES
[facility] ADMISSION NOTE    Patient admitted to room 917 at approximately 1935 via cart from emergency room. Patient was accompanied by nurse.     Verbal SBAR report received from TARIK Linder prior to patient arrival.     Patient trasferred to bed via air michelle. Patient alert and oriented X 3. The patient is not having any pain.  . Admission vital signs: Blood pressure 128/83, pulse 86, temperature 98.1  F (36.7  C), temperature source Tympanic, resp. rate 20, weight 55.2 kg (121 lb 11.1 oz), last menstrual period 01/25/1974, SpO2 (!) 89%, not currently breastfeeding. Patient was oriented to plan of care, call light, bed controls, tv, telephone, bathroom, and visiting hours.     Risk Assessment    The following safety risks were identified during admission: fall. Yellow risk band applied: YES.     Skin Initial Assessment    This writer admitted this patient and completed a full skin assessment and Enmanuel score in the Adult PCS flowsheet.   Photo documentation of skin problem and/or wound competed via [employer] application (located under Media):  Yes    Appropriate interventions initiated as needed.     Secondary skin check completed by TARIK Clemente.    Enmanuel Risk Assessment  Sensory Perception: 3-->slightly limited  Moisture: 3-->occasionally moist  Activity: 1-->bedfast  Mobility: 3-->slightly limited  Nutrition: 2-->probably inadequate  Friction and Shear: 2-->potential problem  Enmanuel Score: 14  Moisture Interventions: No brief in bed, Incontinence pad, Urinary collection device  Nutrition Interventions: Maintain adequate hydration  Friction/Shear Interventions: HOB 30 degrees or less, Silicone foam sacral dressing  Sensory/Activity/Mobility Interventions: Pillows between bony prominence, Turn: left/right positioning  Mattress: Low air loss (LISA pump, Isolibrium, Skin Guard, Pulsate, Isoair, etc.)  Bed Frame: Standard width and length    Education    Patient has a [place] to Observation order: No  Observation education  completed and documented: N/A    Admission/Transfer from: ER  2 RN skin assessment completed. Yes  Significant findings include: Blanchable redness/ shearing to sacrum. Scattered bruising.   [facility] Nurse Consult Ordered? No       Davina Yu RN

## 2025-01-27 LAB
BACTERIA UR CULT: ABNORMAL
BACTERIA UR CULT: ABNORMAL

## (undated) RX ORDER — FUROSEMIDE 10 MG/ML
INJECTION INTRAMUSCULAR; INTRAVENOUS
Status: DISPENSED
Start: 2025-01-01

## (undated) RX ORDER — ONDANSETRON 2 MG/ML
INJECTION INTRAMUSCULAR; INTRAVENOUS
Status: DISPENSED
Start: 2021-03-25

## (undated) RX ORDER — HEPARIN SODIUM 5000 [USP'U]/.5ML
INJECTION, SOLUTION INTRAVENOUS; SUBCUTANEOUS
Status: DISPENSED
Start: 2021-03-25

## (undated) RX ORDER — SODIUM CHLORIDE 9 MG/ML
INJECTION, SOLUTION INTRAVENOUS
Status: DISPENSED
Start: 2021-03-25

## (undated) RX ORDER — HEPARIN SODIUM 10000 [USP'U]/100ML
INJECTION, SOLUTION INTRAVENOUS
Status: DISPENSED
Start: 2021-03-25

## (undated) RX ORDER — DILTIAZEM HYDROCHLORIDE 120 MG/1
CAPSULE, COATED, EXTENDED RELEASE ORAL
Status: DISPENSED
Start: 2024-10-27

## (undated) RX ORDER — FUROSEMIDE 10 MG/ML
INJECTION INTRAMUSCULAR; INTRAVENOUS
Status: DISPENSED
Start: 2024-12-12

## (undated) RX ORDER — DILTIAZEM HYDROCHLORIDE 5 MG/ML
INJECTION INTRAVENOUS
Status: DISPENSED
Start: 2021-03-25

## (undated) RX ORDER — DILTIAZEM HCL/D5W 125 MG/125
PLASTIC BAG, INJECTION (ML) INTRAVENOUS
Status: DISPENSED
Start: 2025-01-01

## (undated) RX ORDER — LORAZEPAM 2 MG/ML
INJECTION INTRAMUSCULAR
Status: DISPENSED
Start: 2022-11-23

## (undated) RX ORDER — DILTIAZEM HYDROCHLORIDE 120 MG/1
CAPSULE, COATED, EXTENDED RELEASE ORAL
Status: DISPENSED
Start: 2022-11-23

## (undated) RX ORDER — ONDANSETRON 2 MG/ML
INJECTION INTRAMUSCULAR; INTRAVENOUS
Status: DISPENSED
Start: 2024-12-22

## (undated) RX ORDER — ONDANSETRON 2 MG/ML
INJECTION INTRAMUSCULAR; INTRAVENOUS
Status: DISPENSED
Start: 2025-01-01

## (undated) RX ORDER — LATANOPROST 50 UG/ML
SOLUTION/ DROPS OPHTHALMIC
Status: DISPENSED
Start: 2025-01-01

## (undated) RX ORDER — FUROSEMIDE 10 MG/ML
INJECTION INTRAMUSCULAR; INTRAVENOUS
Status: DISPENSED
Start: 2024-11-13